# Patient Record
Sex: FEMALE | Race: BLACK OR AFRICAN AMERICAN | Employment: OTHER | URBAN - METROPOLITAN AREA
[De-identification: names, ages, dates, MRNs, and addresses within clinical notes are randomized per-mention and may not be internally consistent; named-entity substitution may affect disease eponyms.]

---

## 2017-01-04 PROBLEM — C64.1 RENAL CELL CARCINOMA OF RIGHT KIDNEY (HCC): Status: ACTIVE | Noted: 2017-01-04

## 2017-01-09 ENCOUNTER — APPOINTMENT (OUTPATIENT)
Dept: GENERAL RADIOLOGY | Age: 72
DRG: 226 | End: 2017-01-09
Attending: EMERGENCY MEDICINE
Payer: MEDICARE

## 2017-01-09 ENCOUNTER — HOSPITAL ENCOUNTER (INPATIENT)
Age: 72
LOS: 14 days | Discharge: NURSING, REHAB OR LONG-TERM CARE | DRG: 226 | End: 2017-01-23
Attending: EMERGENCY MEDICINE | Admitting: INTERNAL MEDICINE
Payer: MEDICARE

## 2017-01-09 DIAGNOSIS — I47.1 SVT (SUPRAVENTRICULAR TACHYCARDIA) (HCC): Primary | ICD-10-CM

## 2017-01-09 DIAGNOSIS — R77.8 ELEVATED TROPONIN: ICD-10-CM

## 2017-01-09 DIAGNOSIS — J18.9 PNEUMONIA OF LEFT LOWER LOBE DUE TO INFECTIOUS ORGANISM: ICD-10-CM

## 2017-01-09 DIAGNOSIS — N28.9 RENAL INSUFFICIENCY: ICD-10-CM

## 2017-01-09 LAB
ALBUMIN SERPL BCP-MCNC: 2.4 G/DL (ref 3.5–5)
ALBUMIN/GLOB SERPL: 0.5 {RATIO} (ref 1.1–2.2)
ALP SERPL-CCNC: 84 U/L (ref 45–117)
ALT SERPL-CCNC: 9 U/L (ref 12–78)
ANION GAP BLD CALC-SCNC: 12 MMOL/L (ref 5–15)
APPEARANCE UR: ABNORMAL
AST SERPL W P-5'-P-CCNC: 16 U/L (ref 15–37)
ATRIAL RATE: 88 BPM
ATRIAL RATE: 93 BPM
BACTERIA URNS QL MICRO: NEGATIVE /HPF
BASOPHILS # BLD AUTO: 0 K/UL (ref 0–0.1)
BASOPHILS # BLD: 0 % (ref 0–1)
BILIRUB SERPL-MCNC: 0.6 MG/DL (ref 0.2–1)
BILIRUB UR QL CFM: NEGATIVE
BUN SERPL-MCNC: 34 MG/DL (ref 6–20)
BUN/CREAT SERPL: 20 (ref 12–20)
CALCIUM SERPL-MCNC: 8.6 MG/DL (ref 8.5–10.1)
CALCULATED P AXIS, ECG09: 28 DEGREES
CALCULATED R AXIS, ECG10: -5 DEGREES
CALCULATED R AXIS, ECG10: -5 DEGREES
CALCULATED T AXIS, ECG11: 15 DEGREES
CALCULATED T AXIS, ECG11: 176 DEGREES
CHLORIDE SERPL-SCNC: 96 MMOL/L (ref 97–108)
CK MB CFR SERPL CALC: 6 % (ref 0–2.5)
CK MB SERPL-MCNC: 1.5 NG/ML (ref 5–25)
CK SERPL-CCNC: 25 U/L (ref 26–192)
CK SERPL-CCNC: 30 U/L (ref 26–192)
CO2 SERPL-SCNC: 26 MMOL/L (ref 21–32)
COLOR UR: ABNORMAL
CREAT SERPL-MCNC: 1.72 MG/DL (ref 0.55–1.02)
DIAGNOSIS, 93000: NORMAL
DIAGNOSIS, 93000: NORMAL
EOSINOPHIL # BLD: 0.1 K/UL (ref 0–0.4)
EOSINOPHIL NFR BLD: 1 % (ref 0–7)
EPITH CASTS URNS QL MICRO: ABNORMAL /LPF
ERYTHROCYTE [DISTWIDTH] IN BLOOD BY AUTOMATED COUNT: 19.3 % (ref 11.5–14.5)
GLOBULIN SER CALC-MCNC: 4.9 G/DL (ref 2–4)
GLUCOSE BLD STRIP.AUTO-MCNC: 122 MG/DL (ref 65–100)
GLUCOSE SERPL-MCNC: 120 MG/DL (ref 65–100)
GLUCOSE UR STRIP.AUTO-MCNC: NEGATIVE MG/DL
HCT VFR BLD AUTO: 34.9 % (ref 35–47)
HGB BLD-MCNC: 11.3 G/DL (ref 11.5–16)
HGB UR QL STRIP: NEGATIVE
KETONES UR QL STRIP.AUTO: ABNORMAL MG/DL
LACTATE SERPL-SCNC: 1.6 MMOL/L (ref 0.4–2)
LEUKOCYTE ESTERASE UR QL STRIP.AUTO: ABNORMAL
LYMPHOCYTES # BLD AUTO: 15 % (ref 12–49)
LYMPHOCYTES # BLD: 2.4 K/UL (ref 0.8–3.5)
MAGNESIUM SERPL-MCNC: 2.1 MG/DL (ref 1.6–2.4)
MCH RBC QN AUTO: 23.7 PG (ref 26–34)
MCHC RBC AUTO-ENTMCNC: 32.4 G/DL (ref 30–36.5)
MCV RBC AUTO: 73.3 FL (ref 80–99)
MONOCYTES # BLD: 1 K/UL (ref 0–1)
MONOCYTES NFR BLD AUTO: 6 % (ref 5–13)
NEUTS SEG # BLD: 13 K/UL (ref 1.8–8)
NEUTS SEG NFR BLD AUTO: 78 % (ref 32–75)
NITRITE UR QL STRIP.AUTO: NEGATIVE
P-R INTERVAL, ECG05: 128 MS
PH UR STRIP: 5.5 [PH] (ref 5–8)
PLATELET # BLD AUTO: 442 K/UL (ref 150–400)
POTASSIUM SERPL-SCNC: 4.5 MMOL/L (ref 3.5–5.1)
PROT SERPL-MCNC: 7.3 G/DL (ref 6.4–8.2)
PROT UR STRIP-MCNC: NEGATIVE MG/DL
Q-T INTERVAL, ECG07: 284 MS
Q-T INTERVAL, ECG07: 366 MS
QRS DURATION, ECG06: 106 MS
QRS DURATION, ECG06: 108 MS
QTC CALCULATION (BEZET), ECG08: 442 MS
QTC CALCULATION (BEZET), ECG08: 487 MS
RBC # BLD AUTO: 4.76 M/UL (ref 3.8–5.2)
RBC #/AREA URNS HPF: ABNORMAL /HPF (ref 0–5)
SERVICE CMNT-IMP: ABNORMAL
SODIUM SERPL-SCNC: 134 MMOL/L (ref 136–145)
SP GR UR REFRACTOMETRY: 1.02 (ref 1–1.03)
TROPONIN I SERPL-MCNC: 0.37 NG/ML
TROPONIN I SERPL-MCNC: 0.46 NG/ML
UROBILINOGEN UR QL STRIP.AUTO: 1 EU/DL (ref 0.2–1)
VENTRICULAR RATE, ECG03: 177 BPM
VENTRICULAR RATE, ECG03: 88 BPM
WBC # BLD AUTO: 16.5 K/UL (ref 3.6–11)
WBC URNS QL MICRO: ABNORMAL /HPF (ref 0–4)

## 2017-01-09 PROCEDURE — 74011250636 HC RX REV CODE- 250/636: Performed by: INTERNAL MEDICINE

## 2017-01-09 PROCEDURE — 74011250636 HC RX REV CODE- 250/636

## 2017-01-09 PROCEDURE — 82553 CREATINE MB FRACTION: CPT | Performed by: EMERGENCY MEDICINE

## 2017-01-09 PROCEDURE — 74011000258 HC RX REV CODE- 258: Performed by: INTERNAL MEDICINE

## 2017-01-09 PROCEDURE — 83605 ASSAY OF LACTIC ACID: CPT | Performed by: EMERGENCY MEDICINE

## 2017-01-09 PROCEDURE — 96365 THER/PROPH/DIAG IV INF INIT: CPT

## 2017-01-09 PROCEDURE — 85025 COMPLETE CBC W/AUTO DIFF WBC: CPT | Performed by: EMERGENCY MEDICINE

## 2017-01-09 PROCEDURE — 83735 ASSAY OF MAGNESIUM: CPT | Performed by: EMERGENCY MEDICINE

## 2017-01-09 PROCEDURE — 65610000006 HC RM INTENSIVE CARE

## 2017-01-09 PROCEDURE — 74011250637 HC RX REV CODE- 250/637: Performed by: EMERGENCY MEDICINE

## 2017-01-09 PROCEDURE — 74011000258 HC RX REV CODE- 258: Performed by: EMERGENCY MEDICINE

## 2017-01-09 PROCEDURE — 99285 EMERGENCY DEPT VISIT HI MDM: CPT

## 2017-01-09 PROCEDURE — 80053 COMPREHEN METABOLIC PANEL: CPT | Performed by: EMERGENCY MEDICINE

## 2017-01-09 PROCEDURE — 74011000250 HC RX REV CODE- 250: Performed by: INTERNAL MEDICINE

## 2017-01-09 PROCEDURE — 71010 XR CHEST PORT: CPT

## 2017-01-09 PROCEDURE — 93005 ELECTROCARDIOGRAM TRACING: CPT

## 2017-01-09 PROCEDURE — 82550 ASSAY OF CK (CPK): CPT | Performed by: EMERGENCY MEDICINE

## 2017-01-09 PROCEDURE — 96374 THER/PROPH/DIAG INJ IV PUSH: CPT

## 2017-01-09 PROCEDURE — 74011250636 HC RX REV CODE- 250/636: Performed by: EMERGENCY MEDICINE

## 2017-01-09 PROCEDURE — 87040 BLOOD CULTURE FOR BACTERIA: CPT | Performed by: EMERGENCY MEDICINE

## 2017-01-09 PROCEDURE — 82962 GLUCOSE BLOOD TEST: CPT

## 2017-01-09 PROCEDURE — 81001 URINALYSIS AUTO W/SCOPE: CPT | Performed by: EMERGENCY MEDICINE

## 2017-01-09 PROCEDURE — 84484 ASSAY OF TROPONIN QUANT: CPT | Performed by: EMERGENCY MEDICINE

## 2017-01-09 PROCEDURE — 36415 COLL VENOUS BLD VENIPUNCTURE: CPT | Performed by: EMERGENCY MEDICINE

## 2017-01-09 PROCEDURE — 74011250637 HC RX REV CODE- 250/637: Performed by: INTERNAL MEDICINE

## 2017-01-09 RX ORDER — ACETAMINOPHEN 325 MG/1
650 TABLET ORAL
Status: DISCONTINUED | OUTPATIENT
Start: 2017-01-09 | End: 2017-01-23 | Stop reason: HOSPADM

## 2017-01-09 RX ORDER — GABAPENTIN 300 MG/1
300 CAPSULE ORAL 3 TIMES DAILY
Status: DISCONTINUED | OUTPATIENT
Start: 2017-01-09 | End: 2017-01-22

## 2017-01-09 RX ORDER — METOPROLOL SUCCINATE 25 MG/1
25 TABLET, EXTENDED RELEASE ORAL DAILY
Status: DISCONTINUED | OUTPATIENT
Start: 2017-01-10 | End: 2017-01-20

## 2017-01-09 RX ORDER — BISACODYL 5 MG
5 TABLET, DELAYED RELEASE (ENTERIC COATED) ORAL DAILY PRN
Status: DISCONTINUED | OUTPATIENT
Start: 2017-01-09 | End: 2017-01-23 | Stop reason: HOSPADM

## 2017-01-09 RX ORDER — ADENOSINE 3 MG/ML
INJECTION, SOLUTION INTRAVENOUS
Status: COMPLETED
Start: 2017-01-09 | End: 2017-01-09

## 2017-01-09 RX ORDER — DULOXETIN HYDROCHLORIDE 30 MG/1
60 CAPSULE, DELAYED RELEASE ORAL DAILY
Status: DISCONTINUED | OUTPATIENT
Start: 2017-01-10 | End: 2017-01-19

## 2017-01-09 RX ORDER — ONDANSETRON 2 MG/ML
4 INJECTION INTRAMUSCULAR; INTRAVENOUS
Status: DISCONTINUED | OUTPATIENT
Start: 2017-01-09 | End: 2017-01-23 | Stop reason: HOSPADM

## 2017-01-09 RX ORDER — DULOXETIN HYDROCHLORIDE 30 MG/1
60 CAPSULE, DELAYED RELEASE ORAL 2 TIMES DAILY
Status: DISCONTINUED | OUTPATIENT
Start: 2017-01-09 | End: 2017-01-09

## 2017-01-09 RX ORDER — DILTIAZEM HYDROCHLORIDE 5 MG/ML
INJECTION INTRAVENOUS
Status: DISPENSED
Start: 2017-01-09 | End: 2017-01-10

## 2017-01-09 RX ORDER — DOCUSATE SODIUM 100 MG/1
100 CAPSULE, LIQUID FILLED ORAL 2 TIMES DAILY
Status: DISCONTINUED | OUTPATIENT
Start: 2017-01-09 | End: 2017-01-23 | Stop reason: HOSPADM

## 2017-01-09 RX ORDER — DILTIAZEM HYDROCHLORIDE 5 MG/ML
10 INJECTION INTRAVENOUS
Status: COMPLETED | OUTPATIENT
Start: 2017-01-09 | End: 2017-01-09

## 2017-01-09 RX ORDER — LEVOFLOXACIN 5 MG/ML
750 INJECTION, SOLUTION INTRAVENOUS
Status: DISCONTINUED | OUTPATIENT
Start: 2017-01-09 | End: 2017-01-11

## 2017-01-09 RX ORDER — SODIUM CHLORIDE 0.9 % (FLUSH) 0.9 %
5-10 SYRINGE (ML) INJECTION EVERY 8 HOURS
Status: DISCONTINUED | OUTPATIENT
Start: 2017-01-09 | End: 2017-01-23 | Stop reason: HOSPADM

## 2017-01-09 RX ORDER — AZITHROMYCIN 250 MG/1
500 TABLET, FILM COATED ORAL
Status: COMPLETED | OUTPATIENT
Start: 2017-01-09 | End: 2017-01-09

## 2017-01-09 RX ORDER — SODIUM CHLORIDE 0.9 % (FLUSH) 0.9 %
5-10 SYRINGE (ML) INJECTION AS NEEDED
Status: DISCONTINUED | OUTPATIENT
Start: 2017-01-09 | End: 2017-01-23 | Stop reason: HOSPADM

## 2017-01-09 RX ADMIN — DOCUSATE SODIUM 100 MG: 100 CAPSULE, LIQUID FILLED ORAL at 19:13

## 2017-01-09 RX ADMIN — ADENOSINE 6 MG: 3 INJECTION, SOLUTION INTRAVENOUS at 11:05

## 2017-01-09 RX ADMIN — GUAIFENESIN 600 MG: 600 TABLET, EXTENDED RELEASE ORAL at 19:13

## 2017-01-09 RX ADMIN — GABAPENTIN 300 MG: 300 CAPSULE ORAL at 21:14

## 2017-01-09 RX ADMIN — LEVOFLOXACIN 750 MG: 5 INJECTION, SOLUTION INTRAVENOUS at 21:14

## 2017-01-09 RX ADMIN — AZITHROMYCIN 500 MG: 250 TABLET, FILM COATED ORAL at 14:14

## 2017-01-09 RX ADMIN — ENOXAPARIN SODIUM 70 MG: 40 INJECTION SUBCUTANEOUS at 21:14

## 2017-01-09 RX ADMIN — DEXTROSE MONOHYDRATE 10 MG/HR: 50 INJECTION, SOLUTION INTRAVENOUS at 16:29

## 2017-01-09 RX ADMIN — DILTIAZEM HYDROCHLORIDE 10 MG: 5 INJECTION INTRAVENOUS at 15:57

## 2017-01-09 RX ADMIN — PIPERACILLIN AND TAZOBACTAM 3.38 G: 3; .375 INJECTION, POWDER, FOR SOLUTION INTRAVENOUS at 14:14

## 2017-01-09 RX ADMIN — AMIODARONE HYDROCHLORIDE 1 MG/MIN: 50 INJECTION, SOLUTION INTRAVENOUS at 20:26

## 2017-01-09 RX ADMIN — SODIUM CHLORIDE 1000 ML: 900 INJECTION, SOLUTION INTRAVENOUS at 14:14

## 2017-01-09 RX ADMIN — AMIODARONE HYDROCHLORIDE 150 MG: 50 INJECTION, SOLUTION INTRAVENOUS at 19:52

## 2017-01-09 NOTE — ED PROVIDER NOTES
HPI Comments: Nataly Lazo is a 80 y.o. female, pmhx significant for CAD, HLD, HTN, DM, who presents via EMS to the ED for evaluation of sudden onset lethargy with associated mild SOB x today. Per EMS, the pt was at [facility] for rehabilitation after having her R kidney removed due to renal cancer. They report that the nurses in the facilities noted that she became lethargic and that she a heart rate in the 170s, so they called EMS. Pt specifically denies CP. PCP: JOSE Rubi      Social Hx: -tobacco , -EtOH, -Illicit Drugs   FHx: no pertinent family hx   Medication Allergies: none      There are no other complaints, changes, or physical findings at this time. The history is provided by the patient and the EMS personnel. Past Medical History:   Diagnosis Date    Autoimmune disease ([address]      rheumatoid     CAD (coronary artery disease)     Diabetes (HCC)     GERD (gastroesophageal reflux disease)     Hypertension     Ill-defined condition      anemia    Ill-defined condition      renal mass    Other ill-defined conditions(799.89)      high cholesterol       Past Surgical History:   Procedure Laterality Date    Pr cardiac surg procedure unlist       three stents placed 2005    Hx tonsillectomy      Hx urological  12/22/2016     RIGHT LAPOROSCOPIC HAND ASSISTED RADICAL NEPHRECTOMY         Family History:   Problem Relation Age of Onset    Cancer Mother      stomach    Other Father      aneurysym    Cancer Brother      lung    Other Brother      stomach problems    Stroke Brother        Social History     Social History    Marital status:      Spouse name: N/A    Number of children: N/A    Years of education: N/A     Occupational History    Not on file.      Social History Main Topics    Smoking status: Never Smoker    Smokeless tobacco: Never Used    Alcohol use No    Drug use: No    Sexual activity: Yes     Birth control/ protection: None     Other Topics Concern    Not on file     Social History Narrative         ALLERGIES: Review of patient's allergies indicates no known allergies. Review of Systems   Constitutional: Negative for fever. HENT: Negative for congestion. Eyes: Negative. Respiratory: Positive for shortness of breath. Cardiovascular: Positive for palpitations. Negative for chest pain. Gastrointestinal: Negative for abdominal pain. Endocrine: Negative for heat intolerance. Genitourinary: Negative. Musculoskeletal: Negative for back pain. Skin: Negative for rash. Allergic/Immunologic: Positive for immunocompromised state. Neurological:        + lethargy   Hematological: Does not bruise/bleed easily. Psychiatric/Behavioral: Negative. All other systems reviewed and are negative. Patient Vitals for the past 12 hrs:   Pulse Resp BP SpO2   01/09/17 1557 (!) 168 - 110/82 -   01/09/17 1400 75 21 107/75 100 %   01/09/17 1345 79 17 112/71 98 %   01/09/17 1330 78 19 104/80 99 %   01/09/17 1315 81 14 131/71 99 %   01/09/17 1130 87 19 101/70 98 %   01/09/17 1116 88 24 90/55 96 %   01/09/17 1110 88 - 101/69 -   01/09/17 1109 88 - - -   01/09/17 1105 (!) 178 - 108/66 -       Physical Exam   Constitutional: She is oriented to person, place, and time. She appears well-developed and well-nourished. No distress. HENT:   Head: Normocephalic and atraumatic. Eyes: EOM are normal.   Neck: Normal range of motion. Neck supple. Cardiovascular: Regular rhythm, normal heart sounds and intact distal pulses. Tachycardic   Pulmonary/Chest: Effort normal and breath sounds normal. No respiratory distress. Abdominal: Soft. Bowel sounds are normal. She exhibits no mass. There is no tenderness. Musculoskeletal: Normal range of motion. She exhibits no edema. Neurological: She is alert and oriented to person, place, and time. Coordination normal.   Skin: Skin is warm and dry. Psychiatric: She has a normal mood and affect.  Her behavior is normal.   Nursing note and vitals reviewed. MDM  Number of Diagnoses or Management Options  Pneumonia of left lower lobe due to infectious organism:   Renal insufficiency:   SVT (supraventricular tachycardia):   Diagnosis management comments: DDx: SVT, dehydration, electrolyte abnormality, CHF, CAD, UTI       Amount and/or Complexity of Data Reviewed  Clinical lab tests: reviewed and ordered  Tests in the radiology section of CPT®: ordered and reviewed  Tests in the medicine section of CPT®: reviewed and ordered  Obtain history from someone other than the patient: yes (EMS)  Review and summarize past medical records: yes  Discuss the patient with other providers: yes (Cardiologist   Hospitalist )  Independent visualization of images, tracings, or specimens: yes    Critical Care  Total time providing critical care:  minutes    Patient Progress  Patient progress: stable        Procedures   EKG interpretation: (Preliminary) 1053  Rhythm: Supraventricular tachycardia; and regular . Rate (approx.): 177 bpm; Axis: voltage criteria for LVH; QRS interval: normal ; ST/T wave: ST and T wave abnormality  Written by Fernanda Quiros as dictated by Silas Santacruz MD    EKG interpretation: (Preliminary) 1110  Rhythm: normal sinus rhythm; and regular . Rate (approx.): 88 bpm; Axis: voltage criteria for LVH; GA interval: normal; QRS interval: normal ; ST/T wave: ST elevation, consider early repolarization, pericarditis or injury, non-specific T wave abnormality  Written by Fernanda Quiros as dictated by Silas Santacruz MD      Procedure Note - Chemical Cardioversion:   11:44 AM  Performed by Silas Santacruz MD .  Cardioversion was indicated for a rhythm of SVT and performed 1 times. 6mg of adenosine were given to pt. Patient's rhythm was normal sinus rhythm at the end of the procedure. The procedure took 1-15 minutes, and pt tolerated well.   Written by Fernanda Quiros as dictated by Roger Morton MD    CRITICAL CARE NOTE :    11:46 AM      IMPENDING DETERIORATION -Respiratory, Cardiovascular, Metabolic and Renal    ASSOCIATED RISK FACTORS - Hypotension, Dysrhythmia, Metabolic changes and Dehydration    MANAGEMENT- Bedside Assessment and Supervision of Care    INTERPRETATION -  Xrays, ECG and Blood Pressure    INTERVENTIONS - hemodynamic mngmt and Metobolic interventions    CASE REVIEW - Hospitalist, Medical Sub-Specialist, Nursing and Family    TREATMENT RESPONSE -Stable    PERFORMED BY - Self        NOTES   :      I have spent 81 minutes of critical care time involved in lab review, consultations with specialist, family decision- making, bedside attention and documentation. During this entire length of time I was immediately available to the patient . Roger Morton MD    CONSULT NOTE:   1:22 PM  Roger Morton MD spoke with Dr. Nikolai Lewis,   Specialty: Cardiology  Discussed pt's hx, disposition, and available diagnostic and imaging results. Reviewed care plans. Consultant recommended to repeat her troponin and if it is not going up to d/c with cardizem. Written by SONY Veliz, as dictated by Roger Morton MD.    CONSULT NOTE:   3:26 Parul Burnham MD spoke with Dr. Nikolai Lewis,   Specialty: Cardiology  Discussed pt's hx, disposition, and available diagnostic and imaging results. Reviewed care plans. Consultant will evaluate the pt. Written by SONY Veliz, as dictated by Roger Morton MD.    CONSULT NOTE:   3:53 PM  Roger Morton MD spoke with Dr. Joss Fung,   Specialty: Hospitalist  Discussed pt's hx, disposition, and available diagnostic and imaging results. Reviewed care plans.  Consultant will admit  Written by SONY Veliz, as dictated by Roger Morton MD.                                                          LABORATORY TESTS:  Recent Results (from the past 12 hour(s))   GLUCOSE, POC    Collection Time: 01/09/17 10:49 AM   Result Value Ref Range    Glucose (POC) 122 (H) 65 - 100 mg/dL    Performed by Christy Brewer    EKG, 12 LEAD, INITIAL    Collection Time: 01/09/17 10:53 AM   Result Value Ref Range    Ventricular Rate 177 BPM    Atrial Rate 93 BPM    QRS Duration 108 ms    Q-T Interval 284 ms    QTC Calculation (Bezet) 487 ms    Calculated R Axis -5 degrees    Calculated T Axis 176 degrees    Diagnosis       ** Poor data quality, interpretation may be adversely affected  Supraventricular tachycardia  Voltage criteria for left ventricular hypertrophy  ST & T wave abnormality, consider inferolateral ischemia  When compared with ECG of 08-JUN-2013 22:40,  T wave inversion now evident in Lateral leads  Confirmed by Varinder Tsang P.V. ([id number]) on 1/9/2017 2:46:13 PM     EKG, 12 LEAD, SUBSEQUENT    Collection Time: 01/09/17 11:10 AM   Result Value Ref Range    Ventricular Rate 88 BPM    Atrial Rate 88 BPM    P-R Interval 128 ms    QRS Duration 106 ms    Q-T Interval 366 ms    QTC Calculation (Bezet) 442 ms    Calculated P Axis 28 degrees    Calculated R Axis -5 degrees    Calculated T Axis 15 degrees    Diagnosis       Normal sinus rhythm  Voltage criteria for left ventricular hypertrophy  ST elevation, consider early repolarization, pericarditis, or injury  Nonspecific T wave abnormality    Confirmed by Varinder Tsang, P.V. ([id number]) on 1/9/2017 2:46:25 PM     CBC WITH AUTOMATED DIFF    Collection Time: 01/09/17 11:24 AM   Result Value Ref Range    WBC 16.5 (H) 3.6 - 11.0 K/uL    RBC 4.76 3.80 - 5.20 M/uL    HGB 11.3 (L) 11.5 - 16.0 g/dL    HCT 34.9 (L) 35.0 - 47.0 %    MCV 73.3 (L) 80.0 - 99.0 FL    MCH 23.7 (L) 26.0 - 34.0 PG    MCHC 32.4 30.0 - 36.5 g/dL    RDW 19.3 (H) 11.5 - 14.5 %    PLATELET 287 (H) 036 - 400 K/uL    NEUTROPHILS 78 (H) 32 - 75 %    LYMPHOCYTES 15 12 - 49 %    MONOCYTES 6 5 - 13 %    EOSINOPHILS 1 0 - 7 %    BASOPHILS 0 0 - 1 %    ABS. NEUTROPHILS 13.0 (H) 1.8 - 8.0 K/UL    ABS.  LYMPHOCYTES 2.4 0.8 - 3.5 K/UL    ABS. MONOCYTES 1.0 0.0 - 1.0 K/UL    ABS. EOSINOPHILS 0.1 0.0 - 0.4 K/UL    ABS. BASOPHILS 0.0 0.0 - 0.1 K/UL   METABOLIC PANEL, COMPREHENSIVE    Collection Time: 01/09/17 11:24 AM   Result Value Ref Range    Sodium 134 (L) 136 - 145 mmol/L    Potassium 4.5 3.5 - 5.1 mmol/L    Chloride 96 (L) 97 - 108 mmol/L    CO2 26 21 - 32 mmol/L    Anion gap 12 5 - 15 mmol/L    Glucose 120 (H) 65 - 100 mg/dL    BUN 34 (H) 6 - 20 MG/DL    Creatinine 1.72 (H) 0.55 - 1.02 MG/DL    BUN/Creatinine ratio 20 12 - 20      GFR est AA 36 (L) >60 ml/min/1.73m2    GFR est non-AA 29 (L) >60 ml/min/1.73m2    Calcium 8.6 8.5 - 10.1 MG/DL    Bilirubin, total 0.6 0.2 - 1.0 MG/DL    ALT 9 (L) 12 - 78 U/L    AST 16 15 - 37 U/L    Alk.  phosphatase 84 45 - 117 U/L    Protein, total 7.3 6.4 - 8.2 g/dL    Albumin 2.4 (L) 3.5 - 5.0 g/dL    Globulin 4.9 (H) 2.0 - 4.0 g/dL    A-G Ratio 0.5 (L) 1.1 - 2.2     TROPONIN I    Collection Time: 01/09/17 11:24 AM   Result Value Ref Range    Troponin-I, Qt. 0.37 (H) <0.05 ng/mL   CK W/ REFLX CKMB    Collection Time: 01/09/17 11:24 AM   Result Value Ref Range    CK 30 26 - 192 U/L   MAGNESIUM    Collection Time: 01/09/17 11:24 AM   Result Value Ref Range    Magnesium 2.1 1.6 - 2.4 mg/dL   URINALYSIS W/ RFLX MICROSCOPIC    Collection Time: 01/09/17  2:08 PM   Result Value Ref Range    Color DARK YELLOW      Appearance CLOUDY (A) CLEAR      Specific gravity 1.025 1.003 - 1.030      pH (UA) 5.5 5.0 - 8.0      Protein NEGATIVE  NEG mg/dL    Glucose NEGATIVE  NEG mg/dL    Ketone TRACE (A) NEG mg/dL    Blood NEGATIVE  NEG      Urobilinogen 1.0 0.2 - 1.0 EU/dL    Nitrites NEGATIVE  NEG      Leukocyte Esterase SMALL (A) NEG      WBC 5-10 0 - 4 /hpf    RBC 0-5 0 - 5 /hpf    Epithelial cells MODERATE (A) FEW /lpf    Bacteria NEGATIVE  NEG /hpf   LACTIC ACID, PLASMA    Collection Time: 01/09/17  2:08 PM   Result Value Ref Range    Lactic acid 1.6 0.4 - 2.0 MMOL/L   CK W/ CKMB & INDEX    Collection Time: 01/09/17 2:08 PM   Result Value Ref Range    CK 25 (L) 26 - 192 U/L    CK - MB 1.5 <3.6 NG/ML    CK-MB Index 6.0 (H) 0 - 2.5     TROPONIN I    Collection Time: 01/09/17  2:08 PM   Result Value Ref Range    Troponin-I, Qt. 0.46 (H) <0.05 ng/mL   BILIRUBIN, CONFIRM    Collection Time: 01/09/17  2:08 PM   Result Value Ref Range    Bilirubin UA, confirm NEGATIVE  NEG         IMAGING RESULTS:  CXR Results  (Last 48 hours)               01/09/17 1321  XR CHEST PORT Final result    Impression:  IMPRESSION: Cardiomegaly is stable. Aorta is ectatic. There is increased density   left retrocardiac region most consistent with left basilar atelectasis/airspace   disease               Narrative:  EXAM:  XR CHEST PORT       INDICATION:  SVT       COMPARISON:  10/3/2016       FINDINGS: A portable AP radiograph of the chest was obtained at 1307 hours. The   patient is on a cardiac monitor. There is increased density left retrocardiac   region may represent atelectasis/airspace disease. .  Mild cardiac megaly is   stable. The aorta is ectatic. Sudie Mar Bony structures are unchanged. MEDICATIONS GIVEN:  Medications   dilTIAZem (CARDIZEM) 5 mg/mL injection (not administered)   dilTIAZem (CARDIZEM) injection 10 mg (not administered)   dilTIAZem (CARDIZEM) 100 mg in dextrose 5% (MBP/ADV) 100 mL infusion (not administered)   adenosine (ADENOCARD) 3 mg/mL injection (6 mg  Given 1/9/17 1105)   sodium chloride 0.9 % bolus infusion 1,000 mL (1,000 mL IntraVENous New Bag 1/9/17 1414)   piperacillin-tazobactam (ZOSYN) 3.375 g in 0.9% sodium chloride (MBP/ADV) 100 mL (3.375 g IntraVENous New Bag 1/9/17 1414)   azithromycin (ZITHROMAX) tablet 500 mg (500 mg Oral Given 1/9/17 1414)       IMPRESSION:  1. SVT (supraventricular tachycardia)    2. Pneumonia of left lower lobe due to infectious organism    3. Renal insufficiency    4. Elevated troponin        PLAN:  1.  Admit to Hospitalist  3:56 PM  Patient is being admitted to the hospital by  Rico.  The results of their tests and reasons for their admission have been discussed with them and/or available family. They convey agreement and understanding for the need to be admitted and for their admission diagnosis. Written by Ortiz Guevara, ED Scribe, as dictated by Sulma Martinez MD.      This note is prepared by Saira Tse acting as scribe for MD Sulma Snyder MD: The scribe's documentation has been prepared under my direction and personally reviewed by me in its entirety. I confirm that the note above accurately reflects all work, treatment, procedures, and medical decision making performed by me.

## 2017-01-09 NOTE — CONSULTS
[id number], [address]   [address] [facility]       Name:  Tony Thomas   MR#:  [id number]   :  1945   Account #:  [de-identified]    Date of Consultation:     Date of Adm:  2017       REQUESTING PHYSICIAN: Dov Sung MD, [id number] [place] [address] ER.    REASON FOR CONSULTATION: Evaluate SVT and abnormal   troponin. CHIEF COMPLAINT: Weakness. HISTORY OF PRESENT ILLNESS: The patient is a 81-year-old   female with a history of known coronary artery disease and remote   anterior wall MI in . At that time, the patient underwent stenting of   the LAD. She had diffuse distal LAD disease and for many years was   followed by Dr. Edith Yoder. She apparently did not have any additional   cardiac events. Her risk factors include hypertension, type 2 diabetes   mellitus, and dyslipidemia. She has been intolerant of statins. The patient recently was found to have a right renal mass and   underwent preoperative evaluation in our office in November. She had   a nuclear study that showed an EF of 33% with a fixed apical defect   and fixed inferior defect. There was no reversibility. She subsequently   underwent right nephrectomy in December and was discharged to   [facility]. Over the last few days, she has apparently been   noted to be weak at [facility]. According to the patient, she has   also had some intermittent, vague chest discomfort for the last several   days. She denies syncope, but has had some dizziness. Some   shortness of breath. She was brought to the [id number] [place] [address] ER today and was   found to be in SVT with a rate of 170. This broke with a dose of   adenosine. Initial troponin was 0.37 and a repeat troponin was 0.46. She has also been found to have a pneumonia on chest x-ray and has   an elevated white count. I was asked to see the patient for evaluation. PAST MEDICAL HISTORY: As noted above.  Type 2 diabetes,   hypertension, dyslipidemia, renal cell cancer, status post right   nephrectomy, also history of rheumatoid arthritis. ALLERGIES: INTOLERANCE TO STATINS WITH MYALGIAS. MEDICATIONS ON ADMISSION:   1. Lisinopril. 2. Percocet. 3. Colace. 4. Metoprolol. 5. Cymbalta. 6. Neurontin. 7. Lortab. 8. HCTZ. SOCIAL HISTORY: The patient is a nonsmoker, nondrinker. FAMILY HISTORY: The patient's brother had hypertension. REVIEW OF SYSTEMS   As noted above, otherwise noncontributory. PHYSICAL EXAMINATION   GENERAL: Revealed an elderly  female lying in bed   in no obvious distress. VITAL SIGNS: Blood pressure 107/75, pulse 75, respirations 21. HEENT: Pupils are equal and reactive to light. Oropharynx with moist   oral mucosa. NECK: Supple. No masses or thyromegaly. No cervical or   supraclavicular adenopathy. No definite carotid bruits or JVD. CHEST: Clear anteriorly. No wheezes or crackles. SKIN: Warm and dry. CARDIAC: Tachycardic rhythm, 2/6 systolic murmur, no gallop or   diastolic murmur heard. ABDOMEN: Obese, no obvious masses or organomegaly. Bowel   sounds positive. EXTREMITIES: No cyanosis or clubbing. Distal pulses not well   palpated. No edema noted. NEUROLOGIC: No obvious gross motor deficits. LABORATORY DATA: Hemoglobin 11.3, white count 16.5, CPK 30,   troponin 0.37, repeat 0.46. BUN 34, creatinine 0.7. EKG: Sinus rhythm, LVH, nonspecific ST-T changes. Chest x-ray   showed left basilar airspace disease. EKG on presentation showed   SVT which was regular rate of 175 beats per minute, nonspecific ST-T   changes. IMPRESSION:   1. SVT, probably AV node reentry. 2. Ischemic cardiomyopathy with prior anterior wall myocardial   infarction and PTC and stenting of the left anterior descending. Previous EF of 43%. 3. Hypertension. 4. Type 2 diabetes. 5. Dyslipidemia. 6. Recent right nephrectomy for renal cell cancer. 7. Acute on chronic renal failure.    8. Left-sided pneumonia. RECOMMENDATIONS: The patient will be admitted to the hospitalist   service. In regard to the SVT, I will start her on IV diltiazem to try to   control this. She should be continued on oral beta blocker. If IV   diltiazem does not work, we can try IV amiodarone. The patient will   need to be treated for her underlying noncardiac issues and an   echocardiogram will be done tomorrow. Further recommendations will   follow.         Noralyn Mates, MD Errol Olszewski / Clinton Hadely   D:  01/09/2017   16:02   T:  01/09/2017   16:29   Job #:  [id number]

## 2017-01-09 NOTE — CONSULTS
Pt seen and examined. Consult dictated. IV diltiazem for SVT. Echo. Hospitalists to admit for pneumonia etc  Will follow.

## 2017-01-09 NOTE — IP AVS SNAPSHOT
[address] [phone] Patient: Holger Fulton MRN: [id number] You are allergic to the following Allergen Reactions Percocet (Oxycodone-Acetaminophen) Other (comments) Confused Recent Documentation Weight BMI OB Status Smoking Status 66.9 kg 23.1 kg/m2 Postmenopausal Never Smoker Unresulted Labs Order Current Status SAMPLE TO BLOOD BANK In process Emergency Contacts Name Discharge Info Relation Home Work Mobile Jr Anjel,Juan Miguel DISCHARGE CAREGIVER [3] Child [2] [phone] About your hospitalization You were admitted on:  January 9, 2017 You last received care in the:  [facility] 2 CARDIOPULMONARY CARE You were discharged on:  January 23, 2017 Unit phone number:  [phone] Why you were hospitalized Your primary diagnosis was:  Not on File Your diagnoses also included:  Svt (Supraventricular Tachycardia), Paroxysmal Atrial Fibrillation (Hcc), Orthostatic Hypotension, Left Lower Lobe Pneumonia Providers Seen During Your Hospitalizations Provider Role Specialty Primary office phone Roger Morton MD Attending Provider Emergency Medicine [phone] Pam Brownlee MD Attending Provider Hospitalist [phone] Per Saenz MD Attending Provider Internal Medicine [phone] Prieto Craig MD Attending Provider Internal Medicine [phone] Your Primary Care Physician (PCP) Primary Care Physician Office Phone Office Fax Hammad Russell [phone] Follow-up Information Follow up With Details Comments Contact Info [facility] Cite Narayan Wolf)   [address] [place] [address] [address]   [address] [address] [phone] Bruce Osborne MD In 2 weeks  [id number] [facility] [address] [phone] JOSE Crowder   [address] [facility] [address] 
[phone] Your Appointments Tuesday February 14, 2017 11:00 AM EST New Patient with Robby Hernandez MD  
Arthritis and 25 [facility] [address] [facility]  
[phone] Current Discharge Medication List  
  
START taking these medications Dose & Instructions Dispensing Information Comments Morning Noon Evening Bedtime  
 amiodarone 200 mg tablet Commonly known as:  CORDARONE Your next dose is: Today, Tomorrow Other:  _________ Dose:  200 mg Take 1 Tab by mouth daily for 60 days. Quantity:  30 Tab Refills:  1  
     
   
   
   
  
 furosemide 20 mg tablet Commonly known as:  LASIX Your next dose is: Today, Tomorrow Other:  _________ Dose:  20 mg Take 1 Tab by mouth daily for 60 days. Quantity:  30 Tab Refills:  1  
     
   
   
   
  
 midodrine 10 mg tablet Commonly known as:  Elyn Pata Your next dose is: Today, Tomorrow Other:  _________ Dose:  10 mg Take 1 Tab by mouth three (3) times daily (with meals) for 60 days. Quantity:  90 Tab Refills:  1 CONTINUE these medications which have CHANGED Dose & Instructions Dispensing Information Comments Morning Noon Evening Bedtime  
 gabapentin 400 mg capsule Commonly known as:  NEURONTIN What changed:   
- medication strength 
- how much to take Your next dose is: Today, Tomorrow Other:  _________ Dose:  400 mg Take 1 Cap by mouth three (3) times daily for 60 days. Quantity:  90 Cap Refills:  1  
     
   
   
   
  
 metoprolol succinate 25 mg XL tablet Commonly known as:  TOPROL-XL What changed:  how much to take Your next dose is: Today, Tomorrow Other:  _________  Dose:  12.5 mg  
 Take 0.5 Tabs by mouth daily. Elsa Smith, NP at [facility] Refills:  0 CONTINUE these medications which have NOT CHANGED Dose & Instructions Dispensing Information Comments Morning Noon Evening Bedtime  
 aspirin delayed-release 81 mg tablet Your next dose is: Today, Tomorrow Other:  _________ Dose:  81 mg Take 81 mg by mouth daily. Refills:  0  
     
   
   
   
  
 docusate sodium 100 mg capsule Commonly known as:  Cristin Lowers Your next dose is: Today, Tomorrow Other:  _________ Dose:  100 mg Take 1 Cap by mouth two (2) times a day for 90 days. Quantity:  60 Cap Refills:  2 STOP taking these medications DULoxetine 30 mg capsule Commonly known as:  CYMBALTA  
   
  
 hydroCHLOROthiazide 25 mg tablet Commonly known as:  HYDRODIURIL HYDROcodone-acetaminophen 5-500 mg per tablet Commonly known as:  LORTAB  
   
  
 lisinopril 40 mg tablet Commonly known as:  Shelly April Where to Get Your Medications Information on where to get these meds will be given to you by the nurse or doctor. ! Ask your nurse or doctor about these medications  
  amiodarone 200 mg tablet  
 furosemide 20 mg tablet  
 gabapentin 400 mg capsule  
 midodrine 10 mg tablet Discharge Instructions DISCHARGE INSTRUCTIONS FOR PATIENTS WITH ICD'S You had a [employer] dual chamber ICD implanted on 1/16 by Dr. Alcides Ortiz during your hospital stay. This device was implanted to act as a pacemaker to bring your low heart rate up AND to protect you from dying from a dangerous fast heart signal that sometimes occurs in weak heart muscles. Your heart muscle is weak, confirmed by an echo imaging study here (Ejection fraction 25%, normal is 55-60%).  
 
1. Remember to call for an appointment in 2-4 weeks [phone] to check healing and implant programming with Dr. Shaun Whatley nurse, Tracy Nicole. This appointment can be cancelled if you are going to see your cardiologist, Dr. Nery Lu, in the next month. The device can be checked at that time. 2. Medic Alert Bracelets are available from your pharmacist to wear at all times if you choose to wear one. 3. Carry your ID card for your ICD with you at all times. This card will be given to you in the hospital or mailed to you. 4. The ICD will bulge slightly under your skin. The bulge will decrease in size over the next few weeks. Please notify the doctor's office if you notice any of the following around your ICD site: A.  A bruise that does not go away. B.  Soreness or yellow, green, or brown drainage from the site. C. Any swelling from the site. D. If you have a fever of 100 degrees or higher that lasts for a few days. INCISION CARE 1.  Leave the skin glue over your site until it dissolves on its own, usually in a few weeks. 2.  You may shower after 3 days as long as your incision isnt submerged or directly sprayed upon until well healed. 3.  For comfort, wear loose fitting clothing. 4.  Report any signs of infection, fever, pain, swelling, redness, oozing, or heat at site especially if these symptoms increase after the first 3 to 4 days. ACTIVITY PRECAUTIONS 1. Avoid rough contact with the implant site. 2. No driving for 14 days. 3. Avoid lifting your arm over your head, carrying anything on the affected side, or lifting over 10 pounds for 30 days. For the first 2 days only bend your arm at the elbow. 4. Any extreme activity such as golf, weight lifting or exercise biking should be restricted for 60 days. 5. Do not carry objects by holding them against your implant site. 6.  No shooting rifles or any type of gun with the affected shoulder permanently. 7.  Welding and chainsaws are prohibited. SPECIAL PRECAUTIONS 1.  You should avoid all strong magnetic fields, such as arc welding, large transformers, large motors. Some ICD devices will beep if it detects a strong magnet. If this occurs, move out of the area. 2.  You may not have an MRI which uses a strong magnet to take pictures. 3.  Treatments or surgery that requires diathermy or electrocautery should be discussed with your doctor before scheduled. 4.  Avoid radio frequency transmitters, including radar. 5.  Advise dentist or other medical personnel you see that you have an ICD. 6.  Cell phones and microwave oven use is okay. 7.  If you plan to move or take a trip to a new area, the doctor's office will give you a name of a doctor to contact for any problems. SPECIAL INSTRUCTIONS ON SHOCKS 1. Notify your doctor for any of the following: A. Anytime a shock is received in a 24 hour period. An office visit is not usually required for a single shock. B.  Two or more shocks in a row. If you do not feel well, call the [facility], otherwise call your doctor. This may require an office visit. C. Two or more shocks spaced apart by several hours. This may require an office visit. 2.  Keep a record of events. Include date, time, symptoms and activity at that time. ANTIBIOTIC THERAPY During the first 8 weeks after your ICD insertion, you may need antibiotics before any dental work or certain tests or operations. Let the dentist or doctor who is caring for you know that you have had an implanted device. HOSPITALIST DISCHARGE INSTRUCTIONS 
 
NAME: Pinkie Bunnie Cabot :  1945 MRN:  [id number] Date/Time:  2017 12:56 PM 
 
ADMIT DATE: 2017 DISCHARGE DATE: 2017 Medications: Per above medication reconciliation. Pain Management: per above medications Recommended diet: Cardiac Diet Recommended activity: PT/OT Eval and Treat She needs to wear compression stockings during the day as tolerated Wound care: AICD site care per routine Indwelling devices:  None Supplemental Oxygen: None Required Lab work: Per SNF routine Glucose management:  None Code status: Full Outside physician follow up: Follow-up Information Follow up With Details Comments Contact Info [facility] Cite Narayan Wolf)   [address] [place] [address] [address]   [address] [address] [phone] Jaquan Kilgore MD In 2 weeks  [address] [facility] 
[phone] JOSE Drew   [address] [address] 
[phone] Skilled nursing facility/ SNF MD responsible for above on discharge. Information obtained by : 
I understand that if any problems occur once I am at home I am to contact my physician. I understand and acknowledge receipt of the instructions indicated above. Physician's or R.N.'s Signature                                                                  Date/Time Patient or Repres Discharge Instructions Attachments/References HEART FAILURE: AVOIDING TRIGGERS (ENGLISH) HEART FAILURE: SELF-CARE: GENERAL INFO (ENGLISH) Discharge Orders None [facility] Announcement We are excited to announce that we are making your provider's discharge notes available to you in [facility]. You will see these notes when they are completed and signed by the physician that discharged you from your recent hospital stay.   If you have any questions or concerns about any information you see in [employer], please call the [employer] Information Department where you were seen or reach out to your Primary Care Provider for more information about your plan of care. Introducing [facility]! [place] introduces [employer] patient portal. Now you can access parts of your medical record, email your doctor's office, and request medication refills online. 1. In your internet browser, go to [email or web address] 2. Click on the First Time User? Click Here link in the Sign In box. You will see the New Member Sign Up page. 3. Enter your [employer] Access Code exactly as it appears below. You will not need to use this code after youve completed the sign-up process. If you do not sign up before the expiration date, you must request a new code. · [employer] Access Code: [email or web address] Expires: 4/9/2017 11:18 AM 
 
4. Enter the last four digits of your Social Security Number (xxxx) and Date of Birth (mm/dd/yyyy) as indicated and click Submit. You will be taken to the next sign-up page. 5. Create a [employer] ID. This will be your [employer] login ID and cannot be changed, so think of one that is secure and easy to remember. 6. Create a [employer] password. You can change your password at any time. 7. Enter your Password Reset Question and Answer. This can be used at a later time if you forget your password. 8. Enter your e-mail address. You will receive e-mail notification when new information is available in [address] 9. Click Sign Up. You can now view and download portions of your medical record. 10. Click the Download Summary menu link to download a portable copy of your medical information. If you have questions, please visit the Frequently Asked Questions section of the [employer] website. Remember, [employer] is NOT to be used for urgent needs. For medical emergencies, dial 911. Now available from your iPhone and Android! General Information Please provide this summary of care documentation to your next provider. Patient Signature:  ____________________________________________________________ Date:  ____________________________________________________________  
  
Michelle Pang Provider Signature:  ____________________________________________________________ Date:  ____________________________________________________________ More Information Avoiding Triggers With Heart Failure: Care Instructions Your Care Instructions Triggers are anything that make your heart failure flare up. A flare-up is also called \"sudden heart failure\" or \"acute heart failure. \" When you have a flare-up, fluid builds up in your lungs, and you have problems breathing. You might need to go to the hospital. By watching for changes in your condition and avoiding triggers, you can prevent heart failure flare-ups. Follow-up care is a key part of your treatment and safety. Be sure to make and go to all appointments, and call your doctor if you are having problems. It's also a good idea to know your test results and keep a list of the medicines you take. How can you care for yourself at home? Watch for changes in your weight and condition · Weigh yourself without clothing at the same time each day. Record your weight. Call your doctor if you gain 3 pounds or more in 2 to 3 days. A sudden weight gain may mean that your heart failure is getting worse. · Keep a daily record of your symptoms. Write down any changes in how you feel, such as new shortness of breath, cough, or problems eating. Also record if your ankles are more swollen than usual and if you have to urinate in the night more often. Note anything that you ate or did that could have triggered these changes. Limit sodium Sodium causes your body to hold on to water, making it harder for your heart to pump. People get most of their sodium from processed foods.  Fast food and restaurant meals also tend to be very high in sodium. · Your doctor may suggest that you limit sodium to 2,000 milligrams (mg) a day or less. That is less than 1 teaspoon of salt a day, including all the salt you eat in cooking or in packaged foods. · Read food labels on cans and food packages. They tell you how much sodium you get in one serving. Check the serving size. If you eat more than one serving, you are getting more sodium. · Be aware that sodium can come in forms other than salt, including monosodium glutamate (MSG), sodium citrate, and sodium bicarbonate (baking soda). MSG is often added to Asian food. You can sometimes ask for food without MSG or salt. · Slowly reducing salt will help you adjust to the taste. Take the salt shaker off the table. · Flavor your food with garlic, lemon juice, onion, vinegar, herbs, and spices instead of salt. Do not use soy sauce, steak sauce, onion salt, garlic salt, mustard, or ketchup on your food, unless it is labeled \"low-sodium\" or \"low-salt. \" 
· Make your own salad dressings, sauces, and ketchup without adding salt. · Use fresh or frozen ingredients, instead of canned ones, whenever you can. Choose low-sodium canned goods. · Eat less processed food and food from restaurants, including fast food. Exercise as directed Moderate, regular exercise is very good for your heart. It improves your blood flow and helps control your weight. But too much exercise can stress your heart and cause a heart failure flare-up. · Check with your doctor before you start an exercise program. 
· Walking is an easy way to get exercise. Start out slowly. Gradually increase the length and pace of your walk. Swimming, riding a bike, and using a treadmill are also good forms of exercise. · When you exercise, watch for signs that your heart is working too hard.  You are pushing yourself too hard if you cannot talk while you are exercising. If you become short of breath or dizzy or have chest pain, stop, sit down, and rest. 
· Do not exercise when you do not feel well. Take medicines correctly · Take your medicines exactly as prescribed. Call your doctor if you think you are having a problem with your medicine. · Make a list of all the medicines you take. Include those prescribed to you by other doctors and any over-the-counter medicines, vitamins, or supplements you take. Take this list with you when you go to any doctor. · Take your medicines at the same time every day. It may help you to post a list of all the medicines you take every day and what time of day you take them. · Make taking your medicine as simple as you can. Plan times to take your medicines when you are doing other things, such as eating a meal or getting ready for bed. This will make it easier to remember to take your medicines. · Get organized. Use helpful tools, such as daily or weekly pill containers. When should you call for help? Call 911 if you have symptoms of sudden heart failure such as: 
· You have severe trouble breathing. · You cough up pink, foamy mucus. · You have a new irregular or rapid heartbeat. Call your doctor now or seek immediate medical care if: 
· You have new or increased shortness of breath. · You are dizzy or lightheaded, or you feel like you may faint. · You have sudden weight gain, such as 3 pounds or more in 2 to 3 days. · You have increased swelling in your legs, ankles, or feet. · You are suddenly so tired or weak that you cannot do your usual activities. Watch closely for changes in your health, and be sure to contact your doctor if you develop new symptoms. Where can you learn more? Go to http://nohemy-josé miguel.info/. Enter K426 in the search box to learn more about \"Avoiding Triggers With Heart Failure: Care Instructions. \" Current as of: April 27, 2016 Content Version: 11.1 © 6031-4657 Healthwise, [employer]. Care instructions adapted under license by [employer] (which disclaims liability or warranty for this information). If you have questions about a medical condition or this instruction, always ask your healthcare professional. [address] any warranty or liability for your use of this information. Learning About Self-Care for Heart Failure What is self-care for heart failure? Heart failure usually gets worse over time. But there are many things you can do to feel better, stay healthy longer, and avoid the hospital. 
Self-care means managing your health by doing certain things every day, like weighing yourself. It's about knowing which symptoms to watch for so you can avoid getting worse. When you practice good self-care, you know when it's time to call your doctor and when your heart failure has turned into an emergency. The lists below can help. Top 5 self-care tips for every day 1. Take your medicines as prescribed. This gives them the best chance of helping you. 2. Watch for signs that you're getting worse. Weighing yourself every day is one of the best ways to do this. Weight gain may be a sign that your body is holding on to too much fluid. Weigh yourself at the same time each day, using the same scale, on a hard, flat surface. The best time is in the morning after you go to the bathroom and before you eat or drink anything. 3. Find out what your triggers are, and learn to avoid them. Triggers are things that make your heart failure worse, often suddenly. A trigger may be eating too much salt, missing a dose of your medicine, or exercising too hard. 4. Limit sodium. This helps keep fluid from building up and makes it easier for your heart to pump. Your doctor may suggest that you limit sodium to 2,000 milligrams (mg) a day or less.  That's less than 1 teaspoon. You can stay under this amount by limiting the salt you eat at home and by watching for \"hidden\" sodium when you eat out or shop for food. 5. Try to exercise throughout the week. Exercise makes your heart stronger and can help you avoid symptoms. Walking is a great way to get exercise. If your doctor says it's safe, start out with some short walks, and then slowly build up to longer ones. When should you act? Try to become familiar with signs that mean your heart failure is getting worse. If you need help, talk with your doctor about making a personal plan. Here are some things to watch for as you practice your daily self-care. Call your doctor if: 
· You gain 3 pounds or more over 2 to 3 days. (Or your doctor may tell you how much weight to watch for.) · You have new or worse swelling in your feet, ankles, or legs. · Your breathing gets worse. Activities that did not make you short of breath before are hard for you now. · Your breathing when you lie down is worse than usual, or you wake up at night needing to catch your breath. Be sure to make and go to all of your follow-up appointments. And it's always a good idea to call your doctor anytime you have a sudden change in symptoms. When is it an emergency? Sometimes the symptoms get worse very quickly. This is called sudden heart failure. It causes fluid to build up in your lungs. Sudden heart failure is an emergency. If you have any of these symptoms, you need care right away. Call 911 if: 
· You have severe shortness of breath. · You have an irregular or fast heartbeat. · You cough up foamy, pink mucus. What else can you do to stay healthy? There are other things you can do to take care of your body and your heart. These things will help you feel better. And they will also reduce your risk of heart attack and stroke. · Try to stay at a healthy weight. Eat a healthy diet with lots of fresh fruit, vegetables, and whole grains. · If you smoke, quit. · Limit the amount of alcohol you drink. · Keep high blood pressure and diabetes under control. If you need help, talk with your doctor. If your doctor has not set you up with a cardiac rehabilitation (rehab) program, talk to him or her about whether that is right for you. Cardiac rehab includes exercise, help with diet and lifestyle changes, and emotional support. Also let your doctor know if: 
· You're having trouble sleeping. Sleep is important to your well-being. It also helps your heart work the way it's supposed to. Your doctor can help you decide if you need treatment for sleep problems. · You're feeling sad and hopeless much of the time, or you are worried and anxious. Heart failure can be hard on your emotions. Treatment with counseling and medicine can help. And when you feel better, you're more likely to take care of yourself. Follow-up care is a key part of your treatment and safety. Be sure to make and go to all appointments, and call your doctor if you are having problems. It's also a good idea to know your test results and keep a list of the medicines you take. Where can you learn more? Go to http://nohemy-josé miguel.info/. Enter E289 in the search box to learn more about \"Learning About Self-Care for Heart Failure. \" Current as of: January 27, 2016 Content Version: 11.1 © 3712-5200 [employer], [employer]. Care instructions adapted under license by [employer] (which disclaims liability or warranty for this information). If you have questions about a medical condition or this instruction, always ask your healthcare professional. [address] any warranty or liability for your use of this information.

## 2017-01-09 NOTE — ED NOTES
Pt arrives via EMS from rehab center c/o weakness and heart racing, pts , pt appears to be in SVT. MD at bedside. Pt placed on continuous EKG, defib pads in place. 6 mg of Adenosine at bedside.

## 2017-01-09 NOTE — H&P
Hospitalist Admission Note    NAME: Maddison Solo   :  1945   MRN:  [id number]     Date/Time:  2017 4:30 PM    Patient PCP: JOSE Maurer  ________________________________________________________________________    My assessment of this patient's clinical condition and my plan of care is as follows. Assessment / Plan:  SVT/afib with aberrancy and mildly elevated troponin:  Variable HR 50s-170s in ER  - CXR with cardiomegaly is stable. Aorta is ectatic. There is increased density left retrocardiac region most consistent with left basilar atelectasis/airspace disease. - echo ordered  - appreciate cardiology consult  - con't metoprolol  - diltiazem gtt recommended - if continues to have severe variability or hypotension in HR, Dr. Duran Leach recommending change to amiodarone gtt  - therapeutic anticoagulation  - serial troponins, suspect mild elevation due to high HR  Acute renal failure in setting of recent R nephrectomy for RCC :  Suspect this is her new baseline renal function  - UA reviewed and is benign  - holding nephrotoxins (HCTZ, lisinopril)  - IV fluids  Leukocytosis: concern for PNA based on CXR but Pt with minimal respiratory sx (has dyspnea but no significant cough)  - given zosyn x 1 in ER, will change to levaquin  - mucinex BID  GERD    Code Status: full  Surrogate Decision Maker: son Timmy Ahumada  DVT Prophylaxis: lovenox        Subjective:   CHIEF COMPLAINT:   Chest pain    HISTORY OF PRESENT ILLNESS:     Jose Eduardo Abad is a 80 y.o.  female who presents with above. Pt recently underwent R nephrectomy 2/2 RCC and was hospitalized - for this. She discharged to [facility]. She says that although she has essentially no pain she has continued to have weakness. Over the last 2 days, she has developed chest pain under her L breast.  She denies lightheadedness or palpitations. She has been having ROCK. She has a dry cough.   No fevers or rhinorrhea. Appetite is poor because she doesn't like the food. No bowel changes. She thinks her feet are a little bit swollen. We were asked to admit for work up and evaluation of the above problems. Past Medical History   Diagnosis Date    Autoimmune disease ([address]      rheumatoid     CAD (coronary artery disease)     GERD (gastroesophageal reflux disease)     Hypertension     Ill-defined condition      anemia    Other ill-defined conditions(799.89)      high cholesterol    Renal cell carcinoma of right kidney (HCC)      s/p resection 12/16        Past Surgical History   Procedure Laterality Date    Pr cardiac surg procedure unlist       three stents placed 2005    Hx tonsillectomy      Hx urological  12/22/2016     RIGHT LAPOROSCOPIC HAND ASSISTED RADICAL NEPHRECTOMY       Social History   Substance Use Topics    Smoking status: Never Smoker    Smokeless tobacco: Never Used    Alcohol use No        Family History   Problem Relation Age of Onset    Cancer Mother      stomach    Other Father      aneurysym   Aundria Dadds Cancer Brother      lung    Other Brother      stomach problems    Stroke Brother      Allergies   Allergen Reactions    Percocet [Oxycodone-Acetaminophen] Other (comments)     Confused        Prior to Admission medications    Medication Sig Start Date End Date Taking? Authorizing Provider   oxyCODONE-acetaminophen (PERCOCET) 5-325 mg per tablet Take 1-2 Tabs by mouth every six (6) hours as needed for Pain. Max Daily Amount: 8 Tabs. 12/22/16   Dena Berrios MD   docusate sodium (COLACE) 100 mg capsule Take 1 Cap by mouth two (2) times a day for 90 days. 12/22/16 3/22/17  Dena Berrios MD   metoprolol succinate (TOPROL-XL) 25 mg XL tablet Take 25 mg by mouth daily. Femi Olmstead NP at [address] Provider   DULoxetine (CYMBALTA) 30 mg capsule Take 60 mg by mouth two (2) times a day.     Historical Provider   gabapentin (NEURONTIN) 300 mg capsule Take 300 mg by mouth three (3) times daily. Mau Raphael MD   HYDROcodone-acetaminophen (LORTAB) 5-500 mg per tablet Take 1 Tab by mouth every four (4) hours as needed for Pain. 6/9/13   Reginaldo Rodriguez MD   hydrochlorothiazide (HYDRODIURIL) 25 mg tablet Take 25 mg by mouth daily. Mau Raphael MD   lisinopril (PRINIVIL, ZESTRIL) 40 mg tablet Take 40 mg by mouth daily. Mau Raphael MD       REVIEW OF SYSTEMS:     I am not able to complete the review of systems because:    The patient is intubated and sedated    The patient has altered mental status due to his acute medical problems    The patient has baseline aphasia from prior stroke(s)    The patient has baseline dementia and is not reliable historian    The patient is in acute medical distress and unable to provide information           Total of 12 systems reviewed as follows:       POSITIVE= underlined text  Negative = text not underlined  General:  fever, chills, sweats, generalized weakness, weight loss/gain,      loss of appetite   Eyes:    blurred vision, eye pain, loss of vision, double vision  ENT:    rhinorrhea, pharyngitis   Respiratory:   cough, sputum production, SOB, ROCK, wheezing, pleuritic pain   Cardiology:   chest pain, palpitations, orthopnea, PND, edema, syncope   Gastrointestinal:  abdominal pain , N/V, diarrhea, dysphagia, constipation, bleeding   Genitourinary:  frequency, urgency, dysuria, hematuria, incontinence   Muskuloskeletal :  arthralgia, myalgia, back pain  Hematology:  easy bruising, nose or gum bleeding, lymphadenopathy   Dermatological: rash, ulceration, pruritis, color change / jaundice  Endocrine:   hot flashes or polydipsia   Neurological:  headache, dizziness, confusion, focal weakness, paresthesia,     Speech difficulties, memory loss, gait difficulty  Psychological: Feelings of anxiety, depression, agitation    Objective:   VITALS:    Visit Vitals    /82    Pulse (!) 168    Resp 21    SpO2 100%       PHYSICAL EXAM:    General: Alert, cooperative, no distress, appears stated age. HEENT: Atraumatic, anicteric sclerae, pink conjunctivae     No oral ulcers, mucosa moist, throat clear, dentition fair  Neck:  Supple, symmetrical,  thyroid: non tender  Lungs:   Clear to auscultation bilaterally. No Wheezing or Rhonchi. No rales. Chest wall:  No tenderness  No accessory muscle use. Heart:   Tachycardic, irregular rhythm,  No  murmur   No edema  Abdomen:   Soft, non-tender. Not distended. Bowel sounds normal  Extremities: No cyanosis. No clubbing    Skin:     Not pale. Not Jaundiced  No rashes   Psych:  Good insight. Not depressed. Not anxious or agitated. Neurologic: EOMs intact. No facial asymmetry. No aphasia or slurred speech. Symmetrical strength, Sensation grossly intact. Alert and oriented X 4.     _______________________________________________________________________  Care Plan discussed with:    Comments   Patient x    Family      RN     Care Manager                    Consultant:      _______________________________________________________________________  Expected  Disposition:   Home with Family    HH/PT/OT/RN    SNF/LTC x   GUILHERME    ________________________________________________________________________  TOTAL TIME:   Minutes    Critical Care Provided   54  Minutes non procedure based ([id number])      Comments    x Reviewed previous records   >50% of visit spent in counseling and coordination of care x Discussion with patient and/or family and questions answered       ________________________________________________________________________  Signed: Tj Link MD    Procedures: see electronic medical records for all procedures/Xrays and details which were not copied into this note but were reviewed prior to creation of Plan.     LAB DATA REVIEWED:    Recent Results (from the past 24 hour(s))   GLUCOSE, POC    Collection Time: 01/09/17 10:49 AM   Result Value Ref Range    Glucose (POC) 122 (H) 65 - 100 mg/dL    Performed by [employer]    EKG, 12 LEAD, INITIAL    Collection Time: 01/09/17 10:53 AM   Result Value Ref Range    Ventricular Rate 177 BPM    Atrial Rate 93 BPM    QRS Duration 108 ms    Q-T Interval 284 ms    QTC Calculation (Bezet) 487 ms    Calculated R Axis -5 degrees    Calculated T Axis 176 degrees    Diagnosis       ** Poor data quality, interpretation may be adversely affected  Supraventricular tachycardia  Voltage criteria for left ventricular hypertrophy  ST & T wave abnormality, consider inferolateral ischemia  When compared with ECG of 08-JUN-2013 22:40,  T wave inversion now evident in Lateral leads  Confirmed by [employer] ([id number]) on 1/9/2017 2:46:13 PM     EKG, 12 LEAD, SUBSEQUENT    Collection Time: 01/09/17 11:10 AM   Result Value Ref Range    Ventricular Rate 88 BPM    Atrial Rate 88 BPM    P-R Interval 128 ms    QRS Duration 106 ms    Q-T Interval 366 ms    QTC Calculation (Bezet) 442 ms    Calculated P Axis 28 degrees    Calculated R Axis -5 degrees    Calculated T Axis 15 degrees    Diagnosis       Normal sinus rhythm  Voltage criteria for left ventricular hypertrophy  ST elevation, consider early repolarization, pericarditis, or injury  Nonspecific T wave abnormality    Confirmed by [employer] ([id number]) on 1/9/2017 2:46:25 PM     CBC WITH AUTOMATED DIFF    Collection Time: 01/09/17 11:24 AM   Result Value Ref Range    WBC 16.5 (H) 3.6 - 11.0 K/uL    RBC 4.76 3.80 - 5.20 M/uL    HGB 11.3 (L) 11.5 - 16.0 g/dL    HCT 34.9 (L) 35.0 - 47.0 %    MCV 73.3 (L) 80.0 - 99.0 FL    MCH 23.7 (L) 26.0 - 34.0 PG    MCHC 32.4 30.0 - 36.5 g/dL    RDW 19.3 (H) 11.5 - 14.5 %    PLATELET 453 (H) 452 - 400 K/uL    NEUTROPHILS 78 (H) 32 - 75 %    LYMPHOCYTES 15 12 - 49 %    MONOCYTES 6 5 - 13 %    EOSINOPHILS 1 0 - 7 %    BASOPHILS 0 0 - 1 %    ABS. NEUTROPHILS 13.0 (H) 1.8 - 8.0 K/UL    ABS. LYMPHOCYTES 2.4 0.8 - 3.5 K/UL    ABS. MONOCYTES 1.0 0.0 - 1.0 K/UL    ABS. EOSINOPHILS 0.1 0.0 - 0.4 K/UL    ABS.  BASOPHILS 0.0 0.0 - 0.1 K/UL   METABOLIC PANEL, COMPREHENSIVE    Collection Time: 01/09/17 11:24 AM   Result Value Ref Range    Sodium 134 (L) 136 - 145 mmol/L    Potassium 4.5 3.5 - 5.1 mmol/L    Chloride 96 (L) 97 - 108 mmol/L    CO2 26 21 - 32 mmol/L    Anion gap 12 5 - 15 mmol/L    Glucose 120 (H) 65 - 100 mg/dL    BUN 34 (H) 6 - 20 MG/DL    Creatinine 1.72 (H) 0.55 - 1.02 MG/DL    BUN/Creatinine ratio 20 12 - 20      GFR est AA 36 (L) >60 ml/min/1.73m2    GFR est non-AA 29 (L) >60 ml/min/1.73m2    Calcium 8.6 8.5 - 10.1 MG/DL    Bilirubin, total 0.6 0.2 - 1.0 MG/DL    ALT 9 (L) 12 - 78 U/L    AST 16 15 - 37 U/L    Alk.  phosphatase 84 45 - 117 U/L    Protein, total 7.3 6.4 - 8.2 g/dL    Albumin 2.4 (L) 3.5 - 5.0 g/dL    Globulin 4.9 (H) 2.0 - 4.0 g/dL    A-G Ratio 0.5 (L) 1.1 - 2.2     TROPONIN I    Collection Time: 01/09/17 11:24 AM   Result Value Ref Range    Troponin-I, Qt. 0.37 (H) <0.05 ng/mL   CK W/ REFLX CKMB    Collection Time: 01/09/17 11:24 AM   Result Value Ref Range    CK 30 26 - 192 U/L   MAGNESIUM    Collection Time: 01/09/17 11:24 AM   Result Value Ref Range    Magnesium 2.1 1.6 - 2.4 mg/dL   URINALYSIS W/ RFLX MICROSCOPIC    Collection Time: 01/09/17  2:08 PM   Result Value Ref Range    Color DARK YELLOW      Appearance CLOUDY (A) CLEAR      Specific gravity 1.025 1.003 - 1.030      pH (UA) 5.5 5.0 - 8.0      Protein NEGATIVE  NEG mg/dL    Glucose NEGATIVE  NEG mg/dL    Ketone TRACE (A) NEG mg/dL    Blood NEGATIVE  NEG      Urobilinogen 1.0 0.2 - 1.0 EU/dL    Nitrites NEGATIVE  NEG      Leukocyte Esterase SMALL (A) NEG      WBC 5-10 0 - 4 /hpf    RBC 0-5 0 - 5 /hpf    Epithelial cells MODERATE (A) FEW /lpf    Bacteria NEGATIVE  NEG /hpf   LACTIC ACID, PLASMA    Collection Time: 01/09/17  2:08 PM   Result Value Ref Range    Lactic acid 1.6 0.4 - 2.0 MMOL/L   CK W/ CKMB & INDEX    Collection Time: 01/09/17  2:08 PM   Result Value Ref Range    CK 25 (L) 26 - 192 U/L    CK - MB 1.5 <3.6 NG/ML    CK-MB Index 6.0 (H) 0 - 2.5     TROPONIN I    Collection Time: 01/09/17  2:08 PM   Result Value Ref Range    Troponin-I, Qt. 0.46 (H) <0.05 ng/mL   BILIRUBIN, CONFIRM    Collection Time: 01/09/17  2:08 PM   Result Value Ref Range    Bilirubin UA, confirm NEGATIVE  NEG

## 2017-01-09 NOTE — ED NOTES
Cardiologist at bedside. Patient again in SVT. Verbal order given for 10 mg bolus of diltiazem IVP. Patients heart rate 162. Verbal orders received to start diltiazem gtt.

## 2017-01-10 ENCOUNTER — APPOINTMENT (OUTPATIENT)
Dept: ULTRASOUND IMAGING | Age: 72
DRG: 226 | End: 2017-01-10
Attending: INTERNAL MEDICINE
Payer: MEDICARE

## 2017-01-10 LAB
ANION GAP BLD CALC-SCNC: 10 MMOL/L (ref 5–15)
BASOPHILS # BLD AUTO: 0 K/UL (ref 0–0.1)
BASOPHILS # BLD: 0 % (ref 0–1)
BUN SERPL-MCNC: 36 MG/DL (ref 6–20)
BUN/CREAT SERPL: 23 (ref 12–20)
CALCIUM SERPL-MCNC: 7.7 MG/DL (ref 8.5–10.1)
CHLORIDE SERPL-SCNC: 97 MMOL/L (ref 97–108)
CO2 SERPL-SCNC: 25 MMOL/L (ref 21–32)
CREAT SERPL-MCNC: 1.56 MG/DL (ref 0.55–1.02)
EOSINOPHIL # BLD: 0.1 K/UL (ref 0–0.4)
EOSINOPHIL NFR BLD: 1 % (ref 0–7)
ERYTHROCYTE [DISTWIDTH] IN BLOOD BY AUTOMATED COUNT: 18.6 % (ref 11.5–14.5)
GLUCOSE SERPL-MCNC: 159 MG/DL (ref 65–100)
HCT VFR BLD AUTO: 25.7 % (ref 35–47)
HGB BLD-MCNC: 8.3 G/DL (ref 11.5–16)
LYMPHOCYTES # BLD AUTO: 18 % (ref 12–49)
LYMPHOCYTES # BLD: 1.6 K/UL (ref 0.8–3.5)
MAGNESIUM SERPL-MCNC: 1.9 MG/DL (ref 1.6–2.4)
MCH RBC QN AUTO: 23.3 PG (ref 26–34)
MCHC RBC AUTO-ENTMCNC: 32.3 G/DL (ref 30–36.5)
MCV RBC AUTO: 72.2 FL (ref 80–99)
MONOCYTES # BLD: 0.7 K/UL (ref 0–1)
MONOCYTES NFR BLD AUTO: 8 % (ref 5–13)
NEUTS SEG # BLD: 6.5 K/UL (ref 1.8–8)
NEUTS SEG NFR BLD AUTO: 73 % (ref 32–75)
PLATELET # BLD AUTO: 310 K/UL (ref 150–400)
POTASSIUM SERPL-SCNC: 4.1 MMOL/L (ref 3.5–5.1)
RBC # BLD AUTO: 3.56 M/UL (ref 3.8–5.2)
SODIUM SERPL-SCNC: 132 MMOL/L (ref 136–145)
TROPONIN I SERPL-MCNC: 0.56 NG/ML
WBC # BLD AUTO: 8.9 K/UL (ref 3.6–11)

## 2017-01-10 PROCEDURE — 94761 N-INVAS EAR/PLS OXIMETRY MLT: CPT

## 2017-01-10 PROCEDURE — 74011000258 HC RX REV CODE- 258: Performed by: INTERNAL MEDICINE

## 2017-01-10 PROCEDURE — 74011250637 HC RX REV CODE- 250/637: Performed by: INTERNAL MEDICINE

## 2017-01-10 PROCEDURE — 84484 ASSAY OF TROPONIN QUANT: CPT | Performed by: INTERNAL MEDICINE

## 2017-01-10 PROCEDURE — 74011250636 HC RX REV CODE- 250/636: Performed by: INTERNAL MEDICINE

## 2017-01-10 PROCEDURE — 85025 COMPLETE CBC W/AUTO DIFF WBC: CPT | Performed by: INTERNAL MEDICINE

## 2017-01-10 PROCEDURE — 74011000250 HC RX REV CODE- 250: Performed by: INTERNAL MEDICINE

## 2017-01-10 PROCEDURE — 93970 EXTREMITY STUDY: CPT

## 2017-01-10 PROCEDURE — 77030029684 HC NEB SM VOL KT MONA -A

## 2017-01-10 PROCEDURE — 36415 COLL VENOUS BLD VENIPUNCTURE: CPT | Performed by: INTERNAL MEDICINE

## 2017-01-10 PROCEDURE — 83735 ASSAY OF MAGNESIUM: CPT | Performed by: INTERNAL MEDICINE

## 2017-01-10 PROCEDURE — 80048 BASIC METABOLIC PNL TOTAL CA: CPT | Performed by: INTERNAL MEDICINE

## 2017-01-10 PROCEDURE — 94640 AIRWAY INHALATION TREATMENT: CPT

## 2017-01-10 PROCEDURE — 65660000000 HC RM CCU STEPDOWN

## 2017-01-10 PROCEDURE — 93306 TTE W/DOPPLER COMPLETE: CPT

## 2017-01-10 PROCEDURE — 93005 ELECTROCARDIOGRAM TRACING: CPT

## 2017-01-10 RX ORDER — LEVALBUTEROL INHALATION SOLUTION 0.63 MG/3ML
0.63 SOLUTION RESPIRATORY (INHALATION)
Status: DISCONTINUED | OUTPATIENT
Start: 2017-01-10 | End: 2017-01-11

## 2017-01-10 RX ORDER — GUAIFENESIN 100 MG/5ML
81 LIQUID (ML) ORAL DAILY
Status: DISCONTINUED | OUTPATIENT
Start: 2017-01-10 | End: 2017-01-23 | Stop reason: HOSPADM

## 2017-01-10 RX ORDER — ASPIRIN 81 MG/1
81 TABLET ORAL DAILY
Status: ON HOLD | COMMUNITY
End: 2017-02-28

## 2017-01-10 RX ORDER — HYDRALAZINE HYDROCHLORIDE 20 MG/ML
10 INJECTION INTRAMUSCULAR; INTRAVENOUS
Status: DISCONTINUED | OUTPATIENT
Start: 2017-01-10 | End: 2017-01-23 | Stop reason: HOSPADM

## 2017-01-10 RX ORDER — SODIUM CHLORIDE 9 MG/ML
50 INJECTION, SOLUTION INTRAVENOUS CONTINUOUS
Status: DISCONTINUED | OUTPATIENT
Start: 2017-01-10 | End: 2017-01-11

## 2017-01-10 RX ADMIN — GABAPENTIN 300 MG: 300 CAPSULE ORAL at 09:03

## 2017-01-10 RX ADMIN — Medication 10 ML: at 14:00

## 2017-01-10 RX ADMIN — AMIODARONE HYDROCHLORIDE 0.5 MG/MIN: 50 INJECTION, SOLUTION INTRAVENOUS at 02:21

## 2017-01-10 RX ADMIN — LEVALBUTEROL HYDROCHLORIDE 0.63 MG: 0.63 SOLUTION RESPIRATORY (INHALATION) at 19:48

## 2017-01-10 RX ADMIN — GABAPENTIN 300 MG: 300 CAPSULE ORAL at 21:24

## 2017-01-10 RX ADMIN — AMIODARONE HYDROCHLORIDE 0.5 MG/MIN: 50 INJECTION, SOLUTION INTRAVENOUS at 15:11

## 2017-01-10 RX ADMIN — Medication 10 ML: at 02:20

## 2017-01-10 RX ADMIN — LEVALBUTEROL HYDROCHLORIDE 0.63 MG: 0.63 SOLUTION RESPIRATORY (INHALATION) at 16:10

## 2017-01-10 RX ADMIN — DULOXETINE HYDROCHLORIDE 60 MG: 30 CAPSULE, DELAYED RELEASE ORAL at 11:06

## 2017-01-10 RX ADMIN — SODIUM CHLORIDE 50 ML/HR: 900 INJECTION, SOLUTION INTRAVENOUS at 14:17

## 2017-01-10 RX ADMIN — GUAIFENESIN 600 MG: 600 TABLET, EXTENDED RELEASE ORAL at 19:24

## 2017-01-10 RX ADMIN — Medication 10 ML: at 21:25

## 2017-01-10 RX ADMIN — GABAPENTIN 300 MG: 300 CAPSULE ORAL at 19:23

## 2017-01-10 RX ADMIN — ASPIRIN 81 MG CHEWABLE TABLET 81 MG: 81 TABLET CHEWABLE at 12:38

## 2017-01-10 RX ADMIN — GUAIFENESIN 600 MG: 600 TABLET, EXTENDED RELEASE ORAL at 09:03

## 2017-01-10 RX ADMIN — ENOXAPARIN SODIUM 70 MG: 40 INJECTION SUBCUTANEOUS at 21:25

## 2017-01-10 RX ADMIN — DOCUSATE SODIUM 100 MG: 100 CAPSULE, LIQUID FILLED ORAL at 09:03

## 2017-01-10 RX ADMIN — DOCUSATE SODIUM 100 MG: 100 CAPSULE, LIQUID FILLED ORAL at 19:24

## 2017-01-10 NOTE — PROGRESS NOTES
Hospitalist Progress Note    NAME: Pilar Campbell   :  1945   MRN:  [id number]       Interim Hospital Summary: 80 y.o. female whom presented on 2017 with  A. Fib, Pneumonia, ARF     Assessment / Plan:  SVT vs A. Fib: c/w Amiodarone drip, check ECHO, Cardiology help appreciated. LLL Pneumonia: c/w LVQ, bronchodilators, mucolytics, check sputum. ARF: so far creatinine trending down, monitor. C/w gentle hydration. Recent Right nephrectomy for RCC   Chest Pain/increased troponin: c/w ASA, check ECHO, Cardiology help appreciated. Leukocytosis: so far trending down, monitor. GERD  Surrogate Decision Maker: elias Marinelli Lay  Code status: Full  Prophylaxis: lovenox  Recommended Disposition: SNF/LTC     Subjective:     Chief Complaint / Reason for Physician Visit  \"I feel a little better\". Discussed with RN events overnight. Review of Systems:  Symptom Y/N Comments  Symptom Y/N Comments   Fever/Chills    Chest Pain y    Poor Appetite    Edema     Cough    Abdominal Pain     Sputum    Joint Pain     SOB/ROCK y   Pruritis/Rash     Nausea/vomit    Tolerating PT/OT     Diarrhea    Tolerating Diet y    Constipation    Other       Could NOT obtain due to:      Objective:     VITALS:   Last 24hrs VS reviewed since prior progress note.  Most recent are:  Patient Vitals for the past 24 hrs:   Temp Pulse Resp BP SpO2   01/10/17 0830 - 67 14 141/85 97 %   01/10/17 0730 - 60 14 136/79 99 %   01/10/17 0700 - 64 10 136/78 99 %   01/10/17 0630 - (!) 59 12 129/69 98 %   01/10/17 0600 - (!) 59 12 125/75 96 %   01/10/17 0530 - (!) 58 12 114/75 96 %   01/10/17 0430 - (!) 55 11 113/70 100 %   01/10/17 0400 - 61 11 135/77 100 %   01/10/17 0300 - 61 12 122/75 100 %   01/10/17 0200 - (!) 59 13 113/73 100 %   01/10/17 0130 - (!) 59 12 115/69 100 %   01/10/17 0100 - 63 15 122/74 100 %   01/10/17 0030 97.7 °F (36.5 °C) 62 16 110/74 100 %   17 2300 - 78 12 111/73 100 %   17 2230 - 88 14 113/78 100 % 01/09/17 2132 - 76 16 113/85 97 %   01/09/17 2050 - 63 15 108/73 100 %   01/09/17 2015 - (!) 120 17 113/70 99 %   01/09/17 2000 - (!) 135 16 99/66 98 %   01/09/17 1958 - 62 16 106/75 99 %   01/09/17 1945 97.5 °F (36.4 °C) 62 16 104/68 99 %   01/09/17 1915 - 81 17 100/74 99 %   01/09/17 1901 - (!) 125 16 - 99 %   01/09/17 1900 - 72 15 109/61 99 %   01/09/17 1859 - (!) 160 17 - 99 %   01/09/17 1856 - (!) 158 18 - 100 %   01/09/17 1715 - (!) 130 (!) 0 95/71 99 %   01/09/17 1557 - (!) 168 - 110/82 -   01/09/17 1400 - 75 21 107/75 100 %   01/09/17 1345 - 79 17 112/71 98 %   01/09/17 1330 - 78 19 104/80 99 %   01/09/17 1315 - 81 14 131/71 99 %     No intake or output data in the 24 hours ending 01/10/17 1232     PHYSICAL EXAM:  General: WD, WN. Alert, cooperative, no acute distress    EENT:  EOMI. Anicteric sclerae. MMM  Resp:  Coarse BS  CV:  Regular  rhythm,  No edema  GI:  Soft, Non distended, Non tender.  +Bowel sounds  Neurologic:  Alert and oriented X 3, normal speech,   Psych:   Fair  insight. Not anxious nor agitated  Skin:  No rashes. No jaundice    Reviewed most current lab test results and cultures  YES  Reviewed most current radiology test results   YES  Review and summation of old records today    NO  Reviewed patient's current orders and MAR    YES  PMH/SH reviewed - no change compared to H&P  ________________________________________________________________________  Care Plan discussed with:    Comments   Patient y    Family      RN y    Care Manager     Consultant                        Multidiciplinary team rounds were held today with , nursing, pharmacist and clinical coordinator. Patient's plan of care was discussed; medications were reviewed and discharge planning was addressed.      ________________________________________________________________________  Total NON critical care TIME:     Minutes    Total CRITICAL CARE TIME Spent: 35   Minutes non procedure based      Comments   >50% of visit spent in counseling and coordination of care     ________________________________________________________________________  Delmy Mack MD     Procedures: see electronic medical records for all procedures/Xrays and details which were not copied into this note but were reviewed prior to creation of Plan. LABS:  I reviewed today's most current labs and imaging studies.   Pertinent labs include:  Recent Labs      01/10/17   0421  01/09/17   1124   WBC  8.9  16.5*   HGB  8.3*  11.3*   HCT  25.7*  34.9*   PLT  310  442*     Recent Labs      01/10/17   0421  01/09/17   1124   NA  132*  134*   K  4.1  4.5   CL  97  96*   CO2  25  26   GLU  159*  120*   BUN  36*  34*   CREA  1.56*  1.72*   CA  7.7*  8.6   MG  1.9  2.1   ALB   --   2.4*   TBILI   --   0.6   SGOT   --   16   ALT   --   9*       Signed: Delmy Mack MD

## 2017-01-10 NOTE — ED NOTES
Bedside and Verbal shift change report given to ISAIAS Mccurdy (oncoming nurse) by Navi Ingram (offgoing nurse). Report included the following information SBAR, ED Summary, Intake/Output, MAR and Recent Results. 2L in place. Diltiazem gtt infusing as ordered. No complaints of pain.

## 2017-01-10 NOTE — PROGRESS NOTES
Cardiology Progress Note      1/10/2017 8:47 AM    Admit Date: 1/9/2017          Subjective:  Further SVT last night. Now on IV amio. Also ? afib ( no strips available)  Some chest pain with deep breaths          Visit Vitals    /79    Pulse 60    Temp 97.7 °F (36.5 °C)    Resp 14    Wt 72.6 kg (160 lb)    SpO2 99%    BMI 25.06 kg/m2              Objective:      Physical Exam:  VS as above  Chest clear ant  Card RRR no gallop  Neuro alert     Data Review:   Labs:    Hgb 8.3  Trop 0.56   Na 132  BUN 36  Creat 1.6     Telemetry: SB R 50-60       Assessment:     1. SVT, probably AV node reentry. ? afib as well  2. Ischemic cardiomyopathy with prior anterior wall myocardial   infarction and PTC and stenting of the left anterior descending. Previous EF of 43%. 3. Hypertension. 4. Type 2 diabetes. 5. Dyslipidemia. 6. Recent right nephrectomy for renal cell cancer. 7. Acute on chronic renal failure. 8. ? Left-sided pneumonia. 9. Anemia     Plan: Cont amio gtt. Echo today.  Repeat trop in AM

## 2017-01-10 NOTE — PROGRESS NOTES
Pharmacy  [facility] Monitoring     Enoxaparin Indication: AF  Current Dose: 70 mg Q12H  Creatinine Clearance: 29.6 ml/min  Recent Labs      01/09/17   1124   CREA  1.72*   BUN  34*   HGB  11.3*   PLT  442*     Wt Readings from Last 1 Encounters:   01/09/17 72.6 kg (160 lb)   Body mass index is 25.06 kg/(m^2). Impression/Plan: Dose adjusted to 70 mg once daily for low creatinine clearance. A call has been placed to Dr. Toni Ross to clarify indication as notes say DVT prophylaxis but patient is currently being treated with amiodarone infusion.       Thanks,  Smiley Pino, [facility]

## 2017-01-10 NOTE — ED NOTES
S/w Hospitalist Mulu regarding patient hgb dropped from 11.3 to 8.3 on this am labs. No new orders given.

## 2017-01-10 NOTE — ED NOTES
TRANSFER - OUT REPORT:    Verbal report given to Breanna Hawk RN on [employer]  being transferred to [address] [place] [address] for routine progression of care       Report consisted of patients Situation, Background, Assessment and   Recommendations(SBAR). Information from the following report(s) SBAR, Kardex, Intake/Output and MAR was reviewed with the receiving nurse. Lines:   Peripheral IV 01/09/17 Left Hand (Active)   Site Assessment Clean, dry, & intact 1/9/2017  4:22 PM   Phlebitis Assessment 0 1/9/2017  4:22 PM   Infiltration Assessment 0 1/9/2017  4:22 PM   Dressing Status Clean, dry, & intact 1/9/2017  4:22 PM   Dressing Type Transparent 1/9/2017  4:22 PM   Hub Color/Line Status Flushed 1/9/2017  4:22 PM       Peripheral IV 01/09/17 Left Hand (Active)   Site Assessment Clean, dry, & intact 1/9/2017 11:46 PM   Phlebitis Assessment 0 1/9/2017 11:46 PM   Infiltration Assessment 0 1/9/2017 11:46 PM   Dressing Status Clean, dry, & intact 1/9/2017 11:46 PM   Dressing Type Transparent 1/9/2017 11:46 PM   Hub Color/Line Status Flushed 1/9/2017 11:46 PM        Opportunity for questions and clarification was provided.       Patient transported with:   Monitor  Registered Nurse

## 2017-01-10 NOTE — PROGRESS NOTES
TRANSFER - IN REPORT:    Verbal report received from ISAIAS Oropeza(name) on [id number] [facility]  being received from ED(unit) for routine progression of care      Report consisted of patients Situation, Background, Assessment and   Recommendations(SBAR). Information from the following report(s) SBAR was reviewed with the receiving nurse. Opportunity for questions and clarification was provided. Assessment completed upon patients arrival to unit and care assumed. 1315: Pt arrived on floor and was oriented to room. Pt has no complaints of pain at this time.      Primary Nurse Joseph Hsu and Martin Vallejo RN performed a dual skin assessment on this patient No impairment noted  Evan score is 17

## 2017-01-10 NOTE — ED NOTES
Bedside and Verbal shift change report given to [facility] (oncoming nurse) by Ludy Knapp (offgoing nurse). Report included the following information SBAR, ED Summary, Intake/Output, MAR and Recent Results. Patient appears to be sleeping at this time. Amio gtt infusing as ordered at this time.

## 2017-01-10 NOTE — ED NOTES
Assumed care of patient. Patient postitioned for comfort, resting quietly in stretcher with call bell in reach.

## 2017-01-11 LAB
ALBUMIN SERPL BCP-MCNC: 2 G/DL (ref 3.5–5)
ALBUMIN/GLOB SERPL: 0.5 {RATIO} (ref 1.1–2.2)
ALP SERPL-CCNC: 67 U/L (ref 45–117)
ALT SERPL-CCNC: 7 U/L (ref 12–78)
ANION GAP BLD CALC-SCNC: 10 MMOL/L (ref 5–15)
AST SERPL W P-5'-P-CCNC: 15 U/L (ref 15–37)
ATRIAL RATE: 62 BPM
ATRIAL RATE: 69 BPM
BASOPHILS # BLD AUTO: 0 K/UL (ref 0–0.1)
BASOPHILS # BLD: 0 % (ref 0–1)
BILIRUB SERPL-MCNC: 0.4 MG/DL (ref 0.2–1)
BUN SERPL-MCNC: 29 MG/DL (ref 6–20)
BUN/CREAT SERPL: 20 (ref 12–20)
CALCIUM SERPL-MCNC: 7.9 MG/DL (ref 8.5–10.1)
CALCULATED P AXIS, ECG09: -175 DEGREES
CALCULATED P AXIS, ECG09: 167 DEGREES
CALCULATED R AXIS, ECG10: -15 DEGREES
CALCULATED R AXIS, ECG10: -15 DEGREES
CALCULATED T AXIS, ECG11: -23 DEGREES
CALCULATED T AXIS, ECG11: 0 DEGREES
CHLORIDE SERPL-SCNC: 99 MMOL/L (ref 97–108)
CO2 SERPL-SCNC: 25 MMOL/L (ref 21–32)
CREAT SERPL-MCNC: 1.42 MG/DL (ref 0.55–1.02)
DIAGNOSIS, 93000: NORMAL
DIAGNOSIS, 93000: NORMAL
EOSINOPHIL # BLD: 0.1 K/UL (ref 0–0.4)
EOSINOPHIL NFR BLD: 2 % (ref 0–7)
ERYTHROCYTE [DISTWIDTH] IN BLOOD BY AUTOMATED COUNT: 18.7 % (ref 11.5–14.5)
EST. AVERAGE GLUCOSE BLD GHB EST-MCNC: 100 MG/DL
GLOBULIN SER CALC-MCNC: 3.9 G/DL (ref 2–4)
GLUCOSE SERPL-MCNC: 110 MG/DL (ref 65–100)
HBA1C MFR BLD: 5.1 % (ref 4.2–6.3)
HCT VFR BLD AUTO: 25.1 % (ref 35–47)
HGB BLD-MCNC: 8.1 G/DL (ref 11.5–16)
LYMPHOCYTES # BLD AUTO: 22 % (ref 12–49)
LYMPHOCYTES # BLD: 1.5 K/UL (ref 0.8–3.5)
MAGNESIUM SERPL-MCNC: 1.8 MG/DL (ref 1.6–2.4)
MCH RBC QN AUTO: 23.3 PG (ref 26–34)
MCHC RBC AUTO-ENTMCNC: 32.3 G/DL (ref 30–36.5)
MCV RBC AUTO: 72.1 FL (ref 80–99)
MONOCYTES # BLD: 0.5 K/UL (ref 0–1)
MONOCYTES NFR BLD AUTO: 7 % (ref 5–13)
NEUTS SEG # BLD: 4.9 K/UL (ref 1.8–8)
NEUTS SEG NFR BLD AUTO: 69 % (ref 32–75)
P-R INTERVAL, ECG05: 142 MS
P-R INTERVAL, ECG05: 200 MS
PLATELET # BLD AUTO: 296 K/UL (ref 150–400)
POTASSIUM SERPL-SCNC: 3.7 MMOL/L (ref 3.5–5.1)
PROT SERPL-MCNC: 5.9 G/DL (ref 6.4–8.2)
Q-T INTERVAL, ECG07: 442 MS
Q-T INTERVAL, ECG07: 496 MS
QRS DURATION, ECG06: 104 MS
QRS DURATION, ECG06: 154 MS
QTC CALCULATION (BEZET), ECG08: 473 MS
QTC CALCULATION (BEZET), ECG08: 503 MS
RBC # BLD AUTO: 3.48 M/UL (ref 3.8–5.2)
SODIUM SERPL-SCNC: 134 MMOL/L (ref 136–145)
TROPONIN I SERPL-MCNC: 0.25 NG/ML
TROPONIN I SERPL-MCNC: 0.36 NG/ML
VENTRICULAR RATE, ECG03: 62 BPM
VENTRICULAR RATE, ECG03: 69 BPM
WBC # BLD AUTO: 7 K/UL (ref 3.6–11)

## 2017-01-11 PROCEDURE — 74011250637 HC RX REV CODE- 250/637: Performed by: INTERNAL MEDICINE

## 2017-01-11 PROCEDURE — 80053 COMPREHEN METABOLIC PANEL: CPT | Performed by: INTERNAL MEDICINE

## 2017-01-11 PROCEDURE — 74011000250 HC RX REV CODE- 250: Performed by: INTERNAL MEDICINE

## 2017-01-11 PROCEDURE — 97116 GAIT TRAINING THERAPY: CPT | Performed by: PHYSICAL THERAPIST

## 2017-01-11 PROCEDURE — 85025 COMPLETE CBC W/AUTO DIFF WBC: CPT | Performed by: INTERNAL MEDICINE

## 2017-01-11 PROCEDURE — 74011000258 HC RX REV CODE- 258: Performed by: INTERNAL MEDICINE

## 2017-01-11 PROCEDURE — 36415 COLL VENOUS BLD VENIPUNCTURE: CPT | Performed by: INTERNAL MEDICINE

## 2017-01-11 PROCEDURE — 84484 ASSAY OF TROPONIN QUANT: CPT | Performed by: INTERNAL MEDICINE

## 2017-01-11 PROCEDURE — 93041 RHYTHM ECG TRACING: CPT

## 2017-01-11 PROCEDURE — 65660000000 HC RM CCU STEPDOWN

## 2017-01-11 PROCEDURE — 74011250636 HC RX REV CODE- 250/636: Performed by: INTERNAL MEDICINE

## 2017-01-11 PROCEDURE — G8987 SELF CARE CURRENT STATUS: HCPCS | Performed by: OCCUPATIONAL THERAPIST

## 2017-01-11 PROCEDURE — G8979 MOBILITY GOAL STATUS: HCPCS | Performed by: PHYSICAL THERAPIST

## 2017-01-11 PROCEDURE — 83735 ASSAY OF MAGNESIUM: CPT | Performed by: INTERNAL MEDICINE

## 2017-01-11 PROCEDURE — 94640 AIRWAY INHALATION TREATMENT: CPT

## 2017-01-11 PROCEDURE — 97163 PT EVAL HIGH COMPLEX 45 MIN: CPT | Performed by: PHYSICAL THERAPIST

## 2017-01-11 PROCEDURE — 97535 SELF CARE MNGMENT TRAINING: CPT | Performed by: OCCUPATIONAL THERAPIST

## 2017-01-11 PROCEDURE — 97167 OT EVAL HIGH COMPLEX 60 MIN: CPT | Performed by: OCCUPATIONAL THERAPIST

## 2017-01-11 PROCEDURE — 83036 HEMOGLOBIN GLYCOSYLATED A1C: CPT | Performed by: INTERNAL MEDICINE

## 2017-01-11 PROCEDURE — G8978 MOBILITY CURRENT STATUS: HCPCS | Performed by: PHYSICAL THERAPIST

## 2017-01-11 PROCEDURE — G8988 SELF CARE GOAL STATUS: HCPCS | Performed by: OCCUPATIONAL THERAPIST

## 2017-01-11 RX ORDER — LEVALBUTEROL INHALATION SOLUTION 0.63 MG/3ML
0.63 SOLUTION RESPIRATORY (INHALATION)
Status: DISCONTINUED | OUTPATIENT
Start: 2017-01-11 | End: 2017-01-23 | Stop reason: HOSPADM

## 2017-01-11 RX ORDER — LEVOFLOXACIN 750 MG/1
750 TABLET ORAL
Status: DISCONTINUED | OUTPATIENT
Start: 2017-01-11 | End: 2017-01-12

## 2017-01-11 RX ORDER — HYDRALAZINE HYDROCHLORIDE 25 MG/1
25 TABLET, FILM COATED ORAL 3 TIMES DAILY
Status: DISCONTINUED | OUTPATIENT
Start: 2017-01-11 | End: 2017-01-13

## 2017-01-11 RX ADMIN — AMIODARONE HYDROCHLORIDE 0.5 MG/MIN: 50 INJECTION, SOLUTION INTRAVENOUS at 16:46

## 2017-01-11 RX ADMIN — Medication 10 ML: at 13:24

## 2017-01-11 RX ADMIN — Medication 10 ML: at 21:55

## 2017-01-11 RX ADMIN — AMIODARONE HYDROCHLORIDE 0.5 MG/MIN: 50 INJECTION, SOLUTION INTRAVENOUS at 03:18

## 2017-01-11 RX ADMIN — GUAIFENESIN 600 MG: 600 TABLET, EXTENDED RELEASE ORAL at 09:16

## 2017-01-11 RX ADMIN — HYDRALAZINE HYDROCHLORIDE 25 MG: 25 TABLET, FILM COATED ORAL at 10:59

## 2017-01-11 RX ADMIN — DULOXETINE HYDROCHLORIDE 60 MG: 30 CAPSULE, DELAYED RELEASE ORAL at 09:15

## 2017-01-11 RX ADMIN — GABAPENTIN 300 MG: 300 CAPSULE ORAL at 16:53

## 2017-01-11 RX ADMIN — GUAIFENESIN 600 MG: 600 TABLET, EXTENDED RELEASE ORAL at 17:54

## 2017-01-11 RX ADMIN — ENOXAPARIN SODIUM 70 MG: 40 INJECTION SUBCUTANEOUS at 21:54

## 2017-01-11 RX ADMIN — METOPROLOL SUCCINATE 25 MG: 25 TABLET, EXTENDED RELEASE ORAL at 09:16

## 2017-01-11 RX ADMIN — HYDRALAZINE HYDROCHLORIDE 25 MG: 25 TABLET, FILM COATED ORAL at 16:53

## 2017-01-11 RX ADMIN — LEVALBUTEROL HYDROCHLORIDE 0.63 MG: 0.63 SOLUTION RESPIRATORY (INHALATION) at 01:20

## 2017-01-11 RX ADMIN — GABAPENTIN 300 MG: 300 CAPSULE ORAL at 21:55

## 2017-01-11 RX ADMIN — DOCUSATE SODIUM 100 MG: 100 CAPSULE, LIQUID FILLED ORAL at 09:16

## 2017-01-11 RX ADMIN — ASPIRIN 81 MG CHEWABLE TABLET 81 MG: 81 TABLET CHEWABLE at 09:16

## 2017-01-11 RX ADMIN — ENOXAPARIN SODIUM 70 MG: 40 INJECTION SUBCUTANEOUS at 10:58

## 2017-01-11 RX ADMIN — LEVOFLOXACIN 750 MG: 750 TABLET, FILM COATED ORAL at 21:55

## 2017-01-11 RX ADMIN — Medication 10 ML: at 09:15

## 2017-01-11 RX ADMIN — GABAPENTIN 300 MG: 300 CAPSULE ORAL at 09:15

## 2017-01-11 RX ADMIN — HYDRALAZINE HYDROCHLORIDE 25 MG: 25 TABLET, FILM COATED ORAL at 21:55

## 2017-01-11 NOTE — PROGRESS NOTES
Hospitalist Progress Note    NAME: Zoe Rosenthal   :  1945   MRN:  [id number]       Interim Hospital Summary: 80 y.o. female whom presented on 2017 with  A. Fib, Pneumonia, ARF     Assessment / Plan:  Sepsis POA: tachycardia, Leukocytosis, PNA, so far improving. SVT vs A. Fib: c/w Amiodarone drip,  ECHO EF 25%, Cardiology help appreciated. LLL Pneumonia: c/w LVQ, bronchodilators, mucolytics, check sputum. WBC trending down  ARF: so far creatinine trending down, monitor. Recent Right nephrectomy for RCC   Chest Pain/increased troponin: c/w ASA,  ECHO EF 25%, Cardiology help appreciated. Leukocytosis: so far trending down to WNL, monitor. GERD  Surrogate Decision Maker: son Deuce Fish, dtr [facility] [place]  [phone] (mailbox is full)  Code status: Full  Prophylaxis: lovenox  Recommended Disposition: SNF/LTC     Progressing well, may be able to D/c back to SNF tomorrow if Amio drip is off     Subjective:     Chief Complaint / Reason for Physician Visit  \"I feel  better\". Discussed with RN events overnight. Review of Systems:  Symptom Y/N Comments  Symptom Y/N Comments   Fever/Chills    Chest Pain y    Poor Appetite    Edema     Cough    Abdominal Pain     Sputum    Joint Pain     SOB/ROCK y   Pruritis/Rash     Nausea/vomit    Tolerating PT/OT     Diarrhea    Tolerating Diet y    Constipation    Other       Could NOT obtain due to:      Objective:     VITALS:   Last 24hrs VS reviewed since prior progress note.  Most recent are:  Patient Vitals for the past 24 hrs:   Temp Pulse Resp BP SpO2   17 0748 97.7 °F (36.5 °C) 71 16 148/85 96 %   17 0205 97.8 °F (36.6 °C) 72 16 120/77 96 %   01/10/17 2256 97.6 °F (36.4 °C) 73 16 139/81 97 %   01/10/17 2002 97.5 °F (36.4 °C) 67 16 137/71 100 %   01/10/17 1610 - - - - 93 %   01/10/17 1536 97.6 °F (36.4 °C) 68 16 134/73 98 %   01/10/17 1319 97.6 °F (36.4 °C) 74 16 138/83 96 %   01/10/17 1230 - 73 15 138/69 97 %       Intake/Output Summary (Last 24 hours) at 01/11/17 1009  Last data filed at 01/11/17 0426   Gross per 24 hour   Intake          1755.41 ml   Output                0 ml   Net          1755.41 ml        PHYSICAL EXAM:  General: WD, WN. Alert, cooperative, no acute distress    EENT:  EOMI. Anicteric sclerae. MMM  Resp:  Coarse BS  CV:  Regular  rhythm,  No edema  GI:  Soft, Non distended, Non tender.  +Bowel sounds  Neurologic:  Alert and oriented X 3, normal speech,   Psych:   Fair  insight. Not anxious nor agitated  Skin:  No rashes. No jaundice    Reviewed most current lab test results and cultures  YES  Reviewed most current radiology test results   YES  Review and summation of old records today    NO  Reviewed patient's current orders and MAR    YES  PMH/SH reviewed - no change compared to H&P  ________________________________________________________________________  Care Plan discussed with:    Comments   Patient y    Family      RN y    Care Manager     Consultant                        Multidiciplinary team rounds were held today with , nursing, pharmacist and clinical coordinator. Patient's plan of care was discussed; medications were reviewed and discharge planning was addressed. ________________________________________________________________________  Total NON critical care TIME:  30   Minutes    Total CRITICAL CARE TIME Spent:    Minutes non procedure based      Comments   >50% of visit spent in counseling and coordination of care     ________________________________________________________________________  Trav Solo MD     Procedures: see electronic medical records for all procedures/Xrays and details which were not copied into this note but were reviewed prior to creation of Plan. LABS:  I reviewed today's most current labs and imaging studies.   Pertinent labs include:  Recent Labs      01/11/17   0223  01/10/17   0421  01/09/17   1124   WBC  7.0  8.9  16.5*   HGB  8.1*  8.3*  11.3*   HCT  25.1* 25.7*  34.9*   PLT  296  310  442*     Recent Labs      01/11/17   0223  01/10/17   0421  01/09/17   1124   NA  134*  132*  134*   K  3.7  4.1  4.5   CL  99  97  96*   CO2  25  25  26   GLU  110*  159*  120*   BUN  29*  36*  34*   CREA  1.42*  1.56*  1.72*   CA  7.9*  7.7*  8.6   MG  1.8  1.9  2.1   ALB  2.0*   --   2.4*   TBILI  0.4   --   0.6   SGOT  15   --   16   ALT  7*   --   9*       Signed: Yaya Simmons MD

## 2017-01-11 NOTE — CARDIO/PULMONARY
CP REHAB NOTE    Chart Review: Patient admitted with SVT/Afib. Medical History: CAD, HTN, prior PCI  EF 25%. Echo 1/10/2017  Non Smoker    Met with patient for CHF teaching. Patient given the CHF folder and handouts on Low Sodium, TLC diet, Spices, and Salty Six. Patient has ischemic cardiomyopathy per cardiology note but no HF diagnosis as of yet. Indicated to patient that with her heart muscle weakness following the parameters we discussed would be a good idea. Patient indicated that she has been told in the past to weigh daily and eat low sodium. Reinforced daily weights and reviewed the guidelines for calling the doctor. Discussed eating low sodium with patient and why we eat low sodium in order to control fluid and avoid fluid retention. Encouraged medication compliance and daily walks. Patient understood teaching and had no further questions.

## 2017-01-11 NOTE — PROGRESS NOTES
[address] SHIFT NURSING NOTE    Bedside and Verbal shift change report given to  (oncoming nurse) by Popeye Reeves (offgoing nurse). Report included the following information SBAR, Procedure Summary, Intake/Output, MAR and Recent Results. SHIFT SUMMARY:         Admission Date 1/9/2017   Admission Diagnosis SVT (supraventricular tachycardia)   Consults IP CONSULT TO CARDIOLOGY  IP CONSULT TO CARDIOLOGY        Consults   [x]PT   [x]OT   []Speech   []Case Management      [] Palliative       Cardiac Monitoring Order   [x]Yes   []No     GI Prophylaxis   []Yes   []No           DVT Prophylaxis   SCDs:             Santiago stockings:         [x] Medication   []Contraindicated   []None        Activity Level Activity Level: Head of bed elevated (degrees)     Activity Assistance: Partial (one person)   Purposeful Rounding every 1-2 hour? [x]Yes    Zuri Score  Total Score: 3   Bed Alarm (If score 3 or >)   [x]Yes   [] Refused (See signed refusal form in chart)   Evan Score  Evan Score: 17   Evan Score (if score 14 or less)   []PMT consult   []Wound Care consult      []Specialty bed   [] Nutrition consult          Needs prior to discharge:   Home O2 required:    []Yes   [x]No    If yes, how much O2 required?     Other:    Last Bowel Movement: Last Bowel Movement Date: 01/06/17        POST-OP SURGICAL VATS   []Yes   [x]N/A   Incentive Spirometer:   []Yes   []Refused   Coughing and deep breathing:     []Yes   []Refused   Oral care:     []Yes   []Refused   Understanding (patient education):     []Yes   []Refused   Getting out of bed Number times ambulated in hallway past shift:    Number of times OOB to chair past shift:     Head of bed elevation:     []Yes   []Refused      Influenza Vaccine Received Flu Vaccine for Current Season (usually Sept-March): Yes        Pneumonia Vaccine           Diet Active Orders   Diet    DIET CARDIAC Regular      LDAs               Peripheral IV 01/09/17 Left Hand (Active)   Site Assessment Clean, dry, & intact 1/11/2017  4:26 AM   Phlebitis Assessment 0 1/11/2017  4:26 AM   Infiltration Assessment 0 1/11/2017  4:26 AM   Dressing Status Clean, dry, & intact 1/11/2017  4:26 AM   Dressing Type Transparent;Tape 1/11/2017  4:26 AM   Hub Color/Line Status Blue; Infusing 1/11/2017  4:26 AM       Peripheral IV 01/09/17 Left Hand (Active)   Site Assessment Clean, dry, & intact 1/11/2017  4:26 AM   Phlebitis Assessment 0 1/11/2017  4:26 AM   Infiltration Assessment 0 1/11/2017  4:26 AM   Dressing Status Clean, dry, & intact 1/11/2017  4:26 AM   Dressing Type Transparent;Tape 1/11/2017  4:26 AM   Hub Color/Line Status Blue; Infusing 1/11/2017  4:26 AM                      Urinary Catheter      Intake & Output   Date 01/10/17 0700 - 01/11/17 0659 01/11/17 0700 - 01/12/17 0659   Shift 8641-1816 0352-3595 24 Hour Total 1986-6030 6580-7423 24 Hour Total   I  N  T  A  K  E   I.V.  (mL/kg/hr)  1755. 4 1755.4         Amiodarone Volume  1047.9 1047. 9         Volume (0.9% sodium chloride infusion)  707.5 707.5       Shift Total  (mL/kg)  1755.4  (24.2) 1755.4  (24.2)      O  U  T  P  U  T   Urine  (mL/kg/hr)            Urine Occurrence(s) 1 x 2 x 3 x       Shift Total  (mL/kg)         NET  1755. 4 1755.4      Weight (kg) 72.6 72.6 72.6 72.6 72.6 72.6         Readmission Risk Assessment Tool Score Low Risk            9       Total Score        4 More than 1 Admission in calendar year    5 Patient Insurance is Medicare, Medicaid or Self Pay        Criteria that do not apply:    Relationship with PCP    Patient Living Status    Patient Length of Stay > 5    Charlson Comorbidity Score       Expected Length of Stay - - -   Actual Length of Stay 2

## 2017-01-11 NOTE — PROGRESS NOTES
[address] SHIFT NURSING NOTE    Bedside and Verbal shift change report given to Jarrett Jeffries (oncoming nurse) by Demarco Riley (offgoing nurse). Report included the following information SBAR, Kardex, ED Summary, Procedure Summary, Intake/Output, MAR, Recent Results and Cardiac Rhythm NSR-SVT. SHIFT SUMMARY:   7010: PT/OT reported that pt had near-syncopal episode in bathroom while standing and washing face. Pt's BP orthostatic relative to her norm. 1106: spoke to dr. Kathrin Fox. notifiedof pt's near-syncopal episode, vital signs, amio drip, and chest \"discomfort. \"  -orders for ekg, troponin. 1112: paged dr. Annelise Perea. 1219: paged dr. Madi Ingram second time    12: spoke to dr. Annelise Perea. Dr. Annelise Perea to come see pt. Admission Date 1/9/2017   Admission Diagnosis SVT (supraventricular tachycardia)   Consults IP CONSULT TO CARDIOLOGY  IP CONSULT TO CARDIOLOGY        Consults   [x] PT   [x] OT   [] Speech   [] Palliative      [] Hospice    [] Case Management   [] None   Cardiac Monitoring   [x] Yes   [] No     Antibiotics   [x] Yes   [] No   GI Prophylaxis  (Ex: Protonix, Pepcid, etc,.)   [] Yes   [x] No          DVT Prophylaxis   SCDs:             Santiago stockings:         [x] Medication (Ex: Lovenox, Eliquis,  Heparin, etc..)   [] Contraindicated   [] None       Urinary Catheter             LDAs               Peripheral IV 01/09/17 Left Hand (Active)   Site Assessment Clean, dry, & intact 1/11/2017  7:45 AM   Phlebitis Assessment 0 1/11/2017  7:45 AM   Infiltration Assessment 0 1/11/2017  7:45 AM   Dressing Status Clean, dry, & intact 1/11/2017  7:45 AM   Dressing Type Transparent;Tape 1/11/2017  7:45 AM   Hub Color/Line Status Blue; Infusing 1/11/2017  7:45 AM       Peripheral IV 01/09/17 Left Hand (Active)   Site Assessment Clean, dry, & intact 1/11/2017  7:45 AM   Phlebitis Assessment 0 1/11/2017  7:45 AM   Infiltration Assessment 0 1/11/2017  7:45 AM   Dressing Status Clean, dry, & intact 1/11/2017  7:45 AM Dressing Type Transparent;Tape 1/11/2017  7:45 AM   Hub Color/Line Status Blue; Infusing 1/11/2017  7:45 AM                      I/Os   Intake/Output Summary (Last 24 hours) at 01/11/17 1425  Last data filed at 01/11/17 0426   Gross per 24 hour   Intake          1755.41 ml   Output                0 ml   Net          1755.41 ml         Activity Level Activity Level: Up with Assistance, Head of bed elevated (degrees)     Activity Assistance: Partial (one person)   Diet Active Orders   Diet    DIET CARDIAC Regular      Purposeful Rounding every 1-2 hour? [x] Yes    Zuri Score  Total Score: 3   Bed Alarm (If score 3 or >)   [x] Yes    [] Refused (See signed refusal form in chart)   Evan Score  Evan Score: 17       Evan Score (if score 14 or less)   [] PMT consult   [] Nutrition consult   [] Wound Care consult      []  Specialty bed         Influenza Vaccine Received Flu Vaccine for Current Season (usually Sept-March): Yes               Needs prior to discharge:   Home O2 required:    [] Yes   [x] No     If yes, how much O2 required?     Other:    Last Bowel Movement Date: 01/06/17        POST-OP SURGICAL VATS   [] Yes   [x] No     Incentive Spirometer:   [] Yes   [] Refused   Coughing and deep breathing:     [] Yes   [] Refused   Oral care:     [] Yes   [] Refused   Understanding (patient education):     [] Yes   [] Refused   Getting out of bed Number times ambulated in hallway past shift:    Number of times OOB to chair past shift:     Head of bed elevation:     [] Yes   [] Refused      Readmission Risk Assessment Tool Score Low Risk            9       Total Score        4 More than 1 Admission in calendar year    5 Patient Insurance is Medicare, Medicaid or Self Pay        Criteria that do not apply:    Relationship with PCP    Patient Living Status    Patient Length of Stay > 5    Charlson Comorbidity Score       Expected Length of Stay 3d 19h   Actual Length of Stay 2

## 2017-01-11 NOTE — PROGRESS NOTES
DC plan: Return to [facility] at time of dc. They are willing to accept the pt back they will have to obtain another insurance auth.     Janiya Velasco, MSW

## 2017-01-11 NOTE — PROGRESS NOTES
Problem: Mobility Impaired (Adult and Pediatric)  Goal: *Acute Goals and Plan of Care (Insert Text)  Physical Therapy Goals  Initiated 1/11/2017  1. Patient will move from supine to sit and sit to supine , scoot up and down and roll side to side in bed with supervision/set-up within 7 day(s). 2. Patient will transfer from bed to chair and chair to bed with minimal assistance/contact guard assist using the least restrictive device within 7 day(s). 3. Patient will perform sit to stand with minimal assistance/contact guard assist within 7 day(s). 4. Patient will ambulate with minimal assistance/contact guard assist for 75 feet with the least restrictive device within 7 day(s). 5. Patient will perform 2 sets of 10 repetitions of active strengthening exercises for bilateral lower extremity(s) with supervision/set-up within 7 day(s). PHYSICAL THERAPY EVALUATION  Patient: Elvie Youngblood (26 y.o. female)  Date: 1/11/2017  Primary Diagnosis: SVT (supraventricular tachycardia)        Precautions: falls         ASSESSMENT :  Based on the objective data described below, the patient presents with generalized weakness and impaired functional mobility, balance, endurance and activity tolerance. Patient requiring CGA and additional time to transition to EOB from supine and min/mod A of 2 for transfer to standing. Gait is slow and unsteady demonstrating short shuffled steps and poor ability to clear L LE. Patient demonstrating hypertension initially during therapy. While standing at sink patient with c/o feeling weak and required emergent transfer to sitting. BP dropped to 121/72 and patient with c/o chest discomfort. Patient returned to bed with mod A of 2 and nursing notified. Recommend patient return to SNF following discharge to improve functional independence prior to returning to home. Patient will benefit from skilled intervention to address the above impairments.   Patients rehabilitation potential is considered to be Fair  Factors which may influence rehabilitation potential include:   [ ]         None noted  [ ]         Mental ability/status  [X]         Medical condition  [ ]         Home/family situation and support systems  [ ]         Safety awareness  [ ]         Pain tolerance/management  [ ]         Other:        PLAN :  Recommendations and Planned Interventions:  [X]           Bed Mobility Training             [ ]    Neuromuscular Re-Education  [X]           Transfer Training                   [ ]    Orthotic/Prosthetic Training  [X]           Gait Training                         [ ]    Modalities  [X]           Therapeutic Exercises           [ ]    Edema Management/Control  [X]           Therapeutic Activities            [X]    Patient and Family Training/Education  [ ]           Other (comment):     Frequency/Duration: Patient will be followed by physical therapy  4 times a week to address goals. Discharge Recommendations: [facility]  Further Equipment Recommendations for Discharge: TBD by SNF       SUBJECTIVE:   Patient stated My chest feels funny.       OBJECTIVE DATA SUMMARY:   HISTORY:    Past Medical History   Diagnosis Date    Autoimmune disease ([address]         rheumatoid     CAD (coronary artery disease)      GERD (gastroesophageal reflux disease)      Hypertension      Ill-defined condition         anemia    Other ill-defined conditions(799.89)         high cholesterol    Renal cell carcinoma of right kidney [facility])         s/p resection 12/16     Past Surgical History   Procedure Laterality Date    Pr cardiac surg procedure unlist           three stents placed 2005    Hx tonsillectomy        Hx urological   12/22/2016       RIGHT LAPOROSCOPIC HAND ASSISTED RADICAL NEPHRECTOMY     Prior Level of Function/Home Situation: patient has been at [facility] rehab prior to this admission using RW for ambulation.  Prior to Oct. Patient reporting independence with mobility; using RW when ambulating in community and assistance from son when ambulating within home. Home Situation  Home Environment: Other (comment) (Pt arrived from [facility]. )  One/Two Story Residence: Other (Comment)  Living Alone: No (lives w son)  Support Systems: Child(macy), Family member(s)  Patient Expects to be Discharged to[de-identified] Skilled nursing facility  Current DME Used/Available at Home: Commode, bedside, Shower chair, Walker, rolling  Tub or Shower Type: Tub/Shower combination     EXAMINATION/PRESENTATION/DECISION MAKING:   Critical Behavior:   Patient is alert and oriented x 4; flat affect; decreased safety awareness        Strength:    Strength: Generally decreased, functional (L LE 4-/5; R LE 4/5)                    Tone & Sensation:   Tone:  (mild intention tremors noted in B hands)              Sensation: Impaired (pt reports numbness and tingling in B feet)               Range Of Motion:  AROM: Generally decreased, functional                       Coordination:  Coordination: Within functional limits     Functional Mobility:  Bed Mobility:  Rolling: Supervision; Additional time (using beed rails)  Supine to Sit: Contact guard assistance; Additional time     Scooting: Contact guard assistance; Additional time  Transfers:  Sit to Stand: Minimum assistance; Moderate assistance;Assist x2 (increased assistance from toilet demonstrating decreased ant)  Stand to Sit: Minimum assistance        Bed to Chair: Minimum assistance;Assist x2              Balance:   Sitting: Intact  Standing: Impaired  Standing - Static: Fair;Constant support  Standing - Dynamic : Fair (using RW for support)  Ambulation/Gait Training:  Distance (ft): 15 Feet (ft)  Assistive Device: Walker, rolling  Ambulation - Level of Assistance: Minimal assistance;Assist x1        Gait Abnormalities: Decreased step clearance (impaired ability to clear L foot; decreased heel strike B)        Base of Support: Narrowed     Speed/Maria Luisa: Pace decreased (<100 feet/min); Shuffled; Slow  Step Length: Left shortened;Right shortened (L>R)   Gait is slow and unsteady demonstrating short shuffled steps and poor clearance of L foot           Functional Measure:     Elder Mobility Scale      3/20            EMS and G-code impairment scale:  Percentage of impairment CH  0% CI  1-19% CJ  20-39% CK  40-59% CL  60-79% CM  80-99% CN  100%   EMS Score 0-20 20 17-19 13-16 9-12 5-8 1-4 0      Scores under 10  generally these patients are dependent in mobility maneuvers; require help with  basic ADL, such as transfers, toileting and dressing. Scores between 10  13  generally these patients are borderline in terms of safe mobility and  independence in ADL i.e. they require some help with some mobility maneuvers. Scores over 14  Generally these patients are able to perform mobility maneuvers alone and safely  and are independent in basic ADL. G codes: In compliance with CMSs Claims Based Outcome Reporting, the following G-code set was chosen for this patient based on their primary functional limitation being treated: The outcome measure chosen to determine the severity of the functional limitation was the Elderly Mobility scale with a score of 3/20 which was correlated with the impairment scale.       · Mobility - Walking and Moving Around:               - CURRENT STATUS:    CM - 80%-99% impaired, limited or restricted               - GOAL STATUS:           CK - 40%-59% impaired, limited or restricted               - D/C STATUS:                       ---------------To be determined---------------         Pain:  Pain Scale 1: Numeric (0 - 10) (pt denies any pain, but states she's in \"discomfort\")  Pain Intensity 1: 0  Pain Location 1: Chest (midclavicular)  Pain Orientation 1: Anterior  Pain Description 1:  (pt states she feels like \"a piece of food is in her throat\")  Pain Intervention(s) 1: Therapeutic presence;Rest;Repositioned;Relaxation technique  Activity Tolerance:   Hypotensive in standing at sink requiring emergent return to sitting- patient remained alert and talking throughout episode. Nursing notified  Please refer to the flowsheet for vital signs taken during this treatment. After treatment:   [ ]         Patient left in no apparent distress sitting up in chair  [X]         Patient left in no apparent distress in bed  [X]         Call bell left within reach  [X]         Nursing notified  [ ]         Caregiver present  [X]         Bed alarm activated      COMMUNICATION/EDUCATION:   The patients plan of care was discussed with: Occupational Therapist and Registered Nurse.  [X]         Fall prevention education was provided and the patient/caregiver indicated understanding. [X]         Patient/family have participated as able in goal setting and plan of care. [X]         Patient/family agree to work toward stated goals and plan of care. [ ]         Patient understands intent and goals of therapy, but is neutral about his/her participation. [ ]         Patient is unable to participate in goal setting and plan of care.      Thank you for this referral.  Paulette Rodriguez, PT   Time Calculation: 48 mins

## 2017-01-11 NOTE — PROGRESS NOTES
Cardiology Progress Note      1/11/2017 8:41 AM    Admit Date: 1/9/2017          Subjective:  Echo showed EF 25%, mildly dilated LV. No major valve issues. Still having frequent bursts of SVT. No other new c/o         Visit Vitals    /85 (BP 1 Location: Right arm, BP Patient Position: At rest)    Pulse 71    Temp 97.7 °F (36.5 °C)    Resp 16    Wt 72.6 kg (160 lb)    SpO2 96%    BMI 25.06 kg/m2     01/09 1901 - 01/11 0700  In: 1755.4 [I.V.:1755.4]  Out: -         Objective:      Physical Exam:  VS as above  Chest crackles at bases  Card RRR no gallop  Extrem 1+ edema     Data Review:   Labs:    BUN 29  Creat 1.4   Trop 0.36   Hgb 8.1     Telemetry: SR, freq nonsustained SVT rate about 160-170 bpm , no afib seen       Assessment:     1. SVT, probably AV node reentry. ? afib as well  2. Ischemic cardiomyopathy with prior anterior wall myocardial   infarction and remote PTCA and stenting of the left anterior descending. EF 25% by echo   3. Hypertension. 4. Type 2 diabetes. 5. Dyslipidemia. 6. Recent right nephrectomy for renal cell cancer. 7. Acute on chronic renal failure. - better  8. ? Left-sided pneumonia. 9. Anemia     Plan:  Cont IV amio. Add hydralazine. ACE contraindicated with renal insuff, single kidney.  Borderline bradycardia at baseline so wont increase Toprol

## 2017-01-12 LAB
ALBUMIN SERPL BCP-MCNC: 2 G/DL (ref 3.5–5)
ALBUMIN/GLOB SERPL: 0.5 {RATIO} (ref 1.1–2.2)
ALP SERPL-CCNC: 65 U/L (ref 45–117)
ALT SERPL-CCNC: <6 U/L (ref 12–78)
ANION GAP BLD CALC-SCNC: 12 MMOL/L (ref 5–15)
AST SERPL W P-5'-P-CCNC: 15 U/L (ref 15–37)
BASOPHILS # BLD AUTO: 0 K/UL (ref 0–0.1)
BASOPHILS # BLD: 0 % (ref 0–1)
BILIRUB SERPL-MCNC: 0.4 MG/DL (ref 0.2–1)
BUN SERPL-MCNC: 21 MG/DL (ref 6–20)
BUN/CREAT SERPL: 17 (ref 12–20)
CALCIUM SERPL-MCNC: 8.1 MG/DL (ref 8.5–10.1)
CHLORIDE SERPL-SCNC: 101 MMOL/L (ref 97–108)
CO2 SERPL-SCNC: 24 MMOL/L (ref 21–32)
CREAT SERPL-MCNC: 1.21 MG/DL (ref 0.55–1.02)
EOSINOPHIL # BLD: 0.1 K/UL (ref 0–0.4)
EOSINOPHIL NFR BLD: 1 % (ref 0–7)
ERYTHROCYTE [DISTWIDTH] IN BLOOD BY AUTOMATED COUNT: 19 % (ref 11.5–14.5)
GLOBULIN SER CALC-MCNC: 3.8 G/DL (ref 2–4)
GLUCOSE SERPL-MCNC: 102 MG/DL (ref 65–100)
HCT VFR BLD AUTO: 24.1 % (ref 35–47)
HCT VFR BLD AUTO: 26.4 % (ref 35–47)
HGB BLD-MCNC: 7.7 G/DL (ref 11.5–16)
HGB BLD-MCNC: 8.4 G/DL (ref 11.5–16)
LYMPHOCYTES # BLD AUTO: 12 % (ref 12–49)
LYMPHOCYTES # BLD: 1 K/UL (ref 0.8–3.5)
MAGNESIUM SERPL-MCNC: 1.9 MG/DL (ref 1.6–2.4)
MCH RBC QN AUTO: 23.2 PG (ref 26–34)
MCHC RBC AUTO-ENTMCNC: 32 G/DL (ref 30–36.5)
MCV RBC AUTO: 72.6 FL (ref 80–99)
MONOCYTES # BLD: 0.5 K/UL (ref 0–1)
MONOCYTES NFR BLD AUTO: 6 % (ref 5–13)
NEUTS SEG # BLD: 6.8 K/UL (ref 1.8–8)
NEUTS SEG NFR BLD AUTO: 81 % (ref 32–75)
PLATELET # BLD AUTO: 305 K/UL (ref 150–400)
POTASSIUM SERPL-SCNC: 4.1 MMOL/L (ref 3.5–5.1)
PROT SERPL-MCNC: 5.8 G/DL (ref 6.4–8.2)
RBC # BLD AUTO: 3.32 M/UL (ref 3.8–5.2)
SODIUM SERPL-SCNC: 137 MMOL/L (ref 136–145)
TROPONIN I SERPL-MCNC: 0.25 NG/ML
WBC # BLD AUTO: 8.4 K/UL (ref 3.6–11)

## 2017-01-12 PROCEDURE — 74011250637 HC RX REV CODE- 250/637: Performed by: INTERNAL MEDICINE

## 2017-01-12 PROCEDURE — 36415 COLL VENOUS BLD VENIPUNCTURE: CPT | Performed by: INTERNAL MEDICINE

## 2017-01-12 PROCEDURE — 97535 SELF CARE MNGMENT TRAINING: CPT | Performed by: OCCUPATIONAL THERAPIST

## 2017-01-12 PROCEDURE — 84484 ASSAY OF TROPONIN QUANT: CPT | Performed by: INTERNAL MEDICINE

## 2017-01-12 PROCEDURE — 97116 GAIT TRAINING THERAPY: CPT | Performed by: PHYSICAL THERAPIST

## 2017-01-12 PROCEDURE — 85018 HEMOGLOBIN: CPT | Performed by: INTERNAL MEDICINE

## 2017-01-12 PROCEDURE — 65660000000 HC RM CCU STEPDOWN

## 2017-01-12 PROCEDURE — 74011250636 HC RX REV CODE- 250/636: Performed by: INTERNAL MEDICINE

## 2017-01-12 PROCEDURE — 85025 COMPLETE CBC W/AUTO DIFF WBC: CPT | Performed by: INTERNAL MEDICINE

## 2017-01-12 PROCEDURE — 80053 COMPREHEN METABOLIC PANEL: CPT | Performed by: INTERNAL MEDICINE

## 2017-01-12 PROCEDURE — 74011000258 HC RX REV CODE- 258: Performed by: INTERNAL MEDICINE

## 2017-01-12 PROCEDURE — 83735 ASSAY OF MAGNESIUM: CPT | Performed by: INTERNAL MEDICINE

## 2017-01-12 RX ORDER — AMIODARONE HYDROCHLORIDE 200 MG/1
200 TABLET ORAL 3 TIMES DAILY
Status: DISCONTINUED | OUTPATIENT
Start: 2017-01-12 | End: 2017-01-17

## 2017-01-12 RX ORDER — CEFUROXIME AXETIL 250 MG/1
500 TABLET ORAL EVERY 12 HOURS
Status: DISCONTINUED | OUTPATIENT
Start: 2017-01-12 | End: 2017-01-16

## 2017-01-12 RX ORDER — FUROSEMIDE 20 MG/1
20 TABLET ORAL DAILY
Status: DISCONTINUED | OUTPATIENT
Start: 2017-01-12 | End: 2017-01-23 | Stop reason: HOSPADM

## 2017-01-12 RX ADMIN — Medication 10 ML: at 21:06

## 2017-01-12 RX ADMIN — AMIODARONE HYDROCHLORIDE 200 MG: 200 TABLET ORAL at 18:11

## 2017-01-12 RX ADMIN — ASPIRIN 81 MG CHEWABLE TABLET 81 MG: 81 TABLET CHEWABLE at 10:12

## 2017-01-12 RX ADMIN — HYDRALAZINE HYDROCHLORIDE 25 MG: 25 TABLET, FILM COATED ORAL at 18:11

## 2017-01-12 RX ADMIN — AMIODARONE HYDROCHLORIDE 200 MG: 200 TABLET ORAL at 10:22

## 2017-01-12 RX ADMIN — GABAPENTIN 300 MG: 300 CAPSULE ORAL at 21:06

## 2017-01-12 RX ADMIN — ENOXAPARIN SODIUM 70 MG: 40 INJECTION SUBCUTANEOUS at 21:06

## 2017-01-12 RX ADMIN — Medication 10 ML: at 03:12

## 2017-01-12 RX ADMIN — AMIODARONE HYDROCHLORIDE 0.5 MG/MIN: 50 INJECTION, SOLUTION INTRAVENOUS at 06:57

## 2017-01-12 RX ADMIN — Medication 10 ML: at 00:12

## 2017-01-12 RX ADMIN — CEFUROXIME AXETIL 500 MG: 250 TABLET ORAL at 21:06

## 2017-01-12 RX ADMIN — GABAPENTIN 300 MG: 300 CAPSULE ORAL at 18:11

## 2017-01-12 RX ADMIN — GABAPENTIN 300 MG: 300 CAPSULE ORAL at 10:12

## 2017-01-12 RX ADMIN — HYDRALAZINE HYDROCHLORIDE 25 MG: 25 TABLET, FILM COATED ORAL at 10:12

## 2017-01-12 RX ADMIN — ONDANSETRON 4 MG: 2 INJECTION INTRAMUSCULAR; INTRAVENOUS at 00:12

## 2017-01-12 RX ADMIN — DOCUSATE SODIUM 100 MG: 100 CAPSULE, LIQUID FILLED ORAL at 18:11

## 2017-01-12 RX ADMIN — GUAIFENESIN 600 MG: 600 TABLET, EXTENDED RELEASE ORAL at 18:11

## 2017-01-12 RX ADMIN — METOPROLOL SUCCINATE 25 MG: 25 TABLET, EXTENDED RELEASE ORAL at 10:12

## 2017-01-12 RX ADMIN — ENOXAPARIN SODIUM 70 MG: 40 INJECTION SUBCUTANEOUS at 10:12

## 2017-01-12 RX ADMIN — Medication 10 ML: at 18:11

## 2017-01-12 RX ADMIN — CEFUROXIME AXETIL 500 MG: 250 TABLET ORAL at 13:50

## 2017-01-12 RX ADMIN — AMIODARONE HYDROCHLORIDE 200 MG: 200 TABLET ORAL at 21:05

## 2017-01-12 RX ADMIN — FUROSEMIDE 20 MG: 20 TABLET ORAL at 13:40

## 2017-01-12 RX ADMIN — HYDRALAZINE HYDROCHLORIDE 25 MG: 25 TABLET, FILM COATED ORAL at 21:05

## 2017-01-12 RX ADMIN — DULOXETINE HYDROCHLORIDE 60 MG: 30 CAPSULE, DELAYED RELEASE ORAL at 10:12

## 2017-01-12 RX ADMIN — DOCUSATE SODIUM 100 MG: 100 CAPSULE, LIQUID FILLED ORAL at 10:12

## 2017-01-12 RX ADMIN — GUAIFENESIN 600 MG: 600 TABLET, EXTENDED RELEASE ORAL at 10:12

## 2017-01-12 NOTE — PROGRESS NOTES
0730: Report received from [facility].    0800: Pt. Has incisions to her ABD. Healing well and intact. Pt. Is incontinent. Will turn 2 Q hours and complete good incontinent care. Will apply foam pad to sacrum for protection and float heels when in bed to prevent breakdown.

## 2017-01-12 NOTE — PROGRESS NOTES
Cardiology Progress Note      1/12/2017 8:46 AM    Admit Date: 1/9/2017          Subjective: No chest pain, SOB overnight. Some atypical CP yesterday but enzymes neg. No SVT overnight. Orthostatic with PT         Visit Vitals    /63 (BP 1 Location: Left arm, BP Patient Position: At rest)    Pulse 62    Temp 98.1 °F (36.7 °C)    Resp 18    Wt 72.6 kg (160 lb)    SpO2 98%    BMI 25.06 kg/m2     01/10 1901 - 01/12 0700  In: 1845.4 [P.O.:90; I.V.:1755.4]  Out: -         Objective:      Physical Exam:  VS as above  Chest CTA   Card RRR no gallop   Extrem s edema     Data Review:   Labs:    trop 0.25  Hgb 7.7  BUN 21  Creat 1.2     Telemetry: SR No SVT     Assessment:     1. SVT, probably AV node reentry. ? afib as well  2. Ischemic cardiomyopathy with prior anterior wall myocardial   infarction and remote PTCA and stenting of the left anterior descending. EF 25% by echo   3. Hypertension. 4. Type 2 diabetes. 5. Dyslipidemia. 6. Recent right nephrectomy for renal cell cancer. 7. Acute on chronic renal failure. - better  8. ? Left-sided pneumonia. 9. Anemia   10 orthostasis    Plan: Change to PO amio. Can try a fluid bolus if orthostatic today.

## 2017-01-12 NOTE — PROGRESS NOTES
Cardiopulmonary Care Interdisciplinary rounds were held today to discuss patient plan of care and outcomes. The following members were present: PT, NP/Physician, Pharmacy, Nursing, Nutritionist and Case Management.       Plan of Care: Continue current treatment plan  Changed to po Amio; d/c 1/12 or 1/13 to [facility]

## 2017-01-12 NOTE — PROGRESS NOTES
Report received from [address]. SBAR, Kardex, Procedure Summary, Intake/Output, MAR and Recent Results were discussed.     Leydi Hicks

## 2017-01-12 NOTE — PROGRESS NOTES
Problem: Self Care Deficits Care Plan (Adult)  Goal: *Acute Goals and Plan of Care (Insert Text)  Occupational Therapy Goals  Initiated 1/11/2017  1. Patient will perform grooming in standing with supervision/set-up within 7 day(s). 2. Patient will perform upper body adls with supervision/set-up within 7 day(s). 3. Patient will perform standing adls with supervision/set-up within 7 day(s). 4. Patient will walk into the bathroom to perform toilet transfers with supervision/set-up within 7 day(s). 5. Patient will perform all aspects of toileting with supervision/set-up within 7 day(s). OCCUPATIONAL THERAPY TREATMENT  Patient: Christin Gambino (71 y.o. female)  Date: 1/12/2017  Diagnosis: SVT (supraventricular tachycardia) <principal problem not specified>       Precautions:        ASSESSMENT:  Pt is slowly progressing. Again today, pt complained of dizziness upon standing and did have a decrease in systolic BP (~66). After a seated rest and LE exercises,  pt was able to ambulate around the bed to sit in the chair verbalizing less complaints-BP had  decreased systolically ~84. Pt performed lower body dressing requiring additional time and CGA/Bipin as well as set up. Pt will benefit from returning to SNF rehab to complete her program.    Progression toward goals:  [ ]       Improving appropriately and progressing toward goals  [X]       Improving slowly and progressing toward goals  [ ]       Not making progress toward goals and plan of care will be adjusted       PLAN:  Patient continues to benefit from skilled intervention to address the above impairments. Continue treatment per established plan of care. Discharge Recommendations:  Skilled Nursing Facility  Further Equipment Recommendations for Discharge:  tbd       SUBJECTIVE:   Patient stated I feel dizzy.       OBJECTIVE DATA SUMMARY:   Cognitive/Behavioral Status:      alert, oriented and cooperative  Generally flat affect                 Functional Mobility and Transfers for ADLs:  Bed Mobility:  Rolling: Supervision (using bed rails)  Supine to Sit: Supervision  Scooting: Supervision; Additional time     Transfers:  Sit to Stand: Minimum assistance;Contact guard assistance;Assist x2  Stand to Sit: Minimum assistance;Contact guard assistance;Assist x2  Bed to Chair: Minimum assistance     Balance:  Sitting: Intact  Standing: Impaired  Standing - Static: Good;Constant support  Standing - Dynamic : Fair (overall using RW however pt with 1 major LOB when turning)     ADL Intervention:   Pt performed bed mobility, ambulation around the bed to the chair using the RW and assistance X2. Pt was able to don underpants once set up with additional time and CGA/Bipin for standing balance using one hand on the RW for support in standing. Therapeutic Exercises:   Encouraged BLE exercises in sitting when pt c/o dizziness in standing. Pain:  Pain Scale 1: Numeric (0 - 10)  Pain Intensity 1: 0              Activity Tolerance:   Fair. Dizzy in standing. Decreased systolic BP upon initially standing with symptoms. Please refer to the flowsheet for vital signs taken during this treatment.   After treatment:   [X] Patient left in no apparent distress sitting up in chair  [ ] Patient left in no apparent distress in bed  [X] Call bell left within reach  [X] Nursing notified  [ ] Caregiver present  [X] Bed alarm activated      COMMUNICATION/COLLABORATION:   The patients plan of care was discussed with: Physical Therapist and Registered Nurse and rounding team     PADMINI Resendez/L  Time Calculation: 32 mins

## 2017-01-12 NOTE — PROGRESS NOTES
Hospitalist Progress Note    NAME: Lisa Pollock   :  1945   MRN:  [id number]       Interim Hospital Summary: 80 y.o. female whom presented on 2017 with  A. Fib, Pneumonia, ARF     Assessment / Plan:  Sepsis POA: tachycardia, Leukocytosis, PNA, so far improving. SVT vs A. Fib: c/w Amiodarone po now,  ECHO EF 25%, Cardiology help appreciated. LLL Pneumonia: change LVQ to Ceftin, c/w bronchodilators, mucolytics, check sputum. WBC trending down  ARF: so far creatinine trending down to WNL, monitor. Recent Right nephrectomy for RCC   Syncope: had a near syncope yesterday, still dizzy and lightheaded, vomited LVQ, BP is better today. Chest Pain/increased troponin: c/w ASA,  ECHO EF 25%, Cardiology help appreciated. Leukocytosis: so far trending down to WNL, monitor. GERD  Surrogate Decision Maker: son Ivory Yu, dtr [facility]  [phone] (mailbox is full)  Code status: Full  Prophylaxis: lovenox  Recommended Disposition: SNF/LTC     Plan to D/c tomorrow back to SNF     Subjective:     Chief Complaint / Reason for Physician Visit  \"I feel  dizzy\". Discussed with RN events overnight. Review of Systems:  Symptom Y/N Comments  Symptom Y/N Comments   Fever/Chills    Chest Pain y    Poor Appetite    Edema     Cough    Abdominal Pain     Sputum    Joint Pain     SOB/ROCK y   Pruritis/Rash     Nausea/vomit    Tolerating PT/OT     Diarrhea    Tolerating Diet y    Constipation    Other       Could NOT obtain due to:      Objective:     VITALS:   Last 24hrs VS reviewed since prior progress note.  Most recent are:  Patient Vitals for the past 24 hrs:   Temp Pulse Resp BP SpO2   17 1104 - 74 - 125/66 98 %   17 1100 - 87 - 119/72 -   17 1055 - 65 - 135/76 99 %   17 1050 98 °F (36.7 °C) 63 18 134/63 96 %   17 0733 98.1 °F (36.7 °C) 62 18 135/63 98 %   17 0305 98 °F (36.7 °C) 73 18 128/69 97 %   17 2254 98 °F (36.7 °C) 68 18 127/68 98 %   17 1918 97.5 °F (36.4 °C) 72 18 117/80 97 %   01/11/17 1539 98 °F (36.7 °C) 68 18 137/82 98 %       Intake/Output Summary (Last 24 hours) at 01/12/17 1155  Last data filed at 01/11/17 1756   Gross per 24 hour   Intake               90 ml   Output                0 ml   Net               90 ml        PHYSICAL EXAM:  General: WD, WN. Alert, cooperative, no acute distress    EENT:  EOMI. Anicteric sclerae. MMM  Resp:  Coarse BS  CV:  Regular  rhythm,  No edema  GI:  Soft, Non distended, Non tender.  +Bowel sounds  Neurologic:  Alert and oriented X 3, normal speech,   Psych:   Fair  insight. Not anxious nor agitated  Skin:  No rashes. No jaundice    Reviewed most current lab test results and cultures  YES  Reviewed most current radiology test results   YES  Review and summation of old records today    NO  Reviewed patient's current orders and MAR    YES  PMH/SH reviewed - no change compared to H&P  ________________________________________________________________________  Care Plan discussed with:    Comments   Patient y    Family      RN y    Care Manager     Consultant                        Multidiciplinary team rounds were held today with , nursing, pharmacist and clinical coordinator. Patient's plan of care was discussed; medications were reviewed and discharge planning was addressed. ________________________________________________________________________  Total NON critical care TIME:  30   Minutes    Total CRITICAL CARE TIME Spent:    Minutes non procedure based      Comments   >50% of visit spent in counseling and coordination of care     ________________________________________________________________________  Nunu Jason, MD     Procedures: see electronic medical records for all procedures/Xrays and details which were not copied into this note but were reviewed prior to creation of Plan. LABS:  I reviewed today's most current labs and imaging studies.   Pertinent labs include:  Recent Labs 01/12/17   0940  01/12/17   0358  01/11/17   0223  01/10/17   0421   WBC   --   8.4  7.0  8.9   HGB  8.4*  7.7*  8.1*  8.3*   HCT  26.4*  24.1*  25.1*  25.7*   PLT   --   305  296  310     Recent Labs      01/12/17   0358  01/11/17   0223  01/10/17   0421   NA  137  134*  132*   K  4.1  3.7  4.1   CL  101  99  97   CO2  24  25  25   GLU  102*  110*  159*   BUN  21*  29*  36*   CREA  1.21*  1.42*  1.56*   CA  8.1*  7.9*  7.7*   MG  1.9  1.8  1.9   ALB  2.0*  2.0*   --    TBILI  0.4  0.4   --    SGOT  15  15   --    ALT  <6*  7*   --        Signed: Cindy Hernandez MD

## 2017-01-12 NOTE — PROGRESS NOTES
Problem: Mobility Impaired (Adult and Pediatric)  Goal: *Acute Goals and Plan of Care (Insert Text)  Physical Therapy Goals  Initiated 1/11/2017  1. Patient will move from supine to sit and sit to supine , scoot up and down and roll side to side in bed with supervision/set-up within 7 day(s). 2. Patient will transfer from bed to chair and chair to bed with minimal assistance/contact guard assist using the least restrictive device within 7 day(s). 3. Patient will perform sit to stand with minimal assistance/contact guard assist within 7 day(s). 4. Patient will ambulate with minimal assistance/contact guard assist for 75 feet with the least restrictive device within 7 day(s). 5. Patient will perform 2 sets of 10 repetitions of active strengthening exercises for bilateral lower extremity(s) with supervision/set-up within 7 day(s). PHYSICAL THERAPY TREATMENT  Patient: [address] (14 y.o. female)  Date: 1/12/2017  Diagnosis: SVT (supraventricular tachycardia) <principal problem not specified>       Precautions:  falls      ASSESSMENT: Patient continues to demonstrate mild symptomatic hypotension which limits OOB mobility. Patient able to perform bed mobility with supervision and requires CGA/min A of 2 for transfers. Improved gait stability noted overall however patient demonstrating one major LOB when walking around corned of bed requiring mod A to regain balance. Patient only able to ambulate 15 feet again today but was able to remain sitting in chair at end of session. Recommend return to SNF following discharge. Progression toward goals:  [ ]    Improving appropriately and progressing toward goals  [X]    Improving slowly and progressing toward goals  [ ]    Not making progress toward goals and plan of care will be adjusted       PLAN:  Patient continues to benefit from skilled intervention to address the above impairments. Continue treatment per established plan of care.   Discharge Recommendations:  Skilled Nursing Facility  Further Equipment Recommendations for Discharge:  TBD by SNF       SUBJECTIVE:   Patient stated I'm feeling a little better today.       OBJECTIVE DATA SUMMARY:   Critical Behavior:  Neurologic State: Alert  Orientation Level: Oriented X4  Cognition: Follows commands  Safety/Judgement: Awareness of environment, Fall prevention  Functional Mobility Training:  Bed Mobility:  Rolling: Supervision (using bed rails)  Supine to Sit: Supervision     Scooting: Supervision; Additional time        Transfers:  Sit to Stand: Minimum assistance;Contact guard assistance;Assist x2  Stand to Sit: Minimum assistance;Contact guard assistance;Assist x2        Bed to Chair: Minimum assistance                    Balance:  Sitting: Intact  Standing: Impaired  Standing - Static: Good;Constant support  Standing - Dynamic : Fair (overall using RW however pt with 1 major LOB when turning)  Ambulation/Gait Training:  Distance (ft): 15 Feet (ft)  Assistive Device: Walker, rolling  Ambulation - Level of Assistance: Minimal assistance;Contact guard assistance (one major LOB when turning at EOB requring mod A)        Gait Abnormalities: Decreased step clearance (decreased dirosiflex on L during swing through/heel strike)        Base of Support: Narrowed     Speed/Maria Luisa: Pace decreased (<100 feet/min)  Step Length: Left shortened;Right shortened      Gait slow with decreased step length bilaterally; CGA initially but demonstrating one major LOB when walking around corner of bed requiring mod A to regain balance. Pain:  Pain Scale 1: Numeric (0 - 10)  Pain Intensity 1: 0              Activity Tolerance:   Mild orthostatic hypotension in standing - patient is symptomatic with c/o dizziness  Please refer to the flowsheet for vital signs taken during this treatment.   After treatment:   [X]    Patient left in no apparent distress sitting up in chair  [ ]    Patient left in no apparent distress in bed  [X]    Call bell left within reach  [X]    Nursing notified  [ ]    Caregiver present  [X]    Bed alarm activated      COMMUNICATION/COLLABORATION:   The patients plan of care was discussed with: Occupational Therapist, Registered Nurse and      Alysia Hernandez PT   Time Calculation: 22 mins

## 2017-01-12 NOTE — PROGRESS NOTES
Problem: Self Care Deficits Care Plan (Adult)  Goal: *Acute Goals and Plan of Care (Insert Text)  Occupational Therapy Goals  Initiated 1/11/2017  1. Patient will perform grooming in standing with supervision/set-up within 7 day(s). 2. Patient will perform upper body adls with supervision/set-up within 7 day(s). 3. Patient will perform standing adls with supervision/set-up within 7 day(s). 4. Patient will walk into the bathroom to perform toilet transfers with supervision/set-up within 7 day(s). 5. Patient will perform all aspects of toileting with supervision/set-up within 7 day(s). OCCUPATIONAL THERAPY EVALUATION  Patient: Jasson Austin (80 y.o. female)  Date: 1/11/2017  Primary Diagnosis: SVT (supraventricular tachycardia)        Precautions: fall,         ASSESSMENT :  Based on the objective data described below, the patient presents with decreased balance, generalized weakness, and decreased endurance as well as BP instability limiting pt's tolerance for adls and mobility. Pt is functioning at set up to moderate assistance for self care and is CGA to maximal assistance for functional mobility. Pt used a RW with assistance X2 to walk into the bathroom with minimal assistance X2. BP was recorded throughout tx session-pt had significant weakness and chest discomfort post toileting and standing at the sink--pt was urgently returned to bed and nursing notified. Pt will benefit from ret urning to rehab to complete her program .     Patient will benefit from skilled intervention to address the above impairments.   Patients rehabilitation potential is considered to be Good  Factors which may influence rehabilitation potential include:   [ ]             None noted  [ ]             Mental ability/status  [X]             Medical condition  [ ]             Home/family situation and support systems  [ ]             Safety awareness  [ ]             Pain tolerance/management  [ ]             Other:        PLAN :  Recommendations and Planned Interventions:  [X]               Self Care Training                  [X]        Therapeutic Activities  [X]               Functional Mobility Training    [ ]        Cognitive Retraining  [X]               Therapeutic Exercises           [X]        Endurance Activities  [X]               Balance Training                   [ ]        Neuromuscular Re-Education  [ ]               Visual/Perceptual Training     [X]   Home Safety Training  [X]               Patient Education                 [X]        Family Training/Education  [ ]               Other (comment):     Frequency/Duration: Patient will be followed by occupational therapy 4 times a week to address goals. Discharge Recommendations: Rehab  Further Equipment Recommendations for Discharge: tbd       SUBJECTIVE:   Patient stated I feel so weak.   (pt collapsing while s tanding at sink and urgently ret urned to bed)      OBJECTIVE DATA SUMMARY:   HISTORY:   Past Medical History   Diagnosis Date    Autoimmune disease ([facility] [address]         rheumatoid     CAD (coronary artery disease)      GERD (gastroesophageal reflux disease)      Hypertension      Ill-defined condition         anemia    Other ill-defined conditions(799.89)         high cholesterol    Renal cell carcinoma of right kidney ([facility] [address]         s/p resection 12/16     Past Surgical History   Procedure Laterality Date    Pr cardiac surg procedure unlist           three stents placed 2005    Hx tonsillectomy        Hx urological   12/22/2016       RIGHT LAPOROSCOPIC HAND ASSISTED RADICAL NEPHRECTOMY        Prior Level of Function/Home Situation: Pt reported that she  has been becoming weaker since October. She is independent in adls at that time. Pt was admitted from rehab.   Expanded or extensive additional review of patient history:      Home Situation  Home Environment: Other (comment) (Pt arrived from [facility]. )  One/Two Story Residence: Other (Comment)  Living Alone: No (lives w son)  Support Systems: Child(macy), Family member(s)  Patient Expects to be Discharged to[de-identified] Skilled nursing facility  Current DME Used/Available at Home: Commode, bedside, Shower chair, Walker, rolling  Tub or Shower Type: Tub/Shower combination  [X]  Right hand dominant             [ ]  Left hand dominant     EXAMINATION OF PERFORMANCE DEFICITS:  Cognitive/Behavioral Status:  Neurologic State: Alert; Appropriate for age  Orientation Level: Appropriate for age  Cognition: Follows commands  Perception: Appears intact  Perseveration: No perseveration noted  Safety/Judgement: Awareness of environment; Fall prevention  Skin: generally intact  Edema: none observed  Vision/Perceptual:                           Acuity: Impaired near vision    Corrective Lenses: Reading glasses  Range of Motion:  BUEs:  AROM: Generally decreased, functional                       Strength:  BUEs  Strength: Generally decreased, functional ( strength equal but decreased bilaterally)   Overall strength is generally weak           Coordination:  Coordination: Within functional limits  Fine Motor Skills-Upper: Left Intact; Right Intact (intention tremor present)    Gross Motor Skills-Upper: Left Intact; Right Intact  Tone & Sensation:     Tone:  (intention tremors B hands)  Sensation: Impaired (pt reports numbness and tingling in B feet)                       Balance:  Sitting: Intact  Standing: Impaired  Standing - Static: Fair;Constant support  Standing - Dynamic : Fair (using RW for support)     Functional Mobility and Transfers for ADLs:  Bed Mobility:  Rolling: Supervision; Additional time (using bed rails)  Supine to Sit: Contact guard assistance; Additional time  Sit to Supine: Maximum assistance;Assist x2  Scooting: Contact guard assistance; Additional time     Transfers:  Sit to Stand: Minimum assistance; Moderate assistance;Assist x2 (increased assistance from toilet demonstrating decreased ant)  Stand to Sit: Minimum assistance  Bed to Chair: Minimum assistance;Assist x2  Toilet Transfer : Minimum assistance; Additional time;Assist x2     ADL Assessment:  Feeding: Setup     Oral Facial Hygiene/Grooming: Setup     Bathing: Moderate assistance     Upper Body Dressing: Minimum assistance     Lower Body Dressing: Moderate assistance     Toileting: Setup;Minimum assistance (assist for throroughness)                 ADL Intervention and task modifications:     Pt performed bed mobility, ambulated to bathroom using RW and assistance X2, toilet transfer , assist for toilet hygiene attempted standing at sink, became collapsing weak and was urgently returned to bed                                      Cognitive Retraining  Safety/Judgement: Awareness of environment; Fall prevention        Functional Measure:  Barthel Index:      Bathin  Bladder: 10  Bowels: 10  Groomin  Dressin  Feeding: 10  Mobility: 0  Stairs: 0  Toilet Use: 5  Transfer (Bed to Chair and Back): 10  Total: 55         Barthel and G-code impairment scale:  Percentage of impairment CH  0% CI  1-19% CJ  20-39% CK  40-59% CL  60-79% CM  80-99% CN  100%   Barthel Score 0-100 100 99-80 79-60 59-40 20-39 1-19    0   Barthel Score 0-20 20 17-19 13-16 9-12 5-8 1-4 0      The Barthel ADL Index: Guidelines  1. The index should be used as a record of what a patient does, not as a record of what a patient could do. 2. The main aim is to establish degree of independence from any help, physical or verbal, however minor and for whatever reason. 3. The need for supervision renders the patient not independent. 4. A patient's performance should be established using the best available evidence. Asking the patient, friends/relatives and nurses are the usual sources, but direct observation and common sense are also important. However direct testing is not needed.   5. Usually the patient's performance over the preceding 24-48 hours is important, but occasionally longer periods will be relevant. 6. Middle categories imply that the patient supplies over 50 per cent of the effort. 7. Use of aids to be independent is allowed. Cee Torres., Barthel, DDavidW. (4608). Functional evaluation: the Barthel Index. [facility] (14)2. [facility], Thompson Mccoy, Julio Nagy, Colonel Olivo, [facility] (1999). Measuring the change indisability after inpatient rehabilitation; comparison of the responsiveness of the Barthel Index and Functional Lyman Measure. Journal of Neurology, Neurosurgery, and Psychiatry, 66(4), 668-194. Lorraine Craven, N.J.A, LIUDMILA Hubbard, & Rosy Elizondo M.A. (2004.) Assessment of post-stroke quality of life in cost-effectiveness studies: The usefulness of the Barthel Index and the EuroQoL-5D. Quality of Life Research, 13, 339-94         G codes: In compliance with CMSs Claims Based Outcome Reporting, the following G-code set was chosen for this patient based on their primary functional limitation being treated: The outcome measure chosen to determine the severity of the functional limitation was the Barthel Index with a score of 55/100 which was correlated with the impairment scale. · Self Care:               - CURRENT STATUS:    CK - 40%-59% impaired, limited or restricted               - GOAL STATUS:           CJ - 20%-39% impaired, limited or restricted               - D/C STATUS:                       ---------------To be determined---------------      Occupational Therapy Evaluation Charge Determination   History Examination Decision-Making   MEDIUM Complexity : Expanded review of history including physical, cognitive and psychosocial  history  MEDIUM Complexity : 3-5 performance deficits relating to physical, cognitive , or psychosocial skils that result in activity limitations and / or participation restrictions MEDIUM Complexity : Patient may present with comorbidities that affect occupational performnce.  Miniml to moderate modification of tasks or assistance (eg, physical or verbal ) with assesment(s) is necessary to enable patient to complete evaluation       Based on the above components, the patient evaluation is determined to be of the following complexity level: MEDIUM  Pain:  Pain Scale 1: Numeric (0 - 10)  Pain Intensity 1: did not rate  Pain Location 1: Chest (midclavicular)  Pain Orientation 1: Anterior  Pain Description 1:  (pt states she feels like \"a piece of food is in her throat\")  Pain Intervention(s) 1: Therapeutic presence;Rest;Repositioned;Relaxation technique  Activity Tolerance:   Poor due to orthostasis  Please refer to the flowsheet for vital signs taken during this treatment. After treatment:   [ ] Patient left in no apparent distress sitting up in chair  [X] Patient left in no apparent distress in bed  [X] Call bell left within reach  [X] Nursing notified  [ ] Caregiver present  [X] Bed alarm activated      COMMUNICATION/EDUCATION:   The patients plan of care was discussed with: Physical Therapist and Registered Nurse. [ ] Home safety education was provided and the patient/caregiver indicated understanding. [ ] Patient/family have participated as able in goal setting and plan of care. [X] Patient/family agree to work toward stated goals and plan of care. [ ] Patient understands intent and goals of therapy, but is neutral about his/her participation. [ ] Patient is unable to participate in goal setting and plan of care. This patients plan of care is appropriate for delegation to [facility].      Thank you for this referral.  Christa Waterman OTR/KELSEA  Time Calculation: 49 mins

## 2017-01-12 NOTE — PROGRESS NOTES
Pharmacy Automatic Renal Dosing Protocol - Antimicrobials     Indication for Antimicrobials: CAP     Current Regimen of Each Antimicrobial:   mg IV q48h - started  17 day 4     Significant Cultures:    (blood): no growth x 3days-PRELIM   (UA):   · Cloudy  · Trace ketone  · Small leuk esterase  · Mod epithelial cells  · Bacteria negative  CAPD, Hemodialysis or Renal Replacement Therapy: None   Paralysis, amputations, malnutrition: None  Estimated Creatinine Clearance: 42.1 mL/min (based on Cr of 1.21). Estimated Creatinine Clearance (using IBW): 42.1 mL/min  Recent Labs      17   0358  17   0223  01/10/17   0421  17   1124   CREA  1.21*  1.42*  1.56*  1.72*   BUN  21*  29*  36*  34*   WBC  8.4  7.0  8.9  16.5*   NA  137  134*  132*  134*   K  4.1  3.7  4.1  4.5   MG  1.9  1.8  1.9  2.1   CA  8.1*  7.9*  7.7*  8.6   ALB  2.0*  2.0*   --   2.4*   HGB  7.7*  8.1*  8.3*  11.3*   HCT  24.1*  25.1*  25.7*  34.9*   PLT  305  296  310  442*   ALT  <6*  7*   --   9*     Temp (24hrs), Av.9 °F (36.6 °C), Min:97.5 °F (36.4 °C), Max:98.1 °F (36.7 °C)    Impression/Plan:   WBC < 10, afebrile, 750mg q48h interval is correct for current renal function and pt taking PO meds, last dose     Please consider changing regimen to PO with addition of stop date, next dose for q48h interval would be . Pharmacy will follow daily and adjust as appropriate.     Thank you,    Kobe Olson, [facility]

## 2017-01-12 NOTE — PROGRESS NOTES
Pharmacy Automatic Renal Dosing Protocol - Antimicrobials     Indication for Antimicrobials: CAP     Current Regimen of Each Antimicrobial:  Cefuroxime 500mg PO q12h - abx 17 day 4     Significant Cultures:    (blood): no growth x 3days-PRELIM   (UA):   · Cloudy  · Trace ketone  · Small leuk esterase  · Mod epithelial cells  · Bacteria negative  CAPD, Hemodialysis or Renal Replacement Therapy: None   Paralysis, amputations, malnutrition: None  Estimated Creatinine Clearance: 42.1 mL/min (based on Cr of 1.21). Estimated Creatinine Clearance (using IBW): 42.1 mL/min         Recent Labs      17  0358 17  0223 01/10/17  0421 17  1124   CREA 1.21* 1.42* 1.56* 1.72*   BUN 21* 29* 36* 34*   WBC 8.4 7.0 8.9 16.5*    134* 132* 134*   K 4.1 3.7 4.1 4.5   MG 1.9 1.8 1.9 2.1   CA 8.1* 7.9* 7.7* 8.6   ALB 2.0* 2.0* --  2.4*   HGB 7.7* 8.1* 8.3* 11.3*   HCT 24.1* 25.1* 25.7* 34.9*    296 310 442*   ALT <6* 7* --  9*      Temp (24hrs), Av.9 °F (36.6 °C), Min:97.5 °F (36.4 °C), Max:98.1 °F (36.7 °C)     Impression/Plan: WBC < 10, afebrile, LQ 750mg q48h changed to Cefuroxime 500mg PO Q12h ; Dosing regimen correct for renal function.        Thank you,  Yessy Garcia, [facility]

## 2017-01-13 PROBLEM — J18.9 LEFT LOWER LOBE PNEUMONIA: Status: ACTIVE | Noted: 2017-01-13

## 2017-01-13 PROBLEM — I95.1 ORTHOSTATIC HYPOTENSION: Status: ACTIVE | Noted: 2017-01-13

## 2017-01-13 PROBLEM — I48.0 PAROXYSMAL ATRIAL FIBRILLATION (HCC): Status: ACTIVE | Noted: 2017-01-13

## 2017-01-13 LAB
ALBUMIN SERPL BCP-MCNC: 2 G/DL (ref 3.5–5)
ALBUMIN/GLOB SERPL: 0.6 {RATIO} (ref 1.1–2.2)
ALP SERPL-CCNC: 61 U/L (ref 45–117)
ALT SERPL-CCNC: <6 U/L (ref 12–78)
ANION GAP BLD CALC-SCNC: 8 MMOL/L (ref 5–15)
AST SERPL W P-5'-P-CCNC: 12 U/L (ref 15–37)
BASOPHILS # BLD AUTO: 0 K/UL (ref 0–0.1)
BASOPHILS # BLD: 0 % (ref 0–1)
BILIRUB SERPL-MCNC: 0.3 MG/DL (ref 0.2–1)
BUN SERPL-MCNC: 18 MG/DL (ref 6–20)
BUN/CREAT SERPL: 14 (ref 12–20)
CALCIUM SERPL-MCNC: 8.2 MG/DL (ref 8.5–10.1)
CHLORIDE SERPL-SCNC: 101 MMOL/L (ref 97–108)
CO2 SERPL-SCNC: 26 MMOL/L (ref 21–32)
CREAT SERPL-MCNC: 1.29 MG/DL (ref 0.55–1.02)
EOSINOPHIL # BLD: 0.1 K/UL (ref 0–0.4)
EOSINOPHIL NFR BLD: 2 % (ref 0–7)
ERYTHROCYTE [DISTWIDTH] IN BLOOD BY AUTOMATED COUNT: 19.3 % (ref 11.5–14.5)
GLOBULIN SER CALC-MCNC: 3.6 G/DL (ref 2–4)
GLUCOSE SERPL-MCNC: 91 MG/DL (ref 65–100)
HCT VFR BLD AUTO: 23.3 % (ref 35–47)
HGB BLD-MCNC: 7.5 G/DL (ref 11.5–16)
LYMPHOCYTES # BLD AUTO: 21 % (ref 12–49)
LYMPHOCYTES # BLD: 1.6 K/UL (ref 0.8–3.5)
MAGNESIUM SERPL-MCNC: 1.9 MG/DL (ref 1.6–2.4)
MCH RBC QN AUTO: 23.4 PG (ref 26–34)
MCHC RBC AUTO-ENTMCNC: 32.2 G/DL (ref 30–36.5)
MCV RBC AUTO: 72.8 FL (ref 80–99)
MONOCYTES # BLD: 0.8 K/UL (ref 0–1)
MONOCYTES NFR BLD AUTO: 10 % (ref 5–13)
NEUTS SEG # BLD: 5.2 K/UL (ref 1.8–8)
NEUTS SEG NFR BLD AUTO: 67 % (ref 32–75)
PLATELET # BLD AUTO: 299 K/UL (ref 150–400)
POTASSIUM SERPL-SCNC: 3.7 MMOL/L (ref 3.5–5.1)
PROT SERPL-MCNC: 5.6 G/DL (ref 6.4–8.2)
RBC # BLD AUTO: 3.2 M/UL (ref 3.8–5.2)
SODIUM SERPL-SCNC: 135 MMOL/L (ref 136–145)
WBC # BLD AUTO: 7.7 K/UL (ref 3.6–11)

## 2017-01-13 PROCEDURE — 74011250636 HC RX REV CODE- 250/636: Performed by: INTERNAL MEDICINE

## 2017-01-13 PROCEDURE — 74011250637 HC RX REV CODE- 250/637: Performed by: INTERNAL MEDICINE

## 2017-01-13 PROCEDURE — 80053 COMPREHEN METABOLIC PANEL: CPT | Performed by: INTERNAL MEDICINE

## 2017-01-13 PROCEDURE — 83735 ASSAY OF MAGNESIUM: CPT | Performed by: INTERNAL MEDICINE

## 2017-01-13 PROCEDURE — 36415 COLL VENOUS BLD VENIPUNCTURE: CPT | Performed by: INTERNAL MEDICINE

## 2017-01-13 PROCEDURE — 65660000000 HC RM CCU STEPDOWN

## 2017-01-13 PROCEDURE — 85025 COMPLETE CBC W/AUTO DIFF WBC: CPT | Performed by: INTERNAL MEDICINE

## 2017-01-13 RX ORDER — LISINOPRIL 20 MG/1
20 TABLET ORAL DAILY
Status: DISCONTINUED | OUTPATIENT
Start: 2017-01-14 | End: 2017-01-16

## 2017-01-13 RX ORDER — ENOXAPARIN SODIUM 100 MG/ML
40 INJECTION SUBCUTANEOUS EVERY 24 HOURS
Status: DISCONTINUED | OUTPATIENT
Start: 2017-01-14 | End: 2017-01-14

## 2017-01-13 RX ADMIN — ENOXAPARIN SODIUM 70 MG: 40 INJECTION SUBCUTANEOUS at 09:13

## 2017-01-13 RX ADMIN — SODIUM CHLORIDE 500 ML: 900 INJECTION, SOLUTION INTRAVENOUS at 08:08

## 2017-01-13 RX ADMIN — HYDRALAZINE HYDROCHLORIDE 25 MG: 25 TABLET, FILM COATED ORAL at 09:16

## 2017-01-13 RX ADMIN — GABAPENTIN 300 MG: 300 CAPSULE ORAL at 09:13

## 2017-01-13 RX ADMIN — CEFUROXIME AXETIL 500 MG: 250 TABLET ORAL at 22:37

## 2017-01-13 RX ADMIN — GUAIFENESIN 600 MG: 600 TABLET, EXTENDED RELEASE ORAL at 17:50

## 2017-01-13 RX ADMIN — Medication 10 ML: at 08:11

## 2017-01-13 RX ADMIN — AMIODARONE HYDROCHLORIDE 200 MG: 200 TABLET ORAL at 22:35

## 2017-01-13 RX ADMIN — DOCUSATE SODIUM 100 MG: 100 CAPSULE, LIQUID FILLED ORAL at 09:11

## 2017-01-13 RX ADMIN — ONDANSETRON 4 MG: 2 INJECTION INTRAMUSCULAR; INTRAVENOUS at 15:46

## 2017-01-13 RX ADMIN — GABAPENTIN 300 MG: 300 CAPSULE ORAL at 22:35

## 2017-01-13 RX ADMIN — AMIODARONE HYDROCHLORIDE 200 MG: 200 TABLET ORAL at 17:58

## 2017-01-13 RX ADMIN — GUAIFENESIN 600 MG: 600 TABLET, EXTENDED RELEASE ORAL at 09:12

## 2017-01-13 RX ADMIN — ASPIRIN 81 MG CHEWABLE TABLET 81 MG: 81 TABLET CHEWABLE at 09:13

## 2017-01-13 RX ADMIN — METOPROLOL SUCCINATE 25 MG: 25 TABLET, EXTENDED RELEASE ORAL at 09:16

## 2017-01-13 RX ADMIN — DULOXETINE HYDROCHLORIDE 60 MG: 30 CAPSULE, DELAYED RELEASE ORAL at 09:12

## 2017-01-13 RX ADMIN — Medication 10 ML: at 15:46

## 2017-01-13 RX ADMIN — AMIODARONE HYDROCHLORIDE 200 MG: 200 TABLET ORAL at 09:12

## 2017-01-13 RX ADMIN — GABAPENTIN 300 MG: 300 CAPSULE ORAL at 17:55

## 2017-01-13 RX ADMIN — DOCUSATE SODIUM 100 MG: 100 CAPSULE, LIQUID FILLED ORAL at 17:50

## 2017-01-13 RX ADMIN — FUROSEMIDE 20 MG: 20 TABLET ORAL at 09:16

## 2017-01-13 RX ADMIN — Medication 10 ML: at 22:39

## 2017-01-13 RX ADMIN — CEFUROXIME AXETIL 500 MG: 250 TABLET ORAL at 10:13

## 2017-01-13 NOTE — PROGRESS NOTES
Cardiology Progress Note      1/13/2017 11:19 AM    Admit Date: 1/9/2017      Subjective:  Some dizziness, SOB when she stands. No chest pain. Only one episode of SVT yest- 10-15 beats          Visit Vitals    /60 (BP 1 Location: Left arm, BP Patient Position: At rest)    Pulse 66    Temp 97.3 °F (36.3 °C)    Resp 18    Wt 72.6 kg (160 lb)    SpO2 96%    BMI 25.06 kg/m2              Objective:      Physical Exam:  VS as above  Chest CTA  Card RRR no gallop 2/6 DORIAN     Data Review:   Labs:      BUN 18  Creat 1.3   Hgb 7.5     Telemetry: SR R 70      Assessment:     1. SVT, probably AV node reentry. ? afib as well- better   2. Ischemic cardiomyopathy with prior anterior wall myocardial   infarction and remote PTCA and stenting of the left anterior descending. EF 25% by echo   3. Hypertension. 4. Type 2 diabetes. 5. Dyslipidemia. 6. Recent right nephrectomy for renal cell cancer. 7. Acute on chronic renal failure. - better  8. ? Left-sided pneumonia. 9. Anemia   10 orthostasis    Plan:  Cont current Rx. Should go to SNF on amiodarone 200 mg tid and see me in office 1-2 weeks. 200 mg bid after 2 weeks. Cont other meds except can d/c lovenox.

## 2017-01-13 NOTE — PROGRESS NOTES
Cardiopulmonary Care Interdisciplinary rounds were held today to discuss patient plan of care and outcomes. The following members were present: PT, NP/Physician, Pharmacy, Nursing, Nutritionist and Case Management.       Plan of Care: Continue current treatment plan  d/c 1/13 to [facility]

## 2017-01-13 NOTE — CARDIO/PULMONARY
C/P Rehab Note:    Chart Reviewed. Pt admitted with SVT. Per Cardiology note: SVT, probably AV node reentry. ? afib as well. Ischemic cardiomyopathy with prior anterior wall myocardial infarction and remote PTCA and stenting of the left anterior descending. EF 25% by echo. PMH significant for:  1. HTN  2. DM  3. Hyperlipiedmia  4. Acute on chronic renal failure     Teaching provided other day. Met with pt who was sitting up talking on the phone. Waited by door way, pt continue talking and advised I would return. Met with pt who was sitting in bed. Inquired if she had an opportunity to read CHF materials. She replied that she has not. Reviewed rational for weighing daily. Asked if she recalled weight parameters? Pt could not recall and provided information. Encouraged pt to follow a low NA diet. Inquired if she remembered how many mg of salt to limit in her diet? Pt replied she could not recall and provided information. She went on to say that she does not salt her food. Advised her that most of the salt we consume comes from the food we eat. Demonstrated how to read NA content on label from beverage on her table. Encouraged her to track NA content each meal.     Pt without questions and reinforcement advisable.

## 2017-01-13 NOTE — PROGRESS NOTES
CM note: Rosaline Briscoe has received authorization from the pt's insurance for her to return. It is possible that the pt will be ready for dc to [place] on tomorrow 1/14/17 ( [phone].     Judee Pallas, MSW

## 2017-01-13 NOTE — PROGRESS NOTES
[facility] SHIFT NURSING NOTE    Bedside and Verbal shift change report given to Giovanna Carroll (oncoming nurse) by Philippe Wilson (offgoing nurse). Report included the following information SBAR, Kardex, ED Summary, Procedure Summary, Intake/Output, MAR, Recent Results and Cardiac Rhythm NSR.    SHIFT SUMMARY:     1759: Per Dr. Sana Harris, amiodarone to be administered considering current vitals. Admission Date 1/9/2017   Admission Diagnosis SVT (supraventricular tachycardia)   Consults IP CONSULT TO CARDIOLOGY  IP CONSULT TO CARDIOLOGY        Consults   [x] PT   [x] OT   [] Speech   [] Palliative      [] Hospice    [] Case Management   [] None   Cardiac Monitoring   [x] Yes   [] No     Antibiotics   [x] Yes   [] No   GI Prophylaxis  (Ex: Protonix, Pepcid, etc,.)   [] Yes   [x] No          DVT Prophylaxis   SCDs:             Santiago stockings:         [x] Medication (Ex: Lovenox, Eliquis,  Heparin, etc..)   [] Contraindicated   [] None       Urinary Catheter             LDAs               Peripheral IV 01/12/17 Left Forearm (Active)   Site Assessment Clean, dry, & intact 1/13/2017  7:31 AM   Phlebitis Assessment 0 1/13/2017  7:31 AM   Infiltration Assessment 0 1/13/2017  7:31 AM   Dressing Status Clean, dry, & intact 1/13/2017  7:31 AM   Dressing Type Transparent;Tape 1/13/2017  7:31 AM   Hub Color/Line Status Blue 1/13/2017  7:31 AM                      I/Os   Intake/Output Summary (Last 24 hours) at 01/13/17 1135  Last data filed at 01/13/17 0919   Gross per 24 hour   Intake              300 ml   Output                0 ml   Net              300 ml         Activity Level Activity Level: Up with Assistance, Head of bed elevated (degrees)     Activity Assistance: Partial (two people)   Diet Active Orders   Diet    DIET CARDIAC Regular      Purposeful Rounding every 1-2 hour?    [x] Yes    Zuri Score  Total Score: 3   Bed Alarm (If score 3 or >)   [x] Yes    [] Refused (See signed refusal form in chart)   Evan Score  Evan Score: 16 Evan Score (if score 14 or less)   [] PMT consult   [] Nutrition consult   [] Wound Care consult      []  Specialty bed         Influenza Vaccine Received Flu Vaccine for Current Season (usually Sept-March): Yes               Needs prior to discharge:   Home O2 required:    [] Yes   [x] No     If yes, how much O2 required?     Other:    Last Bowel Movement Date: 01/11/17        POST-OP SURGICAL VATS   [] Yes   [x] No     Incentive Spirometer:   [] Yes   [] Refused   Coughing and deep breathing:     [] Yes   [] Refused   Oral care:     [] Yes   [] Refused   Understanding (patient education):     [] Yes   [] Refused   Getting out of bed Number times ambulated in hallway past shift:    Number of times OOB to chair past shift:     Head of bed elevation:     [] Yes   [] Refused      Readmission Risk Assessment Tool Score Low Risk            9       Total Score        4 More than 1 Admission in calendar year    5 Patient Insurance is Medicare, Medicaid or Self Pay        Criteria that do not apply:    Relationship with PCP    Patient Living Status    Patient Length of Stay > 5    Charlson Comorbidity Score       Expected Length of Stay 3d 19h   Actual Length of Stay 4

## 2017-01-13 NOTE — PROGRESS NOTES
Report received from [facility]. SBAR, Kardex, ED Summary, Procedure Summary, Intake/Output, MAR and Recent Results were discussed.     Catherine Lee

## 2017-01-13 NOTE — PROGRESS NOTES
Hospitalist Progress Note    NAME: Danica Griffiths   :  1945   MRN:  [id number]       Interim Hospital Summary: 80 y.o. female whom presented on 2017 with chest pain     Assessment / Plan:  Sepsis POA Due to PNA- resolved    Paroxsymal A. Fib POA- now in sinus  CHF systolic POA- stable at this time  H/o CAD s/p MI, s/p remote PTCA, stenting of LAD  Syncope/Orthostatic Hypotension- persistent today  ECHO EF 25%    Cont Amiodarone PO as per Cardiology  OP follow up in 1-2 weeks  Can DC Lovenox as per Cardiology  Cont ASA  Restart Lisinopril today, DC Hydralazine due to orthostatic Dizziness/Hypotension   ml bolus x 1 today AM    LLL Pneumonia POA  Changed LVQ to Ceftin  Cont bronchodilators, mucolytics, check sputum. WBC trending down    ARF: so far creatinine trending down to WNL, monitor. Recent Right nephrectomy for RCC     Chest Pain/increased troponin: c/w ASA, ECHO EF 25%, Cardiology help appreciated. Leukocytosis: so far trending down to WNL, monitor. GERD    Surrogate Decision Maker: elias Smith, dtr [facility] [place] [facility] [place] [phone] (mailbox is full)  Code status: Full  Prophylaxis: lovenox  Recommended Disposition: SNF/LTC once medically cleared - likely in 24 hrs       Subjective:     Chief Complaint / Reason for Physician Visit: orthostatic Hypotension, CHF, Afib RVR  \"I was a little dizzy today AM when the check BP\". Discussed with RN events overnight. Review of Systems:  Symptom Y/N Comments  Symptom Y/N Comments   Fever/Chills n   Chest Pain n    Poor Appetite n   Edema n    Cough n   Abdominal Pain     Sputum n   Joint Pain     SOB/ROCK y improved  Pruritis/Rash     Nausea/vomit n   Tolerating PT/OT y    Diarrhea    Tolerating Diet y    Constipation    Other       Could NOT obtain due to:      Objective:     VITALS:   Last 24hrs VS reviewed since prior progress note.  Most recent are:  Patient Vitals for the past 24 hrs:   Temp Pulse Resp BP SpO2   17 1101 97.3 °F (36.3 °C) 66 18 121/60 96 %   01/13/17 0910 - 67 - - -   01/13/17 0745 - - - (!) 127/106 -   01/13/17 0743 - - - 100/65 -   01/13/17 0741 98.1 °F (36.7 °C) (!) 53 18 123/63 91 %   01/13/17 0425 97.8 °F (36.6 °C) 69 18 119/55 95 %   01/12/17 2333 98.1 °F (36.7 °C) 66 18 108/54 95 %   01/12/17 2130 - - - - 97 %   01/12/17 1932 97.5 °F (36.4 °C) (!) 59 18 107/81 96 %   01/12/17 1520 97.9 °F (36.6 °C) 64 18 132/66 100 %       Intake/Output Summary (Last 24 hours) at 01/13/17 1349  Last data filed at 01/13/17 0919   Gross per 24 hour   Intake              300 ml   Output                0 ml   Net              300 ml        PHYSICAL EXAM:  General: WD, WN. Alert, cooperative, no acute distress    EENT:  EOMI. Anicteric sclerae. MMM  Resp:  Bilateral basal crackles noted +  No accessory muscle use  CV:  Regular  rhythm,  No edema  GI:  Soft, Non distended, Non tender.  +Bowel sounds  Neurologic:  Alert and oriented X 3, normal speech,   Psych:   Good insight. Not anxious nor agitated  Skin:  No rashes. No jaundice    Reviewed most current lab test results and cultures  YES  Reviewed most current radiology test results   YES  Review and summation of old records today    NO  Reviewed patient's current orders and MAR    YES  PMH/SH reviewed - no change compared to H&P  ________________________________________________________________________  Care Plan discussed with:    Comments   Patient x    Family      RN x    Care Manager     Consultant                        Multidiciplinary team rounds were held today with , nursing, pharmacist and clinical coordinator. Patient's plan of care was discussed; medications were reviewed and discharge planning was addressed.      ________________________________________________________________________  Total NON critical care TIME:  25   Minutes    Total CRITICAL CARE TIME Spent:   Minutes non procedure based      Comments   >50% of visit spent in counseling and coordination of care     ________________________________________________________________________  Daryle Crosser, MD     Procedures: see electronic medical records for all procedures/Xrays and details which were not copied into this note but were reviewed prior to creation of Plan. LABS:  I reviewed today's most current labs and imaging studies.   Pertinent labs include:  Recent Labs      01/13/17 0423 01/12/17   0940  01/12/17 0358 01/11/17 0223   WBC  7.7   --   8.4  7.0   HGB  7.5*  8.4*  7.7*  8.1*   HCT  23.3*  26.4*  24.1*  25.1*   PLT  299   --   305  296     Recent Labs      01/13/17 0423 01/12/17 0358 01/11/17 0223   NA  135*  137  134*   K  3.7  4.1  3.7   CL  101  101  99   CO2  26  24  25   GLU  91  102*  110*   BUN  18  21*  29*   CREA  1.29*  1.21*  1.42*   CA  8.2*  8.1*  7.9*   MG  1.9  1.9  1.8   ALB  2.0*  2.0*  2.0*   TBILI  0.3  0.4  0.4   SGOT  12*  15  15   ALT  <6*  <6*  7*       Signed: Daryle Crosser, MD

## 2017-01-14 ENCOUNTER — APPOINTMENT (OUTPATIENT)
Dept: GENERAL RADIOLOGY | Age: 72
DRG: 226 | End: 2017-01-14
Attending: INTERNAL MEDICINE
Payer: MEDICARE

## 2017-01-14 ENCOUNTER — APPOINTMENT (OUTPATIENT)
Dept: CT IMAGING | Age: 72
DRG: 226 | End: 2017-01-14
Attending: INTERNAL MEDICINE
Payer: MEDICARE

## 2017-01-14 LAB
ANION GAP BLD CALC-SCNC: 12 MMOL/L (ref 5–15)
BACTERIA SPEC CULT: NORMAL
BUN SERPL-MCNC: 15 MG/DL (ref 6–20)
BUN/CREAT SERPL: 11 (ref 12–20)
CALCIUM SERPL-MCNC: 8.1 MG/DL (ref 8.5–10.1)
CHLORIDE SERPL-SCNC: 102 MMOL/L (ref 97–108)
CK MB CFR SERPL CALC: ABNORMAL % (ref 0–2.5)
CK MB SERPL-MCNC: <1 NG/ML (ref 5–25)
CK SERPL-CCNC: 12 U/L (ref 26–192)
CO2 SERPL-SCNC: 24 MMOL/L (ref 21–32)
CREAT SERPL-MCNC: 1.33 MG/DL (ref 0.55–1.02)
ERYTHROCYTE [DISTWIDTH] IN BLOOD BY AUTOMATED COUNT: 19.2 % (ref 11.5–14.5)
GLUCOSE SERPL-MCNC: 90 MG/DL (ref 65–100)
HCT VFR BLD AUTO: 23.9 % (ref 35–47)
HGB BLD-MCNC: 7.7 G/DL (ref 11.5–16)
MCH RBC QN AUTO: 23.3 PG (ref 26–34)
MCHC RBC AUTO-ENTMCNC: 32.2 G/DL (ref 30–36.5)
MCV RBC AUTO: 72.2 FL (ref 80–99)
PLATELET # BLD AUTO: 290 K/UL (ref 150–400)
POTASSIUM SERPL-SCNC: 3.8 MMOL/L (ref 3.5–5.1)
RBC # BLD AUTO: 3.31 M/UL (ref 3.8–5.2)
SERVICE CMNT-IMP: NORMAL
SODIUM SERPL-SCNC: 138 MMOL/L (ref 136–145)
TROPONIN I SERPL-MCNC: 0.04 NG/ML
WBC # BLD AUTO: 7.5 K/UL (ref 3.6–11)

## 2017-01-14 PROCEDURE — 82550 ASSAY OF CK (CPK): CPT | Performed by: INTERNAL MEDICINE

## 2017-01-14 PROCEDURE — 80048 BASIC METABOLIC PNL TOTAL CA: CPT | Performed by: INTERNAL MEDICINE

## 2017-01-14 PROCEDURE — 93041 RHYTHM ECG TRACING: CPT

## 2017-01-14 PROCEDURE — 74011250637 HC RX REV CODE- 250/637: Performed by: INTERNAL MEDICINE

## 2017-01-14 PROCEDURE — 84484 ASSAY OF TROPONIN QUANT: CPT | Performed by: INTERNAL MEDICINE

## 2017-01-14 PROCEDURE — 85027 COMPLETE CBC AUTOMATED: CPT | Performed by: INTERNAL MEDICINE

## 2017-01-14 PROCEDURE — 74011250636 HC RX REV CODE- 250/636: Performed by: RADIOLOGY

## 2017-01-14 PROCEDURE — 71275 CT ANGIOGRAPHY CHEST: CPT

## 2017-01-14 PROCEDURE — 71010 XR CHEST PORT: CPT

## 2017-01-14 PROCEDURE — 74011250636 HC RX REV CODE- 250/636: Performed by: INTERNAL MEDICINE

## 2017-01-14 PROCEDURE — 74011000250 HC RX REV CODE- 250: Performed by: INTERNAL MEDICINE

## 2017-01-14 PROCEDURE — 36415 COLL VENOUS BLD VENIPUNCTURE: CPT | Performed by: INTERNAL MEDICINE

## 2017-01-14 PROCEDURE — 65660000000 HC RM CCU STEPDOWN

## 2017-01-14 PROCEDURE — 74011636320 HC RX REV CODE- 636/320: Performed by: RADIOLOGY

## 2017-01-14 RX ORDER — ENOXAPARIN SODIUM 100 MG/ML
30 INJECTION SUBCUTANEOUS ONCE
Status: DISCONTINUED | OUTPATIENT
Start: 2017-01-14 | End: 2017-01-14

## 2017-01-14 RX ORDER — SODIUM CHLORIDE 0.9 % (FLUSH) 0.9 %
10 SYRINGE (ML) INJECTION
Status: COMPLETED | OUTPATIENT
Start: 2017-01-14 | End: 2017-01-14

## 2017-01-14 RX ORDER — SODIUM CHLORIDE 9 MG/ML
50 INJECTION, SOLUTION INTRAVENOUS
Status: COMPLETED | OUTPATIENT
Start: 2017-01-14 | End: 2017-01-14

## 2017-01-14 RX ORDER — ENOXAPARIN SODIUM 100 MG/ML
40 INJECTION SUBCUTANEOUS ONCE
Status: COMPLETED | OUTPATIENT
Start: 2017-01-14 | End: 2017-01-14

## 2017-01-14 RX ADMIN — AMIODARONE HYDROCHLORIDE 200 MG: 200 TABLET ORAL at 23:10

## 2017-01-14 RX ADMIN — DULOXETINE HYDROCHLORIDE 60 MG: 30 CAPSULE, DELAYED RELEASE ORAL at 09:11

## 2017-01-14 RX ADMIN — IOPAMIDOL 80 ML: 755 INJECTION, SOLUTION INTRAVENOUS at 21:58

## 2017-01-14 RX ADMIN — CEFUROXIME AXETIL 500 MG: 250 TABLET ORAL at 23:16

## 2017-01-14 RX ADMIN — ENOXAPARIN SODIUM 40 MG: 40 INJECTION SUBCUTANEOUS at 12:30

## 2017-01-14 RX ADMIN — ENOXAPARIN SODIUM 70 MG: 40 INJECTION SUBCUTANEOUS at 23:11

## 2017-01-14 RX ADMIN — GABAPENTIN 300 MG: 300 CAPSULE ORAL at 18:01

## 2017-01-14 RX ADMIN — SODIUM CHLORIDE 50 ML/HR: 900 INJECTION, SOLUTION INTRAVENOUS at 21:58

## 2017-01-14 RX ADMIN — CEFUROXIME AXETIL 500 MG: 250 TABLET ORAL at 09:10

## 2017-01-14 RX ADMIN — Medication 10 ML: at 23:10

## 2017-01-14 RX ADMIN — GUAIFENESIN 600 MG: 600 TABLET, EXTENDED RELEASE ORAL at 09:11

## 2017-01-14 RX ADMIN — LIDOCAINE HYDROCHLORIDE 40 ML: 20 SOLUTION ORAL; TOPICAL at 14:07

## 2017-01-14 RX ADMIN — LISINOPRIL 20 MG: 20 TABLET ORAL at 09:11

## 2017-01-14 RX ADMIN — AMIODARONE HYDROCHLORIDE 200 MG: 200 TABLET ORAL at 18:01

## 2017-01-14 RX ADMIN — METOPROLOL SUCCINATE 25 MG: 25 TABLET, EXTENDED RELEASE ORAL at 09:11

## 2017-01-14 RX ADMIN — DOCUSATE SODIUM 100 MG: 100 CAPSULE, LIQUID FILLED ORAL at 09:11

## 2017-01-14 RX ADMIN — GABAPENTIN 300 MG: 300 CAPSULE ORAL at 09:11

## 2017-01-14 RX ADMIN — Medication 10 ML: at 18:02

## 2017-01-14 RX ADMIN — DOCUSATE SODIUM 100 MG: 100 CAPSULE, LIQUID FILLED ORAL at 18:01

## 2017-01-14 RX ADMIN — FUROSEMIDE 20 MG: 20 TABLET ORAL at 09:11

## 2017-01-14 RX ADMIN — AMIODARONE HYDROCHLORIDE 200 MG: 200 TABLET ORAL at 09:11

## 2017-01-14 RX ADMIN — GUAIFENESIN 600 MG: 600 TABLET, EXTENDED RELEASE ORAL at 18:01

## 2017-01-14 RX ADMIN — Medication 10 ML: at 21:58

## 2017-01-14 RX ADMIN — GABAPENTIN 300 MG: 300 CAPSULE ORAL at 23:10

## 2017-01-14 RX ADMIN — ENOXAPARIN SODIUM 40 MG: 40 INJECTION SUBCUTANEOUS at 09:18

## 2017-01-14 RX ADMIN — ASPIRIN 81 MG CHEWABLE TABLET 81 MG: 81 TABLET CHEWABLE at 09:11

## 2017-01-14 NOTE — PROGRESS NOTES
CM was notified that pt's medical status has changed and therefore will not be discharged today. RICHARD has notified Sana Malagon at [facility] via [address] [place], [place] [address]d.     [address]  X [phone]

## 2017-01-14 NOTE — PROGRESS NOTES
[address] SHIFT NURSING NOTE    Bedside and Verbal shift change report given to Jenna Rodriguez (oncoming nurse) by Virgil Souza (offgoing nurse). Report included the following information SBAR, Kardex, ED Summary, Procedure Summary, Intake/Output, MAR, Recent Results and Cardiac Rhythm NSR-SBrady with intermittent junctional rhythm. SHIFT SUMMARY:   Micki Mckinney (pt's daughter-in-law): [phone]     033: paged dr. Suzy Donahue. 0920: spoke to dr. Suzy Donahue. Fabiola's phone number given to him with request to call her. Dr. Suzy Donahue to speak to pt and Fabiola. Dr. Suzy Donahue possibly to keep pt overnight d/t orthostatic BPs. 1036: pt c/o CP 8/10 with stable vitals  1100: paged dr. Sadia Oshea  1108: spoke to dr. Sadia Oshea. Notified of pt's CP, current vital signs, recent conversion to intermittent junctional rhythm, admitting diagnoses, pertinent PMHx. MD to see pt. EKG and labwork performed and Dr. Sadia Oshea notified of results. Dr. Sadia Oshea reviewed EKG in person. 1338: paged Dr. Sadia Oshea. 1350: spoke to kory. Chest pressure now descending downward mid-abdominally. RN instructed to notify attending MD. Orders received. 1358: spoke to dr. Suzy Donahue. Notified of pt's current situation including pt's \"chest pressure\" and \"pressure\" descending down mid-abdomen. Orders received. 1413: pt needs to be NPO/clear liq for 4 hrs for CT. 1830: RN unable to obtain IV access in the [facility] for CT. Charge RN also unable to obtain IV access in either [facility] for CT. RN Supervisor Jennifer Merino contacted and is to come attempt to obtain IV access in either [facility].

## 2017-01-14 NOTE — PROGRESS NOTES
Cardiac markers OK, CXR without new findings. Will get CT chest with/without contrast PE protocol. Recheck cardiac markers later today.

## 2017-01-14 NOTE — PROGRESS NOTES
[address] SHIFT NURSING NOTE    Bedside and Verbal shift change report given to *** (oncoming nurse) by Mikey Armijo RN (offgoing nurse). Report included the following information [id number] SHIFT SUMMARY: ***        Admission Date 1/9/2017   Admission Diagnosis SVT (supraventricular tachycardia)   Consults IP CONSULT TO CARDIOLOGY  IP CONSULT TO CARDIOLOGY        Consults   [x] PT   [x] OT   [] Speech   [] Palliative      [] Hospice    [x] Case Management   [] None   Cardiac Monitoring   [x] Yes   [] No     Antibiotics   [x] Yes   [] No   GI Prophylaxis  (Ex: Protonix, Pepcid, etc,.)   [] Yes   [x] No          DVT Prophylaxis   SCDs:             Santiago stockings:         [x] Medication (Ex: Lovenox, Eliquis,  Heparin, etc..)   [] Contraindicated   [] None       Urinary Catheter             LDAs               Peripheral IV 01/12/17 Left Forearm (Active)   Site Assessment Clean, dry, & intact 1/13/2017  8:26 PM   Phlebitis Assessment 0 1/13/2017  8:26 PM   Infiltration Assessment 0 1/13/2017  8:26 PM   Dressing Status Clean, dry, & intact 1/13/2017  8:26 PM   Dressing Type Tape;Transparent 1/13/2017  8:26 PM   Hub Color/Line Status Blue;Capped 1/13/2017  8:26 PM                      I/Os   Intake/Output Summary (Last 24 hours) at 01/14/17 0146  Last data filed at 01/13/17 1801   Gross per 24 hour   Intake              540 ml   Output                0 ml   Net              540 ml         Activity Level Activity Level: Up with Assistance, Head of bed elevated (degrees)     Activity Assistance: Partial (two people)   Diet Active Orders   Diet    DIET CARDIAC Regular      Purposeful Rounding every 1-2 hour?    [x] Yes    Zuri Score  Total Score: 3   Bed Alarm (If score 3 or >)   [x] Yes    [] Refused (See signed refusal form in chart)   Evan Score  Evan Score: 16       Evan Score (if score 14 or less)   [] PMT consult   [] Nutrition consult   [] Wound Care consult      []  Specialty bed Influenza Vaccine Received Flu Vaccine for Current Season (usually Sept-March): Yes               Needs prior to discharge:   Home O2 required:    [] Yes   [x] No     If yes, how much O2 required?     Other:    Last Bowel Movement Date: 01/11/17        POST-OP SURGICAL VATS   [] Yes   [x] No     Incentive Spirometer:   [] Yes   [] Refused   Coughing and deep breathing:     [] Yes   [] Refused   Oral care:     [] Yes   [] Refused   Understanding (patient education):     [] Yes   [] Refused   Getting out of bed Number times ambulated in hallway past shift:    Number of times OOB to chair past shift:     Head of bed elevation:     [] Yes   [] Refused      Readmission Risk Assessment Tool Score Low Risk            9       Total Score        4 More than 1 Admission in calendar year    5 Patient Insurance is Medicare, Medicaid or Self Pay        Criteria that do not apply:    Relationship with PCP    Patient Living Status    Patient Length of Stay > 5    Charlson Comorbidity Score       Expected Length of Stay 3d 19h   Actual Length of Stay 5

## 2017-01-14 NOTE — PROGRESS NOTES
Cardiology Progress Note      1/14/2017     Admit Date: 1/9/2017      Subjective:  No dizziness today, hasn't been up except for (+) orthostatics. Has chest pain that has been present since this AM (3+ hrs). +sharp and pleuritic. R upper chest, no radiation, associated sx. Stable intensity. She says she's felt this discomfort in the past.          Visit Vitals    /69 (BP 1 Location: Left arm, BP Patient Position: At rest)    Pulse 62    Temp 98 °F (36.7 °C)    Resp 20    Wt 72.6 kg (160 lb)    SpO2 96%    BMI 25.06 kg/m2     01/12 1901 - 01/14 0700  In: 540 [P.O.:540]  Out: -         Objective:      Physical Exam:  VS as above  Chest CTA  Card RRR no gallop 2/6 DORIAN     Data Review:   Recent Results (from the past 24 hour(s))   CBC W/O DIFF    Collection Time: 01/14/17  3:50 AM   Result Value Ref Range    WBC 7.5 3.6 - 11.0 K/uL    RBC 3.31 (L) 3.80 - 5.20 M/uL    HGB 7.7 (L) 11.5 - 16.0 g/dL    HCT 23.9 (L) 35.0 - 47.0 %    MCV 72.2 (L) 80.0 - 99.0 FL    MCH 23.3 (L) 26.0 - 34.0 PG    MCHC 32.2 30.0 - 36.5 g/dL    RDW 19.2 (H) 11.5 - 14.5 %    PLATELET 627 119 - 287 K/uL   METABOLIC PANEL, BASIC    Collection Time: 01/14/17  3:50 AM   Result Value Ref Range    Sodium 138 136 - 145 mmol/L    Potassium 3.8 3.5 - 5.1 mmol/L    Chloride 102 97 - 108 mmol/L    CO2 24 21 - 32 mmol/L    Anion gap 12 5 - 15 mmol/L    Glucose 90 65 - 100 mg/dL    BUN 15 6 - 20 MG/DL    Creatinine 1.33 (H) 0.55 - 1.02 MG/DL    BUN/Creatinine ratio 11 (L) 12 - 20      GFR est AA 48 (L) >60 ml/min/1.73m2    GFR est non-AA 39 (L) >60 ml/min/1.73m2    Calcium 8.1 (L) 8.5 - 10.1 MG/DL       Telemetry: SR R 70      Assessment:     1. Chest pain, atypical for myocardial ischemia. Cannot exclude PE, though not hypoxic. 2. Dizziness, orthostatic hypotension. 3. Intermittent junctional/ectopic atrial rhythm. With the orthostasis and rate-limiting medications on board, probably should have pacing support.   4. Paroxysmal SVT, probably AV node reentry per strips. 5. Ischemic cardiomyopathy with prior anterior wall myocardial infarction and remote PTCA and stenting of the left anterior descending. EF 25% by echo here. 6. Hypertensive heart disease with chronic systolic CHF class 3 and CKD stage 3 at baseline. 7. Type 2 diabetes mellitus. 8. Dyslipidemia. 9. Recent right nephrectomy for renal cell cancer in 12/2016. 10. Acute on chronic renal failure, improved. 11. ? Left-sided pneumonia (retrocardiac abnormality on CXR). 12. Anemia, unspecified. Plan:  1. ECG done, no ischemic changes noted in comparison to last ECG. Check cardiac markers, pCXR STAT. 2.  Increase Lovenox to 80 mg total for today. 3.  Continue beta-blocker, ACEI, low dose loop diuretic. 4.  Continue amiodarone for now, though would look to stop at some point in future if no definite Afib given the side effect profile. 5.  Discussed option of EP study and SVT ablation with her, though now is not the time to pursue it. She is difficult to medicate prophylactically due to orthostatics. 6.  Discussed the option of prophylactic (dual chamber) ICD with her, though now is not the time to pursue it. 7.  Not on statin, will defer to general cardiologist (Dr. Yasmin Musa) regarding this. She is not ready for discharge due to her evolving issue. Probably best for ICD implant and/or EP study/SVT ablation if she agrees before discharge, but this will be worked out.

## 2017-01-14 NOTE — PROGRESS NOTES
Hospitalist Progress Note    NAME: Baron Ya   :  1945   MRN:  [id number]       Interim Hospital Summary: 80 y.o. female whom presented on 2017 with chest pain     Assessment / Plan:  Sepsis POA Due to PNA- resolved    Paroxsymal A. Fib POA- now in sinus  CHF systolic POA- stable at this time  H/o CAD s/p MI, s/p remote PTCA, stenting of LAD  Syncope/Orthostatic Hypotension- persistent today  ECHO EF 25%    Cont Amiodarone PO as per Cardiology  Initially recommendations was for OP follow up in 1-2 weeks, plan for IP EPS/Abaltion/? PPM/ICD if patient agrees as per cardiology today  Increased Lovenox to 1mg /kg  as per Cardiology  Cont ASA  Restarted Lisinopril yesterday  DC Hydralazine due to orthostatic Dizziness/Hypotension  S/p  ml bolus x 1yesterday    LLL Pneumonia POA  Changed LVQ to Ceftin  Cont bronchodilators, mucolytics, check sputum. WBC trending down    ARF: so far creatinine trending down to WNL, monitor. Recent Right nephrectomy for RCC     Chest Pain/increased troponin: c/w ASA, ECHO EF 25%, Cardiology help appreciated. Leukocytosis: so far trending down to WNL, monitor. GERD    Surrogate Decision Maker: elias Solis, dtr [facility] [phone] (mailbox is full)  Code status: Full  Prophylaxis: lovenox  Recommended Disposition: SNF/LTC once  cleared by cardiology       Subjective:     Chief Complaint / Reason for Physician Visit: orthostatic Hypotension, CHF, Afib RVR  \"I was a little dizzy again today AM when the check BP\". Discussed with RN events overnight.      Review of Systems:  Symptom Y/N Comments  Symptom Y/N Comments   Fever/Chills n   Chest Pain n    Poor Appetite n   Edema n    Cough n   Abdominal Pain     Sputum n   Joint Pain     SOB/ROCK y improved  Pruritis/Rash     Nausea/vomit n   Tolerating PT/OT y    Diarrhea    Tolerating Diet y    Constipation    Other       Could NOT obtain due to:      Objective:     VITALS:   Last 24hrs VS reviewed since prior progress note. Most recent are:  Patient Vitals for the past 24 hrs:   Temp Pulse Resp BP SpO2   01/14/17 1040 - - - 107/69 -   01/14/17 1036 98 °F (36.7 °C) 62 20 99/63 -   01/14/17 0747 98.2 °F (36.8 °C) (!) 54 20 135/75 96 %   01/14/17 0336 97.7 °F (36.5 °C) (!) 56 18 142/71 94 %   01/13/17 2304 97.8 °F (36.6 °C) (!) 55 18 121/55 95 %   01/13/17 2018 97.8 °F (36.6 °C) 60 18 120/58 95 %   01/13/17 1755 - (!) 54 - 115/56 -   01/13/17 1456 98 °F (36.7 °C) 60 18 119/60 93 %       Intake/Output Summary (Last 24 hours) at 01/14/17 1348  Last data filed at 01/14/17 0930   Gross per 24 hour   Intake              360 ml   Output                0 ml   Net              360 ml        PHYSICAL EXAM:  General: WD, WN. Alert, cooperative, no acute distress    EENT:  EOMI. Anicteric sclerae. MMM  Resp:  Bilateral basal crackles noted +  No accessory muscle use  CV:  Regular  rhythm,  No edema  GI:  Soft, Non distended, Non tender.  +Bowel sounds  Neurologic:  Alert and oriented X 3, normal speech,   Psych:   Good insight. Not anxious nor agitated  Skin:  No rashes. No jaundice    Reviewed most current lab test results and cultures  YES  Reviewed most current radiology test results   YES  Review and summation of old records today    NO  Reviewed patient's current orders and MAR    YES  PMH/SH reviewed - no change compared to H&P  ________________________________________________________________________  Care Plan discussed with:    Comments   Patient x    Family  x Daughter on phone [phone]   RN x    Care Manager x    Consultant                        Multidiciplinary team rounds were held today with , nursing, pharmacist and clinical coordinator. Patient's plan of care was discussed; medications were reviewed and discharge planning was addressed.      ________________________________________________________________________  Total NON critical care TIME:  25   Minutes    Total CRITICAL CARE TIME Spent: Minutes non procedure based      Comments   >50% of visit spent in counseling and coordination of care     ________________________________________________________________________  Danitza Neves MD     Procedures: see electronic medical records for all procedures/Xrays and details which were not copied into this note but were reviewed prior to creation of Plan. LABS:  I reviewed today's most current labs and imaging studies.   Pertinent labs include:  Recent Labs      01/14/17 0350 01/13/17 0423 01/12/17   0940  01/12/17 0358   WBC  7.5  7.7   --   8.4   HGB  7.7*  7.5*  8.4*  7.7*   HCT  23.9*  23.3*  26.4*  24.1*   PLT  290  299   --   305     Recent Labs      01/14/17 0350 01/13/17 0423 01/12/17 0358   NA  138  135*  137   K  3.8  3.7  4.1   CL  102  101  101   CO2  24  26  24   GLU  90  91  102*   BUN  15  18  21*   CREA  1.33*  1.29*  1.21*   CA  8.1*  8.2*  8.1*   MG   --   1.9  1.9   ALB   --   2.0*  2.0*   TBILI   --   0.3  0.4   SGOT   --   12*  15   ALT   --   <6*  <6*       Signed: Danitza Neves MD

## 2017-01-15 LAB
ANION GAP BLD CALC-SCNC: 10 MMOL/L (ref 5–15)
BUN SERPL-MCNC: 15 MG/DL (ref 6–20)
BUN/CREAT SERPL: 12 (ref 12–20)
CALCIUM SERPL-MCNC: 8.3 MG/DL (ref 8.5–10.1)
CHLORIDE SERPL-SCNC: 99 MMOL/L (ref 97–108)
CK MB CFR SERPL CALC: NORMAL % (ref 0–2.5)
CK MB SERPL-MCNC: <1 NG/ML (ref 5–25)
CK SERPL-CCNC: 13 U/L (ref 26–192)
CO2 SERPL-SCNC: 25 MMOL/L (ref 21–32)
CREAT SERPL-MCNC: 1.23 MG/DL (ref 0.55–1.02)
ERYTHROCYTE [DISTWIDTH] IN BLOOD BY AUTOMATED COUNT: 19 % (ref 11.5–14.5)
GLUCOSE SERPL-MCNC: 109 MG/DL (ref 65–100)
HCT VFR BLD AUTO: 26.3 % (ref 35–47)
HGB BLD-MCNC: 8.4 G/DL (ref 11.5–16)
MCH RBC QN AUTO: 23.2 PG (ref 26–34)
MCHC RBC AUTO-ENTMCNC: 31.9 G/DL (ref 30–36.5)
MCV RBC AUTO: 72.7 FL (ref 80–99)
PLATELET # BLD AUTO: 329 K/UL (ref 150–400)
POTASSIUM SERPL-SCNC: 4.1 MMOL/L (ref 3.5–5.1)
RBC # BLD AUTO: 3.62 M/UL (ref 3.8–5.2)
SODIUM SERPL-SCNC: 134 MMOL/L (ref 136–145)
TROPONIN I SERPL-MCNC: <0.04 NG/ML
WBC # BLD AUTO: 10 K/UL (ref 3.6–11)

## 2017-01-15 PROCEDURE — 82550 ASSAY OF CK (CPK): CPT | Performed by: INTERNAL MEDICINE

## 2017-01-15 PROCEDURE — 84484 ASSAY OF TROPONIN QUANT: CPT | Performed by: INTERNAL MEDICINE

## 2017-01-15 PROCEDURE — 65660000000 HC RM CCU STEPDOWN

## 2017-01-15 PROCEDURE — 85027 COMPLETE CBC AUTOMATED: CPT | Performed by: INTERNAL MEDICINE

## 2017-01-15 PROCEDURE — 74011250637 HC RX REV CODE- 250/637: Performed by: INTERNAL MEDICINE

## 2017-01-15 PROCEDURE — 36415 COLL VENOUS BLD VENIPUNCTURE: CPT | Performed by: INTERNAL MEDICINE

## 2017-01-15 PROCEDURE — 74011250636 HC RX REV CODE- 250/636: Performed by: INTERNAL MEDICINE

## 2017-01-15 PROCEDURE — 80048 BASIC METABOLIC PNL TOTAL CA: CPT | Performed by: INTERNAL MEDICINE

## 2017-01-15 PROCEDURE — 82553 CREATINE MB FRACTION: CPT | Performed by: INTERNAL MEDICINE

## 2017-01-15 RX ADMIN — Medication 5 ML: at 14:00

## 2017-01-15 RX ADMIN — CEFUROXIME AXETIL 500 MG: 250 TABLET ORAL at 21:42

## 2017-01-15 RX ADMIN — CEFUROXIME AXETIL 500 MG: 250 TABLET ORAL at 09:53

## 2017-01-15 RX ADMIN — AMIODARONE HYDROCHLORIDE 200 MG: 200 TABLET ORAL at 21:38

## 2017-01-15 RX ADMIN — GABAPENTIN 300 MG: 300 CAPSULE ORAL at 17:49

## 2017-01-15 RX ADMIN — ASPIRIN 81 MG CHEWABLE TABLET 81 MG: 81 TABLET CHEWABLE at 09:52

## 2017-01-15 RX ADMIN — Medication 10 ML: at 21:39

## 2017-01-15 RX ADMIN — GABAPENTIN 300 MG: 300 CAPSULE ORAL at 21:38

## 2017-01-15 RX ADMIN — FUROSEMIDE 20 MG: 20 TABLET ORAL at 09:55

## 2017-01-15 RX ADMIN — GUAIFENESIN 600 MG: 600 TABLET, EXTENDED RELEASE ORAL at 09:52

## 2017-01-15 RX ADMIN — DULOXETINE HYDROCHLORIDE 60 MG: 30 CAPSULE, DELAYED RELEASE ORAL at 09:52

## 2017-01-15 RX ADMIN — AMIODARONE HYDROCHLORIDE 200 MG: 200 TABLET ORAL at 17:49

## 2017-01-15 RX ADMIN — METOPROLOL SUCCINATE 25 MG: 25 TABLET, EXTENDED RELEASE ORAL at 09:51

## 2017-01-15 RX ADMIN — GABAPENTIN 300 MG: 300 CAPSULE ORAL at 09:52

## 2017-01-15 RX ADMIN — DOCUSATE SODIUM 100 MG: 100 CAPSULE, LIQUID FILLED ORAL at 17:49

## 2017-01-15 RX ADMIN — ENOXAPARIN SODIUM 70 MG: 40 INJECTION SUBCUTANEOUS at 09:53

## 2017-01-15 RX ADMIN — DOCUSATE SODIUM 100 MG: 100 CAPSULE, LIQUID FILLED ORAL at 09:53

## 2017-01-15 RX ADMIN — LISINOPRIL 20 MG: 20 TABLET ORAL at 09:52

## 2017-01-15 RX ADMIN — AMIODARONE HYDROCHLORIDE 200 MG: 200 TABLET ORAL at 09:52

## 2017-01-15 RX ADMIN — GUAIFENESIN 600 MG: 600 TABLET, EXTENDED RELEASE ORAL at 17:49

## 2017-01-15 NOTE — PROGRESS NOTES
Hospitalist Progress Note    NAME: Christin Gambino   :  1945   MRN:  [id number]       Interim Hospital Summary: 80 y.o. female whom presented on 2017 with chest pain     Assessment / Plan:  Sepsis POA Due to PNA- resolved    Paroxsymal A. Fib POA- now in sinus  CHF systolic POA- stable at this time  H/o CAD s/p MI, s/p remote PTCA, stenting of LAD  Syncope/Orthostatic Hypotension- resolved today AM  ECHO EF 25%    Cont Amiodarone PO as per Cardiology  Initially recommendations was for OP follow up in 1-2 weeks, plan for IP EPS/Abaltion/? PPM/ICD if patient agrees as per cardiology- pt willing to consider it as per the discussion today  Increased Lovenox to 1mg /kg  as per Cardiology  Cont ASA  Restarted Lisinopril  DC Hydralazine due to orthostatic Dizziness/Hypotension  S/p  ml bolus x 1yesterday    LLL Pneumonia POA  Changed LVQ to Ceftin  Cont bronchodilators, mucolytics, check sputum. WBC trending down    ARF: so far creatinine trending down to WNL, monitor. Recent Right nephrectomy for RCC     Chest Pain/increased troponin: c/w ASA, ECHO EF 25%, Cardiology help appreciated. Leukocytosis: so far trending down to WNL, monitor. GERD    Surrogate Decision Maker: elias Dean, dtr [facility] [phone] (mailbox is full)  Code status: Full  Prophylaxis: lovenox  Recommended Disposition: SNF/LTC once  cleared by cardiology       Subjective:     Chief Complaint / Reason for Physician Visit: orthostatic Hypotension, CHF, Afib RVR  \"i am fine today\". Discussed with RN events overnight.      Review of Systems:  Symptom Y/N Comments  Symptom Y/N Comments   Fever/Chills n   Chest Pain n    Poor Appetite n   Edema n    Cough n   Abdominal Pain     Sputum n   Joint Pain     SOB/ROCK y improved  Pruritis/Rash     Nausea/vomit n   Tolerating PT/OT y    Diarrhea    Tolerating Diet y    Constipation    Other       Could NOT obtain due to:      Objective:     VITALS:   Last 24hrs VS reviewed since prior progress note. Most recent are:  Patient Vitals for the past 24 hrs:   Temp Pulse Resp BP SpO2   01/15/17 1148 97.5 °F (36.4 °C) 70 18 129/62 97 %   01/15/17 0951 - 64 - - -   01/15/17 0812 97.7 °F (36.5 °C) (!) 51 16 107/59 98 %   01/15/17 0411 97.9 °F (36.6 °C) 63 16 109/64 97 %   01/14/17 2309 98 °F (36.7 °C) 65 18 132/77 96 %   01/14/17 2049 98.3 °F (36.8 °C) (!) 52 18 139/79 97 %   01/14/17 1621 97.9 °F (36.6 °C) 64 18 125/65 98 %       Intake/Output Summary (Last 24 hours) at 01/15/17 1200  Last data filed at 01/14/17 1410   Gross per 24 hour   Intake               40 ml   Output                0 ml   Net               40 ml        PHYSICAL EXAM:  General: WD, WN. Alert, cooperative, no acute distress    EENT:  EOMI. Anicteric sclerae. MMM  Resp:  Bilateral basal crackles noted +  No accessory muscle use  CV:  Regular  rhythm,  No edema  GI:  Soft, Non distended, Non tender.  +Bowel sounds  Neurologic:  Alert and oriented X 3, normal speech,   Psych:   Good insight. Not anxious nor agitated  Skin:  No rashes. No jaundice    Reviewed most current lab test results and cultures  YES  Reviewed most current radiology test results   YES  Review and summation of old records today    NO  Reviewed patient's current orders and MAR    YES  PMH/ reviewed - no change compared to H&P  ________________________________________________________________________  Care Plan discussed with:    Comments   Patient x    Family      RN x    Care Manager     Consultant                        Multidiciplinary team rounds were held today with , nursing, pharmacist and clinical coordinator. Patient's plan of care was discussed; medications were reviewed and discharge planning was addressed.      ________________________________________________________________________  Total NON critical care TIME:  25   Minutes    Total CRITICAL CARE TIME Spent:   Minutes non procedure based      Comments   >50% of visit spent in counseling and coordination of care     ________________________________________________________________________  Zeynep Jacobs MD     Procedures: see electronic medical records for all procedures/Xrays and details which were not copied into this note but were reviewed prior to creation of Plan. LABS:  I reviewed today's most current labs and imaging studies.   Pertinent labs include:  Recent Labs      01/15/17   0420  01/14/17   0350  01/13/17   0423   WBC  10.0  7.5  7.7   HGB  8.4*  7.7*  7.5*   HCT  26.3*  23.9*  23.3*   PLT  329  290  299     Recent Labs      01/15/17   0420  01/14/17   0350  01/13/17   0423   NA  134*  138  135*   K  4.1  3.8  3.7   CL  99  102  101   CO2  25  24  26   GLU  109*  90  91   BUN  15  15  18   CREA  1.23*  1.33*  1.29*   CA  8.3*  8.1*  8.2*   MG   --    --   1.9   ALB   --    --   2.0*   TBILI   --    --   0.3   SGOT   --    --   12*   ALT   --    --   <6*       Signed: Zeynep Jacobs MD

## 2017-01-15 NOTE — PROGRESS NOTES
Cardiology Progress Note      1/15/2017     Admit Date: 1/9/2017      Subjective:  Some dizziness today, negative orthostatics today. Has chest pain that has intermittent.  +sharp and pleuritic. R upper chest, no radiation, associated sx. Stable intensity. Visit Vitals    /62 (BP Patient Position: Sitting)    Pulse 70    Temp 97.5 °F (36.4 °C)    Resp 18    Wt 72.6 kg (160 lb)    SpO2 97%    BMI 25.06 kg/m2     01/13 1901 - 01/15 0700  In: 160 [P.O.:160]  Out: -         Objective:      Physical Exam:  VS as above  Chest CTA  Card RRR no gallop 2/6 DORIAN  Pulsatile JVP  Tired, awake and appropriate    Data Review:   Recent Results (from the past 24 hour(s))   METABOLIC PANEL, BASIC    Collection Time: 01/15/17  4:20 AM   Result Value Ref Range    Sodium 134 (L) 136 - 145 mmol/L    Potassium 4.1 3.5 - 5.1 mmol/L    Chloride 99 97 - 108 mmol/L    CO2 25 21 - 32 mmol/L    Anion gap 10 5 - 15 mmol/L    Glucose 109 (H) 65 - 100 mg/dL    BUN 15 6 - 20 MG/DL    Creatinine 1.23 (H) 0.55 - 1.02 MG/DL    BUN/Creatinine ratio 12 12 - 20      GFR est AA 52 (L) >60 ml/min/1.73m2    GFR est non-AA 43 (L) >60 ml/min/1.73m2    Calcium 8.3 (L) 8.5 - 10.1 MG/DL   CBC W/O DIFF    Collection Time: 01/15/17  4:20 AM   Result Value Ref Range    WBC 10.0 3.6 - 11.0 K/uL    RBC 3.62 (L) 3.80 - 5.20 M/uL    HGB 8.4 (L) 11.5 - 16.0 g/dL    HCT 26.3 (L) 35.0 - 47.0 %    MCV 72.7 (L) 80.0 - 99.0 FL    MCH 23.2 (L) 26.0 - 34.0 PG    MCHC 31.9 30.0 - 36.5 g/dL    RDW 19.0 (H) 11.5 - 14.5 %    PLATELET 176 093 - 087 K/uL   CK-MB,QUANT.     Collection Time: 01/15/17  4:20 AM   Result Value Ref Range    CK - MB <1.0 <3.6 NG/ML    CK-MB Index CANNOT BE CALCULATED 0 - 2.5     CK    Collection Time: 01/15/17  4:20 AM   Result Value Ref Range    CK 13 (L) 26 - 192 U/L   TROPONIN I    Collection Time: 01/15/17  4:20 AM   Result Value Ref Range    Troponin-I, Qt. <0.04 <0.05 ng/mL       Telemetry: Junctional rhythm 70, was in ectopic atrial rhythm until early this AM      Assessment:     1. Chest pain, atypical for myocardial ischemia. No evidence of ischemic insult. 2. Dizziness, orthostatic hypotension. 3. Intermittent junctional/ectopic atrial rhythm. With the orthostasis and rate-limiting medications on board, probably should have pacing support. 4. Paroxysmal SVT, probably AV node reentry per strips. No recent recurrence. 5. Ischemic cardiomyopathy with prior anterior wall myocardial infarction and remote PTCA and stenting of the left anterior descending. EF 25% by echo here. 6. Hypertensive heart disease with chronic systolic CHF class 3 and CKD stage 3 at baseline. 7. Type 2 diabetes mellitus. 8. Dyslipidemia. 9. Recent right nephrectomy for renal cell cancer in 12/2016. 10. Acute on chronic renal failure, improved. 11. ? Left-sided pneumonia (retrocardiac abnormality on CXR). 12. Anemia, unspecified. Plan:  1. Discontinue Lovenox, last dose 1/16 AM.  Anticipate invasive procedure. 2.  Lowered dose of beta-blocker, continue ACEI, low dose loop diuretic. 3.  Continue amiodarone for now, though would look to stop at some point in future if no definite Afib given the side effect profile. 4.  Discussed option of EP study and SVT ablation with her, though now is not the time to pursue it. This can be done later as an OP as appropriate. 5.  Discussed the option of prophylactic (dual chamber) ICD with her, and given the potential benefits, risks, and alternatives, she agrees to proceed. 6.  Not on statin, will defer to general cardiologist (Dr. Wilfrido Tellez) regarding this. Would push for ICD implant for primary prevention of sudden cardiac death prior to discharge as this also can provide pacing support which is what she needs. NPO after MN in case we can do this tomorrow pending schedule, otherwise will look to Tuesday.

## 2017-01-15 NOTE — PROGRESS NOTES
Report received from Jana Celaya, [address] [facility], [facility] 23, ED SUMMARY, [facility], RECENT RESULTS were discussed. Maria Cintron, RN    0673: Paged Dr. Yelena Santo to make him aware pt refusing to sign concent for ICD.  Pt states she does not want a ICD bc she doesn't want anything to shock her but will do a Pacemaker

## 2017-01-16 ENCOUNTER — ANESTHESIA (OUTPATIENT)
Dept: NON INVASIVE DIAGNOSTICS | Age: 72
DRG: 226 | End: 2017-01-16
Payer: MEDICARE

## 2017-01-16 ENCOUNTER — APPOINTMENT (OUTPATIENT)
Dept: GENERAL RADIOLOGY | Age: 72
DRG: 226 | End: 2017-01-16
Attending: INTERNAL MEDICINE
Payer: MEDICARE

## 2017-01-16 ENCOUNTER — ANESTHESIA EVENT (OUTPATIENT)
Dept: NON INVASIVE DIAGNOSTICS | Age: 72
DRG: 226 | End: 2017-01-16
Payer: MEDICARE

## 2017-01-16 LAB
ANION GAP BLD CALC-SCNC: 12 MMOL/L (ref 5–15)
ATRIAL RATE: 57 BPM
BUN SERPL-MCNC: 19 MG/DL (ref 6–20)
BUN/CREAT SERPL: 14 (ref 12–20)
CALCIUM SERPL-MCNC: 8.3 MG/DL (ref 8.5–10.1)
CALCULATED P AXIS, ECG09: 8 DEGREES
CALCULATED R AXIS, ECG10: -17 DEGREES
CALCULATED T AXIS, ECG11: 6 DEGREES
CHLORIDE SERPL-SCNC: 100 MMOL/L (ref 97–108)
CO2 SERPL-SCNC: 24 MMOL/L (ref 21–32)
CREAT SERPL-MCNC: 1.4 MG/DL (ref 0.55–1.02)
DIAGNOSIS, 93000: NORMAL
GLUCOSE SERPL-MCNC: 96 MG/DL (ref 65–100)
P-R INTERVAL, ECG05: 154 MS
POTASSIUM SERPL-SCNC: 4.1 MMOL/L (ref 3.5–5.1)
Q-T INTERVAL, ECG07: 450 MS
QRS DURATION, ECG06: 102 MS
QTC CALCULATION (BEZET), ECG08: 438 MS
SODIUM SERPL-SCNC: 136 MMOL/L (ref 136–145)
VENTRICULAR RATE, ECG03: 57 BPM

## 2017-01-16 PROCEDURE — 02HK3KZ INSERTION OF DEFIBRILLATOR LEAD INTO RIGHT VENTRICLE, PERCUTANEOUS APPROACH: ICD-10-PCS | Performed by: INTERNAL MEDICINE

## 2017-01-16 PROCEDURE — 80048 BASIC METABOLIC PNL TOTAL CA: CPT | Performed by: INTERNAL MEDICINE

## 2017-01-16 PROCEDURE — 74011250637 HC RX REV CODE- 250/637: Performed by: INTERNAL MEDICINE

## 2017-01-16 PROCEDURE — 0JH608Z INSERTION OF DEFIBRILLATOR GENERATOR INTO CHEST SUBCUTANEOUS TISSUE AND FASCIA, OPEN APPROACH: ICD-10-PCS | Performed by: INTERNAL MEDICINE

## 2017-01-16 PROCEDURE — C1892 INTRO/SHEATH,FIXED,PEEL-AWAY: HCPCS

## 2017-01-16 PROCEDURE — 71010 XR CHEST PORT: CPT

## 2017-01-16 PROCEDURE — 77030018729 HC ELECTRD DEFIB PAD CARD -B

## 2017-01-16 PROCEDURE — 77030011640 HC PAD GRND REM COVD -A

## 2017-01-16 PROCEDURE — 77030018836 HC SOL IRR NACL ICUM -A

## 2017-01-16 PROCEDURE — 74011250636 HC RX REV CODE- 250/636: Performed by: INTERNAL MEDICINE

## 2017-01-16 PROCEDURE — C1898 LEAD, PMKR, OTHER THAN TRANS: HCPCS

## 2017-01-16 PROCEDURE — C1777 LEAD, AICD, ENDO SINGLE COIL: HCPCS

## 2017-01-16 PROCEDURE — 74011000250 HC RX REV CODE- 250: Performed by: INTERNAL MEDICINE

## 2017-01-16 PROCEDURE — L3670 SO ACRO/CLAV CAN WEB PRE OTS: HCPCS

## 2017-01-16 PROCEDURE — 93641 EP EVL 1/2CHMB PAC CVDFB TST: CPT

## 2017-01-16 PROCEDURE — 74011250636 HC RX REV CODE- 250/636

## 2017-01-16 PROCEDURE — 65660000000 HC RM CCU STEPDOWN

## 2017-01-16 PROCEDURE — 77030002996 HC SUT SLK J&J -A

## 2017-01-16 PROCEDURE — 77030031139 HC SUT VCRL2 J&J -A

## 2017-01-16 PROCEDURE — 77030010507 HC ADH SKN DERMBND J&J -B

## 2017-01-16 PROCEDURE — C1894 INTRO/SHEATH, NON-LASER: HCPCS

## 2017-01-16 PROCEDURE — C1721 AICD, DUAL CHAMBER: HCPCS

## 2017-01-16 PROCEDURE — 02H63KZ INSERTION OF DEFIBRILLATOR LEAD INTO RIGHT ATRIUM, PERCUTANEOUS APPROACH: ICD-10-PCS | Performed by: INTERNAL MEDICINE

## 2017-01-16 PROCEDURE — 36415 COLL VENOUS BLD VENIPUNCTURE: CPT | Performed by: INTERNAL MEDICINE

## 2017-01-16 RX ORDER — MIDAZOLAM HYDROCHLORIDE 1 MG/ML
1-5 INJECTION, SOLUTION INTRAMUSCULAR; INTRAVENOUS
Status: DISCONTINUED | OUTPATIENT
Start: 2017-01-16 | End: 2017-01-16 | Stop reason: ALTCHOICE

## 2017-01-16 RX ORDER — LISINOPRIL 5 MG/1
5 TABLET ORAL DAILY
Status: DISCONTINUED | OUTPATIENT
Start: 2017-01-17 | End: 2017-01-17

## 2017-01-16 RX ORDER — BACITRACIN 50000 [IU]/1
INJECTION, POWDER, FOR SOLUTION INTRAMUSCULAR
Status: DISCONTINUED
Start: 2017-01-16 | End: 2017-01-16 | Stop reason: ALTCHOICE

## 2017-01-16 RX ORDER — FENTANYL CITRATE 50 UG/ML
12.5-5 INJECTION, SOLUTION INTRAMUSCULAR; INTRAVENOUS
Status: DISCONTINUED | OUTPATIENT
Start: 2017-01-16 | End: 2017-01-16 | Stop reason: ALTCHOICE

## 2017-01-16 RX ORDER — LIDOCAINE HYDROCHLORIDE AND EPINEPHRINE 10; 10 MG/ML; UG/ML
1-20 INJECTION, SOLUTION INFILTRATION; PERINEURAL
Status: DISCONTINUED | OUTPATIENT
Start: 2017-01-16 | End: 2017-01-16 | Stop reason: ALTCHOICE

## 2017-01-16 RX ORDER — SODIUM CHLORIDE 0.9 % (FLUSH) 0.9 %
5-10 SYRINGE (ML) INJECTION AS NEEDED
Status: DISCONTINUED | OUTPATIENT
Start: 2017-01-16 | End: 2017-01-22 | Stop reason: SDUPTHER

## 2017-01-16 RX ORDER — PROPOFOL 10 MG/ML
INJECTION, EMULSION INTRAVENOUS AS NEEDED
Status: DISCONTINUED | OUTPATIENT
Start: 2017-01-16 | End: 2017-01-16 | Stop reason: HOSPADM

## 2017-01-16 RX ORDER — SODIUM CHLORIDE 900 MG/100ML
INJECTION INTRAVENOUS
Status: DISCONTINUED
Start: 2017-01-16 | End: 2017-01-16 | Stop reason: ALTCHOICE

## 2017-01-16 RX ORDER — HEPARIN SODIUM 200 [USP'U]/100ML
500 INJECTION, SOLUTION INTRAVENOUS ONCE
Status: COMPLETED | OUTPATIENT
Start: 2017-01-16 | End: 2017-01-16

## 2017-01-16 RX ORDER — SODIUM CHLORIDE 0.9 % (FLUSH) 0.9 %
5-10 SYRINGE (ML) INJECTION EVERY 8 HOURS
Status: DISCONTINUED | OUTPATIENT
Start: 2017-01-16 | End: 2017-01-22 | Stop reason: SDUPTHER

## 2017-01-16 RX ORDER — BACITRACIN 50000 [IU]/1
50000 INJECTION, POWDER, FOR SOLUTION INTRAMUSCULAR ONCE
Status: COMPLETED | OUTPATIENT
Start: 2017-01-16 | End: 2017-01-16

## 2017-01-16 RX ORDER — VANCOMYCIN HYDROCHLORIDE 1 G/20ML
INJECTION, POWDER, LYOPHILIZED, FOR SOLUTION INTRAVENOUS
Status: DISCONTINUED
Start: 2017-01-16 | End: 2017-01-16 | Stop reason: ALTCHOICE

## 2017-01-16 RX ORDER — SODIUM CHLORIDE 9 MG/ML
100 INJECTION, SOLUTION INTRAVENOUS CONTINUOUS
Status: DISCONTINUED | OUTPATIENT
Start: 2017-01-16 | End: 2017-01-16

## 2017-01-16 RX ADMIN — Medication 10 ML: at 21:12

## 2017-01-16 RX ADMIN — GABAPENTIN 300 MG: 300 CAPSULE ORAL at 21:11

## 2017-01-16 RX ADMIN — LIDOCAINE HYDROCHLORIDE,EPINEPHRINE BITARTRATE 20 ML: 10; .01 INJECTION, SOLUTION INFILTRATION; PERINEURAL at 15:14

## 2017-01-16 RX ADMIN — MIDAZOLAM HYDROCHLORIDE 2 MG: 1 INJECTION INTRAMUSCULAR; INTRAVENOUS at 14:35

## 2017-01-16 RX ADMIN — BACITRACIN 50000 UNITS: 5000 INJECTION, POWDER, FOR SOLUTION INTRAMUSCULAR at 15:15

## 2017-01-16 RX ADMIN — MIDAZOLAM HYDROCHLORIDE 1 MG: 1 INJECTION INTRAMUSCULAR; INTRAVENOUS at 15:06

## 2017-01-16 RX ADMIN — GABAPENTIN 300 MG: 300 CAPSULE ORAL at 08:53

## 2017-01-16 RX ADMIN — FUROSEMIDE 20 MG: 20 TABLET ORAL at 08:53

## 2017-01-16 RX ADMIN — SODIUM CHLORIDE 250 ML: 900 INJECTION, SOLUTION INTRAVENOUS at 15:13

## 2017-01-16 RX ADMIN — AMIODARONE HYDROCHLORIDE 200 MG: 200 TABLET ORAL at 21:11

## 2017-01-16 RX ADMIN — PROPOFOL 30 MG: 10 INJECTION, EMULSION INTRAVENOUS at 15:24

## 2017-01-16 RX ADMIN — VANCOMYCIN HYDROCHLORIDE 1000 MG: 1 INJECTION, POWDER, LYOPHILIZED, FOR SOLUTION INTRAVENOUS at 14:31

## 2017-01-16 RX ADMIN — FENTANYL CITRATE 50 MCG: 50 INJECTION, SOLUTION INTRAMUSCULAR; INTRAVENOUS at 14:53

## 2017-01-16 RX ADMIN — AMIODARONE HYDROCHLORIDE 200 MG: 200 TABLET ORAL at 08:54

## 2017-01-16 RX ADMIN — SODIUM CHLORIDE 100 ML/HR: 900 INJECTION, SOLUTION INTRAVENOUS at 14:30

## 2017-01-16 RX ADMIN — MIDAZOLAM HYDROCHLORIDE 2 MG: 1 INJECTION INTRAMUSCULAR; INTRAVENOUS at 14:53

## 2017-01-16 RX ADMIN — Medication 5 ML: at 14:00

## 2017-01-16 RX ADMIN — GUAIFENESIN 600 MG: 600 TABLET, EXTENDED RELEASE ORAL at 08:53

## 2017-01-16 RX ADMIN — AMIODARONE HYDROCHLORIDE 200 MG: 200 TABLET ORAL at 17:51

## 2017-01-16 RX ADMIN — METOPROLOL SUCCINATE 25 MG: 25 TABLET, EXTENDED RELEASE ORAL at 08:53

## 2017-01-16 RX ADMIN — DULOXETINE HYDROCHLORIDE 60 MG: 30 CAPSULE, DELAYED RELEASE ORAL at 08:54

## 2017-01-16 RX ADMIN — GABAPENTIN 300 MG: 300 CAPSULE ORAL at 17:51

## 2017-01-16 RX ADMIN — LISINOPRIL 20 MG: 20 TABLET ORAL at 08:53

## 2017-01-16 RX ADMIN — CEFUROXIME AXETIL 500 MG: 250 TABLET ORAL at 08:59

## 2017-01-16 RX ADMIN — DOCUSATE SODIUM 100 MG: 100 CAPSULE, LIQUID FILLED ORAL at 08:54

## 2017-01-16 RX ADMIN — FENTANYL CITRATE 50 MCG: 50 INJECTION, SOLUTION INTRAMUSCULAR; INTRAVENOUS at 14:35

## 2017-01-16 RX ADMIN — DOCUSATE SODIUM 100 MG: 100 CAPSULE, LIQUID FILLED ORAL at 17:51

## 2017-01-16 RX ADMIN — ASPIRIN 81 MG CHEWABLE TABLET 81 MG: 81 TABLET CHEWABLE at 08:54

## 2017-01-16 RX ADMIN — HEPARIN SODIUM 1000 UNITS: 200 INJECTION, SOLUTION INTRAVENOUS at 14:31

## 2017-01-16 NOTE — PROGRESS NOTES
Physical Therapy  Spoke with RN who states patient is to go for pacer placement soon and was rather orthostatic this morning. Recommends we wait for PT until after procedure. Will check back tmrw for mobility progression.   Giselle Saab, PT, DPT

## 2017-01-16 NOTE — PROGRESS NOTES
0700: Report received from Guillaume Garza, [address] [facility] 23, ED SUMMARY, [facility], RECENT RESULTS were discussed. Sadie Kessler, RN    [id number] Velasco with EP lab, pt on schedule for ICD between 0982-9812. Ok to give am meds    1100:   Cardiopulmonary Care Interdisciplinary rounds were held today to discuss patient plan of care and outcomes. The following members were present: PT, NP/Physician, Pharmacy, Nursing, Nutritionist and Case Management. Plan of Care: Continue current treatment plan         [id number]: Pt back to room post ICD placement, Pt arousalable but sleepy. VSS.  Site clean dry and intact

## 2017-01-16 NOTE — PROCEDURES
[address]  [phone]    Patient ID:  Patient: Brenda Saha  MRN: [id number]  Age: 80 y.o.  : 1945  Gender: female  Study Date: 2017    History: This is a female with a chronic dilated cardiomyopathy EF 25%, on a beta-blocker and ACEI for at least 3 months, unable to take beta-blocker now due to nonreversible sinus node dysfunction. She is here for a dual chamber ICD for primary prevention of sudden cardiac death. Procedure: DUAL CHAMBER ICD (28706) and ICD testing, initial (45548)  The patient was brought to the EP lab in a postabsorptive state after informed consent had been previously obtained. Continuous electrocardiographic and hemodynamic monitoring was performed. Sedation was performed by the nurse at first who was in constant attendance and my supervision throughout the procedure (using Versed 5 mg and fentanyl 100 mcg) and then finally by the anesthesiologist during ICD testing using propofol 30 mcg. Using 1% lidocaine with epinephrine, the left chest site was anesthetized. The pocket was formed in the usual fashion and ultimately axillary venous access was obtained using a micropuncture needle x2. Two safe sheaths were placed. The single coil right ventricular lead ([id number]/58 cm) was advanced to the RV apex. The right atrial lead (2088TC/52 cm) was advanced to the RA appendage. Each lead was fixed and tested, performance verified. It was noted during the case that she was having intermittent junctional rhythm as well as paroxysmal atrial flutter. The leads were anchored to the pocket floor using two 0-silk sutures at each anchor sleeve. The pulse generator was connected to the leads and placed in the pocket after hemostasis was confirmed. Vigorous irrigation with antibiotic solution was performed. A single 0-silk suture was used to anchor the pulse generator to the pocket floor.     The pocket was closed using a running 2-0 Vicryl layer x1, followed by a more superficial layer of running 4-0 Vicryl in a subcuticular fashion to close the skin. Final fluoroscopic check revealed adequate redundancy of the leads and the absence of pneumothorax. Dermabond was applied. ICD testing was performed before pocket closure. Using an induction protocol, ventricular fibrillation was successfully sensed and a single 20J biphasic shock restored sinus rhythm, 52 ohm shock impedance. No complications. Preoperative Diagnosis: As above. Postoperative Diagnosis: As above. Procedure:  As above. Surgeon(s) and Role:  Wendy Kline MD - Primary   Anesthesia:   MAC by the anesthesiologist  Estimated Blood Loss:  <5 cc. Specimens: * No specimens in log *   Findings:  As below. Complications:  None. Case time:  50 minutes  X-ray time:  2.6 minutes    SETTINGS:  SJM 2357-40Q, [id number]  RA 0.5, 1.7, 378  RV 19.1, 1.0, 585  DDDR     2 tachycardia zones:   bpm,  bpm.  ATP before shocks. Recommendations:  After successful dual chamber ICD implant to the left chest using transvenous leads, routine follow-up in 2-4 weeks for wound and device check. Intermittent atrial flutter noted.

## 2017-01-16 NOTE — PROGRESS NOTES
Hospitalist Progress Note    NAME: Maddison Solo   :  1945   MRN:  [id number]       Interim Hospital Summary: 80 y.o. female whom presented on 2017 with chest pain     Assessment / Plan:  Sepsis POA Due to PNA- resolved    Paroxsymal A. Fib POA- now in sinus  CHF systolic POA- stable at this time  H/o CAD s/p MI, s/p remote PTCA, stenting of LAD  Syncope/Orthostatic Hypotension- back again today AM  ECHO EF 25%    Cont Amiodarone PO as per Cardiology  Initially recommendations was for OP follow up in 1-2 weeks, plan for IP EPS/Abaltion/? PPM/ICD if patient agrees as per cardiology- Pt has agreed to get AICD/PPM today- scheduled for today PM  Increased Lovenox to 1mg /kg  as per Cardiology- held for ICD/PPM placement  Cont ASA  Restarted Lisinopril yesterday- will decrease dose to 5mg today  DC Hydralazine due to orthostatic Dizziness/Hypotension  S/p  ml bolus x 1 over the weekend    LLL Pneumonia POA  Changed LVQ to Ceftin - completed therapy here- DC today  Cont bronchodilators, mucolytics, check sputum. WBC trending down    ARF: so far creatinine trending down to WNL, monitor. Recent Right nephrectomy for RCC     Chest Pain/increased troponin: c/w ASA, ECHO EF 25%, Cardiology help appreciated. Leukocytosis: so far trending down to WNL, monitor. GERD    Surrogate Decision Maker: son Timmy Ahumada, dtr [facility] [phone] (mailbox is full)  Code status: Full  Prophylaxis: lovenox  Recommended Disposition: SNF/LTC once  cleared by cardiology in 24/48 hrs       Subjective:     Chief Complaint / Reason for Physician Visit: orthostatic Hypotension, CHF, Afib RVR  \"i am fine but was dizzy today\". Discussed with RN events overnight.      Review of Systems:  Symptom Y/N Comments  Symptom Y/N Comments   Fever/Chills n   Chest Pain n    Poor Appetite n   Edema n    Cough n   Abdominal Pain     Sputum n   Joint Pain     SOB/ROCK y improved  Pruritis/Rash     Nausea/vomit n   Tolerating PT/OT y    Diarrhea    Tolerating Diet y    Constipation    Other       Could NOT obtain due to:      Objective:     VITALS:   Last 24hrs VS reviewed since prior progress note. Most recent are:  Patient Vitals for the past 24 hrs:   Temp Pulse Resp BP SpO2   01/16/17 0808 97.5 °F (36.4 °C) 65 18 111/66 99 %   01/16/17 0246 98.3 °F (36.8 °C) 65 16 109/61 99 %   01/15/17 2256 98.1 °F (36.7 °C) 70 18 93/67 99 %   01/15/17 2023 97.8 °F (36.6 °C) 68 18 97/63 96 %   01/15/17 1534 97.1 °F (36.2 °C) 66 18 110/62 98 %       Intake/Output Summary (Last 24 hours) at 01/16/17 1208  Last data filed at 01/16/17 1022   Gross per 24 hour   Intake              240 ml   Output              350 ml   Net             -110 ml        PHYSICAL EXAM:  General: WD, WN. Alert, cooperative, no acute distress    EENT:  EOMI. Anicteric sclerae. MMM  Resp:  Bilateral basal crackles noted +  No accessory muscle use  CV:  Regular  rhythm,  No edema  GI:  Soft, Non distended, Non tender.  +Bowel sounds  Neurologic:  Alert and oriented X 3, normal speech,   Psych:   Good insight. Not anxious nor agitated  Skin:  No rashes. No jaundice    Reviewed most current lab test results and cultures  YES  Reviewed most current radiology test results   YES  Review and summation of old records today    NO  Reviewed patient's current orders and MAR    YES  PMH/ reviewed - no change compared to H&P  ________________________________________________________________________  Care Plan discussed with:    Comments   Patient x    Family      RN x    Care Manager     Consultant                        Multidiciplinary team rounds were held today with , nursing, pharmacist and clinical coordinator. Patient's plan of care was discussed; medications were reviewed and discharge planning was addressed.      ________________________________________________________________________  Total NON critical care TIME:  15   Minutes    Total CRITICAL CARE TIME Spent:   Minutes non procedure based      Comments   >50% of visit spent in counseling and coordination of care     ________________________________________________________________________  Guillermo Victor MD     Procedures: see electronic medical records for all procedures/Xrays and details which were not copied into this note but were reviewed prior to creation of Plan. LABS:  I reviewed today's most current labs and imaging studies.   Pertinent labs include:  Recent Labs      01/15/17   0420  01/14/17   0350   WBC  10.0  7.5   HGB  8.4*  7.7*   HCT  26.3*  23.9*   PLT  329  290     Recent Labs      01/16/17   0318  01/15/17   0420  01/14/17   0350   NA  136  134*  138   K  4.1  4.1  3.8   CL  100  99  102   CO2  24  25  24   GLU  96  109*  90   BUN  19  15  15   CREA  1.40*  1.23*  1.33*   CA  8.3*  8.3*  8.1*       Signed: Guillermo Victor MD

## 2017-01-16 NOTE — PROGRESS NOTES
EP/ End of Procedure/ TRANSFER - OUT REPORT:    Verbal report given to MARLO,RN on Stefania Hobbs for routine progression of care       Report consisted of patients Situation, Background, Assessment and   Recommendations(SBAR). Information from the following report(s) SBAR, Kardex, Procedure Summary and MAR was reviewed with the receiving nurse. Opportunity for questions and clarification was provided.

## 2017-01-16 NOTE — ANESTHESIA PREPROCEDURE EVALUATION
Anesthetic History   No history of anesthetic complications            Review of Systems / Medical History  Patient summary reviewed, nursing notes reviewed and pertinent labs reviewed    Pulmonary  Within defined limits                 Neuro/Psych   Within defined limits           Cardiovascular    Hypertension        Dysrhythmias : atrial fibrillation  CAD and hyperlipidemia    Exercise tolerance: <4 METS  Comments: Hx SVT   Hx Orthostatic hypotension  Coronary stent x 3     GI/Hepatic/Renal     GERD    Renal disease: CRI      Comments: Hx Renal cell carcinoma  Endo/Other        Anemia     Other Findings            Physical Exam    Airway  Mallampati: III    Neck ROM: normal range of motion   Mouth opening: Normal     Cardiovascular  Regular rate and rhythm,  S1 and S2 normal,  no murmur, click, rub, or gallop             Dental      Comments: Multiple missing teeth   Pulmonary  Breath sounds clear to auscultation               Abdominal  GI exam deferred       Other Findings            Anesthetic Plan    ASA: 3  Anesthesia type: general          Induction: Intravenous  Anesthetic plan and risks discussed with: Patient

## 2017-01-16 NOTE — PROGRESS NOTES
Cardiology Progress Note      1/16/2017 8:42 AM    Admit Date: 1/9/2017          Subjective:  Course over weeknd noted. Remains in junctional rhythm. No other c/o. Visit Vitals    /66 (BP 1 Location: Left arm)    Pulse 65    Temp 97.5 °F (36.4 °C)    Resp 18    Wt 72.6 kg (160 lb)    SpO2 99%    BMI 25.06 kg/m2     01/14 1901 - 01/16 0700  In: 360 [P.O.:360]  Out: -         Objective:      Physical Exam:  VS as above  Chest CTA  Card RRR no gallop      Data Review:   Labs:    K 4.1  BUN 19  Creat 1.4     Telemetry: junctional rhythm R 54       Assessment:     1. Chest pain, atypical for myocardial ischemia. No evidence of ischemic insult. 2. Dizziness, orthostatic hypotension. 3. Intermittent junctional/ectopic atrial rhythm. With the orthostasis and rate-limiting medications on board, probably should have pacing support. 4. Paroxysmal SVT, probably AV node reentry per strips. No recent recurrence. 5. Ischemic cardiomyopathy with prior anterior wall myocardial infarction and remote PTCA and stenting of the left anterior descending. EF 25% by echo here. 6. Hypertensive heart disease with chronic systolic CHF class 3 and CKD stage 3 at baseline. 7. Type 2 diabetes mellitus. 8. Dyslipidemia. 9. Recent right nephrectomy for renal cell cancer in 12/2016. 10. Acute on chronic renal failure, improved. 11. ? Left-sided pneumonia (retrocardiac abnormality on CXR). 12. Anemia, unspecified. Plan: Cont current Rx. For pacer/ICD prior to d/c.

## 2017-01-16 NOTE — PROGRESS NOTES
Pressure Ulcer Prevention In basket Alert Received for Evan < 14 (moderate risk).      Suggested Care Plan/Interventions for Nursing  1. Complete Evan Pressure Ulcer Risk Scale and use sub scores to identify appropriate interventions. 2. Perform Assessment: skin, changes in LOC, visual cues for pain, monitor skin under medical devices  3. Respond to Reduced Sensory Perception: changes in LOC, check visual cues for pain, float heels, suspension boots, pressure redistribution bed/mattress/chair cushion, turning and reposition approximately every 2 hours (pillows & wedges), pad between skin to skin, turn & reposition  4. Manage Moisture: absorbent under pads, internal / external urinary device, internal /  external fecal device, minimize layers, contain wound drainage, access need for specialty bed, limit adult briefs, maintain skin hydration (lotion/cream), moisture barrier, offer toileting every hour  5. Promote Activity: increase time out of bed, chair cushion, PT/OT evaluation, trapeze to reposition, pressure redistribution bed/mattress/chair  6. Address Reduced Mobility: float heels / suspension boot, HOB 30 degrees or less, pressure redistribution bed/mattress/cushion, PT / OT evaluation, turn and reposition approximately every 2 hours (pillows & wedges)  7. Promote Nutrition: document food / fluid / supplement intake, encourage/assist with meals as needed  8. Reduce Friction and Shear: transferring/repositioning devices (lift/draw sheet), lift team/ patient mobility team, feet elevated on foot rest, minimize layers, foam dressing / transparent film / skin sealants, protective barrier creams and emollients, transfer aides (board, Jannet lift, ceiling lift, stand assist), HOB 30 degrees or less, trapeze to reposition.   Wound Care Team

## 2017-01-16 NOTE — PROGRESS NOTES
CM note: Pt to have a pacer/ICD placed on today. SW has sent updates through [facility] to [facility]. At time of dc a re authorization for SNF is needed.     Bridgette Wilson, OZZIE

## 2017-01-16 NOTE — ANESTHESIA POSTPROCEDURE EVALUATION
Post-Anesthesia Evaluation and Assessment    Patient: Christina Whatley MRN: [id number]  SSN: [id number]    YOB: 1945  Age: 80 y.o. Sex: female       Cardiovascular Function/Vital Signs  Visit Vitals    BP 97/62    Pulse 69    Temp 36.4 °C (97.6 °F)    Resp 16    Wt 72.6 kg (160 lb)    SpO2 100%    BMI 25.06 kg/m2       Patient is status post general anesthesia for * No procedures listed *. Nausea/Vomiting: None    Postoperative hydration reviewed and adequate. Pain:  Pain Scale 1: Numeric (0 - 10) (01/16/17 0731)  Pain Intensity 1: 0 (01/16/17 0731)   Managed    Neurological Status:   Neuro  Neurologic State: Alert (01/16/17 0734)  Orientation Level: Oriented X4 (01/16/17 0734)  Cognition: Follows commands (01/15/17 2003)   At baseline    Mental Status and Level of Consciousness: Arousable    Pulmonary Status:   O2 Device: CO2 nasal cannula (01/16/17 1538)   Adequate oxygenation and airway patent    Complications related to anesthesia: None    Post-anesthesia assessment completed.  No concerns    Signed By: Adrian Britt MD     January 16, 2017

## 2017-01-16 NOTE — CARDIO/PULMONARY
C/P Rehab Note:     Chart Reviewed. Pt admitted with SVT. Per Cardiology note: SVT, probably AV node reentry. ? afib as well. Ischemic cardiomyopathy with prior anterior wall myocardial infarction and remote PTCA and stenting of the left anterior descending. EF 25% by echo.     PMH significant for:  1. HTN  2. DM  3. Hyperlipiedmia  4. Acute on chronic renal failure. NPO currently for ICD today. Attempt to meet with pt preop for teaching- currently being helped to bathe by her nurse Leslie Pearl. Handed pre op instructions on ICD and post op pacemaker ICD instructions with Jayde for pt to read over. Returned to room per nurse who said pt was available for teaching. Reviewed pre and  post ICD insertion instructions, with emphasis on limitations, temporary restrictions, signs/symptoms of infection, f/u with MD, and importance of taking all meds as ordered and what to do if a shock is felt. Verbalized understanding and questions were answered.

## 2017-01-17 LAB
ANION GAP BLD CALC-SCNC: 13 MMOL/L (ref 5–15)
BUN SERPL-MCNC: 22 MG/DL (ref 6–20)
BUN/CREAT SERPL: 15 (ref 12–20)
CALCIUM SERPL-MCNC: 8.2 MG/DL (ref 8.5–10.1)
CHLORIDE SERPL-SCNC: 101 MMOL/L (ref 97–108)
CO2 SERPL-SCNC: 22 MMOL/L (ref 21–32)
CREAT SERPL-MCNC: 1.42 MG/DL (ref 0.55–1.02)
GLUCOSE SERPL-MCNC: 89 MG/DL (ref 65–100)
POTASSIUM SERPL-SCNC: 4 MMOL/L (ref 3.5–5.1)
SODIUM SERPL-SCNC: 136 MMOL/L (ref 136–145)

## 2017-01-17 PROCEDURE — 74011250637 HC RX REV CODE- 250/637: Performed by: INTERNAL MEDICINE

## 2017-01-17 PROCEDURE — G8979 MOBILITY GOAL STATUS: HCPCS | Performed by: PHYSICAL THERAPIST

## 2017-01-17 PROCEDURE — 97530 THERAPEUTIC ACTIVITIES: CPT | Performed by: OCCUPATIONAL THERAPIST

## 2017-01-17 PROCEDURE — 97530 THERAPEUTIC ACTIVITIES: CPT | Performed by: PHYSICAL THERAPIST

## 2017-01-17 PROCEDURE — 36415 COLL VENOUS BLD VENIPUNCTURE: CPT | Performed by: INTERNAL MEDICINE

## 2017-01-17 PROCEDURE — 80048 BASIC METABOLIC PNL TOTAL CA: CPT | Performed by: INTERNAL MEDICINE

## 2017-01-17 PROCEDURE — 65660000000 HC RM CCU STEPDOWN

## 2017-01-17 PROCEDURE — G8978 MOBILITY CURRENT STATUS: HCPCS | Performed by: PHYSICAL THERAPIST

## 2017-01-17 RX ORDER — AMIODARONE HYDROCHLORIDE 200 MG/1
200 TABLET ORAL 2 TIMES DAILY
Status: DISCONTINUED | OUTPATIENT
Start: 2017-01-17 | End: 2017-01-21

## 2017-01-17 RX ADMIN — Medication 10 ML: at 03:10

## 2017-01-17 RX ADMIN — DULOXETINE HYDROCHLORIDE 60 MG: 30 CAPSULE, DELAYED RELEASE ORAL at 08:58

## 2017-01-17 RX ADMIN — METOPROLOL SUCCINATE 25 MG: 25 TABLET, EXTENDED RELEASE ORAL at 08:58

## 2017-01-17 RX ADMIN — GABAPENTIN 300 MG: 300 CAPSULE ORAL at 08:58

## 2017-01-17 RX ADMIN — FUROSEMIDE 20 MG: 20 TABLET ORAL at 08:58

## 2017-01-17 RX ADMIN — AMIODARONE HYDROCHLORIDE 200 MG: 200 TABLET ORAL at 17:01

## 2017-01-17 RX ADMIN — APIXABAN 2.5 MG: 2.5 TABLET, FILM COATED ORAL at 17:01

## 2017-01-17 RX ADMIN — GABAPENTIN 300 MG: 300 CAPSULE ORAL at 22:02

## 2017-01-17 RX ADMIN — Medication 10 ML: at 22:02

## 2017-01-17 RX ADMIN — DOCUSATE SODIUM 100 MG: 100 CAPSULE, LIQUID FILLED ORAL at 08:58

## 2017-01-17 RX ADMIN — Medication 10 ML: at 14:00

## 2017-01-17 RX ADMIN — AMIODARONE HYDROCHLORIDE 200 MG: 200 TABLET ORAL at 08:58

## 2017-01-17 RX ADMIN — LISINOPRIL 5 MG: 5 TABLET ORAL at 08:58

## 2017-01-17 RX ADMIN — Medication 10 ML: at 03:09

## 2017-01-17 RX ADMIN — GABAPENTIN 300 MG: 300 CAPSULE ORAL at 16:58

## 2017-01-17 RX ADMIN — DOCUSATE SODIUM 100 MG: 100 CAPSULE, LIQUID FILLED ORAL at 17:01

## 2017-01-17 RX ADMIN — ASPIRIN 81 MG CHEWABLE TABLET 81 MG: 81 TABLET CHEWABLE at 08:58

## 2017-01-17 NOTE — PROGRESS NOTES
Hospitalist Progress Note    NAME: Jasson Austin   :  1945   MRN:  [id number]       Interim Hospital Summary: 80 y.o. female whom presented on 2017 with chest pain     Assessment / Plan:  Sepsis POA Due to PNA- resolved    Paroxsymal A. Fib POA- now in sinus  CHF systolic POA- stable at this time  H/o CAD s/p MI, s/p remote PTCA, stenting of LAD  Syncope/Orthostatic Hypotension- improved post PPM/ICD yesterday  ECHO EF 25%    Cont Amiodarone PO as per Cardiology  Initially recommendations was for OP follow up in 1-2 weeks, plan for IP EPS/Abaltion/PPM/ICD if patient agrees as per cardiology- Pt has agreed to get AICD/PPM s/p placement yesterday  Increased Lovenox to 1mg /kg  as per Cardiology- held for ICD/PPM placement -will add eliquis today  Cont ASA  Restarted Lisinopril yesterday- cont decreased dose  5mg today - Cr stable  DC Hydralazine due to orthostatic Dizziness/Hypotension  S/p  ml bolus x 1 over the weekend    LLL Pneumonia POA  Changed LVQ to Ceftin - completed therapy here- off it now  Cont bronchodilators, mucolytics, check sputum. WBC trending down    ARF: so far creatinine trending down to WNL, monitor. Recent Right nephrectomy for RCC     Chest Pain/increased troponin: c/w ASA, ECHO EF 25%, Cardiology help appreciated. Leukocytosis: so far trending down to WNL, monitor. GERD    Surrogate Decision Maker: son Romel Cope, dtr [facility] [phone] (mailbox is full)  Code status: Full  Prophylaxis: lovenox  Recommended Disposition: SNF/LTC  In 24 hrs pending authorization       Subjective:     Chief Complaint / Reason for Physician Visit: orthostatic Hypotension, CHF, Afib RVR  \"i am fine , just sore in my chest\". Discussed with RN events overnight.      Review of Systems:  Symptom Y/N Comments  Symptom Y/N Comments   Fever/Chills n   Chest Pain n    Poor Appetite n   Edema n    Cough n   Abdominal Pain     Sputum n   Joint Pain     SOB/ROCK y improved  Pruritis/Rash Nausea/vomit n   Tolerating PT/OT y Orthostatic improved   Diarrhea    Tolerating Diet y    Constipation    Other       Could NOT obtain due to:      Objective:     VITALS:   Last 24hrs VS reviewed since prior progress note. Most recent are:  Patient Vitals for the past 24 hrs:   Temp Pulse Resp BP SpO2   01/17/17 1043 98.1 °F (36.7 °C) 71 16 116/72 97 %   01/17/17 0717 98 °F (36.7 °C) 70 16 148/76 96 %   01/17/17 0310 98.2 °F (36.8 °C) 75 16 131/71 96 %   01/17/17 0014 98 °F (36.7 °C) 70 16 133/70 99 %   01/16/17 2114 97.4 °F (36.3 °C) 70 16 123/59 95 %   01/16/17 2028 97.6 °F (36.4 °C) 70 18 113/67 97 %   01/16/17 1918 98 °F (36.7 °C) 70 18 108/64 100 %   01/16/17 1739 - 70 - 115/65 -   01/16/17 1730 - 70 - 114/64 -   01/16/17 1700 - 97 - (!) 89/69 -   01/16/17 1645 - (!) 104 - 112/62 -   01/16/17 1630 - 95 - 96/62 -   01/16/17 1614 97.6 °F (36.4 °C) (!) 101 16 98/72 97 %   01/16/17 1557 98.1 °F (36.7 °C) (!) 104 16 93/56 98 %   01/16/17 1538 - 69 16 97/62 100 %       Intake/Output Summary (Last 24 hours) at 01/17/17 1239  Last data filed at 01/17/17 0418   Gross per 24 hour   Intake              320 ml   Output                0 ml   Net              320 ml        PHYSICAL EXAM:  General: WD, WN. Alert, cooperative, no acute distress    EENT:  EOMI. Anicteric sclerae. MMM  Resp:  Bilateral basal crackles improved +  No accessory muscle use  CV:  Regular  rhythm,  No edema  GI:  Soft, Non distended, Non tender.  +Bowel sounds  Neurologic:  Alert and oriented X 3, normal speech,   Psych:   Good insight. Not anxious nor agitated  Skin:  No rashes.   No jaundice    Reviewed most current lab test results and cultures  YES  Reviewed most current radiology test results   YES  Review and summation of old records today    NO  Reviewed patient's current orders and MAR    YES  PMH/SH reviewed - no change compared to H&P  ________________________________________________________________________  Care Plan discussed with: Comments   Patient x    Family      RN x    Care Manager x    Consultant                        Multidiciplinary team rounds were held today with , nursing, pharmacist and clinical coordinator. Patient's plan of care was discussed; medications were reviewed and discharge planning was addressed. ________________________________________________________________________  Total NON critical care TIME:  15   Minutes    Total CRITICAL CARE TIME Spent:   Minutes non procedure based      Comments   >50% of visit spent in counseling and coordination of care     ________________________________________________________________________  Guillermo Victor MD     Procedures: see electronic medical records for all procedures/Xrays and details which were not copied into this note but were reviewed prior to creation of Plan. LABS:  I reviewed today's most current labs and imaging studies.   Pertinent labs include:  Recent Labs      01/15/17   0420   WBC  10.0   HGB  8.4*   HCT  26.3*   PLT  329     Recent Labs      01/17/17   0341  01/16/17   0318  01/15/17   0420   NA  136  136  134*   K  4.0  4.1  4.1   CL  101  100  99   CO2  22  24  25   GLU  89  96  109*   BUN  22*  19  15   CREA  1.42*  1.40*  1.23*   CA  8.2*  8.3*  8.3*       Signed: Guillermo Victor MD

## 2017-01-17 NOTE — PROGRESS NOTES
POD#1 site and device programming check OK. S/p dual chamber SJM ICD yesterday. Expected mild site discomfort. Dermabond intact. No hematoma. Ambulatory with a walker and taking oral.      Visit Vitals    /76 (BP 1 Location: Right arm, BP Patient Position: At rest)    Pulse 70    Temp 98 °F (36.7 °C)    Resp 16    Wt 72.6 kg (160 lb)    SpO2 96%    BMI 25.06 kg/m2       ND, NAD. L chest site OK. RRR, no rub. No unilateral arm edema. Awake, appropriate, neuro grossly nonfocal.      PLAN:  F/U in the office for wound and device programming check in 2-4 weeks. This can be arranged at the same time as one month F/U with Dr. Annelise Perea, her primary cardiologist.  All questions answered regarding her ICD. Patient is aware of signs and sx warranting urgent med F/U or calling 911. Will sign off. Addendum:  I discontinued lisinopril. She is still significantly orthostatic.     Patient Vitals for the past 12 hrs:   Temp Pulse Resp BP SpO2   01/17/17 1624 - 77 - 128/62 -   01/17/17 1623 - 99 - (!) 76/58 (standing) -   01/17/17 1611 - 77 - 140/83 -   01/17/17 1609 - 77 - 105/77 -   01/17/17 1604 - 68 - 140/81 -   01/17/17 1043 98.1 °F (36.7 °C) 71 16 116/72 97 %   01/17/17 0717 98 °F (36.7 °C) 70 16 148/76 96 %

## 2017-01-17 NOTE — PROGRESS NOTES
7:28 AM  received bedside report from [place], [facility]. [address] SHIFT NURSING NOTE    Bedside and Verbal shift change report given to [place] (oncoming nurse) by Jossy Lewis (offgoing nurse). Report included the following information SBAR, Kardex, Intake/Output and Recent Results. SHIFT SUMMARY: patient had an unevetntful day today.         Admission Date 1/9/2017   Admission Diagnosis SVT (supraventricular tachycardia)   Consults IP CONSULT TO CARDIOLOGY  IP CONSULT TO CARDIOLOGY        Consults   [x] PT   [x] OT   [] Speech   [] Palliative      [] Hospice    [] Case Management   [] None   Cardiac Monitoring   [x] Yes   [] No     Antibiotics   [] Yes   [] No   GI Prophylaxis  (Ex: Protonix, Pepcid, etc,.)   [] Yes   [] No          DVT Prophylaxis   SCDs:             Santiago stockings:         [] Medication (Ex: Lovenox, Eliquis,  Heparin, etc..)   [] Contraindicated   [] None       Urinary Catheter             LDAs               Peripheral IV 01/12/17 Left Forearm (Active)   Site Assessment Clean, dry, & intact 1/17/2017  3:08 PM   Phlebitis Assessment 0 1/17/2017  3:08 PM   Infiltration Assessment 0 1/17/2017  3:08 PM   Dressing Status Clean, dry, & intact 1/17/2017  3:08 PM   Dressing Type Transparent;Tape 1/17/2017  3:08 PM   Hub Color/Line Status Blue;Flushed 1/17/2017  3:08 PM       Peripheral IV 01/14/17 Right Arm (Active)   Site Assessment Clean, dry, & intact 1/17/2017  3:08 PM   Phlebitis Assessment 0 1/17/2017  3:08 PM   Infiltration Assessment 0 1/17/2017  3:08 PM   Dressing Status Clean, dry, & intact 1/17/2017  3:08 PM   Dressing Type Transparent;Tape 1/17/2017  3:08 PM   Hub Color/Line Status Pink;Flushed 1/17/2017  3:08 PM                      I/Os   Intake/Output Summary (Last 24 hours) at 01/17/17 1917  Last data filed at 01/17/17 0418   Gross per 24 hour   Intake              120 ml   Output                0 ml   Net              120 ml         Activity Level Activity Level: Bed Rest     Activity Assistance: Partial (two people)   Diet Active Orders   Diet    DIET CARDIAC Regular; 2 GM NA (House Low NA)      Purposeful Rounding every 1-2 hour? [x] Yes    Zuri Score  Total Score: 3   Bed Alarm (If score 3 or >)   [x] Yes    [] Refused (See signed refusal form in chart)   Evan Score  Evan Score: 16       Evan Score (if score 14 or less)   [] PMT consult   [] Nutrition consult   [] Wound Care consult      []  Specialty bed         Influenza Vaccine Received Flu Vaccine for Current Season (usually Sept-March): Yes               Needs prior to discharge:   Home O2 required:    [] Yes   [x] No     If yes, how much O2 required?     Other:    Last Bowel Movement Date: 01/17/17        POST-OP SURGICAL VATS   [] Yes   [x] No     Incentive Spirometer:   [] Yes   [] Refused   Coughing and deep breathing:     [] Yes   [] Refused   Oral care:     [] Yes   [] Refused   Understanding (patient education):     [] Yes   [] Refused   Getting out of bed Number times ambulated in hallway past shift:    Number of times OOB to chair past shift:     Head of bed elevation:     [] Yes   [] Refused      Readmission Risk Assessment Tool Score Low Risk            12       Total Score        3 Patient Length of Stay > 5    4 More than 1 Admission in calendar year    5 Patient Insurance is Medicare, Medicaid or Self Pay        Criteria that do not apply:    Relationship with PCP    Patient Living Status    Charlson Comorbidity Score       Expected Length of Stay 5d 16h   Actual Length of Stay 8

## 2017-01-17 NOTE — DISCHARGE INSTRUCTIONS
DISCHARGE INSTRUCTIONS FOR PATIENTS WITH ICD'S    You had a [employer] dual chamber ICD implanted on 1/16 by Dr. Vy De La Cruz during your hospital stay. This device was implanted to act as a pacemaker to bring your low heart rate up AND to protect you from dying from a dangerous fast heart signal that sometimes occurs in weak heart muscles. Your heart muscle is weak, confirmed by an echo imaging study here (Ejection fraction 25%, normal is 55-60%). 1. Remember to call for an appointment in 2-4 weeks [phone] to check healing and implant programming with Dr. Radha Pineda nurse, Edilma Orellana. This appointment can be cancelled if you are going to see your cardiologist, Dr. Yobany Longoria, in the next month. The device can be checked at that time. 2. Medic Alert Bracelets are available from your pharmacist to wear at all times if you choose to wear one. 3. Carry your ID card for your ICD with you at all times. This card will be given to you in the hospital or mailed to you. 4. The ICD will bulge slightly under your skin. The bulge will decrease in size over the next few weeks. Please notify the doctor's office if you notice any of the following around your ICD site:   A.  A bruise that does not go away. B.  Soreness or yellow, green, or brown drainage from the site. C. Any swelling from the site. D. If you have a fever of 100 degrees or higher that lasts for a few days. INCISION CARE       1.  Leave the skin glue over your site until it dissolves on its own, usually in a few weeks. 2.  You may shower after 3 days as long as your incision isnt submerged or directly sprayed upon until well healed. 3.  For comfort, wear loose fitting clothing. 4.  Report any signs of infection, fever, pain, swelling, redness, oozing, or heat at site especially if these symptoms increase after the first 3 to 4 days. ACTIVITY PRECAUTIONS     1. Avoid rough contact with the implant site.   2. No driving for 14 days.  3. Avoid lifting your arm over your head, carrying anything on the affected side, or lifting over 10 pounds for 30 days. For the first 2 days only bend your arm at the elbow. 4. Any extreme activity such as golf, weight lifting or exercise biking should be restricted for 60 days. 5. Do not carry objects by holding them against your implant site. 6.  No shooting rifles or any type of gun with the affected shoulder permanently. 7.  Welding and chainsaws are prohibited. SPECIAL PRECAUTIONS     1. You should avoid all strong magnetic fields, such as arc welding, large transformers, large motors. Some ICD devices will beep if it detects a strong magnet. If this occurs, move out of the area. 2.  You may not have an MRI which uses a strong magnet to take pictures. 3.  Treatments or surgery that requires diathermy or electrocautery should be discussed with your doctor before scheduled. 4.  Avoid radio frequency transmitters, including radar. 5.  Advise dentist or other medical personnel you see that you have an ICD. 6.  Cell phones and microwave oven use is okay. 7.  If you plan to move or take a trip to a new area, the doctor's office will give you a name of a doctor to contact for any problems. SPECIAL INSTRUCTIONS ON SHOCKS     1. Notify your doctor for any of the following:       A. Anytime a shock is received in a 24 hour period. An office visit is not usually required for a single shock. B.  Two or more shocks in a row. If you do not feel well, call the [facility], otherwise call your doctor. This may require an office visit. C. Two or more shocks spaced apart by several hours. This may require an office visit. 2.  Keep a record of events. Include date, time, symptoms and activity at that time. ANTIBIOTIC THERAPY    During the first 8 weeks after your ICD insertion, you may need antibiotics before any dental work or certain tests or operations.   Let the dentist or doctor who is caring for you know that you have had an implanted device. HOSPITALIST DISCHARGE INSTRUCTIONS    NAME: Iban Ott   :  1945   MRN:  [id number]     Date/Time:  2017 12:56 PM    ADMIT DATE: 2017   DISCHARGE DATE: 2017     Medications: Per above medication reconciliation. Pain Management: per above medications    Recommended diet: Cardiac Diet    Recommended activity: PT/OT Eval and Treat  She needs to wear compression stockings during the day as tolerated    Wound care: AICD site care per routine    Indwelling devices:  None    Supplemental Oxygen: None    Required Lab work: Per SNF routine    Glucose management:  None    Code status: Full        Outside physician follow up: Follow-up Information     Follow up With Details Comments [address] ([address])   10 [facility]  [address] [place], [place] In 2 weeks  [address]  [phone]  [facility] [address], [address] [address]                 Skilled nursing facility/ SNF MD responsible for above on discharge. Information obtained by :  I understand that if any problems occur once I am at home I am to contact my physician. I understand and acknowledge receipt of the instructions indicated above.                                                                                                                                            Physician's or R.N.'s Signature                                                                  Date/Time                                                                                                                                              Patient or Repres

## 2017-01-17 NOTE — PROGRESS NOTES
Cardiology Progress Note      1/17/2017 8:55 AM    Admit Date: 1/9/2017          Subjective: S/p pacer ICD yest. Some soreness today. No other c/o          Visit Vitals    /76 (BP 1 Location: Right arm, BP Patient Position: At rest)    Pulse 70    Temp 98 °F (36.7 °C)    Resp 16    Wt 72.6 kg (160 lb)    SpO2 96%    BMI 25.06 kg/m2     01/15 1901 - 01/17 0700  In: 320 [P.O.:120; I.V.:200]  Out: 350 [Urine:350]        Objective:      Physical Exam:  VS as above  Chest CTA ant  Card RRR no gallop  Extrem 2+ pedal edema     Data Review:   Labs:    BUN 22  Creat 1.4   K 4.0     Telemetry: SR with V pacing No SVT       Assessment:     1. Chest pain, atypical for myocardial ischemia. No evidence of ischemic insult. 2. Dizziness, orthostatic hypotension. 3. Intermittent junctional/ectopic atrial rhythm. With the orthostasis and rate-limiting medications on board, probably should have pacing support. 4. Paroxysmal SVT, probably AV node reentry per strips. No recent recurrence. 5. Ischemic cardiomyopathy with prior anterior wall myocardial infarction and remote PTCA and stenting of the left anterior descending. EF 25% by echo here. 6. Hypertensive heart disease with chronic systolic CHF class 3 and CKD stage 3 at baseline. 7. Type 2 diabetes mellitus. 8. Dyslipidemia. 9. Recent right nephrectomy for renal cell cancer in 12/2016. 10. Acute on chronic renal failure, improved. 11. ? Left-sided pneumonia (retrocardiac abnormality on CXR). 12. Anemia, unspecified. 13. S/p pacer/ ICD yest     Plan: Would like to see her back in a month. Will decrease amiodarone to 200 mg bid. Creat up slightly- need to watch closely and may may to stop ACE if it continues to rise.

## 2017-01-17 NOTE — PROGRESS NOTES
Problem: Self Care Deficits Care Plan (Adult)  Goal: *Acute Goals and Plan of Care (Insert Text)  Occupational Therapy Goals  Initiated 1/11/2017  1. Patient will perform grooming in standing with supervision/set-up within 7 day(s). 2. Patient will perform upper body adls with supervision/set-up within 7 day(s). 3. Patient will perform standing adls with supervision/set-up within 7 day(s). 4. Patient will walk into the bathroom to perform toilet transfers with supervision/set-up within 7 day(s). 5. Patient will perform all aspects of toileting with supervision/set-up within 7 day(s). OCCUPATIONAL THERAPY TREATMENT  Patient: Pilar Campbell (75 y.o. female)  Date: 1/17/2017  Diagnosis: SVT (supraventricular tachycardia) <principal problem not specified>       Precautions:  fall, pacemaker      ASSESSMENT:  Pt had pacemaker surgery yesterday, now with precautions. All established goals remain appropriate to continue. Educated pt on the pacemaker precautions and restrictions during adls and mobility-pt needed cues to adhere to the precautions. Pt reports dizziness in standing, requiring immediate return to bed with maximal assistance. Orthostatics taken and pt positive and symptomatic which impairs pt's mobility and participation in adls. Pt also reports that she is feeling weak due to her cold. Sling applied at bed level. Pt verbalized understanding of precautions but will need reinforcement. Recommend returning to snf rehab. Progression toward goals:  [ ]       Improving appropriately and progressing toward goals  [ ]       Improving slowly and progressing toward goals  [X]       Not making progress toward goals this date       PLAN:  Patient continues to benefit from skilled intervention to address the above impairments. Continue treatment per established plan of care.   Discharge Recommendations:  [facility] Jacob for completing rehab program  Further Equipment Recommendations for Discharge:  tbd       SUBJECTIVE:   Patient stated yes, I feel dizzy.       OBJECTIVE DATA SUMMARY:   Cognitive/Behavioral Status:  Neurologic State: Alert  Orientation Level: Oriented X4  Cognition: Follows commands; Appropriate decision making              Functional Mobility and Transfers for ADLs:  Bed Mobility:  Rolling: Supervision (VC to adhere to pacemaker precautions)  Supine to Sit: Contact guard assistance (VC to adhere to pacemaker precautions)  Sit to Supine: Maximal assistance due to BP decrease/diziness  Scooting: Supervision (VC to adhere to pacemaker precautions)     Transfers:  Sit to Stand: Minimum assistance;Contact guard assistance (decreased anterior weight shift)  Stand to Sit: Minimum assistance     Balance:  Sitting: Intact  Standing: Impaired  Standing - Static: Good;Constant support     ADL Intervention:   Pt educated in pacemaker precautions-needs reinforcement. Total assistance for donning /doffing sling. Educated on the benefits of OOB activities and to prepare for them by being upright in bed as much as possible. Pt was orthostatic today limiting particpation in adls. Therapeutic Exercises:   Performed BLE seated exercises at EOB to decrease orthostatic response. Pain:  Pain Scale 1: Numeric (0 - 10)  Pain Intensity 1: 0              Activity Tolerance:   Poor due to symptomatic orthostatic BP  Please refer to the flowsheet for vital signs taken during this treatment.   After treatment:   [ ] Patient left in no apparent distress sitting up in chair  [X] Patient left in no apparent distress in bed-HOB raised  [X] Call bell left within reach  [X] Nursing notified  [ ] Caregiver present  [X] Bed alarm activated      COMMUNICATION/COLLABORATION:   The patients plan of care was discussed with: Physical Therapist and Registered Nurse     Chelsea Rasheed OTR/L  Time Calculation: 32 mins

## 2017-01-17 NOTE — PROGRESS NOTES
[address] SHIFT NURSING NOTE    Bedside and Verbal shift change report given to  (oncoming nurse) by Ethan Gilbert (offgoing nurse). Report included the following information SBAR, Procedure Summary, Intake/Output, MAR and Recent Results. SHIFT SUMMARY:   1945 Sling applied to patients arm. Admission Date 1/9/2017   Admission Diagnosis SVT (supraventricular tachycardia)   Consults IP CONSULT TO CARDIOLOGY  IP CONSULT TO CARDIOLOGY        Consults   [x]PT   [x]OT   []Speech   []Case Management      [] Palliative       Cardiac Monitoring Order   [x]Yes   []No     GI Prophylaxis   []Yes   []No           DVT Prophylaxis   SCDs:             Santiago stockings:         [] Medication   []Contraindicated   []None        Activity Level Activity Level: Bed Rest (S/P ICD placement)     Activity Assistance: Partial (two people)   Purposeful Rounding every 1-2 hour? [x]Yes    Zuri Score  Total Score: 3   Bed Alarm (If score 3 or >)   [x]Yes   [] Refused (See signed refusal form in chart)   Evan Score  Evan Score: 15   Evan Score (if score 14 or less)   []PMT consult   []Wound Care consult      []Specialty bed   [] Nutrition consult          Needs prior to discharge:   Home O2 required:    []Yes   [x]No    If yes, how much O2 required?     Other:    Last Bowel Movement: Last Bowel Movement Date: 01/15/17        POST-OP SURGICAL VATS   []Yes   [x]N/A   Incentive Spirometer:   []Yes   []Refused   Coughing and deep breathing:     []Yes   []Refused   Oral care:     []Yes   []Refused   Understanding (patient education):     []Yes   []Refused   Getting out of bed Number times ambulated in hallway past shift:    Number of times OOB to chair past shift:     Head of bed elevation:     []Yes   []Refused      Influenza Vaccine Received Flu Vaccine for Current Season (usually Sept-March): Yes        Pneumonia Vaccine           Diet Active Orders   Diet    DIET CARDIAC Regular; 2 GM NA (House Low NA)      LDAs               Peripheral IV 01/12/17 Left Forearm (Active)   Site Assessment Clean, dry, & intact 1/17/2017  4:18 AM   Phlebitis Assessment 0 1/17/2017  4:18 AM   Infiltration Assessment 0 1/17/2017  4:18 AM   Dressing Status Clean, dry, & intact 1/17/2017  4:18 AM   Dressing Type Transparent;Tape 1/17/2017  4:18 AM   Hub Color/Line Status Blue;Capped 1/17/2017  4:18 AM       Peripheral IV 01/14/17 Right Arm (Active)   Site Assessment Clean, dry, & intact 1/17/2017  4:18 AM   Phlebitis Assessment 0 1/17/2017  4:18 AM   Infiltration Assessment 0 1/17/2017  4:18 AM   Dressing Status Clean, dry, & intact 1/17/2017  4:18 AM   Dressing Type Transparent;Tape 1/17/2017  4:18 AM   Hub Color/Line Status Pink;Capped 1/17/2017  4:18 AM                      Urinary Catheter      Intake & Output   Date 01/16/17 0700 - 01/17/17 0659 01/17/17 0700 - 01/18/17 0659   Shift 9272-1696 6325-4133 24 Hour Total 7389-2908 1108-5731 24 Hour Total   I  N  T  A  K  E   P.O.  120 120         P. O.  120 120       I.V.  (mL/kg/hr) 200  (0.2)  200         Volume (0.9% sodium chloride infusion) 200  200       Shift Total  (mL/kg) 200  (2.8) 120  (1.7) 320  (4.4)      O  U  T  P  U  T   Urine  (mL/kg/hr) 350  (0.4)  350         Urine Voided 350  350         Urine Occurrence(s) 1 x 2 x 3 x       Shift Total  (mL/kg) 350  (4.8)  350  (4.8)      NET -150 120 -30      Weight (kg) 72.6 72.6 72.6 72.6 72.6 72.6         Readmission Risk Assessment Tool Score Low Risk            12       Total Score        3 Patient Length of Stay > 5    4 More than 1 Admission in calendar year    5 Patient Insurance is Medicare, Medicaid or Self Pay        Criteria that do not apply:    Relationship with PCP    Patient Living Status    Charlson Comorbidity Score       Expected Length of Stay 5d 16h   Actual Length of Stay 8

## 2017-01-17 NOTE — PROGRESS NOTES
Problem: Mobility Impaired (Adult and Pediatric)  Goal: *Acute Goals and Plan of Care (Insert Text)  Physical Therapy Goals  Revised 1/17/2017  1. Patient will move from supine to sit and sit to supine , scoot up and down and roll side to side in bed with supervision/set-up within 7 day(s). 2. Patient will transfer from bed to chair and chair to bed with supervision/set-up using the least restrictive device within 7 day(s). 3. Patient will perform sit to stand with supervision/set-up within 7 day(s). 4. Patient will ambulate with minimal assistance/contact guard assist for 50 feet with the least restrictive device within 7 day(s). 5. Patient will demonstrate good recall and compliance with pacemaker precautions during activity within 7 days. Physical Therapy Goals  Initiated 1/11/2017  1. Patient will move from supine to sit and sit to supine , scoot up and down and roll side to side in bed with supervision/set-up within 7 day(s). 2. Patient will transfer from bed to chair and chair to bed with minimal assistance/contact guard assist using the least restrictive device within 7 day(s). 3. Patient will perform sit to stand with minimal assistance/contact guard assist within 7 day(s). 4. Patient will ambulate with minimal assistance/contact guard assist for 75 feet with the least restrictive device within 7 day(s). 5. Patient will perform 2 sets of 10 repetitions of active strengthening exercises for bilateral lower extremity(s) with supervision/set-up within 7 day(s). PHYSICAL THERAPY REEVALUATION  Patient: Maddison Solo (80 y.o. female)  Date: 1/17/2017  Primary Diagnosis: SVT (supraventricular tachycardia)        Precautions: falls         ASSESSMENT :  Based on the objective data described below, the patient presents with generalized weakness and impaired functional mobility, balance, endurance and activity tolerance. Patient s/p pacemaker placement yesterday.  Pacemaker precautions reviewed with patient and patient requiring frequent VC during mobility to adhere to precautions. Patient requiring CGA to min A and additional time for bed mobility and transfers. Patient demonstrating orthostatic hypotension with positional changes and demonstrates significant drop in BP to 76/58 in standing- pt symptomatic and required return to supine. Patient demonstrating slow overall progress with therapy and OOB mobility continues to be limited by orthostatic hypotension. Recommend SNF following discharge. .     Patient will benefit from skilled intervention to address the above impairments. Patients rehabilitation potential is considered to be Fair  Factors which may influence rehabilitation potential include:   [ ]           None noted  [X]           Mental ability/status  [X]           Medical condition  [ ]           Home/family situation and support systems  [ ]           Safety awareness  [ ]           Pain tolerance/management  [ ]           Other:        PLAN :  Recommendations and Planned Interventions:  [X]             Bed Mobility Training             [ ]      Neuromuscular Re-Education  [X]             Transfer Training                   [ ]      Orthotic/Prosthetic Training  [X]             Gait Training                         [ ]      Modalities  [ ]             Therapeutic Exercises           [ ]      Edema Management/Control  [X]             Therapeutic Activities            [X]      Patient and Family Training/Education  [ ]             Other (comment):  Frequency/Duration: Patient will be followed by physical therapy 4 times a week to address goals. Discharge Recommendations: Skilled Nursing Facility  Further Equipment Recommendations for Discharge: TBD by SNF       SUBJECTIVE:   Patient stated I don't feel like doing anything today. I have caught a cold.       OBJECTIVE DATA SUMMARY:       Past Medical History   Diagnosis Date    Autoimmune disease ([facility] [address]         rheumatoid     CAD (coronary artery disease)      GERD (gastroesophageal reflux disease)      Hypertension      Ill-defined condition         anemia    Other ill-defined conditions(799.89)         high cholesterol    Renal cell carcinoma of right kidney [facility])         s/p resection 12/16     Past Surgical History   Procedure Laterality Date    Pr cardiac surg procedure unlist           three stents placed 2005    Hx tonsillectomy        Hx urological   12/22/2016       RIGHT LAPOROSCOPIC HAND [address] course since last seen and reason for reevaluation: pacemaker placement 1/16/17  Critical Behavior:  Neurologic State: Alert  Orientation Level: Oriented X4  Cognition: Follows commands, Appropriate decision making  Safety/Judgement: Awareness of environment, Fall prevention     Strength:    Strength: Generally decreased, functional                       Tone & Sensation:   Tone: Normal (no tremors noted today)                              Range Of Motion:  AROM: Generally decreased, functional (L shoulder flex to 90 2/2 new pacemaker)                       Coordination:  Coordination: Within functional limits     Functional Mobility:  Bed Mobility:  Rolling: Supervision (VC to adhere to pacemaker precautions)  Supine to Sit: Contact guard assistance (VC to adhere to pacemaker precautions)  Sit to Supine: Moderate assistance  Scooting: Supervision (VC to adhere to pacemaker precautions)  Transfers:  Sit to Stand: Minimum assistance;Contact guard assistance (decreased anterior weight shift)  Stand to Sit: Minimum assistance                    Balance:   Sitting: Intact  Standing: Impaired  Standing - Static: Good;Constant support  Ambulation/Gait Training:      Patient able to side step 2 feet to head of bed using RW for support. Min A for support but no LOB noted. Further ambulation deferred secondary to orthostatic hypotension.            Functional Measure:     Elder Mobility Scale      4/20            EMS and G-code impairment scale:  Percentage of impairment CH  0% CI  1-19% CJ  20-39% CK  40-59% CL  60-79% CM  80-99% CN  100%   EMS Score 0-20 20 17-19 13-16 9-12 5-8 1-4 0      Scores under 10  generally these patients are dependent in mobility maneuvers; require help with  basic ADL, such as transfers, toileting and dressing. Scores between 10  13  generally these patients are borderline in terms of safe mobility and  independence in ADL i.e. they require some help with some mobility maneuvers. Scores over 14  Generally these patients are able to perform mobility maneuvers alone and safely  and are independent in basic ADL. G codes: In compliance with CMSs Claims Based Outcome Reporting, the following G-code set was chosen for this patient based on their primary functional limitation being treated: The outcome measure chosen to determine the severity of the functional limitation was the Elderly Mobility scale with a score of 4/20 which was correlated with the impairment scale. · Mobility - Walking and Moving Around:               - CURRENT STATUS:    CM - 80%-99% impaired, limited or restricted               - GOAL STATUS:           CK - 40%-59% impaired, limited or restricted               - D/C STATUS:                       ---------------To be determined---------------      Pain:   Patient without c/o pain; reports soreness at pacemaker incision site                 Activity Tolerance:   Symptomatic orthostatic hypotension in standing. BP in qtmsxay=942/83 and standing= 76/58  Please refer to the flowsheet for vital signs taken during this treatment.   After treatment:   [ ]  Patient left in no apparent distress sitting up in chair  [X]  Patient left in no apparent distress in bed  [X]  Call bell left within reach  [X]  Nursing notified  [ ]  Caregiver present  [ ]  Bed alarm activated      COMMUNICATION/EDUCATION:   The patients plan of care was discussed with: Occupational Therapist and Registered Nurse.  [X]  Fall prevention education was provided and the patient/caregiver indicated understanding. [X]  Patient/family have participated as able in goal setting and plan of care. [X]  Patient/family agree to work toward stated goals and plan of care. [ ]  Patient understands intent and goals of therapy, but is neutral about his/her participation. [ ]  Patient is unable to participate in goal setting and plan of care.      Thank you for this referral.  Kriss Amin, PT   Time Calculation: 31 mins

## 2017-01-17 NOTE — PROGRESS NOTES
Cardiopulmonary Care Interdisciplinary rounds were held today to discuss patient plan of care and outcomes. The following members were present: PT, NP/Physician, Pharmacy, Nursing, Nutritionist and Case Management.       Plan of Care: Continue current treatment plan  D/c to SNF on 1/18 - awaiting auth

## 2017-01-18 LAB
ANION GAP BLD CALC-SCNC: 12 MMOL/L (ref 5–15)
BUN SERPL-MCNC: 19 MG/DL (ref 6–20)
BUN/CREAT SERPL: 15 (ref 12–20)
CALCIUM SERPL-MCNC: 8.1 MG/DL (ref 8.5–10.1)
CHLORIDE SERPL-SCNC: 102 MMOL/L (ref 97–108)
CO2 SERPL-SCNC: 24 MMOL/L (ref 21–32)
CREAT SERPL-MCNC: 1.24 MG/DL (ref 0.55–1.02)
GLUCOSE BLD STRIP.AUTO-MCNC: 152 MG/DL (ref 65–100)
GLUCOSE SERPL-MCNC: 97 MG/DL (ref 65–100)
POTASSIUM SERPL-SCNC: 3.9 MMOL/L (ref 3.5–5.1)
SERVICE CMNT-IMP: ABNORMAL
SODIUM SERPL-SCNC: 138 MMOL/L (ref 136–145)

## 2017-01-18 PROCEDURE — 65660000000 HC RM CCU STEPDOWN

## 2017-01-18 PROCEDURE — 36415 COLL VENOUS BLD VENIPUNCTURE: CPT | Performed by: INTERNAL MEDICINE

## 2017-01-18 PROCEDURE — 80048 BASIC METABOLIC PNL TOTAL CA: CPT | Performed by: INTERNAL MEDICINE

## 2017-01-18 PROCEDURE — 74011250637 HC RX REV CODE- 250/637: Performed by: INTERNAL MEDICINE

## 2017-01-18 PROCEDURE — 82962 GLUCOSE BLOOD TEST: CPT

## 2017-01-18 PROCEDURE — 74011250637 HC RX REV CODE- 250/637: Performed by: NURSE PRACTITIONER

## 2017-01-18 PROCEDURE — 74011250636 HC RX REV CODE- 250/636: Performed by: INTERNAL MEDICINE

## 2017-01-18 RX ORDER — MIDODRINE HYDROCHLORIDE 5 MG/1
2.5 TABLET ORAL 2 TIMES DAILY WITH MEALS
Status: DISCONTINUED | OUTPATIENT
Start: 2017-01-18 | End: 2017-01-19

## 2017-01-18 RX ADMIN — METOPROLOL SUCCINATE 25 MG: 25 TABLET, EXTENDED RELEASE ORAL at 10:35

## 2017-01-18 RX ADMIN — AMIODARONE HYDROCHLORIDE 200 MG: 200 TABLET ORAL at 10:35

## 2017-01-18 RX ADMIN — Medication 10 ML: at 18:30

## 2017-01-18 RX ADMIN — GABAPENTIN 300 MG: 300 CAPSULE ORAL at 21:52

## 2017-01-18 RX ADMIN — GABAPENTIN 300 MG: 300 CAPSULE ORAL at 18:20

## 2017-01-18 RX ADMIN — DULOXETINE HYDROCHLORIDE 60 MG: 30 CAPSULE, DELAYED RELEASE ORAL at 10:35

## 2017-01-18 RX ADMIN — GABAPENTIN 300 MG: 300 CAPSULE ORAL at 10:35

## 2017-01-18 RX ADMIN — APIXABAN 2.5 MG: 2.5 TABLET, FILM COATED ORAL at 10:35

## 2017-01-18 RX ADMIN — DOCUSATE SODIUM 100 MG: 100 CAPSULE, LIQUID FILLED ORAL at 18:20

## 2017-01-18 RX ADMIN — Medication 10 ML: at 21:52

## 2017-01-18 RX ADMIN — DOCUSATE SODIUM 100 MG: 100 CAPSULE, LIQUID FILLED ORAL at 10:35

## 2017-01-18 RX ADMIN — AMIODARONE HYDROCHLORIDE 200 MG: 200 TABLET ORAL at 18:20

## 2017-01-18 RX ADMIN — MIDODRINE HYDROCHLORIDE 2.5 MG: 5 TABLET ORAL at 18:21

## 2017-01-18 RX ADMIN — SODIUM CHLORIDE 1000 ML: 900 INJECTION, SOLUTION INTRAVENOUS at 10:00

## 2017-01-18 RX ADMIN — ASPIRIN 81 MG CHEWABLE TABLET 81 MG: 81 TABLET CHEWABLE at 10:35

## 2017-01-18 NOTE — PROGRESS NOTES
Cardiology Progress Note      1/18/2017 8:43 AM    Admit Date: 1/9/2017          Subjective:  Stable , no new c/o. ACE inhibitor stopped. Visit Vitals    /78 (BP 1 Location: Right arm, BP Patient Position: At rest)    Pulse 69    Temp 99 °F (37.2 °C)    Resp 16    Wt 66.9 kg (147 lb 8 oz)    SpO2 96%    BMI 23.1 kg/m2     01/16 1901 - 01/18 0700  In: 240 [P.O.:240]  Out: -         Objective:      Physical Exam:  VS as above  Chest CTA ant  Card RRR no gallop      Data Review:   Labs:    BUN 19  Creat 1.2     Telemetry: SR     Assessment:     1. Chest pain, atypical for myocardial ischemia. No evidence of ischemic insult. 2. Dizziness, orthostatic hypotension. 3. Intermittent junctional/ectopic atrial rhythm. With the orthostasis and rate-limiting medications on board, probably should have pacing support. 4. Paroxysmal SVT, probably AV node reentry per strips. No recent recurrence. 5. Ischemic cardiomyopathy with prior anterior wall myocardial infarction and remote PTCA and stenting of the left anterior descending. EF 25% by echo here. 6. Hypertensive heart disease with chronic systolic CHF class 3 and CKD stage 3 at baseline. 7. Type 2 diabetes mellitus. 8. Dyslipidemia. 9. Recent right nephrectomy for renal cell cancer in 12/2016. 10. Acute on chronic renal failure, improved. 11. ? Left-sided pneumonia (retrocardiac abnormality on CXR). 12. Anemia, unspecified. 13. S/p pacer/ ICD yest        Plan:  Cont current Rx. I am not sure she really need Eliquis- no prolonged episodes of afib.

## 2017-01-18 NOTE — PROGRESS NOTES
Spiritual Care Assessment/Progress Notes    Holger Fulton [id number]  [id number]    1945  80 y.o.  female    Patient Telephone Number: [phone] (home)   Yarsani Affiliation: Abner Epley   Language: English   Extended Emergency Contact Information  Primary Emergency Contact: Lori Marrero  Address: [place] [address] [address] [place] [address] Drive Phone: [phone]  Relation: Child   Patient Active Problem List    Diagnosis Date Noted    Paroxysmal atrial fibrillation ([facility] [address] 01/13/2017    Orthostatic hypotension 01/13/2017    Left lower lobe pneumonia 01/13/2017    SVT (supraventricular tachycardia) 01/09/2017    Renal cell carcinoma of right kidney ([facility] [address] 01/04/2017    Renal mass 12/22/2016        Date: 1/18/2017       Level of Yarsani/Spiritual Activity:  []         Involved in danette tradition/spiritual practice    []         Not involved in danette tradition/spiritual practice  [x]         Spiritually oriented    []         Claims no spiritual orientation    []         seeking spiritual identity  []         Feels alienated from Yazdanism practice/tradition  []         Feels angry about Yazdanism practice/tradition  []         Spirituality/Yazdanism tradition is a resource for coping at this time.   []         Not able to assess due to medical condition    Services Provided Today:  []         crisis intervention    []         reading Scriptures  [x]         spiritual assessment    [x]         prayer  []         empathic listening/emotional support  []         rites and rituals (cite in comments)  []         life review     [x]         Yazdanism support  []         theological development   []         advocacy  []         ethical dialog     []         blessing  []         bereavement support    [x]         support to family  []         anticipatory grief support   []         help with AMD  []         spiritual guidance    []         meditation      Spiritual Care Needs  [x]         Emotional Support  [x]         Spiritual/Taoism Care  [x]         Loss/Adjustment  []         Advocacy/Referral                /Ethics  []         No needs expressed at               this time  []         Other: (note in               comments)  Spiritual Care Plan  [x]         Follow up visits with               pt/family  []         Provide materials  []         Schedule sacraments  []         Contact Community               Clergy  []         Follow up as needed  []         Other: (note in               comments)     Comments:   Initial visit with patient and her family at request of staff. I met with pt's daughter-in-law and her only son Guille Mayo in the hallway, then we went to visit with Ms. Harley Dean. Family shared of patient's struggles. I affirmed pt's emotions and Ms. Harley Dean shared how she is tired of just not feeling well. She was not expecting this lengthy hospital stay and desires to return home. I offered presence and support. Ms. Harley Dean also shared that she lost two friends this week and that has been hard for her to handle. We spoke of growing older and the difficulties we face as our bodies break down - we prayed together. Spiritual care will continue to follow and provide support to Ms. Harley Dean as able. Ms. Harley Dean is hoping to be discharged and go rehab. Please page as needed/desired, as spiritual support appears to be a source of comfort to patient. [phone]. Visit by: Zeynep Montemayor. Renato Koroma.  Gavin Goetz MA, [address]

## 2017-01-18 NOTE — PROGRESS NOTES
Initial Nutrition Assessment:    INTERVENTIONS/RECOMMENDATIONS:   · Continue current diet, modify texture to dental soft  · Glucerna TID, Jello once per day. · acounts for 34% of 2g Na, however not eating much of meals, will adjust if meal consumption increases  · Encourage PO intake    ASSESSMENT:   Ms. Vickie Silva, 81 yo female present with orthostatic hypotension, PNA, CHF, renal cell carcinoma and PMH shown below. Pt reports of a poor appetite PTA and currently with a 22% weight loss in the past 3 months. We discussed the importance of nutrition for energy and she agreed to receive glucerna and gelatein. Notes trouble chewing, will modify texture to dental soft. Past Medical History   Diagnosis Date    Autoimmune disease ([facility] [address]      rheumatoid     CAD (coronary artery disease)     GERD (gastroesophageal reflux disease)     Hypertension     Ill-defined condition      anemia    Other ill-defined conditions(799.89)      high cholesterol    Renal cell carcinoma of right kidney ([facility])      s/p resection 12/16     Diet Order: Cardiac, Other (comment) (2g Na)  % Eaten:  No data found. Pertinent Medications: [x]Reviewed []Other (colace, lasix, zofran)  Pertinent Labs: [x]Reviewed []Other  Food Allergies: [x]None []Other   Last BM: 1/18   [x]Active     []Hyperactive  []Hypoactive       [] Absent BS  Skin:    [x] Intact   [] Incision  [] Breakdown  [] Other:    Anthropometrics:   Height:   Weight: 66.9 kg (147 lb 8 oz)   IBW (%IBW):   ( ) UBW (%UBW):   (  %)   Last Weight Metrics:  Weight Loss Metrics 1/17/2017 12/22/2016 12/14/2016 10/21/2016 10/15/2016 10/3/2016 4/11/2015   Today's Wt 147 lb 8 oz 150 lb 5.7 oz 154 lb 8.7 oz 185 lb 186 lb 184 lb 11.9 oz 197 lb   BMI 23.1 kg/m2 23.55 kg/m2 24.2 kg/m2 28.98 kg/m2 29.13 kg/m2 28.94 kg/m2 30.85 kg/m2       BMI: Body mass index is 23.1 kg/(m^2).     This BMI is indicative of:   []Underweight    [x]Normal    []Overweight    [] Obesity   [] Extreme Obesity (BMI>40) Estimated Nutrition Needs (Based on):   1600 Kcals/day (MSJ: 1225 x 1.3) , 70 g (1 g/kg) Protein  Carbohydrate: At Least 130 g/day  Fluids: 1600 mL/day (1ml/kcal)    Pt expected to meet estimated nutrient needs: []Yes [x]No    NUTRITION DIAGNOSES:   Problem:  Inadequate protein-energy intake      Etiology: related to decreased appetite     Signs/Symptoms: as evidenced by 22% wt loss in 3 months      NUTRITION INTERVENTIONS:  Meals/Snacks: General/healthful diet   Supplements: Commercial supplement              GOAL:   consume >50% of meals and ONS in 2-4 days    LEARNING NEEDS (Diet, Food/Nutrient-Drug Interaction):    [x] None Identified   [] Identified and Education Provided/Documented   [] Identified and Pt declined/was not appropriate     Cultureal, Jehovah's witness, OR Ethnic Dietary Needs:    [x] None Identified   [] Identified and Addressed     [x] Interdisciplinary Care Plan Reviewed/Documented    [x] Discharge Planning:  TBD     MONITORING /EVALUATION:      Food/Nutrient Intake Outcomes:  Total energy intake, Liquid meal replacement  Physical Signs/Symptoms Outcomes: Weight/weight change, Electrolyte and renal profile, Glucose profile, GI    NUTRITION RISK:    [x] High              [] Moderate           []  Low  []  Minimal/Uncompromised    PT SEEN FOR:    [x]  MD Consult: []Calorie Count      []Diabetic Diet Education        []Diet Education     []Electrolyte Management     [x]General Nutrition Management and Supplements     []Management of Tube Feeding     []TPN Recommendations    []  RN Referral:  []MST score >=2     []Enteral/Parenteral Nutrition PTA     []Pregnant: Gestational DM or Multigestation     []Pressure Ulcer/Wound Care needs        []  Low BMI  []  DTR Referral       Jeferson Tejeda RDN  Pager [phone]  Weekend Pager [phone]

## 2017-01-18 NOTE — PROGRESS NOTES
Patient up to bathroom for bowel movement, became diaphoretic and complained of lightheadedness. BP 87/58. Patient returned to bed. Blood pressure rechecked, systolic BP returned to 703 over several minutes. RN in room during episode. Interventions started by RN.

## 2017-01-18 NOTE — PROGRESS NOTES
[address] SHIFT NURSING NOTE    Bedside and Verbal shift change report given to  (oncoming nurse) by Brandie Putnam (offgoing nurse). Report included the following information SBAR, Procedure Summary, Intake/Output, MAR and Recent Results. SHIFT SUMMARY:         Admission Date 1/9/2017   Admission Diagnosis SVT (supraventricular tachycardia)   Consults IP CONSULT TO CARDIOLOGY  IP CONSULT TO CARDIOLOGY        Consults   []PT   []OT   []Speech   []Case Management      [] Palliative       Cardiac Monitoring Order   []Yes   []No     GI Prophylaxis   []Yes   []No           DVT Prophylaxis   SCDs:             Santiago stockings:         [] Medication   []Contraindicated   []None        Activity Level Activity Level: Up with Assistance     Activity Assistance: Partial (two people)   Purposeful Rounding every 1-2 hour? []Yes    Zuri Score  Total Score: 3   Bed Alarm (If score 3 or >)   []Yes   [] Refused (See signed refusal form in chart)   Evan Score  Evan Score: 17   Evan Score (if score 14 or less)   []PMT consult   []Wound Care consult      []Specialty bed   [] Nutrition consult          Needs prior to discharge:   Home O2 required:    []Yes   []No    If yes, how much O2 required?     Other:    Last Bowel Movement: Last Bowel Movement Date: 01/17/17        POST-OP SURGICAL VATS   []Yes   []N/A   Incentive Spirometer:   []Yes   []Refused   Coughing and deep breathing:     []Yes   []Refused   Oral care:     []Yes   []Refused   Understanding (patient education):     []Yes   []Refused   Getting out of bed Number times ambulated in hallway past shift:    Number of times OOB to chair past shift:     Head of bed elevation:     []Yes   []Refused      Influenza Vaccine Received Flu Vaccine for Current Season (usually Sept-March): Yes        Pneumonia Vaccine           Diet Active Orders   Diet    DIET CARDIAC Regular; 2 GM NA (House Low NA)      LDAs               Peripheral IV 01/12/17 Left Forearm (Active)   Site Assessment Clean, dry, & intact 1/18/2017  4:32 AM   Phlebitis Assessment 0 1/18/2017  4:32 AM   Infiltration Assessment 0 1/18/2017  4:32 AM   Dressing Status Clean, dry, & intact 1/18/2017  4:32 AM   Dressing Type Transparent;Tape 1/18/2017  4:32 AM   Hub Color/Line Status Blue;Capped 1/18/2017  4:32 AM       Peripheral IV 01/14/17 Right Arm (Active)   Site Assessment Clean, dry, & intact 1/18/2017  4:32 AM   Phlebitis Assessment 0 1/18/2017  4:32 AM   Infiltration Assessment 0 1/18/2017  4:32 AM   Dressing Status Clean, dry, & intact 1/18/2017  4:32 AM   Dressing Type Transparent;Tape 1/18/2017  4:32 AM   Hub Color/Line Status Pink; Infusing 1/18/2017  4:32 AM                      Urinary Catheter      Intake & Output   Date 01/17/17 0700 - 01/18/17 0659 01/18/17 0700 - 01/19/17 0659   Shift [id number] 24 Hour Total [id number] 7773-1435 24 Hour Total   I  N  T  A  K  E   P.O.  120 120         P. O.  120 120       Shift Total  (mL/kg)  120  (1.8) 120  (1.8)      O  U  T  P  U  T   Shift Total  (mL/kg)         NET  120 120      Weight (kg) 66.9 66.9 66.9 66.9 66.9 66.9         Readmission Risk Assessment Tool Score Low Risk            12       Total Score        3 Patient Length of Stay > 5    4 More than 1 Admission in calendar year    5 Patient Insurance is Medicare, Medicaid or Self Pay        Criteria that do not apply:    Relationship with PCP    Patient Living Status    Charlson Comorbidity Score       Expected Length of Stay 5d 16h   Actual Length of Stay 9

## 2017-01-18 NOTE — PROGRESS NOTES
Physical Therapy  Chart reviewed. Pt continues with orthostatic hypotension as demonstrated this am by short walk to bathroom with ns staff (orthostatics taken initially were WNL). Pt receiving a bolus now. Will follow up later today as able.

## 2017-01-18 NOTE — PROGRESS NOTES
Cardiopulmonary Care Interdisciplinary rounds were held today to discuss patient plan of care and outcomes. The following members were present: PT, NP/Physician, Pharmacy, Nursing, Nutritionist and Case Management.       Plan of Care: Continue current treatment plan  reconsult cardio re: ortho bp; ? D/c 1/18 or 1/19

## 2017-01-18 NOTE — PROGRESS NOTES
Occupational Therapy  Medical record reviewed. Pt has been orthostatic this a.m. And is to receive a bolus of fluid. Will defer and continue to follow. Suggest that pt sit upright in bed and up to the chair as BP tolerates.

## 2017-01-19 LAB
ANION GAP BLD CALC-SCNC: 11 MMOL/L (ref 5–15)
BUN SERPL-MCNC: 18 MG/DL (ref 6–20)
BUN/CREAT SERPL: 16 (ref 12–20)
CALCIUM SERPL-MCNC: 8.1 MG/DL (ref 8.5–10.1)
CHLORIDE SERPL-SCNC: 104 MMOL/L (ref 97–108)
CO2 SERPL-SCNC: 24 MMOL/L (ref 21–32)
CREAT SERPL-MCNC: 1.1 MG/DL (ref 0.55–1.02)
ERYTHROCYTE [DISTWIDTH] IN BLOOD BY AUTOMATED COUNT: 19.2 % (ref 11.5–14.5)
GLUCOSE BLD STRIP.AUTO-MCNC: 117 MG/DL (ref 65–100)
GLUCOSE BLD STRIP.AUTO-MCNC: 118 MG/DL (ref 65–100)
GLUCOSE SERPL-MCNC: 89 MG/DL (ref 65–100)
HCT VFR BLD AUTO: 23.8 % (ref 35–47)
HGB BLD-MCNC: 7.8 G/DL (ref 11.5–16)
MCH RBC QN AUTO: 23.4 PG (ref 26–34)
MCHC RBC AUTO-ENTMCNC: 32.8 G/DL (ref 30–36.5)
MCV RBC AUTO: 71.5 FL (ref 80–99)
PLATELET # BLD AUTO: 327 K/UL (ref 150–400)
POTASSIUM SERPL-SCNC: 3.9 MMOL/L (ref 3.5–5.1)
RBC # BLD AUTO: 3.33 M/UL (ref 3.8–5.2)
SERVICE CMNT-IMP: ABNORMAL
SERVICE CMNT-IMP: ABNORMAL
SODIUM SERPL-SCNC: 139 MMOL/L (ref 136–145)
WBC # BLD AUTO: 7.8 K/UL (ref 3.6–11)

## 2017-01-19 PROCEDURE — 74011250637 HC RX REV CODE- 250/637: Performed by: INTERNAL MEDICINE

## 2017-01-19 PROCEDURE — 97535 SELF CARE MNGMENT TRAINING: CPT | Performed by: OCCUPATIONAL THERAPIST

## 2017-01-19 PROCEDURE — 97530 THERAPEUTIC ACTIVITIES: CPT | Performed by: OCCUPATIONAL THERAPIST

## 2017-01-19 PROCEDURE — 65660000000 HC RM CCU STEPDOWN

## 2017-01-19 PROCEDURE — 36415 COLL VENOUS BLD VENIPUNCTURE: CPT | Performed by: INTERNAL MEDICINE

## 2017-01-19 PROCEDURE — 97530 THERAPEUTIC ACTIVITIES: CPT

## 2017-01-19 PROCEDURE — 80048 BASIC METABOLIC PNL TOTAL CA: CPT | Performed by: INTERNAL MEDICINE

## 2017-01-19 PROCEDURE — 82962 GLUCOSE BLOOD TEST: CPT

## 2017-01-19 PROCEDURE — 74011250637 HC RX REV CODE- 250/637: Performed by: NURSE PRACTITIONER

## 2017-01-19 PROCEDURE — 85027 COMPLETE CBC AUTOMATED: CPT | Performed by: INTERNAL MEDICINE

## 2017-01-19 RX ORDER — MIDODRINE HYDROCHLORIDE 5 MG/1
5 TABLET ORAL
Status: DISCONTINUED | OUTPATIENT
Start: 2017-01-19 | End: 2017-01-20

## 2017-01-19 RX ADMIN — GABAPENTIN 300 MG: 300 CAPSULE ORAL at 09:19

## 2017-01-19 RX ADMIN — AMIODARONE HYDROCHLORIDE 200 MG: 200 TABLET ORAL at 09:19

## 2017-01-19 RX ADMIN — Medication 10 ML: at 21:08

## 2017-01-19 RX ADMIN — Medication 10 ML: at 03:06

## 2017-01-19 RX ADMIN — MIDODRINE HYDROCHLORIDE 5 MG: 5 TABLET ORAL at 17:57

## 2017-01-19 RX ADMIN — ASPIRIN 81 MG CHEWABLE TABLET 81 MG: 81 TABLET CHEWABLE at 09:20

## 2017-01-19 RX ADMIN — DULOXETINE HYDROCHLORIDE 60 MG: 30 CAPSULE, DELAYED RELEASE ORAL at 09:19

## 2017-01-19 RX ADMIN — GABAPENTIN 300 MG: 300 CAPSULE ORAL at 17:57

## 2017-01-19 RX ADMIN — DOCUSATE SODIUM 100 MG: 100 CAPSULE, LIQUID FILLED ORAL at 17:57

## 2017-01-19 RX ADMIN — AMIODARONE HYDROCHLORIDE 200 MG: 200 TABLET ORAL at 17:57

## 2017-01-19 RX ADMIN — METOPROLOL SUCCINATE 25 MG: 25 TABLET, EXTENDED RELEASE ORAL at 09:00

## 2017-01-19 RX ADMIN — FUROSEMIDE 20 MG: 20 TABLET ORAL at 09:19

## 2017-01-19 RX ADMIN — Medication 10 ML: at 13:50

## 2017-01-19 RX ADMIN — MIDODRINE HYDROCHLORIDE 2.5 MG: 5 TABLET ORAL at 09:19

## 2017-01-19 RX ADMIN — MIDODRINE HYDROCHLORIDE 5 MG: 5 TABLET ORAL at 13:50

## 2017-01-19 RX ADMIN — GABAPENTIN 300 MG: 300 CAPSULE ORAL at 21:08

## 2017-01-19 NOTE — PROGRESS NOTES
Cardiopulmonary Care Interdisciplinary rounds were held today to discuss patient plan of care and outcomes. The following members were present: PT, NP/Physician, Pharmacy, Nursing, Nutritionist and Case Management.       Plan of Care: Continue current treatment plan  Continue f/u with cardio

## 2017-01-19 NOTE — PROGRESS NOTES
Hospitalist Progress Note    NAME: Raciel Amado   :  1945   MRN:  [id number]       Interim Hospital Summary: 80 y.o. female whom presented on 2017 with chest pain     Assessment / Plan:      Orthostatic Hypotension  Vasovagal reaction / pre syncope  -remains orthostatic this AM (down to 80s). After BM, went down further (40s)  -will stop Cymbalta (associated with orthostatic hypotension)  -increase midodrine 5 TID  -continue to follow orthostatic BP and PT OT as tolerated. Hold discharge to SNF    Paroxsymal SVT / SSS POA  H/o CAD s/p MI, s/p remote PTCA, stenting of LAD  Ischemic cardiomyopathy with chronic systolic HF  -ECHO EF 41% no AS no MR  -s/p dual chamber ICD for primary prevention of SCD 17  -continue asa, amiodarone and toprol  -appreciate cardiology input / follow up. Anti coagulation not felt necessary as patient has not demonstrated prolonged episodes of afib. Follow up with Cardiology as OP    Anemia, microcytic   -check Iron studies in AM with retic. Transfusion may help BP    Sepsis due to PNA POA   -completed abx treatment with LVQ / Ceftin    LOYDA  S/P Recent Right nephrectomy for RCC   -Cr trending down to normal      Surrogate Decision Maker: [facility] Kai [phone] (mailbox is full)  Code status: Full  Prophylaxis: lovenox  Recommended Disposition: SNF/LTC  In 24 hrs pending authorization       Subjective:     Chief Complaint / Reason for Physician Visit: orthostatic Hypotension, CHF, Afib RVR  Patient felt dizzy this morning after a BM with BP dropping to the 80s.       Review of Systems:  Symptom Y/N Comments  Symptom Y/N Comments   Fever/Chills n   Chest Pain n    Poor Appetite n   Edema n    Cough n   Abdominal Pain     Sputum n   Joint Pain     SOB/ROCK y improved  Pruritis/Rash     Nausea/vomit n   Tolerating PT/OT y    Diarrhea    Tolerating Diet y    Constipation    Other       Could NOT obtain due to:      Objective:     VITALS: Last 24hrs VS reviewed since prior progress note. Most recent are:  Patient Vitals for the past 24 hrs:   Temp Pulse Resp BP SpO2   01/19/17 1042 - - - 100/66 -   01/19/17 1007 - - - 117/75 -   01/19/17 0925 - - - 141/84 -   01/19/17 0808 97.5 °F (36.4 °C) 77 16 121/77 100 %   01/19/17 0258 98 °F (36.7 °C) 70 16 124/64 98 %   01/18/17 2312 97.4 °F (36.3 °C) 70 18 113/80 98 %   01/18/17 2008 97.9 °F (36.6 °C) 70 18 105/73 99 %   01/18/17 1356 98.3 °F (36.8 °C) 70 - 118/87 100 %   01/18/17 1300 - - - 127/82 -       Intake/Output Summary (Last 24 hours) at 01/19/17 1134  Last data filed at 01/19/17 0934   Gross per 24 hour   Intake              360 ml   Output                0 ml   Net              360 ml        PHYSICAL EXAM:  General: WD, WN. Alert, cooperative, no acute distress    EENT:  EOMI. Anicteric sclerae. MMM  Resp:  Bilateral basal crackles improved +  No accessory muscle use  CV:  Regular  rhythm,  No edema  GI:  Soft, Non distended, Non tender.  +Bowel sounds  Neurologic:  Alert and oriented X 3, normal speech,   Psych:   Good insight. Not anxious nor agitated  Skin:  No rashes. No jaundice    Reviewed most current lab test results and cultures  YES  Reviewed most current radiology test results   YES  Review and summation of old records today    NO  Reviewed patient's current orders and MAR    YES  PMH/SH reviewed - no change compared to H&P  ________________________________________________________________________  Care Plan discussed with:    Comments   Patient x    Family  x Spoke with son and DIL   RN x    Care Manager x    Consultant                        Multidiciplinary team rounds were held today with , nursing, pharmacist and clinical coordinator. Patient's plan of care was discussed; medications were reviewed and discharge planning was addressed.      ________________________________________________________________________  Total NON critical care TIME:  35   Minutes    Total CRITICAL CARE TIME Spent:   Minutes non procedure based      Comments   >50% of visit spent in counseling and coordination of care     ________________________________________________________________________  Cristiana Guerrero MD     Procedures: see electronic medical records for all procedures/Xrays and details which were not copied into this note but were reviewed prior to creation of Plan. LABS:  I reviewed today's most current labs and imaging studies.   Pertinent labs include:  Recent Labs      01/19/17   0308   WBC  7.8   HGB  7.8*   HCT  23.8*   PLT  327     Recent Labs      01/19/17   0308  01/18/17   0407  01/17/17   0341   NA  139  138  136   K  3.9  3.9  4.0   CL  104  102  101   CO2  24  24  22   GLU  89  97  89   BUN  18  19  22*   CREA  1.10*  1.24*  1.42*   CA  8.1*  8.1*  8.2*       Signed: Cristiana Guerrero MD

## 2017-01-19 NOTE — PROGRESS NOTES
10:40 AM Notified by PT of patients blood pressure dropping while on bedside commode. BP 43/21. Patient feeling dizzy. Assisted patient back to bed to lay down. Patients blood pressure now 100/66. Patient states she is feeling better. MD notified. Will  Continue to monitor. 3:00 PM Report given to Subha Huang RN.

## 2017-01-19 NOTE — PROGRESS NOTES
Cardiology Progress Note      1/19/2017 1:28 PM    Admit Date: 1/9/2017          Subjective: Still orthostatic. Midodrine increased. Otherwise feels ok. Visit Vitals    /76 (BP 1 Location: Left arm, BP Patient Position: At rest)    Pulse 71    Temp 97.6 °F (36.4 °C)    Resp 18    Wt 66.9 kg (147 lb 8 oz)    SpO2 100%    BMI 23.1 kg/m2     01/17 1901 - 01/19 0700  In: 240 [P.O.:240]  Out: 150 [Urine:150]        Objective:      Physical Exam:  VS as above  Chest CTA ant  Card RRR 2/6 DORIAN no gallop     Data Review:   Labs:    BUN 18  Creat 1.1  Hgb 7.8     Telemetry: Dual chamber pacing      Assessment:     1. Chest pain, atypical for myocardial ischemia. No evidence of ischemic insult. Resolved   2. Dizziness, orthostatic hypotension. 3. Intermittent junctional/ectopic atrial rhythm. With the orthostasis and rate-limiting medications on board, probably should have pacing support. 4. Paroxysmal SVT, probably AV node reentry per strips. No recent recurrence. 5. Ischemic cardiomyopathy with prior anterior wall myocardial infarction and remote PTCA and stenting of the left anterior descending. EF 25% by echo here. 6. Hypertensive heart disease with chronic systolic CHF class 3 and CKD stage 3 at baseline. 7. Type 2 diabetes mellitus. 8. Dyslipidemia. 9. Recent right nephrectomy for renal cell cancer in 12/2016. 10. Acute on chronic renal failure, improved. 11. ? Left-sided pneumonia (retrocardiac abnormality on CXR). 12. Anemia, unspecified. 13. S/p pacer/ ICD Monday     Plan:  Cont current Rx. No additional recc at this time.

## 2017-01-19 NOTE — PROGRESS NOTES
Problem: Mobility Impaired (Adult and Pediatric)  Goal: *Acute Goals and Plan of Care (Insert Text)  Physical Therapy Goals  Revised 1/17/2017  1. Patient will move from supine to sit and sit to supine , scoot up and down and roll side to side in bed with supervision/set-up within 7 day(s). 2. Patient will transfer from bed to chair and chair to bed with supervision/set-up using the least restrictive device within 7 day(s). 3. Patient will perform sit to stand with supervision/set-up within 7 day(s). 4. Patient will ambulate with minimal assistance/contact guard assist for 50 feet with the least restrictive device within 7 day(s). 5. Patient will demonstrate good recall and compliance with pacemaker precautions during activity within 7 days. Physical Therapy Goals  Initiated 1/11/2017  1. Patient will move from supine to sit and sit to supine , scoot up and down and roll side to side in bed with supervision/set-up within 7 day(s). 2. Patient will transfer from bed to chair and chair to bed with minimal assistance/contact guard assist using the least restrictive device within 7 day(s). 3. Patient will perform sit to stand with minimal assistance/contact guard assist within 7 day(s). 4. Patient will ambulate with minimal assistance/contact guard assist for 75 feet with the least restrictive device within 7 day(s). 5. Patient will perform 2 sets of 10 repetitions of active strengthening exercises for bilateral lower extremity(s) with supervision/set-up within 7 day(s). PHYSICAL THERAPY TREATMENT  PT SEEN [id number] AM     Patient: Holger Fulton (11 y.o. female)  Date: 1/19/2017  Diagnosis: SVT (supraventricular tachycardia) <principal problem not specified>       Precautions:    Chart, physical therapy assessment, plan of care and goals were reviewed. ASSESSMENT:  Pt very limited in all mobility due to significant orthostatic hypotension.  Pt performs mobility with min assist and stood with RW but unable to complete standing BP due to dizziness. Pt insisting on walking into the bathroom then agreed to [facility] as she needed to have a bowel movement. Pt became increasingly dizzy and decreased responsiveness as BP was 43/27 while on BSC. Pt required total assist x 2-3 to transfer back to bed. RN attending to pt and MD paged. Will continue to follow. Progression toward goals:  [ ]      Improving appropriately and progressing toward goals  [X]      Improving slowly and progressing toward goals  [ ]      Not making progress toward goals and plan of care will be adjusted       PLAN:  Patient continues to benefit from skilled intervention to address the above impairments. Continue treatment per established plan of care. Discharge Recommendations:  Skilled Nursing Facility  Further Equipment Recommendations for Discharge:  rolling walker       SUBJECTIVE:   Patient stated I want to go to the bathroom.       OBJECTIVE DATA SUMMARY:   Critical Behavior:  Neurologic State: Alert, Appropriate for age (oriented)  Orientation Level: Oriented X4  Cognition: Follows commands  Safety/Judgement: Awareness of environment, Fall prevention  Functional Mobility Training:  Bed Mobility:     Supine to Sit: Contact guard assistance  Sit to Supine: Total assistance;Assist x2 (due to drop in BP)  Scooting: Supervision (at EOB)                    Transfers:  Sit to Stand: Minimum assistance;Assist x2  Stand to Sit: Minimum assistance        Bed to Chair: Minimum assistance; Total assistance;Assist x2 (min assist bed>BSC; BSC>bed total A due to drop in BP)                    Balance:  Sitting: Intact  Standing: Impaired  Standing - Static: Good  Standing - Dynamic : Fair  Ambulation/Gait Training:  Distance (ft): 3 Feet (ft)  Assistive Device: Gait belt;Walker, rolling  Ambulation - Level of Assistance: Minimal assistance;Assist x2  Gait Abnormalities: Decreased step clearance  Base of Support: Narrowed     Speed/Maria Luisa: Pace decreased (<100 feet/min)  Step Length: Left shortened;Right shortened                 Stairs: Therapeutic Exercises:      Pain:  Pain Scale 1: Numeric (0 - 10)  Pain Intensity 1: 0              Activity Tolerance:   Pt with symptomatic orthostatic hypotension. See flow sheet for numerous BP's taken     Please refer to the flowsheet for vital signs taken during this treatment.   After treatment:   [ ] Patient left in no apparent distress sitting up in chair  [X] Patient left in no apparent distress in bed  [X] Call bell left within reach  [X] Nursing notified  [ ] Caregiver present  [ ] Bed alarm activated      COMMUNICATION/COLLABORATION:   The patients plan of care was discussed with: Occupational Therapist and Registered Nurse     Pam Beal PTA   Time Calculation: 41 mins

## 2017-01-19 NOTE — PROGRESS NOTES
Problem: Self Care Deficits Care Plan (Adult)  Goal: *Acute Goals and Plan of Care (Insert Text)  Occupational Therapy Goals  All goals reviewed on 2019--all goals remain appropriate to continue for another week  Initiated 2017  1. Patient will perform grooming in standing with supervision/set-up within 7 day(s). 2017 Not Achieved. Continue goal  2. Patient will perform upper body adls with supervision/set-up within 7 day(s). 2017 Not Achieved. Continue goal  3. Patient will perform standing adls with supervision/set-up within 7 day(s). 2017 Not Achieved. Continue goal  4. Patient will walk into the bathroom to perform toilet transfers with supervision/set-up within 7 day(s). 2017 Not Achieved. Continue goal  5. Patient will perform all aspects of toileting with supervision/set-up within 7 day(s). 2017 Not Achieved. Continue goal   OCCUPATIONAL THERAPY TREATMENT: WEEKLY REASSESSMENT  Patient: Pepito Stein (80 y.o. female)  Date: 2017  Diagnosis: SVT (supraventricular tachycardia) <principal problem not specified>       Precautions:  fall, orthostatic hypotension      ASSESSMENT:  All goals were reviewed and appropriate to continue for another week. Therapy tx sessions have been limited by significant symptomatic orthostatic hypotension which impairs pt's ability to participate in self care and mobiltiy activities. Today pt was orthostatic/dizzy and nauseated in standing, but unable to tolerate standing long enough for the dynamap to read due to her severe symtoms and BP was completed in sittin/37. Pt sat EOB for several minutes, pumping ankles and fists and pt eventually julius to 104/64 and BP was felt to be fairly stable in sitting. Nursing was consulted re: decision to allow pt to  sit up in the chair with BP monitoring and that was okayed.   Pt then requested to ambulate to the bathroom and was convinced to use the BSC (due to BP instability, BSC was a safer option). Pt was seated on the BSC, had her BM and became even more symptomatic and BP had decreased to 43/27. Pt was emergently returned to bed and began feeling better in supine. Nursing assisting and present during episode, monitoring pt at end of tx session. Pt's progress in therapy is significantly impaired due to her instability in positional BPs. Pt is hoping to  return to complete her rehab program at discharge. Pt has demonstrated orthostatic/BP instability with symptoms every tx session. Progression toward goals:  [ ]            Improving appropriately and progressing toward goals  [ ]            Improving slowly and progressing toward goals  [X]            Not making progress toward goals due to BP instability       PLAN:  Goals have been updated based on progression since last assessment. Patient continues to benefit from skilled intervention to address the above impairments. Continue to follow patient 4 times a week to address goals. Planned Interventions:  [X]                    Self Care Training                  [X]             Therapeutic Activities  [X]                    Functional Mobility Training    [X]             Cognitive Retraining  [X]                    Therapeutic Exercises           [X]             Endurance Activities  [X]                    Balance Training                   [ ]             Neuromuscular Re-Education  [ ]                    Visual/Perceptual Training     [ ]        Home Safety Training  [X]                    Patient Education                 [X]             Family Training/Education  [ ]                    Other (comment):  Discharge Recommendations: Rehab  Further Equipment Recommendations for Discharge: tbd       SUBJECTIVE:   Patient stated I don't feel that well.    (pt reported taking medications with her nutritional supplement and reported in supine.)      OBJECTIVE DATA SUMMARY:   Cognitive/Behavioral Status:  Neurologic State: Alert; Appropriate for age (oriented)  Orientation Level: Oriented X4  Cognition: Follows commands              Functional Mobility and Transfers for ADLs:  Bed Mobility:  Supine to Sit: Contact guard assistance  Sit to Supine: Total assistance;Assist x2 (due to drop in BP)  Scooting: Supervision (at EOB)     Transfers:  Sit to Stand: Minimum assistance;Assist x2  Stand to Sit: Minimum assistance  Bed to Chair: Minimum assistance; Total assistance;Assist x2 (min assist bed>BSC; BSC>bed total A due to drop in BP)  Toilet Transfer : Moderate assistance (to BSC -  Where BP dropped signficantly)     Balance:  Sitting: Intact  Standing: Impaired  Standing - Static: Good  Standing - Dynamic : Fair     ADL Intervention:  Feeding  Feeding Assistance: Supervision/set-up (limited intake)                                   Toileting  Toileting Assistance: Total assistance(dependent)  Bowel Hygiene: Total assistance (dependent) (due to symtomatic orthostatic BP on BSC)  Clothing Management: Total assistance (dependent)  Cues: Tactile cues provided;Verbal cues provided  Adaptive Equipment: Bethanie Jackson; Other (comment) (bedside commode)                    Therapeutic Exercises:   Encouraged Lower body exercises seated and pumping fists during low BPs  Pain:  Pain Scale 1: Numeric (0 - 10)  Pain Intensity 1: 0              Activity Tolerance:   Poor unable to participate in functional activities due to orthostatic BPs  Please refer to the flowsheet for vital signs taken during this treatment.   After treatment:   [ ] Patient left in no apparent distress sitting up in chair  [X] Patient left in no apparent distress in bed  [X] Call bell left within reach  [X] Nursing notified and present to assist  [ ] Caregiver present  [ ] Bed alarm activated      COMMUNICATION/COLLABORATION:   The patients plan of care was discussed with: Physical Therapy Assistant, Registered Nurse and Physician     PADMINI Haas/L  Time Calculation: 42 mins

## 2017-01-19 NOTE — PROGRESS NOTES
[facility] SHIFT NURSING NOTE    Bedside and Verbal shift change report given to  (oncoming nurse) by Amisha Jones (offgoing nurse). Report included the following information SBAR, Procedure Summary, Intake/Output, MAR and Recent Results. SHIFT SUMMARY:           Admission Date 1/9/2017   Admission Diagnosis SVT (supraventricular tachycardia)   Consults IP CONSULT TO CARDIOLOGY  IP CONSULT TO CARDIOLOGY        Consults   [x]PT   [x]OT   []Speech   []Case Management      [] Palliative       Cardiac Monitoring Order   [x]Yes   []No     GI Prophylaxis   []Yes   []No           DVT Prophylaxis   SCDs:             Santiago stockings:         [x] Medication   []Contraindicated   []None        Activity Level Activity Level: Up with Assistance     Activity Assistance: Partial (two people)   Purposeful Rounding every 1-2 hour? [x]Yes    Zuri Score  Total Score: 3   Bed Alarm (If score 3 or >)   [x]Yes   [] Refused (See signed refusal form in chart)   Evan Score  Evan Score: 17   Evan Score (if score 14 or less)   []PMT consult   []Wound Care consult      []Specialty bed   [] Nutrition consult          Needs prior to discharge:   Home O2 required:    []Yes   [x]No    If yes, how much O2 required?     Other:    Last Bowel Movement: Last Bowel Movement Date: 01/18/17        POST-OP SURGICAL VATS   []Yes   [x]N/A   Incentive Spirometer:   []Yes   []Refused   Coughing and deep breathing:     []Yes   []Refused   Oral care:     []Yes   []Refused   Understanding (patient education):     []Yes   []Refused   Getting out of bed Number times ambulated in hallway past shift:    Number of times OOB to chair past shift:     Head of bed elevation:     []Yes   []Refused      Influenza Vaccine Received Flu Vaccine for Current Season (usually Sept-March): Yes        Pneumonia Vaccine           Diet Active Orders   Diet    DIET CARDIAC Soft Solids; 2 GM NA (House Low NA)      LDAs               Peripheral IV 01/12/17 Left Forearm (Active) Site Assessment Clean, dry, & intact 1/19/2017  3:47 AM   Phlebitis Assessment 0 1/19/2017  3:47 AM   Infiltration Assessment 0 1/19/2017  3:47 AM   Dressing Status Clean, dry, & intact 1/19/2017  3:47 AM   Dressing Type Transparent;Tape 1/19/2017  3:47 AM   Hub Color/Line Status Blue;Capped 1/19/2017  3:47 AM       Peripheral IV 01/14/17 Right Arm (Active)   Site Assessment Clean, dry, & intact 1/19/2017  3:47 AM   Phlebitis Assessment 0 1/19/2017  3:47 AM   Infiltration Assessment 0 1/19/2017  3:47 AM   Dressing Status Clean, dry, & intact 1/19/2017  3:47 AM   Dressing Type Transparent;Tape 1/19/2017  3:47 AM   Hub Color/Line Status Pink;Capped 1/19/2017  3:47 AM                      Urinary Catheter      Intake & Output   Date 01/18/17 0700 - 01/19/17 0659 01/19/17 0700 - 01/20/17 0659   Shift 8869-2409 8768-5301 24 Hour Total 2816-2772 6879-6322 24 Hour Total   I  N  T  A  K  E   P.O.  120 120         P. O.  120 120       Shift Total  (mL/kg)  120  (1.8) 120  (1.8)      O  U  T  P  U  T   Urine  (mL/kg/hr) 150  (0.2)  150         Urine Voided 150  150       Shift Total  (mL/kg) 150  (2.2)  150  (2.2)      NET -150 120 -30      Weight (kg) 66.9 66.9 66.9 66.9 66.9 66.9         Readmission Risk Assessment Tool Score Low Risk            12       Total Score        3 Patient Length of Stay > 5    4 More than 1 Admission in calendar year    5 Patient Insurance is Medicare, Medicaid or Self Pay        Criteria that do not apply:    Relationship with PCP    Patient Living Status    Charlson Comorbidity Score       Expected Length of Stay 5d 16h   Actual Length of Stay 10

## 2017-01-19 NOTE — CARDIO/PULMONARY
C/P Rehab Note:      Chart Reviewed. Pt admitted with SVT. Per Cardiology note: SVT, probably AV node reentry. ? afib as well. Ischemic cardiomyopathy with prior anterior wall myocardial infarction and remote PTCA and stenting of the left anterior descending. EF 25% by echo.      PMH significant for:  1. HTN  2. DM  3. Hyperlipiedmia  4. Acute on chronic renal failure    Met with pt for CHF follow up teaching. This was a follow-up visit to answer questions and reinforce prior teaching re: CHF, S&Ss, medication management, Low NA diet, daily weights, when to call the doctor and balancing rest/activity. Pt awake and pleasant but when asked about CHF/fluid overload teaching-she had a blank stare and does not recall any teaching-was not able to tell me what weight gain to call doctor for,was not able to tell me what fluid pill she was on. States she normally lives with her son but will be going [facility] from here. Reminded to let staff be aware if any of the food served there is too salty and ask for something with lower sodium. Pt engaged very little in teaching and may only have a basic understanding of information. Will need reinforcement. Verbally reviewed post ICD insertion instructions, with emphasis on limitations, temporary restrictions, signs/symptoms of infection, f/u with MD, and importance of taking all meds as ordered and what to do if a shock is felt. Noted on hospital discharge instructions ICD teaching handout added.

## 2017-01-19 NOTE — PROGRESS NOTES
Hospitalist Progress Note    NAME: Pepito Stein   :  1945   MRN:  [id number]       Interim Hospital Summary: 80 y.o. female whom presented on 2017 with chest pain     Assessment / Plan:      Orthostatic Hypotension  Vasovagal reaction / pre syncope  -lisinopril stopped yesterday due to low BP. The event this morning after bowel movement involved her BP dropping to the 80s. Sounds like vasovagal reaction.  -started midodrine today. Continue to monitor. -PT OT to re eval in AM.  DC planning to SNF once BP stable. Paroxsymal SVT / SSS POA  H/o CAD s/p MI, s/p remote PTCA, stenting of LAD  Ischemic cardiomyopathy with chronic systolic HF  -ECHO EF 88% no AS no MR  -s/p dual chamber ICD for primary prevention of SCD 17  -continue asa, amiodarone and toprol  -appreciate cardiology input / follow up. Anti coagulation not felt necessary as patient has not demonstrated prolonged episodes of afib. Follow up with Cardiology as OP      Sepsis due to PNA POA   -completed abx treatment with LVQ / Ceftin    LOYDA  S/P Recent Right nephrectomy for RCC   -Cr trending down to normal      Surrogate Decision Maker: elias Keene Heads, dtr [facility] [phone] (mailbox is full)  Code status: Full  Prophylaxis: lovenox  Recommended Disposition: SNF/LTC  In 24 hrs pending authorization       Subjective:     Chief Complaint / Reason for Physician Visit: orthostatic Hypotension, CHF, Afib RVR  Patient felt dizzy this morning after a BM with BP dropping to the 80s. Review of Systems:  Symptom Y/N Comments  Symptom Y/N Comments   Fever/Chills n   Chest Pain n    Poor Appetite n   Edema n    Cough n   Abdominal Pain     Sputum n   Joint Pain     SOB/ROCK y improved  Pruritis/Rash     Nausea/vomit n   Tolerating PT/OT y    Diarrhea    Tolerating Diet y    Constipation    Other       Could NOT obtain due to:      Objective:     VITALS:   Last 24hrs VS reviewed since prior progress note.  Most recent are:  Patient Vitals for the past 24 hrs:   Temp Pulse Resp BP SpO2   01/18/17 2008 97.9 °F (36.6 °C) 70 18 105/73 99 %   01/18/17 1356 98.3 °F (36.8 °C) 70 - 118/87 100 %   01/18/17 1300 - - - 127/82 -   01/18/17 1118 - 70 - - -   01/18/17 1108 97.8 °F (36.6 °C) 70 18 121/79 97 %   01/18/17 1018 - - - 130/71 -   01/18/17 0955 - - - 126/71 -   01/18/17 0952 - - - 105/69 -   01/18/17 0950 - 69 16 (!) 88/58 95 %   01/18/17 0903 - - - (!) 86/53 -   01/18/17 0737 99 °F (37.2 °C) 69 16 122/78 96 %   01/18/17 0400 98 °F (36.7 °C) 69 18 126/60 100 %   01/18/17 0000 97.9 °F (36.6 °C) 70 18 118/78 100 %       Intake/Output Summary (Last 24 hours) at 01/18/17 2103  Last data filed at 01/18/17 0915   Gross per 24 hour   Intake              120 ml   Output              150 ml   Net              -30 ml        PHYSICAL EXAM:  General: WD, WN. Alert, cooperative, no acute distress    EENT:  EOMI. Anicteric sclerae. MMM  Resp:  Bilateral basal crackles improved +  No accessory muscle use  CV:  Regular  rhythm,  No edema  GI:  Soft, Non distended, Non tender.  +Bowel sounds  Neurologic:  Alert and oriented X 3, normal speech,   Psych:   Good insight. Not anxious nor agitated  Skin:  No rashes. No jaundice    Reviewed most current lab test results and cultures  YES  Reviewed most current radiology test results   YES  Review and summation of old records today    NO  Reviewed patient's current orders and MAR    YES  PMH/SH reviewed - no change compared to H&P  ________________________________________________________________________  Care Plan discussed with:    Comments   Patient x    Family  x Spoke with son and DIL   RN x    Care Manager x    Consultant                        Multidiciplinary team rounds were held today with , nursing, pharmacist and clinical coordinator. Patient's plan of care was discussed; medications were reviewed and discharge planning was addressed. ________________________________________________________________________  Total NON critical care TIME:  35   Minutes    Total CRITICAL CARE TIME Spent:   Minutes non procedure based      Comments   >50% of visit spent in counseling and coordination of care     ________________________________________________________________________  Kobe Kang MD     Procedures: see electronic medical records for all procedures/Xrays and details which were not copied into this note but were reviewed prior to creation of Plan. LABS:  I reviewed today's most current labs and imaging studies. Pertinent labs include:  No results for input(s): WBC, HGB, HCT, PLT, HGBEXT, HCTEXT, PLTEXT, HGBEXT, HCTEXT, PLTEXT in the last 72 hours.   Recent Labs      01/18/17   0407  01/17/17   0341  01/16/17   0318   NA  138  136  136   K  3.9  4.0  4.1   CL  102  101  100   CO2  24  22  24   GLU  97  89  96   BUN  19  22*  19   CREA  1.24*  1.42*  1.40*   CA  8.1*  8.2*  8.3*       Signed: Kobe Kang MD

## 2017-01-20 LAB
ANION GAP BLD CALC-SCNC: 9 MMOL/L (ref 5–15)
BUN SERPL-MCNC: 19 MG/DL (ref 6–20)
BUN/CREAT SERPL: 16 (ref 12–20)
CALCIUM SERPL-MCNC: 8 MG/DL (ref 8.5–10.1)
CHLORIDE SERPL-SCNC: 104 MMOL/L (ref 97–108)
CO2 SERPL-SCNC: 25 MMOL/L (ref 21–32)
CREAT SERPL-MCNC: 1.19 MG/DL (ref 0.55–1.02)
GLUCOSE BLD STRIP.AUTO-MCNC: 114 MG/DL (ref 65–100)
GLUCOSE SERPL-MCNC: 92 MG/DL (ref 65–100)
HCT VFR BLD AUTO: 24.5 % (ref 35–47)
HGB BLD-MCNC: 7.9 G/DL (ref 11.5–16)
IRON SATN MFR SERPL: 38 % (ref 20–50)
IRON SERPL-MCNC: 52 UG/DL (ref 35–150)
POTASSIUM SERPL-SCNC: 3.9 MMOL/L (ref 3.5–5.1)
RETICS/RBC NFR AUTO: 1.6 % (ref 0.7–2.1)
SERVICE CMNT-IMP: ABNORMAL
SODIUM SERPL-SCNC: 138 MMOL/L (ref 136–145)
TIBC SERPL-MCNC: 137 UG/DL (ref 250–450)

## 2017-01-20 PROCEDURE — 65660000000 HC RM CCU STEPDOWN

## 2017-01-20 PROCEDURE — 77030032490 HC SLV COMPR SCD KNE COVD -B

## 2017-01-20 PROCEDURE — 74011250637 HC RX REV CODE- 250/637: Performed by: INTERNAL MEDICINE

## 2017-01-20 PROCEDURE — 97530 THERAPEUTIC ACTIVITIES: CPT | Performed by: OCCUPATIONAL THERAPIST

## 2017-01-20 PROCEDURE — 85045 AUTOMATED RETICULOCYTE COUNT: CPT | Performed by: INTERNAL MEDICINE

## 2017-01-20 PROCEDURE — 36415 COLL VENOUS BLD VENIPUNCTURE: CPT | Performed by: INTERNAL MEDICINE

## 2017-01-20 PROCEDURE — 82962 GLUCOSE BLOOD TEST: CPT

## 2017-01-20 PROCEDURE — 80048 BASIC METABOLIC PNL TOTAL CA: CPT | Performed by: INTERNAL MEDICINE

## 2017-01-20 PROCEDURE — 97530 THERAPEUTIC ACTIVITIES: CPT

## 2017-01-20 PROCEDURE — 83540 ASSAY OF IRON: CPT | Performed by: INTERNAL MEDICINE

## 2017-01-20 PROCEDURE — 85018 HEMOGLOBIN: CPT | Performed by: INTERNAL MEDICINE

## 2017-01-20 RX ORDER — METOPROLOL SUCCINATE 25 MG/1
12.5 TABLET, EXTENDED RELEASE ORAL DAILY
Status: DISCONTINUED | OUTPATIENT
Start: 2017-01-21 | End: 2017-01-23 | Stop reason: HOSPADM

## 2017-01-20 RX ORDER — MIDODRINE HYDROCHLORIDE 5 MG/1
7.5 TABLET ORAL
Status: DISCONTINUED | OUTPATIENT
Start: 2017-01-20 | End: 2017-01-21

## 2017-01-20 RX ADMIN — Medication 10 ML: at 22:36

## 2017-01-20 RX ADMIN — MIDODRINE HYDROCHLORIDE 7.5 MG: 5 TABLET ORAL at 11:34

## 2017-01-20 RX ADMIN — GABAPENTIN 300 MG: 300 CAPSULE ORAL at 17:22

## 2017-01-20 RX ADMIN — GABAPENTIN 300 MG: 300 CAPSULE ORAL at 09:04

## 2017-01-20 RX ADMIN — AMIODARONE HYDROCHLORIDE 200 MG: 200 TABLET ORAL at 17:22

## 2017-01-20 RX ADMIN — ASPIRIN 81 MG CHEWABLE TABLET 81 MG: 81 TABLET CHEWABLE at 09:03

## 2017-01-20 RX ADMIN — Medication 10 ML: at 06:55

## 2017-01-20 RX ADMIN — FUROSEMIDE 20 MG: 20 TABLET ORAL at 09:03

## 2017-01-20 RX ADMIN — Medication 10 ML: at 13:43

## 2017-01-20 RX ADMIN — GABAPENTIN 300 MG: 300 CAPSULE ORAL at 22:36

## 2017-01-20 RX ADMIN — MIDODRINE HYDROCHLORIDE 7.5 MG: 5 TABLET ORAL at 17:21

## 2017-01-20 RX ADMIN — DOCUSATE SODIUM 100 MG: 100 CAPSULE, LIQUID FILLED ORAL at 17:21

## 2017-01-20 RX ADMIN — DOCUSATE SODIUM 100 MG: 100 CAPSULE, LIQUID FILLED ORAL at 09:03

## 2017-01-20 RX ADMIN — Medication 10 ML: at 13:44

## 2017-01-20 RX ADMIN — AMIODARONE HYDROCHLORIDE 200 MG: 200 TABLET ORAL at 09:03

## 2017-01-20 RX ADMIN — MIDODRINE HYDROCHLORIDE 5 MG: 5 TABLET ORAL at 09:03

## 2017-01-20 NOTE — PROGRESS NOTES
Problem: Mobility Impaired (Adult and Pediatric)  Goal: *Acute Goals and Plan of Care (Insert Text)  Physical Therapy Goals  Revised 1/17/2017  1. Patient will move from supine to sit and sit to supine , scoot up and down and roll side to side in bed with supervision/set-up within 7 day(s). 2. Patient will transfer from bed to chair and chair to bed with supervision/set-up using the least restrictive device within 7 day(s). 3. Patient will perform sit to stand with supervision/set-up within 7 day(s). 4. Patient will ambulate with minimal assistance/contact guard assist for 50 feet with the least restrictive device within 7 day(s). 5. Patient will demonstrate good recall and compliance with pacemaker precautions during activity within 7 days. Physical Therapy Goals  Initiated 1/11/2017  1. Patient will move from supine to sit and sit to supine , scoot up and down and roll side to side in bed with supervision/set-up within 7 day(s). 2. Patient will transfer from bed to chair and chair to bed with minimal assistance/contact guard assist using the least restrictive device within 7 day(s). 3. Patient will perform sit to stand with minimal assistance/contact guard assist within 7 day(s). 4. Patient will ambulate with minimal assistance/contact guard assist for 75 feet with the least restrictive device within 7 day(s). 5. Patient will perform 2 sets of 10 repetitions of active strengthening exercises for bilateral lower extremity(s) with supervision/set-up within 7 day(s). PHYSICAL THERAPY TREATMENT  Patient: Maddison Solo (35 y.o. female)  Date: 1/20/2017  Diagnosis: SVT (supraventricular tachycardia) <principal problem not specified>       Precautions:    Chart, physical therapy assessment, plan of care and goals were reviewed. ASSESSMENT:  Pt continues to be limited in progression of mobility today due to orthostatic hypotension.  A lot of additional time spent taking BP's and monitoring pt's response and symptoms. Pt able to take a few steps out to the chair and remained there for 15 minutes but required a transfer back to bed due to continued drop in BP (95/67)  and c/o dizziness/nausea. Pt alert and able to take steps back to bed today with RW. NSG aware. Recommend for pt's head to be elevated in bed and for nsg to continue to progress transfers as able out of bed this evening and over the weekend. Progression toward goals:  [ ]      Improving appropriately and progressing toward goals  [X]      Improving slowly and progressing toward goals  [ ]      Not making progress toward goals and plan of care will be adjusted       PLAN:  Patient continues to benefit from skilled intervention to address the above impairments. Continue treatment per established plan of care. Discharge Recommendations:  Skilled Nursing Facility  Further Equipment Recommendations for Discharge:  rolling walker       SUBJECTIVE:   Patient stated I feel weak.       OBJECTIVE DATA SUMMARY:   Critical Behavior:  Neurologic State: Alert  Orientation Level: Oriented X4  Cognition: Appropriate for age attention/concentration, Follows commands  Safety/Judgement: Awareness of environment, Fall prevention  Functional Mobility Training:  Bed Mobility:     Supine to Sit: Contact guard assistance; Additional time  Sit to Supine: Minimum assistance;Assist x1                       Transfers:  Sit to Stand: Minimum assistance;Assist x2  Stand to Sit: Minimum assistance;Assist x1        Bed to Chair: Minimum assistance;Assist x2                    Balance:  Sitting: Intact  Standing: Impaired  Standing - Static: Good  Standing - Dynamic : Fair  Ambulation/Gait Training:  Distance (ft): 3 Feet (ft)  Assistive Device: Gait belt;Walker, rolling  Ambulation - Level of Assistance: Minimal assistance;Assist x2  Gait Abnormalities: Decreased step clearance  Base of Support: Narrowed     Speed/Maria Luisa: Pace decreased (<100 feet/min)  Step Length: Left shortened;Right shortened              Therapeutic Exercises:      Pain:  Pain Scale 1: Numeric (0 - 10)  Pain Intensity 1: 0              Activity Tolerance:   orthostatic BP's taken throughout lengthy session. Pt had a drop in BP after sitting in chair approximately 15 minutes that required a transfer back to bed     Please refer to the flowsheet for vital signs taken during this treatment.   After treatment:   [ ] Patient left in no apparent distress sitting up in chair  [X] Patient left in no apparent distress in bed  [X] Call bell left within reach  [X] Nursing notified  [ ] Caregiver present  [X] Bed alarm activated      COMMUNICATION/COLLABORATION:   The patients plan of care was discussed with: Occupational Therapist and Registered Nurse     Alisa Yang PTA   Time Calculation: 50 mins

## 2017-01-20 NOTE — PROGRESS NOTES
Problem: Self Care Deficits Care Plan (Adult)  Goal: *Acute Goals and Plan of Care (Insert Text)  Occupational Therapy Goals  All goals reviewed on 1/19/2019--all goals remain appropriate to continue for another week  Initiated 1/11/2017  1. Patient will perform grooming in standing with supervision/set-up within 7 day(s). 1/19/2017 Not Achieved. Continue goal  2. Patient will perform upper body adls with supervision/set-up within 7 day(s). 1/19/2017 Not Achieved. Continue goal  3. Patient will perform standing adls with supervision/set-up within 7 day(s). 1/19/2017 Not Achieved. Continue goal  4. Patient will walk into the bathroom to perform toilet transfers with supervision/set-up within 7 day(s). 1/19/2017 Not Achieved. Continue goal  5. Patient will perform all aspects of toileting with supervision/set-up within 7 day(s). 1/19/2017 Not Achieved. Continue goal   OCCUPATIONAL THERAPY TREATMENT  Patient: Pilar Campbell (7 y.o. female)  Date: 1/20/2017  Diagnosis: SVT (supraventricular tachycardia) <principal problem not specified>       Precautions:   fall, ortthostatic hypotension      ASSESSMENT:  Pt was agreeable to treatment. She was again orthostatic and many BP were taken to ensure the safest mobility progression. Pt was able to take steps to the chair and sit for approx. 15 minutes--exercises were initiated several times throughout tx session to aid in maintaining BP. Pt then reported dizziness and nausea and pt was returned to bed. Nursing aware. Encouraged upright sitting in bed as much as possible, and several times a day pt should be getting up to the chair to allow her BP to adjust appropriately to mobility demands. Spoke with nursing and cardiologist this session. Would ACE wrapping BLEs, or abdominal binder, or recliner chair with scd's?  help with BP control?    Progression toward goals:  [ ]       Improving appropriately and progressing toward goals  [X]       Improving slowly and progressing toward goals  [ ]       Not making progress toward goals and plan of care will be adjusted       PLAN:  Patient continues to benefit from skilled intervention to address the above impairments. Continue treatment per established plan of care. Discharge Recommendations:  Rehab-return to [facility]. HC to complete her rehab program  Further Equipment Recommendations for Discharge:  tbd       SUBJECTIVE:   Patient stated I feel nauseated.   (after sitting 15 minutes in chair.)      OBJECTIVE DATA SUMMARY:   Cognitive/Behavioral Status:  Neurologic State: Alert  Orientation Level: Oriented X4  Cognition: Appropriate for age attention/concentration; Follows commands              Functional Mobility and Transfers for ADLs:  Bed Mobility:  Supine to Sit: Contact guard assistance; Additional time  Sit to Supine: Minimum assistance;Assist x1     Transfers:  Sit to Stand: Minimum assistance;Assist x2  Stand to Sit: Minimum assistance;Assist x1  Bed to Chair: Minimum assistance;Assist x2     Balance:  Sitting: Intact  Standing: Impaired  Standing - Static: Good  Standing - Dynamic : Fair     ADL Intervention:     Pt performed bed mobility and transfer to chair and returned to bed due to symptomatic Orthostatic BP. Many readings were taken for pt safety. Pt tolerated ~15 OOB sitting this date then became nauseated. Discussed with nursing and physician   Would ace wrapping LEs help with orthostasis? Therapeutic Exercises:   Encouraged  BUE and BLE exercises to increase BP control. Pain:  Pain Scale 1: Numeric (0 - 10)  Pain Intensity 1: 0              Activity Tolerance:   Poor but slightly improved. Many BPs taken   Please refer to the flowsheet for vital signs taken during this treatment.   After treatment:   [ ] Patient left in no apparent distress sitting up in chair  [X] Patient left in no apparent distress in bed  [X] Call bell left within reach  [X] Nursing notified  [ ] Caregiver present  [X] Bed alarm activated      COMMUNICATION/COLLABORATION:   The patients plan of care was discussed with: Physical Therapist, Physical Therapy Assistant, Registered Nurse and cardiologist     Joan Keyes OTR/L  Time Calculation: 49 mins

## 2017-01-20 NOTE — PROGRESS NOTES
Cardiology Progress Note      1/20/2017 9:36 AM    Admit Date: 1/9/2017      Subjective:  Still orthostatic. No other c/o. Visit Vitals    /68 (BP 1 Location: Left arm, BP Patient Position: Standing)    Pulse (!) 102    Temp 97.9 °F (36.6 °C)    Resp 20    Wt 66.9 kg (147 lb 8 oz)    SpO2 100%    BMI 23.1 kg/m2     01/18 1901 - 01/20 0700  In: 760 [P.O.:760]  Out: 500 [Urine:500]        Objective:      Physical Exam:  VS as above  Chest CTA  Card RRR 2/6 DORIAN no gallop     Data Review:   Labs:    BUN 19  Creat 1.2   Hgb 7.9     Telemetry: dual chamber pacing       Assessment:     1. Chest pain, atypical for myocardial ischemia. No evidence of ischemic insult. Resolved   2. Dizziness, orthostatic hypotension. 3. Intermittent junctional/ectopic atrial rhythm. With the orthostasis and rate-limiting medications on board, probably should have pacing support. 4. Paroxysmal SVT, probably AV node reentry per strips. No recent recurrence. 5. Ischemic cardiomyopathy with prior anterior wall myocardial infarction and remote PTCA and stenting of the left anterior descending. EF 25% by echo here. 6. Hypertensive heart disease with chronic systolic CHF class 3 and CKD stage 3 at baseline. 7. Type 2 diabetes mellitus. 8. Dyslipidemia. 9. Recent right nephrectomy for renal cell cancer in 12/2016. 10. Acute on chronic renal failure, improved. 11. ? Left-sided pneumonia (retrocardiac abnormality on CXR). 12. Anemia, unspecified. 13. S/p pacer/ ICD Monday     Plan: Will increase midodrine to 7.5 mg tid.

## 2017-01-20 NOTE — PROGRESS NOTES
Bedside shift change report given to Aleenasaima Emery. (oncoming nurse) by Cheryle Net. (offgoing nurse). Report included the following information SBAR, Kardex, Intake/Output, MAR, Recent Results and Cardiac Rhythm paced. 4:30 PM Pt up to bedside commode. Pt remained sitting on side of bed to eat dinner. No c/o dizziness or feeling lightheaded. Will continue to monitor. [facility] SHIFT NURSING NOTE    Bedside shift change report given to Cheryle Net. (oncoming nurse) by Aleenasaima Mcgregor (offgoing nurse). Report included the following information SBAR, Kardex, Intake/Output, MAR, Med Rec Status and Cardiac Rhythm paced. SHIFT SUMMARY: Pts BP has remained stable today; however she still has felt dizzy and lightheaded when she gets up.         Admission Date 1/9/2017   Admission Diagnosis SVT (supraventricular tachycardia)   Consults IP CONSULT TO CARDIOLOGY  IP CONSULT TO CARDIOLOGY        Consults   [x] PT   [x] OT   [] Speech   [] Palliative      [] Hospice    [x] Case Management   [] None   Cardiac Monitoring   [x] Yes   [] No     Antibiotics   [] Yes   [] No   GI Prophylaxis  (Ex: Protonix, Pepcid, etc,.)   [x] Yes   [] No          DVT Prophylaxis   SCDs:  Sequential Compression Device: Bilateral          Santiago stockings:         [] Medication (Ex: Lovenox, Eliquis,  Heparin, etc..)   [] Contraindicated   [] None       Urinary Catheter             LDAs               Peripheral IV 01/14/17 Right Arm (Active)   Site Assessment Clean, dry, & intact 1/20/2017  1:53 PM   Phlebitis Assessment 0 1/20/2017  1:53 PM   Infiltration Assessment 0 1/20/2017  1:53 PM   Dressing Status Clean, dry, & intact 1/20/2017  1:53 PM   Dressing Type Transparent;Tape 1/20/2017  1:53 PM   Hub Color/Line Status Pink;Capped;Flushed;Patent 1/20/2017  1:53 PM   Alcohol Cap Used No 1/20/2017  1:53 PM                      I/Os   Intake/Output Summary (Last 24 hours) at 01/20/17 1630  Last data filed at 01/20/17 0351   Gross per 24 hour   Intake 400 ml   Output              500 ml   Net             -100 ml         Activity Level Activity Level: Up with Assistance     Activity Assistance: Partial (two people)   Diet Active Orders   Diet    DIET CARDIAC Soft Solids; 2 GM NA (House Low NA)      Purposeful Rounding every 1-2 hour? [x] Yes    Zuri Score  Total Score: 3   Bed Alarm (If score 3 or >)   [x] Yes    [] Refused (See signed refusal form in chart)   Evan Score  Evan Score: 17       Evan Score (if score 14 or less)   [] PMT consult   [] Nutrition consult   [] Wound Care consult      []  Specialty bed         Influenza Vaccine Received Flu Vaccine for Current Season (usually Sept-March): Yes               Needs prior to discharge:   Home O2 required:    [] Yes   [x] No     If yes, how much O2 required?     Other:    Last Bowel Movement Date: 01/19/17        POST-OP SURGICAL VATS   [] Yes   [] No     Incentive Spirometer:   [] Yes   [] Refused   Coughing and deep breathing:     [] Yes   [] Refused   Oral care:     [] Yes   [] Refused   Understanding (patient education):     [] Yes   [] Refused   Getting out of bed Number times ambulated in hallway past shift:    Number of times OOB to chair past shift:     Head of bed elevation:     [] Yes   [] Refused      Readmission Risk Assessment Tool Score Low Risk            12       Total Score        3 Patient Length of Stay > 5    4 More than 1 Admission in calendar year    5 Patient Insurance is Medicare, Medicaid or Self Pay        Criteria that do not apply:    Relationship with PCP    Patient Living Status    Charlson Comorbidity Score       Expected Length of Stay 5d 16h   Actual Length of Stay 11

## 2017-01-20 NOTE — PROGRESS NOTES
CM note: Pt remains orthostatic. [facility] [place]. is stil willing to accept pt. Insurance auth needed prior to discharge. As previous authorizations have ran out.   OZZIE Oneill

## 2017-01-20 NOTE — PROGRESS NOTES
[address] SHIFT NURSING NOTE    Bedside and Verbal shift change report given to  (oncoming nurse) by Jimmie Rock  (offgoing nurse). Report included the following information Kardex. SHIFT SUMMARY: Pt refused orthostatic BPs and to stand for weight after yesterdays episode of orthostatic hypotension . Reassured and emotional support given but she still refused. SCDS placed this evening          Admission Date 1/9/2017   Admission Diagnosis SVT (supraventricular tachycardia)   Consults IP CONSULT TO CARDIOLOGY  IP CONSULT TO CARDIOLOGY        Consults   [x] PT   [x] OT   [] Speech   [] Palliative      [] Hospice    [] Case Management   [] None   Cardiac Monitoring   [x] Yes   [] No     Antibiotics   [] Yes   [x] No   GI Prophylaxis  (Ex: Protonix, Pepcid, etc,.)   [] Yes   [x] No          DVT Prophylaxis   SCDs:  Sequential Compression Device: Bilateral          Santiago stockings:         [] Medication (Ex: Lovenox, Eliquis,  Heparin, etc..)   [] Contraindicated   [x] None       Urinary Catheter             LDAs               Peripheral IV 01/14/17 Right Arm (Active)   Site Assessment Clean, dry, & intact 1/20/2017  3:51 AM   Phlebitis Assessment 0 1/20/2017  3:51 AM   Infiltration Assessment 0 1/20/2017  3:51 AM   Dressing Status Clean, dry, & intact 1/20/2017  3:51 AM   Dressing Type Tape;Transparent 1/20/2017  3:51 AM   Hub Color/Line Status Pink;Capped;Flushed;Patent 1/20/2017  3:51 AM                      I/Os   Intake/Output Summary (Last 24 hours) at 01/20/17 1826  Last data filed at 01/20/17 0351   Gross per 24 hour   Intake              640 ml   Output              500 ml   Net              140 ml         Activity Level Activity Level: Up with Assistance     Activity Assistance: Partial (two people)   Diet Active Orders   Diet    DIET CARDIAC Soft Solids; 2 GM NA (House Low NA)      Purposeful Rounding every 1-2 hour?    [x] Yes    Zuri Score  Total Score: 3   Bed Alarm (If score 3 or >)   [] Yes    [] Refused (See signed refusal form in chart)   Evan Score  Evan Score: 17       Evan Score (if score 14 or less)   [] PMT consult   [] Nutrition consult   [] Wound Care consult      []  Specialty bed         Influenza Vaccine Received Flu Vaccine for Current Season (usually Sept-March): Yes               Needs prior to discharge:   Home O2 required:    [] Yes   [x] No     If yes, how much O2 required?     Other:    Last Bowel Movement Date: 01/19/17        POST-OP SURGICAL VATS   [] Yes   [] No     Incentive Spirometer:   [] Yes   [] Refused   Coughing and deep breathing:     [] Yes   [] Refused   Oral care:     [] Yes   [] Refused   Understanding (patient education):     [] Yes   [] Refused   Getting out of bed Number times ambulated in hallway past shift:    Number of times OOB to chair past shift:     Head of bed elevation:     [] Yes   [] Refused      Readmission Risk Assessment Tool Score Low Risk            12       Total Score        3 Patient Length of Stay > 5    4 More than 1 Admission in calendar year    5 Patient Insurance is Medicare, Medicaid or Self Pay        Criteria that do not apply:    Relationship with PCP    Patient Living Status    Charlson Comorbidity Score       Expected Length of Stay 5d 16h   Actual Length of Stay 11

## 2017-01-20 NOTE — PROGRESS NOTES
Hospitalist Progress Note    NAME: Virgil Bell   :  1945   MRN:  [id number]       Interim Hospital Summary: 80 y.o. female whom presented on 2017 with chest pain     Assessment / Plan:      Orthostatic Hypotension  Vasovagal reaction / pre syncope  -remains orthostatic but drop is less (down to 95/67 instead of <80s like yesterday)  -stopped Cymbalta (associated with orthostatic hypotension)  -increasing midodrine 7.5 TID  -continue to follow orthostatic BP and PT OT as tolerated. Continue to hold discharge to SNF    Paroxsymal SVT / SSS POA  H/o CAD s/p MI, s/p remote PTCA, stenting of LAD  Ischemic cardiomyopathy with chronic systolic HF  -ECHO EF 90% no AS no MR  -s/p dual chamber ICD for primary prevention of SCD 17  -continue asa, amiodarone and toprol  -appreciate cardiology input / follow up. Anti coagulation not felt necessary as patient has not demonstrated prolonged episodes of afib. Follow up with Cardiology as OP    Anemia of chronic disease  -check Iron studies consistent with chronic disease and not ANTOINETTE (low TIBC, normal Iron and normal percent sat of iron)      Sepsis due to PNA POA   -completed abx treatment with LVQ / Ceftin    LOYDA  S/P Recent Right nephrectomy for RCC   -Cr trending down to normal      Surrogate Decision Maker: elias Stark, [phone] (mailbox is full)  Code status: Full  Prophylaxis: lovenox  Recommended Disposition: SNF/LTC  In 24 hrs pending authorization       Subjective:     Chief Complaint / Reason for Physician Visit: orthostatic Hypotension, CHF, Afib RVR  Still orthostatic but to a lesser degree.       Review of Systems:  Symptom Y/N Comments  Symptom Y/N Comments   Fever/Chills n   Chest Pain n    Poor Appetite n   Edema n    Cough n   Abdominal Pain     Sputum n   Joint Pain     SOB/ROCK y improved  Pruritis/Rash     Nausea/vomit n   Tolerating PT/OT y    Diarrhea    Tolerating Diet y    Constipation    Other Could NOT obtain due to:      Objective:     VITALS:   Last 24hrs VS reviewed since prior progress note. Most recent are:  Patient Vitals for the past 24 hrs:   Temp Pulse Resp BP SpO2   01/20/17 1139 97.8 °F (36.6 °C) 70 16 113/77 100 %   01/20/17 1100 - - - 103/75 -   01/20/17 1059 - - - 95/67 -   01/20/17 1041 - - - 110/77 -   01/20/17 1038 - 70 - 114/73 100 %   01/20/17 1034 - 69 - 102/61 100 %   01/20/17 1031 - 84 - 108/80 -   01/20/17 1023 - 70 - 125/69 99 %   01/20/17 0804 - (!) 102 20 115/68 100 %   01/20/17 0802 - 70 18 131/79 100 %   01/20/17 0800 97.9 °F (36.6 °C) 70 18 131/70 99 %   01/20/17 0426 97.5 °F (36.4 °C) 72 18 133/79 98 %   01/19/17 2226 97.5 °F (36.4 °C) 71 16 127/72 98 %   01/19/17 1948 97.6 °F (36.4 °C) 70 15 112/69 100 %   01/19/17 1615 97.3 °F (36.3 °C) 70 18 125/84 99 %   01/19/17 1225 97.6 °F (36.4 °C) 71 18 139/76 100 %       Intake/Output Summary (Last 24 hours) at 01/20/17 1142  Last data filed at 01/20/17 0351   Gross per 24 hour   Intake              400 ml   Output              500 ml   Net             -100 ml        PHYSICAL EXAM:  General: WD, WN. Alert, cooperative, no acute distress    EENT:  EOMI. Anicteric sclerae. MMM  Resp:  Bilateral basal crackles improved +  No accessory muscle use  CV:  Regular  rhythm,  No edema  GI:  Soft, Non distended, Non tender.  +Bowel sounds  Neurologic:  Alert and oriented X 3, normal speech,   Psych:   Good insight. Not anxious nor agitated  Skin:  No rashes.   No jaundice    Reviewed most current lab test results and cultures  YES  Reviewed most current radiology test results   YES  Review and summation of old records today    NO  Reviewed patient's current orders and MAR    YES  PMH/ reviewed - no change compared to H&P  ________________________________________________________________________  Care Plan discussed with:    Comments   Patient x    Family  x Spoke with son and DIL   RN x    Care Manager     Consultant Multidiciplinary team rounds were held today with , nursing, pharmacist and clinical coordinator. Patient's plan of care was discussed; medications were reviewed and discharge planning was addressed. ________________________________________________________________________  Total NON critical care TIME:  25   Minutes    Total CRITICAL CARE TIME Spent:   Minutes non procedure based      Comments   >50% of visit spent in counseling and coordination of care     ________________________________________________________________________  Cristiana Guerrero MD     Procedures: see electronic medical records for all procedures/Xrays and details which were not copied into this note but were reviewed prior to creation of Plan. LABS:  I reviewed today's most current labs and imaging studies.   Pertinent labs include:  Recent Labs      01/20/17 0253 01/19/17   0308   WBC   --   7.8   HGB  7.9*  7.8*   HCT  24.5*  23.8*   PLT   --   327     Recent Labs      01/20/17 0253 01/19/17 0308  01/18/17   0407   NA  138  139  138   K  3.9  3.9  3.9   CL  104  104  102   CO2  25  24  24   GLU  92  89  97   BUN  19  18  19   CREA  1.19*  1.10*  1.24*   CA  8.0*  8.1*  8.1*       Signed: Cristiana Guerrero MD

## 2017-01-20 NOTE — CARDIO/PULMONARY
C/P Rehab Note:      Chart Reviewed. Pt admitted with SVT. Per Cardiology note: SVT, probably AV node reentry. ? afib as well. Ischemic cardiomyopathy with prior anterior wall myocardial infarction and remote PTCA and stenting of the left anterior descending. EF 25% by echo.      PMH significant for:  1. HTN  2. DM  3. Hyperlipiedmia  4. Acute on chronic renal failure     Patient working with PT/OT. Patient seen for CHF and ICD education on January 11, 13, 16 and 19.    CP Rehab will continue to follow.

## 2017-01-20 NOTE — PROGRESS NOTES
Nutrition Assessment:    INTERVENTIONS/RECOMMENDATIONS:   Continue current diet regimen    ASSESSMENT:   Chart reviewed, medically noted for orthostatic hypotension, PNA, CHF, renal cell carcinoma. Pt's intake seems to be improving, still not eating the majority of her meals but reports consuming 3 glucerna per day (provides 660 kcals, 30 g pro). Pt notes today that she would like to regain some of the weight she lost. She seemed to have more energy today than previous visits. Will continue to follow PO intake. Diet Order: Cardiac (soft solids, 2g Na)  % Eaten:  Patient Vitals for the past 72 hrs:   % Diet Eaten   01/19/17 0934 10 %     Pertinent Medications: [x] Reviewed []Other: colace, lasix, zofran PRN  Pertinent Labs: [x]Reviewed  []Other:  Food Allergies: [x]None []Other:     Last BM: 1/19   [x]Active     []Hyperactive  []Hypoactive       [] Absent  BS  Skin:    [] Intact   [x] Incision  [] Breakdown   []Edema   []Other:    Anthropometrics: Height:   Weight: 66.9 kg (147 lb 8 oz)    IBW (%IBW):   ( ) UBW (%UBW):   (  %)    BMI: Body mass index is 23.1 kg/(m^2). This BMI is indicative of:  []Underweight   [x]Normal   []Overweight   [] Obesity   [] Extreme Obesity (BMI>40)  Last Weight Metrics:  Weight Loss Metrics 1/17/2017 12/22/2016 12/14/2016 10/21/2016 10/15/2016 10/3/2016 4/11/2015   Today's Wt 147 lb 8 oz 150 lb 5.7 oz 154 lb 8.7 oz 185 lb 186 lb 184 lb 11.9 oz 197 lb   BMI 23.1 kg/m2 23.55 kg/m2 24.2 kg/m2 28.98 kg/m2 29.13 kg/m2 28.94 kg/m2 30.85 kg/m2       Estimated Nutrition Needs (Based on): 1600 Kcals/day (MSJ: 1225 x 1.3) , 70 g (1 g/kg) Protein  Carbohydrate:  At Least 130 g/day  Fluids: 1600 mL/day    NUTRITION DIAGNOSES:   Problem:  Inadequate protein-energy intake      Etiology: related to decreased appetite     Signs/Symptoms: as evidenced by 22% wt loss in 3 months      NUTRITION INTERVENTIONS:  Meals/Snacks: General/healthful diet   Supplements: Commercial supplement GOAL:   consume >50% of meals and ONS in 2-4 days    NUTRITION MONITORING AND EVALUATION      Food/Nutrient Intake Outcomes:  Total energy intake, Liquid meal replacement  Physical Signs/Symptoms Outcomes: Weight/weight change, Electrolyte and renal profile, Glucose profile, GI    Previous Goal Met:   [] Met              [x] Progressing Towards Goal              [] Not Progressing Towards Goal   Previous Recommendations:   [x] Implemented          [] Not Implemented          [] Not Applicable    LEARNING NEEDS (Diet, Food/Nutrient-Drug Interaction):    [x] None Identified   [] Identified and Education Provided/Documented   [] Identified and Pt declined/was not appropriate     Cultural, Advent, OR Ethnic Dietary Needs:    [x] None Identified   [] Identified and Addressed     [x] Interdisciplinary Care Plan Reviewed/Documented    [x] Discharge Planning: TBD   [] Participated in Interdisciplinary Rounds    NUTRITION RISK:    [x] High              [] Moderate           []  Low  []  Minimal/Uncompromised      Sherryle Poncho, RDN  Pager [phone]  Weekend Pager [phone]

## 2017-01-21 LAB
ANION GAP BLD CALC-SCNC: 11 MMOL/L (ref 5–15)
BNP SERPL-MCNC: 952 PG/ML (ref 0–100)
BUN SERPL-MCNC: 17 MG/DL (ref 6–20)
BUN/CREAT SERPL: 15 (ref 12–20)
CALCIUM SERPL-MCNC: 8.1 MG/DL (ref 8.5–10.1)
CHLORIDE SERPL-SCNC: 105 MMOL/L (ref 97–108)
CO2 SERPL-SCNC: 24 MMOL/L (ref 21–32)
CREAT SERPL-MCNC: 1.17 MG/DL (ref 0.55–1.02)
ERYTHROCYTE [DISTWIDTH] IN BLOOD BY AUTOMATED COUNT: 19.3 % (ref 11.5–14.5)
GLUCOSE SERPL-MCNC: 88 MG/DL (ref 65–100)
HCT VFR BLD AUTO: 25.2 % (ref 35–47)
HGB BLD-MCNC: 8.1 G/DL (ref 11.5–16)
MCH RBC QN AUTO: 23.1 PG (ref 26–34)
MCHC RBC AUTO-ENTMCNC: 32.1 G/DL (ref 30–36.5)
MCV RBC AUTO: 72 FL (ref 80–99)
PLATELET # BLD AUTO: 352 K/UL (ref 150–400)
POTASSIUM SERPL-SCNC: 4 MMOL/L (ref 3.5–5.1)
RBC # BLD AUTO: 3.5 M/UL (ref 3.8–5.2)
SODIUM SERPL-SCNC: 140 MMOL/L (ref 136–145)
WBC # BLD AUTO: 10.1 K/UL (ref 3.6–11)

## 2017-01-21 PROCEDURE — 80048 BASIC METABOLIC PNL TOTAL CA: CPT | Performed by: INTERNAL MEDICINE

## 2017-01-21 PROCEDURE — 65660000000 HC RM CCU STEPDOWN

## 2017-01-21 PROCEDURE — 74011250637 HC RX REV CODE- 250/637: Performed by: INTERNAL MEDICINE

## 2017-01-21 PROCEDURE — 85027 COMPLETE CBC AUTOMATED: CPT | Performed by: INTERNAL MEDICINE

## 2017-01-21 PROCEDURE — 83880 ASSAY OF NATRIURETIC PEPTIDE: CPT | Performed by: INTERNAL MEDICINE

## 2017-01-21 PROCEDURE — 36415 COLL VENOUS BLD VENIPUNCTURE: CPT | Performed by: INTERNAL MEDICINE

## 2017-01-21 RX ORDER — AMIODARONE HYDROCHLORIDE 200 MG/1
200 TABLET ORAL DAILY
Status: DISCONTINUED | OUTPATIENT
Start: 2017-01-22 | End: 2017-01-23 | Stop reason: HOSPADM

## 2017-01-21 RX ORDER — MIDODRINE HYDROCHLORIDE 5 MG/1
10 TABLET ORAL
Status: DISCONTINUED | OUTPATIENT
Start: 2017-01-21 | End: 2017-01-23 | Stop reason: HOSPADM

## 2017-01-21 RX ADMIN — METOPROLOL SUCCINATE 12.5 MG: 25 TABLET, EXTENDED RELEASE ORAL at 08:31

## 2017-01-21 RX ADMIN — ASPIRIN 81 MG CHEWABLE TABLET 81 MG: 81 TABLET CHEWABLE at 08:32

## 2017-01-21 RX ADMIN — Medication 10 ML: at 05:08

## 2017-01-21 RX ADMIN — GABAPENTIN 300 MG: 300 CAPSULE ORAL at 17:07

## 2017-01-21 RX ADMIN — GABAPENTIN 300 MG: 300 CAPSULE ORAL at 21:18

## 2017-01-21 RX ADMIN — AMIODARONE HYDROCHLORIDE 200 MG: 200 TABLET ORAL at 08:31

## 2017-01-21 RX ADMIN — GABAPENTIN 300 MG: 300 CAPSULE ORAL at 08:31

## 2017-01-21 RX ADMIN — Medication 10 ML: at 21:18

## 2017-01-21 RX ADMIN — MIDODRINE HYDROCHLORIDE 10 MG: 5 TABLET ORAL at 11:38

## 2017-01-21 RX ADMIN — Medication 10 ML: at 14:24

## 2017-01-21 RX ADMIN — FUROSEMIDE 20 MG: 20 TABLET ORAL at 08:31

## 2017-01-21 RX ADMIN — MIDODRINE HYDROCHLORIDE 10 MG: 5 TABLET ORAL at 17:07

## 2017-01-21 RX ADMIN — DOCUSATE SODIUM 100 MG: 100 CAPSULE, LIQUID FILLED ORAL at 08:32

## 2017-01-21 RX ADMIN — MIDODRINE HYDROCHLORIDE 7.5 MG: 5 TABLET ORAL at 08:30

## 2017-01-21 NOTE — PROGRESS NOTES
Hospitalist Progress Note    NAME: Chapin Robles   :  1945   MRN:  [id number]       Interim Hospital Summary: 80 y.o. female whom presented on 2017 with chest pain     Assessment / Plan:      Orthostatic Hypotension  Vasovagal reaction / pre syncope  -today's orthostatic BP pending.    -stopped Cymbalta (associated with orthostatic hypotension)   -increasing midodrine 10 TID  -continue to follow orthostatic BP and PT OT as tolerated. DC planning Monday if no longer orthostatic. Paroxsymal SVT / SSS POA  H/o CAD s/p MI, s/p remote PTCA, stenting of LAD  Ischemic cardiomyopathy with chronic systolic HF  -ECHO EF 21% no AS no MR  -s/p dual chamber ICD for primary prevention of SCD 17  -continue asa, amiodarone and toprol  -appreciate cardiology input / follow up. Anti coagulation not felt necessary as patient has not demonstrated prolonged episodes of afib. Follow up with Cardiology as OP    Anemia of chronic disease  -check Iron studies consistent with chronic disease and not ANTOINETTE (low TIBC, normal Iron and normal percent sat of iron)      Sepsis due to PNA POA   -completed abx treatment with LVQ / Ceftin    LOYDA  S/P Recent Right nephrectomy for RCC   -Cr trending down to normal      Surrogate Decision Maker: elias Rai, dtr [facility] [phone] (mailbox is full)  Code status: Full  Prophylaxis: lovenox  Recommended Disposition: SNF/LTC  In 24 hrs pending authorization       Subjective:     Chief Complaint / Reason for Physician Visit: orthostatic Hypotension, CHF, Afib RVR  BP improved.       Review of Systems:  Symptom Y/N Comments  Symptom Y/N Comments   Fever/Chills n   Chest Pain n    Poor Appetite n   Edema n    Cough n   Abdominal Pain     Sputum n   Joint Pain     SOB/ROCK y improved  Pruritis/Rash     Nausea/vomit n   Tolerating PT/OT y    Diarrhea    Tolerating Diet y    Constipation    Other       Could NOT obtain due to:      Objective:     VITALS:   Last 24hrs VS reviewed since prior progress note. Most recent are:  Patient Vitals for the past 24 hrs:   Temp Pulse Resp BP SpO2   01/21/17 1648 - - - 108/75 -   01/21/17 1635 97.6 °F (36.4 °C) 69 16 (!) 137/92 100 %   01/21/17 1231 - (!) 104 - (!) 148/91 -   01/21/17 1230 - 71 - 136/77 -   01/21/17 1229 97.4 °F (36.3 °C) 70 18 135/76 99 %   01/21/17 0811 - 70 - 127/85 -   01/21/17 0810 98.1 °F (36.7 °C) 70 18 120/78 98 %   01/21/17 0434 97.8 °F (36.6 °C) 69 18 142/85 100 %   01/21/17 0045 98.1 °F (36.7 °C) 68 18 130/78 99 %   01/20/17 2004 97.5 °F (36.4 °C) 70 18 120/74 99 %       Intake/Output Summary (Last 24 hours) at 01/21/17 1755  Last data filed at 01/21/17 1400   Gross per 24 hour   Intake              720 ml   Output              501 ml   Net              219 ml        PHYSICAL EXAM:  General: WD, WN. Alert, cooperative, no acute distress    EENT:  EOMI. Anicteric sclerae. MMM  Resp:  Bilateral basal crackles improved +  No accessory muscle use  CV:  Regular  rhythm,  No edema  GI:  Soft, Non distended, Non tender.  +Bowel sounds  Neurologic:  Alert and oriented X 3, normal speech,   Psych:   Good insight. Not anxious nor agitated  Skin:  No rashes. No jaundice    Reviewed most current lab test results and cultures  YES  Reviewed most current radiology test results   YES  Review and summation of old records today    NO  Reviewed patient's current orders and MAR    YES  PMH/SH reviewed - no change compared to H&P  ________________________________________________________________________  Care Plan discussed with:    Comments   Patient x    Family   Spoke with son and DIL   RN x    Care Manager     Consultant                        Multidiciplinary team rounds were held today with , nursing, pharmacist and clinical coordinator. Patient's plan of care was discussed; medications were reviewed and discharge planning was addressed. ________________________________________________________________________  Total NON critical care TIME:  25   Minutes    Total CRITICAL CARE TIME Spent:   Minutes non procedure based      Comments   >50% of visit spent in counseling and coordination of care     ________________________________________________________________________  Nica Uribe MD     Procedures: see electronic medical records for all procedures/Xrays and details which were not copied into this note but were reviewed prior to creation of Plan. LABS:  I reviewed today's most current labs and imaging studies.   Pertinent labs include:  Recent Labs      01/21/17 0427 01/20/17 0253 01/19/17 0308   WBC  10.1   --   7.8   HGB  8.1*  7.9*  7.8*   HCT  25.2*  24.5*  23.8*   PLT  352   --   327     Recent Labs      01/21/17 0427 01/20/17 0253 01/19/17 0308   NA  140  138  139   K  4.0  3.9  3.9   CL  105  104  104   CO2  24  25  24   GLU  88  92  89   BUN  17  19  18   CREA  1.17*  1.19*  1.10*   CA  8.1*  8.0*  8.1*       Signed: Nica Uribe MD

## 2017-01-21 NOTE — PROGRESS NOTES
Cardiology Progress Note      1/21/2017 9:36 AM    Admit Date: 1/9/2017      Subjective:  BP's improved No other c/o. Visit Vitals    /85 (BP Patient Position: Sitting)    Pulse 70    Temp 98.1 °F (36.7 °C)    Resp 18    Wt 66.9 kg (147 lb 8 oz)    SpO2 98%    BMI 23.1 kg/m2     01/19 1901 - 01/21 0700  In: 1080 [P.O.:1080]  Out: 7648 [Urine:1601]        Objective:      Physical Exam:  VS as above  Chest CTA  Card RRR 2/6 DORIAN no gallop     Data Review:   Labs:    BUN 19  Creat 1.2   Hgb 7.9     Telemetry: dual chamber pacing       Assessment:     1. Chest pain, atypical for myocardial ischemia. No evidence of ischemic insult. Resolved   2. Dizziness, orthostatic hypotension. 3. Intermittent junctional/ectopic atrial rhythm. With the orthostasis and rate-limiting medications on board, probably should have pacing support. 4. Paroxysmal SVT, probably AV node reentry per strips. No recent recurrence. 5. Ischemic cardiomyopathy with prior anterior wall myocardial infarction and remote PTCA and stenting of the left anterior descending. EF 25% by echo here. 6. Hypertensive heart disease with chronic systolic CHF class 3 and CKD stage 3 at baseline. 7. Type 2 diabetes mellitus. 8. Dyslipidemia. 9. Recent right nephrectomy for renal cell cancer in 12/2016. 10. Acute on chronic renal failure, improved. 11. ? Left-sided pneumonia (retrocardiac abnormality on CXR). 12. Anemia, unspecified. 13. S/p pacer/ ICD Monday     Plan:    - Cont asa  - Decrease amiodarone to 200mg daily  - Cont toprol XL 12.5  - no ACE/ARB due to hypotension  - lasix 20mg daily  - Increase midodrine to 10mg TID, PT/OT hopefully rehab soon.

## 2017-01-21 NOTE — PROGRESS NOTES
[address] SHIFT NURSING NOTE    Bedside and Verbal shift change report given to eMlva Rico  (oncoming nurse) by [employer] nurse). Report included the following information Kardex. SHIFT SUMMARY: Mrs Machelle Pino did refuse to stand for orthostatic b/ps saying 'I will  the morning \"      Admission Date 1/9/2017   Admission Diagnosis SVT (supraventricular tachycardia)   Consults IP CONSULT TO CARDIOLOGY  IP CONSULT TO CARDIOLOGY        Consults   [x] PT   [] OT   [] Speech   [] Palliative      [] Hospice    [] Case Management   [] None   Cardiac Monitoring   [x] Yes   [] No     Antibiotics   [] Yes   [] No   GI Prophylaxis  (Ex: Protonix, Pepcid, etc,.)   [] Yes   [] No          DVT Prophylaxis   SCDs:  Sequential Compression Device: Bilateral          Santiago stockings:         [] Medication (Ex: Lovenox, Eliquis,  Heparin, etc..)   [] Contraindicated   [] None       Urinary Catheter             LDAs               Peripheral IV 01/14/17 Right Arm (Active)   Site Assessment Clean, dry, & intact 1/21/2017  4:34 AM   Phlebitis Assessment 0 1/21/2017  4:34 AM   Infiltration Assessment 0 1/21/2017  4:34 AM   Dressing Status Clean, dry, & intact 1/21/2017  4:34 AM   Dressing Type Tape;Transparent 1/21/2017  4:34 AM   Hub Color/Line Status Pink;Capped;Flushed;Patent 1/21/2017  4:34 AM   Alcohol Cap Used No 1/20/2017  1:53 PM                      I/Os   Intake/Output Summary (Last 24 hours) at 01/21/17 0568  Last data filed at 01/21/17 0434   Gross per 24 hour   Intake              680 ml   Output             1100 ml   Net             -420 ml         Activity Level Activity Level: Up with Assistance     Activity Assistance: Partial (two people)   Diet Active Orders   Diet    DIET CARDIAC Soft Solids; 2 GM NA (House Low NA)      Purposeful Rounding every 1-2 hour?    [x] Yes    Zuri Score  Total Score: 3   Bed Alarm (If score 3 or >)   [x] Yes    [] Refused (See signed refusal form in chart)   Evan Score  Evan Score: 17       Evan Score (if score 14 or less)   [] PMT consult   [] Nutrition consult   [] Wound Care consult      []  Specialty bed         Influenza Vaccine Received Flu Vaccine for Current Season (usually Sept-March): Yes               Needs prior to discharge:   Home O2 required:    [] Yes   [x] No     If yes, how much O2 required?     Other:    Last Bowel Movement Date: 01/19/17        POST-OP SURGICAL VATS   [] Yes   [] No     Incentive Spirometer:   [] Yes   [] Refused   Coughing and deep breathing:     [] Yes   [] Refused   Oral care:     [] Yes   [] Refused   Understanding (patient education):     [] Yes   [] Refused   Getting out of bed Number times ambulated in hallway past shift:    Number of times OOB to chair past shift:     Head of bed elevation:     [] Yes   [] Refused      Readmission Risk Assessment Tool Score Low Risk            12       Total Score        3 Patient Length of Stay > 5    4 More than 1 Admission in calendar year    5 Patient Insurance is Medicare, Medicaid or Self Pay        Criteria that do not apply:    Relationship with PCP    Patient Living Status    Charlson Comorbidity Score       Expected Length of Stay 5d 16h   Actual Length of Stay 12

## 2017-01-21 NOTE — PROGRESS NOTES
Bedside shift change report given to Zilliant. (oncoming nurse) by Torie Sky. (offgoing nurse). Report included the following information SBAR, Kardex, Intake/Output, MAR, Recent Results and Cardiac Rhythm paced. 4:40 PM Pt stood up to go to the bathroom, as well as to get her standing BP. Pt got lightheaded and had to sit down before the blood pressure was completed. While sitting the blood pressure was 80/60 after standing for a few seconds. Pt had been sitting in chair since 1400. Will inform MD and monitor pt closely. [address] [facility] SHIFT NURSING NOTE    Bedside shift change report given to Danilo (oncoming nurse) by Zilliant. (offgoing nurse). Report included the following information SBAR, Kardex, Intake/Output, MAR, Recent Results and Cardiac Rhythm paced. Ethelene Gauss     SHIFT SUMMARY:        Admission Date 1/9/2017   Admission Diagnosis SVT (supraventricular tachycardia)   Consults IP CONSULT TO CARDIOLOGY  IP CONSULT TO CARDIOLOGY        Consults   [x] PT   [x] OT   [] Speech   [] Palliative      [] Hospice    [] Case Management   [] None   Cardiac Monitoring   [x] Yes   [] No     Antibiotics   [] Yes   [] No   GI Prophylaxis  (Ex: Protonix, Pepcid, etc,.)   [x] Yes   [] No          DVT Prophylaxis   SCDs:  Sequential Compression Device: Bilateral          Santiago stockings:         [] Medication (Ex: Lovenox, Eliquis,  Heparin, etc..)   [] Contraindicated   [] None       Urinary Catheter             LDAs               Peripheral IV 01/14/17 Right Arm (Active)   Site Assessment Clean, dry, & intact 1/21/2017  2:29 PM   Phlebitis Assessment 0 1/21/2017  2:29 PM   Infiltration Assessment 0 1/21/2017  2:29 PM   Dressing Status Clean, dry, & intact 1/21/2017  2:29 PM   Dressing Type Transparent;Tape 1/21/2017  2:29 PM   Hub Color/Line Status Pink;Capped;Flushed;Patent 1/21/2017  2:29 PM   Alcohol Cap Used No 1/21/2017  2:29 PM                      I/Os   Intake/Output Summary (Last 24 hours) at 01/21/17 1432  Last data filed at 01/21/17 0453   Gross per 24 hour   Intake              240 ml   Output              501 ml   Net             -261 ml         Activity Level Activity Level: Up with Assistance     Activity Assistance: Partial (one person)   Diet Active Orders   Diet    DIET CARDIAC Soft Solids; 2 GM NA (House Low NA)      Purposeful Rounding every 1-2 hour? [x] Yes    Zuri Score  Total Score: 3   Bed Alarm (If score 3 or >)   [x] Yes    [] Refused (See signed refusal form in chart)   Evan Score  Evan Score: 17       Evan Score (if score 14 or less)   [] PMT consult   [] Nutrition consult   [] Wound Care consult      []  Specialty bed         Influenza Vaccine Received Flu Vaccine for Current Season (usually Sept-March): Yes               Needs prior to discharge:   Home O2 required:    [] Yes   [x] No     If yes, how much O2 required?     Other:    Last Bowel Movement Date: 01/19/17        POST-OP SURGICAL VATS   [] Yes   [] No     Incentive Spirometer:   [] Yes   [] Refused   Coughing and deep breathing:     [] Yes   [] Refused   Oral care:     [] Yes   [] Refused   Understanding (patient education):     [] Yes   [] Refused   Getting out of bed Number times ambulated in hallway past shift:    Number of times OOB to chair past shift:     Head of bed elevation:     [] Yes   [] Refused      Readmission Risk Assessment Tool Score Low Risk            12       Total Score        3 Patient Length of Stay > 5    4 More than 1 Admission in calendar year    5 Patient Insurance is Medicare, Medicaid or Self Pay        Criteria that do not apply:    Relationship with PCP    Patient Living Status    Charlson Comorbidity Score       Expected Length of Stay 5d 16h   Actual Length of Stay 12

## 2017-01-22 PROCEDURE — 74011250637 HC RX REV CODE- 250/637: Performed by: INTERNAL MEDICINE

## 2017-01-22 PROCEDURE — 65660000000 HC RM CCU STEPDOWN

## 2017-01-22 RX ADMIN — GABAPENTIN 400 MG: 100 CAPSULE ORAL at 21:04

## 2017-01-22 RX ADMIN — MIDODRINE HYDROCHLORIDE 10 MG: 5 TABLET ORAL at 08:55

## 2017-01-22 RX ADMIN — METOPROLOL SUCCINATE 12.5 MG: 25 TABLET, EXTENDED RELEASE ORAL at 08:55

## 2017-01-22 RX ADMIN — GABAPENTIN 300 MG: 300 CAPSULE ORAL at 08:54

## 2017-01-22 RX ADMIN — GABAPENTIN 400 MG: 100 CAPSULE ORAL at 17:10

## 2017-01-22 RX ADMIN — Medication 10 ML: at 21:04

## 2017-01-22 RX ADMIN — AMIODARONE HYDROCHLORIDE 200 MG: 200 TABLET ORAL at 08:54

## 2017-01-22 RX ADMIN — FUROSEMIDE 20 MG: 20 TABLET ORAL at 08:54

## 2017-01-22 RX ADMIN — ASPIRIN 81 MG CHEWABLE TABLET 81 MG: 81 TABLET CHEWABLE at 08:54

## 2017-01-22 RX ADMIN — MIDODRINE HYDROCHLORIDE 10 MG: 5 TABLET ORAL at 17:10

## 2017-01-22 RX ADMIN — MIDODRINE HYDROCHLORIDE 10 MG: 5 TABLET ORAL at 12:32

## 2017-01-22 RX ADMIN — Medication 10 ML: at 14:48

## 2017-01-22 RX ADMIN — Medication 10 ML: at 03:08

## 2017-01-22 NOTE — PROGRESS NOTES
Cardiology Progress Note      1/22/2017 9:36 AM    Admit Date: 1/9/2017      Subjective:  BP's improved No other c/o. Visit Vitals    /77 (BP 1 Location: Left arm)    Pulse 68    Temp 97.8 °F (36.6 °C)    Resp 18    Wt 66.9 kg (147 lb 8 oz)    SpO2 96%    BMI 23.1 kg/m2     01/20 1901 - 01/22 0700  In: 1120 [P.O.:1120]  Out: 1251 [Urine:1251]        Objective:      Physical Exam:  VS as above  Chest CTA  Card RRR 2/6 DORIAN no gallop     Data Review:   Labs:    BUN 19  Creat 1.2   Hgb 7.9     Telemetry: dual chamber pacing       Assessment:     1. Chest pain, atypical for myocardial ischemia. No evidence of ischemic insult. Resolved   2. Dizziness, orthostatic hypotension. 3. Intermittent junctional/ectopic atrial rhythm. With the orthostasis and rate-limiting medications on board, probably should have pacing support. 4. Paroxysmal SVT, probably AV node reentry per strips. No recent recurrence. 5. Ischemic cardiomyopathy with prior anterior wall myocardial infarction and remote PTCA and stenting of the left anterior descending. EF 25% by echo here. 6. Hypertensive heart disease with chronic systolic CHF class 3 and CKD stage 3 at baseline. 7. Type 2 diabetes mellitus. 8. Dyslipidemia. 9. Recent right nephrectomy for renal cell cancer in 12/2016. 10. Acute on chronic renal failure, improved. 11. ? Left-sided pneumonia (retrocardiac abnormality on CXR). 12. Anemia, unspecified. 13. S/p pacer/ ICD Monday     Plan:    - Cont asa  - Cont amiodarone to 200mg daily  - Cont toprol XL 12.5  - no ACE/ARB due to hypotension  - lasix 20mg daily, BNP is mildly elevated, on room air  - Cont midodrine to 10mg TID, PT/OT hopefully rehab soon.

## 2017-01-22 NOTE — PROGRESS NOTES
Bedside shift change report given to [email or web address]. (oncoming nurse) by Bryan Pace (offgoing nurse). Report included the following information SBAR, Kardex, Intake/Output, MAR, Recent Results and Cardiac Rhythm paced. 5:50 PM Pure wick changed. [address] SHIFT NURSING NOTE    Bedside shift change report given to Bryan Pace (oncoming nurse) by [email or web address]. (offgoing nurse). Report included the following information SBAR, Kardex, Intake/Output, MAR, Recent Results and Cardiac Rhythm paced. SHIFT SUMMARY: Pts BP remained stable during othostatic BPs today. Admission Date 1/9/2017   Admission Diagnosis SVT (supraventricular tachycardia)   Consults IP CONSULT TO CARDIOLOGY  IP CONSULT TO CARDIOLOGY        Consults   [x] PT   [x] OT   [] Speech   [] Palliative      [] Hospice    [x] Case Management   [] None   Cardiac Monitoring   [x] Yes   [] No     Antibiotics   [] Yes   [] No   GI Prophylaxis  (Ex: Protonix, Pepcid, etc,.)   [x] Yes   [] No          DVT Prophylaxis   SCDs:  Sequential Compression Device: Bilateral          Santiago stockings:         [] Medication (Ex: Lovenox, Eliquis,  Heparin, etc..)   [] Contraindicated   [] None       Urinary Catheter             LDAs               Peripheral IV 01/14/17 Right Arm (Active)   Site Assessment Clean, dry, & intact 1/22/2017  3:08 PM   Phlebitis Assessment 0 1/22/2017  3:08 PM   Infiltration Assessment 0 1/22/2017  3:08 PM   Dressing Status Clean, dry, & intact 1/22/2017  3:08 PM   Dressing Type Transparent;Tape 1/22/2017  3:08 PM   Hub Color/Line Status Pink;Capped;Flushed;Patent 1/22/2017  3:08 PM   Alcohol Cap Used No 1/22/2017  3:08 PM                      I/Os   Intake/Output Summary (Last 24 hours) at 01/22/17 1509  Last data filed at 01/22/17 0359   Gross per 24 hour   Intake              640 ml   Output              750 ml   Net             -110 ml         Activity Level Activity Level:  Up with Assistance     Activity Assistance: Partial (two people)   Diet Active Orders   Diet    DIET CARDIAC Soft Solids; 2 GM NA (House Low NA)      Purposeful Rounding every 1-2 hour? [x] Yes    Zuri Score  Total Score: 3   Bed Alarm (If score 3 or >)   [] Yes    [] Refused (See signed refusal form in chart)   Evan Score  Evan Score: 18       Evan Score (if score 14 or less)   [] PMT consult   [] Nutrition consult   [] Wound Care consult      []  Specialty bed         Influenza Vaccine Received Flu Vaccine for Current Season (usually Sept-March): Yes               Needs prior to discharge:   Home O2 required:    [] Yes   [x] No     If yes, how much O2 required?     Other:    Last Bowel Movement Date: 01/21/17        POST-OP SURGICAL VATS   [] Yes   [] No     Incentive Spirometer:   [] Yes   [] Refused   Coughing and deep breathing:     [] Yes   [] Refused   Oral care:     [] Yes   [] Refused   Understanding (patient education):     [] Yes   [] Refused   Getting out of bed Number times ambulated in hallway past shift:    Number of times OOB to chair past shift:     Head of bed elevation:     [] Yes   [] Refused      Readmission Risk Assessment Tool Score Low Risk            12       Total Score        3 Patient Length of Stay > 5    4 More than 1 Admission in calendar year    5 Patient Insurance is Medicare, Medicaid or Self Pay        Criteria that do not apply:    Relationship with PCP    Patient Living Status    Charlson Comorbidity Score       Expected Length of Stay 5d 16h   Actual Length of Stay 13

## 2017-01-22 NOTE — PROGRESS NOTES
[facility] SHIFT NURSING NOTE    Bedside shift change report given to Eliseo (oncoming nurse) by Liban Negron (offgoing nurse). Report included the following information SBAR, Kardex, Intake/Output, MAR and Recent Results. SHIFT SUMMARY:         Admission Date 1/9/2017   Admission Diagnosis SVT (supraventricular tachycardia)   Consults IP CONSULT TO CARDIOLOGY  IP CONSULT TO CARDIOLOGY        Consults   [x] PT   [x] OT   [] Speech   [] Palliative      [] Hospice    [x] Case Management   [] None   Cardiac Monitoring   [] Yes   [] No     Antibiotics   [] Yes   [] No   GI Prophylaxis  (Ex: Protonix, Pepcid, etc,.)   [] Yes   [] No          DVT Prophylaxis   SCDs:  Sequential Compression Device: Bilateral          Santiago stockings:         [] Medication (Ex: Lovenox, Eliquis,  Heparin, etc..)   [] Contraindicated   [x] None       Urinary Catheter             LDAs               Peripheral IV 01/14/17 Right Arm (Active)   Site Assessment Clean, dry, & intact 1/21/2017  8:04 PM   Phlebitis Assessment 0 1/21/2017  8:04 PM   Infiltration Assessment 0 1/21/2017  8:04 PM   Dressing Status Clean, dry, & intact 1/21/2017  8:04 PM   Dressing Type Transparent 1/21/2017  8:04 PM   Hub Color/Line Status Pink;Capped 1/21/2017  8:04 PM   Alcohol Cap Used No 1/21/2017  2:29 PM                      I/Os   Intake/Output Summary (Last 24 hours) at 01/21/17 2006  Last data filed at 01/21/17 2004   Gross per 24 hour   Intake             1020 ml   Output              801 ml   Net              219 ml         Activity Level Activity Level: Up with Assistance     Activity Assistance: Partial (one person)   Diet Active Orders   Diet    DIET CARDIAC Soft Solids; 2 GM NA (House Low NA)      Purposeful Rounding every 1-2 hour?    [x] Yes    Zuri Score  Total Score: 3   Bed Alarm (If score 3 or >)   [x] Yes    [] Refused (See signed refusal form in chart)   Evan Score  Evan Score: 17       Evan Score (if score 14 or less)   [] PMT consult   [] Nutrition consult   [] Wound Care consult      []  Specialty bed         Influenza Vaccine Received Flu Vaccine for Current Season (usually Sept-March): Yes               Needs prior to discharge:   Home O2 required:    [] Yes   [x] No     If yes, how much O2 required?     Other:    Last Bowel Movement Date: 01/19/17        POST-OP SURGICAL VATS   [] Yes   [x] No     Incentive Spirometer:   [] Yes   [] Refused   Coughing and deep breathing:     [] Yes   [] Refused   Oral care:     [] Yes   [] Refused   Understanding (patient education):     [] Yes   [] Refused   Getting out of bed Number times ambulated in hallway past shift:    Number of times OOB to chair past shift:     Head of bed elevation:     [] Yes   [] Refused      Readmission Risk Assessment Tool Score Low Risk            12       Total Score        3 Patient Length of Stay > 5    4 More than 1 Admission in calendar year    5 Patient Insurance is Medicare, Medicaid or Self Pay        Criteria that do not apply:    Relationship with PCP    Patient Living Status    Charlson Comorbidity Score       Expected Length of Stay 5d 16h   Actual Length of Stay 12

## 2017-01-22 NOTE — PROGRESS NOTES
Hospitalist Progress Note    NAME: Jennifer Power   :  1945   MRN:  [id number]       Interim Hospital Summary: 80 y.o. female whom presented on 2017 with chest pain     Assessment / Plan:    Orthostatic Hypotension  Vasovagal reaction / pre syncope  -was not orthostatic yesterday AM but was again orthostatic last night dropping down to SBP 80 (per patient and nurse but not recorded in flow sheets)  -stopped Cymbalta (associated with orthostatic hypotension)   -continue midodrine 10 TID  -apply compression stockings  -continue to follow orthostatic BP and PT OT as tolerated. DC planning to SNF once no longer orthostatic    Paroxsymal SVT / SSS POA  H/o CAD s/p MI, s/p remote PTCA, stenting of LAD  Ischemic cardiomyopathy with chronic systolic HF  -ECHO EF 01% no AS no MR  -s/p dual chamber ICD for primary prevention of SCD 17  -continue asa, amiodarone and toprol  -appreciate cardiology input / follow up. Anti coagulation not felt necessary as patient has not demonstrated prolonged episodes of afib. Follow up with Cardiology as OP    Anemia of chronic disease  -check Iron studies consistent with chronic disease and not ANTOINETTE (low TIBC, normal Iron and normal percent sat of iron)      Sepsis due to PNA POA   -completed abx treatment with LVQ / Ceftin    LOYDA  S/P Recent Right nephrectomy for RCC   -Cr trending down to normal    Peripheral neuropathy  -increase neurontin 400mg TID since we stopped her cymbalta. Discussed with pt. Surrogate Decision Maker: elias Jacksona Kayla, dtr [facility] [phone] (mailbox is full)  Code status: Full  Prophylaxis: lovenox  Recommended Disposition: SNF/LTC  In 24 hrs pending authorization       Subjective:     Chief Complaint / Reason for Physician Visit: orthostatic Hypotension, CHF, Afib RVR  Still orthostatic.       Review of Systems:  Symptom Y/N Comments  Symptom Y/N Comments   Fever/Chills n   Chest Pain n    Poor Appetite n   Edema n    Cough n Abdominal Pain     Sputum n   Joint Pain     SOB/ROCK y improved  Pruritis/Rash     Nausea/vomit n   Tolerating PT/OT y    Diarrhea    Tolerating Diet y    Constipation    Other       Could NOT obtain due to:      Objective:     VITALS:   Last 24hrs VS reviewed since prior progress note. Most recent are:  Patient Vitals for the past 24 hrs:   Temp Pulse Resp BP SpO2   01/22/17 0735 - 71 - 138/76 -   01/22/17 0733 98 °F (36.7 °C) 66 18 127/77 98 %   01/22/17 0358 98.1 °F (36.7 °C) 70 18 131/70 97 %   01/21/17 2242 97.9 °F (36.6 °C) 75 18 119/73 100 %   01/21/17 2005 97.8 °F (36.6 °C) 72 16 135/73 98 %   01/21/17 1648 - - - 108/75 -   01/21/17 1635 97.6 °F (36.4 °C) 69 16 (!) 137/92 100 %   01/21/17 1231 - (!) 104 - (!) 148/91 -   01/21/17 1230 - 71 - 136/77 -   01/21/17 1229 97.4 °F (36.3 °C) 70 18 135/76 99 %       Intake/Output Summary (Last 24 hours) at 01/22/17 0854  Last data filed at 01/22/17 0359   Gross per 24 hour   Intake              880 ml   Output              750 ml   Net              130 ml        PHYSICAL EXAM:  General: WD, WN. Alert, cooperative, no acute distress    EENT:  EOMI. Anicteric sclerae. MMM  Resp:  Bilateral basal crackles improved +  No accessory muscle use  CV:  Regular  rhythm,  No edema  GI:  Soft, Non distended, Non tender.  +Bowel sounds  Neurologic:  Alert and oriented X 3, normal speech,   Psych:   Good insight. Not anxious nor agitated  Skin:  No rashes.   No jaundice    Reviewed most current lab test results and cultures  YES  Reviewed most current radiology test results   YES  Review and summation of old records today    NO  Reviewed patient's current orders and MAR    YES  PMH/SH reviewed - no change compared to H&P  ________________________________________________________________________  Care Plan discussed with:    Comments   Patient x    Family   Spoke with son and DIL   RN x    Care Manager     Consultant                        Multidiciplinary team rounds were held today with , nursing, pharmacist and clinical coordinator. Patient's plan of care was discussed; medications were reviewed and discharge planning was addressed. ________________________________________________________________________  Total NON critical care TIME:  25   Minutes    Total CRITICAL CARE TIME Spent:   Minutes non procedure based      Comments   >50% of visit spent in counseling and coordination of care     ________________________________________________________________________  Anitra Leonard MD     Procedures: see electronic medical records for all procedures/Xrays and details which were not copied into this note but were reviewed prior to creation of Plan. LABS:  I reviewed today's most current labs and imaging studies.   Pertinent labs include:  Recent Labs      01/21/17 0427 01/20/17 0253   WBC  10.1   --    HGB  8.1*  7.9*   HCT  25.2*  24.5*   PLT  352   --      Recent Labs      01/21/17 0427 01/20/17 0253   NA  140  138   K  4.0  3.9   CL  105  104   CO2  24  25   GLU  88  92   BUN  17  19   CREA  1.17*  1.19*   CA  8.1*  8.0*       Signed: Anitra Leonard MD

## 2017-01-23 VITALS
HEART RATE: 69 BPM | OXYGEN SATURATION: 100 % | RESPIRATION RATE: 18 BRPM | DIASTOLIC BLOOD PRESSURE: 80 MMHG | TEMPERATURE: 98 F | WEIGHT: 147.5 LBS | BODY MASS INDEX: 23.1 KG/M2 | SYSTOLIC BLOOD PRESSURE: 153 MMHG

## 2017-01-23 LAB
ANION GAP BLD CALC-SCNC: 9 MMOL/L (ref 5–15)
BUN SERPL-MCNC: 19 MG/DL (ref 6–20)
BUN/CREAT SERPL: 16 (ref 12–20)
CALCIUM SERPL-MCNC: 8 MG/DL (ref 8.5–10.1)
CHLORIDE SERPL-SCNC: 105 MMOL/L (ref 97–108)
CO2 SERPL-SCNC: 26 MMOL/L (ref 21–32)
CREAT SERPL-MCNC: 1.2 MG/DL (ref 0.55–1.02)
ERYTHROCYTE [DISTWIDTH] IN BLOOD BY AUTOMATED COUNT: 19.8 % (ref 11.5–14.5)
GLUCOSE BLD STRIP.AUTO-MCNC: 125 MG/DL (ref 65–100)
GLUCOSE SERPL-MCNC: 94 MG/DL (ref 65–100)
HCT VFR BLD AUTO: 24.4 % (ref 35–47)
HGB BLD-MCNC: 7.8 G/DL (ref 11.5–16)
MCH RBC QN AUTO: 23.4 PG (ref 26–34)
MCHC RBC AUTO-ENTMCNC: 32 G/DL (ref 30–36.5)
MCV RBC AUTO: 73.3 FL (ref 80–99)
PLATELET # BLD AUTO: 338 K/UL (ref 150–400)
POTASSIUM SERPL-SCNC: 4.3 MMOL/L (ref 3.5–5.1)
RBC # BLD AUTO: 3.33 M/UL (ref 3.8–5.2)
SERVICE CMNT-IMP: ABNORMAL
SODIUM SERPL-SCNC: 140 MMOL/L (ref 136–145)
WBC # BLD AUTO: 11.3 K/UL (ref 3.6–11)

## 2017-01-23 PROCEDURE — 74011250637 HC RX REV CODE- 250/637: Performed by: INTERNAL MEDICINE

## 2017-01-23 PROCEDURE — 85027 COMPLETE CBC AUTOMATED: CPT | Performed by: INTERNAL MEDICINE

## 2017-01-23 PROCEDURE — 80048 BASIC METABOLIC PNL TOTAL CA: CPT | Performed by: INTERNAL MEDICINE

## 2017-01-23 PROCEDURE — 97116 GAIT TRAINING THERAPY: CPT | Performed by: PHYSICAL THERAPIST

## 2017-01-23 PROCEDURE — 36415 COLL VENOUS BLD VENIPUNCTURE: CPT | Performed by: INTERNAL MEDICINE

## 2017-01-23 PROCEDURE — 82962 GLUCOSE BLOOD TEST: CPT

## 2017-01-23 PROCEDURE — 97530 THERAPEUTIC ACTIVITIES: CPT | Performed by: PHYSICAL THERAPIST

## 2017-01-23 RX ORDER — FUROSEMIDE 20 MG/1
20 TABLET ORAL DAILY
Qty: 30 TAB | Refills: 1 | Status: ON HOLD | OUTPATIENT
Start: 2017-01-23 | End: 2017-02-01

## 2017-01-23 RX ORDER — METOPROLOL SUCCINATE 25 MG/1
12.5 TABLET, EXTENDED RELEASE ORAL DAILY
Status: ON HOLD | COMMUNITY
Start: 2017-01-23 | End: 2017-02-28

## 2017-01-23 RX ORDER — AMIODARONE HYDROCHLORIDE 200 MG/1
200 TABLET ORAL DAILY
Qty: 30 TAB | Refills: 1 | Status: SHIPPED | OUTPATIENT
Start: 2017-01-23 | End: 2017-03-24

## 2017-01-23 RX ORDER — MIDODRINE HYDROCHLORIDE 10 MG/1
10 TABLET ORAL
Qty: 90 TAB | Refills: 1 | Status: SHIPPED | OUTPATIENT
Start: 2017-01-23 | End: 2017-03-24

## 2017-01-23 RX ORDER — GABAPENTIN 400 MG/1
400 CAPSULE ORAL 3 TIMES DAILY
Qty: 90 CAP | Refills: 1 | Status: ON HOLD | OUTPATIENT
Start: 2017-01-23 | End: 2017-02-28 | Stop reason: DRUGHIGH

## 2017-01-23 RX ADMIN — GABAPENTIN 400 MG: 100 CAPSULE ORAL at 09:15

## 2017-01-23 RX ADMIN — ASPIRIN 81 MG CHEWABLE TABLET 81 MG: 81 TABLET CHEWABLE at 09:15

## 2017-01-23 RX ADMIN — FUROSEMIDE 20 MG: 20 TABLET ORAL at 09:16

## 2017-01-23 RX ADMIN — AMIODARONE HYDROCHLORIDE 200 MG: 200 TABLET ORAL at 09:15

## 2017-01-23 RX ADMIN — Medication 10 ML: at 02:05

## 2017-01-23 RX ADMIN — MIDODRINE HYDROCHLORIDE 10 MG: 5 TABLET ORAL at 09:16

## 2017-01-23 RX ADMIN — METOPROLOL SUCCINATE 12.5 MG: 25 TABLET, EXTENDED RELEASE ORAL at 09:16

## 2017-01-23 RX ADMIN — MIDODRINE HYDROCHLORIDE 10 MG: 5 TABLET ORAL at 13:39

## 2017-01-23 NOTE — PROGRESS NOTES
Care Management:    Anticipate discharge today or tomorrow. Patient returning to [facility] and they have been sent updates and will need to get auth. Once auth obtained for return to [facility] we will set up BLS ambulance transport back to [facility]. Patient states she will let her family know she is discharging back to [facility]. Second IM letter given and copy placed on chart. Care Management Interventions  PCP Verified by CM: Yes  Mode of Transport at Discharge: BLS  Transition of Care Consult (CM Consult): SNF  Plan discussed with Pt/Family/Caregiver: Yes  Discharge Location  Discharge Placement: Skilled nursing facility     Nurse has number to call report once discharge set.      Elzbieta Escalera crm acm [phone]

## 2017-01-23 NOTE — PROGRESS NOTES
Cardiopulmonary Care Interdisciplinary rounds were held today to discuss patient plan of care and outcomes. The following members were present: PT, NP/Physician, Pharmacy, Nursing, Nutritionist and Case Management.       Plan of Care: Continue current treatment plan  D/c rehab awaiting auth

## 2017-01-23 NOTE — DISCHARGE SUMMARY
Hospitalist Discharge Summary     Patient ID:  Baron Ya  [id number]  80 y.o.  1945    PCP on record: JOSE Virk    Admit date: 1/9/2017  Discharge date and time: 1/23/2017      DISCHARGE DIAGNOSIS:    Orthostatic Hypotension  Vasovagal reaction / pre syncope  Paroxsymal SVT / SSS POA  H/o CAD s/p MI, s/p remote PTCA, stenting of LAD  Ischemic cardiomyopathy with chronic systolic HF  Anemia of chronic disease  Sepsis due to PNA POA   LOYDA  S/P Recent Right nephrectomy for RCC 12/16  Peripheral neuropathy      CONSULTATIONS:  IP CONSULT TO CARDIOLOGY  IP CONSULT TO CARDIOLOGY    Excerpted HPI from H&P of Carlos Alberto Lazo MD:  CHIEF COMPLAINT: Chest pain     HISTORY OF PRESENT ILLNESS:   Orlando Medrano is a 80 y.o.  female who presents with above. Pt recently underwent R nephrectomy 2/2 RCC and was hospitalized 12/22-27 for this. She discharged to [facility]. She says that although she has essentially no pain she has continued to have weakness. Over the last 2 days, she has developed chest pain under her L breast. She denies lightheadedness or palpitations. She has been having ROCK. She has a dry cough. No fevers or rhinorrhea. Appetite is poor because she doesn't like the food. No bowel changes. She thinks her feet are a little bit swollen.     ______________________________________________________________________  DISCHARGE SUMMARY/HOSPITAL COURSE:  for full details see H&P, daily progress notes, labs, consult notes. Orthostatic Hypotension  Vasovagal reaction / pre syncope  -hospital stay complicated by postural hypotension and pre syncope after BM. She gradually improved and is now no longer orthostatic and she does not experience lightheadedness when she stands up. Interventions include: stopped Cymbalta (associated with orthostatic hypotension) 1/19. Starting midodrine and gradually increased to present dose of 10mg TID.   Also added compression stockings. -stable for discharge to SNF      Paroxsymal SVT / SSS POA  H/o CAD s/p MI, s/p remote PTCA, stenting of LAD  Ischemic cardiomyopathy with chronic systolic HF  -ECHO EF 50% no AS no MR  -she was evaluated by cardiology and felt appropriate for ICD by Dr Jordyn Delong. She is now s/p dual chamber ICD for primary prevention of SCD 1/16/17  -continue asa, amiodarone and toprol  -appreciate cardiology input / follow up. Anti coagulation not felt necessary as patient has not demonstrated prolonged episodes of afib. Follow up with Dr Nery Lu as OP     Anemia of chronic disease  -Iron studies consistent with chronic disease and not ANTOINETTE (low TIBC, normal Iron and normal percent sat of iron)      Sepsis due to PNA POA   -completed abx treatment with LVQ / Ceftin     LOYDA, resolved  S/P Recent Right nephrectomy for RCC 12/16. Cr has been stable    Peripheral neuropathy  -increased neurontin 400mg TID since we stopped her cymbalta.      _______________________________________________________________________  Patient seen and examined by me on discharge day. Pertinent Findings:  General: WD, WN. Alert, cooperative, no acute distress    EENT:  EOMI. Anicteric sclerae. MMM  Resp:  Bilateral basal crackles improved + No accessory muscle use  CV:  Regular rhythm,  No edema  GI:  Soft, Non distended, Non tender.  +Bowel sounds  Neurologic:  Alert and oriented X 3, normal speech,   Psych:   Good insight. Not anxious nor agitated  Skin:  No rashes. No jaundice     _______________________________________________________________________  DISCHARGE MEDICATIONS:   Current Discharge Medication List      START taking these medications    Details   amiodarone (CORDARONE) 200 mg tablet Take 1 Tab by mouth daily for 60 days. Qty: 30 Tab, Refills: 1      furosemide (LASIX) 20 mg tablet Take 1 Tab by mouth daily for 60 days.   Qty: 30 Tab, Refills: 1      midodrine (PROAMITINE) 10 mg tablet Take 1 Tab by mouth three (3) times daily (with meals) for 60 days. Qty: 90 Tab, Refills: 1         CONTINUE these medications which have CHANGED    Details   gabapentin (NEURONTIN) 400 mg capsule Take 1 Cap by mouth three (3) times daily for 60 days. Qty: 90 Cap, Refills: 1      metoprolol succinate (TOPROL-XL) 25 mg XL tablet Take 0.5 Tabs by mouth daily. Sonya Adamson NP at [facility] these medications which have NOT CHANGED    Details   aspirin delayed-release 81 mg tablet Take 81 mg by mouth daily. docusate sodium (COLACE) 100 mg capsule Take 1 Cap by mouth two (2) times a day for 90 days. Qty: 60 Cap, Refills: 2         STOP taking these medications       DULoxetine (CYMBALTA) 30 mg capsule Comments:   Reason for Stopping:         hydrochlorothiazide (HYDRODIURIL) 25 mg tablet Comments:   Reason for Stopping:         lisinopril (PRINIVIL, ZESTRIL) 40 mg tablet Comments:   Reason for Stopping:         HYDROcodone-acetaminophen (LORTAB) 5-500 mg per tablet Comments:   Reason for Stopping:               My Recommended Diet, Activity, Wound Care, and follow-up labs are listed in the patient's Discharge Insturctions which I have personally completed and reviewed.     _______________________________________________________________________  DISPOSITION:    Home with Family:    Home with HH/PT/OT/RN:    SNF/LTC: x   GUILHERME:    OTHER:        Condition at Discharge:  Stable  _______________________________________________________________________  Follow up with:   PCP : JOSE Lopez  Follow-up Information     Follow up With Details [address] [place] [address] ([address] [place] [address])   [address]  [address] [place] [address] [place]    Karl Hills MD In 2 weeks  [phone]                Total time in minutes spent coordinating this discharge (includes going over instructions, follow-up, prescriptions, and preparing report for sign off to her PCP) :  35 minutes    Signed:  Judith Bowling MD

## 2017-01-23 NOTE — CARDIO/PULMONARY
C/P Rehab Note:      Chart Reviewed. Pt admitted with SVT. Per Cardiology note: SVT, probably AV node reentry. ? afib as well. Ischemic cardiomyopathy with prior anterior wall myocardial infarction and remote PTCA and stenting of the left anterior descending. EF 25% by echo.      PMH significant for:  1. HTN  2. DM  3. Hyperlipiedmia  4. Acute on chronic renal failure. Attempt to meet with pt for follow up CHF teaching -Care Manager currently in room with patient. Handout on amiodarone left at bedside and handout on post ICD left at bedside as Physical Therapy now in room with patient. Will continue to follow-has had previous CHF teaching this admission.

## 2017-01-23 NOTE — PROGRESS NOTES
[address] SHIFT NURSING NOTE    Bedside shift change report given to Shagufta (oncoming nurse) by Demi Jung (offgoing nurse). Report included the following information SBAR, Kardex, Intake/Output, MAR and Recent Results. SHIFT SUMMARY:         Admission Date 1/9/2017   Admission Diagnosis SVT (supraventricular tachycardia)   Consults IP CONSULT TO CARDIOLOGY  IP CONSULT TO CARDIOLOGY        Consults   [x] PT   [x] OT   [] Speech   [] Palliative      [] Hospice    [x] Case Management   [] None   Cardiac Monitoring   [x] Yes   [] No     Antibiotics   [] Yes   [x] No   GI Prophylaxis  (Ex: Protonix, Pepcid, etc,.)   [] Yes   [x] No          DVT Prophylaxis   SCDs:  Sequential Compression Device: Bilateral          Santiago stockings:         [] Medication (Ex: Lovenox, Eliquis,  Heparin, etc.. )   [x] Contraindicated   [] None       Urinary Catheter             LDAs               Peripheral IV 01/14/17 Right Arm (Active)   Site Assessment Clean, dry, & intact 1/22/2017  7:27 PM   Phlebitis Assessment 0 1/22/2017  7:27 PM   Infiltration Assessment 0 1/22/2017  7:27 PM   Dressing Status Clean, dry, & intact 1/22/2017  7:27 PM   Dressing Type Transparent 1/22/2017  7:27 PM   Hub Color/Line Status Pink;Capped 1/22/2017  7:27 PM   Alcohol Cap Used No 1/22/2017  3:08 PM                      I/Os   Intake/Output Summary (Last 24 hours) at 01/22/17 1930  Last data filed at 01/22/17 1927   Gross per 24 hour   Intake             1000 ml   Output              750 ml   Net              250 ml         Activity Level Activity Level: Up with Assistance     Activity Assistance: Partial (two people)   Diet Active Orders   Diet    DIET CARDIAC Soft Solids; 2 GM NA (House Low NA)      Purposeful Rounding every 1-2 hour?    [x] Yes    Zuri Score  Total Score: 3   Bed Alarm (If score 3 or >)   [x] Yes    [] Refused (See signed refusal form in chart)   Evan Score  Evan Score: 18       Evan Score (if score 14 or less)   [] PMT consult   [] Nutrition consult   [] Wound Care consult      []  Specialty bed         Influenza Vaccine Received Flu Vaccine for Current Season (usually Sept-March): Yes               Needs prior to discharge:   Home O2 required:    [] Yes   [x] No     If yes, how much O2 required?     Other:    Last Bowel Movement Date: 01/21/17        POST-OP SURGICAL VATS   [] Yes   [x] No     Incentive Spirometer:   [] Yes   [] Refused   Coughing and deep breathing:     [] Yes   [] Refused   Oral care:     [] Yes   [] Refused   Understanding (patient education):     [] Yes   [] Refused   Getting out of bed Number times ambulated in hallway past shift:    Number of times OOB to chair past shift:     Head of bed elevation:     [] Yes   [] Refused      Readmission Risk Assessment Tool Score Low Risk            12       Total Score        3 Patient Length of Stay > 5    4 More than 1 Admission in calendar year    5 Patient Insurance is Medicare, Medicaid or Self Pay        Criteria that do not apply:    Relationship with PCP    Patient Living Status    Charlson Comorbidity Score       Expected Length of Stay 5d 16h   Actual Length of Stay 13

## 2017-01-23 NOTE — PROGRESS NOTES
0730  Report received from [address] [place] [address], [address] [place] [address]. SBAR, Kardex, Procedure Summary, Intake/Output, MAR, Accordion, Recent Results and Med Rec Status were discussed. 1440  Discharge instructions reviewed with patient, questions answered.      1515  PIV and tele removed    1525  Report called to Marla Orellana RN at [facility]    Lilly Harrington

## 2017-01-23 NOTE — PROGRESS NOTES
Problem: Mobility Impaired (Adult and Pediatric)  Goal: *Acute Goals and Plan of Care (Insert Text)  Physical Therapy Goals  Revised 1/17/2017  1. Patient will move from supine to sit and sit to supine , scoot up and down and roll side to side in bed with supervision/set-up within 7 day(s). 2. Patient will transfer from bed to chair and chair to bed with supervision/set-up using the least restrictive device within 7 day(s). 3. Patient will perform sit to stand with supervision/set-up within 7 day(s). 4. Patient will ambulate with minimal assistance/contact guard assist for 50 feet with the least restrictive device within 7 day(s). 5. Patient will demonstrate good recall and compliance with pacemaker precautions during activity within 7 days. Physical Therapy Goals  Initiated 1/11/2017  1. Patient will move from supine to sit and sit to supine , scoot up and down and roll side to side in bed with supervision/set-up within 7 day(s). 2. Patient will transfer from bed to chair and chair to bed with minimal assistance/contact guard assist using the least restrictive device within 7 day(s). 3. Patient will perform sit to stand with minimal assistance/contact guard assist within 7 day(s). 4. Patient will ambulate with minimal assistance/contact guard assist for 75 feet with the least restrictive device within 7 day(s). 5. Patient will perform 2 sets of 10 repetitions of active strengthening exercises for bilateral lower extremity(s) with supervision/set-up within 7 day(s). PHYSICAL THERAPY TREATMENT  Patient: Phyllis Rodriguez (89 y.o. female)  Date: 1/23/2017  Diagnosis: SVT (supraventricular tachycardia) <principal problem not specified>       Precautions:   falls      ASSESSMENT: Patient noted to be hypertensive with initial standing today. Patient demonstrating improved overall mobility. She is able to perform bed mobility with supervision and requires CGA for tranfers.  Gait is slow and unsteady with increased tremulousness noted in R LE. Patient demonstrating mild B knee buckling and required an emergent transition to sitting on EOB- BP stable. Transferred to chair and BP remained stable. Patient requiring min A for ambulation today using RW. Patient appears somewhat anxious regarding pending discharge which appears to impair her ability to fully participate in therapy and noted to have c/o \" not feeling right \" but VSS. Recommend return to SNF following discharge to improve functional independence prior to returning to home. Progression toward goals:  [ ]    Improving appropriately and progressing toward goals  [X]    Improving slowly and progressing toward goals  [ ]    Not making progress toward goals and plan of care will be adjusted       PLAN:  Patient continues to benefit from skilled intervention to address the above impairments. Continue treatment per established plan of care. Discharge Recommendations:  Skilled Nursing Facility  Further Equipment Recommendations for Discharge:  TBD by SNF       SUBJECTIVE:   Patient stated I can't leave the hospital if my pressures are up. They need to figure this out before they send me to rehab.       OBJECTIVE DATA SUMMARY:   Critical Behavior:  Neurologic State: Alert, Appropriate for age  Orientation Level: Oriented X4  Cognition: Appropriate decision making  Safety/Judgement: Awareness of environment, Fall prevention  Functional Mobility Training:  Bed Mobility:  Rolling: Supervision  Supine to Sit: Supervision     Scooting: Supervision        Transfers:  Sit to Stand: Contact guard assistance  Stand to Sit: Contact guard assistance        Bed to Chair: Minimum assistance                    Balance:  Sitting: Intact  Standing: Impaired  Standing - Static: Good;Constant support  Standing - Dynamic : Fair (using RW for support)  Ambulation/Gait Training:  Distance (ft): 15 Feet (ft)  Assistive Device: Walker, rolling;Gait belt  Ambulation - Level of Assistance: Minimal assistance        Gait Abnormalities: Decreased step clearance;Shuffling gait        Base of Support: Widened     Speed/Maria Luisa: Pace decreased (<100 feet/min); Shuffled; Slow  Step Length: Left shortened;Right shortened      Gait is slow and unsteady; tremulousness noted in R LE; short shuffled steps noted. Patient ambulated 15 feet and then experienced mild knee buckling with c/o feeling \"odd\". Assisted to EOB- BP stable. Then then transferred to chair and BP remained stable        Pain:  Pain Scale 1: Numeric (0 - 10)  Pain Intensity 1: 0              Activity Tolerance:   Hypertensive in standing  Please refer to the flowsheet for vital signs taken during this treatment.   After treatment:   [X]    Patient left in no apparent distress sitting up in chair  [ ]    Patient left in no apparent distress in bed  [X]    Call bell left within reach  [X]    Nursing notified  [ ]    Caregiver present  [X]    Bed alarm activated      COMMUNICATION/COLLABORATION:   The patients plan of care was discussed with: Registered Nurse,  and Rehabilitation Attendant     Bj Agustin PT   Time Calculation: 28 mins

## 2017-01-23 NOTE — PROGRESS NOTES
Hospitalist Progress Note    NAME: Amy Weber   :  1945   MRN:  [id number]       Interim Hospital Summary: 80 y.o. female whom presented on 2017 with chest pain     Assessment / Plan:    Orthostatic Hypotension  Vasovagal reaction / pre syncope  -no longer orthostatic and she does not experience lightheadedness when she stands up  -stopped Cymbalta (associated with orthostatic hypotension)   -continue midodrine 10 TID  -apply compression stockings  -stable for discharge to SNF once bed available    Paroxsymal SVT / SSS POA  H/o CAD s/p MI, s/p remote PTCA, stenting of LAD  Ischemic cardiomyopathy with chronic systolic HF  -ECHO EF 58% no AS no MR  -s/p dual chamber ICD for primary prevention of SCD 17  -continue asa, amiodarone and toprol  -appreciate cardiology input / follow up. Anti coagulation not felt necessary as patient has not demonstrated prolonged episodes of afib. Follow up with Dr Nery Lu as OP    Anemia of chronic disease  -check Iron studies consistent with chronic disease and not ANTOINETTE (low TIBC, normal Iron and normal percent sat of iron)      Sepsis due to PNA POA   -completed abx treatment with LVQ / Ceftin    LOYDA  S/P Recent Right nephrectomy for RCC   -Cr trending down to normal    Peripheral neuropathy  -increased neurontin 400mg TID since we stopped her cymbalta. Surrogate Decision Maker[facility]  [phone]  Code status: Full  Prophylaxis: lovenox  Recommended Disposition: SNF/LTC  In 24 hrs pending authorization       Subjective:     Chief Complaint / Reason for Physician Visit: orthostatic Hypotension, CHF, Afib RVR  No longer orthostatic.    No dizziness     Review of Systems:  Symptom Y/N Comments  Symptom Y/N Comments   Fever/Chills n   Chest Pain n    Poor Appetite n   Edema n    Cough n   Abdominal Pain     Sputum n   Joint Pain     SOB/ROCK n   Pruritis/Rash     Nausea/vomit n   Tolerating PT/OT y    Diarrhea    Tolerating Diet y    Constipation    Other       Could NOT obtain due to:      Objective:     VITALS:   Last 24hrs VS reviewed since prior progress note. Most recent are:  Patient Vitals for the past 24 hrs:   Temp Pulse Resp BP SpO2   01/23/17 0750 98.3 °F (36.8 °C) 72 18 132/73 98 %   01/23/17 0310 98.3 °F (36.8 °C) 70 18 123/73 100 %   01/23/17 0008 98.3 °F (36.8 °C) 70 18 132/69 98 %   01/22/17 2045 98.6 °F (37 °C) 71 18 137/66 98 %   01/22/17 1742 - 72 - 168/85 -   01/22/17 1523 97.9 °F (36.6 °C) 70 18 144/67 100 %   01/22/17 1150 97.8 °F (36.6 °C) 68 18 140/77 96 %   01/22/17 1120 - 73 - 163/87 -   01/22/17 1119 - 70 - 151/89 -   01/22/17 1118 97.6 °F (36.4 °C) 69 16 (!) 149/91 97 %       Intake/Output Summary (Last 24 hours) at 01/23/17 0856  Last data filed at 01/23/17 0311   Gross per 24 hour   Intake             1040 ml   Output              700 ml   Net              340 ml        PHYSICAL EXAM:  General: WD, WN. Alert, cooperative, no acute distress    EENT:  EOMI. Anicteric sclerae. MMM  Resp:  Bilateral basal crackles improved +  No accessory muscle use  CV:  Regular  rhythm,  No edema  GI:  Soft, Non distended, Non tender.  +Bowel sounds  Neurologic:  Alert and oriented X 3, normal speech,   Psych:   Good insight. Not anxious nor agitated  Skin:  No rashes. No jaundice    Reviewed most current lab test results and cultures  YES  Reviewed most current radiology test results   YES  Review and summation of old records today    NO  Reviewed patient's current orders and MAR    YES  PMH/SH reviewed - no change compared to H&P  ________________________________________________________________________  Care Plan discussed with:    Comments   Patient x    Family  x Left message with DIL    RN x    Care Manager     Consultant                        Multidiciplinary team rounds were held today with , nursing, pharmacist and clinical coordinator.   Patient's plan of care was discussed; medications were reviewed and discharge planning was addressed. ________________________________________________________________________  Total NON critical care TIME:  15   Minutes    Total CRITICAL CARE TIME Spent:   Minutes non procedure based      Comments   >50% of visit spent in counseling and coordination of care     ________________________________________________________________________  Giovanni Savage MD     Procedures: see electronic medical records for all procedures/Xrays and details which were not copied into this note but were reviewed prior to creation of Plan. LABS:  I reviewed today's most current labs and imaging studies.   Pertinent labs include:  Recent Labs      01/23/17 0343 01/21/17 0427   WBC  11.3*  10.1   HGB  7.8*  8.1*   HCT  24.4*  25.2*   PLT  338  352     Recent Labs      01/23/17 0343 01/21/17 0427   NA  140  140   K  4.3  4.0   CL  105  105   CO2  26  24   GLU  94  88   BUN  19  17   CREA  1.20*  1.17*   CA  8.0*  8.1*       Signed: Giovanni Savage MD

## 2017-01-26 ENCOUNTER — APPOINTMENT (OUTPATIENT)
Dept: ULTRASOUND IMAGING | Age: 72
DRG: 445 | End: 2017-01-26
Attending: INTERNAL MEDICINE
Payer: MEDICARE

## 2017-01-26 ENCOUNTER — HOSPITAL ENCOUNTER (INPATIENT)
Age: 72
LOS: 6 days | Discharge: NURSING, REHAB OR LONG-TERM CARE | DRG: 445 | End: 2017-02-01
Attending: EMERGENCY MEDICINE | Admitting: INTERNAL MEDICINE
Payer: MEDICARE

## 2017-01-26 ENCOUNTER — APPOINTMENT (OUTPATIENT)
Dept: CT IMAGING | Age: 72
DRG: 445 | End: 2017-01-26
Attending: EMERGENCY MEDICINE
Payer: MEDICARE

## 2017-01-26 ENCOUNTER — APPOINTMENT (OUTPATIENT)
Dept: GENERAL RADIOLOGY | Age: 72
DRG: 445 | End: 2017-01-26
Attending: EMERGENCY MEDICINE
Payer: MEDICARE

## 2017-01-26 DIAGNOSIS — K57.32 DIVERTICULITIS OF LARGE INTESTINE WITHOUT PERFORATION OR ABSCESS WITHOUT BLEEDING: Primary | ICD-10-CM

## 2017-01-26 PROBLEM — Z95.0 HISTORY OF PERMANENT CARDIAC PACEMAKER PLACEMENT: Status: ACTIVE | Noted: 2017-01-26

## 2017-01-26 PROBLEM — I50.22 CHRONIC SYSTOLIC HEART FAILURE (HCC): Status: ACTIVE | Noted: 2017-01-26

## 2017-01-26 PROBLEM — K57.92 DIVERTICULITIS: Status: ACTIVE | Noted: 2017-01-26

## 2017-01-26 PROBLEM — D72.829 LEUKOCYTOSIS: Status: ACTIVE | Noted: 2017-01-26

## 2017-01-26 LAB
ALBUMIN SERPL BCP-MCNC: 2.5 G/DL (ref 3.5–5)
ALBUMIN/GLOB SERPL: 0.7 {RATIO} (ref 1.1–2.2)
ALP SERPL-CCNC: 390 U/L (ref 45–117)
ALT SERPL-CCNC: 12 U/L (ref 12–78)
ANION GAP BLD CALC-SCNC: 14 MMOL/L (ref 5–15)
APPEARANCE UR: ABNORMAL
AST SERPL W P-5'-P-CCNC: 30 U/L (ref 15–37)
BACTERIA URNS QL MICRO: NEGATIVE /HPF
BASOPHILS # BLD AUTO: 0 K/UL (ref 0–0.1)
BASOPHILS # BLD: 0 % (ref 0–1)
BILIRUB SERPL-MCNC: 0.7 MG/DL (ref 0.2–1)
BILIRUB UR QL CFM: NEGATIVE
BUN SERPL-MCNC: 17 MG/DL (ref 6–20)
BUN/CREAT SERPL: 16 (ref 12–20)
CALCIUM SERPL-MCNC: 8 MG/DL (ref 8.5–10.1)
CHLORIDE SERPL-SCNC: 101 MMOL/L (ref 97–108)
CK SERPL-CCNC: 22 U/L (ref 26–192)
CO2 SERPL-SCNC: 23 MMOL/L (ref 21–32)
COLOR UR: ABNORMAL
CREAT SERPL-MCNC: 1.06 MG/DL (ref 0.55–1.02)
DIFFERENTIAL METHOD BLD: ABNORMAL
EOSINOPHIL # BLD: 0 K/UL (ref 0–0.4)
EOSINOPHIL NFR BLD: 0 % (ref 0–7)
EPITH CASTS URNS QL MICRO: ABNORMAL /LPF
ERYTHROCYTE [DISTWIDTH] IN BLOOD BY AUTOMATED COUNT: 19.5 % (ref 11.5–14.5)
GLOBULIN SER CALC-MCNC: 3.7 G/DL (ref 2–4)
GLUCOSE SERPL-MCNC: 120 MG/DL (ref 65–100)
GLUCOSE UR STRIP.AUTO-MCNC: NEGATIVE MG/DL
HCT VFR BLD AUTO: 24.2 % (ref 35–47)
HGB BLD-MCNC: 7.9 G/DL (ref 11.5–16)
HGB UR QL STRIP: NEGATIVE
KETONES UR QL STRIP.AUTO: 15 MG/DL
LACTATE SERPL-SCNC: 0.6 MMOL/L (ref 0.4–2)
LEUKOCYTE ESTERASE UR QL STRIP.AUTO: ABNORMAL
LIPASE SERPL-CCNC: 51 U/L (ref 73–393)
LYMPHOCYTES # BLD AUTO: 3 % (ref 12–49)
LYMPHOCYTES # BLD: 0.7 K/UL (ref 0.8–3.5)
MCH RBC QN AUTO: 23.7 PG (ref 26–34)
MCHC RBC AUTO-ENTMCNC: 32.6 G/DL (ref 30–36.5)
MCV RBC AUTO: 72.5 FL (ref 80–99)
MONOCYTES # BLD: 1.7 K/UL (ref 0–1)
MONOCYTES NFR BLD AUTO: 7 % (ref 5–13)
NEUTS SEG # BLD: 21.2 K/UL (ref 1.8–8)
NEUTS SEG NFR BLD AUTO: 90 % (ref 32–75)
NITRITE UR QL STRIP.AUTO: NEGATIVE
PH UR STRIP: 6 [PH] (ref 5–8)
PLATELET # BLD AUTO: 303 K/UL (ref 150–400)
PLATELET COMMENTS,PCOM: ABNORMAL
POTASSIUM SERPL-SCNC: 4 MMOL/L (ref 3.5–5.1)
PROT SERPL-MCNC: 6.2 G/DL (ref 6.4–8.2)
PROT UR STRIP-MCNC: 30 MG/DL
RBC # BLD AUTO: 3.34 M/UL (ref 3.8–5.2)
RBC #/AREA URNS HPF: ABNORMAL /HPF (ref 0–5)
RBC MORPH BLD: ABNORMAL
RBC MORPH BLD: ABNORMAL
SODIUM SERPL-SCNC: 138 MMOL/L (ref 136–145)
SP GR UR REFRACTOMETRY: 1.02 (ref 1–1.03)
TROPONIN I SERPL-MCNC: <0.04 NG/ML
UA: UC IF INDICATED,UAUC: ABNORMAL
UROBILINOGEN UR QL STRIP.AUTO: 1 EU/DL (ref 0.2–1)
WBC # BLD AUTO: 23.6 K/UL (ref 3.6–11)
WBC URNS QL MICRO: ABNORMAL /HPF (ref 0–4)

## 2017-01-26 PROCEDURE — 74011250636 HC RX REV CODE- 250/636: Performed by: EMERGENCY MEDICINE

## 2017-01-26 PROCEDURE — 74011250637 HC RX REV CODE- 250/637: Performed by: INTERNAL MEDICINE

## 2017-01-26 PROCEDURE — 65660000000 HC RM CCU STEPDOWN

## 2017-01-26 PROCEDURE — 74011250636 HC RX REV CODE- 250/636

## 2017-01-26 PROCEDURE — 80053 COMPREHEN METABOLIC PANEL: CPT | Performed by: EMERGENCY MEDICINE

## 2017-01-26 PROCEDURE — 74011000250 HC RX REV CODE- 250: Performed by: EMERGENCY MEDICINE

## 2017-01-26 PROCEDURE — 84484 ASSAY OF TROPONIN QUANT: CPT | Performed by: EMERGENCY MEDICINE

## 2017-01-26 PROCEDURE — 96365 THER/PROPH/DIAG IV INF INIT: CPT

## 2017-01-26 PROCEDURE — 36415 COLL VENOUS BLD VENIPUNCTURE: CPT | Performed by: EMERGENCY MEDICINE

## 2017-01-26 PROCEDURE — 96375 TX/PRO/DX INJ NEW DRUG ADDON: CPT

## 2017-01-26 PROCEDURE — 71010 XR CHEST PORT: CPT

## 2017-01-26 PROCEDURE — 82550 ASSAY OF CK (CPK): CPT | Performed by: EMERGENCY MEDICINE

## 2017-01-26 PROCEDURE — 83605 ASSAY OF LACTIC ACID: CPT | Performed by: EMERGENCY MEDICINE

## 2017-01-26 PROCEDURE — 85025 COMPLETE CBC W/AUTO DIFF WBC: CPT | Performed by: EMERGENCY MEDICINE

## 2017-01-26 PROCEDURE — 83690 ASSAY OF LIPASE: CPT | Performed by: EMERGENCY MEDICINE

## 2017-01-26 PROCEDURE — 76705 ECHO EXAM OF ABDOMEN: CPT

## 2017-01-26 PROCEDURE — 81001 URINALYSIS AUTO W/SCOPE: CPT | Performed by: EMERGENCY MEDICINE

## 2017-01-26 PROCEDURE — 74176 CT ABD & PELVIS W/O CONTRAST: CPT

## 2017-01-26 PROCEDURE — 74011000250 HC RX REV CODE- 250: Performed by: INTERNAL MEDICINE

## 2017-01-26 PROCEDURE — 36600 WITHDRAWAL OF ARTERIAL BLOOD: CPT

## 2017-01-26 PROCEDURE — 74011250636 HC RX REV CODE- 250/636: Performed by: INTERNAL MEDICINE

## 2017-01-26 PROCEDURE — 99285 EMERGENCY DEPT VISIT HI MDM: CPT

## 2017-01-26 RX ORDER — AMIODARONE HYDROCHLORIDE 200 MG/1
200 TABLET ORAL DAILY
Status: DISCONTINUED | OUTPATIENT
Start: 2017-01-27 | End: 2017-02-01 | Stop reason: HOSPADM

## 2017-01-26 RX ORDER — METOPROLOL SUCCINATE 25 MG/1
12.5 TABLET, EXTENDED RELEASE ORAL DAILY
Status: DISCONTINUED | OUTPATIENT
Start: 2017-01-27 | End: 2017-02-01 | Stop reason: HOSPADM

## 2017-01-26 RX ORDER — METRONIDAZOLE 500 MG/100ML
500 INJECTION, SOLUTION INTRAVENOUS EVERY 8 HOURS
Status: DISCONTINUED | OUTPATIENT
Start: 2017-01-26 | End: 2017-02-01

## 2017-01-26 RX ORDER — ACETAMINOPHEN 325 MG/1
650 TABLET ORAL
Status: DISCONTINUED | OUTPATIENT
Start: 2017-01-26 | End: 2017-02-01 | Stop reason: HOSPADM

## 2017-01-26 RX ORDER — ASPIRIN 81 MG/1
81 TABLET ORAL DAILY
Status: DISCONTINUED | OUTPATIENT
Start: 2017-01-26 | End: 2017-01-26

## 2017-01-26 RX ORDER — SODIUM CHLORIDE 9 MG/ML
50 INJECTION, SOLUTION INTRAVENOUS CONTINUOUS
Status: DISCONTINUED | OUTPATIENT
Start: 2017-01-26 | End: 2017-01-26

## 2017-01-26 RX ORDER — AMIODARONE HYDROCHLORIDE 200 MG/1
200 TABLET ORAL DAILY
Status: DISCONTINUED | OUTPATIENT
Start: 2017-01-26 | End: 2017-01-26

## 2017-01-26 RX ORDER — METOPROLOL TARTRATE 5 MG/5ML
1.25 INJECTION INTRAVENOUS EVERY 6 HOURS
Status: DISCONTINUED | OUTPATIENT
Start: 2017-01-26 | End: 2017-01-26

## 2017-01-26 RX ORDER — MORPHINE SULFATE 2 MG/ML
2 INJECTION, SOLUTION INTRAMUSCULAR; INTRAVENOUS
Status: DISCONTINUED | OUTPATIENT
Start: 2017-01-26 | End: 2017-01-26

## 2017-01-26 RX ORDER — METOPROLOL SUCCINATE 25 MG/1
12.5 TABLET, EXTENDED RELEASE ORAL DAILY
Status: DISCONTINUED | OUTPATIENT
Start: 2017-01-26 | End: 2017-01-26

## 2017-01-26 RX ORDER — ASPIRIN 81 MG/1
81 TABLET ORAL DAILY
Status: DISCONTINUED | OUTPATIENT
Start: 2017-01-27 | End: 2017-02-01 | Stop reason: HOSPADM

## 2017-01-26 RX ORDER — MIDODRINE HYDROCHLORIDE 5 MG/1
10 TABLET ORAL
Status: DISCONTINUED | OUTPATIENT
Start: 2017-01-26 | End: 2017-01-26

## 2017-01-26 RX ORDER — SENNOSIDES 8.6 MG/1
2 TABLET ORAL DAILY
COMMUNITY
End: 2017-02-24

## 2017-01-26 RX ORDER — LEVOFLOXACIN 5 MG/ML
750 INJECTION, SOLUTION INTRAVENOUS
Status: DISCONTINUED | OUTPATIENT
Start: 2017-01-26 | End: 2017-02-01

## 2017-01-26 RX ORDER — PANTOPRAZOLE SODIUM 40 MG/1
40 TABLET, DELAYED RELEASE ORAL DAILY
Status: DISCONTINUED | OUTPATIENT
Start: 2017-01-27 | End: 2017-02-01 | Stop reason: HOSPADM

## 2017-01-26 RX ORDER — SENNOSIDES 8.6 MG/1
2 TABLET ORAL DAILY
Status: DISCONTINUED | OUTPATIENT
Start: 2017-01-27 | End: 2017-01-31

## 2017-01-26 RX ORDER — METRONIDAZOLE 500 MG/100ML
500 INJECTION, SOLUTION INTRAVENOUS
Status: COMPLETED | OUTPATIENT
Start: 2017-01-26 | End: 2017-01-26

## 2017-01-26 RX ORDER — CIPROFLOXACIN 2 MG/ML
400 INJECTION, SOLUTION INTRAVENOUS
Status: COMPLETED | OUTPATIENT
Start: 2017-01-26 | End: 2017-01-27

## 2017-01-26 RX ORDER — SODIUM CHLORIDE 0.9 % (FLUSH) 0.9 %
5-10 SYRINGE (ML) INJECTION EVERY 8 HOURS
Status: DISCONTINUED | OUTPATIENT
Start: 2017-01-26 | End: 2017-02-01 | Stop reason: HOSPADM

## 2017-01-26 RX ORDER — SODIUM CHLORIDE 0.9 % (FLUSH) 0.9 %
5-10 SYRINGE (ML) INJECTION AS NEEDED
Status: DISCONTINUED | OUTPATIENT
Start: 2017-01-26 | End: 2017-02-01 | Stop reason: HOSPADM

## 2017-01-26 RX ORDER — HYDROMORPHONE HYDROCHLORIDE 1 MG/ML
0.5 INJECTION, SOLUTION INTRAMUSCULAR; INTRAVENOUS; SUBCUTANEOUS
Status: DISCONTINUED | OUTPATIENT
Start: 2017-01-26 | End: 2017-02-01 | Stop reason: HOSPADM

## 2017-01-26 RX ORDER — MORPHINE SULFATE 2 MG/ML
INJECTION, SOLUTION INTRAMUSCULAR; INTRAVENOUS
Status: COMPLETED
Start: 2017-01-26 | End: 2017-01-26

## 2017-01-26 RX ORDER — DOCUSATE SODIUM 100 MG/1
100 CAPSULE, LIQUID FILLED ORAL 2 TIMES DAILY
Status: DISCONTINUED | OUTPATIENT
Start: 2017-01-27 | End: 2017-01-31

## 2017-01-26 RX ORDER — MORPHINE SULFATE 2 MG/ML
2 INJECTION, SOLUTION INTRAMUSCULAR; INTRAVENOUS
Status: COMPLETED | OUTPATIENT
Start: 2017-01-26 | End: 2017-01-26

## 2017-01-26 RX ORDER — MIDODRINE HYDROCHLORIDE 5 MG/1
10 TABLET ORAL
Status: DISCONTINUED | OUTPATIENT
Start: 2017-01-27 | End: 2017-02-01 | Stop reason: HOSPADM

## 2017-01-26 RX ORDER — OMEPRAZOLE 20 MG/1
20 CAPSULE, DELAYED RELEASE ORAL DAILY
COMMUNITY
End: 2018-09-27

## 2017-01-26 RX ORDER — ONDANSETRON 2 MG/ML
4 INJECTION INTRAMUSCULAR; INTRAVENOUS
Status: DISCONTINUED | OUTPATIENT
Start: 2017-01-26 | End: 2017-02-01 | Stop reason: HOSPADM

## 2017-01-26 RX ORDER — ENOXAPARIN SODIUM 100 MG/ML
40 INJECTION SUBCUTANEOUS EVERY 24 HOURS
Status: DISCONTINUED | OUTPATIENT
Start: 2017-01-26 | End: 2017-01-26

## 2017-01-26 RX ADMIN — Medication 2 MG: at 03:15

## 2017-01-26 RX ADMIN — Medication 10 ML: at 22:20

## 2017-01-26 RX ADMIN — METOPROLOL SUCCINATE 12.5 MG: 25 TABLET, EXTENDED RELEASE ORAL at 10:49

## 2017-01-26 RX ADMIN — LEVOFLOXACIN 750 MG: 5 INJECTION, SOLUTION INTRAVENOUS at 10:36

## 2017-01-26 RX ADMIN — METRONIDAZOLE 500 MG: 500 INJECTION, SOLUTION INTRAVENOUS at 04:57

## 2017-01-26 RX ADMIN — ONDANSETRON 4 MG: 2 INJECTION INTRAMUSCULAR; INTRAVENOUS at 08:18

## 2017-01-26 RX ADMIN — Medication 10 ML: at 08:18

## 2017-01-26 RX ADMIN — AMIODARONE HYDROCHLORIDE 200 MG: 200 TABLET ORAL at 10:48

## 2017-01-26 RX ADMIN — GABAPENTIN 400 MG: 100 CAPSULE ORAL at 10:48

## 2017-01-26 RX ADMIN — SODIUM CHLORIDE 500 ML: 900 INJECTION, SOLUTION INTRAVENOUS at 04:57

## 2017-01-26 RX ADMIN — METOPROLOL TARTRATE 1.25 MG: 5 INJECTION INTRAVENOUS at 18:35

## 2017-01-26 RX ADMIN — ASPIRIN 81 MG: 81 TABLET, COATED ORAL at 10:48

## 2017-01-26 RX ADMIN — MORPHINE SULFATE 2 MG: 2 INJECTION, SOLUTION INTRAMUSCULAR; INTRAVENOUS at 03:15

## 2017-01-26 RX ADMIN — ENOXAPARIN SODIUM 40 MG: 40 INJECTION SUBCUTANEOUS at 10:48

## 2017-01-26 RX ADMIN — ACETAMINOPHEN 650 MG: 325 TABLET, FILM COATED ORAL at 10:52

## 2017-01-26 RX ADMIN — CIPROFLOXACIN 400 MG: 2 INJECTION, SOLUTION INTRAVENOUS at 06:06

## 2017-01-26 RX ADMIN — METRONIDAZOLE 500 MG: 500 INJECTION, SOLUTION INTRAVENOUS at 14:50

## 2017-01-26 RX ADMIN — MIDODRINE HYDROCHLORIDE 10 MG: 5 TABLET ORAL at 10:48

## 2017-01-26 RX ADMIN — GABAPENTIN 400 MG: 100 CAPSULE ORAL at 22:19

## 2017-01-26 NOTE — ED NOTES
MD requesting RN continue to attempt IV access at this time. ISAIAS Goel at bedside attempting IV at this time.

## 2017-01-26 NOTE — ROUTINE PROCESS
Call out to Dr. Annabel Ronquillo to confirm whether pt is to remain NPO.    1350 Have not heard from Dr. Annabel Ronquillo, but Dr. Toni Ross was on floor and said clear liquid's ok.

## 2017-01-26 NOTE — ED NOTES
TRANSFER - OUT REPORT:    Verbal report given to Ines ESPARZA(name) on [address]  being transferred to [facility]) for routine progression of care       Report consisted of patients Situation, Background, Assessment and   Recommendations(SBAR). Information from the following report(s) SBAR, Kardex, ED Summary and MAR was reviewed with the receiving nurse. Lines:   Peripheral IV 01/26/17 Left Antecubital (Active)   Site Assessment Clean, dry, & intact 1/26/2017  4:23 AM   Phlebitis Assessment 0 1/26/2017  4:23 AM   Infiltration Assessment 0 1/26/2017  4:23 AM   Dressing Status Clean, dry, & intact 1/26/2017  4:23 AM   Dressing Type Tape;Transparent 1/26/2017  4:23 AM   Hub Color/Line Status Pink 1/26/2017  4:23 AM        Opportunity for questions and clarification was provided.       Patient transported with:   [place]

## 2017-01-26 NOTE — ED PROVIDER NOTES
HPI Comments: Christina Whatley is a 80 y.o. female with PMHx significant for CAD, HTN, and GERD who presents via EMS to [facility] ED with cc of abdominal pain. Pt states that her pain started yesterday as a mild pain but today it was exacerbated and became severe pain. Pt states her pain is currently a 10/10. Pt also c/o nausea and vomiting, she notes one episode of emesis yesterday. Pt also reports being constipated. She states that her last BM was 3 days ago and she has not eaten anything for the past 2 days. She notes drinking milk of magnesia with no relief. Pt specifically denies any fever, dysuria, frequency, or any other acute symptoms. PCP: JOSE Cedeno    Social Hx: - tobacco (-), -EtOH (-), - illicit drugs    There are no other complaints, changes or physical findings at this time. The history is provided by the patient. No  was used. Past Medical History:   Diagnosis Date    Autoimmune disease ([facility] [address]      rheumatoid     CAD (coronary artery disease)     GERD (gastroesophageal reflux disease)     Hypertension     Ill-defined condition      anemia    Other ill-defined conditions(799.89)      high cholesterol    Renal cell carcinoma of right kidney (HCC)      s/p resection 12/16       Past Surgical History:   Procedure Laterality Date    Pr cardiac surg procedure unlist       three stents placed 2005    Hx tonsillectomy      Hx urological  12/22/2016     RIGHT LAPOROSCOPIC HAND ASSISTED RADICAL NEPHRECTOMY         Family History:   Problem Relation Age of Onset    Cancer Mother      stomach    Other Father      aneurysym    Cancer Brother      lung    Other Brother      stomach problems    Stroke Brother        Social History     Social History    Marital status:      Spouse name: N/A    Number of children: N/A    Years of education: N/A     Occupational History    Not on file.      Social History Main Topics    Smoking status: Never Smoker    Smokeless tobacco: Never Used    Alcohol use No    Drug use: No    Sexual activity: Yes     Birth control/ protection: None     Other Topics Concern    Not on file     Social History Narrative         ALLERGIES: Percocet [oxycodone-acetaminophen]    Review of Systems   Constitutional: Negative for activity change, appetite change, fatigue and fever. HENT: Negative. Negative for congestion, rhinorrhea and sore throat. Respiratory: Negative. Negative for cough, shortness of breath and wheezing. Cardiovascular: Negative. Negative for chest pain and leg swelling. Gastrointestinal: Positive for abdominal pain (generalized), constipation, nausea and vomiting. Negative for abdominal distention and diarrhea. Endocrine: Negative. Genitourinary: Negative for difficulty urinating, dysuria, frequency, menstrual problem, vaginal bleeding and vaginal discharge. Musculoskeletal: Negative. Negative for arthralgias, joint swelling and myalgias. Skin: Negative. Negative for rash. Neurological: Negative. Negative for dizziness, weakness, light-headedness and headaches. Psychiatric/Behavioral: Negative. Patient Vitals for the past 12 hrs:   Temp Pulse Resp BP SpO2   01/26/17 0412 - 75 19 - 98 %   01/26/17 0406 - 78 24 - 93 %   01/26/17 0400 - 78 23 148/66 95 %   01/26/17 0330 - 74 21 149/70 98 %   01/26/17 0245 - 73 21 138/55 97 %   01/26/17 0230 - 73 19 140/56 99 %   01/26/17 0215 - 71 20 142/61 99 %   01/26/17 0200 - 70 24 137/57 97 %   01/26/17 0145 - 70 17 148/59 97 %   01/26/17 0130 - 73 26 151/63 95 %   01/26/17 0116 98.4 °F (36.9 °C) 70 18 150/65 100 %            Physical Exam   Constitutional: She is oriented to person, place, and time. She appears well-developed and well-nourished. Non-toxic appearance. Tired appearing   HENT:   Head: Atraumatic. Eyes: EOM are normal.   Cardiovascular: Normal rate, regular rhythm, normal heart sounds and intact distal pulses.   Exam reveals no gallop and no friction rub. No murmur heard. Pulmonary/Chest: Effort normal and breath sounds normal. No respiratory distress. She has no wheezes. She has no rales. She exhibits no tenderness. Abdominal: Soft. Bowel sounds are normal. She exhibits no distension and no mass. There is tenderness (mild) in the epigastric area. There is no rebound and no guarding. Musculoskeletal: Normal range of motion. She exhibits no edema or tenderness. Neurological: She is oriented to person, place, and time. Skin: Skin is warm and dry. Psychiatric: She has a normal mood and affect. Nursing note and vitals reviewed. MDM  Number of Diagnoses or Management Options  Diverticulitis of large intestine without perforation or abscess without bleeding:   Diagnosis management comments: Diverticulitis, colitis, C Dif, gastroenterology, dehydration, electrolyte abnormality, cholecystitis, Gall stones, UTI       Amount and/or Complexity of Data Reviewed  Clinical lab tests: ordered and reviewed  Tests in the radiology section of CPT®: ordered and reviewed  Review and summarize past medical records: yes    Patient Progress  Patient progress: stable    ED Course       Procedures      PROGRESS NOTE:  4:28 AM  Updated family on plans for admission and her diagnosis. Written by Simon Kim ED Scribe, as dictated by Martha Morales MD.    CONSULT NOTE:   4:50 AM  Martha Morales MD spoke with Jacobo Guerrreo,   Specialty: Hospitalist  Discussed pt's hx, disposition, and available diagnostic and imaging results. Reviewed care plans. Consultant will evaluate pt for admission.   Written by Simon Kim ED Scribe, as dictated by Martha Morales MD.    LABORATORY TESTS:  Recent Results (from the past 12 hour(s))   CBC WITH AUTOMATED DIFF    Collection Time: 01/26/17  3:36 AM   Result Value Ref Range    WBC 23.6 (H) 3.6 - 11.0 K/uL    RBC 3.34 (L) 3.80 - 5.20 M/uL    HGB 7.9 (L) 11.5 - 16.0 g/dL    HCT 24.2 (L) 35.0 - 47.0 %    MCV 72.5 (L) 80.0 - 99.0 FL    MCH 23.7 (L) 26.0 - 34.0 PG    MCHC 32.6 30.0 - 36.5 g/dL    RDW 19.5 (H) 11.5 - 14.5 %    PLATELET 637 137 - 268 K/uL    NEUTROPHILS 90 (H) 32 - 75 %    LYMPHOCYTES 3 (L) 12 - 49 %    MONOCYTES 7 5 - 13 %    EOSINOPHILS 0 0 - 7 %    BASOPHILS 0 0 - 1 %    ABS. NEUTROPHILS 21.2 (H) 1.8 - 8.0 K/UL    ABS. LYMPHOCYTES 0.7 (L) 0.8 - 3.5 K/UL    ABS. MONOCYTES 1.7 (H) 0.0 - 1.0 K/UL    ABS. EOSINOPHILS 0.0 0.0 - 0.4 K/UL    ABS. BASOPHILS 0.0 0.0 - 0.1 K/UL    DF SMEAR SCANNED      PLATELET COMMENTS LARGE PLATELETS      RBC COMMENTS MICROCYTOSIS  1+        RBC COMMENTS POLYCHROMASIA  1+       LACTIC ACID, PLASMA    Collection Time: 01/26/17  3:36 AM   Result Value Ref Range    Lactic acid 0.6 0.4 - 2.0 MMOL/L   METABOLIC PANEL, COMPREHENSIVE    Collection Time: 01/26/17  3:40 AM   Result Value Ref Range    Sodium 138 136 - 145 mmol/L    Potassium 4.0 3.5 - 5.1 mmol/L    Chloride 101 97 - 108 mmol/L    CO2 23 21 - 32 mmol/L    Anion gap 14 5 - 15 mmol/L    Glucose 120 (H) 65 - 100 mg/dL    BUN 17 6 - 20 MG/DL    Creatinine 1.06 (H) 0.55 - 1.02 MG/DL    BUN/Creatinine ratio 16 12 - 20      GFR est AA >60 >60 ml/min/1.73m2    GFR est non-AA 51 (L) >60 ml/min/1.73m2    Calcium 8.0 (L) 8.5 - 10.1 MG/DL    Bilirubin, total 0.7 0.2 - 1.0 MG/DL    ALT 12 12 - 78 U/L    AST 30 15 - 37 U/L    Alk.  phosphatase 390 (H) 45 - 117 U/L    Protein, total 6.2 (L) 6.4 - 8.2 g/dL    Albumin 2.5 (L) 3.5 - 5.0 g/dL    Globulin 3.7 2.0 - 4.0 g/dL    A-G Ratio 0.7 (L) 1.1 - 2.2     LIPASE    Collection Time: 01/26/17  3:40 AM   Result Value Ref Range    Lipase 51 (L) 73 - 393 U/L   TROPONIN I    Collection Time: 01/26/17  3:40 AM   Result Value Ref Range    Troponin-I, Qt. <0.04 <0.05 ng/mL   CK W/ REFLX CKMB    Collection Time: 01/26/17  3:40 AM   Result Value Ref Range    CK 22 (L) 26 - 192 U/L       IMAGING RESULTS:  XR CHEST PORT   Final Result   Study Result      INDICATION: abdominal pain/chest pain      COMPARISON: 1/16/2017     FINDINGS: Single AP portable view of the chest obtained at 307 demonstrates a  stable cardiomediastinal silhouette. The lungs are hypoinspiratory but appear  clear bilaterally. Left-sided pacer device is noted. No osseous abnormalities  are seen.     IMPRESSION  IMPRESSION: No evidence of acute cardiopulmonary process. CT ABD PELV WO CONT   Final Result   Study Result      INDICATION: ABDOMINAL pAIN status post recent nephrectomy     COMPARISON: CT 10/21/2016     TECHNIQUE:   Thin axial images were obtained through the abdomen and pelvis. Coronal and  sagittal reconstructions were generated. Oral contrast was not administered. CT  dose reduction was achieved through use of a standardized protocol tailored for  this examination and automatic exposure control for dose modulation.      The absence of intravenous contrast material reduces the sensitivity for  evaluation of the solid parenchymal organs of the abdomen.      FINDINGS:   LUNG BASES: Mild bibasilar atelectasis and trace pleural effusions. INCIDENTALLY IMAGED HEART AND MEDIASTINUM: Cardiac pacer leads are noted  LIVER: No mass or biliary dilatation. GALLBLADDER: Mild gallbladder distention with hyperdense material within  SPLEEN: No mass. PANCREAS: No mass or ductal dilatation. ADRENALS: Normal left adrenal gland. The right adrenal gland is not well  visualized  KIDNEYS/URETERS: Status post right nephrectomy, with postsurgical changes. Normal unenhanced appearance of the left kidney. STOMACH: Unremarkable. SMALL BOWEL: No dilatation or wall thickening. COLON: Pan diverticulosis of the colon. Acute inflammation of the hepatic  flexure, over approximately 4.5 cm, is compatible with acute diverticulitis. No  evidence of abscess. APPENDIX: Unremarkable. PERITONEUM: No pneumoperitoneum. Trace ascites in the right upper quadrant  RETROPERITONEUM: No lymphadenopathy or aortic aneurysm. REPRODUCTIVE ORGANS: Hysterectomy is noted. Moderate free fluid in the pelvis. URINARY BLADDER: No mass or calculus. BONES: L1 compression fracture has worsened mildly since the prior study. Severe  degenerative disc disease at L5-S1. ADDITIONAL COMMENTS: N/A     IMPRESSION  IMPRESSION:     1. Acute diverticulitis of the hepatic flexure. No evidence of peridiverticular  abscess. 2. Distended gallbladder with hyperdense material may represent sludge. No  evidence of biliary duct dilatation. 3. Evidence of prior right nephrectomy. No complicating features visualized in  the surgical bed. Normal unenhanced appearance of the left kidney. 4. Chronic L1 compression fracture, with mild worsening compression since prior  study. MEDICATIONS GIVEN:  Medications   ciprofloxacin (CIPRO) 400 mg IVPB (premix) (not administered)   metroNIDAZOLE (FLAGYL) IVPB premix 500 mg (not administered)   sodium chloride 0.9 % bolus infusion 500 mL (not administered)   morphine injection 2 mg (2 mg IntraMUSCular Given 1/26/17 0315)       IMPRESSION:  1. Diverticulitis of large intestine without perforation or abscess without bleeding        PLAN:  1. Admit    ADMIT NOTE:  4:50 AM  Patient is being admitted to the hospital by Dr. José Altman. The results of their tests and reasons for their admission have been discussed with them and/or available family. They convey agreement and understanding for the need to be admitted and for their admission diagnosis. Consultation has been made with the inpatient physician specialist for hospitalization. This note is prepared by aDnie Lora acting as Scribe for Corky Pennington MD.    Corky Pennington MD: The scribe's documentation has been prepared under my direction and personally reviewed by me in its entirety. I confirm that the note above accurately reflects all work, treatment, procedures, and medical decision making performed by me.

## 2017-01-26 NOTE — CONSULTS
Surgery Consult:  cholecystitis    Subjective:   Patient 80 y.o.  female presented to ER with 3 days history of severe constant epigastric/RUQ pain with nausea and vomiting. No F/C/S. Patient reports having similar pain but mild on and off for few years especially with fatty foods. Work up in the ER included CT scan which showed findings consistent with acute diverticulitis of the hepatic flexure, distended gallbladder, and chronic L1 compression fracture. Patient subsequently underwent US and it showed gallbladder wall thickening and distention with stones and sludge. WBC on admission was 23.6. LFTs were notable for elevated . TB normal at 0.7. Patient is currently on Levaquin and Flagyl. Patient reports that her abdominal pain has much improved. Patient denies any prior history of diverticulitis. Of noted, patient recently underwent hand assisted laparoscopic right radical nephrectomy for renal cell carcinoma on 12/22/16. No history of jaundice or pancreatitis. Past Medical & Surgical History:  Past Medical History   Diagnosis Date    Autoimmune disease ([address]      rheumatoid     CAD (coronary artery disease)     GERD (gastroesophageal reflux disease)     Hypertension     Ill-defined condition      anemia    Other ill-defined conditions(799.89)      high cholesterol    Renal cell carcinoma of right kidney ([address]      s/p resection 12/16      Past Surgical History   Procedure Laterality Date    Pr cardiac surg procedure unlist       three stents placed 2005    Hx tonsillectomy      Hx urological  12/22/2016     RIGHT LAPOROSCOPIC HAND ASSISTED RADICAL NEPHRECTOMY       Social History:  Social History     Social History    Marital status:      Spouse name: N/A    Number of children: N/A    Years of education: N/A     Occupational History    Not on file.      Social History Main Topics    Smoking status: Never Smoker    Smokeless tobacco: Never Used   [employer] Alcohol use No    Drug use: No    Sexual activity: Yes     Birth control/ protection: None     Other Topics Concern    Not on file     Social History Narrative        Family History:  Family History   Problem Relation Age of Onset    Cancer Mother      stomach    Other Father      aneurysym    Cancer Brother      lung    Other Brother      stomach problems    Stroke Brother         Medications:  Current Facility-Administered Medications   Medication Dose Route Frequency    levoFLOXacin (LEVAQUIN) 750 mg in D5W IVPB  750 mg IntraVENous Q48H    metroNIDAZOLE (FLAGYL) IVPB premix 500 mg  500 mg IntraVENous Q8H    sodium chloride (NS) flush 5-10 mL  5-10 mL IntraVENous Q8H    sodium chloride (NS) flush 5-10 mL  5-10 mL IntraVENous PRN    acetaminophen (TYLENOL) tablet 650 mg  650 mg Oral Q6H PRN    ondansetron (ZOFRAN) injection 4 mg  4 mg IntraVENous Q4H PRN    morphine injection 2 mg  2 mg IntraVENous Q4H PRN    metoprolol (LOPRESSOR) injection 1.25 mg  1.25 mg IntraVENous Q6H       Allergies: Allergies   Allergen Reactions    Percocet [Oxycodone-Acetaminophen] Other (comments)     Confused       Review of Systems  A comprehensive review of systems was negative except for that written in the HPI. Objective:     Exam:    Visit Vitals    /60 (BP 1 Location: Right arm, BP Patient Position: At rest)    Pulse 70    Temp 97.6 °F (36.4 °C)    Resp 18    Ht 5' 7\" (1.702 m)    Wt 73.1 kg (161 lb 2.5 oz)    SpO2 100%    BMI 25.24 kg/m2     General appearance: alert, cooperative, no distress, appears stated age  Eyes: no sclera icterus  Lungs: clear to auscultation bilaterally  Heart: regular rate and rhythm  Abdomen: soft, non-distended. Mild RUQ tenderness, no rebound or guarding. RLQ and trocar incisions C/D/I. Extremities: extremities normal, atraumatic, no cyanosis or edema. GUSTAVO. Skin: Skin color, texture, turgor normal. No rashes or lesions. No jaundice.   Neurologic: Grossly normal      Data Review  Recent Results (from the past 24 hour(s))   CBC WITH AUTOMATED DIFF    Collection Time: 01/26/17  3:36 AM   Result Value Ref Range    WBC 23.6 (H) 3.6 - 11.0 K/uL    RBC 3.34 (L) 3.80 - 5.20 M/uL    HGB 7.9 (L) 11.5 - 16.0 g/dL    HCT 24.2 (L) 35.0 - 47.0 %    MCV 72.5 (L) 80.0 - 99.0 FL    MCH 23.7 (L) 26.0 - 34.0 PG    MCHC 32.6 30.0 - 36.5 g/dL    RDW 19.5 (H) 11.5 - 14.5 %    PLATELET 725 975 - 950 K/uL    NEUTROPHILS 90 (H) 32 - 75 %    LYMPHOCYTES 3 (L) 12 - 49 %    MONOCYTES 7 5 - 13 %    EOSINOPHILS 0 0 - 7 %    BASOPHILS 0 0 - 1 %    ABS. NEUTROPHILS 21.2 (H) 1.8 - 8.0 K/UL    ABS. LYMPHOCYTES 0.7 (L) 0.8 - 3.5 K/UL    ABS. MONOCYTES 1.7 (H) 0.0 - 1.0 K/UL    ABS. EOSINOPHILS 0.0 0.0 - 0.4 K/UL    ABS. BASOPHILS 0.0 0.0 - 0.1 K/UL    DF SMEAR SCANNED      PLATELET COMMENTS LARGE PLATELETS      RBC COMMENTS MICROCYTOSIS  1+        RBC COMMENTS POLYCHROMASIA  1+       LACTIC ACID, PLASMA    Collection Time: 01/26/17  3:36 AM   Result Value Ref Range    Lactic acid 0.6 0.4 - 2.0 MMOL/L   METABOLIC PANEL, COMPREHENSIVE    Collection Time: 01/26/17  3:40 AM   Result Value Ref Range    Sodium 138 136 - 145 mmol/L    Potassium 4.0 3.5 - 5.1 mmol/L    Chloride 101 97 - 108 mmol/L    CO2 23 21 - 32 mmol/L    Anion gap 14 5 - 15 mmol/L    Glucose 120 (H) 65 - 100 mg/dL    BUN 17 6 - 20 MG/DL    Creatinine 1.06 (H) 0.55 - 1.02 MG/DL    BUN/Creatinine ratio 16 12 - 20      GFR est AA >60 >60 ml/min/1.73m2    GFR est non-AA 51 (L) >60 ml/min/1.73m2    Calcium 8.0 (L) 8.5 - 10.1 MG/DL    Bilirubin, total 0.7 0.2 - 1.0 MG/DL    ALT 12 12 - 78 U/L    AST 30 15 - 37 U/L    Alk.  phosphatase 390 (H) 45 - 117 U/L    Protein, total 6.2 (L) 6.4 - 8.2 g/dL    Albumin 2.5 (L) 3.5 - 5.0 g/dL    Globulin 3.7 2.0 - 4.0 g/dL    A-G Ratio 0.7 (L) 1.1 - 2.2     LIPASE    Collection Time: 01/26/17  3:40 AM   Result Value Ref Range    Lipase 51 (L) 73 - 393 U/L   TROPONIN I    Collection Time: 01/26/17  3:40 AM Result Value Ref Range    Troponin-I, Qt. <0.04 <0.05 ng/mL   CK W/ REFLX CKMB    Collection Time: 01/26/17  3:40 AM   Result Value Ref Range    CK 22 (L) 26 - 192 U/L   URINALYSIS W/ REFLEX CULTURE    Collection Time: 01/26/17  4:35 AM   Result Value Ref Range    Color DARK YELLOW      Appearance CLOUDY (A) CLEAR      Specific gravity 1.019 1.003 - 1.030      pH (UA) 6.0 5.0 - 8.0      Protein 30 (A) NEG mg/dL    Glucose NEGATIVE  NEG mg/dL    Ketone 15 (A) NEG mg/dL    Blood NEGATIVE  NEG      Urobilinogen 1.0 0.2 - 1.0 EU/dL    Nitrites NEGATIVE  NEG      Leukocyte Esterase SMALL (A) NEG      WBC 0-4 0 - 4 /hpf    RBC 0-5 0 - 5 /hpf    Epithelial cells FEW FEW /lpf    Bacteria NEGATIVE  NEG /hpf    UA:UC IF INDICATED CULTURE NOT INDICATED BY UA RESULT CNI     BILIRUBIN, CONFIRM    Collection Time: 01/26/17  4:35 AM   Result Value Ref Range    Bilirubin UA, confirm NEGATIVE  NEG         Assessment:     Active Problems:    Renal cell carcinoma of right kidney (HCC) (1/4/2017)      SVT (supraventricular tachycardia) (1/9/2017)      Paroxysmal atrial fibrillation (HCC) (1/13/2017)      Orthostatic hypotension (1/13/2017)      Diverticulitis (1/26/2017)      Leukocytosis (1/26/2017)      History of permanent cardiac pacemaker placement (1/26/2017)      Chronic systolic heart failure ([address] (1/26/2017)    Cholecystitis with gallstones and sludge    Plan:     Continue antibiotics  Plan for Laparoscopic cholecystectomy/IOC as an outpatient. Plan for cholecystostomy tube placement if symptoms worsens or pain/leukocytosis persists. Follow labs.

## 2017-01-26 NOTE — PROGRESS NOTES
Pt is a 80 y.o  [place] female admitted with Diverticulitis. Pt was alert, oriented and in no distress. Demographic information verified and pt would like her daughter added to the emergency contact list. CM will add pt's daughter, Grover Scott with a phone number of [phone]. Pt stated she came from [email or web address] after being discharged from [address] [facility] on 1/23/17. Prior to [email or web address] pt lives with her son in a 1 story home with a few steps to the entrance. Pt uses a cane and walker at home. Preferred pharmacy is [facility]. Pt's son can transport pt when discharged. CM contacted [email or web address] and they verified pt was there for rehab. CM will continue to follow pt for discharge planning needs. Care Management Interventions  PCP Verified by CM: Yes  Mode of Transport at Discharge: Other (see comment) (family can transport)  Transition of Care Consult (CM Consult): Discharge Planning  Discharge Durable Medical Equipment: No (pt has a cane and walker)  Physical Therapy Consult: No  Occupational Therapy Consult: No  Speech Therapy Consult: No  Current Support Network:  Other, Own Home (lives with her son in a 1 story home with a few steps)  Confirm Follow Up Transport: Family  Discharge Location  Discharge Placement: [facility] [address], [address]

## 2017-01-26 NOTE — IP AVS SNAPSHOT
[address] [facility] 
[phone] Patient: Sujatha Goldberg MRN: [id number] GXF:[id number]/[id number] You are allergic to the following Allergen Reactions Percocet (Oxycodone-Acetaminophen) Other (comments) Confused Recent Documentation Height Weight BMI OB Status Smoking Status 1.702 m 73.1 kg 25.24 kg/m2 Postmenopausal Never Smoker Unresulted Labs Order Current Status CULTURE, BLOOD Preliminary result Emergency Contacts Name Discharge Info Relation Home Work Mobile 23 Mirtha Weaver Said CAREGIVER [3] Son [22] [phone] Alysia Stoddard  Daughter [21] [phone] About your hospitalization You were admitted on:  January 26, 2017 You last received care in the:  \[facility]\"" 2 GENERAL SURGERY You were discharged on:  February 1, 2017 Unit phone number:  [phone] Why you were hospitalized Your primary diagnosis was:  Not on File Your diagnoses also included:  Diverticulitis, Leukocytosis, Paroxysmal Atrial Fibrillation (Hcc), Svt (Supraventricular Tachycardia), History Of Permanent Cardiac Pacemaker Placement, Chronic Systolic Heart Failure (Hcc), Renal Cell Carcinoma Of Right Kidney (Hcc), Orthostatic Hypotension Providers Seen During Your Hospitalizations Provider Role Specialty Primary office phone Sergio Cordero MD Attending Provider Emergency Medicine [phone] Basil Miramontes MD Attending Provider Internal Medicine [phone] Samara Echevarria MD Attending Provider Internal Medicine [phone] Your Primary Care Physician (PCP) Primary Care Physician Office Phone Office Fax Shanika Townsend [phone] Follow-up Information Follow up With Details Comments Contact Info Rosendo Goodrich MD On 2/13/2017 your appointment is at 1:40pm [address]ain [address] [address] [phone] [facility]  every week   
 gastroenterology  4 weeks for recent diverticulitis JOSE Fierro   [address] [facility] [address] 
[phone] [facility] Cite Narayan Wolf)   [address] [facility] [address]    [address] [address] 
[phone] Your Appointments Monday February 13, 2017  1:40 PM EST  
ESTABLISHED PATIENT with Shahana Singletary MD  
[facility] [address] ([facility]) [address], [address] [facility]  
[phone] Tuesday February 14, 2017 11:00 AM EST New Patient with Tati Oquendo MD  
Arthritis and [address] [facility]) [address]  
[phone] Current Discharge Medication List  
  
START taking these medications Dose & Instructions Dispensing Information Comments Morning Noon Evening Bedtime  
 acetaminophen 325 mg tablet Commonly known as:  TYLENOL Your next dose is: Today, Tomorrow Other:  _________ Dose:  650 mg Take 2 Tabs by mouth every six (6) hours as needed. Quantity:  30 Tab Refills:  0  
     
   
   
   
  
 L. acidoph & paracasei- S therm- Bifido 8 billion cell Cap cap Commonly known as:  PARDEEP-Q/RISAQUAD Your next dose is: Today, Tomorrow Other:  _________ Dose:  1 Cap Take 1 Cap by mouth daily. Quantity:  30 Cap Refills:  0  
     
   
   
   
  
 levoFLOXacin 750 mg tablet Commonly known as:  Salty Feliz Your next dose is: Today, Tomorrow Other:  _________ Dose:  750 mg Take 1 Tab by mouth every fourty-eight (48) hours for 10 days. Quantity:  5 Tab Refills:  0  
     
   
   
   
  
 metroNIDAZOLE 500 mg tablet Commonly known as:  FLAGYL Your next dose is: Today, Tomorrow Other:  _________  Dose:  500 mg  
 Take 1 Tab by mouth every eight (8) hours for 10 days. Quantity:  30 Tab Refills:  0  
     
   
   
   
  
 ondansetron hcl 4 mg tablet Commonly known as:  ZOFRAN (AS HYDROCHLORIDE) Your next dose is: Today, Tomorrow Other:  _________ Dose:  4 mg Take 1 Tab by mouth every eight (8) hours as needed for Nausea. Quantity:  30 Tab Refills:  0 CONTINUE these medications which have CHANGED Dose & Instructions Dispensing Information Comments Morning Noon Evening Bedtime  
 furosemide 20 mg tablet Commonly known as:  LASIX What changed:   
- how much to take 
- how to take this - when to take this 
- additional instructions Your next dose is: Today, Tomorrow Other:  _________ Take 60mg PO daily but may hold on days if she is not tolerating PO intake properly. Quantity:  30 Tab Refills:  1 CONTINUE these medications which have NOT CHANGED Dose & Instructions Dispensing Information Comments Morning Noon Evening Bedtime  
 amiodarone 200 mg tablet Commonly known as:  CORDARONE Your next dose is: Today, Tomorrow Other:  _________ Dose:  200 mg Take 1 Tab by mouth daily for 60 days. Quantity:  30 Tab Refills:  1  
     
   
   
   
  
 aspirin delayed-release 81 mg tablet Your next dose is: Today, Tomorrow Other:  _________ Dose:  81 mg Take 81 mg by mouth daily. Refills:  0  
     
   
   
   
  
 docusate sodium 100 mg capsule Commonly known as:  Aurora Brandie Your next dose is: Today, Tomorrow Other:  _________ Dose:  100 mg Take 1 Cap by mouth two (2) times a day for 90 days. Quantity:  60 Cap Refills:  2  
     
   
   
   
  
 gabapentin 400 mg capsule Commonly known as:  NEURONTIN Your next dose is: Today, Tomorrow Other:  _________  Dose:  400 mg  
 Take 1 Cap by mouth three (3) times daily for 60 days. Quantity:  90 Cap Refills:  1  
     
   
   
   
  
 metoprolol succinate 25 mg XL tablet Commonly known as:  TOPROL-XL Your next dose is: Today, Tomorrow Other:  _________ Dose:  12.5 mg Take 12.5 mg by mouth daily. Refills:  0  
     
   
   
   
  
 midodrine 10 mg tablet Commonly known as:  Deyanira Quale Your next dose is: Today, Tomorrow Other:  _________ Dose:  10 mg Take 1 Tab by mouth three (3) times daily (with meals) for 60 days. Quantity:  90 Tab Refills:  1  
     
   
   
   
  
 omeprazole 20 mg capsule Commonly known as:  PRILOSEC Your next dose is: Today, Tomorrow Other:  _________ Dose:  20 mg Take 20 mg by mouth daily. Indications: GASTROESOPHAGEAL REFLUX Refills:  0  
     
   
   
   
  
 senna 8.6 mg tablet Commonly known as:  Edwin Tellez Your next dose is: Today, Tomorrow Other:  _________ Dose:  2 Tab Take 2 Tabs by mouth daily. Refills:  0 Where to Get Your Medications Information on where to get these meds will be given to you by the nurse or doctor. ! Ask your nurse or doctor about these medications  
  acetaminophen 325 mg tablet  
 furosemide 20 mg tablet L. acidoph & paracasei- S therm- Bifido 8 billion cell Cap cap  
 levoFLOXacin 750 mg tablet  
 metroNIDAZOLE 500 mg tablet  
 ondansetron hcl 4 mg tablet Discharge Instructions Follow up with Dr. Reva Barron in 2 weeks. Please call [phone] for an appointment. HOSPITALIST DISCHARGE INSTRUCTIONS 
 
NAME: Gypsybrandyn Zakia Mercedes :  1945 MRN:  [id number] Date/Time:  2017 11:46 AM 
 
ADMIT DATE: 2017 DISCHARGE DATE: 2017 Attending Physician: Levi Das MD 
 
DISCHARGE DIAGNOSIS: 
Acute renal failure Acute cholecystitis Chronic systolic heart failure (ischemic cardiomyopathy) due to CAD with LAD stents and SVT/paroxysmal atrial fibrillation s/p ICD/PM placement 1/18/17 Acute on chronic anemia Orthostatic hypotension Peripheral neuropathy GERD Renal cell carcinoma of right kidney s/p resection 12/16 with Dr. Misha Porter Medications: Per above medication reconciliation. Pain Management: per above medications Recommended diet: GI lite with Ensure Clear with all meal. Advance to cardiac as tolerated Recommended activity: PT/OT Eval and Treat Wound care: None Indwelling devices:  Colostomy care Supplemental Oxygen: None Required Lab work: Weekly BMP Glucose management:  None Code status: DNR Outside physician follow up: Follow-up Information Follow up With Details Comments Contact Info Sudhakar Wick MD Schedule an appointment as soon as possible for a visit in 15 days  [address] [place] [address] 
[phone] BMP  every week Skilled nursing facility/ SNF MD responsible for above on discharge. Information obtained by : 
I understand that if any problems occur once I am at home I am to contact my physician. I understand and acknowledge receipt of the instructions indicated above. Physician's or R.N.'s Signature                                                                  Date/Time Patient or Repres Discharge Orders None [facility] Announcement We are excited to announce that we are making your provider's discharge notes available to you in [employer]   You will see these notes when they are completed and signed by the physician that discharged you from your recent hospital stay. If you have any questions or concerns about any information you see in [facility], please call the Health Information Department where you were seen or reach out to your Primary Care Provider for more information about your plan of care. Introducing [facility] [place] [facility]! Bradford Mendez introduces [facility] patient portal. Now you can access parts of your medical record, email your doctor's office, and request medication refills online. 1. In your internet browser, go to [email or web address]€™[email or web address] More Alive Than You. [email or web address]/Whatâ€™s More Alive Than You 2. Click on the First Time User? Click Here link in the Sign In box. You will see the New Member Sign Up page. 3. Enter your PlaceFull Access Code exactly as it appears below. You will not need to use this code after youve completed the sign-up process. If you do not sign up before the expiration date, you must request a new code. · PlaceFull Access Code: [email or web address] Expires: 4/9/2017 11:18 AM 
 
4. Enter the last four digits of your Social Security Number (xxxx) and Date of Birth (mm/dd/yyyy) as indicated and click Submit. You will be taken to the next sign-up page. 5. Create a PlaceFull ID. This will be your PlaceFull login ID and cannot be changed, so think of one that is secure and easy to remember. 6. Create a Place[facility] password. You can change your password at any time. 7. Enter your Password Reset Question and Answer. This can be used at a later time if you forget your password. 8. Enter your e-mail address. You will receive e-mail notification when new information is available in [address] 9. Click Sign Up. You can now view and download portions of your medical record. 10. Click the Download Summary menu link to download a portable copy of your medical information.  
 
If you have questions, please visit the Frequently Asked Questions section of the H5. Remember, MyChart is NOT to be used for urgent needs. For medical emergencies, dial 911. Now available from your iPhone and Android! General Information Please provide this summary of care documentation to your next provider. Patient Signature:  ____________________________________________________________ Date:  ____________________________________________________________  
  
Mya Portage Provider Signature:  ____________________________________________________________ Date:  ____________________________________________________________

## 2017-01-26 NOTE — PROGRESS NOTES
Hospitalist Progress Note    NAME: Erica Reddy   :  1945   MRN:  [id number]         Assessment / Plan:  Acute cholecystitis:  Nausea/pain improving this morning with supportive care  - CT with acute diverticulitis of the hepatic flexure. No evidence of peridiverticular abscess. Distended gallbladder with hyperdense material may represent sludge. No  evidence of biliary duct dilatation. Evidence of prior right nephrectomy. No complicating features visualized in the surgical bed. Normal unenhanced appearance of the left kidney. Chronic L1 compression fracture with mild worsening compression.  - Abd US with concern for acute cholecystitis: Gallbladder wall is thickened and contains fluid. Gallbladder contains sludge and stones. No biliary dilation.  - general surgery consulted  - con't levaquin, flagyl  - d/v IV fluids  Chronic systolic heart failure (ischemic cardiomyopathy) due to CAD with LAD stents and SVT/paroxysmal atrial fibrillation s/p ICD/PM placement 17 : echo 1/10 with EF 25%. There were no regional wall motion abnormalities. - restart amiodarone  - restart toprol and ASA  - holding lasix today, will restart tomorrow if stable and tolerating po   Orthostatic hypotension:  con't outpt midodrine  Peripheral neuropathy: con't restart neurontin  GERD: restart PPI  Renal cell carcinoma of right kidney s/p resection  with Dr. Evelia Lui    Code Status: DNR  Surrogate Decision Maker: Chauncey Cosby  DVT Prophylaxis: Lovenox     Subjective:     Chief Complaint / Reason for Physician Visit  Fatigued this morning. No acute complaints abdominally, feeling a little better. Discussed with RN events overnight.      Review of Systems:  Symptom Y/N Comments  Symptom Y/N Comments   Fever/Chills n   Chest Pain n    Poor Appetite y   Edema n    Cough n   Abdominal Pain y    Sputum n   Joint Pain     SOB/ROCK n   Pruritis/Rash     Nausea/vomit    Tolerating PT/OT     Diarrhea    Tolerating Diet Constipation    Other       Could NOT obtain due to:      Objective:     VITALS:   Last 24hrs VS reviewed since prior progress note. Most recent are:  Patient Vitals for the past 24 hrs:   Temp Pulse Resp BP SpO2   01/26/17 0742 98.7 °F (37.1 °C) 66 20 116/67 100 %   01/26/17 0627 - 72 23 - 95 %   01/26/17 0615 - 73 19 130/64 95 %   01/26/17 0600 - 72 20 138/63 96 %   01/26/17 0545 - 74 23 134/62 94 %   01/26/17 0530 - 76 21 138/66 96 %   01/26/17 0515 - 73 22 143/69 95 %   01/26/17 0500 - 72 23 144/64 95 %   01/26/17 0445 - 72 23 141/68 92 %   01/26/17 0430 - 73 19 140/72 96 %   01/26/17 0412 - 75 19 - 98 %   01/26/17 0406 - 78 24 - 93 %   01/26/17 0400 - 78 23 148/66 95 %   01/26/17 0330 - 74 21 149/70 98 %   01/26/17 0245 - 73 21 138/55 97 %   01/26/17 0230 - 73 19 140/56 99 %   01/26/17 0215 - 71 20 142/61 99 %   01/26/17 0200 - 70 24 137/57 97 %   01/26/17 0145 - 70 17 148/59 97 %   01/26/17 0130 - 73 26 151/63 95 %   01/26/17 0116 98.4 °F (36.9 °C) 70 18 150/65 100 %     No intake or output data in the 24 hours ending 01/26/17 0902     PHYSICAL EXAM:  General: WD, WN. Alert, cooperative, no acute distress    EENT:  EOMI. Anicteric sclerae. MMM  Resp:  CTA bilaterally, no wheezing or rales. No accessory muscle use  CV:  Regular  rhythm,  No edema  GI:  Soft, moderately distended, mildly tender midabdominally.  +Bowel sounds  Neurologic:  Alert and oriented X 3, normal speech,   Psych:   Poor insight. Not anxious nor agitated  Skin:  No rashes.   No jaundice    Reviewed most current lab test results and cultures  YES  Reviewed most current radiology test results   YES  Review and summation of old records today    NO  Reviewed patient's current orders and MAR    YES  PMH/ reviewed - no change compared to H&P  ________________________________________________________________________  Care Plan discussed with:    Comments   Patient x    Family  x Son over phone   RN x    Care Manager     Consultant  x surgery Multidiciplinary team rounds were held today with , nursing, pharmacist and clinical coordinator. Patient's plan of care was discussed; medications were reviewed and discharge planning was addressed. ________________________________________________________________________  Total NON critical care TIME:  35 Minutes    Total CRITICAL CARE TIME Spent:   Minutes non procedure based      Comments   >50% of visit spent in counseling and coordination of care x    ________________________________________________________________________  Suzanne Canavan, MD     Procedures: see electronic medical records for all procedures/Xrays and details which were not copied into this note but were reviewed prior to creation of Plan. LABS:  I reviewed today's most current labs and imaging studies.   Pertinent labs include:  Recent Labs      01/26/17   0336   WBC  23.6*   HGB  7.9*   HCT  24.2*   PLT  303     Recent Labs      01/26/17   0340   NA  138   K  4.0   CL  101   CO2  23   GLU  120*   BUN  17   CREA  1.06*   CA  8.0*   ALB  2.5*   TBILI  0.7   SGOT  30   ALT  12       Signed: Suzanne Canavan, MD

## 2017-01-26 NOTE — PROGRESS NOTES
Primary Nurse Amanda Patel and Tessie Boone, ISAIAS performed a dual skin assessment on this patient No impairment noted (healed incision to left upper chest).    Evan score is 19

## 2017-01-26 NOTE — IP AVS SNAPSHOT
Current Discharge Medication List  
  
Take these medications at their scheduled times Dose & Instructions Dispensing Information Comments Morning Noon Evening Bedtime  
 amiodarone 200 mg tablet Commonly known as:  CORDARONE Your next dose is: Today, Tomorrow Other:  ____________ Dose:  200 mg Take 1 Tab by mouth daily for 60 days. Quantity:  30 Tab Refills:  1  
     
   
   
   
  
 aspirin delayed-release 81 mg tablet Your next dose is: Today, Tomorrow Other:  ____________ Dose:  81 mg Take 81 mg by mouth daily. Refills:  0  
     
   
   
   
  
 docusate sodium 100 mg capsule Commonly known as:  Fredonia Cummins Your next dose is: Today, Tomorrow Other:  ____________ Dose:  100 mg Take 1 Cap by mouth two (2) times a day for 90 days. Quantity:  60 Cap Refills:  2  
     
   
   
   
  
 gabapentin 400 mg capsule Commonly known as:  NEURONTIN Your next dose is: Today, Tomorrow Other:  ____________ Dose:  400 mg Take 1 Cap by mouth three (3) times daily for 60 days. Quantity:  90 Cap Refills:  1 L. acidoph & paracasei- S therm- Bifido 8 billion cell Cap cap Commonly known as:  PARDEEP-Q/RISAQUAD Your next dose is: Today, Tomorrow Other:  ____________ Dose:  1 Cap Take 1 Cap by mouth daily. Quantity:  30 Cap Refills:  0  
     
   
   
   
  
 levoFLOXacin 750 mg tablet Commonly known as:  Lamount Chalet Your next dose is: Today, Tomorrow Other:  ____________ Dose:  750 mg Take 1 Tab by mouth every fourty-eight (48) hours for 10 days. Quantity:  5 Tab Refills:  0  
     
   
   
   
  
 metoprolol succinate 25 mg XL tablet Commonly known as:  TOPROL-XL Your next dose is: Today, Tomorrow Other:  ____________ Dose:  12.5 mg Take 12.5 mg by mouth daily. Refills:  0 metroNIDAZOLE 500 mg tablet Commonly known as:  FLAGYL Your next dose is: Today, Tomorrow Other:  ____________ Dose:  500 mg Take 1 Tab by mouth every eight (8) hours for 10 days. Quantity:  30 Tab Refills:  0  
     
   
   
   
  
 midodrine 10 mg tablet Commonly known as:  Ermias Laughliner Your next dose is: Today, Tomorrow Other:  ____________ Dose:  10 mg Take 1 Tab by mouth three (3) times daily (with meals) for 60 days. Quantity:  90 Tab Refills:  1  
     
   
   
   
  
 omeprazole 20 mg capsule Commonly known as:  PRILOSEC Your next dose is: Today, Tomorrow Other:  ____________ Dose:  20 mg Take 20 mg by mouth daily. Indications: GASTROESOPHAGEAL REFLUX Refills:  0  
     
   
   
   
  
 senna 8.6 mg tablet Commonly known as:  Edwin Tellez Your next dose is: Today, Tomorrow Other:  ____________ Dose:  2 Tab Take 2 Tabs by mouth daily. Refills:  0 Take these medications as needed Dose & Instructions Dispensing Information Comments Morning Noon Evening Bedtime  
 acetaminophen 325 mg tablet Commonly known as:  TYLENOL Your next dose is: Today, Tomorrow Other:  ____________ Dose:  650 mg Take 2 Tabs by mouth every six (6) hours as needed. Quantity:  30 Tab Refills:  0  
     
   
   
   
  
 ondansetron hcl 4 mg tablet Commonly known as:  ZOFRAN (AS HYDROCHLORIDE) Your next dose is: Today, Tomorrow Other:  ____________ Dose:  4 mg Take 1 Tab by mouth every eight (8) hours as needed for Nausea. Quantity:  30 Tab Refills:  0 Take these medications as directed Dose & Instructions Dispensing Information Comments Morning Noon Evening Bedtime  
 furosemide 20 mg tablet Commonly known as:  LASIX Your next dose is: Today, Tomorrow Other:  ____________ Take 60mg PO daily but may hold on days if she is not tolerating PO intake properly. Quantity:  30 Tab Refills:  1 Where to Get Your Medications Information about where to get these medications is not yet available ! Ask your nurse or doctor about these medications  
  acetaminophen 325 mg tablet  
 furosemide 20 mg tablet L. acidoph & paracasei- S therm- Bifido 8 billion cell Cap cap  
 levoFLOXacin 750 mg tablet  
 metroNIDAZOLE 500 mg tablet  
 ondansetron hcl 4 mg tablet

## 2017-01-26 NOTE — ED TRIAGE NOTES
Pt reports n/v x1 today and abdominal pain 10/10 that is generalized and stabbing that started two days prior. Pt reports 3 normal BMs today. Pt denies chest pain, SOB and all other symptoms at this time. Pt reports she had a pacemaker placed 2 weeks and and a 1 kidney removed in December of 2016.

## 2017-01-26 NOTE — PROGRESS NOTES
Patient has repeatedly declined attempts to toilet throughout the day. Informed pt that she was to get up and ambulate to bathroom and attempt to urinate. She was unsteady on her feet and required use of walker and close physical assistance to remain upright. She voided 100 cc's of tea colored urine. She states it was Tea Breeze" a couple of days ago too. Denies any pain/burning with urination. 1835 Discussed above with Dr. Jj Goel. Continue to monitor. End of Shift Nursing Note    Bedside shift change report given to Vee Cordova (oncoming nurse) by Gustavo Gentile (offgoing nurse). Report included the following information SBAR and Intake/Output. Significant changes during shift:    As above. .. Otherwise, no vomiting. Premedicated with zofran x1, Tylenol given for pain x1. C/o dull, sore pains in right ribs and LLQ of abdomen. Non-emergent issues for physician to address:   Switch metoprolol back to PO while not NPO? SCD's??       Vital Signs:    Temp: 98.9 °F (37.2 °C)     Pulse (Heart Rate): 70     BP: 125/70     Resp Rate: 17     O2 Sat (%): 95 %    Skin Integrity:      Wounds: no   Dressings Present: no    Wound Concerns: no      GI:    Current diet:  DIET CLEAR LIQUID    Nausea: YES  Vomiting: NO  Bowel Sounds: YES  Flatus: NO  Last Bowel Movement: PTA       Patient Safety:    Falls Score: 3  Mobility Score: 1  Bed Alarm On? NO  Sitter?  [address] [facility]

## 2017-01-26 NOTE — ED NOTES
Daughter in law Fiona Hussein) [phone]  Son Ruthy Rico) [phone]    Family going home at this time.  Family requesting to be contacted for update on admission

## 2017-01-26 NOTE — PROGRESS NOTES
Pharmacy Medication Reconciliation     The patient was interviewed regarding current PTA medication list, use and drug allergies. Allergy Update: No update. Recommendations/Findings: The following amendments were made to the patient's active medication list on file at [facility]:    1) Additions:   Omeprazole 20 mg cap by mouth once daily for reflux  Sennosides 8.6 mg tab 2 tabs by mouth once daily     2) Deletions: none     3) Changes: none        -Clarified PTA med list with medication list provided from rehab facility. Patient was not able to recall medications she is prescribed. PTA medication list was corrected to the following:     Prior to Admission Medications   Prescriptions Last Dose Informant Patient Reported? Taking?   amiodarone (CORDARONE) 200 mg tablet 1/25/2017 at Unknown time  No Yes   Sig: Take 1 Tab by mouth daily for 60 days. aspirin delayed-release 81 mg tablet 1/25/2017 at Unknown time  Yes Yes   Sig: Take 81 mg by mouth daily. docusate sodium (COLACE) 100 mg capsule 1/25/2017 at Unknown time  No Yes   Sig: Take 1 Cap by mouth two (2) times a day for 90 days. furosemide (LASIX) 20 mg tablet 1/25/2017 at Unknown time  No Yes   Sig: Take 1 Tab by mouth daily for 60 days. gabapentin (NEURONTIN) 400 mg capsule 1/25/2017 at Unknown time  No Yes   Sig: Take 1 Cap by mouth three (3) times daily for 60 days. metoprolol succinate (TOPROL-XL) 25 mg XL tablet 1/25/2017 at Unknown time  Yes Yes   Sig: Take 12.5 mg by mouth daily. midodrine (PROAMITINE) 10 mg tablet 1/25/2017 at Unknown time  No Yes   Sig: Take 1 Tab by mouth three (3) times daily (with meals) for 60 days. omeprazole (PRILOSEC) 20 mg capsule 1/25/2017 at Unknown time  Yes Yes   Sig: Take 20 mg by mouth daily. Indications: GASTROESOPHAGEAL REFLUX   senna (SENOKOT) 8.6 mg tablet 1/25/2017 at Unknown time  Yes Yes   Sig: Take 2 Tabs by mouth daily.       Facility-Administered Medications: None        Thank you,  Santoscourtney Johnsonome PharmD Candidate 2017  [facility]

## 2017-01-27 ENCOUNTER — APPOINTMENT (OUTPATIENT)
Dept: INTERVENTIONAL RADIOLOGY/VASCULAR | Age: 72
DRG: 445 | End: 2017-01-27
Attending: SURGERY
Payer: MEDICARE

## 2017-01-27 LAB
ALBUMIN SERPL BCP-MCNC: 2.1 G/DL (ref 3.5–5)
ALBUMIN/GLOB SERPL: 0.6 {RATIO} (ref 1.1–2.2)
ALP SERPL-CCNC: 225 U/L (ref 45–117)
ALT SERPL-CCNC: 10 U/L (ref 12–78)
ANION GAP BLD CALC-SCNC: 12 MMOL/L (ref 5–15)
AST SERPL W P-5'-P-CCNC: 20 U/L (ref 15–37)
BASOPHILS # BLD AUTO: 0 K/UL
BASOPHILS # BLD: 0 %
BILIRUB SERPL-MCNC: 0.7 MG/DL (ref 0.2–1)
BUN SERPL-MCNC: 27 MG/DL (ref 6–20)
BUN/CREAT SERPL: 17 (ref 12–20)
CALCIUM SERPL-MCNC: 8 MG/DL (ref 8.5–10.1)
CHLORIDE SERPL-SCNC: 98 MMOL/L (ref 97–108)
CO2 SERPL-SCNC: 24 MMOL/L (ref 21–32)
CREAT SERPL-MCNC: 1.62 MG/DL (ref 0.55–1.02)
DIFFERENTIAL METHOD BLD: ABNORMAL
EOSINOPHIL # BLD: 0 K/UL
EOSINOPHIL NFR BLD: 0 %
ERYTHROCYTE [DISTWIDTH] IN BLOOD BY AUTOMATED COUNT: 19 % (ref 11.5–14.5)
GLOBULIN SER CALC-MCNC: 3.8 G/DL (ref 2–4)
GLUCOSE SERPL-MCNC: 92 MG/DL (ref 65–100)
HCT VFR BLD AUTO: 22.6 % (ref 35–47)
HGB BLD-MCNC: 7.3 G/DL (ref 11.5–16)
LYMPHOCYTES # BLD AUTO: 6 %
LYMPHOCYTES # BLD: 1.9 K/UL
MCH RBC QN AUTO: 23.3 PG (ref 26–34)
MCHC RBC AUTO-ENTMCNC: 32.3 G/DL (ref 30–36.5)
MCV RBC AUTO: 72.2 FL (ref 80–99)
MONOCYTES # BLD: 0.3 K/UL
MONOCYTES NFR BLD AUTO: 1 %
NEUTS BAND NFR BLD MANUAL: 1 %
NEUTS SEG # BLD: 29.3 K/UL
NEUTS SEG NFR BLD AUTO: 92 %
PLATELET # BLD AUTO: 296 K/UL (ref 150–400)
POTASSIUM SERPL-SCNC: 4.4 MMOL/L (ref 3.5–5.1)
PROT SERPL-MCNC: 5.9 G/DL (ref 6.4–8.2)
RBC # BLD AUTO: 3.13 M/UL (ref 3.8–5.2)
RBC MORPH BLD: ABNORMAL
RBC MORPH BLD: ABNORMAL
SODIUM SERPL-SCNC: 134 MMOL/L (ref 136–145)
WBC # BLD AUTO: 31.5 K/UL (ref 3.6–11)

## 2017-01-27 PROCEDURE — 74011250637 HC RX REV CODE- 250/637: Performed by: INTERNAL MEDICINE

## 2017-01-27 PROCEDURE — C1892 INTRO/SHEATH,FIXED,PEEL-AWAY: HCPCS

## 2017-01-27 PROCEDURE — 74011636320 HC RX REV CODE- 636/320

## 2017-01-27 PROCEDURE — C1729 CATH, DRAINAGE: HCPCS

## 2017-01-27 PROCEDURE — 47490 INCISION OF GALLBLADDER: CPT

## 2017-01-27 PROCEDURE — 87205 SMEAR GRAM STAIN: CPT | Performed by: INTERNAL MEDICINE

## 2017-01-27 PROCEDURE — 77030010548 HC BG WND DRN ARMD -B

## 2017-01-27 PROCEDURE — 80053 COMPREHEN METABOLIC PANEL: CPT | Performed by: INTERNAL MEDICINE

## 2017-01-27 PROCEDURE — C1769 GUIDE WIRE: HCPCS

## 2017-01-27 PROCEDURE — 74011000250 HC RX REV CODE- 250: Performed by: RADIOLOGY

## 2017-01-27 PROCEDURE — 74011250636 HC RX REV CODE- 250/636: Performed by: INTERNAL MEDICINE

## 2017-01-27 PROCEDURE — 77030002996 HC SUT SLK J&J -A

## 2017-01-27 PROCEDURE — 74011000258 HC RX REV CODE- 258: Performed by: INTERNAL MEDICINE

## 2017-01-27 PROCEDURE — 65660000000 HC RM CCU STEPDOWN

## 2017-01-27 PROCEDURE — 77030011229 HC DIL VESL COON COOK -A

## 2017-01-27 PROCEDURE — 87040 BLOOD CULTURE FOR BACTERIA: CPT | Performed by: INTERNAL MEDICINE

## 2017-01-27 PROCEDURE — 74011000250 HC RX REV CODE- 250: Performed by: INTERNAL MEDICINE

## 2017-01-27 PROCEDURE — 36415 COLL VENOUS BLD VENIPUNCTURE: CPT | Performed by: INTERNAL MEDICINE

## 2017-01-27 PROCEDURE — 85025 COMPLETE CBC W/AUTO DIFF WBC: CPT | Performed by: INTERNAL MEDICINE

## 2017-01-27 PROCEDURE — 0F943ZZ DRAINAGE OF GALLBLADDER, PERCUTANEOUS APPROACH: ICD-10-PCS | Performed by: RADIOLOGY

## 2017-01-27 PROCEDURE — 74011250636 HC RX REV CODE- 250/636: Performed by: RADIOLOGY

## 2017-01-27 PROCEDURE — 87075 CULTR BACTERIA EXCEPT BLOOD: CPT | Performed by: INTERNAL MEDICINE

## 2017-01-27 RX ORDER — LIDOCAINE HYDROCHLORIDE 20 MG/ML
20 INJECTION, SOLUTION INFILTRATION; PERINEURAL ONCE
Status: COMPLETED | OUTPATIENT
Start: 2017-01-27 | End: 2017-01-27

## 2017-01-27 RX ORDER — SODIUM CHLORIDE 450 MG/100ML
100 INJECTION, SOLUTION INTRAVENOUS CONTINUOUS
Status: DISPENSED | OUTPATIENT
Start: 2017-01-27 | End: 2017-01-28

## 2017-01-27 RX ORDER — DIPHENHYDRAMINE HYDROCHLORIDE 50 MG/ML
25-50 INJECTION, SOLUTION INTRAMUSCULAR; INTRAVENOUS ONCE
Status: COMPLETED | OUTPATIENT
Start: 2017-01-27 | End: 2017-01-27

## 2017-01-27 RX ORDER — FENTANYL CITRATE 50 UG/ML
12.5-5 INJECTION, SOLUTION INTRAMUSCULAR; INTRAVENOUS
Status: DISCONTINUED | OUTPATIENT
Start: 2017-01-27 | End: 2017-01-27

## 2017-01-27 RX ORDER — HEPARIN SODIUM 1000 [USP'U]/ML
10000 INJECTION, SOLUTION INTRAVENOUS; SUBCUTANEOUS ONCE
Status: ACTIVE | OUTPATIENT
Start: 2017-01-27 | End: 2017-01-28

## 2017-01-27 RX ADMIN — FENTANYL CITRATE 25 MCG: 50 INJECTION, SOLUTION INTRAMUSCULAR; INTRAVENOUS at 13:29

## 2017-01-27 RX ADMIN — MIDODRINE HYDROCHLORIDE 10 MG: 5 TABLET ORAL at 18:27

## 2017-01-27 RX ADMIN — FENTANYL CITRATE 50 MCG: 50 INJECTION, SOLUTION INTRAMUSCULAR; INTRAVENOUS at 13:20

## 2017-01-27 RX ADMIN — DOCUSATE SODIUM 100 MG: 100 CAPSULE, LIQUID FILLED ORAL at 08:57

## 2017-01-27 RX ADMIN — GABAPENTIN 400 MG: 100 CAPSULE ORAL at 22:00

## 2017-01-27 RX ADMIN — METOPROLOL SUCCINATE 12.5 MG: 25 TABLET, EXTENDED RELEASE ORAL at 08:57

## 2017-01-27 RX ADMIN — PANTOPRAZOLE SODIUM 40 MG: 40 TABLET, DELAYED RELEASE ORAL at 09:00

## 2017-01-27 RX ADMIN — SENNOSIDES 17.2 MG: 8.6 TABLET, FILM COATED ORAL at 08:59

## 2017-01-27 RX ADMIN — GABAPENTIN 400 MG: 100 CAPSULE ORAL at 18:27

## 2017-01-27 RX ADMIN — HYDROMORPHONE HYDROCHLORIDE 0.5 MG: 1 INJECTION, SOLUTION INTRAMUSCULAR; INTRAVENOUS; SUBCUTANEOUS at 09:08

## 2017-01-27 RX ADMIN — METRONIDAZOLE 500 MG: 500 INJECTION, SOLUTION INTRAVENOUS at 01:35

## 2017-01-27 RX ADMIN — MIDODRINE HYDROCHLORIDE 10 MG: 5 TABLET ORAL at 08:57

## 2017-01-27 RX ADMIN — FENTANYL CITRATE 25 MCG: 50 INJECTION, SOLUTION INTRAMUSCULAR; INTRAVENOUS at 13:25

## 2017-01-27 RX ADMIN — DOCUSATE SODIUM 100 MG: 100 CAPSULE, LIQUID FILLED ORAL at 18:36

## 2017-01-27 RX ADMIN — LIDOCAINE HYDROCHLORIDE 400 MG: 20 INJECTION, SOLUTION INFILTRATION; PERINEURAL at 13:31

## 2017-01-27 RX ADMIN — AMIODARONE HYDROCHLORIDE 200 MG: 200 TABLET ORAL at 08:59

## 2017-01-27 RX ADMIN — MIDODRINE HYDROCHLORIDE 10 MG: 5 TABLET ORAL at 14:26

## 2017-01-27 RX ADMIN — DIPHENHYDRAMINE HYDROCHLORIDE 50 MG: 50 INJECTION, SOLUTION INTRAMUSCULAR; INTRAVENOUS at 13:10

## 2017-01-27 RX ADMIN — IOPAMIDOL 10 ML: 755 INJECTION, SOLUTION INTRAVENOUS at 13:33

## 2017-01-27 RX ADMIN — SODIUM CHLORIDE 100 ML/HR: 450 INJECTION, SOLUTION INTRAVENOUS at 18:37

## 2017-01-27 RX ADMIN — ASPIRIN 81 MG: 81 TABLET, COATED ORAL at 08:58

## 2017-01-27 RX ADMIN — Medication 10 ML: at 09:03

## 2017-01-27 RX ADMIN — METRONIDAZOLE 500 MG: 500 INJECTION, SOLUTION INTRAVENOUS at 09:01

## 2017-01-27 RX ADMIN — GABAPENTIN 400 MG: 100 CAPSULE ORAL at 08:58

## 2017-01-27 RX ADMIN — Medication 10 ML: at 14:28

## 2017-01-27 RX ADMIN — METRONIDAZOLE 500 MG: 500 INJECTION, SOLUTION INTRAVENOUS at 18:28

## 2017-01-27 RX ADMIN — SODIUM BICARBONATE 2 ML: 0.2 INJECTION, SOLUTION INTRAVENOUS at 13:31

## 2017-01-27 NOTE — PROGRESS NOTES
Hospitalist Progress Note    NAME: Virgil Bell   :  1945   MRN:  [id number]         Assessment / Plan:  Acute cholecystitis: worsening leukocytosis with new ARF this morning  - CT with acute diverticulitis of the hepatic flexure. No evidence of peridiverticular abscess. Distended gallbladder with hyperdense material may represent sludge. No  evidence of biliary duct dilatation. Evidence of prior right nephrectomy. No complicating features visualized in the surgical bed. Normal unenhanced appearance of the left kidney. Chronic L1 compression fracture with mild worsening compression.  - Abd US with concern for acute cholecystitis: Gallbladder wall is thickened and contains fluid. Gallbladder contains sludge and stones. No biliary dilation. - percutaneous cholecystectomy tube today via IR due to worsening labs which is worrisome. Fluid sent for cultures. Blood cultures also sent today. - general surgery consult appreciated, plan for elective cholecystectomy in 4-6 weeks if possible  - con't levaquin, flagyl  - restart IV fluids  Acute renal failure:    - admission UA reviewed and is benign  - restart IV fluids - need to use caution due to severe systolic CHF  Chronic systolic heart failure (ischemic cardiomyopathy) due to CAD with LAD stents and SVT/paroxysmal atrial fibrillation s/p ICD/PM placement 17 : echo 1/10 with EF 25%. There were no regional wall motion abnormalities. - con't amiodarone, toprol and ASA  - holding lasix due to renal failure   Orthostatic hypotension: con't outpt midodrine  Peripheral neuropathy: con't restart neurontin  GERD: restart PPI  Renal cell carcinoma of right kidney s/p resection  with Dr. Silas Berg     Code Status: DNR  Surrogate Decision Maker: Petrona Vincent  DVT Prophylaxis: Lovenox     Subjective:     Chief Complaint / Reason for Physician Visit  \"Doing okay\". Denies acute issue, tolerated cholecystectomy tube placement.   Discussed with RN events overnight. Review of Systems:  Symptom Y/N Comments  Symptom Y/N Comments   Fever/Chills n   Chest Pain n    Poor Appetite n   Edema n    Cough n   Abdominal Pain n    Sputum n   Joint Pain     SOB/ROCK n   Pruritis/Rash     Nausea/vomit    Tolerating PT/OT     Diarrhea    Tolerating Diet     Constipation    Other       Could NOT obtain due to:      Objective:     VITALS:   Last 24hrs VS reviewed since prior progress note. Most recent are:  Patient Vitals for the past 24 hrs:   Temp Pulse Resp BP SpO2   01/27/17 1330 - 70 20 127/69 100 %   01/27/17 1325 - 75 20 128/77 100 %   01/27/17 1320 - 70 20 141/49 100 %   01/27/17 1234 98.8 °F (37.1 °C) 78 18 115/50 97 %   01/27/17 1117 98.7 °F (37.1 °C) 76 18 110/47 96 %   01/27/17 0726 98.4 °F (36.9 °C) 71 18 108/62 95 %   01/27/17 0006 98.2 °F (36.8 °C) 71 18 115/70 96 %   01/26/17 1959 98.8 °F (37.1 °C) 72 16 125/66 93 %   01/26/17 1645 - - 17 - -   01/26/17 1606 98.9 °F (37.2 °C) 70 17 125/70 95 %       Intake/Output Summary (Last 24 hours) at 01/27/17 1432  Last data filed at 01/27/17 1042   Gross per 24 hour   Intake              300 ml   Output              100 ml   Net              200 ml        PHYSICAL EXAM:  General: WD, WN. Alert, cooperative, no acute distress    EENT:  EOMI. Anicteric sclerae. MMM  Resp:  CTA bilaterally, no wheezing or rales. No accessory muscle use  CV:  Regular  rhythm,  No edema  GI:  Soft, moderately distended, Non tender.  +Bowel sounds  Neurologic:  Alert and oriented X 3, normal speech,   Psych:   Some insight. Not anxious nor agitated  Skin:  No rashes.   No jaundice    Reviewed most current lab test results and cultures  YES  Reviewed most current radiology test results   YES  Review and summation of old records today    NO  Reviewed patient's current orders and MAR    YES  PMH/SH reviewed - no change compared to H&P  ________________________________________________________________________  Care Plan discussed with:    Comments Patient x    Family      RN x    Care Manager     Consultant  x General surgery                     Multidiciplinary team rounds were held today with , nursing, pharmacist and clinical coordinator. Patient's plan of care was discussed; medications were reviewed and discharge planning was addressed. ________________________________________________________________________  Total NON critical care TIME:  30 Minutes    Total CRITICAL CARE TIME Spent:   Minutes non procedure based      Comments   >50% of visit spent in counseling and coordination of care x    ________________________________________________________________________  Neisha Gaona MD     Procedures: see electronic medical records for all procedures/Xrays and details which were not copied into this note but were reviewed prior to creation of Plan. LABS:  I reviewed today's most current labs and imaging studies.   Pertinent labs include:  Recent Labs      01/27/17   0432  01/26/17   0336   WBC  31.5*  23.6*   HGB  7.3*  7.9*   HCT  22.6*  24.2*   PLT  296  303     Recent Labs      01/27/17   0432  01/26/17   0340   NA  134*  138   K  4.4  4.0   CL  98  101   CO2  24  23   GLU  92  120*   BUN  27*  17   CREA  1.62*  1.06*   CA  8.0*  8.0*   ALB  2.1*  2.5*   TBILI  0.7  0.7   SGOT  20  30   ALT  10*  12       Signed: Neisha Gaona MD

## 2017-01-27 NOTE — PROGRESS NOTES
SURGERY PROGRESS NOTE      Admit Date: 2017      Subjective:     Complaining of worse abdominal pain. Objective:     Visit Vitals    /62 (BP 1 Location: Right arm, BP Patient Position: At rest)    Pulse 71    Temp 98.4 °F (36.9 °C)    Resp 18    Ht 5' 7\" (1.702 m)    Wt 73.1 kg (161 lb 2.5 oz)    SpO2 95%    BMI 25.24 kg/m2        Temp (24hrs), Av.4 °F (36.9 °C), Min:97.6 °F (36.4 °C), Max:98.9 °F (37.2 °C)      Physical Exam:     Abdomen:  Soft. Non-distended. RUQ tenderness. No rebound or guarding. Lab Results  Component Value Date/Time   WBC 31.5 2017 04:32 AM   Hemoglobin (POC) 9.2 2016 07:02 AM   HGB 7.3 2017 04:32 AM   Hematocrit (POC) 27 2016 07:02 AM   HCT 22.6 2017 04:32 AM   PLATELET 791  04:32 AM   MCV 72.2 2017 04:32 AM       Lab Results  Component Value Date/Time   GFR est AA 38 2017 04:32 AM   GFR est non-AA 31 2017 04:32 AM   Creatinine (POC) 1.0 2016 07:02 AM   Creatinine 1.62 2017 04:32 AM   BUN 27 2017 04:32 AM   BUN (POC) 12 2016 07:02 AM   Sodium (POC) 137 2016 07:02 AM   Sodium 134 2017 04:32 AM   Potassium 4.4 2017 04:32 AM   Potassium (POC) 3.9 2016 07:02 AM   Chloride (POC) 98 2016 07:02 AM   Chloride 98 2017 04:32 AM   CO2 24 2017 04:32 AM        Assessment:     Active Problems:    Renal cell carcinoma of right kidney ([address] (2017)      SVT (supraventricular tachycardia) (2017)      Paroxysmal atrial fibrillation (HCC) (2017)      Orthostatic hypotension (2017)      Diverticulitis (2017)      Leukocytosis (2017)      History of permanent cardiac pacemaker placement (2017)      Chronic systolic heart failure ([facility] [address] (2017)    Worsening leukocytosis despite antibiotics    Plan/Recommendations/Medical Decision Making:     NPO  Plan for cholecystostomy tube placement today by IR.   Continue antibiotics

## 2017-01-27 NOTE — PROGRESS NOTES
Pt arrived in xray recovery for procedure. Pt ate a full breakfast at 9 am. Per MD procedure will be done with Fentanyl and Benedryl.

## 2017-01-27 NOTE — PROGRESS NOTES
Name of procedure: Cholecystostomy Tube    Complications, if any, r/t procedure: none    Medications given: 100 mcg Fentanyl, 50 mg Benedryl    VS : Stable     [facility]    Pt tolerated procedure well. SS. No C/O pain. Dressing to site D&I. No bleeding or hematoma noted to site. HOB elevated. Pt taken back to room by transport. NAD noted at time of transport. Floor, RN to assume care of pt. Attempted to call report X3 . Floor RN may call ext. [phone] for updates. See connect care orders and chart for updates.

## 2017-01-27 NOTE — PROGRESS NOTES
End of Shift Nursing Note    Bedside shift change report given to Ger Miranda (oncoming nurse) by Bhumika Junior (offgoing nurse). Report included the following information SBAR, Kardex, Procedure Summary and Recent Results. Zone Phone:   none    Significant changes during shift:    none   Non-emergent issues for physician to address:   none     Number times ambulated in hallway past shift: none      Number of times OOB to chair past shift: none    POD #: n/a     Vital Signs:    Temp: 98.2 °F (36.8 °C)     Pulse (Heart Rate): 71     BP: 115/70     Resp Rate: 18     O2 Sat (%): 96 %    Lines & Drains:     Urinary Catheter? NO   Placement Date:    Medical Necessity:   Central Line? NO   Placement Date:    Medical Necessity:   PICC Line? NO   Placement Date:    Medical Necessity:     NG tube [] in [] removed [x] not applicable   Drains [] in [] removed [x] not applicable     Skin Integrity:      Wounds: no   Dressings Present: no    Wound Concerns: no      GI:    Current diet:  DIET CLEAR LIQUID    Nausea: NO  Vomiting: NO  Bowel Sounds: YES  Flatus: YES  Last Bowel Movement: yesterday   Appearance:     Respiratory:  Supplemental O2: NO      Device:    via  Liters/min     Incentive Spirometer: NO  Volume:   Coughing and Deep Breathing: YES  Oral Care: NO  Understanding (patient/family education): YES   Getting out of bed: NO  Head of bed elevation: YES    Patient Safety:    Falls Score: 3  Mobility Score: 1  Bed Alarm On? NO  Sitter? NO      Opportunity for questions and clarification was given to oncoming nurse. Patient bed is in low position, side rails are up x 2, door & observation blinds open as needed, call bell within reach and patient not in distress.     Sara Martinez RN

## 2017-01-28 LAB
ALBUMIN SERPL BCP-MCNC: 2 G/DL (ref 3.5–5)
ALBUMIN/GLOB SERPL: 0.6 {RATIO} (ref 1.1–2.2)
ALP SERPL-CCNC: 170 U/L (ref 45–117)
ALT SERPL-CCNC: 9 U/L (ref 12–78)
ANION GAP BLD CALC-SCNC: 10 MMOL/L (ref 5–15)
AST SERPL W P-5'-P-CCNC: 21 U/L (ref 15–37)
BILIRUB SERPL-MCNC: 0.8 MG/DL (ref 0.2–1)
BUN SERPL-MCNC: 38 MG/DL (ref 6–20)
BUN/CREAT SERPL: 18 (ref 12–20)
CALCIUM SERPL-MCNC: 8 MG/DL (ref 8.5–10.1)
CHLORIDE SERPL-SCNC: 97 MMOL/L (ref 97–108)
CO2 SERPL-SCNC: 25 MMOL/L (ref 21–32)
CREAT SERPL-MCNC: 2.09 MG/DL (ref 0.55–1.02)
ERYTHROCYTE [DISTWIDTH] IN BLOOD BY AUTOMATED COUNT: 18.8 % (ref 11.5–14.5)
GLOBULIN SER CALC-MCNC: 3.6 G/DL (ref 2–4)
GLUCOSE SERPL-MCNC: 86 MG/DL (ref 65–100)
HCT VFR BLD AUTO: 20.6 % (ref 35–47)
HGB BLD-MCNC: 6.7 G/DL (ref 11.5–16)
MCH RBC QN AUTO: 23.2 PG (ref 26–34)
MCHC RBC AUTO-ENTMCNC: 32.5 G/DL (ref 30–36.5)
MCV RBC AUTO: 71.3 FL (ref 80–99)
PLATELET # BLD AUTO: 293 K/UL (ref 150–400)
POTASSIUM SERPL-SCNC: 4.2 MMOL/L (ref 3.5–5.1)
PROT SERPL-MCNC: 5.6 G/DL (ref 6.4–8.2)
RBC # BLD AUTO: 2.89 M/UL (ref 3.8–5.2)
SODIUM SERPL-SCNC: 132 MMOL/L (ref 136–145)
WBC # BLD AUTO: 20.7 K/UL (ref 3.6–11)

## 2017-01-28 PROCEDURE — 74011000250 HC RX REV CODE- 250: Performed by: INTERNAL MEDICINE

## 2017-01-28 PROCEDURE — 74011250637 HC RX REV CODE- 250/637: Performed by: INTERNAL MEDICINE

## 2017-01-28 PROCEDURE — 77030027138 HC INCENT SPIROMETER -A

## 2017-01-28 PROCEDURE — 65660000000 HC RM CCU STEPDOWN

## 2017-01-28 PROCEDURE — 80053 COMPREHEN METABOLIC PANEL: CPT | Performed by: INTERNAL MEDICINE

## 2017-01-28 PROCEDURE — 36415 COLL VENOUS BLD VENIPUNCTURE: CPT | Performed by: INTERNAL MEDICINE

## 2017-01-28 PROCEDURE — 74011250636 HC RX REV CODE- 250/636: Performed by: INTERNAL MEDICINE

## 2017-01-28 PROCEDURE — P9016 RBC LEUKOCYTES REDUCED: HCPCS | Performed by: INTERNAL MEDICINE

## 2017-01-28 PROCEDURE — 86900 BLOOD TYPING SEROLOGIC ABO: CPT | Performed by: INTERNAL MEDICINE

## 2017-01-28 PROCEDURE — 30233N1 TRANSFUSION OF NONAUTOLOGOUS RED BLOOD CELLS INTO PERIPHERAL VEIN, PERCUTANEOUS APPROACH: ICD-10-PCS | Performed by: INTERNAL MEDICINE

## 2017-01-28 PROCEDURE — 77030029131 HC ADMN ST IV BLD N DEHP ICUM -B

## 2017-01-28 PROCEDURE — 36430 TRANSFUSION BLD/BLD COMPNT: CPT

## 2017-01-28 PROCEDURE — 85027 COMPLETE CBC AUTOMATED: CPT | Performed by: INTERNAL MEDICINE

## 2017-01-28 PROCEDURE — 74011000258 HC RX REV CODE- 258: Performed by: INTERNAL MEDICINE

## 2017-01-28 PROCEDURE — 86920 COMPATIBILITY TEST SPIN: CPT | Performed by: INTERNAL MEDICINE

## 2017-01-28 RX ORDER — SODIUM CHLORIDE 450 MG/100ML
50 INJECTION, SOLUTION INTRAVENOUS CONTINUOUS
Status: DISCONTINUED | OUTPATIENT
Start: 2017-01-28 | End: 2017-01-30

## 2017-01-28 RX ORDER — SODIUM CHLORIDE 9 MG/ML
250 INJECTION, SOLUTION INTRAVENOUS AS NEEDED
Status: DISCONTINUED | OUTPATIENT
Start: 2017-01-28 | End: 2017-02-01 | Stop reason: HOSPADM

## 2017-01-28 RX ADMIN — METRONIDAZOLE 500 MG: 500 INJECTION, SOLUTION INTRAVENOUS at 23:44

## 2017-01-28 RX ADMIN — GABAPENTIN 400 MG: 100 CAPSULE ORAL at 20:59

## 2017-01-28 RX ADMIN — METRONIDAZOLE 500 MG: 500 INJECTION, SOLUTION INTRAVENOUS at 11:17

## 2017-01-28 RX ADMIN — GABAPENTIN 400 MG: 100 CAPSULE ORAL at 18:40

## 2017-01-28 RX ADMIN — LIDOCAINE HYDROCHLORIDE 40 ML: 20 SOLUTION ORAL; TOPICAL at 12:34

## 2017-01-28 RX ADMIN — AMIODARONE HYDROCHLORIDE 200 MG: 200 TABLET ORAL at 08:51

## 2017-01-28 RX ADMIN — LEVOFLOXACIN 750 MG: 5 INJECTION, SOLUTION INTRAVENOUS at 08:46

## 2017-01-28 RX ADMIN — Medication 10 ML: at 04:53

## 2017-01-28 RX ADMIN — MIDODRINE HYDROCHLORIDE 10 MG: 5 TABLET ORAL at 11:17

## 2017-01-28 RX ADMIN — SENNOSIDES 17.2 MG: 8.6 TABLET, FILM COATED ORAL at 08:49

## 2017-01-28 RX ADMIN — MIDODRINE HYDROCHLORIDE 10 MG: 5 TABLET ORAL at 08:49

## 2017-01-28 RX ADMIN — MIDODRINE HYDROCHLORIDE 10 MG: 5 TABLET ORAL at 18:40

## 2017-01-28 RX ADMIN — DOCUSATE SODIUM 100 MG: 100 CAPSULE, LIQUID FILLED ORAL at 08:50

## 2017-01-28 RX ADMIN — Medication 10 ML: at 20:58

## 2017-01-28 RX ADMIN — SODIUM CHLORIDE 50 ML/HR: 450 INJECTION, SOLUTION INTRAVENOUS at 08:57

## 2017-01-28 RX ADMIN — METOPROLOL SUCCINATE 12.5 MG: 25 TABLET, EXTENDED RELEASE ORAL at 08:50

## 2017-01-28 RX ADMIN — GABAPENTIN 400 MG: 100 CAPSULE ORAL at 08:48

## 2017-01-28 RX ADMIN — METRONIDAZOLE 500 MG: 500 INJECTION, SOLUTION INTRAVENOUS at 04:52

## 2017-01-28 RX ADMIN — PANTOPRAZOLE SODIUM 40 MG: 40 TABLET, DELAYED RELEASE ORAL at 08:49

## 2017-01-28 RX ADMIN — ASPIRIN 81 MG: 81 TABLET, COATED ORAL at 08:49

## 2017-01-28 NOTE — PROGRESS NOTES
End of Shift Nursing Note    Bedside shift change report given to Thomas (oncoming nurse) by Gail Izquierdo (offgoing nurse). Report included the following information SBAR and Kardex. Zone Phone:   [phone]    Significant changes during shift:    Pt had a cholecystectomy tube placed   Non-emergent issues for physician to address:   none     Number times ambulated in hallway past shift: 0      Number of times OOB to chair past shift: 0    POD #:      Vital Signs:    Temp: 100.3 °F (37.9 °C)     Pulse (Heart Rate): 68     BP: 112/67     Resp Rate: 16     O2 Sat (%): 93 %    Lines & Drains:     Urinary Catheter? NO   Placement Date:    Medical Necessity:   Central Line? NO   Placement Date:    Medical Necessity:   PICC Line? NO   Placement Date:    Medical Necessity:     NG tube [] in [] removed [] not applicable   Drains [] in [] removed [] not applicable     Skin Integrity:      Wounds: yes   Dressings Present: yes    Wound Concerns: no      GI:    Current diet:  DIET CLEAR LIQUID    Nausea: NO  Vomiting: NO  Bowel Sounds: YES  Flatus: YES  Last Bowel Movement: yesterday   Appearance:     Respiratory:  Supplemental O2: NO      Device:    via  Liters/min     Incentive Spirometer: YES  Volume:   Coughing and Deep Breathing: YES  Oral Care: YES  Understanding (patient/family education): YES   Getting out of bed: YES  Head of bed elevation: YES    Patient Safety:    Falls Score: 3  Mobility Score: 1  Bed Alarm On? NO  Sitter? NO      Opportunity for questions and clarification was given to oncoming nurse. Patient bed is in lowest position, side rails are up x 3, door & observation blinds open as needed, call bell within reach and patient not in distress.     [facility]

## 2017-01-28 NOTE — PROGRESS NOTES
Hospitalist Progress Note    NAME: Antoinette Jones   :  1945   MRN:  [id number]         Assessment / Plan:  Acute anemia:    - discussed with Pt - no apparent source for blood loss  - transfusing 1u pRBCs  Acute renal failure: says she is urinating more now, but still worsening Cr  - admission UA reviewed and is benign  - con't IV fluids (caution with comorbid CHF)  - con't holding lasix  Acute cholecystitis: WBC improving, still febrile  - CT with acute diverticulitis of the hepatic flexure. No evidence of peridiverticular abscess. Distended gallbladder with hyperdense material may represent sludge. No  evidence of biliary duct dilatation. Evidence of prior right nephrectomy. No complicating features visualized in the surgical bed. Normal unenhanced appearance of the left kidney. Chronic L1 compression fracture with mild worsening compression.  - Abd US with concern for acute cholecystitis: Gallbladder wall is thickened and contains fluid. Gallbladder contains sludge and stones. No biliary dilation. - percutaneous cholecystectomy tube  via IR due to worsening labs which is worrisome. Fluid cultures no growth thus far. - general surgery consult appreciated, plan for elective cholecystectomy in 4-6 weeks  - con't levaquin, flagyl  Chronic systolic heart failure (ischemic cardiomyopathy) due to CAD with LAD stents and SVT/paroxysmal atrial fibrillation s/p ICD/PM placement 17 : echo 1/10 with EF 25%. There were no regional wall motion abnormalities.   - con't amiodarone, toprol and ASA  - holding lasix due to renal failure   Orthostatic hypotension: con't outpt midodrine  Peripheral neuropathy: con't restart neurontin  GERD: restart PPI  Renal cell carcinoma of right kidney s/p resection  with Dr. Anette Knight  Code Status: DNR  Surrogate Decision Maker: Will Vaca  DVT Prophylaxis: Lovenox      Subjective:     Chief Complaint / Reason for Physician Visit  \"I'm having some pain (epigastric)\". Discussed with RN events overnight. Review of Systems:  Symptom Y/N Comments  Symptom Y/N Comments   Fever/Chills n   Chest Pain n    Poor Appetite n   Edema n    Cough n   Abdominal Pain y    Sputum n   Joint Pain     SOB/ROCK n   Pruritis/Rash     Nausea/vomit    Tolerating PT/OT     Diarrhea    Tolerating Diet     Constipation    Other       Could NOT obtain due to:      Objective:     VITALS:   Last 24hrs VS reviewed since prior progress note. Most recent are:  Patient Vitals for the past 24 hrs:   Temp Pulse Resp BP SpO2   01/28/17 1119 98.6 °F (37 °C) 70 18 123/63 96 %   01/28/17 0842 98.8 °F (37.1 °C) 73 18 105/64 94 %   01/28/17 0340 98.6 °F (37 °C) 73 16 115/60 92 %   01/27/17 2317 99.5 °F (37.5 °C) 67 16 102/61 93 %   01/27/17 2013 100.3 °F (37.9 °C) 68 16 112/67 93 %   01/27/17 1447 98.5 °F (36.9 °C) 66 17 125/58 96 %   01/27/17 1430 - 75 20 129/63 100 %   01/27/17 1400 - 78 18 135/74 100 %   01/27/17 1345 - 74 20 129/72 100 %   01/27/17 1330 - 70 20 127/69 100 %   01/27/17 1325 - 75 20 128/77 100 %   01/27/17 1320 - 70 20 141/49 100 %   01/27/17 1234 98.8 °F (37.1 °C) 78 18 115/50 97 %       Intake/Output Summary (Last 24 hours) at 01/28/17 1158  Last data filed at 01/28/17 0800   Gross per 24 hour   Intake              690 ml   Output               20 ml   Net              670 ml        PHYSICAL EXAM:  General: WD, WN. Alert, cooperative, no acute distress    EENT:  EOMI. Anicteric sclerae. MMM  Resp:  CTA bilaterally, no wheezing or rales. No accessory muscle use  CV:  Regular  rhythm,  No edema  GI:  Soft, moderately distended, RUQ tenderness.  +Bowel sounds  Neurologic:  Alert and oriented X 3, normal speech,   Psych:   Good insight. Not anxious nor agitated  Skin:  No rashes.   No jaundice    Reviewed most current lab test results and cultures  YES  Reviewed most current radiology test results   YES  Review and summation of old records today    NO  Reviewed patient's current orders and MAR    YES  PMH/SH reviewed - no change compared to H&P  ________________________________________________________________________  Care Plan discussed with:    Comments   Patient x    Family  x Left message with son J   RN x    Care Manager     Consultant                        Multidiciplinary team rounds were held today with , nursing, pharmacist and clinical coordinator. Patient's plan of care was discussed; medications were reviewed and discharge planning was addressed. ________________________________________________________________________  Total NON critical care TIME:  25 Minutes    Total CRITICAL CARE TIME Spent:   Minutes non procedure based      Comments   >50% of visit spent in counseling and coordination of care x    ________________________________________________________________________  Eladio Ma MD     Procedures: see electronic medical records for all procedures/Xrays and details which were not copied into this note but were reviewed prior to creation of Plan. LABS:  I reviewed today's most current labs and imaging studies.   Pertinent labs include:  Recent Labs      01/28/17 0452  01/27/17   0432  01/26/17   0336   WBC  20.7*  31.5*  23.6*   HGB  6.7*  7.3*  7.9*   HCT  20.6*  22.6*  24.2*   PLT  293  296  303     Recent Labs      01/28/17   0452  01/27/17   0432  01/26/17   0340   NA  132*  134*  138   K  4.2  4.4  4.0   CL  97  98  101   CO2  25  24  23   GLU  86  92  120*   BUN  38*  27*  17   CREA  2.09*  1.62*  1.06*   CA  8.0*  8.0*  8.0*   ALB  2.0*  2.1*  2.5*   TBILI  0.8  0.7  0.7   SGOT  21  20  30   ALT  9*  10*  12       Signed: Eladio Ma MD

## 2017-01-28 NOTE — PROGRESS NOTES
1900-bedside report received from dottie smith. Pt. Resting in  Bed oriented x 4, denies nausea, pain. Drain in place, patent. Drain flushed by dottie Pinedo. Assessment as noted    0000--pt denies need for pain medication at this time, no nausea. 0430--pt oriented x 4, denies pain nausea, am labs drawn and sent to lab.    0700--bedside report given to dottie salcido who is assuming care of pt.

## 2017-01-28 NOTE — PROGRESS NOTES
Admit Date: 2017    POD * No surgery found *    Procedure:  * No surgery found *    Subjective:     Patient has complaints of right sided pain, says worse than yesterday. Zaira clears well    Objective:     Blood pressure 105/64, pulse 73, temperature 98.8 °F (37.1 °C), resp. rate 18, height 5' 7\" (1.702 m), weight 161 lb 2.5 oz (73.1 kg), SpO2 94 %. Temp (24hrs), Av °F (37.2 °C), Min:98.5 °F (36.9 °C), Max:100.3 °F (37.9 °C)      Physical Exam:  GENERAL: alert, cooperative, no distress, appears stated age, LUNG: clear to auscultation bilaterally, HEART: regular rate and rhythm, S1, S2 normal, no murmur, click, rub or gallop, ABDOMEN: soft, non-tender.  Bowel sounds normal. No masses,  no organomegaly, drain output dark bile, EXTREMITIES:  extremities normal, atraumatic, no cyanosis or edema    Labs:   Recent Results (from the past 24 hour(s))   CULTURE, BLOOD    Collection Time: 17 11:16 AM   Result Value Ref Range    Special Requests: NO SPECIAL REQUESTS      Culture result: NO GROWTH AFTER 20 HOURS     CULTURE, BODY FLUID W GRAM STAIN    Collection Time: 17  1:30 PM   Result Value Ref Range    Special Requests: NO SPECIAL REQUESTS      GRAM STAIN RARE  WBCS SEEN        GRAM STAIN NO ORGANISMS SEEN      Culture result: PENDING    CBC W/O DIFF    Collection Time: 17  4:52 AM   Result Value Ref Range    WBC 20.7 (H) 3.6 - 11.0 K/uL    RBC 2.89 (L) 3.80 - 5.20 M/uL    HGB 6.7 (L) 11.5 - 16.0 g/dL    HCT 20.6 (L) 35.0 - 47.0 %    MCV 71.3 (L) 80.0 - 99.0 FL    MCH 23.2 (L) 26.0 - 34.0 PG    MCHC 32.5 30.0 - 36.5 g/dL    RDW 18.8 (H) 11.5 - 14.5 %    PLATELET 643 846 - 784 K/uL   METABOLIC PANEL, COMPREHENSIVE    Collection Time: 17  4:52 AM   Result Value Ref Range    Sodium 132 (L) 136 - 145 mmol/L    Potassium 4.2 3.5 - 5.1 mmol/L    Chloride 97 97 - 108 mmol/L    CO2 25 21 - 32 mmol/L    Anion gap 10 5 - 15 mmol/L    Glucose 86 65 - 100 mg/dL    BUN 38 (H) 6 - 20 MG/DL    Creatinine 2.09 (H) 0.55 - 1.02 MG/DL    BUN/Creatinine ratio 18 12 - 20      GFR est AA 28 (L) >60 ml/min/1.73m2    GFR est non-AA 23 (L) >60 ml/min/1.73m2    Calcium 8.0 (L) 8.5 - 10.1 MG/DL    Bilirubin, total 0.8 0.2 - 1.0 MG/DL    ALT 9 (L) 12 - 78 U/L    AST 21 15 - 37 U/L    Alk. phosphatase 170 (H) 45 - 117 U/L    Protein, total 5.6 (L) 6.4 - 8.2 g/dL    Albumin 2.0 (L) 3.5 - 5.0 g/dL    Globulin 3.6 2.0 - 4.0 g/dL    A-G Ratio 0.6 (L) 1.1 - 2.2         Data Review images and reports reviewed    Assessment:     Active Problems:    Renal cell carcinoma of right kidney (HCC) (1/4/2017)      SVT (supraventricular tachycardia) (1/9/2017)      Paroxysmal atrial fibrillation (HCC) (1/13/2017)      Orthostatic hypotension (1/13/2017)      Diverticulitis (1/26/2017)      Leukocytosis (1/26/2017)      History of permanent cardiac pacemaker placement (1/26/2017)      Chronic systolic heart failure ([facility] [address] (1/26/2017)        Plan/Recommendations/Medical Decision Making:     Continue present treatment   S/p cholecystostomy placement yesterday. WBC down to 20 today. Low grade temp last evening, currently afeb. No organisms on gram stain    Yoly Montanez MD, [facility]

## 2017-01-28 NOTE — PROGRESS NOTES
End of Shift Nursing Note    Bedside shift change report given to [address] [place] [address] (oncoming nurse) by Olvin Moise (offgoing nurse). Report included the following information SBAR, Kardex, ED Summary, Procedure Summary, Intake/Output, MAR, Accordion and Recent Results. Significant changes during shift:    Pt with Hgb=6.7, one unit RBC given. Pts bili drain with very dark brown (nearly black) output of 6mL today. Non-emergent issues for physician to address:   none     Number times ambulated in hallway past shift: 0      Number of times OOB to chair past shift: 0       Vital Signs:    Temp: 99.1 °F (37.3 °C)     Pulse (Heart Rate): 76     BP: 113/69     Resp Rate: 16     O2 Sat (%): 96 %    Lines & Drains:     Urinary Catheter? NO       NG tube [] in [] removed [x] not applicable   Drains [] in [] removed [x] not applicable     Skin Integrity:      Wounds: no   Dressings Present: yes    Wound Concerns: no      GI:    Current diet:  DIET GI LITE (POST SURGICAL)    Nausea: NO  Vomiting: NO  Bowel Sounds: YES  Flatus: YES  Last Bowel Movement: several days ago    Respiratory:  Supplemental O2: NO      Incentive Spirometer: YES  Volume: 400  Coughing and Deep Breathing: YES  Oral Care: YES  Understanding (patient/family education): YES   Getting out of bed: NO  Head of bed elevation: YES    Patient Safety:    Falls Score: 2  Mobility Score: 1  Bed Alarm On? NO  Sitter? NO      Opportunity for questions and clarification was given to oncoming nurse. Patient bed is in low position, side rails are up x 2, door & observation blinds open as needed, call bell within reach and patient not in distress.     Chantal Bender RN

## 2017-01-29 ENCOUNTER — APPOINTMENT (OUTPATIENT)
Dept: GENERAL RADIOLOGY | Age: 72
DRG: 445 | End: 2017-01-29
Attending: INTERNAL MEDICINE
Payer: MEDICARE

## 2017-01-29 LAB
ABO + RH BLD: NORMAL
ALBUMIN SERPL BCP-MCNC: 1.8 G/DL (ref 3.5–5)
ALBUMIN/GLOB SERPL: 0.5 {RATIO} (ref 1.1–2.2)
ALP SERPL-CCNC: 149 U/L (ref 45–117)
ALT SERPL-CCNC: 6 U/L (ref 12–78)
ANION GAP BLD CALC-SCNC: 13 MMOL/L (ref 5–15)
AST SERPL W P-5'-P-CCNC: 16 U/L (ref 15–37)
BILIRUB SERPL-MCNC: 0.7 MG/DL (ref 0.2–1)
BLD PROD TYP BPU: NORMAL
BLOOD GROUP ANTIBODIES SERPL: NORMAL
BPU ID: NORMAL
BUN SERPL-MCNC: 35 MG/DL (ref 6–20)
BUN/CREAT SERPL: 19 (ref 12–20)
CALCIUM SERPL-MCNC: 7.8 MG/DL (ref 8.5–10.1)
CHLORIDE SERPL-SCNC: 98 MMOL/L (ref 97–108)
CO2 SERPL-SCNC: 22 MMOL/L (ref 21–32)
CREAT SERPL-MCNC: 1.84 MG/DL (ref 0.55–1.02)
CROSSMATCH RESULT,%XM: NORMAL
ERYTHROCYTE [DISTWIDTH] IN BLOOD BY AUTOMATED COUNT: 18 % (ref 11.5–14.5)
GLOBULIN SER CALC-MCNC: 3.6 G/DL (ref 2–4)
GLUCOSE SERPL-MCNC: 83 MG/DL (ref 65–100)
HCT VFR BLD AUTO: 21.3 % (ref 35–47)
HEMOCCULT STL QL: NEGATIVE
HGB BLD-MCNC: 7.2 G/DL (ref 11.5–16)
MCH RBC QN AUTO: 24.1 PG (ref 26–34)
MCHC RBC AUTO-ENTMCNC: 33.8 G/DL (ref 30–36.5)
MCV RBC AUTO: 71.2 FL (ref 80–99)
PLATELET # BLD AUTO: 278 K/UL (ref 150–400)
POTASSIUM SERPL-SCNC: 4 MMOL/L (ref 3.5–5.1)
PROT SERPL-MCNC: 5.4 G/DL (ref 6.4–8.2)
RBC # BLD AUTO: 2.99 M/UL (ref 3.8–5.2)
SODIUM SERPL-SCNC: 133 MMOL/L (ref 136–145)
SPECIMEN EXP DATE BLD: NORMAL
STATUS OF UNIT,%ST: NORMAL
UNIT DIVISION, %UDIV: 0
WBC # BLD AUTO: 15.2 K/UL (ref 3.6–11)

## 2017-01-29 PROCEDURE — 71020 XR CHEST PA LAT: CPT

## 2017-01-29 PROCEDURE — 74011000258 HC RX REV CODE- 258: Performed by: INTERNAL MEDICINE

## 2017-01-29 PROCEDURE — 36415 COLL VENOUS BLD VENIPUNCTURE: CPT | Performed by: INTERNAL MEDICINE

## 2017-01-29 PROCEDURE — 74011000250 HC RX REV CODE- 250: Performed by: INTERNAL MEDICINE

## 2017-01-29 PROCEDURE — 74011250637 HC RX REV CODE- 250/637: Performed by: INTERNAL MEDICINE

## 2017-01-29 PROCEDURE — 65660000000 HC RM CCU STEPDOWN

## 2017-01-29 PROCEDURE — 80053 COMPREHEN METABOLIC PANEL: CPT | Performed by: INTERNAL MEDICINE

## 2017-01-29 PROCEDURE — 85027 COMPLETE CBC AUTOMATED: CPT | Performed by: INTERNAL MEDICINE

## 2017-01-29 PROCEDURE — 82272 OCCULT BLD FECES 1-3 TESTS: CPT | Performed by: INTERNAL MEDICINE

## 2017-01-29 RX ADMIN — ASPIRIN 81 MG: 81 TABLET, COATED ORAL at 09:02

## 2017-01-29 RX ADMIN — METRONIDAZOLE 500 MG: 500 INJECTION, SOLUTION INTRAVENOUS at 16:19

## 2017-01-29 RX ADMIN — GABAPENTIN 400 MG: 100 CAPSULE ORAL at 09:03

## 2017-01-29 RX ADMIN — Medication 10 ML: at 05:35

## 2017-01-29 RX ADMIN — METRONIDAZOLE 500 MG: 500 INJECTION, SOLUTION INTRAVENOUS at 09:00

## 2017-01-29 RX ADMIN — MIDODRINE HYDROCHLORIDE 10 MG: 5 TABLET ORAL at 13:08

## 2017-01-29 RX ADMIN — SENNOSIDES 17.2 MG: 8.6 TABLET, FILM COATED ORAL at 09:01

## 2017-01-29 RX ADMIN — METOPROLOL SUCCINATE 12.5 MG: 25 TABLET, EXTENDED RELEASE ORAL at 09:04

## 2017-01-29 RX ADMIN — AMIODARONE HYDROCHLORIDE 200 MG: 200 TABLET ORAL at 09:02

## 2017-01-29 RX ADMIN — MIDODRINE HYDROCHLORIDE 10 MG: 5 TABLET ORAL at 16:19

## 2017-01-29 RX ADMIN — PANTOPRAZOLE SODIUM 40 MG: 40 TABLET, DELAYED RELEASE ORAL at 09:05

## 2017-01-29 RX ADMIN — GABAPENTIN 400 MG: 100 CAPSULE ORAL at 16:19

## 2017-01-29 RX ADMIN — SODIUM CHLORIDE 50 ML/HR: 450 INJECTION, SOLUTION INTRAVENOUS at 04:56

## 2017-01-29 RX ADMIN — Medication 10 ML: at 20:34

## 2017-01-29 RX ADMIN — MIDODRINE HYDROCHLORIDE 10 MG: 5 TABLET ORAL at 09:04

## 2017-01-29 RX ADMIN — GABAPENTIN 400 MG: 100 CAPSULE ORAL at 20:33

## 2017-01-29 RX ADMIN — Medication 10 ML: at 14:19

## 2017-01-29 NOTE — PROGRESS NOTES
End of Shift Nursing Note     Bedside shift change report given to [address] [place] [address] (oncoming nurse) by Earlene Joseph (offgoing nurse). Report included the following information SBAR, Kardex, ED Summary, Intake/Output, MAR, Accordion and Recent Results. Significant changes during shift:    Inc B&B, Hgb improved to 7.2 today. Bili drain putting out dark brown to black drainage, flushed per order. Stool sent for occult blood, negative. Two incontinent BMs today. Non-emergent issues for physician to address:   none     Number times ambulated in hallway past shift: 0       Number of times OOB to chair past shift: 0       Vital Signs:    Temp: 98.2 °F (36.8 °C)     Pulse (Heart Rate): 70     BP: 119/75     Resp Rate: 16     O2 Sat (%): 99 %    Lines & Drains:     Urinary Catheter? NO     NG tube [] in [] removed [x] not applicable   Drains [] in [] removed [x] not applicable     Skin Integrity:      Wounds: no   Dressings Present: no    Wound Concerns: no      GI:    Current diet:  DIET FULL LIQUID    Nausea: NO  Vomiting: NO  Bowel Sounds: YES  Flatus: YES  Last Bowel Movement: today       Respiratory:  Supplemental O2: YES      Device: nc   via 2 Liters/min     Incentive Spirometer: NO   Coughing and Deep Breathing: YES  Oral Care: YES  Understanding (patient/family education): YES   Getting out of bed: YES  Head of bed elevation: YES    Patient Safety:    Falls Score: 3  Mobility Score: 1  Bed Alarm On? NO  Sitter? NO      Opportunity for questions and clarification was given to oncoming nurse. Patient bed is in low position, side rails are up x 2, door & observation blinds open as needed, call bell within reach and patient not in distress.     Theo De Jesus RN

## 2017-01-29 NOTE — PROGRESS NOTES
End of Shift Nursing Note    Bedside shift change report given to Sadie Robbins RN (oncoming nurse) by Koki Kumari RN (offgoing nurse). Report included the following information SBAR, Kardex, Procedure Summary, Intake/Output, MAR and Recent Results. Zone Phone:   [phone]    Significant changes during shift:    New IV 22 G Rt hand, Hgb slightly improved after the blood transfusion 7.2. Drains flushed with 10cc. Total output 18 cc   Non-emergent issues for physician to address:   none     Number times ambulated in hallway past shift: 0      Number of times OOB to chair past shift: 1    POD #: 0     Vital Signs:    Temp: 98.7 °F (37.1 °C)     Pulse (Heart Rate): 73     BP: 148/82     Resp Rate: 16     O2 Sat (%): 95 %    Lines & Drains:     Urinary Catheter? NO   Central Line? NO   PICC Line? NO       NG tube [] in [] removed [x] not applicable   Drains [] in [] removed [x] not applicable     Skin Integrity:      Wounds: yes   Dressings Present: yes    Wound Concerns: no      GI:    Current diet:  DIET GI LITE (POST SURGICAL)    Nausea: NO  Vomiting: NO  Bowel Sounds: YES  Flatus: YES  Last Bowel Movement: several days ago     Respiratory:  Supplemental O2: NO      Coughing and Deep Breathing: YES  Oral Care: YES  Understanding (patient/family education): YES   Getting out of bed: YES  Head of bed elevation: YES    Patient Safety:    Falls Score: 1  Mobility Score: 1  Bed Alarm On? NO  Sitter? NO      Opportunity for questions and clarification was given to oncoming nurse. Patient bed is in low position, side rails are up x 2, door & observation blinds open as needed, call bell within reach and patient not in distress. Peyton Haro RN

## 2017-01-29 NOTE — PROGRESS NOTES
Admit Date: 2017    POD * No surgery found *    Procedure:  * No surgery found *    Subjective:     Patient feeling well, had nausea earlier. Objective:     Blood pressure 119/75, pulse 70, temperature 98.2 °F (36.8 °C), resp. rate 16, height 5' 7\" (1.702 m), weight 161 lb 2.5 oz (73.1 kg), SpO2 99 %. Temp (24hrs), Av.4 °F (36.9 °C), Min:97.6 °F (36.4 °C), Max:99.1 °F (37.3 °C)      Physical Exam:  GENERAL: alert, cooperative, no distress, appears stated age, LUNG: clear to auscultation bilaterally, HEART: regular rate and rhythm, S1, S2 normal, no murmur, click, rub or gallop, ABDOMEN: soft, non-tender. Bowel sounds normal. No masses,  no organomegaly, drain output dark bile, EXTREMITIES:  extremities normal, atraumatic, no cyanosis or edema    Labs:   Recent Results (from the past 24 hour(s))   METABOLIC PANEL, COMPREHENSIVE    Collection Time: 17  4:54 AM   Result Value Ref Range    Sodium 133 (L) 136 - 145 mmol/L    Potassium 4.0 3.5 - 5.1 mmol/L    Chloride 98 97 - 108 mmol/L    CO2 22 21 - 32 mmol/L    Anion gap 13 5 - 15 mmol/L    Glucose 83 65 - 100 mg/dL    BUN 35 (H) 6 - 20 MG/DL    Creatinine 1.84 (H) 0.55 - 1.02 MG/DL    BUN/Creatinine ratio 19 12 - 20      GFR est AA 33 (L) >60 ml/min/1.73m2    GFR est non-AA 27 (L) >60 ml/min/1.73m2    Calcium 7.8 (L) 8.5 - 10.1 MG/DL    Bilirubin, total 0.7 0.2 - 1.0 MG/DL    ALT 6 (L) 12 - 78 U/L    AST 16 15 - 37 U/L    Alk.  phosphatase 149 (H) 45 - 117 U/L    Protein, total 5.4 (L) 6.4 - 8.2 g/dL    Albumin 1.8 (L) 3.5 - 5.0 g/dL    Globulin 3.6 2.0 - 4.0 g/dL    A-G Ratio 0.5 (L) 1.1 - 2.2     CBC W/O DIFF    Collection Time: 17  4:54 AM   Result Value Ref Range    WBC 15.2 (H) 3.6 - 11.0 K/uL    RBC 2.99 (L) 3.80 - 5.20 M/uL    HGB 7.2 (L) 11.5 - 16.0 g/dL    HCT 21.3 (L) 35.0 - 47.0 %    MCV 71.2 (L) 80.0 - 99.0 FL    MCH 24.1 (L) 26.0 - 34.0 PG    MCHC 33.8 30.0 - 36.5 g/dL    RDW 18.0 (H) 11.5 - 14.5 %    PLATELET 479 112 - 432 K/uL Data Review images and reports reviewed    Assessment:     Active Problems:    Renal cell carcinoma of right kidney (HCC) (1/4/2017)      SVT (supraventricular tachycardia) (1/9/2017)      Paroxysmal atrial fibrillation (HCC) (1/13/2017)      Orthostatic hypotension (1/13/2017)      Diverticulitis (1/26/2017)      Leukocytosis (1/26/2017)      History of permanent cardiac pacemaker placement (1/26/2017)      Chronic systolic heart failure ([address] (1/26/2017)        Plan/Recommendations/Medical Decision Making:     Continue present treatment   S/p cholecystostomy placement Friday. WBC continues to trend down. Scant drain output past 2 days. No new recommendations. Dr. Lauryn Howard back tomorrow. Ernesto Funes.  Gloria Denson MD, [facility]

## 2017-01-29 NOTE — PROGRESS NOTES
Hospitalist Progress Note    NAME: Jasson Austin   :  1945   MRN:  [id number]         Assessment / Plan:  Acute renal failure: Cr slightly better today  - admission UA reviewed and is benign  - con't IV fluids (caution with comorbid CHF). Daily weights added. - con't holding lasix for now  Acute cholecystitis: WBC improving. Still nauseated with abd pain - asked to be changed back to liquid diet today as not tolerating GI lite  - CT A/P  with acute diverticulitis of the hepatic flexure. No evidence of peridiverticular abscess. Distended gallbladder with hyperdense material may represent sludge. No evidence of biliary duct dilatation. Evidence of prior right nephrectomy. Chronic L1 compression fracture with mild worsening compression.  - Abd US  with concern for acute cholecystitis: Gallbladder wall is thickened and contains fluid. Gallbladder contains sludge and stones. No biliary dilation. - percutaneous cholecystectomy tube  via IR due to worsening labs which is worrisome. Fluid cultures no growth thus far. - general surgery consult appreciated, plan for elective cholecystectomy in 4-6 weeks if able. Still putting out dark brown/black fluid from GB.  - con't levaquin, flagyl  - change back to full liquid diet  Chronic systolic heart failure (ischemic cardiomyopathy) due to CAD with LAD stents and SVT/paroxysmal atrial fibrillation s/p ICD/PM placement 17 : echo 1/10 with EF 25%. There were no regional wall motion abnormalities.   - con't amiodarone, toprol and ASA  - holding lasix due to renal failure   Acute on chronic anemia: Hg still low, but slightly better  - transfused 1u pRBCs   - check iron studies in AM  - check stool for blood  Orthostatic hypotension: con't outpt midodrine  Peripheral neuropathy: con't neurontin  GERD: PPI  Renal cell carcinoma of right kidney s/p resection  with Dr. Tineo Bachelor  Code Status: DNR  Surrogate Decision Maker: Navi dos santos [phone] or  [phone], Daughter in law Sadie Ernst [phone]  DVT Prophylaxis: Lovenox      Subjective:     Chief Complaint / Reason for Physician Visit  \"I still don't feel good\". Poor appetite, pain, nausea. Discussed with RN events overnight. Review of Systems:  Symptom Y/N Comments  Symptom Y/N Comments   Fever/Chills n   Chest Pain y    Poor Appetite n   Edema n    Cough n   Abdominal Pain y    Sputum n   Joint Pain     SOB/ROCK y   Pruritis/Rash     Nausea/vomit    Tolerating PT/OT     Diarrhea    Tolerating Diet     Constipation    Other       Could NOT obtain due to:      Objective:     VITALS:   Last 24hrs VS reviewed since prior progress note. Most recent are:  Patient Vitals for the past 24 hrs:   Temp Pulse Resp BP SpO2   01/29/17 0819 97.6 °F (36.4 °C) 72 18 125/77 96 %   01/29/17 0342 98.2 °F (36.8 °C) 71 16 146/83 98 %   01/28/17 2345 98.7 °F (37.1 °C) 73 16 148/82 95 %   01/28/17 2036 98.9 °F (37.2 °C) 76 16 138/76 93 %   01/28/17 1846 98.1 °F (36.7 °C) 82 16 (!) 145/93 92 %   01/28/17 1750 98.1 °F (36.7 °C) 79 16 122/83 95 %   01/28/17 1624 99.1 °F (37.3 °C) 76 16 113/69 96 %   01/28/17 1546 98.3 °F (36.8 °C) 76 16 116/75 91 %   01/28/17 1119 98.6 °F (37 °C) 70 18 123/63 96 %   01/28/17 0842 98.8 °F (37.1 °C) 73 18 105/64 94 %       Intake/Output Summary (Last 24 hours) at 01/29/17 0832  Last data filed at 01/29/17 0700   Gross per 24 hour   Intake            243.8 ml   Output               24 ml   Net            219.8 ml        PHYSICAL EXAM:  General: WD, WN. Alert, cooperative, no acute distress    EENT:  EOMI. Anicteric sclerae. MMM  Resp:  CTA bilaterally, no wheezing or rales. No accessory muscle use  CV:  Regular rhythm,  No edema  GI:  Soft, moderately distended, Non tender.  +Bowel sounds  Neurologic:  Alert and oriented X 3, normal speech,   Psych:   Fair insight. Not anxious nor agitated  Skin:  No rashes.   No jaundice    Reviewed most current lab test results and cultures YES  Reviewed most current radiology test results   YES  Review and summation of old records today    NO  Reviewed patient's current orders and MAR    YES  PMH/SH reviewed - no change compared to H&P  ________________________________________________________________________  Care Plan discussed with:    Comments   Patient x    Family      RN     Care Manager     Consultant                        Multidiciplinary team rounds were held today with , nursing, pharmacist and clinical coordinator. Patient's plan of care was discussed; medications were reviewed and discharge planning was addressed. ________________________________________________________________________  Total NON critical care TIME:  25 Minutes    Total CRITICAL CARE TIME Spent:   Minutes non procedure based      Comments   >50% of visit spent in counseling and coordination of care x    ________________________________________________________________________  Tj Link MD     Procedures: see electronic medical records for all procedures/Xrays and details which were not copied into this note but were reviewed prior to creation of Plan. LABS:  I reviewed today's most current labs and imaging studies.   Pertinent labs include:  Recent Labs      01/29/17 0454 01/28/17 0452  01/27/17   0432   WBC  15.2*  20.7*  31.5*   HGB  7.2*  6.7*  7.3*   HCT  21.3*  20.6*  22.6*   PLT  278  293  296     Recent Labs      01/29/17   0454  01/28/17 0452  01/27/17   0432   NA  133*  132*  134*   K  4.0  4.2  4.4   CL  98  97  98   CO2  22  25  24   GLU  83  86  92   BUN  35*  38*  27*   CREA  1.84*  2.09*  1.62*   CA  7.8*  8.0*  8.0*   ALB  1.8*  2.0*  2.1*   TBILI  0.7  0.8  0.7   SGOT  16  21  20   ALT  6*  9*  10*       Signed: Tj Link MD

## 2017-01-30 LAB
ANION GAP BLD CALC-SCNC: 11 MMOL/L (ref 5–15)
BUN SERPL-MCNC: 26 MG/DL (ref 6–20)
BUN/CREAT SERPL: 20 (ref 12–20)
CALCIUM SERPL-MCNC: 7.2 MG/DL (ref 8.5–10.1)
CHLORIDE SERPL-SCNC: 100 MMOL/L (ref 97–108)
CO2 SERPL-SCNC: 22 MMOL/L (ref 21–32)
CREAT SERPL-MCNC: 1.3 MG/DL (ref 0.55–1.02)
ERYTHROCYTE [DISTWIDTH] IN BLOOD BY AUTOMATED COUNT: 18.2 % (ref 11.5–14.5)
GLUCOSE SERPL-MCNC: 78 MG/DL (ref 65–100)
HCT VFR BLD AUTO: 24.8 % (ref 35–47)
HGB BLD-MCNC: 8.3 G/DL (ref 11.5–16)
IRON SATN MFR SERPL: 61 % (ref 20–50)
IRON SERPL-MCNC: 59 UG/DL (ref 35–150)
MCH RBC QN AUTO: 24.1 PG (ref 26–34)
MCHC RBC AUTO-ENTMCNC: 33.5 G/DL (ref 30–36.5)
MCV RBC AUTO: 72.1 FL (ref 80–99)
PLATELET # BLD AUTO: 281 K/UL (ref 150–400)
POTASSIUM SERPL-SCNC: 3.5 MMOL/L (ref 3.5–5.1)
RBC # BLD AUTO: 3.44 M/UL (ref 3.8–5.2)
SODIUM SERPL-SCNC: 133 MMOL/L (ref 136–145)
TIBC SERPL-MCNC: 96 UG/DL (ref 250–450)
TROPONIN I SERPL-MCNC: <0.04 NG/ML
WBC # BLD AUTO: 12.8 K/UL (ref 3.6–11)

## 2017-01-30 PROCEDURE — 97165 OT EVAL LOW COMPLEX 30 MIN: CPT

## 2017-01-30 PROCEDURE — 74011000250 HC RX REV CODE- 250: Performed by: INTERNAL MEDICINE

## 2017-01-30 PROCEDURE — 80048 BASIC METABOLIC PNL TOTAL CA: CPT | Performed by: INTERNAL MEDICINE

## 2017-01-30 PROCEDURE — 74011250637 HC RX REV CODE- 250/637: Performed by: INTERNAL MEDICINE

## 2017-01-30 PROCEDURE — G8979 MOBILITY GOAL STATUS: HCPCS

## 2017-01-30 PROCEDURE — 85027 COMPLETE CBC AUTOMATED: CPT | Performed by: INTERNAL MEDICINE

## 2017-01-30 PROCEDURE — 97161 PT EVAL LOW COMPLEX 20 MIN: CPT

## 2017-01-30 PROCEDURE — G8987 SELF CARE CURRENT STATUS: HCPCS

## 2017-01-30 PROCEDURE — 74011000258 HC RX REV CODE- 258: Performed by: INTERNAL MEDICINE

## 2017-01-30 PROCEDURE — 36415 COLL VENOUS BLD VENIPUNCTURE: CPT | Performed by: INTERNAL MEDICINE

## 2017-01-30 PROCEDURE — 65660000000 HC RM CCU STEPDOWN

## 2017-01-30 PROCEDURE — 84484 ASSAY OF TROPONIN QUANT: CPT | Performed by: INTERNAL MEDICINE

## 2017-01-30 PROCEDURE — 97162 PT EVAL MOD COMPLEX 30 MIN: CPT

## 2017-01-30 PROCEDURE — 74011250636 HC RX REV CODE- 250/636: Performed by: INTERNAL MEDICINE

## 2017-01-30 PROCEDURE — 83540 ASSAY OF IRON: CPT | Performed by: INTERNAL MEDICINE

## 2017-01-30 PROCEDURE — G8978 MOBILITY CURRENT STATUS: HCPCS

## 2017-01-30 RX ADMIN — GABAPENTIN 400 MG: 100 CAPSULE ORAL at 20:32

## 2017-01-30 RX ADMIN — ASPIRIN 81 MG: 81 TABLET, COATED ORAL at 09:00

## 2017-01-30 RX ADMIN — METOPROLOL SUCCINATE 12.5 MG: 25 TABLET, EXTENDED RELEASE ORAL at 08:17

## 2017-01-30 RX ADMIN — GABAPENTIN 400 MG: 100 CAPSULE ORAL at 17:18

## 2017-01-30 RX ADMIN — Medication 10 ML: at 06:03

## 2017-01-30 RX ADMIN — METRONIDAZOLE 500 MG: 500 INJECTION, SOLUTION INTRAVENOUS at 08:19

## 2017-01-30 RX ADMIN — AMIODARONE HYDROCHLORIDE 200 MG: 200 TABLET ORAL at 08:17

## 2017-01-30 RX ADMIN — PANTOPRAZOLE SODIUM 40 MG: 40 TABLET, DELAYED RELEASE ORAL at 08:18

## 2017-01-30 RX ADMIN — GABAPENTIN 400 MG: 100 CAPSULE ORAL at 08:17

## 2017-01-30 RX ADMIN — Medication 10 ML: at 14:17

## 2017-01-30 RX ADMIN — Medication 10 ML: at 20:33

## 2017-01-30 RX ADMIN — MIDODRINE HYDROCHLORIDE 10 MG: 5 TABLET ORAL at 08:17

## 2017-01-30 RX ADMIN — SODIUM CHLORIDE 50 ML/HR: 450 INJECTION, SOLUTION INTRAVENOUS at 06:03

## 2017-01-30 RX ADMIN — METRONIDAZOLE 500 MG: 500 INJECTION, SOLUTION INTRAVENOUS at 00:09

## 2017-01-30 RX ADMIN — MIDODRINE HYDROCHLORIDE 10 MG: 5 TABLET ORAL at 14:17

## 2017-01-30 RX ADMIN — LEVOFLOXACIN 750 MG: 5 INJECTION, SOLUTION INTRAVENOUS at 08:19

## 2017-01-30 RX ADMIN — MIDODRINE HYDROCHLORIDE 10 MG: 5 TABLET ORAL at 17:18

## 2017-01-30 RX ADMIN — METRONIDAZOLE 500 MG: 500 INJECTION, SOLUTION INTRAVENOUS at 17:18

## 2017-01-30 NOTE — PROGRESS NOTES
Problem: Mobility Impaired (Adult and Pediatric)  Goal: *Acute Goals and Plan of Care (Insert Text)  Physical Therapy Goals  Initiated 1/30/2017  1. Patient will move from supine to sit and sit to supine in bed with independence within 7 day(s). 2. Patient will transfer from bed to chair and chair to bed with modified independence using the least restrictive device within 7 day(s). 3. Patient will perform sit to stand with modified independence within 7 day(s). 4. Patient will ambulate with modified independence for 100 feet with the least restrictive device within 7 day(s). PHYSICAL THERAPY EVALUATION  Patient: Jorge Hernandez (42 y.o. female)  Date: 1/30/2017  Primary Diagnosis: Diverticulitis        Precautions:   Fall      ASSESSMENT :  Based on the objective data described below, the patient presents with generalized weakness, decreased activity tolerance, impaired balance and altered gait. PTA pt was discharged from [facility] 1/23/17 and went to [facility] for rehab, she then was readmitted to the hospital. She was using walker at rehab and a chair follow, she gets dizzy. Cleared by nursing to mobilize, she was received in supine and agreed to work with therapy. Bed mobility was performed with supervision to come to EOB. Sit<>stand was performed with min A and multiple attempts. She stood quickly and became very dizzy, she sat quickly. BP taken in sitting and then in standing, VSS despite dizziness (in flow sheet). Ambulated around the bed for 15ft with RW and CGA/min ,noted increased trunk sway and a few minor LOB with walker. She was returned to supine at the end of the session, refused OOB at this time but encouraged to get to chair with nursing later. Recommend returning to SNF to finish her rehab. Patient will benefit from skilled intervention to address the above impairments.   Patients rehabilitation potential is considered to be Good  Factors which may influence rehabilitation potential include:   [X]         None noted  [ ]         Mental ability/status  [ ]         Medical condition  [ ]         Home/family situation and support systems  [ ]         Safety awareness  [ ]         Pain tolerance/management  [ ]         Other:        PLAN :  Recommendations and Planned Interventions:  [X]           Bed Mobility Training             [ ]    Neuromuscular Re-Education  [X]           Transfer Training                   [ ]    Orthotic/Prosthetic Training  [X]           Gait Training                         [ ]    Modalities  [X]           Therapeutic Exercises           [ ]    Edema Management/Control  [X]           Therapeutic Activities            [X]    Patient and Family Training/Education  [ ]           Other (comment):     Frequency/Duration: Patient will be followed by physical therapy  4 times a week to address goals. Discharge Recommendations: Skilled Nursing Facility  Further Equipment Recommendations for Discharge: TBD       SUBJECTIVE:   Patient stated If I get up I will poop.       OBJECTIVE DATA SUMMARY:   HISTORY:    Past Medical History   Diagnosis Date    Autoimmune disease ([facility]         rheumatoid     CAD (coronary artery disease)      GERD (gastroesophageal reflux disease)      Hypertension      Ill-defined condition         anemia    Other ill-defined conditions(799.89)         high cholesterol    Renal cell carcinoma of right kidney [facility])         s/p resection 12/16     Past Surgical History   Procedure Laterality Date    Pr cardiac surg procedure unlist           three stents placed 2005    Hx tonsillectomy        Hx urological   12/22/2016       RIGHT LAPOROSCOPIC HAND ASSISTED RADICAL NEPHRECTOMY     Prior Level of Function/Home Situation: was at [facility].  Prior the last admission she was living at home with her son  Personal factors and/or comorbidities impacting plan of care:      Home Situation  Home Environment: Skilled nursing facility  One/Two Story Residence: Other (Comment) ([facility])  Living Alone: No  Support Systems: Family member(s)  Patient Expects to be Discharged to[de-identified] Patient room  Current DME Used/Available at Home: Walker     EXAMINATION/PRESENTATION/DECISION MAKING:   Critical Behavior:   alert and oriented x 4           Skin:  WDL  Strength:    Strength: Generally decreased, functional                    Tone & Sensation:   Tone: Normal              Sensation: Intact               Range Of Motion:  AROM: Within functional limits           PROM: Within functional limits           Coordination:  Coordination: Within functional limits     Functional Mobility:  Bed Mobility:  Rolling: Supervision  Supine to Sit: Supervision        Transfers:  Sit to Stand: Minimum assistance (multiple attempts)  Stand to Sit: Contact guard assistance                       Balance:   Sitting: Intact  Standing: Impaired  Standing - Static: Good;Constant support  Standing - Dynamic : Fair  Ambulation/Gait Training:  Distance (ft): 15 Feet (ft)  Assistive Device: Gait belt;Walker, rolling  Ambulation - Level of Assistance: Contact guard assistance  Gait Abnormalities: Decreased step clearance;Trunk sway increased; Path deviations  Base of Support: Widened  Speed/Maria Luisa: Pace decreased (<100 feet/min)  Step Length: Left shortened;Right shortened           Functional Measure:  Barthel Index:      Bathin  Bladder: 5  Bowels: 5  Groomin  Dressin  Feeding: 10  Mobility: 0  Stairs: 0  Toilet Use: 5  Transfer (Bed to Chair and Back): 10  Total: 45         Barthel and G-code impairment scale:  Percentage of impairment CH  0% CI  1-19% CJ  20-39% CK  40-59% CL  60-79% CM  80-99% CN  100%   Barthel Score 0-100 100 99-80 79-60 59-40 20-39 1-19    0   Barthel Score 0-20 20 17-19 13-16 9-12 5-8 1-4 0      The Barthel ADL Index: Guidelines  1. The index should be used as a record of what a patient does, not as a record of what a patient could do.   2. The main aim is to establish degree of independence from any help, physical or verbal, however minor and for whatever reason. 3. The need for supervision renders the patient not independent. 4. A patient's performance should be established using the best available evidence. Asking the patient, friends/relatives and nurses are the usual sources, but direct observation and common sense are also important. However direct testing is not needed. 5. Usually the patient's performance over the preceding 24-48 hours is important, but occasionally longer periods will be relevant. 6. Middle categories imply that the patient supplies over 50 per cent of the effort. 7. Use of aids to be independent is allowed. Ky Bassett., Barthel, D.W. (2049). Functional evaluation: the Barthel Index. [address] [facility] (14)2. [facility], Leticia Fabian., [facility]., Shana, [address] [place] [address] Ave (1999). Measuring the change indisability after inpatient rehabilitation; comparison of the responsiveness of the Barthel Index and Functional [place] Measure. Journal of Neurology, Neurosurgery, and Psychiatry, 66(4), 821-236. Chi Rico, N.J.A, LIUDMILA Hubbard, & Caitlyn Marsh, MJOHN. (2004.) Assessment of post-stroke quality of life in cost-effectiveness studies: The usefulness of the Barthel Index and the EuroQoL-5D. Quality of Life Research, 13, 009-67            G codes: In compliance with CMSs Claims Based Outcome Reporting, the following G-code set was chosen for this patient based on their primary functional limitation being treated: The outcome measure chosen to determine the severity of the functional limitation was the barthel with a score of 45/100 which was correlated with the impairment scale.       · Mobility - Walking and Moving Around:               - CURRENT STATUS:    CK - 40%-59% impaired, limited or restricted               - GOAL STATUS:           CJ - 20%-39% impaired, limited or restricted               - D/C STATUS:                       ---------------To be determined---------------      Physical Therapy Evaluation Charge Determination   History Examination Presentation Decision-Making   MEDIUM  Complexity : 1-2 comorbidities / personal factors will impact the outcome/ POC  MEDIUM Complexity : 3 Standardized tests and measures addressing body structure, function, activity limitation and / or participation in recreation  MEDIUM Complexity : Evolving with changing characteristics  MEDIUM Complexity : FOTO score of 26-74      Based on the above components, the patient evaluation is determined to be of the following complexity level: MEDIUM     Pain:  Pain Scale 1: Numeric (0 - 10)  Pain Intensity 1: 0              Activity Tolerance:   Dizzy, VSS  Please refer to the flowsheet for vital signs taken during this treatment. After treatment:   [ ]         Patient left in no apparent distress sitting up in chair  [X]         Patient left in no apparent distress in bed  [X]         Call bell left within reach  [X]         Nursing notified  [ ]         Caregiver present  [ ]         Bed alarm activated      COMMUNICATION/EDUCATION:   The patients plan of care was discussed with: Occupational Therapist and Registered Nurse.  [X]         Fall prevention education was provided and the patient/caregiver indicated understanding. [ ]         Patient/family have participated as able in goal setting and plan of care. [X]         Patient/family agree to work toward stated goals and plan of care. [ ]         Patient understands intent and goals of therapy, but is neutral about his/her participation. [ ]         Patient is unable to participate in goal setting and plan of care.      Thank you for this referral.  Arlene Weber, PT, DPT   Time Calculation: 20 mins

## 2017-01-30 NOTE — PROGRESS NOTES
End of Shift Nursing Note    Bedside shift change report given to RN (oncoming nurse) by Starlett Oppenheim, RN (offgoing nurse). Report included the following information SBAR, Kardex, Procedure Summary, Intake/Output, MAR and Recent Results. Zone Phone:   [phone]    Significant changes during shift:    1 Loose small BM   Non-emergent issues for physician to address:   none     Number times ambulated in hallway past shift: 0      Number of times OOB to chair past shift: 0    POD #: 0     Vital Signs:    Temp: 98.4 °F (36.9 °C)     Pulse (Heart Rate): 69     BP: 124/72     Resp Rate: 18     O2 Sat (%): 99 %    Lines & Drains:     Urinary Catheter? NO     Central Line? NO   PICC Line? NO     NG tube [] in [] removed [x] not applicable   Drains [] in [] removed [x] not applicable     Skin Integrity:      Wounds: yes   Dressings Present: yes    Wound Concerns: no      GI:    Current diet:  DIET FULL LIQUID    Nausea: NO  Vomiting: NO  Bowel Sounds: YES  Flatus: YES  Last Bowel Movement: yesterday     Respiratory:  Supplemental O2: NO        Incentive Spirometer: YES  Volume: 700  Coughing and Deep Breathing: YES  Oral Care: YES  Understanding (patient/family education): YES   Getting out of bed: Yes  Head of bed elevation: YES    Patient Safety:    Falls Score: 1  Mobility Score: 1  Bed Alarm On? NO  Sitter? NO      Opportunity for questions and clarification was given to oncoming nurse. Patient bed is in low position, side rails are up x 2, door & observation blinds open as needed, call bell within reach and patient not in distress. Peyton Masters RN

## 2017-01-30 NOTE — PROGRESS NOTES
Problem: Self Care Deficits Care Plan (Adult)  Goal: *Acute Goals and Plan of Care (Insert Text)  Occupational Therapy Goals  Initiated 1/30/2017  1. Patient will perform grooming at the sink with supervision/set-up within 7 day(s). 2. Patient will perform bathing at the sink with supervision/set-up within 7 day(s). 3. Patient will perform lower body dressing with minimal assistance/contact guard assist within 7 day(s). 4. Patient will perform toilet transfers with supervision/set-up within 7 day(s). 5. Patient will perform all aspects of toileting with independence within 7 day(s). OCCUPATIONAL THERAPY EVALUATION  Patient: Jennifer Power (20 y.o. female)  Date: 1/30/2017  Primary Diagnosis: Diverticulitis        Precautions:   Fall      ASSESSMENT :  Based on the objective data described below, the patient presents with generalized weakness, decreased endurance, strength, mobility, and ADLs. Pt reports that she was independent in all areas prior to her first admission using AD. Pt was sent to SNF for 2 to 3 days after her last admission to acute care and stated that she was working on transfers and has not done much with ADLs. Pt at [facility] was supervision for bed mobility, CGA for standing and pt stood and sat down right away stating that she was dizzy. All VSS and pt was able to stand with CGA to SBA and walk around the bed with RW and did not want to sit up in chair or walk further. Pt was CGA to SBA for getting back in bed and was left in bed with call bell and nursing notified. Recommendations for pt at discharge will be to return to [facility] for OT and PT      Patient will benefit from skilled intervention to address the above impairments.   Patients rehabilitation potential is considered to be Good  Factors which may influence rehabilitation potential include:   [X]             None noted  [ ]             Mental ability/status  [ ]             Medical condition  [ ]             Home/family situation and support systems  [ ]             Safety awareness  [ ]             Pain tolerance/management  [ ]             Other:        PLAN :  Recommendations and Planned Interventions:  [X]               Self Care Training                  [ ]        Therapeutic Activities  [X]               Functional Mobility Training    [ ]        Cognitive Retraining  [X]               Therapeutic Exercises           [X]        Endurance Activities  [ ]               Balance Training                   [ ]        Neuromuscular Re-Education  [ ]               Visual/Perceptual Training     [X]   Home Safety Training  [X]               Patient Education                 [X]        Family Training/Education  [ ]               Other (comment):     Frequency/Duration: Patient will be followed by occupational therapy 4 times a week to address goals. Discharge Recommendations: Skilled Nursing Facility  Further Equipment Recommendations for Discharge: tbd       SUBJECTIVE:   Patient stated I dont have the strength to be up and I keep going to the bathroom. Ramon Larson      OBJECTIVE DATA SUMMARY:   HISTORY:   Past Medical History   Diagnosis Date    Autoimmune disease ([facility] [address]         rheumatoid     CAD (coronary artery disease)      GERD (gastroesophageal reflux disease)      Hypertension      Ill-defined condition         anemia    Other ill-defined conditions(799.89)         high cholesterol    Renal cell carcinoma of right kidney (HCC)         s/p resection 12/16     Past Surgical History   Procedure Laterality Date    Pr cardiac surg procedure unlist           three stents placed 2005    Hx tonsillectomy        Hx urological   12/22/2016       RIGHT LAPOROSCOPIC HAND ASSISTED RADICAL NEPHRECTOMY        Prior Level of Function/Home Situation: pt resided in a [address] [place] [address] prior and was only there for approx 2 to 3 days and was working with therapy but stated that she needed assist with her ADLs.   Expanded or extensive additional review of patient history:      Home Situation  Home Environment: Skilled nursing facility  One/Two Story Residence: Other (Comment) ([facility])  Living Alone: No  Support Systems: Family member(s)  Patient Expects to be Discharged to[de-identified] Patient room  Current DME Used/Available at Home: [address]  [X]  Right hand dominant             [ ]  Left hand dominant     EXAMINATION OF PERFORMANCE DEFICITS:  Cognitive/Behavioral Status:  Neurologic State: Alert  Orientation Level: Appropriate for age;Oriented X4  Cognition: Appropriate decision making; Follows commands  Perception: Appears intact  Perseveration: No perseveration noted  Safety/Judgement: Awareness of environment; Fall prevention  Skin: in good health   Edema: none noted  Vision/Perceptual:                 intact                  Range of Motion:     AROM: Within functional limits  PROM: Within functional limits                    Strength:     Strength: Generally decreased, functional              Coordination:  Coordination: Within functional limits          Tone & Sensation:     Tone: Normal  Sensation: Intact                       Balance:  Sitting: Intact  Standing: Impaired  Standing - Static: Good;Constant support  Standing - Dynamic : Fair     Functional Mobility and Transfers for ADLs:  Bed Mobility:  Rolling: Supervision  Supine to Sit: Supervision     Transfers:  Sit to Stand: Minimum assistance (multiple attempts)  Stand to Sit: Contact guard assistance     ADL Assessment:  Feeding: Independent     Oral Facial Hygiene/Grooming: Setup     Bathing: Moderate assistance;Contact guard assistance     Upper Body Dressing: Setup     Lower Body Dressing:  Moderate assistance;Contact guard assistance     Toileting: Minimum assistance;Contact guard assistance                 Barthel Index:      Bathin  Bladder: 5  Bowels: 5  Groomin  Dressin  Feeding: 10  Mobility: 0  Stairs: 0  Toilet Use: 5  Transfer (Bed to Chair and Back): 10  Total: 45         Barthel and G-code impairment scale:  Percentage of impairment CH  0% CI  1-19% CJ  20-39% CK  40-59% CL  60-79% CM  80-99% CN  100%   Barthel Score 0-100 100 99-80 79-60 59-40 20-39 1-19    0   Barthel Score 0-20 20 17-19 13-16 9-12 5-8 1-4 0      The Barthel ADL Index: Guidelines  1. The index should be used as a record of what a patient does, not as a record of what a patient could do. 2. The main aim is to establish degree of independence from any help, physical or verbal, however minor and for whatever reason. 3. The need for supervision renders the patient not independent. 4. A patient's performance should be established using the best available evidence. Asking the patient, friends/relatives and nurses are the usual sources, but direct observation and common sense are also important. However direct testing is not needed. 5. Usually the patient's performance over the preceding 24-48 hours is important, but occasionally longer periods will be relevant. 6. Middle categories imply that the patient supplies over 50 per cent of the effort. 7. Use of aids to be independent is allowed. Zac Rosado., Barthel, DDavidW. (5417). Functional evaluation: the Barthel Index. [facility] (14)2. Loree Asencio, SUSSYJMALIK, Isabela Phillips., [facility], [address] [place] [address] (1999). Measuring the change indisability after inpatient rehabilitation; comparison of the responsiveness of the Barthel Index and Functional [place] Measure. Journal of Neurology, Neurosurgery, and Psychiatry, 66(4), 933-896. Mj Chino, N.[place].A, JESSICA Hubbard.MITRA, & Lizzie Sevilla, MDavidA. (2004.) Assessment of post-stroke quality of life in cost-effectiveness studies: The usefulness of the Barthel Index and the EuroQoL-5D. Quality of Life Research, 13, 463-62                        Cognitive Retraining  Safety/Judgement: Awareness of environment; Fall prevention           Functional Measure:        G codes:   In compliance with CMSs Claims Based Outcome Reporting, the following G-code set was chosen for this patient based on their primary functional limitation being treated: The outcome measure chosen to determine the severity of the functional limitation was the Barthel with a score of 45/100 which was correlated with the impairment scale. · Self Care:               - CURRENT STATUS:    CK - 40%-59% impaired, limited or restricted               - GOAL STATUS:           CJ - 20%-39% impaired, limited or restricted               - D/C STATUS:                       ---------------To be determined---------------      Occupational Therapy Evaluation Charge Determination   History Examination Decision-Making   LOW Complexity : Brief history review  MEDIUM Complexity : 3-5 performance deficits relating to physical, cognitive , or psychosocial skils that result in activity limitations and / or participation restrictions MEDIUM Complexity : Patient may present with comorbidities that affect occupational performnce. Miniml to moderate modification of tasks or assistance (eg, physical or verbal ) with assesment(s) is necessary to enable patient to complete evaluation       Based on the above components, the patient evaluation is determined to be of the following complexity level: LOW   Pain:  Pain Scale 1: Numeric (0 - 10)  Pain Intensity 1: 0              Activity Tolerance:   vss  Please refer to the flowsheet for vital signs taken during this treatment. After treatment:   [ ] Patient left in no apparent distress sitting up in chair  [X] Patient left in no apparent distress in bed  [X] Call bell left within reach  [X] Nursing notified  [ ] Caregiver present  [X] Bed alarm activated      COMMUNICATION/EDUCATION:   The patients plan of care was discussed with: Physical Therapist and Registered Nurse.  [X] Home safety education was provided and the patient/caregiver indicated understanding.   [X] Patient/family have participated as able in goal setting and plan of care. [X] Patient/family agree to work toward stated goals and plan of care. [ ] Patient understands intent and goals of therapy, but is neutral about his/her participation. [ ] Patient is unable to participate in goal setting and plan of care. This patients plan of care is appropriate for delegation to GRACY.      Thank you for this referral.  Huong Rodriguez OT  Time Calculation: 20 mins

## 2017-01-30 NOTE — PROGRESS NOTES
End of Shift Nursing Note    Bedside shift change report given to *** (oncoming nurse) by Leti Lamb RN (offgoing nurse). Report included the following information SBAR, Kardex, Intake/Output, MAR and Recent Results. Zone Phone:       Significant changes during shift:    ***   Non-emergent issues for physician to address:   ***     Number times ambulated in hallway past shift: 0. Up to bedside commode. Number of times OOB to chair past shift: 0    POD #:      Vital Signs:    Temp: 97.6 °F (36.4 °C)     Pulse (Heart Rate): 70     BP: 133/81     Resp Rate: 18     O2 Sat (%): 97 %    Lines & Drains:     Urinary Catheter? NO   Placement Date:    Medical Necessity:   Central Line? NO   Placement Date:    Medical Necessity:   PICC Line? NO   Placement Date:    Medical Necessity:     NG tube [] in [] removed [] not applicable   Drains [] in [] removed [] not applicable     Skin Integrity:      Wounds: no   Dressings Present: no    Wound Concerns: no      GI:    Current diet:  DIET FULL LIQUID    Nausea: YEs  Vomiting: NO  Bowel Sounds: YES  Flatus: YES  Last Bowel Movement: today   Appearance:     Respiratory:  Supplemental O2: NO      Device:    via  Liters/min     Incentive Spirometer: YES  Volume:   Coughing and Deep Breathing: YES  Oral Care: YES  Understanding (patient/family education): YES   Getting out of bed: YES  Head of bed elevation: YES    Patient Safety:    Falls Score: 3  Mobility Score: 1.0  Bed Alarm On? NO  Sitter? NO      Opportunity for questions and clarification was given to oncoming nurse. Patient bed is in upright position, side rails are up x 3, door & observation blinds open as needed, call bell within reach and patient not in distress.     Sherita Lob

## 2017-01-30 NOTE — PROGRESS NOTES
Met with pt and discussed therapy's recommendation for rehab at SNF at discharge. Pt came from [facility] and would like to return at discharge. FOC completed. Referral sent via [employer] to [facility].      Ry Olmstead, [address]

## 2017-01-30 NOTE — PROGRESS NOTES
Hospitalist Progress Note    NAME: Jorge Hernandez   :  1945   MRN:  [id number]         Assessment / Plan:  Acute renal failure: Cr improving  - admission UA reviewed and is benign  - con't IV fluids (caution with comorbid CHF). Daily weights added. - con't holding lasix until Pt taking better po  Acute cholecystitis: WBC improving. Still nauseated with abd pain when eating and has not been able to advance beyond a small amount of liquids. - CT A/P  with acute diverticulitis of the hepatic flexure. No evidence of peridiverticular abscess. Distended gallbladder with hyperdense material may represent sludge. No evidence of biliary duct dilatation. Evidence of prior right nephrectomy. Chronic L1 compression fracture with mild worsening compression.  - Abd US  with concern for acute cholecystitis: Gallbladder wall is thickened and contains fluid. Gallbladder contains sludge and stones. No biliary dilation. - percutaneous cholecystectomy tube  via IR. Fluid cultures no growth thus far. - general surgery consult appreciated, plan for elective cholecystectomy in 4-6 weeks if able to stabilize Pt.  - con't levaquin, flagyl  - full liquid diet until improves  Chronic systolic heart failure (ischemic cardiomyopathy) due to CAD with LAD stents and SVT/paroxysmal atrial fibrillation s/p ICD/PM placement 17 : echo 1/10 with EF 25%. There were no regional wall motion abnormalities.   - con't amiodarone, toprol and ASA  - holding lasix due to poor po intake  Acute on chronic anemia: Hg stable  - transfused 1u pRBCs   - iron level WNL  - check stool for blood  Orthostatic hypotension: con't outpt midodrine  Peripheral neuropathy: con't neurontin  GERD: PPI  Renal cell carcinoma of right kidney s/p resection  with Dr. Lucy Mayo  Code Status: DNR  Surrogate Decision Maker: [facility]) c [phone] or h [phone], Daughter in law [place] [phone]  DVT Prophylaxis: Lovenox Subjective:     Chief Complaint / Reason for Physician Visit  \"I still can't eat\". Discussed with RN events overnight. Review of Systems:  Symptom Y/N Comments  Symptom Y/N Comments   Fever/Chills n   Chest Pain n    Poor Appetite y   Edema n    Cough n   Abdominal Pain y    Sputum n   Joint Pain     SOB/ROCK n   Pruritis/Rash     Nausea/vomit    Tolerating PT/OT     Diarrhea    Tolerating Diet     Constipation    Other       Could NOT obtain due to:      Objective:     VITALS:   Last 24hrs VS reviewed since prior progress note. Most recent are:  Patient Vitals for the past 24 hrs:   Temp Pulse Resp BP SpO2   01/30/17 0831 97.6 °F (36.4 °C) 70 18 133/81 97 %   01/30/17 0257 97.2 °F (36.2 °C) 70 18 132/74 96 %   01/30/17 0010 98.4 °F (36.9 °C) 69 18 124/72 99 %   01/1945 98.6 °F (37 °C) 68 16 139/77 96 %   01/29/17 1609 98.4 °F (36.9 °C) 69 16 151/76 97 %       Intake/Output Summary (Last 24 hours) at 01/30/17 1055  Last data filed at 01/30/17 0611   Gross per 24 hour   Intake               10 ml   Output              136 ml   Net             -126 ml        PHYSICAL EXAM:  General: WD, WN. Alert, cooperative, no acute distress    EENT:  EOMI. Anicteric sclerae. MMM  Resp:  CTA bilaterally, no wheezing or rales. No accessory muscle use  CV:  Regular rhythm,  No edema  GI:  Soft, moderately distended, tender epigastric and RUQ.  +Bowel sounds  Neurologic:  Alert and oriented X 3, normal speech,   Psych:   Fair insight. Not anxious nor agitated  Skin:  No rashes.   No jaundice    Reviewed most current lab test results and cultures  YES  Reviewed most current radiology test results   YES  Review and summation of old records today    NO  Reviewed patient's current orders and MAR    YES  PMH/SH reviewed - no change compared to H&P  ________________________________________________________________________  Care Plan discussed with:    Comments   Patient x    Family      RN     Care Manager     Consultant  x surgery                     Multidiciplinary team rounds were held today with , nursing, pharmacist and clinical coordinator. Patient's plan of care was discussed; medications were reviewed and discharge planning was addressed. ________________________________________________________________________  Total NON critical care TIME:  25 Minutes    Total CRITICAL CARE TIME Spent:   Minutes non procedure based      Comments   >50% of visit spent in counseling and coordination of care x    ________________________________________________________________________  Lore Warren MD     Procedures: see electronic medical records for all procedures/Xrays and details which were not copied into this note but were reviewed prior to creation of Plan. LABS:  I reviewed today's most current labs and imaging studies.   Pertinent labs include:  Recent Labs      01/30/17   0331 01/29/17   0454  01/28/17 0452   WBC  12.8*  15.2*  20.7*   HGB  8.3*  7.2*  6.7*   HCT  24.8*  21.3*  20.6*   PLT  281  278  293     Recent Labs      01/30/17   0331 01/29/17   0454  01/28/17 0452   NA  133*  133*  132*   K  3.5  4.0  4.2   CL  100  98  97   CO2  22  22  25   GLU  78  83  86   BUN  26*  35*  38*   CREA  1.30*  1.84*  2.09*   CA  7.2*  7.8*  8.0*   ALB   --   1.8*  2.0*   TBILI   --   0.7  0.8   SGOT   --   16  21   ALT   --   6*  9*       Signed: Lore Warren MD

## 2017-01-30 NOTE — PROGRESS NOTES
Admit Date: 2017    POD * No surgery found *    Procedure:  * No surgery found *      Assessment:   Active Problems:    Renal cell carcinoma of right kidney ([facility] [address] (2017)      SVT (supraventricular tachycardia) (2017)      Paroxysmal atrial fibrillation (HCC) (2017)      Orthostatic hypotension (2017)      Diverticulitis (2017)      Leukocytosis (2017)      History of permanent cardiac pacemaker placement (2017)      Chronic systolic heart failure ([facility] [address] (2017)      1. Acute cholecystitis. S/p cholecystostomy tube placement  2. WBC improving  3. Tolerating clear liquids but not enthusiastic about advancing diet  4. Acute renal failure improving      Plan/Recommendations/Medical Decision Makin. Mobilize  2. Advance to GI lite diet    Subjective:     Patient has complaints of pain. Objective:     Blood pressure 133/81, pulse 70, temperature 97.6 °F (36.4 °C), resp. rate 18, height 5' 7\" (1.702 m), weight 73.1 kg (161 lb 2.5 oz), SpO2 97 %. Temp (24hrs), Av.1 °F (36.7 °C), Min:97.2 °F (36.2 °C), Max:98.6 °F (37 °C)      Physical Exam:  LUNG: clear to auscultation bilaterally, HEART: regular rate and rhythm, S1, S2 normal, no murmur, click, rub or gallop, ABDOMEN: soft, non-tender except at drain site.  Bowel sounds normal. No masses,  no organomegaly, drain output bilious; EXTREMITIES:  extremities normal, atraumatic, no cyanosis or edema    Labs:   Recent Results (from the past 48 hour(s))   TYPE & CROSSMATCH    Collection Time: 17 10:33 AM   Result Value Ref Range    Crossmatch Expiration 2017     ABO/Rh(D) Stu Olivas POSITIVE     Antibody screen NEG     Unit number [id number]     Blood component type RC LR AS3,2     Unit division 00     Status of unit TRANSFUSED     Crossmatch result Compatible    METABOLIC PANEL, COMPREHENSIVE    Collection Time: 17  4:54 AM   Result Value Ref Range    Sodium 133 (L) 136 - 145 mmol/L    Potassium 4.0 3.5 - 5.1 mmol/L    Chloride 98 97 - 108 mmol/L    CO2 22 21 - 32 mmol/L    Anion gap 13 5 - 15 mmol/L    Glucose 83 65 - 100 mg/dL    BUN 35 (H) 6 - 20 MG/DL    Creatinine 1.84 (H) 0.55 - 1.02 MG/DL    BUN/Creatinine ratio 19 12 - 20      GFR est AA 33 (L) >60 ml/min/1.73m2    GFR est non-AA 27 (L) >60 ml/min/1.73m2    Calcium 7.8 (L) 8.5 - 10.1 MG/DL    Bilirubin, total 0.7 0.2 - 1.0 MG/DL    ALT 6 (L) 12 - 78 U/L    AST 16 15 - 37 U/L    Alk.  phosphatase 149 (H) 45 - 117 U/L    Protein, total 5.4 (L) 6.4 - 8.2 g/dL    Albumin 1.8 (L) 3.5 - 5.0 g/dL    Globulin 3.6 2.0 - 4.0 g/dL    A-G Ratio 0.5 (L) 1.1 - 2.2     CBC W/O DIFF    Collection Time: 01/29/17  4:54 AM   Result Value Ref Range    WBC 15.2 (H) 3.6 - 11.0 K/uL    RBC 2.99 (L) 3.80 - 5.20 M/uL    HGB 7.2 (L) 11.5 - 16.0 g/dL    HCT 21.3 (L) 35.0 - 47.0 %    MCV 71.2 (L) 80.0 - 99.0 FL    MCH 24.1 (L) 26.0 - 34.0 PG    MCHC 33.8 30.0 - 36.5 g/dL    RDW 18.0 (H) 11.5 - 14.5 %    PLATELET 039 498 - 725 K/uL   OCCULT BLOOD, STOOL    Collection Time: 01/29/17  5:42 PM   Result Value Ref Range    Occult blood, stool NEGATIVE  NEG     CBC W/O DIFF    Collection Time: 01/30/17  3:31 AM   Result Value Ref Range    WBC 12.8 (H) 3.6 - 11.0 K/uL    RBC 3.44 (L) 3.80 - 5.20 M/uL    HGB 8.3 (L) 11.5 - 16.0 g/dL    HCT 24.8 (L) 35.0 - 47.0 %    MCV 72.1 (L) 80.0 - 99.0 FL    MCH 24.1 (L) 26.0 - 34.0 PG    MCHC 33.5 30.0 - 36.5 g/dL    RDW 18.2 (H) 11.5 - 14.5 %    PLATELET 230 669 - 661 K/uL   METABOLIC PANEL, BASIC    Collection Time: 01/30/17  3:31 AM   Result Value Ref Range    Sodium 133 (L) 136 - 145 mmol/L    Potassium 3.5 3.5 - 5.1 mmol/L    Chloride 100 97 - 108 mmol/L    CO2 22 21 - 32 mmol/L    Anion gap 11 5 - 15 mmol/L    Glucose 78 65 - 100 mg/dL    BUN 26 (H) 6 - 20 MG/DL    Creatinine 1.30 (H) 0.55 - 1.02 MG/DL    BUN/Creatinine ratio 20 12 - 20      GFR est AA 49 (L) >60 ml/min/1.73m2    GFR est non-AA 40 (L) >60 ml/min/1.73m2    Calcium 7.2 (L) 8.5 - 10.1 MG/DL   TROPONIN I    Collection Time: 01/30/17  3:31 AM   Result Value Ref Range    Troponin-I, Qt. <0.04 <0.05 ng/mL   IRON PROFILE    Collection Time: 01/30/17  3:31 AM   Result Value Ref Range    Iron 59 35 - 150 ug/dL    TIBC 96 (L) 250 - 450 ug/dL    Iron % saturation 61 (H) 20 - 50 %       Data Review images and reports reviewed

## 2017-01-30 NOTE — PROGRESS NOTES
Problem: Falls - Risk of  Goal: *Absence of falls  Outcome: Progressing Towards Goal  Patient will remain free of falls throughout this hospitalization.

## 2017-01-31 LAB
ANION GAP BLD CALC-SCNC: 14 MMOL/L (ref 5–15)
BACTERIA SPEC CULT: NORMAL
BACTERIA SPEC CULT: NORMAL
BUN SERPL-MCNC: 20 MG/DL (ref 6–20)
BUN/CREAT SERPL: 18 (ref 12–20)
CALCIUM SERPL-MCNC: 7.7 MG/DL (ref 8.5–10.1)
CHLORIDE SERPL-SCNC: 104 MMOL/L (ref 97–108)
CO2 SERPL-SCNC: 21 MMOL/L (ref 21–32)
CREAT SERPL-MCNC: 1.12 MG/DL (ref 0.55–1.02)
ERYTHROCYTE [DISTWIDTH] IN BLOOD BY AUTOMATED COUNT: 18.6 % (ref 11.5–14.5)
GLUCOSE SERPL-MCNC: 75 MG/DL (ref 65–100)
GRAM STN SPEC: NORMAL
GRAM STN SPEC: NORMAL
HCT VFR BLD AUTO: 23.3 % (ref 35–47)
HGB BLD-MCNC: 7.9 G/DL (ref 11.5–16)
MCH RBC QN AUTO: 24.4 PG (ref 26–34)
MCHC RBC AUTO-ENTMCNC: 33.9 G/DL (ref 30–36.5)
MCV RBC AUTO: 71.9 FL (ref 80–99)
PLATELET # BLD AUTO: 307 K/UL (ref 150–400)
POTASSIUM SERPL-SCNC: 3.6 MMOL/L (ref 3.5–5.1)
RBC # BLD AUTO: 3.24 M/UL (ref 3.8–5.2)
SERVICE CMNT-IMP: NORMAL
SERVICE CMNT-IMP: NORMAL
SODIUM SERPL-SCNC: 139 MMOL/L (ref 136–145)
WBC # BLD AUTO: 10.9 K/UL (ref 3.6–11)

## 2017-01-31 PROCEDURE — 65660000000 HC RM CCU STEPDOWN

## 2017-01-31 PROCEDURE — 74011250636 HC RX REV CODE- 250/636: Performed by: INTERNAL MEDICINE

## 2017-01-31 PROCEDURE — 85027 COMPLETE CBC AUTOMATED: CPT | Performed by: INTERNAL MEDICINE

## 2017-01-31 PROCEDURE — 36415 COLL VENOUS BLD VENIPUNCTURE: CPT | Performed by: INTERNAL MEDICINE

## 2017-01-31 PROCEDURE — 74011250637 HC RX REV CODE- 250/637: Performed by: INTERNAL MEDICINE

## 2017-01-31 PROCEDURE — 97116 GAIT TRAINING THERAPY: CPT

## 2017-01-31 PROCEDURE — 74011000250 HC RX REV CODE- 250: Performed by: INTERNAL MEDICINE

## 2017-01-31 PROCEDURE — 97535 SELF CARE MNGMENT TRAINING: CPT

## 2017-01-31 PROCEDURE — 80048 BASIC METABOLIC PNL TOTAL CA: CPT | Performed by: INTERNAL MEDICINE

## 2017-01-31 PROCEDURE — 74011250637 HC RX REV CODE- 250/637: Performed by: SURGERY

## 2017-01-31 RX ADMIN — METRONIDAZOLE 500 MG: 500 INJECTION, SOLUTION INTRAVENOUS at 16:03

## 2017-01-31 RX ADMIN — Medication 10 ML: at 16:04

## 2017-01-31 RX ADMIN — GABAPENTIN 400 MG: 100 CAPSULE ORAL at 21:47

## 2017-01-31 RX ADMIN — GABAPENTIN 400 MG: 100 CAPSULE ORAL at 16:03

## 2017-01-31 RX ADMIN — Medication 1 CAPSULE: at 10:13

## 2017-01-31 RX ADMIN — METRONIDAZOLE 500 MG: 500 INJECTION, SOLUTION INTRAVENOUS at 01:08

## 2017-01-31 RX ADMIN — MIDODRINE HYDROCHLORIDE 10 MG: 5 TABLET ORAL at 08:36

## 2017-01-31 RX ADMIN — MIDODRINE HYDROCHLORIDE 10 MG: 5 TABLET ORAL at 12:32

## 2017-01-31 RX ADMIN — MIDODRINE HYDROCHLORIDE 10 MG: 5 TABLET ORAL at 16:03

## 2017-01-31 RX ADMIN — Medication 10 ML: at 21:40

## 2017-01-31 RX ADMIN — AMIODARONE HYDROCHLORIDE 200 MG: 200 TABLET ORAL at 08:36

## 2017-01-31 RX ADMIN — METOPROLOL SUCCINATE 12.5 MG: 25 TABLET, EXTENDED RELEASE ORAL at 08:37

## 2017-01-31 RX ADMIN — ASPIRIN 81 MG: 81 TABLET, COATED ORAL at 08:36

## 2017-01-31 RX ADMIN — GABAPENTIN 400 MG: 100 CAPSULE ORAL at 08:36

## 2017-01-31 RX ADMIN — SODIUM CHLORIDE 250 ML: 900 INJECTION, SOLUTION INTRAVENOUS at 08:58

## 2017-01-31 RX ADMIN — PANTOPRAZOLE SODIUM 40 MG: 40 TABLET, DELAYED RELEASE ORAL at 08:36

## 2017-01-31 RX ADMIN — METRONIDAZOLE 500 MG: 500 INJECTION, SOLUTION INTRAVENOUS at 08:37

## 2017-01-31 NOTE — PROGRESS NOTES
Hospitalist Progress Note    NAME: Christin Gambino   :  1945   MRN:  [id number]         Assessment / Plan:  Acute renal failure  Cr improving with IVF  con't holding lasix until Pt taking better po    Acute cholecystitis  ? Acute Diverticulitis POA  CT A/P  with acute diverticulitis of the hepatic flexure. No evidence of peridiverticular abscess. Distended gallbladder with hyperdense material may represent sludge. No evidence of biliary duct dilatation. Evidence of prior right nephrectomy. Chronic L1 compression fracture with mild worsening compression. Abd US  with concern for acute cholecystitis: Gallbladder wall is thickened and contains fluid. Gallbladder contains sludge and stones. No biliary dilation. S/p percutaneous cholecystectomy tube  via IR. Fluid cultures no growth thus far. Continue surgery input  Con't levaquin, flagyl  Advanced diet to GI lite today  Plan for keep the cholecystectomy tube on discharge with OP followup with Dr Enoch Szymanski    Chronic systolic heart failure (ischemic cardiomyopathy) due to CAD with LAD stents and SVT/paroxysmal atrial fibrillation s/p ICD/PM placement 17   2D echo 1/10 with EF 25%. There were no regional wall motion abnormalities. Con't amiodarone, toprol and ASA  holding lasix due to poor po intake    Acute on chronic anemia  Hg stable  Transfused 1u pRBCs   Iron level WNL  Check stool for blood    Orthostatic hypotension  con't outpt midodrine  Peripheral neuropathy: con't neurontin  GERD: PPI  Renal cell carcinoma of right kidney s/p resection  with Dr. Fran Tsang  Code Status: DNR  Surrogate Decision Maker: Vicki Abraham [facility]) c [phone] or h [phone], Daughter in law [place] [phone]  DVT Prophylaxis: Lovenox      Subjective: Pt seen and examined at bedside. Feels the same. NAD.  Overnight events d/w RN     Chief Complaint / Reason for Physician Visit: f/u abdominal pain    Review of Systems:  Symptom Y/N Comments Symptom Y/N Comments   Fever/Chills n   Chest Pain n    Poor Appetite y   Edema n    Cough n   Abdominal Pain y    Sputum n   Joint Pain     SOB/ROCK n   Pruritis/Rash     Nausea/vomit    Tolerating PT/OT     Diarrhea    Tolerating Diet y    Constipation    Other       Could NOT obtain due to:      Objective:     VITALS:   Last 24hrs VS reviewed since prior progress note. Most recent are:  Patient Vitals for the past 24 hrs:   Temp Pulse Resp BP SpO2   01/31/17 1124 97.5 °F (36.4 °C) 70 18 140/88 100 %   01/31/17 0900 - 73 - 170/88 -   01/31/17 0741 98.1 °F (36.7 °C) 70 18 155/90 97 %   01/30/17 2309 97.7 °F (36.5 °C) 74 16 163/85 100 %   01/30/17 1834 98.7 °F (37.1 °C) 69 18 133/78 100 %   01/30/17 1537 98.6 °F (37 °C) 72 17 142/80 98 %       Intake/Output Summary (Last 24 hours) at 01/31/17 1516  Last data filed at 01/31/17 1038   Gross per 24 hour   Intake              240 ml   Output                0 ml   Net              240 ml        PHYSICAL EXAM:  General: WD, WN. Alert, cooperative, no acute distress    EENT:  EOMI. Anicteric sclerae. MMM  Resp:  CTA bilaterally, no wheezing or rales. No accessory muscle use  CV:  Regular rhythm,  No edema  GI:  Soft, moderately distended, tender epigastric and RUQ.  +Bowel sounds  Neurologic:  Alert and oriented X 3, normal speech,   Psych:   Fair insight. Not anxious nor agitated  Skin:  No rashes. No jaundice    Reviewed most current lab test results and cultures  YES  Reviewed most current radiology test results   YES  Review and summation of old records today    NO  Reviewed patient's current orders and MAR    YES  PMH/SH reviewed - no change compared to H&P  ________________________________________________________________________  Care Plan discussed with:    Comments   Patient x    Family      RN x    Care Manager x    Consultant                        Multidiciplinary team rounds were held today with , nursing, pharmacist and clinical coordinator. Patient's plan of care was discussed; medications were reviewed and discharge planning was addressed. ________________________________________________________________________  Total NON critical care TIME:  30 Minutes    Total CRITICAL CARE TIME Spent:   Minutes non procedure based      Comments   >50% of visit spent in counseling and coordination of care x Chart review   ________________________________________________________________________  Eugenie Page MD     Procedures: see electronic medical records for all procedures/Xrays and details which were not copied into this note but were reviewed prior to creation of Plan. LABS:  I reviewed today's most current labs and imaging studies.   Pertinent labs include:  Recent Labs      01/31/17   0316  01/30/17   0331  01/29/17   0454   WBC  10.9  12.8*  15.2*   HGB  7.9*  8.3*  7.2*   HCT  23.3*  24.8*  21.3*   PLT  307  281  278     Recent Labs      01/31/17   0316  01/30/17   0331  01/29/17   0454   NA  139  133*  133*   K  3.6  3.5  4.0   CL  104  100  98   CO2  21  22  22   GLU  75  78  83   BUN  20  26*  35*   CREA  1.12*  1.30*  1.84*   CA  7.7*  7.2*  7.8*   ALB   --    --   1.8*   TBILI   --    --   0.7   SGOT   --    --   16   ALT   --    --   6*       Signed: Eugenie Page MD

## 2017-01-31 NOTE — PROGRESS NOTES
End of Shift Nursing Note    Bedside shift change report given to Luis Donnelly RN (oncoming nurse) by Bev Calderon RN (offgoing nurse). Report included the following information SBAR, Kardex, Procedure Summary, Intake/Output, MAR and Recent Results. Zone Phone:   [phone]    Significant changes during shift:   Loose BM x 1 Hgb 7.9 this am Creat. 1.12  Dressing changed as ordered   Non-emergent issues for physician to address:   none     Number times ambulated in hallway past shift: 0      Number of times OOB to chair past shift: 2    POD #: 0     Vital Signs:    Temp: 97.7 °F (36.5 °C)     Pulse (Heart Rate): 74     BP: 163/85     Resp Rate: 16     O2 Sat (%): 100 %    Lines & Drains:     Urinary Catheter? NO   Central Line? NO     PICC Line? NO       NG tube [] in [] removed [x] not applicable   Drains [] in [] removed [x] not applicable     Skin Integrity:      Wounds: yes   Dressings Present: yes    Wound Concerns: no      GI:    Current diet:  DIET FULL LIQUID  DIET NUTRITIONAL SUPPLEMENTS Breakfast, Lunch, Dinner; Ensure Clear    Nausea: NO  Vomiting: NO  Bowel Sounds: YES  Flatus: YES  Last Bowel Movement: today   Appearance: loose    Respiratory:  Supplemental O2: NO      Coughing and Deep Breathing: YES  Oral Care: YES  Understanding (patient/family education): YES   Getting out of bed: YES  Head of bed elevation: YES    Patient Safety:    Falls Score: 1  Mobility Score: 1  Bed Alarm On? NO  Sitter? NO      Opportunity for questions and clarification was given to oncoming nurse. Patient bed is in low position, side rails are up x 2, door & observation blinds open as needed, call bell within reach and patient not in distress. Peyton Waterman RN

## 2017-01-31 NOTE — PROGRESS NOTES
Met with pt and explained the 2nd  Medicare letter, pt understood and signed it. Copy left with pt. We discussed transport to [facility] and medical appointments and pt's daughter Tamera Castillo can transport her. Pt called [facility] [phone] and CM spoke with her and she agreed to  pt tomorrow if discharged. CM will send updates to [facility] via [facility]. [facility] has accepted pt for rehab.      Audrey Kaur, [phone]an

## 2017-01-31 NOTE — PROGRESS NOTES
SURGERY PROGRESS NOTE      Admit Date: 2017    Subjective: Had diarrhea yesterday, but better today. Tolerating full liquid diet. No N/V. Abdominal pain improving. Objective:     Visit Vitals    /90 (BP 1 Location: Right arm, BP Patient Position: At rest)    Pulse 70    Temp 98.1 °F (36.7 °C)    Resp 18    Ht 5' 7\" (1.702 m)    Wt 73.1 kg (161 lb 2.5 oz)    SpO2 97%    BMI 25.24 kg/m2        Temp (24hrs), Av.3 °F (36.8 °C), Min:97.7 °F (36.5 °C), Max:98.7 °F (37.1 °C)      Physical Exam:     Abdomen:  Soft. Non-tender, non-distended. Drain in place. Lab Results  Component Value Date/Time   WBC 10.9 2017 03:16 AM   Hemoglobin (POC) 9.2 2016 07:02 AM   HGB 7.9 2017 03:16 AM   Hematocrit (POC) 27 2016 07:02 AM   HCT 23.3 2017 03:16 AM   PLATELET 030 9727 03:16 AM   MCV 71.9 2017 03:16 AM       Lab Results  Component Value Date/Time   GFR est AA 58 2017 03:16 AM   GFR est non-AA 48 2017 03:16 AM   Creatinine (POC) 1.0 2016 07:02 AM   Creatinine 1.12 2017 03:16 AM   BUN 20 2017 03:16 AM   BUN (POC) 12 2016 07:02 AM   Sodium (POC) 137 2016 07:02 AM   Sodium 139 2017 03:16 AM   Potassium 3.6 2017 03:16 AM   Potassium (POC) 3.9 2016 07:02 AM   Chloride (POC) 98 2016 07:02 AM   Chloride 104 2017 03:16 AM   CO2 21 2017 03:16 AM        Assessment:     Active Problems:    Renal cell carcinoma of right kidney ([address] (2017)      SVT (supraventricular tachycardia) (2017)      Paroxysmal atrial fibrillation [address] [address] (2017)      Orthostatic hypotension (2017)      Diverticulitis (2017)      Leukocytosis (2017)      History of permanent cardiac pacemaker placement (2017)      Chronic systolic heart failure ([facility] (2017)    Leukocytosis and ARF resolving.   Diarrhea    Plan/Recommendations/Medical Decision Making:     Advance to GI lite diet.  Hold colace and senna. Ariana Q  Clear for discharge from surgery standpoint. Will need to go to Rehab with the drain. F/u in the clinic in 2 weeks.

## 2017-01-31 NOTE — ROUTINE PROCESS
End of Shift Nursing Note    Bedside shift change report given to Audrey Hook (oncoming nurse) by Luz Solis RN (offgoing nurse). Report included the following information [facility]. [facility] Phone:   [phone]    Significant changes during shift:    Possible dc to rehab in the am   Non-emergent issues for physician to address:        Number times ambulated in hallway past shift: 1 in the room      Number of times OOB to chair past shift: 1    POD #:      Vital Signs:    Temp: 97.5 °F (36.4 °C)     Pulse (Heart Rate): 70     BP: 140/88     Resp Rate: 18     O2 Sat (%): 100 %    Lines & Drains:     Urinary Catheter? NO   Placement Date:    Medical Necessity:   Central Line? NO   Placement Date:    Medical Necessity:   PICC Line? NO   Placement Date:    Medical Necessity:     NG tube [] in [] removed [] not applicable   Drains [] in [] removed [] not applicable     Skin Integrity:      Wounds: no   Dressings Present: no    Wound Concerns: no      GI:    Current diet:  DIET NUTRITIONAL SUPPLEMENTS Breakfast, Lunch, Dinner; Ensure Clear  DIET GI LITE (POST SURGICAL)    Nausea: NO  Vomiting: NO  Bowel Sounds: YES  Flatus: YES  Last Bowel Movement:    Appearance:     Respiratory:  Supplemental O2: NO      Device:    via  Liters/min     Incentive Spirometer: YES  Volume:   Coughing and Deep Breathing: YES  Oral Care: YES  Understanding (patient/family education): YES   Getting out of bed: YES  Head of bed elevation: YES    Patient Safety:    Falls Score: 1  Mobility Score: 2  Bed Alarm On? NO  Sitter? NO      Opportunity for questions and clarification was given to oncoming nurse. Patient bed is in lowest position, side rails are up x 2, door & observation blinds open as needed, call bell within reach and patient not in distress.     Austin Thacker RN

## 2017-01-31 NOTE — DISCHARGE INSTRUCTIONS
Follow up with Dr. Rubén Sam in 2 weeks. Please call [phone] for an appointment. HOSPITALIST DISCHARGE INSTRUCTIONS    NAME: Brenda Saha   :  1945   MRN:  [id number]     Date/Time:  2017 11:46 AM    ADMIT DATE: 2017   DISCHARGE DATE: 2017     Attending Physician: Sanaz Moore MD    DISCHARGE DIAGNOSIS:  Acute renal failure  Acute cholecystitis   Chronic systolic heart failure (ischemic cardiomyopathy) due to CAD with LAD stents and SVT/paroxysmal atrial fibrillation s/p ICD/PM placement 17   Acute on chronic anemia  Orthostatic hypotension  Peripheral neuropathy  GERD  Renal cell carcinoma of right kidney s/p resection  with Dr. Joy Moeller      Medications: Per above medication reconciliation. Pain Management: per above medications    Recommended diet: GI lite with Ensure Clear with all meal. Advance to cardiac as tolerated    Recommended activity: PT/OT Eval and Treat    Wound care: None    Indwelling devices:  Colostomy care    Supplemental Oxygen: None    Required Lab work: Weekly BMP    Glucose management:  None    Code status: DNR        Outside physician follow up: Follow-up Information     Follow up With Details Comments Contact Collette Farooq MD Schedule an appointment as soon as possible for a visit in 15 days  [facility] [address]  [address] [phone]      BMP  every week                Skilled nursing facility/ SNF MD responsible for above on discharge. Information obtained by :  I understand that if any problems occur once I am at home I am to contact my physician. I understand and acknowledge receipt of the instructions indicated above.                                                                                                                                            Physician's or R.N.'s Signature                                                                  Date/Time Patient or Repres

## 2017-01-31 NOTE — PROGRESS NOTES
Problem: Mobility Impaired (Adult and Pediatric)  Goal: *Acute Goals and Plan of Care (Insert Text)  Physical Therapy Goals  Initiated 1/30/2017  1. Patient will move from supine to sit and sit to supine in bed with independence within 7 day(s). 2. Patient will transfer from bed to chair and chair to bed with modified independence using the least restrictive device within 7 day(s). 3. Patient will perform sit to stand with modified independence within 7 day(s). 4. Patient will ambulate with modified independence for 100 feet with the least restrictive device within 7 day(s). PHYSICAL THERAPY TREATMENT  Patient: Cristin Valente (7 y.o. female)  Date: 1/31/2017  Diagnosis: Diverticulitis <principal problem not specified>       Precautions: Fall      ASSESSMENT:  Pt was received in supine and cleared by nursing to mobilize. Bed mobility was performed with supervision to come to EOB. Sit<>stand performed with CGA and height of the bed elevated. Negative for orthostatics, but she continues to feel dizzy with initial stand and needs to sit down quickly, vitals in flow sheet. She stood a second time and ambulated to the bathroom with RW and CGA, she refused to go into the bathroom. Reported some dizziness at this time and ambulated to the chair where she sat down. She was left working with OT on self care, encourage pt to do as much for herself as possible. Continue to recommend SNF at discharge. Progression toward goals:  [ ]    Improving appropriately and progressing toward goals  [X]    Improving slowly and progressing toward goals  [ ]    Not making progress toward goals and plan of care will be adjusted       PLAN:  Patient continues to benefit from skilled intervention to address the above impairments. Continue treatment per established plan of care.   Discharge Recommendations:  [facility]  Further Equipment Recommendations for Discharge:  TBD       SUBJECTIVE:   Patient stated Darold Lombard said I am going home tomorrow .       OBJECTIVE DATA SUMMARY:   Critical Behavior:  Neurologic State: Alert  Orientation Level: Appropriate for age, Oriented X4  Cognition: Appropriate decision making, Follows commands  Safety/Judgement: Awareness of environment, Fall prevention  Functional Mobility Training:  Bed Mobility:  Rolling: Supervision  Supine to Sit: Supervision              Transfers:  Sit to Stand: Contact guard assistance (height of bed raised)  Stand to Sit: Contact guard assistance                             Balance:  Sitting: Intact  Standing: Impaired  Standing - Static: Good;Constant support  Standing - Dynamic : Fair  Ambulation/Gait Training:  Distance (ft): 17 Feet (ft)  Assistive Device: Gait belt;Walker, rolling  Ambulation - Level of Assistance: Contact guard assistance        Gait Abnormalities: Decreased step clearance        Base of Support: Widened     Speed/Maria Luisa: Pace decreased (<100 feet/min)  Step Length: Left shortened;Right shortened           Pain:  Pain Scale 1: Numeric (0 - 10)  Pain Intensity 1: 0              Activity Tolerance:   Dizzy, VSS  Please refer to the flowsheet for vital signs taken during this treatment.   After treatment:   [X]    Patient left in no apparent distress sitting up in chair  [ ]    Patient left in no apparent distress in bed  [X]    Call bell left within reach  [X]    Nursing notified  [ ]    Caregiver present  [ ]    Bed alarm activated      COMMUNICATION/COLLABORATION:   The patients plan of care was discussed with: Occupational Therapist and Registered Nurse     Cory Edge PT, DPT   Time Calculation: 17 mins

## 2017-01-31 NOTE — PROGRESS NOTES
Problem: Self Care Deficits Care Plan (Adult)  Goal: *Acute Goals and Plan of Care (Insert Text)  Occupational Therapy Goals  Initiated 1/30/2017  1. Patient will perform grooming at the sink with supervision/set-up within 7 day(s). 2. Patient will perform bathing at the sink with supervision/set-up within 7 day(s). 3. Patient will perform lower body dressing with minimal assistance/contact guard assist within 7 day(s). 4. Patient will perform toilet transfers with supervision/set-up within 7 day(s). 5. Patient will perform all aspects of toileting with independence within 7 day(s). OCCUPATIONAL THERAPY TREATMENT  Patient: Nataly Lazo (18 y.o. female)  Date: 1/31/2017  Diagnosis: Diverticulitis <principal problem not specified>       Precautions: Fall      ASSESSMENT:  Nursing cleared patient for therapy. Assessed BP in supine, sit and standing- all stable, see flowsheet. Reports mild dizziness in standing. Attempted to ambulate to toilet, patient preferring to walk around bed to chair. Provided education on role of OT and encouraged highest level of independence. Patient self limiting, reporting her family was helping her at home when her health issues began. With education and encouragement, agreeable to complete bathing in sitting. Completed with setup and supervision. Progression toward goals:  [X]       Improving appropriately and progressing toward goals  [ ]       Improving slowly and progressing toward goals  [ ]       Not making progress toward goals and plan of care will be adjusted       PLAN:  Patient continues to benefit from skilled intervention to address the above impairments. Continue treatment per established plan of care. Discharge Recommendations:  Skilled Nursing Facility  Further Equipment Recommendations for Discharge:  TBD SNF       SUBJECTIVE:   Patient stated I guess I can be doing more now that I am getting better.       OBJECTIVE DATA SUMMARY: Cognitive/Behavioral Status:  Neurologic State: Alert  Orientation Level: Appropriate for age;Oriented X4  Cognition: Appropriate decision making; Follows commands        Functional Mobility and Transfers for ADLs:  Bed Mobility:  Rolling: Supervision  Supine to Sit: Supervision     Transfers:  Sit to Stand: Contact guard assistance (height of bed raised)  Stand to Sit: Contact guard assistance     Balance:  Sitting: Intact  Standing: Impaired  Standing - Static: Good;Constant support  Standing - Dynamic : Fair     ADL Intervention:    Assessed BP in supine, sit and standing- all stable, see flowsheet. Completed x 2 sit <> stand to RW level  Reports mild dizziness in standing. Attempted to ambulate to toilet at [facility] level with CGA, patient preferring to walk around bed to chair. No LOB. Patient self limiting to highest level of independence. Provided education on role of OT and encouraged highest level of independence. Completed bathing with setup and supervision in sitting. Encouraged her to assist nursing when they complete ADL tasks, not to be dependent. Grooming  Washing Face: Supervision/set-up (seated)     Upper Body Bathing  Bathing Assistance: Supervision/set-up (seated)     Pain:  Pain Scale 1: Numeric (0 - 10)  Pain Intensity 1: 0     Activity Tolerance:   Good. BP stable.       After treatment:   [X] Patient left in no apparent distress sitting up in chair  [ ] Patient left in no apparent distress in bed  [X] Call bell left within reach  [X] Nursing notified  [ ] Caregiver present  [X] Bed alarm activated      COMMUNICATION/COLLABORATION:   The patients plan of care was discussed with: Physical Therapist, Registered Nurse and Patient     Mike Tapia OT  Time Calculation: 18 mins

## 2017-02-01 VITALS
HEART RATE: 75 BPM | SYSTOLIC BLOOD PRESSURE: 136 MMHG | OXYGEN SATURATION: 96 % | RESPIRATION RATE: 18 BRPM | HEIGHT: 67 IN | TEMPERATURE: 98 F | BODY MASS INDEX: 25.29 KG/M2 | WEIGHT: 161.16 LBS | DIASTOLIC BLOOD PRESSURE: 79 MMHG

## 2017-02-01 PROCEDURE — 74011250637 HC RX REV CODE- 250/637: Performed by: SURGERY

## 2017-02-01 PROCEDURE — 74011250636 HC RX REV CODE- 250/636: Performed by: INTERNAL MEDICINE

## 2017-02-01 PROCEDURE — 74011250637 HC RX REV CODE- 250/637: Performed by: INTERNAL MEDICINE

## 2017-02-01 PROCEDURE — 74011000250 HC RX REV CODE- 250: Performed by: INTERNAL MEDICINE

## 2017-02-01 PROCEDURE — 77030012890

## 2017-02-01 RX ORDER — FUROSEMIDE 20 MG/1
TABLET ORAL
Qty: 30 TAB | Refills: 1 | Status: ON HOLD
Start: 2017-02-01 | End: 2017-02-28

## 2017-02-01 RX ORDER — FUROSEMIDE 20 MG/1
TABLET ORAL
Qty: 30 TAB | Refills: 1 | Status: SHIPPED
Start: 2017-02-01 | End: 2017-02-01

## 2017-02-01 RX ORDER — ONDANSETRON 4 MG/1
4 TABLET, FILM COATED ORAL
Qty: 30 TAB | Refills: 0 | Status: SHIPPED
Start: 2017-02-01 | End: 2017-02-24

## 2017-02-01 RX ORDER — LEVOFLOXACIN 750 MG/1
750 TABLET ORAL
Qty: 5 TAB | Refills: 0 | Status: SHIPPED
Start: 2017-02-01 | End: 2017-02-11

## 2017-02-01 RX ORDER — LISINOPRIL 20 MG/1
20 TABLET ORAL DAILY
Status: DISCONTINUED | OUTPATIENT
Start: 2017-02-01 | End: 2017-02-01 | Stop reason: CLARIF

## 2017-02-01 RX ORDER — ACETAMINOPHEN 325 MG/1
650 TABLET ORAL
Qty: 30 TAB | Refills: 0 | Status: ON HOLD
Start: 2017-02-01 | End: 2017-02-28

## 2017-02-01 RX ORDER — METRONIDAZOLE 500 MG/1
500 TABLET ORAL EVERY 8 HOURS
Qty: 30 TAB | Refills: 0 | Status: SHIPPED
Start: 2017-02-01 | End: 2017-02-11

## 2017-02-01 RX ORDER — METRONIDAZOLE 250 MG/1
500 TABLET ORAL EVERY 8 HOURS
Status: DISCONTINUED | OUTPATIENT
Start: 2017-02-01 | End: 2017-02-01 | Stop reason: HOSPADM

## 2017-02-01 RX ORDER — LEVOFLOXACIN 750 MG/1
750 TABLET ORAL
Status: DISCONTINUED | OUTPATIENT
Start: 2017-02-01 | End: 2017-02-01 | Stop reason: HOSPADM

## 2017-02-01 RX ADMIN — METRONIDAZOLE 500 MG: 250 TABLET ORAL at 17:13

## 2017-02-01 RX ADMIN — GABAPENTIN 400 MG: 100 CAPSULE ORAL at 08:32

## 2017-02-01 RX ADMIN — METRONIDAZOLE 500 MG: 500 INJECTION, SOLUTION INTRAVENOUS at 08:17

## 2017-02-01 RX ADMIN — Medication 1 CAPSULE: at 08:32

## 2017-02-01 RX ADMIN — Medication 10 ML: at 06:19

## 2017-02-01 RX ADMIN — MIDODRINE HYDROCHLORIDE 10 MG: 5 TABLET ORAL at 08:32

## 2017-02-01 RX ADMIN — LEVOFLOXACIN 750 MG: 5 INJECTION, SOLUTION INTRAVENOUS at 09:44

## 2017-02-01 RX ADMIN — MIDODRINE HYDROCHLORIDE 10 MG: 5 TABLET ORAL at 17:13

## 2017-02-01 RX ADMIN — PANTOPRAZOLE SODIUM 40 MG: 40 TABLET, DELAYED RELEASE ORAL at 08:30

## 2017-02-01 RX ADMIN — METRONIDAZOLE 500 MG: 500 INJECTION, SOLUTION INTRAVENOUS at 00:47

## 2017-02-01 RX ADMIN — ASPIRIN 81 MG: 81 TABLET, COATED ORAL at 08:32

## 2017-02-01 RX ADMIN — AMIODARONE HYDROCHLORIDE 200 MG: 200 TABLET ORAL at 08:32

## 2017-02-01 RX ADMIN — Medication 10 ML: at 13:50

## 2017-02-01 RX ADMIN — MIDODRINE HYDROCHLORIDE 10 MG: 5 TABLET ORAL at 11:25

## 2017-02-01 RX ADMIN — METOPROLOL SUCCINATE 12.5 MG: 25 TABLET, EXTENDED RELEASE ORAL at 08:29

## 2017-02-01 RX ADMIN — GABAPENTIN 400 MG: 100 CAPSULE ORAL at 17:13

## 2017-02-01 NOTE — PROGRESS NOTES
Met with pt and informed her [email or web address] has authorization for her stay. Pt stated her daughter is coming to the hospital and will transport her there. CM sent discharge summary to [email or web address] and provided pt's nurse with the contact information to give report. Pt in good spirits and ready for discharge.      Ivania Rodriguez, [phone]

## 2017-02-01 NOTE — PROGRESS NOTES
SURGERY PROGRESS NOTE      Admit Date: 2017    Subjective: Tolerating diet. No abdominal pain. Diarrhea resolved. Objective:     Visit Vitals    /84 (BP 1 Location: Right arm, BP Patient Position: At rest)    Pulse 70    Temp 98.5 °F (36.9 °C)    Resp 16    Ht 5' 7\" (1.702 m)    Wt 73.1 kg (161 lb 2.5 oz)    SpO2 99%    BMI 25.24 kg/m2        Temp (24hrs), Av.6 °F (37 °C), Min:97.5 °F (36.4 °C), Max:100.2 °F (37.9 °C)      Physical Exam:     Abdomen:  Soft. Non-tender, non-distended. Drain in place. Lab Results   Component Value Date/Time    WBC 10.9 2017 03:16 AM    Hemoglobin (POC) 9.2 2016 07:02 AM    HGB 7.9 2017 03:16 AM    Hematocrit (POC) 27 2016 07:02 AM    HCT 23.3 2017 03:16 AM    PLATELET 678  03:16 AM    MCV 71.9 2017 03:16 AM       Lab Results   Component Value Date/Time    GFR est AA 58 2017 03:16 AM    GFR est non-AA 48 2017 03:16 AM    Creatinine (POC) 1.0 2016 07:02 AM    Creatinine 1.12 2017 03:16 AM    BUN 20 2017 03:16 AM    BUN (POC) 12 2016 07:02 AM    Sodium (POC) 137 2016 07:02 AM    Sodium 139 2017 03:16 AM    Potassium 3.6 2017 03:16 AM    Potassium (POC) 3.9 2016 07:02 AM    Chloride (POC) 98 2016 07:02 AM    Chloride 104 2017 03:16 AM    CO2 21 2017 03:16 AM        Assessment:     Active Problems:    Renal cell carcinoma of right kidney ([address] (2017)      SVT (supraventricular tachycardia) (2017)      Paroxysmal atrial fibrillation ([facility]) (2017)      Orthostatic hypotension (2017)      Diverticulitis (2017)      Leukocytosis (2017)      History of permanent cardiac pacemaker placement (2017)      Chronic systolic heart failure ([facility] [address] (2017)      Plan/Recommendations/Medical Decision Making:     Clear for discharge from surgery standpoint. Will need to go to Rehab with the drain.   F/u in the clinic in 2-3 weeks.

## 2017-02-01 NOTE — PROGRESS NOTES
Pharmacy IV to PO Conversion Program    IV to PO conversion of metronidazole 500 mg IV q 8 hr to 500 mg po q 8 hr and levaquin 750 mg IV q 48h to 750 mg po q 48  for this 80 y.o. female being treated for diverticulitis. Recent Labs      17   0316  17   0331   CREA  1.12*  1.30*   BUN  20  26*   WBC  10.9  12.8*     Temp (24hrs), Av.6 °F (37 °C), Min:97.5 °F (36.4 °C), Max:100.2 °F (37.9 °C)      Criteria for IV to PO switch - Antibiotics  Received IV therapy for at least 48 hours and   YES   1. Functioning GI tract  a. Taking scheduled oral medications  b. Tolerating tube feeds at goal rate or a full liquid, soft, or regular diet  c. Patient has no documented malabsorption syndrome  d. Patient has not experienced emesis or severe diarrhea within the past 24 hours     YES   2. Clinically Stable  a. Afebrile (temperature < 100.4°F or 38°C) for at least 24 hours  b. White blood count is trending down towards normal range     YES            3.   Does not have an exclusion criteria (See list below) YES         Thanks,    Syed Lopez, PHARMD

## 2017-02-01 NOTE — CARDIO/PULMONARY
CP Rehab Note: chart review    Admit: abdominal pain/cholecystostomy tube placement    Mhx: CAD, GERD, HTN, cardiac stents x3, high cholesterol, autoimmune disease, renal cancer/radical nephrectomy, ICD/PM placement January 2017, EF 25-30% on 1/10/17. CP Rehab CHF education January 2017. Never smoked. Patient being discharged.

## 2017-02-01 NOTE — DISCHARGE SUMMARY
Hospitalist Discharge Summary     Patient ID:  Virgil Bell  [id number]  80 y.o.  1945    PCP on record: JOSE Fay    Admit date: 1/26/2017  Discharge date and time: 2/1/2017      DISCHARGE DIAGNOSIS:  Acute renal failure  Acute cholecystitis   Chronic systolic heart failure (ischemic cardiomyopathy) due to CAD with LAD stents and SVT/paroxysmal atrial fibrillation s/p ICD/PM placement 1/18/17   Acute on chronic anemia  Orthostatic hypotension  Peripheral neuropathy  GERD  Renal cell carcinoma of right kidney s/p resection 12/16 with Dr. Curtis Weston:  IP Elwin Heimlich HPI from H&P of Ebenezer Trejo MD:  Kayy Sue is a 80 y.o.  female who presents with nausea, vomiting and abdominal pain. As per patient, she started to have abdominal pain 3 days ago, intermittent, 10/10 in intensity when worse, sharp in nature, diffuse all over the abdomen, non radiating associated with intermittent nausea and vomiting. Pt denies any fever, chills, diarrhea, problems urination, chest pain, shortness of breath. In ED pt note to have leukocytosis, CT A/P showed diverticulitis, Gall bladder distension and chronic L1 compression fracture.     We were asked to admit for work up and evaluation of the above problems. ______________________________________________________________________  DISCHARGE SUMMARY/HOSPITAL COURSE:  for full details see H&P, daily progress notes, labs, consult notes. Acute renal failure  Resolved with IVF and holding diuretics  Diuretics resume on discharge due to risk of fluid overload with poor EF, instructions placed to hold the days not taking PO intake properly    Acute cholecystitis  ? Acute Diverticulitis POA  CT A/P 1/26 with acute diverticulitis of the hepatic flexure. No evidence of peridiverticular abscess. Distended gallbladder with hyperdense material may represent sludge.  No evidence of biliary duct dilatation. Evidence of prior right nephrectomy. Chronic L1 compression fracture with mild worsening compression. Abd US 1/26 with concern for acute cholecystitis: Gallbladder wall is thickened and contains fluid. Gallbladder contains sludge and stones. No biliary dilation. S/p percutaneous cholecystectomy tube 1/27 via IR. Fluid cultures no growth thus far. Continue surgery input  Con't levaquin, flagyl  Advanced diet to GI lite today  Plan for keep the cholecystectomy tube on discharge with OP followup with Dr Vivian Rayo     Chronic systolic heart failure (ischemic cardiomyopathy) due to CAD with LAD stents and SVT/paroxysmal atrial fibrillation s/p ICD/PM placement 1/18/17   2D echo 1/10 with EF 25%. There were no regional wall motion abnormalities. Con't amiodarone, toprol and ASA  Lasix resume as above     Acute on chronic anemia  Hg stable  Transfused 1u pRBCs 1/28  Iron level WNL     Orthostatic hypotension  con't outpt midodrine  Peripheral neuropathy: con't neurontin  GERD: PPI  Renal cell carcinoma of right kidney s/p resection 12/16 with Dr. Lacinda Kocher  Code Status: DNR  _______________________________________________________________________  Patient seen and examined by me on discharge day. Pertinent Findings:  Gen:    Not in distress  Chest: Clear lungs  CVS:   Regular rhythm. No edema  Abd:  Soft, not distended, not tender  Neuro:  Alert, non focal  _______________________________________________________________________  DISCHARGE MEDICATIONS:   Current Discharge Medication List      START taking these medications    Details   L. acidoph & paracasei- S therm- Bifido (PARDEEP-Q/RISAQUAD) 8 billion cell cap cap Take 1 Cap by mouth daily. Qty: 30 Cap, Refills: 0      acetaminophen (TYLENOL) 325 mg tablet Take 2 Tabs by mouth every six (6) hours as needed. Qty: 30 Tab, Refills: 0      levoFLOXacin (LEVAQUIN) 750 mg tablet Take 1 Tab by mouth every fourty-eight (48) hours for 10 days.   Qty: 5 Tab, Refills: 0 metroNIDAZOLE (FLAGYL) 500 mg tablet Take 1 Tab by mouth every eight (8) hours for 10 days. Qty: 30 Tab, Refills: 0      ondansetron hcl (ZOFRAN, AS HYDROCHLORIDE,) 4 mg tablet Take 1 Tab by mouth every eight (8) hours as needed for Nausea. Qty: 30 Tab, Refills: 0         CONTINUE these medications which have CHANGED    Details   furosemide (LASIX) 20 mg tablet Take 60mg PO daily but may hold on days if she is not tolerating PO intake properly. Qty: 30 Tab, Refills: 1         CONTINUE these medications which have NOT CHANGED    Details   omeprazole (PRILOSEC) 20 mg capsule Take 20 mg by mouth daily. Indications: GASTROESOPHAGEAL REFLUX      senna (SENOKOT) 8.6 mg tablet Take 2 Tabs by mouth daily. gabapentin (NEURONTIN) 400 mg capsule Take 1 Cap by mouth three (3) times daily for 60 days. Qty: 90 Cap, Refills: 1      metoprolol succinate (TOPROL-XL) 25 mg XL tablet Take 12.5 mg by mouth daily. amiodarone (CORDARONE) 200 mg tablet Take 1 Tab by mouth daily for 60 days. Qty: 30 Tab, Refills: 1      midodrine (PROAMITINE) 10 mg tablet Take 1 Tab by mouth three (3) times daily (with meals) for 60 days. Qty: 90 Tab, Refills: 1      aspirin delayed-release 81 mg tablet Take 81 mg by mouth daily. docusate sodium (COLACE) 100 mg capsule Take 1 Cap by mouth two (2) times a day for 90 days. Qty: 60 Cap, Refills: 2             My Recommended Diet, Activity, Wound Care, and follow-up labs are listed in the patient's Discharge Insturctions which I have personally completed and reviewed.     _______________________________________________________________________  DISPOSITION:    Home with Family:    Home with HH/PT/OT/RN:    SNF/LTC: Y   GUILHERME:    OTHER:        Condition at Discharge:  Stable  _______________________________________________________________________  Follow up with:   PCP : JOSE Chester  Follow-up Information     Follow up With Details Milderd Austin, MD Schedule an appointment as soon as possible for a visit in 15 days  [address]  [address]  [address] [facility]  every week     gastroenterology  4 weeks for recent diverticulitis     [facility], [address]   [facility] [address] [facility]  [phone]                Total time in minutes spent coordinating this discharge (includes going over instructions, follow-up, prescriptions, and preparing report for sign off to her PCP) : 35 minutes    Signed:  Levi Das MD

## 2017-02-01 NOTE — PROGRESS NOTES
Updates and discharge summary sent to [employer] via [employer]. [employer] still waiting on an authorization for pt's stay. They will let us know when they receive it. F/u appointment made with Dr. Sharath Bennett for pt and documented on discharge paperwork.     Azeem Parra, [address]

## 2017-02-01 NOTE — ROUTINE PROCESS
I have reviewed discharge instructions with the patient. The patient verbalized understanding. IV removed. Patient taken to main entrance via wheelchair to main entrance.

## 2017-02-01 NOTE — PROGRESS NOTES
Spiritual Care Assessment/Progress Notes    Jonathon Leon [id number]  [id number]    1945  80 y.o.  female    Patient Telephone Number: [phone] (home)   Hindu Affiliation: Nataliya Etienne   Language: English   Extended Emergency Contact Information  Primary Emergency Contact: Christian Josue  Address: [facility] [address] Phone: [phone]  Relation: Son  Secondary Emergency Contact: [address] [place] [address] Phone: [phone]  Relation: Daughter   Patient Active Problem List    Diagnosis Date Noted    Diverticulitis 01/26/2017    Leukocytosis 01/26/2017    History of permanent cardiac pacemaker placement 01/26/2017    Chronic systolic heart failure ([facility] 01/26/2017    Paroxysmal atrial fibrillation ([facility] 01/13/2017    Orthostatic hypotension 01/13/2017    Left lower lobe pneumonia 01/13/2017    SVT (supraventricular tachycardia) 01/09/2017    Renal cell carcinoma of right kidney ([facility] 01/04/2017    Renal mass 12/22/2016        Date: 2/1/2017       Level of Hindu/Spiritual Activity:  []         Involved in danette tradition/spiritual practice    []         Not involved in danette tradition/spiritual practice  [x]         Spiritually oriented    []         Claims no spiritual orientation    []         seeking spiritual identity  []         Feels alienated from Amish practice/tradition  []         Feels angry about Amish practice/tradition  [x]         Spirituality/Amish tradition is a resource for coping at this time.   []         Not able to assess due to medical condition    Services Provided Today:  []         crisis intervention    []         reading Scriptures  [x]         spiritual assessment    []         prayer  [x]         empathic listening/emotional support  []         rites and rituals (cite in comments)  []         life review     []         Amish support  []         theological development   []         advocacy  []         ethical dialog     []         blessing  []         bereavement support    []         support to family  []         anticipatory grief support   []         help with AMD  []         spiritual guidance    []         meditation      Spiritual Care Needs  []         Emotional Support  [x]         Spiritual/Episcopalian Care  []         Loss/Adjustment  []         Advocacy/Referral                /Ethics  []         No needs expressed at               this time  []         Other: (note in               comments)  [address]  []         Follow up visits with               pt/family  []         Provide materials  []         Schedule sacraments  []         Contact Community               Clergy  [x]         Follow up as needed  []         Other: (note in               comments)     Comments: On rounds.  initiated visit due to pt's length of stay. Provided empathic listening as pt expressed her frustration and concerns with her physical condition. Pt shared that she has not been home since December 22 and has been moving from one health care facility to another and soon she will be D/Fadi to another. Pt shared that she has a son who supports her and is able to come by to visit. She stated that prayer and her spirituality has been coping resources for her through this time. Advised of  availability. Ashley Yip M.S.   Spiritual Care Department  If need arise please call DISHA ([phone])

## 2017-02-01 NOTE — PROGRESS NOTES
End of Shift Nursing Note    Bedside shift change report given to [address] [place] [address] (oncoming nurse) by Khoi Angel RN (offgoing nurse). Report included the following information SBAR, Kardex, MAR and Recent Results. Significant changes during shift:    none   Non-emergent issues for physician to address:   none     Number times ambulated in hallway past shift: 0      Number of times OOB to chair past shift: 0     Vital Signs:    Temp: 98.4 °F (36.9 °C)     Pulse (Heart Rate): 75     BP: 142/77     Resp Rate: 18     O2 Sat (%): 100 %    Lines & Drains:      NG tube [] in [] removed [x] not applicable   Drains [x] in [] removed [] not applicable     Skin Integrity:      Wounds: yes   Dressings Present: yes    Wound Concerns: no      GI:    Current diet:  DIET NUTRITIONAL SUPPLEMENTS Breakfast, Lunch, Dinner; Ensure Clear  DIET GI LITE (POST SURGICAL)    Nausea: NO  Vomiting: NO  Bowel Sounds: YES  Flatus: NO  Last Bowel Movement: several days ago    Respiratory:    Incentive Spirometer: YES   Coughing and Deep Breathing: YES  Oral Care: YES  Understanding (patient/family education): YES   Getting out of bed: NO  Head of bed elevation: YES    Patient Safety:    Falls Score: 1  Mobility Score: 1  Bed Alarm On? NO  Sitter? NO      Opportunity for questions and clarification was given to oncoming nurse. Patient bed is in low position, side rails are up x 2, door & observation blinds open as needed, call bell within reach and patient not in distress.     Pippa Bravo RN

## 2017-02-01 NOTE — PROGRESS NOTES
[employer] called and they just received authorization. CM will inform pt's nurse.      Mcarthur Carly, [address]

## 2017-02-02 LAB
BACTERIA SPEC CULT: NORMAL
SERVICE CMNT-IMP: NORMAL

## 2017-02-07 ENCOUNTER — TELEPHONE (OUTPATIENT)
Dept: RHEUMATOLOGY | Age: 72
End: 2017-02-07

## 2017-02-09 ENCOUNTER — HOME HEALTH ADMISSION (OUTPATIENT)
Dept: HOME HEALTH SERVICES | Facility: HOME HEALTH | Age: 72
End: 2017-02-09

## 2017-02-13 ENCOUNTER — OFFICE VISIT (OUTPATIENT)
Dept: SURGERY | Age: 72
End: 2017-02-13

## 2017-02-13 ENCOUNTER — TELEPHONE (OUTPATIENT)
Dept: RHEUMATOLOGY | Age: 72
End: 2017-02-13

## 2017-02-13 VITALS
HEART RATE: 57 BPM | RESPIRATION RATE: 20 BRPM | HEIGHT: 67 IN | OXYGEN SATURATION: 98 % | BODY MASS INDEX: 25.27 KG/M2 | SYSTOLIC BLOOD PRESSURE: 136 MMHG | WEIGHT: 161 LBS | DIASTOLIC BLOOD PRESSURE: 81 MMHG

## 2017-02-13 DIAGNOSIS — K80.20 GALLSTONES: Primary | ICD-10-CM

## 2017-02-13 NOTE — MR AVS SNAPSHOT
Visit Information Date & Time Provider Department Dept. Phone Encounter #  
 2/13/2017  2:00 PM Rosi Lopez MD [facility] [place] [address] [id number] Upcoming Health Maintenance Date Due Hepatitis C Screening 1945 DTaP/Tdap/Td series (1 - Tdap) 8/3/1967 ZOSTER VACCINE AGE 60> 8/3/2006 GLAUCOMA SCREENING Q2Y 8/3/2011 OSTEOPOROSIS SCREENING (DEXA) 8/3/2011 Pneumococcal 65+ High/Highest Risk (1 of 2 - PCV13) 8/3/2011 MEDICARE YEARLY EXAM 8/3/2011 BREAST CANCER SCRN MAMMOGRAM 8/29/2015 FOBT Q 1 YEAR AGE 50-75 1/29/2018 Allergies as of 2/13/2017  Review Complete On: 2/13/2017 By: Chang Johnson Severity Noted Reaction Type Reactions Percocet [Oxycodone-acetaminophen]  01/09/2017    Other (comments) Confused Current Immunizations  Never Reviewed Name Date Influenza Vaccine 12/1/2016 Not reviewed this visit Vitals BP Pulse Resp Height(growth percentile) Weight(growth percentile) SpO2  
 136/81 (BP 1 Location: Left arm, BP Patient Position: Sitting) (!) 57 20 5' 7\" (1.702 m) 161 lb (73 kg) 98% BMI OB Status Smoking Status 25.22 kg/m2 Postmenopausal Never Smoker BMI and BSA Data Body Mass Index Body Surface Area  
 25.22 kg/m 2 1.86 m 2 Preferred Pharmacy Pharmacy Name Phone [facility] [address] [address] [address]  [facility] [phone] Your Updated Medication List  
  
   
This list is accurate as of: 2/13/17 11:59 PM.  Always use your most recent med list.  
  
  
  
  
 acetaminophen 325 mg tablet Commonly known as:  TYLENOL Take 2 Tabs by mouth every six (6) hours as needed. amiodarone 200 mg tablet Commonly known as:  CORDARONE Take 1 Tab by mouth daily for 60 days. aspirin delayed-release 81 mg tablet Take 81 mg by mouth daily. docusate sodium 100 mg capsule Commonly known as:  Kelly Cummins Take 1 Cap by mouth two (2) times a day for 90 days. furosemide 20 mg tablet Commonly known as:  LASIX Take 60mg PO daily but may hold on days if she is not tolerating PO intake properly. gabapentin 400 mg capsule Commonly known as:  NEURONTIN Take 1 Cap by mouth three (3) times daily for 60 days. L. acidoph & paracasei- S therm- Bifido 8 billion cell Cap cap Commonly known as:  PARDEEP-Q/RISAQUAD Take 1 Cap by mouth daily. metoprolol succinate 25 mg XL tablet Commonly known as:  TOPROL-XL Take 12.5 mg by mouth daily. midodrine 10 mg tablet Commonly known as:  Tonna Fergusson Take 1 Tab by mouth three (3) times daily (with meals) for 60 days. omeprazole 20 mg capsule Commonly known as:  PRILOSEC Take 20 mg by mouth daily. Indications: GASTROESOPHAGEAL REFLUX  
  
 ondansetron hcl 4 mg tablet Commonly known as:  ZOFRAN (AS HYDROCHLORIDE) Take 1 Tab by mouth every eight (8) hours as needed for Nausea. senna 8.6 mg tablet Commonly known as:  [employer] Take 2 Tabs by mouth daily. To-Do List   
 02/23/2017 1:00 PM  
  Appointment with [facility] PAT ROOM P2 at [facility] OR PREADMIT TESTING ([phone]) Introducing [facility]! Salazar Echevarria introduces [email or web address] patient portal. Now you can access parts of your medical record, email your doctor's office, and request medication refills online. 1. In your internet browser, go to [email or web address] 2. Click on the First Time User? Click Here link in the Sign In box. You will see the New Member Sign Up page. 3. Enter your [email or web address] Access Code exactly as it appears below. You will not need to use this code after youve completed the sign-up process. If you do not sign up before the expiration date, you must request a new code. · [email or web address] Access Code: [email or web address] Expires: 4/9/2017 11:18 AM 
 
 4. Enter the last four digits of your Social Security Number (xxxx) and Date of Birth (mm/dd/yyyy) as indicated and click Submit. You will be taken to the next sign-up page. 5. Create a Base[employer] ID. This will be your Base[employer] login ID and cannot be changed, so think of one that is secure and easy to remember. 6. Create a [employer] password. You can change your password at any time. 7. Enter your Password Reset Question and Answer. This can be used at a later time if you forget your password. 8. Enter your e-mail address. You will receive e-mail notification when new information is available in [address] 9. Click Sign Up. You can now view and download portions of your medical record. 10. Click the Download Summary menu link to download a portable copy of your medical information. If you have questions, please visit the Frequently Asked Questions section of the [employer] website. Remember, Base[employer] is NOT to be used for urgent needs. For medical emergencies, dial 911. Now available from your iPhone and Android! Please provide this summary of care documentation to your next provider. Your primary care clinician is listed as Duglas Castillo. If you have any questions after today's visit, please call [phone].

## 2017-02-15 NOTE — PROGRESS NOTES
Patient is here for follow-up from the hospital.  Patient was admitted on 1/26/17 with diverticulitis and cholecystitis. Patient underwent cholecystostomy tube placed on 1/27/17. Patient was discharged on 2/1/17. Patient was doing well until this morning when the tube fell out. Currently denies any abdominal pain. No F/C/S. Off antibiotics. PE:  Visit Vitals    /81 (BP 1 Location: Left arm, BP Patient Position: Sitting)    Pulse (!) 57    Resp 20    Ht 5' 7\" (1.702 m)    Wt 73 kg (161 lb)    SpO2 98%    BMI 25.22 kg/m2     Abdomen:  Soft, non-tender. Non-distended. Assessment:    Diverticulitis and cholecystitis s/p cholecystostomy tube    Plan:  -Will hold off on replacing the cholecystostomy tube for now.  -plan for lap tana/IOC in couple of weeks  -Risks, benefit, and alternative to surgery was discussed with the patient. The risks of surgery include but not limited to infection, bleeding, intraabdominal organ injury, bile duct injury, retained stone, possible conversion to open, and the risks of general anesthetic. Patient is agreeable to surgery. All questions answered.   -Instructed to RTC if recurrent symptoms

## 2017-02-24 ENCOUNTER — HOSPITAL ENCOUNTER (OUTPATIENT)
Dept: PREADMISSION TESTING | Age: 72
Discharge: HOME | End: 2017-02-24
Attending: SURGERY
Payer: MEDICARE

## 2017-02-24 VITALS
OXYGEN SATURATION: 98 % | TEMPERATURE: 98.3 F | SYSTOLIC BLOOD PRESSURE: 155 MMHG | BODY MASS INDEX: 21.49 KG/M2 | RESPIRATION RATE: 20 BRPM | HEART RATE: 89 BPM | DIASTOLIC BLOOD PRESSURE: 87 MMHG | WEIGHT: 136.91 LBS | HEIGHT: 67 IN

## 2017-02-24 LAB
ANION GAP BLD CALC-SCNC: 10 MMOL/L (ref 5–15)
APPEARANCE UR: ABNORMAL
ATRIAL RATE: 78 BPM
BACTERIA URNS QL MICRO: NEGATIVE /HPF
BILIRUB UR QL CFM: NEGATIVE
BUN SERPL-MCNC: 8 MG/DL (ref 6–20)
BUN/CREAT SERPL: 7 (ref 12–20)
CALCIUM SERPL-MCNC: 8.7 MG/DL (ref 8.5–10.1)
CALCULATED P AXIS, ECG09: 43 DEGREES
CALCULATED R AXIS, ECG10: -14 DEGREES
CALCULATED T AXIS, ECG11: 150 DEGREES
CHLORIDE SERPL-SCNC: 106 MMOL/L (ref 97–108)
CO2 SERPL-SCNC: 25 MMOL/L (ref 21–32)
COLOR UR: ABNORMAL
CREAT SERPL-MCNC: 1.21 MG/DL (ref 0.55–1.02)
DIAGNOSIS, 93000: NORMAL
EPITH CASTS URNS QL MICRO: ABNORMAL /LPF
ERYTHROCYTE [DISTWIDTH] IN BLOOD BY AUTOMATED COUNT: 19.2 % (ref 11.5–14.5)
GLUCOSE SERPL-MCNC: 93 MG/DL (ref 65–100)
GLUCOSE UR STRIP.AUTO-MCNC: NEGATIVE MG/DL
HCT VFR BLD AUTO: 29.8 % (ref 35–47)
HGB BLD-MCNC: 9.5 G/DL (ref 11.5–16)
HGB UR QL STRIP: NEGATIVE
HYALINE CASTS URNS QL MICRO: ABNORMAL /LPF (ref 0–5)
KETONES UR QL STRIP.AUTO: ABNORMAL MG/DL
LEUKOCYTE ESTERASE UR QL STRIP.AUTO: ABNORMAL
MCH RBC QN AUTO: 23.8 PG (ref 26–34)
MCHC RBC AUTO-ENTMCNC: 31.9 G/DL (ref 30–36.5)
MCV RBC AUTO: 74.7 FL (ref 80–99)
NITRITE UR QL STRIP.AUTO: NEGATIVE
P-R INTERVAL, ECG05: 144 MS
PH UR STRIP: 6.5 [PH] (ref 5–8)
PLATELET # BLD AUTO: 329 K/UL (ref 150–400)
POTASSIUM SERPL-SCNC: 4 MMOL/L (ref 3.5–5.1)
PROT UR STRIP-MCNC: ABNORMAL MG/DL
Q-T INTERVAL, ECG07: 420 MS
QRS DURATION, ECG06: 90 MS
QTC CALCULATION (BEZET), ECG08: 478 MS
RBC # BLD AUTO: 3.99 M/UL (ref 3.8–5.2)
RBC #/AREA URNS HPF: ABNORMAL /HPF (ref 0–5)
SODIUM SERPL-SCNC: 141 MMOL/L (ref 136–145)
SP GR UR REFRACTOMETRY: 1.02 (ref 1–1.03)
UA: UC IF INDICATED,UAUC: ABNORMAL
UROBILINOGEN UR QL STRIP.AUTO: 1 EU/DL (ref 0.2–1)
VENTRICULAR RATE, ECG03: 78 BPM
WBC # BLD AUTO: 7.1 K/UL (ref 3.6–11)
WBC URNS QL MICRO: ABNORMAL /HPF (ref 0–4)

## 2017-02-24 PROCEDURE — 36415 COLL VENOUS BLD VENIPUNCTURE: CPT | Performed by: ANESTHESIOLOGY

## 2017-02-24 PROCEDURE — 85027 COMPLETE CBC AUTOMATED: CPT | Performed by: ANESTHESIOLOGY

## 2017-02-24 PROCEDURE — 93005 ELECTROCARDIOGRAM TRACING: CPT

## 2017-02-24 PROCEDURE — 80048 BASIC METABOLIC PNL TOTAL CA: CPT | Performed by: ANESTHESIOLOGY

## 2017-02-24 PROCEDURE — 81001 URINALYSIS AUTO W/SCOPE: CPT | Performed by: SURGERY

## 2017-02-24 PROCEDURE — 87086 URINE CULTURE/COLONY COUNT: CPT | Performed by: SURGERY

## 2017-02-24 NOTE — PERIOP NOTES
[facility]  Preoperative Instructions        Surgery Date 03/02/17          Time of Arrival 0630  Contact # [phone] cell    1. On the day of your surgery, please report to the Surgical Services Registration Desk and sign in at your designated time. The Surgery Center is located to the right of the Emergency Room. 2. You must have someone with you to drive you home. You should not drive a car for 24 hours following surgery. Please make arrangements for a friend or family member to stay with you for the first 24 hours after your surgery. 3. Do not have anything to eat or drink (including water, gum, mints, coffee, juice) after midnight        . This may not apply to medications prescribed by your physician. Please note special instructions, if applicable. If you are currently taking Plavix, Coumadin, or other blood-thinning agents, contact your surgeon for instructions. 4. We recommend you do not drink any alcoholic beverages for 24 hours before and after your surgery. 5. Have a list of all current medications, including vitamins, herbal supplements and any other over the counter medications. Stop all Aspirin and non-steroidal anti-inflammatory drugs (I.e. Advil, Aleve), as directed by your surgeon's office. Stop all vitamins and herbal supplements seven days prior to your surgery. 6. Wear comfortable clothes. Wear glasses instead of contacts. Do not bring any money or jewelry. Please bring picture ID, insurance card, and any prearranged co-payment or hospital payment. Do not wear make-up, particularly mascara the morning of your surgery. Do not wear nail polish, particularly if you are having foot /hand surgery. Wear your hair loose or down, no ponytails, buns, cesar pins or clips. All body piercings must be removed.   Please shower with antibacterial soap for three consecutive days before and on the morning of surgery, but do not apply any lotions, powders or deodorants after the shower on the day of surgery. Please use a fresh towels after each shower. Please sleep in clean clothes and change bed linens the night before surgery. Please do not shave for 48 hours prior to surgery. Shaving of the face is acceptable. 7. You should understand that if you do not follow these instructions your surgery may be cancelled. If your physical condition changes (I.e. fever, cold or flu) please contact your surgeon as soon as possible. 8. It is important that you be on time. If a situation occurs where you may be late, please call [phone] (OR Holding Area). 9. If you have any questions and or problems, please call [phone] (Pre-admission Testing). 10. Your surgery time may be subject to change. You will receive a phone call the evening prior if your time changes. 11.  If having outpatient surgery, you must have someone to drive you here, stay with you during the duration of your stay, and to drive you home at time of discharge. Special Instructions: prescriptions(amiodarone,midodrine,lasix) from [address] [place] [address] A [employer] [place] [employer] as needed,gabapentin,metoprolol,prilosec-------amiodarone and midodrine also if picked up from pharmacy      I understand a pre-operative phone call will be made to verify my surgery time. In the event that I am not available, I give permission for a message to be left on my answering service and/or with another person?   yes         ___________________      __________   _________    (Signature of Patient)             (Witness)                (Date and Time)

## 2017-02-24 NOTE — PERIOP NOTES
Dr. Aisha Parr office/Brittany notified patient has not been taking cardiac/blood pressure medications regularly (has not picked up discharge medications due to cost of medications). Office informed waiting on call back from patient to confirm /clarify exact medications available at home .

## 2017-02-26 LAB
BACTERIA SPEC CULT: NORMAL
CC UR VC: NORMAL
SERVICE CMNT-IMP: NORMAL

## 2017-02-27 ENCOUNTER — APPOINTMENT (OUTPATIENT)
Dept: CT IMAGING | Age: 72
DRG: 419 | End: 2017-02-27
Attending: PHYSICIAN ASSISTANT
Payer: MEDICARE

## 2017-02-27 ENCOUNTER — APPOINTMENT (OUTPATIENT)
Dept: ULTRASOUND IMAGING | Age: 72
DRG: 419 | End: 2017-02-27
Attending: PHYSICIAN ASSISTANT
Payer: MEDICARE

## 2017-02-27 ENCOUNTER — HOSPITAL ENCOUNTER (INPATIENT)
Age: 72
LOS: 5 days | Discharge: HOME HEALTH | DRG: 419 | End: 2017-03-05
Attending: EMERGENCY MEDICINE | Admitting: FAMILY MEDICINE
Payer: MEDICARE

## 2017-02-27 DIAGNOSIS — K81.0 ACUTE CHOLECYSTITIS: Primary | ICD-10-CM

## 2017-02-27 DIAGNOSIS — R11.2 NON-INTRACTABLE VOMITING WITH NAUSEA, UNSPECIFIED VOMITING TYPE: ICD-10-CM

## 2017-02-27 DIAGNOSIS — D72.828 OTHER ELEVATED WHITE BLOOD CELL (WBC) COUNT: ICD-10-CM

## 2017-02-27 LAB
ALBUMIN SERPL BCP-MCNC: 2.9 G/DL (ref 3.5–5)
ALBUMIN/GLOB SERPL: 0.7 {RATIO} (ref 1.1–2.2)
ALP SERPL-CCNC: 83 U/L (ref 45–117)
ALT SERPL-CCNC: 10 U/L (ref 12–78)
ANION GAP BLD CALC-SCNC: 15 MMOL/L (ref 5–15)
APPEARANCE UR: CLEAR
AST SERPL W P-5'-P-CCNC: 25 U/L (ref 15–37)
BACTERIA URNS QL MICRO: NEGATIVE /HPF
BASOPHILS # BLD AUTO: 0 K/UL (ref 0–0.1)
BASOPHILS # BLD: 0 % (ref 0–1)
BILIRUB SERPL-MCNC: 0.5 MG/DL (ref 0.2–1)
BILIRUB UR QL: NEGATIVE
BUN SERPL-MCNC: 12 MG/DL (ref 6–20)
BUN/CREAT SERPL: 8 (ref 12–20)
CALCIUM SERPL-MCNC: 8.2 MG/DL (ref 8.5–10.1)
CHLORIDE SERPL-SCNC: 108 MMOL/L (ref 97–108)
CK SERPL-CCNC: 53 U/L (ref 26–192)
CO2 SERPL-SCNC: 21 MMOL/L (ref 21–32)
COLOR UR: NORMAL
CREAT SERPL-MCNC: 1.55 MG/DL (ref 0.55–1.02)
EOSINOPHIL # BLD: 0.1 K/UL (ref 0–0.4)
EOSINOPHIL NFR BLD: 1 % (ref 0–7)
EPITH CASTS URNS QL MICRO: NORMAL /LPF
ERYTHROCYTE [DISTWIDTH] IN BLOOD BY AUTOMATED COUNT: 19.2 % (ref 11.5–14.5)
GLOBULIN SER CALC-MCNC: 3.9 G/DL (ref 2–4)
GLUCOSE SERPL-MCNC: 139 MG/DL (ref 65–100)
GLUCOSE UR STRIP.AUTO-MCNC: NEGATIVE MG/DL
HCT VFR BLD AUTO: 31.1 % (ref 35–47)
HGB BLD-MCNC: 9.8 G/DL (ref 11.5–16)
HGB UR QL STRIP: NEGATIVE
KETONES UR QL STRIP.AUTO: NEGATIVE MG/DL
LACTATE SERPL-SCNC: 1.7 MMOL/L (ref 0.4–2)
LEUKOCYTE ESTERASE UR QL STRIP.AUTO: NEGATIVE
LIPASE SERPL-CCNC: 135 U/L (ref 73–393)
LYMPHOCYTES # BLD AUTO: 14 % (ref 12–49)
LYMPHOCYTES # BLD: 2.4 K/UL (ref 0.8–3.5)
MAGNESIUM SERPL-MCNC: 1.8 MG/DL (ref 1.6–2.4)
MCH RBC QN AUTO: 23.6 PG (ref 26–34)
MCHC RBC AUTO-ENTMCNC: 31.5 G/DL (ref 30–36.5)
MCV RBC AUTO: 74.9 FL (ref 80–99)
MONOCYTES # BLD: 1.1 K/UL (ref 0–1)
MONOCYTES NFR BLD AUTO: 7 % (ref 5–13)
NEUTS SEG # BLD: 13.8 K/UL (ref 1.8–8)
NEUTS SEG NFR BLD AUTO: 78 % (ref 32–75)
NITRITE UR QL STRIP.AUTO: NEGATIVE
PH UR STRIP: 6.5 [PH] (ref 5–8)
PLATELET # BLD AUTO: 371 K/UL (ref 150–400)
POTASSIUM SERPL-SCNC: 4.5 MMOL/L (ref 3.5–5.1)
PROT SERPL-MCNC: 6.8 G/DL (ref 6.4–8.2)
PROT UR STRIP-MCNC: NEGATIVE MG/DL
RBC # BLD AUTO: 4.15 M/UL (ref 3.8–5.2)
RBC #/AREA URNS HPF: NORMAL /HPF (ref 0–5)
SODIUM SERPL-SCNC: 144 MMOL/L (ref 136–145)
SP GR UR REFRACTOMETRY: 1.01 (ref 1–1.03)
TROPONIN I SERPL-MCNC: <0.04 NG/ML
UA: UC IF INDICATED,UAUC: NORMAL
UROBILINOGEN UR QL STRIP.AUTO: 1 EU/DL (ref 0.2–1)
WBC # BLD AUTO: 17.4 K/UL (ref 3.6–11)
WBC URNS QL MICRO: NORMAL /HPF (ref 0–4)

## 2017-02-27 PROCEDURE — 74011250637 HC RX REV CODE- 250/637: Performed by: EMERGENCY MEDICINE

## 2017-02-27 PROCEDURE — 83735 ASSAY OF MAGNESIUM: CPT | Performed by: EMERGENCY MEDICINE

## 2017-02-27 PROCEDURE — 80053 COMPREHEN METABOLIC PANEL: CPT | Performed by: EMERGENCY MEDICINE

## 2017-02-27 PROCEDURE — 36415 COLL VENOUS BLD VENIPUNCTURE: CPT | Performed by: EMERGENCY MEDICINE

## 2017-02-27 PROCEDURE — 93005 ELECTROCARDIOGRAM TRACING: CPT

## 2017-02-27 PROCEDURE — 74011250637 HC RX REV CODE- 250/637: Performed by: PHYSICIAN ASSISTANT

## 2017-02-27 PROCEDURE — 74011250636 HC RX REV CODE- 250/636: Performed by: PHYSICIAN ASSISTANT

## 2017-02-27 PROCEDURE — 84484 ASSAY OF TROPONIN QUANT: CPT | Performed by: EMERGENCY MEDICINE

## 2017-02-27 PROCEDURE — 76705 ECHO EXAM OF ABDOMEN: CPT

## 2017-02-27 PROCEDURE — 83605 ASSAY OF LACTIC ACID: CPT | Performed by: PHYSICIAN ASSISTANT

## 2017-02-27 PROCEDURE — 74011000258 HC RX REV CODE- 258: Performed by: PHYSICIAN ASSISTANT

## 2017-02-27 PROCEDURE — 74176 CT ABD & PELVIS W/O CONTRAST: CPT

## 2017-02-27 PROCEDURE — 82550 ASSAY OF CK (CPK): CPT | Performed by: EMERGENCY MEDICINE

## 2017-02-27 PROCEDURE — 81001 URINALYSIS AUTO W/SCOPE: CPT | Performed by: PHYSICIAN ASSISTANT

## 2017-02-27 PROCEDURE — 99285 EMERGENCY DEPT VISIT HI MDM: CPT

## 2017-02-27 PROCEDURE — 83690 ASSAY OF LIPASE: CPT | Performed by: EMERGENCY MEDICINE

## 2017-02-27 PROCEDURE — 85025 COMPLETE CBC W/AUTO DIFF WBC: CPT | Performed by: EMERGENCY MEDICINE

## 2017-02-27 PROCEDURE — 96365 THER/PROPH/DIAG IV INF INIT: CPT

## 2017-02-27 PROCEDURE — 96375 TX/PRO/DX INJ NEW DRUG ADDON: CPT

## 2017-02-27 RX ORDER — ONDANSETRON 4 MG/1
4 TABLET, ORALLY DISINTEGRATING ORAL
Status: COMPLETED | OUTPATIENT
Start: 2017-02-27 | End: 2017-02-27

## 2017-02-27 RX ORDER — MAGNESIUM CITRATE
296 SOLUTION, ORAL ORAL
Status: COMPLETED | OUTPATIENT
Start: 2017-02-27 | End: 2017-02-27

## 2017-02-27 RX ORDER — ONDANSETRON 4 MG/1
4 TABLET, ORALLY DISINTEGRATING ORAL
Qty: 10 TAB | Refills: 0 | Status: SHIPPED | OUTPATIENT
Start: 2017-02-27 | End: 2017-06-06

## 2017-02-27 RX ORDER — ONDANSETRON 2 MG/ML
4 INJECTION INTRAMUSCULAR; INTRAVENOUS
Status: COMPLETED | OUTPATIENT
Start: 2017-02-27 | End: 2017-02-27

## 2017-02-27 RX ORDER — TRAMADOL HYDROCHLORIDE 50 MG/1
50 TABLET ORAL
Status: COMPLETED | OUTPATIENT
Start: 2017-02-27 | End: 2017-02-27

## 2017-02-27 RX ORDER — ONDANSETRON 4 MG/1
4 TABLET, ORALLY DISINTEGRATING ORAL
Qty: 10 TAB | Refills: 0 | Status: SHIPPED | OUTPATIENT
Start: 2017-02-27 | End: 2017-02-27

## 2017-02-27 RX ADMIN — PIPERACILLIN SODIUM,TAZOBACTAM SODIUM 3.38 G: 3; .375 INJECTION, POWDER, FOR SOLUTION INTRAVENOUS at 23:49

## 2017-02-27 RX ADMIN — TRAMADOL HYDROCHLORIDE 50 MG: 50 TABLET, FILM COATED ORAL at 20:57

## 2017-02-27 RX ADMIN — MAGESIUM CITRATE 296 ML: 1.75 LIQUID ORAL at 18:44

## 2017-02-27 RX ADMIN — ONDANSETRON 4 MG: 4 TABLET, ORALLY DISINTEGRATING ORAL at 21:36

## 2017-02-27 RX ADMIN — ONDANSETRON HYDROCHLORIDE 4 MG: 2 INJECTION, SOLUTION INTRAMUSCULAR; INTRAVENOUS at 16:40

## 2017-02-27 NOTE — IP AVS SNAPSHOT
[address] [facility] 
[phone] Patient: Erica Reddy MRN: [id number] TELLO:[birth year] You are allergic to the following Allergen Reactions Percocet (Oxycodone-Acetaminophen) Other (comments) Confused Recent Documentation Height Weight BMI OB Status Smoking Status 1.702 m 61.7 kg 21.3 kg/m2 Postmenopausal Never Smoker Emergency Contacts Name Discharge Info Relation Home Work Mobile 23 Mirtha Weaver Said CAREGIVER [3] Son [22]   [phone] Fareed Villagomez  Daughter [21] [phone] About your hospitalization You were admitted on:  February 28, 2017 You last received care in the:  [facility] [place] [facility] GENERAL SURGERY You were discharged on:  March 5, 2017 Unit phone number:  [phone] Why you were hospitalized Your primary diagnosis was:  Not on File Your diagnoses also included:  Acute Cholecystitis Providers Seen During Your Hospitalizations Provider Role Specialty Primary office phone Marjorie Jaimes MD Attending Provider Emergency Medicine [phone] Gavin Solis MD Attending Provider General Surgery [phone] Your Primary Care Physician (PCP) Primary Care Physician Office Phone Office Fax Cher Hood [phone] Follow-up Information Follow up With Details Comments Contact Info JOSE Staton Schedule an appointment as soon as possible for a visit As needed [address] [place] [address] 
[phone] Gavin Solis MD  as scheduled or sooner if needed. [address] [facility] 
[phone] [address]  As needed [phone] Current Discharge Medication List  
  
START taking these medications Dose & Instructions Dispensing Information Comments Morning Noon Evening Bedtime cholestyramine-sucrose 4 gram powder Commonly known as:  Phong Potter Your next dose is: Today, Tomorrow Other:  _________ Dose:  1 g Take 1 g by mouth three (3) times daily (with meals) for 30 days. Quantity:  1 Can Refills:  0 HYDROmorphone 2 mg tablet Commonly known as:  DILAUDID Your next dose is: Today, Tomorrow Other:  _________ Dose:  2-4 mg Take 1-2 Tabs by mouth every four (4) hours as needed for Pain. Max Daily Amount: 24 mg. Quantity:  40 Tab Refills:  0  
     
   
   
   
  
 loperamide 1 mg/5 mL solution Commonly known as:  IMODIUM Your next dose is: Today, Tomorrow Other:  _________ Dose:  1 tsp Take 5 mL by mouth four (4) times daily as needed for Diarrhea. Quantity:  60 mL Refills:  0  
     
   
   
   
  
 ondansetron 4 mg disintegrating tablet Commonly known as:  ZOFRAN ODT Your next dose is: Today, Tomorrow Other:  _________ Dose:  4 mg Take 1 Tab by mouth every eight (8) hours as needed for Nausea. Quantity:  10 Tab Refills:  0 CONTINUE these medications which have CHANGED Dose & Instructions Dispensing Information Comments Morning Noon Evening Bedtime  
 gabapentin 300 mg capsule Commonly known as:  NEURONTIN What changed:  Another medication with the same name was removed. Continue taking this medication, and follow the directions you see here. Your next dose is: Today, Tomorrow Other:  _________ Dose:  300 mg Take 300 mg by mouth three (3) times daily. Refills:  0 CONTINUE these medications which have NOT CHANGED Dose & Instructions Dispensing Information Comments Morning Noon Evening Bedtime  
 amiodarone 200 mg tablet Commonly known as:  CORDARONE Your next dose is: Today, Tomorrow Other:  _________ Dose:  200 mg Take 1 Tab by mouth daily for 60 days. Quantity:  30 Tab Refills:  1  
     
   
   
   
  
 hydroCHLOROthiazide 25 mg tablet Commonly known as:  HYDRODIURIL Your next dose is: Today, Tomorrow Other:  _________ Dose:  25 mg Take 25 mg by mouth daily. Refills:  0  
     
   
   
   
  
 lisinopril 40 mg tablet Commonly known as:  [place] Your next dose is: Today, Tomorrow Other:  _________ Dose:  40 mg Take 40 mg by mouth daily. Refills:  0  
     
   
   
   
  
 midodrine 10 mg tablet Commonly known as:  Ortiz Bene Your next dose is: Today, Tomorrow Other:  _________ Dose:  10 mg Take 1 Tab by mouth three (3) times daily (with meals) for 60 days. Quantity:  90 Tab Refills:  1  
     
   
   
   
  
 omeprazole 20 mg capsule Commonly known as:  PRILOSEC Your next dose is: Today, Tomorrow Other:  _________ Dose:  20 mg Take 20 mg by mouth daily. Indications: GASTROESOPHAGEAL REFLUX Refills:  0  
     
   
   
   
  
 predniSONE 10 mg tablet Commonly known as:  Devon Sprawls Your next dose is: Today, Tomorrow Other:  _________ Dose:  10 mg Take 10 mg by mouth daily. Refills:  0 STOP taking these medications HYDROcodone-acetaminophen 5-325 mg per tablet Commonly known as:  Aisha Parr Where to Get Your Medications These medications were sent to [facility], [address] AT [address] [place] [address] [address], [address] [facility] Hours:  24-hours Phone:  [phone]  
  ondansetron 4 mg disintegrating tablet Information on where to get these meds will be given to you by the nurse or doctor. ! Ask your nurse or doctor about these medications  
  cholestyramine-sucrose 4 gram powder HYDROmorphone 2 mg tablet loperamide 1 mg/5 mL solution Discharge Instructions Patient Discharge Instructions Jonathon Leon / [id number] : 1945 Admitted 2017 Discharged: 3/3/2017 What to do at [facility] Recommended diet: Low fat, Low cholesterol Recommended activity: no heavy lifting > 20 lbs x 2 weeks; no driving while taking narcotics for pain. May take shower, but no bath x 2 weeks. Empty drain daily. Call office immediately if drain output is bilious or greenish in color. If you experience a lot of drainage, develop redness around the wound, or a fever over 101 F occurs please call the office. Narcotics and anesthesia sometimes cause nausea and vomiting. If persistent please call the office. Do not hesitate to call with questions or concerns. Follow-up with Dr. Charmaine Mosqueda in 1 week. Please call [phone] for an appointment. Information obtained by : 
I understand that if any problems occur once I am at home I am to contact my physician. I understand and acknowledge receipt of the instructions indicated above. Physician's or R.N.'s Signature                                                                  Date/Time Patient or Representative Signature                                                          Date/Time Discharge Instructions Attachments/References HEART FAILURE ZONES: GENERAL INFO (ENGLISH) HEART FAILURE: AVOIDING TRIGGERS (ENGLISH) Discharge Orders None [employer] Announcement We are excited to announce that we are making your provider's discharge notes available to you in [employer].   You will see these notes when they are completed and signed by the physician that discharged you from your recent hospital stay. If you have any questions or concerns about any information you see in [employer], please call the Health Information Department where you were seen or reach out to your Primary Care Provider for more information about your plan of care. Introducing [facility]! [employer] introduces [employer] patient portal. Now you can access parts of your medical record, email your doctor's office, and request medication refills online. 1. In your internet browser, go to [email or web address] 2. Click on the First Time User? Click Here link in the Sign In box. You will see the New Member Sign Up page. 3. Enter your [employer] Access Code exactly as it appears below. You will not need to use this code after youve completed the sign-up process. If you do not sign up before the expiration date, you must request a new code. · [employer] Access Code: [email or web address] Expires: 4/9/2017 11:18 AM 
 
4. Enter the last four digits of your Social Security Number (xxxx) and Date of Birth (mm/dd/yyyy) as indicated and click Submit. You will be taken to the next sign-up page. 5. Create a [employer] ID. This will be your [employer] login ID and cannot be changed, so think of one that is secure and easy to remember. 6. Create a [employer] password. You can change your password at any time. 7. Enter your Password Reset Question and Answer. This can be used at a later time if you forget your password. 8. Enter your e-mail address. You will receive e-mail notification when new information is available in [address] 9. Click Sign Up. You can now view and download portions of your medical record. 10. Click the Download Summary menu link to download a portable copy of your medical information.  
 
If you have questions, please visit the Frequently Asked Questions section of [employer]. Remember, MyChart is NOT to be used for urgent needs. For medical emergencies, dial 911. Now available from your iPhone and Android! General Information Please provide this summary of care documentation to your next provider. Patient Signature:  ____________________________________________________________ Date:  ____________________________________________________________  
  
Wong Manrique Provider Signature:  ____________________________________________________________ Date:  ____________________________________________________________ More Information Learning About Heart Failure Zones What are heart failure zones? Heart failure zones give you an easy way to see changes in your heart failure symptoms. They also tell you when you need to get help. Check every day to see which zone you are in. Green zone. You are doing well. This is where you want to be. · Your weight is stable. This means it is not going up or down. · You breathe easily. · You are sleeping well. You are able to lie flat without shortness of breath. · You can do your usual activities. Yellow zone. Be careful. Your symptoms are changing. Call your doctor. · You have new or increased shortness of breath. · You are dizzy or lightheaded, or you feel like you may faint. · You have sudden weight gain, such as 3 pounds or more in 2 to 3 days. · You have increased swelling in your legs, ankles, or feet. · You are so tired or weak that you cannot do your usual activities. · You are not sleeping well. Shortness of breath wakes you up at night. You need extra pillows. Your doctor's name: ____________________________________________________________ Your doctor's contact information: _________________________________________________ Red zone. This is an emergency. Call 911. You have symptoms of sudden heart failure, such as: · You have severe trouble breathing. · You cough up pink, foamy mucus. · You have a new irregular or fast heartbeat. You have symptoms of a heart attack. These may include: · Chest pain or pressure, or a strange feeling in the chest. 
· Sweating. · Shortness of breath. · Nausea or vomiting. · Pain, pressure, or a strange feeling in the back, neck, jaw, or upper belly or in one or both shoulders or arms. · Lightheadedness or sudden weakness. · A fast or irregular heartbeat. If you have symptoms of a heart attack: After you call 911, the  may tell you to chew 1 adult-strength or 2 to 4 low-dose aspirin. Wait for an ambulance. Do not try to drive yourself. Follow-up care is a key part of your treatment and safety. Be sure to make and go to all appointments, and call your doctor if you are having problems. It's also a good idea to know your test results and keep a list of the medicines you take. Where can you learn more? Go to http://nohemy[employer]ixon Technologiesjosé miguel.info/. Enter T174 in the search box to learn more about \"Learning About Heart Failure Zones. \" Current as of: January 27, 2016 Content Version: 11.1 © 0110-3615 [employer]. Care instructions adapted under license by [employer] (which disclaims liability or warranty for this information). If you have questions about a medical condition or this instruction, always ask your healthcare professional. [place] [address] any warranty or liability for your use of this information. Avoiding Triggers With Heart Failure: Care Instructions Your Care Instructions Triggers are anything that make your heart failure flare up. A flare-up is also called \"sudden heart failure\" or \"acute heart failure. \" When you have a flare-up, fluid builds up in your lungs, and you have problems breathing.  You might need to go to the hospital. By watching for changes in your condition and avoiding triggers, you can prevent heart failure flare-ups. Follow-up care is a key part of your treatment and safety. Be sure to make and go to all appointments, and call your doctor if you are having problems. It's also a good idea to know your test results and keep a list of the medicines you take. How can you care for yourself at home? Watch for changes in your weight and condition · Weigh yourself without clothing at the same time each day. Record your weight. Call your doctor if you gain 3 pounds or more in 2 to 3 days. A sudden weight gain may mean that your heart failure is getting worse. · Keep a daily record of your symptoms. Write down any changes in how you feel, such as new shortness of breath, cough, or problems eating. Also record if your ankles are more swollen than usual and if you have to urinate in the night more often. Note anything that you ate or did that could have triggered these changes. Limit sodium Sodium causes your body to hold on to water, making it harder for your heart to pump. People get most of their sodium from processed foods. Fast food and restaurant meals also tend to be very high in sodium. · Your doctor may suggest that you limit sodium to 2,000 milligrams (mg) a day or less. That is less than 1 teaspoon of salt a day, including all the salt you eat in cooking or in packaged foods. · Read food labels on cans and food packages. They tell you how much sodium you get in one serving. Check the serving size. If you eat more than one serving, you are getting more sodium. · Be aware that sodium can come in forms other than salt, including monosodium glutamate (MSG), sodium citrate, and sodium bicarbonate (baking soda). MSG is often added to Asian food. You can sometimes ask for food without MSG or salt. · Slowly reducing salt will help you adjust to the taste. Take the salt shaker off the table. · Flavor your food with garlic, lemon juice, onion, vinegar, herbs, and spices instead of salt. Do not use soy sauce, steak sauce, onion salt, garlic salt, mustard, or ketchup on your food, unless it is labeled \"low-sodium\" or \"low-salt. \" 
· Make your own salad dressings, sauces, and ketchup without adding salt. · Use fresh or frozen ingredients, instead of canned ones, whenever you can. Choose low-sodium canned goods. · Eat less processed food and food from restaurants, including fast food. Exercise as directed Moderate, regular exercise is very good for your heart. It improves your blood flow and helps control your weight. But too much exercise can stress your heart and cause a heart failure flare-up. · Check with your doctor before you start an exercise program. 
· Walking is an easy way to get exercise. Start out slowly. Gradually increase the length and pace of your walk. Swimming, riding a bike, and using a treadmill are also good forms of exercise. · When you exercise, watch for signs that your heart is working too hard. You are pushing yourself too hard if you cannot talk while you are exercising. If you become short of breath or dizzy or have chest pain, stop, sit down, and rest. 
· Do not exercise when you do not feel well. Take medicines correctly · Take your medicines exactly as prescribed. Call your doctor if you think you are having a problem with your medicine. · Make a list of all the medicines you take. Include those prescribed to you by other doctors and any over-the-counter medicines, vitamins, or supplements you take. Take this list with you when you go to any doctor. · Take your medicines at the same time every day. It may help you to post a list of all the medicines you take every day and what time of day you take them. · Make taking your medicine as simple as you can.  Plan times to take your medicines when you are doing other things, such as eating a meal or getting ready for bed. This will make it easier to remember to take your medicines. · Get organized. Use helpful tools, such as daily or weekly pill containers. When should you call for help? Call 911 if you have symptoms of sudden heart failure such as: 
· You have severe trouble breathing. · You cough up pink, foamy mucus. · You have a new irregular or rapid heartbeat. Call your doctor now or seek immediate medical care if: 
· You have new or increased shortness of breath. · You are dizzy or lightheaded, or you feel like you may faint. · You have sudden weight gain, such as 3 pounds or more in 2 to 3 days. · You have increased swelling in your legs, ankles, or feet. · You are suddenly so tired or weak that you cannot do your usual activities. Watch closely for changes in your health, and be sure to contact your doctor if you develop new symptoms. Where can you learn more? Go to http://nohemy-josé miguel.info/. Enter G908 in the search box to learn more about \"Avoiding Triggers With Heart Failure: Care Instructions. \" Current as of: April 27, 2016 Content Version: 11.1 © 1772-3338 Healthwise[employer]orporated. Care instructions adapted under license by [employer] (which disclaims liability or warranty for this information). If you have questions about a medical condition or this instruction, always ask your healthcare professional. [address] any warranty or liability for your use of this information.

## 2017-02-27 NOTE — IP AVS SNAPSHOT
Current Discharge Medication List  
  
Take these medications at their scheduled times Dose & Instructions Dispensing Information Comments Morning Noon Evening Bedtime  
 amiodarone 200 mg tablet Commonly known as:  CORDARONE Your next dose is: Today, Tomorrow Other:  ____________ Dose:  200 mg Take 1 Tab by mouth daily for 60 days. Quantity:  30 Tab Refills:  1 cholestyramine-sucrose 4 gram powder Commonly known as:  Beverli [place] Your next dose is: Today, Tomorrow Other:  ____________ Dose:  1 g Take 1 g by mouth three (3) times daily (with meals) for 30 days. Quantity:  1 Can Refills:  0  
     
   
   
   
  
 gabapentin 300 mg capsule Commonly known as:  NEURONTIN Your next dose is: Today, Tomorrow Other:  ____________ Dose:  300 mg Take 300 mg by mouth three (3) times daily. Refills:  0  
     
   
   
   
  
 hydroCHLOROthiazide 25 mg tablet Commonly known as:  HYDRODIURIL Your next dose is: Today, Tomorrow Other:  ____________ Dose:  25 mg Take 25 mg by mouth daily. Refills:  0  
     
   
   
   
  
 lisinopril 40 mg tablet Commonly known as:  Zeina Loss Your next dose is: Today, Tomorrow Other:  ____________ Dose:  40 mg Take 40 mg by mouth daily. Refills:  0  
     
   
   
   
  
 midodrine 10 mg tablet Commonly known as:  Lamberto Cobia Your next dose is: Today, Tomorrow Other:  ____________ Dose:  10 mg Take 1 Tab by mouth three (3) times daily (with meals) for 60 days. Quantity:  90 Tab Refills:  1  
     
   
   
   
  
 omeprazole 20 mg capsule Commonly known as:  PRILOSEC Your next dose is: Today, Tomorrow Other:  ____________ Dose:  20 mg Take 20 mg by mouth daily. Indications: GASTROESOPHAGEAL REFLUX Refills:  0 predniSONE 10 mg tablet Commonly known as:  Heaton Stair Your next dose is: Today, Tomorrow Other:  ____________ Dose:  10 mg Take 10 mg by mouth daily. Refills:  0 Take these medications as needed Dose & Instructions Dispensing Information Comments Morning Noon Evening Bedtime HYDROmorphone 2 mg tablet Commonly known as:  DILAUDID Your next dose is: Today, Tomorrow Other:  ____________ Dose:  2-4 mg Take 1-2 Tabs by mouth every four (4) hours as needed for Pain. Max Daily Amount: 24 mg. Quantity:  40 Tab Refills:  0  
     
   
   
   
  
 loperamide 1 mg/5 mL solution Commonly known as:  IMODIUM Your next dose is: Today, Tomorrow Other:  ____________ Dose:  1 tsp Take 5 mL by mouth four (4) times daily as needed for Diarrhea. Quantity:  60 mL Refills:  0  
     
   
   
   
  
 ondansetron 4 mg disintegrating tablet Commonly known as:  ZOFRAN ODT Your next dose is: Today, Tomorrow Other:  ____________ Dose:  4 mg Take 1 Tab by mouth every eight (8) hours as needed for Nausea. Quantity:  10 Tab Refills:  0 Where to Get Your Medications These medications were sent to [facility] AT [facility] [address], [address] Hours:  24-hours Phone:  [phone]  
  ondansetron 4 mg disintegrating tablet Information about where to get these medications is not yet available ! Ask your nurse or doctor about these medications  
  cholestyramine-sucrose 4 gram powder HYDROmorphone 2 mg tablet  
 loperamide 1 mg/5 mL solution

## 2017-02-27 NOTE — ED TRIAGE NOTES
Pt. Reports having 8/10 abdominal pain and nausea after inadvertently taking all of her home blood pressure medications today at once.  Pt. Denies any intention of self harm

## 2017-02-27 NOTE — ED PROVIDER NOTES
HPI Comments: Baron Ya is a 80 y.o. female who was admitted for diverticulitis and acute cholecystitis on 1/26/17 who presents via EMS to the ED to be evaluated for vomiting and hypotension. Patient states she \"must have taken too much of my blood pressure medication\" today at 12:30 today stating she began to feel nausea and vomited once after. She later notes she took her normal medications today. She notes she hasn't had a BM for a couple days. Pt states her son called EMS around 15:00 today. Upon arrival EMS reports pt c/o dizziness and had a BP of 80/50. EMS notes administering a 1/3 bag of IV-fluid. Pt is scheduled to have an elective cholecystectomy by Dr. Torri Killian on 03/02 due to having cholecystitis and gallstones on US during her admission on 1/26, but diverticulitis was initially treated and she underwent a cholecystostomy tube placement which fell out prior to her last visit to Dr. Torri Killian on 2/13 for pre-op evaluation. Per chart review, pt had an EF of 25% on 1/10/2017. Pt specifically denies fever, chills, leg swelling, CP, SOB or urinary sxs.  on 1/10/2017    PCP: JOSE Virk  General Surgery: Torri Killian    Social Hx: -tobacco, -EtOH, -Illicit Drugs  PMHX: CHF, CAD with LAD stent, HTN, GERD, DM, renal failure, anemia,       There are no other complaints, changes or physical findings at this time. The history is provided by the patient.         Past Medical History:   Diagnosis Date    Arthritis     Autoimmune disease ([address]     rheumatoid     CAD (coronary artery disease)     Chronic pain     neuropathy bilateral feet,knees    Diabetes ([address]     GERD (gastroesophageal reflux disease)     Hypertension     Ill-defined condition     anemia    Ill-defined condition     blood transfusion hx    Other ill-defined conditions(799.89)     high cholesterol    Renal cell carcinoma of right kidney [facility])     s/p resection 12/16       Past Surgical History:   Procedure Laterality Date    CARDIAC SURG PROCEDURE UNLIST      three stents placed 2005    CARDIAC SURG PROCEDURE UNLIST      HX PACEMAKER  2017/January    ICD    HX TONSILLECTOMY      HX UROLOGICAL  12/22/2016    RIGHT LAPOROSCOPIC HAND ASSISTED RADICAL NEPHRECTOMY         Family History:   Problem Relation Age of Onset   [facility] Mother      stomach    Other Father      aneurysym   [facility] Brother      lung    Other Brother      stomach problems    Stroke Brother        Social History     Social History    Marital status:      Spouse name: N/A    Number of children: N/A    Years of education: N/A     Occupational History    Not on file. Social History Main Topics    Smoking status: Never Smoker    Smokeless tobacco: Never Used    Alcohol use No    Drug use: No    Sexual activity: Yes     Birth control/ protection: None     Other Topics Concern    Not on file     Social History Narrative         ALLERGIES: Percocet [oxycodone-acetaminophen]    Review of Systems   Constitutional: Negative. Negative for activity change, chills, fatigue and unexpected weight change. Respiratory: Negative for cough, chest tightness, shortness of breath and wheezing. Cardiovascular: Negative. Negative for chest pain and palpitations. Gastrointestinal: Positive for abdominal pain (mid), constipation, nausea and vomiting. Negative for diarrhea. Genitourinary: Negative. Negative for dysuria, flank pain, frequency and hematuria. Musculoskeletal: Negative. Negative for arthralgias, back pain, neck pain and neck stiffness. Skin: Negative. Negative for color change and rash. Neurological: Negative for numbness and headaches. Psychiatric/Behavioral: Negative. Negative for confusion. All other systems reviewed and are negative.       Patient Vitals for the past 24 hrs:   Temp Pulse Resp BP SpO2   02/28/17 0048 - 71 - 149/81 94 %   02/27/17 2351 - 70 18 (!) 109/94 97 %   02/27/17 2059 - 68 17 150/79 98 %   02/27/17 1825 - 72 - 139/79 97 %   02/27/17 1739 - 74 - 138/73 98 %   02/27/17 1705 - 78 - 113/71 100 %   02/27/17 1558 97.7 °F (36.5 °C) 70 14 118/65 98 %       Physical Exam   Constitutional: She is oriented to person, place, and time. She appears well-developed and well-nourished. She is active. Non-toxic appearance. She appears distressed. uncomfortably appearing   HENT:   Head: Normocephalic and atraumatic. Eyes: Conjunctivae are normal. Pupils are equal, round, and reactive to light. Right eye exhibits no discharge. Left eye exhibits no discharge. Neck: Normal range of motion and full passive range of motion without pain. Neck supple. No tracheal tenderness present. Cardiovascular: Normal rate, regular rhythm, normal heart sounds, intact distal pulses and normal pulses. Exam reveals no gallop and no friction rub. No murmur heard. Pulmonary/Chest: Effort normal and breath sounds normal. No respiratory distress. She has no wheezes. She has no rales. She exhibits no tenderness. Lungs clear   Abdominal: Soft. Bowel sounds are normal. She exhibits no distension. There is tenderness in the right lower quadrant and periumbilical area. There is no rebound and no guarding. Musculoskeletal: Normal range of motion. She exhibits no edema or tenderness. Neurological: She is alert and oriented to person, place, and time. She has normal strength. No cranial nerve deficit or sensory deficit. Coordination normal.   Skin: Skin is warm, dry and intact. No abrasion and no rash noted. She is not diaphoretic. No erythema. Pale   Psychiatric: She has a normal mood and affect. Her speech is normal and behavior is normal. Cognition and memory are normal.   Nursing note and vitals reviewed.        MDM  Number of Diagnoses or Management Options  Gastroenteritis, acute:   Leukocytosis, unspecified type:   Non-intractable vomiting with nausea, unspecified vomiting type:   Diagnosis management comments:   DDx:diverticulits, SBO, pancreatitis, UTI, electrolyte abnormality       Amount and/or Complexity of Data Reviewed  Clinical lab tests: ordered and reviewed  Tests in the radiology section of CPT®: ordered and reviewed  Tests in the medicine section of CPT®: ordered and reviewed  Obtain history from someone other than the patient: yes (EMS  Chart Review)  Review and summarize past medical records: yes  Discuss the patient with other providers: yes (General surgery)  Independent visualization of images, tracings, or specimens: yes      ED Course       Procedures     ED EKG interpretation: 16:42  Rhythm: normal sinus rhythm; and regular . Rate (approx.): 72; Axis: normal; P wave: normal; QRS interval: normal ; ST/T wave: non-specific changes; Other findings: abnormal ekg (but unchanged from prior EKGs). This EKG was interpreted by Yvette Guzman MD,ED Provider. Progress Note:  4:26 PM  Pt vomited while drinking contrast. No hematemesis noted. Will obtain dry CT. Written by Sandra Finley ED Scribe, as dictated by Corky Salazar    Progress Note:  6:53 PM  Pt re-evaluated. Pt is having BM after a few sips of mag citrate. Written by Sandra Finley, ED Scribe, as dictated by Corky Salazar    7:40 PM  Patient re-assessed, feels better. Had a BM, no gross blood, was loose. Tolerating PO water. Discussed f/u with general surgery Dr. Kimberly Mcpherson this week as scheduled or sooner if needed. Leukocytosis without UTI or other acute process; CT without acute process and improved compared to previous one on 1/26- most likely a stress response. Discussed case with Dr. Jaswinder Tomlinson who agrees with care plan and states patient can be discharged. MICHAEL Pope    Progress Note:  Pt re-evaluated. Pt is requesting pain medication before discharge. Pt denies h/o of seizures.  Will administer dose of Tramadol prior to D/C, she states she has tolerated this well in the past.  Written by Sandra Finley ED Scribe, as dictated by Savannah Rodriguez Adolfo     9:50 AM  I have reviewed the additional findings with the patient and encouraged them to follow-up with a primary care provider for appropriate outpatient evaluation and treatment. I have encouraged them to see the official results in [facility]\" or to retrieve the specifics of their results from medical records. The patient states that they understand that there were additional findings and that they agree to follow-up as recommended. Progress Note:  9:53 PM  Pt vomited in the waiting room after receiving discharge paperwork. Re-evaluated pt and given Zofran, but continuing to vomit. Will order US, although she has no focal RUQ tenderness and normal LFTs / alk phos, due to her history and persistent vomiting. Written by Jazmyn Fisher, ED Scribe, as dictated by Pancho Kat    CONSULT NOTE:   11:25 PM  MICHAEL Wetzel spoke with Dr. Justice Navarro,  Specialty: General Surgery  Discussed pt's hx, disposition, and available diagnostic and imaging results. Reviewed care plans. Consultant agrees with plans as outlined. He recommends administering a dose of Zosyn and to keep the pt NPO and he will come see and evaluate patient for admission. Written by Jazmyn Fisher, ED Scribe, as dictated by Pancho Kat.     LABORATORY TESTS:  Recent Results (from the past 12 hour(s))   CK W/ REFLX CKMB    Collection Time: 02/27/17  4:33 PM   Result Value Ref Range    CK 53 26 - 192 U/L   TROPONIN I    Collection Time: 02/27/17  4:33 PM   Result Value Ref Range    Troponin-I, Qt. <0.04 <0.05 ng/mL   CBC WITH AUTOMATED DIFF    Collection Time: 02/27/17  4:33 PM   Result Value Ref Range    WBC 17.4 (H) 3.6 - 11.0 K/uL    RBC 4.15 3.80 - 5.20 M/uL    HGB 9.8 (L) 11.5 - 16.0 g/dL    HCT 31.1 (L) 35.0 - 47.0 %    MCV 74.9 (L) 80.0 - 99.0 FL    MCH 23.6 (L) 26.0 - 34.0 PG    MCHC 31.5 30.0 - 36.5 g/dL    RDW 19.2 (H) 11.5 - 14.5 %    PLATELET 236 136 - 118 K/uL    NEUTROPHILS 78 (H) 32 - 75 %    LYMPHOCYTES 14 12 - 49 %    MONOCYTES 7 5 - 13 %    EOSINOPHILS 1 0 - 7 %    BASOPHILS 0 0 - 1 %    ABS. NEUTROPHILS 13.8 (H) 1.8 - 8.0 K/UL    ABS. LYMPHOCYTES 2.4 0.8 - 3.5 K/UL    ABS. MONOCYTES 1.1 (H) 0.0 - 1.0 K/UL    ABS. EOSINOPHILS 0.1 0.0 - 0.4 K/UL    ABS. BASOPHILS 0.0 0.0 - 0.1 K/UL   MAGNESIUM    Collection Time: 02/27/17  4:33 PM   Result Value Ref Range    Magnesium 1.8 1.6 - 2.4 mg/dL   LIPASE    Collection Time: 02/27/17  4:33 PM   Result Value Ref Range    Lipase 135 73 - 470 U/L   METABOLIC PANEL, COMPREHENSIVE    Collection Time: 02/27/17  4:33 PM   Result Value Ref Range    Sodium 144 136 - 145 mmol/L    Potassium 4.5 3.5 - 5.1 mmol/L    Chloride 108 97 - 108 mmol/L    CO2 21 21 - 32 mmol/L    Anion gap 15 5 - 15 mmol/L    Glucose 139 (H) 65 - 100 mg/dL    BUN 12 6 - 20 MG/DL    Creatinine 1.55 (H) 0.55 - 1.02 MG/DL    BUN/Creatinine ratio 8 (L) 12 - 20      GFR est AA 40 (L) >60 ml/min/1.73m2    GFR est non-AA 33 (L) >60 ml/min/1.73m2    Calcium 8.2 (L) 8.5 - 10.1 MG/DL    Bilirubin, total 0.5 0.2 - 1.0 MG/DL    ALT (SGPT) 10 (L) 12 - 78 U/L    AST (SGOT) 25 15 - 37 U/L    Alk.  phosphatase 83 45 - 117 U/L    Protein, total 6.8 6.4 - 8.2 g/dL    Albumin 2.9 (L) 3.5 - 5.0 g/dL    Globulin 3.9 2.0 - 4.0 g/dL    A-G Ratio 0.7 (L) 1.1 - 2.2     URINALYSIS W/ REFLEX CULTURE    Collection Time: 02/27/17  4:47 PM   Result Value Ref Range    Color YELLOW/STRAW      Appearance CLEAR CLEAR      Specific gravity 1.011 1.003 - 1.030      pH (UA) 6.5 5.0 - 8.0      Protein NEGATIVE  NEG mg/dL    Glucose NEGATIVE  NEG mg/dL    Ketone NEGATIVE  NEG mg/dL    Bilirubin NEGATIVE  NEG      Blood NEGATIVE  NEG      Urobilinogen 1.0 0.2 - 1.0 EU/dL    Nitrites NEGATIVE  NEG      Leukocyte Esterase NEGATIVE  NEG      WBC 0-4 0 - 4 /hpf    RBC 0-5 0 - 5 /hpf    Epithelial cells FEW FEW /lpf    Bacteria NEGATIVE  NEG /hpf    UA:UC IF INDICATED CULTURE NOT INDICATED BY UA RESULT CNI     LACTIC ACID, PLASMA    Collection Time: 02/27/17  4:47 PM   Result Value Ref Range    Lactic acid 1.7 0.4 - 2.0 MMOL/L       IMAGING RESULTS:    CT ABD PELV WO CONT  INDICATION: RLQ pain; vomiting; 8/10 abdominal pain and nausea after taking all  of her home blood pressure medications today and once. Patient developed onset  mid abdominal pain with nausea and vomiting shortly thereafter.     COMPARISON: CT January 26, 2017     TECHNIQUE:   Thin axial images were obtained through the abdomen and pelvis. Coronal and  sagittal reconstructions were generated. Oral and IV contrast were not, per  order, administered. CT dose reduction was achieved through use of a  standardized protocol tailored for this examination and automatic exposure  control for dose modulation.      The absence of intravenous and oral contrast material substantially reduces the  capacity of CT for evaluation of the solid parenchymal organs of the abdomen and  bowel.      FINDINGS:   LUNG BASES: Clear. INCIDENTALLY IMAGED HEART AND MEDIASTINUM: AICD device partly demonstrated. Mild  cardiac contour enlargement. No pleural or pericardial effusion. LIVER: No mass or biliary dilatation. GALLBLADDER: Currently nondistended. SPLEEN: No mass. PANCREAS: No mass or ductal dilatation. ADRENALS: Resected on right. KIDNEYS/URETERS: Right kidney resected. No hydronephrosis or calculus. 1 cm  exophytic lesion of mid pole shown posteriorly, nonspecific. STOMACH: Nondistended  SMALL BOWEL: Nondistended  COLON: Previous appearance of hepatic flexure diverticulitis no longer shown. Abundant diffuse colonic diverticula again demonstrated. APPENDIX: Unremarkable. PERITONEUM: No ascites or pneumoperitoneum. RETROPERITONEUM: No lymphadenopathy or aortic aneurysm. REPRODUCTIVE ORGANS:  URINARY BLADDER: No mass or calculus. BONES: Mild superior endplate vertebral compression fracture of L1 again shown. Lower lumbar and upper lumbar degenerative changes again noted.   ADDITIONAL COMMENTS: N/A     IMPRESSION  IMPRESSION:     1. Appearance of hepatic flecture diverticulitis not currently demonstrated. 2. Gallbladder distention not currently shown. 3. Status post right nephrectomy and resection of adrenal gland. EXAM: Right upper quadrant limited abdominal ultrasound.     CLINICAL INDICATION: Right upper quadrant pain today with history of  gallstones. .     TECHNIQUE: High-resolution selected color flow evaluation of the right upper  quadrant performed for biliary process.     COMPARISON: None.     FINDINGS:     The gallbladder contains echogenic shadowing stones and sludge with 9 mm diffuse  thickening of the wall and mild pericholecystic fluid. The patient reports  tenderness with direct insonation of the gallbladder. The common bile duct  measures 5 mm. Visualized liver appears unremarkable. There is hepatopedal  portal venous flow within the liver. Because pancreas appears unremarkable. The  right kidney is surgically absent.     IMPRESSION  IMPRESSION: Gallbladder wall thickening and pericholecystic fluid with pain  compatible with acute cholecystitis. Cholelithiasis.      MEDICATIONS GIVEN:  Medications   diatrizoate meglumine-d.sodium (MD-GASTROVIEW,GASTROGRAFIN) 66-10 % contrast solution 30 mL (0 mL Oral Held 2/27/17 1611)   sodium chloride (NS) flush 5-10 mL (not administered)   sodium chloride (NS) flush 5-10 mL (not administered)   piperacillin-tazobactam (ZOSYN) 3.375 g in 0.9% sodium chloride (MBP/ADV) 100 mL (not administered)   HYDROmorphone (PF) (DILAUDID) injection 0.5 mg (not administered)   ondansetron (ZOFRAN) injection 4 mg (not administered)   dextrose 5% - 0.45% NaCl with KCl 10 mEq/L infusion (not administered)   amiodarone (CORDARONE) tablet 200 mg (not administered)   metoprolol succinate (TOPROL-XL) XL tablet 12.5 mg (not administered)   pantoprazole (PROTONIX) 40 mg in sodium chloride 0.9 % 10 mL injection (not administered)   ondansetron (ZOFRAN) injection 4 mg (4 mg IntraVENous Given 2/27/17 1640)   magnesium citrate solution 296 mL (296 mL Oral Given 2/27/17 1844)   traMADol (ULTRAM) tablet 50 mg (50 mg Oral Given 2/27/17 2057)   ondansetron (ZOFRAN ODT) tablet 4 mg (4 mg Oral Given 2/27/17 2136)   piperacillin-tazobactam (ZOSYN) 3.375 g in 0.9% sodium chloride (MBP/ADV) 100 mL (3.375 g IntraVENous New Bag 2/27/17 2349)       IMPRESSION:  1. Acute cholecystitis    2. Non-intractable vomiting with nausea, unspecified vomiting type    3. Other elevated white blood cell (WBC) count        PLAN:  1. To be admitted per General Surgery    11:28 PM  Patient is being admitted to the hospital by Dr. Nikko Garcia. The results of their tests and reasons for their admission have been discussed with them and/or available family. They convey agreement and understanding for the need to be admitted and for their admission diagnosis. Consultation has been made with the inpatient physician specialist for hospitalization. Written by SONY Soriano, as dictated by Kimberly Rockwell. MICHAEL Castorena Mins: The melindaibe's documentation has been prepared under my direction and personally reviewed by me in its entirety. I confirm that the note above accurately reflects all work, treatment, procedures, and medical decision making performed by me.

## 2017-02-27 NOTE — PERIOP NOTES
Left message with Dr. Shawanda Rodriguez office of  patient need for clarification of medication---has been discharged from rehab facility and has been home with home care nurses. Patient stated at pre-op visit (02/24/17) she had not been taking several medications due to cost/money issues. Also left message at patient's home to clarify home health nurse name/number.

## 2017-02-28 ENCOUNTER — SURGERY (OUTPATIENT)
Age: 72
End: 2017-02-28

## 2017-02-28 ENCOUNTER — ANESTHESIA EVENT (OUTPATIENT)
Dept: SURGERY | Age: 72
DRG: 419 | End: 2017-02-28
Payer: MEDICARE

## 2017-02-28 ENCOUNTER — ANESTHESIA (OUTPATIENT)
Dept: SURGERY | Age: 72
DRG: 419 | End: 2017-02-28
Payer: MEDICARE

## 2017-02-28 ENCOUNTER — APPOINTMENT (OUTPATIENT)
Dept: GENERAL RADIOLOGY | Age: 72
DRG: 419 | End: 2017-02-28
Attending: SURGERY
Payer: MEDICARE

## 2017-02-28 PROBLEM — K81.0 ACUTE CHOLECYSTITIS: Status: ACTIVE | Noted: 2017-02-28

## 2017-02-28 LAB
ANION GAP BLD CALC-SCNC: 13 MMOL/L (ref 5–15)
ATRIAL RATE: 72 BPM
BUN BLD-MCNC: 24 MG/DL (ref 9–20)
CA-I BLD-MCNC: 1.04 MMOL/L (ref 1.12–1.32)
CALCULATED P AXIS, ECG09: 22 DEGREES
CALCULATED R AXIS, ECG10: 74 DEGREES
CALCULATED T AXIS, ECG11: -109 DEGREES
CHLORIDE BLD-SCNC: 106 MMOL/L (ref 98–107)
CO2 BLD-SCNC: 27 MMOL/L (ref 21–32)
CREAT BLD-MCNC: 1.3 MG/DL (ref 0.6–1.3)
DIAGNOSIS, 93000: NORMAL
GLUCOSE BLD STRIP.AUTO-MCNC: 115 MG/DL (ref 65–100)
GLUCOSE BLD STRIP.AUTO-MCNC: 138 MG/DL (ref 65–100)
GLUCOSE BLD STRIP.AUTO-MCNC: 149 MG/DL (ref 65–100)
GLUCOSE BLD-MCNC: 124 MG/DL (ref 65–105)
HCT VFR BLD CALC: 32 % (ref 35–47)
HGB BLD-MCNC: 10.9 GM/DL (ref 11.5–16)
P-R INTERVAL, ECG05: 154 MS
POTASSIUM BLD-SCNC: 5.8 MMOL/L (ref 3.5–5.1)
Q-T INTERVAL, ECG07: 442 MS
QRS DURATION, ECG06: 92 MS
QTC CALCULATION (BEZET), ECG08: 483 MS
SERVICE CMNT-IMP: ABNORMAL
SODIUM BLD-SCNC: 139 MMOL/L (ref 136–145)
VENTRICULAR RATE, ECG03: 72 BPM

## 2017-02-28 PROCEDURE — 77030035051: Performed by: SURGERY

## 2017-02-28 PROCEDURE — 77030002967 HC SUT PDS J&J -B: Performed by: SURGERY

## 2017-02-28 PROCEDURE — 77030002895 HC DEV VASC CLOSR COVD -B: Performed by: SURGERY

## 2017-02-28 PROCEDURE — 77030012406 HC DRN WND PENRS BARD -A: Performed by: SURGERY

## 2017-02-28 PROCEDURE — BF121ZZ FLUOROSCOPY OF GALLBLADDER USING LOW OSMOLAR CONTRAST: ICD-10-PCS | Performed by: SURGERY

## 2017-02-28 PROCEDURE — 74011250636 HC RX REV CODE- 250/636: Performed by: FAMILY MEDICINE

## 2017-02-28 PROCEDURE — 77030012022 HC APPL CLP ENDOSC COVD -C: Performed by: SURGERY

## 2017-02-28 PROCEDURE — 77030013079 HC BLNKT BAIR HGGR 3M -A: Performed by: ANESTHESIOLOGY

## 2017-02-28 PROCEDURE — 77030008756 HC TU IRR SUC STRY -B: Performed by: SURGERY

## 2017-02-28 PROCEDURE — 77030010507 HC ADH SKN DERMBND J&J -B: Performed by: SURGERY

## 2017-02-28 PROCEDURE — 80047 BASIC METABLC PNL IONIZED CA: CPT

## 2017-02-28 PROCEDURE — 88304 TISSUE EXAM BY PATHOLOGIST: CPT | Performed by: FAMILY MEDICINE

## 2017-02-28 PROCEDURE — 74011000250 HC RX REV CODE- 250: Performed by: SURGERY

## 2017-02-28 PROCEDURE — 77030013567 HC DRN WND RESERV BARD -A: Performed by: SURGERY

## 2017-02-28 PROCEDURE — 77030008771 HC TU NG SALEM SUMP -A: Performed by: ANESTHESIOLOGY

## 2017-02-28 PROCEDURE — 77030009957 HC RELD ENDOSTCH COVD -C: Performed by: SURGERY

## 2017-02-28 PROCEDURE — 77030031139 HC SUT VCRL2 J&J -A: Performed by: SURGERY

## 2017-02-28 PROCEDURE — 74011000250 HC RX REV CODE- 250

## 2017-02-28 PROCEDURE — 77030020263 HC SOL INJ SOD CL0.9% LFCR 1000ML: Performed by: SURGERY

## 2017-02-28 PROCEDURE — 74011636320 HC RX REV CODE- 636/320: Performed by: SURGERY

## 2017-02-28 PROCEDURE — 74011000250 HC RX REV CODE- 250: Performed by: FAMILY MEDICINE

## 2017-02-28 PROCEDURE — 77030018875 HC APPL CLP LIG4 J&J -B: Performed by: SURGERY

## 2017-02-28 PROCEDURE — 77030018876 HC APPL CLP LIG4 COVD -B: Performed by: SURGERY

## 2017-02-28 PROCEDURE — 76060000036 HC ANESTHESIA 2.5 TO 3 HR: Performed by: SURGERY

## 2017-02-28 PROCEDURE — 77030035045 HC TRCR ENDOSC VRSPRT BLDLSS COVD -B: Performed by: SURGERY

## 2017-02-28 PROCEDURE — 77030008684 HC TU ET CUF COVD -B: Performed by: ANESTHESIOLOGY

## 2017-02-28 PROCEDURE — 77030002916 HC SUT ETHLN J&J -A: Performed by: SURGERY

## 2017-02-28 PROCEDURE — 74300 X-RAY BILE DUCTS/PANCREAS: CPT

## 2017-02-28 PROCEDURE — 77030020256 HC SOL INJ NACL 0.9%  500ML: Performed by: SURGERY

## 2017-02-28 PROCEDURE — 0FT44ZZ RESECTION OF GALLBLADDER, PERCUTANEOUS ENDOSCOPIC APPROACH: ICD-10-PCS | Performed by: SURGERY

## 2017-02-28 PROCEDURE — 74011250637 HC RX REV CODE- 250/637: Performed by: FAMILY MEDICINE

## 2017-02-28 PROCEDURE — 74011250636 HC RX REV CODE- 250/636

## 2017-02-28 PROCEDURE — 77030008023 HC RELD SUT ENDOSC COVD -B: Performed by: SURGERY

## 2017-02-28 PROCEDURE — 74011000258 HC RX REV CODE- 258: Performed by: FAMILY MEDICINE

## 2017-02-28 PROCEDURE — 65270000029 HC RM PRIVATE

## 2017-02-28 PROCEDURE — 82962 GLUCOSE BLOOD TEST: CPT

## 2017-02-28 PROCEDURE — 77030026438 HC STYL ET INTUB CARD -A: Performed by: ANESTHESIOLOGY

## 2017-02-28 PROCEDURE — 77030020053 HC ELECTRD LAPSCP COVD -B: Performed by: SURGERY

## 2017-02-28 PROCEDURE — 77030009852 HC PCH RTVR ENDOSC COVD -B: Performed by: SURGERY

## 2017-02-28 PROCEDURE — 77030035048 HC TRCR ENDOSC OPTCL COVD -B: Performed by: SURGERY

## 2017-02-28 PROCEDURE — 77030011640 HC PAD GRND REM COVD -A: Performed by: SURGERY

## 2017-02-28 PROCEDURE — C9113 INJ PANTOPRAZOLE SODIUM, VIA: HCPCS | Performed by: FAMILY MEDICINE

## 2017-02-28 PROCEDURE — 77030032490 HC SLV COMPR SCD KNE COVD -B: Performed by: SURGERY

## 2017-02-28 PROCEDURE — 76010000132 HC OR TIME 2.5 TO 3 HR: Performed by: SURGERY

## 2017-02-28 PROCEDURE — 74011250636 HC RX REV CODE- 250/636: Performed by: ANESTHESIOLOGY

## 2017-02-28 PROCEDURE — 77030010837 HC CATH CHOL INTRO TELE -B: Performed by: SURGERY

## 2017-02-28 PROCEDURE — 76210000006 HC OR PH I REC 0.5 TO 1 HR: Performed by: SURGERY

## 2017-02-28 PROCEDURE — C1758 CATHETER, URETERAL: HCPCS | Performed by: SURGERY

## 2017-02-28 RX ORDER — ONDANSETRON 2 MG/ML
4 INJECTION INTRAMUSCULAR; INTRAVENOUS
Status: DISCONTINUED | OUTPATIENT
Start: 2017-02-28 | End: 2017-03-05 | Stop reason: HOSPADM

## 2017-02-28 RX ORDER — HYDROCHLOROTHIAZIDE 25 MG/1
25 TABLET ORAL DAILY
COMMUNITY
End: 2017-06-13

## 2017-02-28 RX ORDER — ONDANSETRON 2 MG/ML
INJECTION INTRAMUSCULAR; INTRAVENOUS AS NEEDED
Status: DISCONTINUED | OUTPATIENT
Start: 2017-02-28 | End: 2017-02-28 | Stop reason: HOSPADM

## 2017-02-28 RX ORDER — GLYCOPYRROLATE 0.2 MG/ML
INJECTION INTRAMUSCULAR; INTRAVENOUS AS NEEDED
Status: DISCONTINUED | OUTPATIENT
Start: 2017-02-28 | End: 2017-02-28 | Stop reason: HOSPADM

## 2017-02-28 RX ORDER — PHENYLEPHRINE HCL IN 0.9% NACL 0.4MG/10ML
SYRINGE (ML) INTRAVENOUS AS NEEDED
Status: DISCONTINUED | OUTPATIENT
Start: 2017-02-28 | End: 2017-02-28 | Stop reason: HOSPADM

## 2017-02-28 RX ORDER — FENTANYL CITRATE 50 UG/ML
INJECTION, SOLUTION INTRAMUSCULAR; INTRAVENOUS AS NEEDED
Status: DISCONTINUED | OUTPATIENT
Start: 2017-02-28 | End: 2017-02-28 | Stop reason: HOSPADM

## 2017-02-28 RX ORDER — LIDOCAINE HYDROCHLORIDE 20 MG/ML
INJECTION, SOLUTION EPIDURAL; INFILTRATION; INTRACAUDAL; PERINEURAL AS NEEDED
Status: DISCONTINUED | OUTPATIENT
Start: 2017-02-28 | End: 2017-02-28 | Stop reason: HOSPADM

## 2017-02-28 RX ORDER — HYDROMORPHONE HYDROCHLORIDE 1 MG/ML
.2-.5 INJECTION, SOLUTION INTRAMUSCULAR; INTRAVENOUS; SUBCUTANEOUS
Status: DISCONTINUED | OUTPATIENT
Start: 2017-02-28 | End: 2017-02-28 | Stop reason: HOSPADM

## 2017-02-28 RX ORDER — LISINOPRIL 20 MG/1
40 TABLET ORAL DAILY
Status: DISCONTINUED | OUTPATIENT
Start: 2017-03-01 | End: 2017-03-05 | Stop reason: HOSPADM

## 2017-02-28 RX ORDER — SODIUM CHLORIDE, SODIUM LACTATE, POTASSIUM CHLORIDE, CALCIUM CHLORIDE 600; 310; 30; 20 MG/100ML; MG/100ML; MG/100ML; MG/100ML
25 INJECTION, SOLUTION INTRAVENOUS CONTINUOUS
Status: DISCONTINUED | OUTPATIENT
Start: 2017-03-02 | End: 2017-02-28 | Stop reason: HOSPADM

## 2017-02-28 RX ORDER — ROCURONIUM BROMIDE 10 MG/ML
INJECTION, SOLUTION INTRAVENOUS AS NEEDED
Status: DISCONTINUED | OUTPATIENT
Start: 2017-02-28 | End: 2017-02-28 | Stop reason: HOSPADM

## 2017-02-28 RX ORDER — SUCCINYLCHOLINE CHLORIDE 20 MG/ML
INJECTION INTRAMUSCULAR; INTRAVENOUS AS NEEDED
Status: DISCONTINUED | OUTPATIENT
Start: 2017-02-28 | End: 2017-02-28 | Stop reason: HOSPADM

## 2017-02-28 RX ORDER — AMIODARONE HYDROCHLORIDE 200 MG/1
200 TABLET ORAL DAILY
Status: DISCONTINUED | OUTPATIENT
Start: 2017-02-28 | End: 2017-03-05 | Stop reason: HOSPADM

## 2017-02-28 RX ORDER — DEXTROSE, SODIUM CHLORIDE, AND POTASSIUM CHLORIDE 5; .45; .075 G/100ML; G/100ML; G/100ML
25 INJECTION INTRAVENOUS CONTINUOUS
Status: DISCONTINUED | OUTPATIENT
Start: 2017-02-28 | End: 2017-03-05 | Stop reason: HOSPADM

## 2017-02-28 RX ORDER — BUPIVACAINE HYDROCHLORIDE AND EPINEPHRINE 2.5; 5 MG/ML; UG/ML
INJECTION, SOLUTION EPIDURAL; INFILTRATION; INTRACAUDAL; PERINEURAL AS NEEDED
Status: DISCONTINUED | OUTPATIENT
Start: 2017-02-28 | End: 2017-02-28 | Stop reason: HOSPADM

## 2017-02-28 RX ORDER — SODIUM CHLORIDE 0.9 % (FLUSH) 0.9 %
5-10 SYRINGE (ML) INJECTION AS NEEDED
Status: DISCONTINUED | OUTPATIENT
Start: 2017-02-28 | End: 2017-02-28

## 2017-02-28 RX ORDER — GABAPENTIN 300 MG/1
300 CAPSULE ORAL 3 TIMES DAILY
COMMUNITY
End: 2018-09-27

## 2017-02-28 RX ORDER — FENTANYL CITRATE 50 UG/ML
50 INJECTION, SOLUTION INTRAMUSCULAR; INTRAVENOUS AS NEEDED
Status: DISCONTINUED | OUTPATIENT
Start: 2017-02-28 | End: 2017-02-28 | Stop reason: HOSPADM

## 2017-02-28 RX ORDER — GABAPENTIN 300 MG/1
300 CAPSULE ORAL 3 TIMES DAILY
Status: DISCONTINUED | OUTPATIENT
Start: 2017-02-28 | End: 2017-03-05 | Stop reason: HOSPADM

## 2017-02-28 RX ORDER — DIPHENHYDRAMINE HYDROCHLORIDE 50 MG/ML
12.5 INJECTION, SOLUTION INTRAMUSCULAR; INTRAVENOUS AS NEEDED
Status: DISCONTINUED | OUTPATIENT
Start: 2017-02-28 | End: 2017-02-28 | Stop reason: HOSPADM

## 2017-02-28 RX ORDER — SODIUM CHLORIDE 0.9 % (FLUSH) 0.9 %
5-10 SYRINGE (ML) INJECTION EVERY 8 HOURS
Status: DISCONTINUED | OUTPATIENT
Start: 2017-02-28 | End: 2017-02-28

## 2017-02-28 RX ORDER — MORPHINE SULFATE 10 MG/ML
2 INJECTION, SOLUTION INTRAMUSCULAR; INTRAVENOUS
Status: DISCONTINUED | OUTPATIENT
Start: 2017-02-28 | End: 2017-02-28 | Stop reason: HOSPADM

## 2017-02-28 RX ORDER — LISINOPRIL 40 MG/1
40 TABLET ORAL DAILY
COMMUNITY
End: 2017-06-13

## 2017-02-28 RX ORDER — PREDNISONE 10 MG/1
10 TABLET ORAL DAILY
COMMUNITY
End: 2017-07-11 | Stop reason: ALTCHOICE

## 2017-02-28 RX ORDER — HYDROMORPHONE HYDROCHLORIDE 1 MG/ML
0.5 INJECTION, SOLUTION INTRAMUSCULAR; INTRAVENOUS; SUBCUTANEOUS
Status: DISCONTINUED | OUTPATIENT
Start: 2017-02-28 | End: 2017-03-04

## 2017-02-28 RX ORDER — LIDOCAINE HYDROCHLORIDE 10 MG/ML
0.1 INJECTION, SOLUTION EPIDURAL; INFILTRATION; INTRACAUDAL; PERINEURAL AS NEEDED
Status: DISCONTINUED | OUTPATIENT
Start: 2017-02-28 | End: 2017-02-28 | Stop reason: HOSPADM

## 2017-02-28 RX ORDER — SODIUM CHLORIDE 0.9 % (FLUSH) 0.9 %
5-10 SYRINGE (ML) INJECTION AS NEEDED
Status: DISCONTINUED | OUTPATIENT
Start: 2017-02-28 | End: 2017-02-28 | Stop reason: HOSPADM

## 2017-02-28 RX ORDER — METOPROLOL SUCCINATE 25 MG/1
12.5 TABLET, EXTENDED RELEASE ORAL DAILY
Status: DISCONTINUED | OUTPATIENT
Start: 2017-02-28 | End: 2017-03-05 | Stop reason: HOSPADM

## 2017-02-28 RX ORDER — HYDROMORPHONE HYDROCHLORIDE 2 MG/1
2-4 TABLET ORAL
Status: DISCONTINUED | OUTPATIENT
Start: 2017-02-28 | End: 2017-03-05 | Stop reason: HOSPADM

## 2017-02-28 RX ORDER — MIDAZOLAM HYDROCHLORIDE 1 MG/ML
INJECTION, SOLUTION INTRAMUSCULAR; INTRAVENOUS AS NEEDED
Status: DISCONTINUED | OUTPATIENT
Start: 2017-02-28 | End: 2017-02-28 | Stop reason: HOSPADM

## 2017-02-28 RX ORDER — PROPOFOL 10 MG/ML
INJECTION, EMULSION INTRAVENOUS AS NEEDED
Status: DISCONTINUED | OUTPATIENT
Start: 2017-02-28 | End: 2017-02-28 | Stop reason: HOSPADM

## 2017-02-28 RX ORDER — HYDROCODONE BITARTRATE AND ACETAMINOPHEN 5; 325 MG/1; MG/1
1 TABLET ORAL
COMMUNITY
End: 2017-03-05

## 2017-02-28 RX ORDER — FENTANYL CITRATE 50 UG/ML
25 INJECTION, SOLUTION INTRAMUSCULAR; INTRAVENOUS
Status: DISCONTINUED | OUTPATIENT
Start: 2017-02-28 | End: 2017-02-28 | Stop reason: HOSPADM

## 2017-02-28 RX ORDER — MIDAZOLAM HYDROCHLORIDE 1 MG/ML
0.5 INJECTION, SOLUTION INTRAMUSCULAR; INTRAVENOUS
Status: DISCONTINUED | OUTPATIENT
Start: 2017-02-28 | End: 2017-02-28 | Stop reason: HOSPADM

## 2017-02-28 RX ORDER — SODIUM CHLORIDE, SODIUM LACTATE, POTASSIUM CHLORIDE, CALCIUM CHLORIDE 600; 310; 30; 20 MG/100ML; MG/100ML; MG/100ML; MG/100ML
25 INJECTION, SOLUTION INTRAVENOUS CONTINUOUS
Status: DISCONTINUED | OUTPATIENT
Start: 2017-02-28 | End: 2017-02-28 | Stop reason: HOSPADM

## 2017-02-28 RX ORDER — NEOSTIGMINE METHYLSULFATE 1 MG/ML
INJECTION INTRAVENOUS AS NEEDED
Status: DISCONTINUED | OUTPATIENT
Start: 2017-02-28 | End: 2017-02-28 | Stop reason: HOSPADM

## 2017-02-28 RX ORDER — HYDROCHLOROTHIAZIDE 25 MG/1
25 TABLET ORAL DAILY
Status: DISCONTINUED | OUTPATIENT
Start: 2017-03-01 | End: 2017-03-05 | Stop reason: HOSPADM

## 2017-02-28 RX ADMIN — Medication 80 MCG: at 16:51

## 2017-02-28 RX ADMIN — Medication 40 MCG: at 18:15

## 2017-02-28 RX ADMIN — MIDAZOLAM HYDROCHLORIDE 1 MG: 1 INJECTION, SOLUTION INTRAMUSCULAR; INTRAVENOUS at 16:20

## 2017-02-28 RX ADMIN — LIDOCAINE HYDROCHLORIDE 60 MG: 20 INJECTION, SOLUTION EPIDURAL; INFILTRATION; INTRACAUDAL; PERINEURAL at 16:27

## 2017-02-28 RX ADMIN — PIPERACILLIN SODIUM,TAZOBACTAM SODIUM 3.38 G: 3; .375 INJECTION, POWDER, FOR SOLUTION INTRAVENOUS at 06:28

## 2017-02-28 RX ADMIN — Medication 80 MCG: at 18:07

## 2017-02-28 RX ADMIN — SODIUM CHLORIDE 40 MG: 9 INJECTION INTRAMUSCULAR; INTRAVENOUS; SUBCUTANEOUS at 09:27

## 2017-02-28 RX ADMIN — FENTANYL CITRATE 75 MCG: 50 INJECTION, SOLUTION INTRAMUSCULAR; INTRAVENOUS at 16:27

## 2017-02-28 RX ADMIN — SUCCINYLCHOLINE CHLORIDE 125 MG: 20 INJECTION INTRAMUSCULAR; INTRAVENOUS at 16:27

## 2017-02-28 RX ADMIN — HYDROMORPHONE HYDROCHLORIDE 0.5 MG: 1 INJECTION, SOLUTION INTRAMUSCULAR; INTRAVENOUS; SUBCUTANEOUS at 15:30

## 2017-02-28 RX ADMIN — FENTANYL CITRATE 25 MCG: 50 INJECTION, SOLUTION INTRAMUSCULAR; INTRAVENOUS at 17:50

## 2017-02-28 RX ADMIN — Medication 120 MCG: at 17:19

## 2017-02-28 RX ADMIN — HYDROMORPHONE HYDROCHLORIDE 0.5 MG: 1 INJECTION, SOLUTION INTRAMUSCULAR; INTRAVENOUS; SUBCUTANEOUS at 02:02

## 2017-02-28 RX ADMIN — ONDANSETRON HYDROCHLORIDE 4 MG: 2 INJECTION, SOLUTION INTRAMUSCULAR; INTRAVENOUS at 02:02

## 2017-02-28 RX ADMIN — ONDANSETRON 4 MG: 2 INJECTION INTRAMUSCULAR; INTRAVENOUS at 16:49

## 2017-02-28 RX ADMIN — ONDANSETRON HYDROCHLORIDE 4 MG: 2 INJECTION, SOLUTION INTRAMUSCULAR; INTRAVENOUS at 06:34

## 2017-02-28 RX ADMIN — Medication 10 ML: at 09:31

## 2017-02-28 RX ADMIN — PROPOFOL 120 MG: 10 INJECTION, EMULSION INTRAVENOUS at 16:27

## 2017-02-28 RX ADMIN — SODIUM CHLORIDE, SODIUM LACTATE, POTASSIUM CHLORIDE, AND CALCIUM CHLORIDE 25 ML/HR: 600; 310; 30; 20 INJECTION, SOLUTION INTRAVENOUS at 16:15

## 2017-02-28 RX ADMIN — Medication 80 MCG: at 16:53

## 2017-02-28 RX ADMIN — ROCURONIUM BROMIDE 20 MG: 10 INJECTION, SOLUTION INTRAVENOUS at 16:48

## 2017-02-28 RX ADMIN — ROCURONIUM BROMIDE 10 MG: 10 INJECTION, SOLUTION INTRAVENOUS at 16:27

## 2017-02-28 RX ADMIN — BUPIVACAINE HYDROCHLORIDE AND EPINEPHRINE 11 ML: 2.5; 5 INJECTION, SOLUTION EPIDURAL; INFILTRATION; INTRACAUDAL; PERINEURAL at 16:47

## 2017-02-28 RX ADMIN — FENTANYL CITRATE 25 MCG: 50 INJECTION, SOLUTION INTRAMUSCULAR; INTRAVENOUS at 17:54

## 2017-02-28 RX ADMIN — Medication 80 MCG: at 17:23

## 2017-02-28 RX ADMIN — IOTHALAMATE MEGLUMINE 26 ML: 600 INJECTION INTRAVASCULAR at 18:05

## 2017-02-28 RX ADMIN — PIPERACILLIN SODIUM,TAZOBACTAM SODIUM 3.38 G: 3; .375 INJECTION, POWDER, FOR SOLUTION INTRAVENOUS at 14:09

## 2017-02-28 RX ADMIN — HYDROMORPHONE HYDROCHLORIDE 0.5 MG: 1 INJECTION, SOLUTION INTRAMUSCULAR; INTRAVENOUS; SUBCUTANEOUS at 08:02

## 2017-02-28 RX ADMIN — METOPROLOL SUCCINATE 12.5 MG: 25 TABLET, EXTENDED RELEASE ORAL at 09:28

## 2017-02-28 RX ADMIN — Medication 40 MCG: at 16:48

## 2017-02-28 RX ADMIN — DEXTROSE MONOHYDRATE, SODIUM CHLORIDE, AND POTASSIUM CHLORIDE 100 ML/HR: 50; 4.5; .745 INJECTION, SOLUTION INTRAVENOUS at 02:15

## 2017-02-28 RX ADMIN — NEOSTIGMINE METHYLSULFATE 3 MG: 1 INJECTION INTRAVENOUS at 18:50

## 2017-02-28 RX ADMIN — Medication 10 ML: at 02:18

## 2017-02-28 RX ADMIN — DEXTROSE MONOHYDRATE, SODIUM CHLORIDE, AND POTASSIUM CHLORIDE 100 ML/HR: 50; 4.5; .745 INJECTION, SOLUTION INTRAVENOUS at 09:39

## 2017-02-28 RX ADMIN — Medication 10 ML: at 06:28

## 2017-02-28 RX ADMIN — GLYCOPYRROLATE 0.6 MG: 0.2 INJECTION INTRAMUSCULAR; INTRAVENOUS at 18:50

## 2017-02-28 RX ADMIN — Medication 40 MCG: at 18:03

## 2017-02-28 RX ADMIN — ROCURONIUM BROMIDE 5 MG: 10 INJECTION, SOLUTION INTRAVENOUS at 17:54

## 2017-02-28 RX ADMIN — Medication 80 MCG: at 17:57

## 2017-02-28 RX ADMIN — FENTANYL CITRATE 25 MCG: 50 INJECTION, SOLUTION INTRAMUSCULAR; INTRAVENOUS at 17:02

## 2017-02-28 NOTE — PROGRESS NOTES
Pt is a 80 y.o  [place] female admitted with Acute Cholecystitis. Pt was recently discharged from [facility] on 2/1/17 with an admitting dx of Diverticulitis. Pt was discharged from [facility] to [facility] on 2/1/17. Pt stated she was discharged last week from [facility] and was receiving home health nursing and therapy from [address] [place] [address]. Demographic information verified and all is correct. Pt lives with her son in a 1 story home with 2 steps to the entrance. Pt is independent with her ADL's and IADL's. Pt uses a cane and walker at home. Preferred pharmacy is [facility] [address] [employer] [facility]. Pt's son or daughter can transport her home at discharge. CM will continue to follow for discharge planning needs. Care Management Interventions  PCP Verified by CM: Yes (Dr. Flavia Ulrich )  Mode of Transport at Discharge: Other (see comment)  Transition of Care Consult (CM Consult): Discharge Planning  Discharge Durable Medical Equipment: No (pt has a cane and walker at home)  Physical Therapy Consult: No  Occupational Therapy Consult: No  Speech Therapy Consult: No  Current Support Network:  Other, Own Home (lives with her son in a 1 story home with 2 steps to the entrance)  Confirm Follow Up Transport: Family  Dis[facility]  [facility] [address] [place] [address] [place]

## 2017-02-28 NOTE — CARDIO/PULMONARY
C/P REHAB:  Chart reviewed. Admitted with acute cholecystitis. History significant for CAD, chronic systolic CHF, ICD, PAF, S/P resection of renal cell carcinoma of right kidney, rheumatoid arthritis, DM, GERD. LVEF 25% on echo 1/20/17. Nonsmoker. Pt had been seen by Cardiac Rehab on admission in January of 2017. Met with patient who states she is awaiting surgery. Gave and briefly reviewed CHF handouts including CHD \"zones,\" avoiding triggers and low sodium diet. Reviewed daily weights, when to call MD for fluid weight gain and low sodium diet. Pt does not have scales at home to weigh but states she thought she could get some. She was able to name her diuretic with a little coaching and she states she is med compliant. Reviewed the importance of low sodium diet. Cardiac teaching kept brief. Pt indicated understanding but would benefit from reinforcement.

## 2017-02-28 NOTE — PROGRESS NOTES
Pharmacy Medication Reconciliation : Note: Medical Noncompliance    The patient was interviewed regarding current PTA medication list, use and drug allergies. The patient was questioned regarding use of any other inhalers, topical products, over the counter medications, herbal medications, vitamin products or ophthalmic/nasal/otic medication use. Recommendations/Findings: The following amendments were made to the patient's active medication list on file at [facility]:     1) Additions:   - Lisinopril 40 mg by mouth once daily  - Hydrochlorothiazide 25 mg by mouth once daily   - Prednisone 10 mg by mouth once daily   - Hydrocodone/acetaminophen 5/325 mg by mouth every 6 hours as needed (patient states she typically takes two tablets daily)     2) Deletions:  - Aspirin 81 mg by mouth once daily (states she was told to not take this medication anymore)  - Metoprolol succinate 25 mg by mouth daily (patient states she was instructed to stop lisinopril and start metoprolol but that she only took metoprolol a few times then switched back to lisinopril. - Furosemide (patient states she did not receive a prescription for this medication, but that she was suppose to be taking it)    3) Patient did not fill amiodarone, midodrine, and omeprazole until 2/26/2017. She only took one dose of each of those medications before this hospital admission.     -Clarified PTA med list with patient and Rx query. PTA medication list was corrected to the following:     Prior to Admission Medications   Prescriptions Last Dose Informant Patient Reported? Taking? HYDROcodone-acetaminophen (NORCO) 5-325 mg per tablet   Yes Yes   Sig: Take 1 Tab by mouth every six (6) hours as needed for Pain. amiodarone (CORDARONE) 200 mg tablet 2/26/2017  No Yes   Sig: Take 1 Tab by mouth daily for 60 days. gabapentin (NEURONTIN) 300 mg capsule   Yes Yes   Sig: Take 300 mg by mouth three (3) times daily.    hydroCHLOROthiazide (HYDRODIURIL) 25 mg tablet Yes Yes   Sig: Take 25 mg by mouth daily. lisinopril (PRINIVIL, ZESTRIL) 40 mg tablet   Yes Yes   Sig: Take 40 mg by mouth daily. midodrine (PROAMITINE) 10 mg tablet 2/26/2017  No Yes   Sig: Take 1 Tab by mouth three (3) times daily (with meals) for 60 days. omeprazole (PRILOSEC) 20 mg capsule 2/26/2017  Yes Yes   Sig: Take 20 mg by mouth daily. Indications: GASTROESOPHAGEAL REFLUX   predniSONE (DELTASONE) 10 mg tablet   Yes Yes   Sig: Take 10 mg by mouth daily.       Facility-Administered Medications: None     Thank you,  Real Ritter  PharmD Candidate [id number]

## 2017-02-28 NOTE — PROGRESS NOTES
End of Shift Nursing Note    Bedside shift change report given to Yumiko Goff RN (oncoming nurse) by Giovanni Blanco RN (offgoing nurse). Report included the following information SBAR, Kardex, ED Summary, Intake/Output, MAR, Recent Results and Alarm Parameters . Zone Phone:  [phone]    Significant changes during shift:    Medicated for pain & nausea with positive results NPO    Non-emergent issues for physician to address:   none     Number times ambulated in hallway past shift: 0      Number of times OOB to chair past shift: 0    POD #: n/a     Vital Signs:    Temp: 97.9 °F (36.6 °C)     Pulse (Heart Rate): 75     BP: 153/83     Resp Rate: 16     O2 Sat (%): 100 %    Lines & Drains:       NG tube [] in [] removed [x] not applicable   Drains [] in [] removed [x] not applicable     Skin Integrity:      Wounds: no  Dressings Present: no    Wound Concerns: no      GI:    Current diet:  DIET NPO Except Meds    Nausea: NO  Vomiting:prior to admission  Bowel Sounds: YES  Flatus: YES  Last Bowel Movement: yesterday   Appearance:     Respiratory:  Supplemental O2: No      Incentive Spirometer: NO  Volume:   Coughing and Deep Breathing: YES  Oral Care: YES  Understanding (patient/family education): YES   Getting out of bed: NO  Head of bed elevation: YES    Patient Safety:    Falls Score: 2  Mobility Score: 1  Bed Alarm On? No  Sitter? No      Opportunity for questions and clarification was given to oncoming nurse. Patient bed is in low position, side rails are up x 3, door & observation blinds open as needed, call bell within reach and patient not in distress.     Lina Allen RN

## 2017-02-28 NOTE — ANESTHESIA PREPROCEDURE EVALUATION
Anesthetic History   No history of anesthetic complications            Review of Systems / Medical History  Patient summary reviewed, nursing notes reviewed and pertinent labs reviewed    Pulmonary  Within defined limits                 Neuro/Psych             Comments: neuropathy  Cardiovascular    Hypertension      CHF  Dysrhythmias : atrial fibrillation  Pacemaker, CAD and hyperlipidemia    Exercise tolerance: <4 METS  Comments: Coronary stent x 3  Orthostatic hypotension  Hx SVT      GI/Hepatic/Renal     GERD    Renal disease: CRI      Comments: Cholecystitis  Hx Right sided Renal cell carcinoma   Hx Diverticulitis Endo/Other    Diabetes    Arthritis and anemia     Other Findings   Comments: rheumatoid arthritis           Physical Exam    Airway  Mallampati: IV    Neck ROM: normal range of motion   Mouth opening: Diminished (comment)    Comments: Small mouth opening Cardiovascular  Regular rate and rhythm,  S1 and S2 normal,  no murmur, click, rub, or gallop             Dental         Pulmonary  Breath sounds clear to auscultation               Abdominal  GI exam deferred       Other Findings            Anesthetic Plan    ASA: 3  Anesthesia type: general          Induction: Intravenous  Anesthetic plan and risks discussed with: Patient

## 2017-02-28 NOTE — ED NOTES
Upon wheeling patient out to  area, patient proceeded to vomit. Rylie Smith MD and Jaylin Crowe notified.

## 2017-02-28 NOTE — ED NOTES
Spoke to son regarding patient plan of care. Son's number is [phone] and would like to be notified when patient has gone for sx in morning.

## 2017-02-28 NOTE — PROGRESS NOTES
SURGERY PROGRESS NOTE      Admit Date: 2017    Subjective:     Complaining of RUQ pain. Objective:     Visit Vitals    /84 (BP 1 Location: Left arm, BP Patient Position: At rest)    Pulse 70    Temp 98.1 °F (36.7 °C)    Resp 18    Ht 5' 7\" (1.702 m)    Wt 61.7 kg (136 lb)    SpO2 100%    BMI 21.3 kg/m2        Temp (24hrs), Av.8 °F (36.6 °C), Min:97.4 °F (36.3 °C), Max:98.1 °F (36.7 °C)      Physical Exam:     Abdomen:  Soft. Non-distended. RUQ tenderness. No rebound or guarding. Lab Results  Component Value Date/Time   WBC 17.4 2017 04:33 PM   Hemoglobin (POC) 9.2 2016 07:02 AM   HGB 9.8 2017 04:33 PM   Hematocrit (POC) 27 2016 07:02 AM   HCT 31.1 2017 04:33 PM   PLATELET 870  04:33 PM   MCV 74.9 2017 04:33 PM       Lab Results  Component Value Date/Time   ALT (SGPT) 10 2017 04:33 PM   AST (SGOT) 25 2017 04:33 PM   Alk. phosphatase 83 2017 04:33 PM   Bilirubin, total 0.5 2017 04:33 PM       Lab Results  Component Value Date/Time   GFR est AA 40 2017 04:33 PM   GFR est non-AA 33 2017 04:33 PM   Creatinine (POC) 1.0 2016 07:02 AM   Creatinine 1.55 2017 04:33 PM   BUN 12 2017 04:33 PM   BUN (POC) 12 2016 07:02 AM   Sodium (POC) 137 2016 07:02 AM   Sodium 144 2017 04:33 PM   Potassium 4.5 2017 04:33 PM   Potassium (POC) 3.9 2016 07:02 AM   Chloride (POC) 98 2016 07:02 AM   Chloride 108 2017 04:33 PM   CO2 21 2017 04:33 PM        Assessment:     Active Problems:    Acute cholecystitis (2017)        Plan/Recommendations/Medical Decision Making:     Continue antibiotics. Plan for lap tana/IOC. Risks, benefit, and alternative to surgery was discussed with the patient.   The risks of surgery include but not limited to infection, bleeding, intraabdominal organ injury, bile duct injury, retained stone, possible conversion to open, and the risks of general anesthetic. Patient is agreeable to surgery. All questions answered.

## 2017-02-28 NOTE — PROGRESS NOTES
Problem: Nausea/Vomiting (Adult)  Goal: *Absence of nausea/vomiting  Outcome: Not Progressing Towards Goal  Patient nausea, with vomiting x2 today. Given green emesis bag, medicating with IV Zofran as needed.

## 2017-02-28 NOTE — PROGRESS NOTES
Nutrition Assessment:    RECOMMENDATIONS:   Advance diet as medically able s/p tana     DIETITIANS INTERVENTIONS/PLAN:   Monitor plan of care    ASSESSMENT:   Pt admitted with acute cholecystitis. PMH: CAD, DM, GERD, HTN, nephrectomy. Chart reviewed. Pt with active n/v.  Plan is for lap cholecystectomy (unsure of when). MST triggered for wt loss, noted pt has had 15.6% wt loss over the past month which is both significant and severe. Unsure of source of current or previous wt. -149. No skin breakdown noted. Will monitor plan of care postop. Will likely need ONS vs nutrition support. SUBJECTIVE/OBJECTIVE:   Pt actively n/v   Diet Order: NPO  % Eaten:  No data found. Pertinent Medications:protonix, zosyn, KCl; IVF(D5, [email or web address]); PRN Meds: zofran. Chemistries:  Lab Results   Component Value Date/Time    Sodium 144 02/27/2017 04:33 PM    Potassium 4.5 02/27/2017 04:33 PM    Chloride 108 02/27/2017 04:33 PM    CO2 21 02/27/2017 04:33 PM    Anion gap 15 02/27/2017 04:33 PM    Glucose 139 02/27/2017 04:33 PM    BUN 12 02/27/2017 04:33 PM    Creatinine 1.55 02/27/2017 04:33 PM    BUN/Creatinine ratio 8 02/27/2017 04:33 PM    GFR est AA 40 02/27/2017 04:33 PM    GFR est non-AA 33 02/27/2017 04:33 PM    Calcium 8.2 02/27/2017 04:33 PM    AST (SGOT) 25 02/27/2017 04:33 PM    Alk. phosphatase 83 02/27/2017 04:33 PM    Protein, total 6.8 02/27/2017 04:33 PM    Albumin 2.9 02/27/2017 04:33 PM    Globulin 3.9 02/27/2017 04:33 PM    A-G Ratio 0.7 02/27/2017 04:33 PM    ALT (SGPT) 10 02/27/2017 04:33 PM      Anthropometrics: Height: 5' 7\" (170.2 cm) Weight: 61.7 kg (136 lb 0.4 oz)    IBW (%IBW):   ( ) UBW (%UBW): 73.1 kg (161 lb 2.5 oz) (1 month ago ) (84.4 %)    BMI: Body mass index is 21.3 kg/(m^2).     This BMI is indicative of:  []Underweight   [x]Normal   []Overweight   [] Obesity   [] Extreme Obesity (BMI>40)  Estimated Nutrition Needs (Based on): 1521 Kcals/day (MSJ 1170 x 1.3) , 67 g (1gPro/kg) Protein  Carbohydrate: At Least 130 g/day  Fluids: 1600 mL/day    Last BM: 2/28   [x]Active     []Hyperactive  []Hypoactive       [] Absent   BS  Skin:    [x] Intact   [] Incision  [] Breakdown   [] DTI   [] Tears/Excoriation/Abrasion  []Edema [] Other: Wt Readings from Last 30 Encounters:   02/28/17 61.7 kg (136 lb 0.4 oz)   02/24/17 62.1 kg (136 lb 14.5 oz)   02/13/17 73 kg (161 lb)   01/26/17 73.1 kg (161 lb 2.5 oz)   01/17/17 66.9 kg (147 lb 8 oz)   12/22/16 68.2 kg (150 lb 5.7 oz)   12/14/16 70.1 kg (154 lb 8.7 oz)   10/21/16 83.9 kg (185 lb)   10/15/16 84.4 kg (186 lb)   10/03/16 83.8 kg (184 lb 11.9 oz)   04/11/15 89.4 kg (197 lb)   06/01/14 88.3 kg (194 lb 10.7 oz)   06/08/13 83.7 kg (184 lb 8.4 oz)   03/02/12 91.1 kg (200 lb 13.4 oz)      NUTRITION DIAGNOSES:   Problem:  Unintended weight loss      Etiology: related to current dx and n/v      Signs/Symptoms: as evidenced by 15.6% wt loss over the past month. NUTRITION INTERVENTIONS:                     GOAL:   Plan of care will be determined re: nutrition in 2-3 days.      Cultural, Caodaism, or Ethnic Dietary Needs: None     LEARNING NEEDS (Diet, Food/Nutrient-Drug Interaction):    [x] None Identified   [] Identified and Education Provided/Documented   [] Identified and Pt declined/was not appropriate      [x] Interdisciplinary Care Plan Reviewed/Documented    [x] Participated in Discharge Planning: Unable to determine    [] Interdisciplinary Rounds     NUTRITION RISK:    [x] High              [] Moderate           []  Low  []  Minimal/Uncompromised    PT SEEN FOR:    []  MD Consult: []Calorie Count      []Diabetic Diet Education        []Diet Education     []Electrolyte Management     []General Nutrition Management and Supplements     []Management of Tube Feeding     []TPN Recommendations    [x]  RN Referral:  [x]MST score >=2     []Enteral/Parenteral Nutrition PTA     []Pregnant: Gestational DM or Multigestation   []  Low BMI  []  DTR Referral Ronen Centeno RD  Pager [phone]  Weekend Pager [phone]

## 2017-02-28 NOTE — H&P
Surgery History and Physical    Subjective:      Stefania Morrell is a 80 y.o. female who presents for evaluation of abdominal discomfort and vomiting. . Patient has been seen by Dr Steve Guo in the past for Cholecystitis and diverticulitis in January. She had had a cholecystostomy tube which apparently came out recently and plans were made for the patient to have Cholecystectomy later this week. She comes in to the ER and on US is found to have:\"IMPRESSION: Gallbladder wall thickening and pericholecystic fluid with pain  compatible with acute cholecystitis. Cholelithiasis. \"    WBC is 17.4    Past Medical History:   Diagnosis Date    Arthritis     Autoimmune disease ([address]     rheumatoid     CAD (coronary artery disease)     Chronic pain     neuropathy bilateral feet,knees    [facility] [address]     GERD (gastroesophageal reflux disease)     Hypertension     Ill-defined condition     anemia    Ill-defined condition     blood transfusion hx    Other ill-defined conditions(799.89)     high cholesterol    Renal cell carcinoma of right kidney (HCC)     s/p resection 12/16     Past Surgical History:   Procedure Laterality Date    CARDIAC SURG PROCEDURE UNLIST      three stents placed 2005   [address] UNLIST      HX PACEMAKER  2017/January    ICD    HX TONSILLECTOMY      HX UROLOGICAL  12/22/2016    RIGHT LAPOROSCOPIC HAND ASSISTED RADICAL NEPHRECTOMY      Family History   Problem Relation Age of Onset    Cancer Mother      stomach    Other Father      [address] Cancer Brother      lung    Other Brother      stomach problems    Stroke Brother      Social History   Substance Use Topics    Smoking status: Never Smoker    Smokeless tobacco: Never Used    Alcohol use No      Prior to Admission medications    Medication Sig Start Date End Date Taking? Authorizing Provider   ondansetron (ZOFRAN ODT) 4 mg disintegrating tablet Take 1 Tab by mouth every eight (8) hours as needed for Nausea.  2/27/17 Yes Micky Howe PA-C   furosemide (LASIX) 20 mg tablet Take 60mg PO daily but may hold on days if she is not tolerating PO intake properly. 17  Yes Pratik Salinas MD   omeprazole (PRILOSEC) 20 mg capsule Take 20 mg by mouth daily. Indications: GASTROESOPHAGEAL REFLUX   Yes Historical Provider   gabapentin (NEURONTIN) 400 mg capsule Take 1 Cap by mouth three (3) times daily for 60 days. 1/23/17 3/24/17 Yes Kaiden Flores MD   metoprolol succinate (TOPROL-XL) 25 mg XL tablet Take 12.5 mg by mouth daily. 17  Yes Kaiden Flores MD   amiodarone (CORDARONE) 200 mg tablet Take 1 Tab by mouth daily for 60 days. 1/23/17 3/24/17 Yes Kaiden Flores MD   midodrine (PROAMITINE) 10 mg tablet Take 1 Tab by mouth three (3) times daily (with meals) for 60 days. 1/23/17 3/24/17 Yes Kaiden Flores MD   aspirin delayed-release 81 mg tablet Take 81 mg by mouth daily. Yes Historical Provider   acetaminophen (TYLENOL) 325 mg tablet Take 2 Tabs by mouth every six (6) hours as needed. 17   Pratik Salinas MD      Allergies   Allergen Reactions    Percocet [Oxycodone-Acetaminophen] Other (comments)     Confused       Review of Systems see HPI/PMH    Objective:     Patient Vitals for the past 8 hrs:   BP Pulse Resp SpO2   17 2351 (!) 109/94 70 18 97 %   17 2059 150/79 68 17 98 %   17 1825 139/79 72 - 97 %   17 1739 138/73 74 - 98 %   17 1705 113/71 78 - 100 %       Temp (24hrs), Av.7 °F (36.5 °C), Min:97.7 °F (36.5 °C), Max:97.7 °F (36.5 °C)      Physical Exam Constitutional: She is oriented to person, place, and time. She appears well-developed and well-nourished. NAD  HENT:   Head: Normocephalic and atraumatic. Eyes: Conjunctivae are normal. Pupils are equal, round, and reactive  Sclera not icteric   Neck:trach midline   Cardiovascular: Normal rate, regular rhythm,  Pulmonary/Chest: Effort normal  Abdominal: Soft. Bowel sounds are present She exhibits no distension.  RUQ tenderness There is no rebound and no guarding. Musculoskeletal: grossly wnl  Neurological: She is alert and oriented to person, place, and time. She has normal strength. No cranial nerve deficit or sensory deficit appreciated  Skin: Skin is warm, dry and intact. Psychiatric: She has a normal mood and affect.  Her speech is normal and behavior is normal. Cognition and memory are normal.     Assessment:     Acute Cholecystitis  Scheduled for Surgery on Thursday with Dr Gomez Part:   Admit  IV antibx  NPO  Pain med and antiemetics    Evaluation by Dr Linsey Littlejohn to determine timing of cholecystectomy    Signed By: Wayne Campo MD   [id number] [facility]    February 28, 2017

## 2017-02-28 NOTE — PROGRESS NOTES
Primary Nurse Zee Beckham RN and Diane Zheng RN performed a dual skin assessment on this patient No impairment noted except for blanchable redness @ sacrum.  Will encourage pt to lay on side  Evan score is 17

## 2017-02-28 NOTE — ED NOTES
TRANSFER - OUT REPORT:    Verbal report given to Tiffany Lan (name) on [id number] [facility]  being transferred to [facility] S[facility]  (unit) for routine progression of care       Report consisted of patients Situation, Background, Assessment and   Recommendations(SBAR). Information from the following report(s) SBAR, ED Summary and MAR was reviewed with the receiving nurse. Lines:   Peripheral IV 02/27/17 Right Antecubital (Active)   Site Assessment Clean, dry, & intact 2/27/2017 10:47 PM   Phlebitis Assessment 0 2/27/2017 10:47 PM   Infiltration Assessment 0 2/27/2017 10:47 PM   Dressing Status Clean, dry, & intact 2/27/2017 10:47 PM   Dressing Type Transparent 2/27/2017 10:47 PM   Hub Color/Line Status Blue;Flushed 2/27/2017 10:47 PM        Opportunity for questions and clarification was provided.       Patient transported with:   [employer]

## 2017-02-28 NOTE — PERIOP NOTES
TRANSFER - IN REPORT:    Verbal report received from Mable Da Silva RN (name) on [id number] [facility]  being received from General Surgery (unit) for ordered procedure      Report consisted of patients Situation, Background, Assessment and   Recommendations(SBAR). Information from the following report(s) SBAR, MAR and Recent Results was reviewed with the receiving nurse. Opportunity for questions and clarification was provided. Assessment completed upon patients arrival to unit and care assumed.

## 2017-02-28 NOTE — PROGRESS NOTES
Pharmacy Automatic Renal Dosing Protocol - Antimicrobials    Indication for Antimicrobials: Cholecystitis    Current Regimen of Each Antimicrobial (Start Day & Day of Therapy):  Zosyn 3.375gm IV q8h; start ; Day #1    Significant Cultures:   : US Abd: Gallbladder wall thickening and pericholecystic fluid with pain  compatible with acute cholecystitis. Cholelithiasis. CAPD, Hemodialysis or Renal Replacement Therapy: n/a   Paralysis, amputations, malnutrition: n/a    Recent Labs      17   1633   CREA  1.55*   BUN  12   WBC  17.4*     Temp (24hrs), Av.8 °F (36.6 °C), Min:97.4 °F (36.3 °C), Max:98.1 °F (36.7 °C)    Creatinine Clearance (Creatinine Clearance (ml/min)): 33     Impression/Plan: Zosyn 3.375gm IV q8h is dosed appropriately based on indication and renal function. Infuse rate changed to 25ml/hr (4hr) per protocol       Pharmacy will follow daily and adjust medications as appropriate for renal function and/or serum levels.     Thank you,  Phyllis Calabrese, [address] [facility]     Renal Dosing Tables on [employer]

## 2017-03-01 LAB
ALBUMIN SERPL BCP-MCNC: 2.3 G/DL (ref 3.5–5)
ALBUMIN/GLOB SERPL: 0.7 {RATIO} (ref 1.1–2.2)
ALP SERPL-CCNC: 81 U/L (ref 45–117)
ALT SERPL-CCNC: 8 U/L (ref 12–78)
ANION GAP BLD CALC-SCNC: 13 MMOL/L (ref 5–15)
AST SERPL W P-5'-P-CCNC: 27 U/L (ref 15–37)
BILIRUB SERPL-MCNC: 0.4 MG/DL (ref 0.2–1)
BUN SERPL-MCNC: 19 MG/DL (ref 6–20)
BUN/CREAT SERPL: 14 (ref 12–20)
CALCIUM SERPL-MCNC: 7.4 MG/DL (ref 8.5–10.1)
CHLORIDE SERPL-SCNC: 109 MMOL/L (ref 97–108)
CO2 SERPL-SCNC: 20 MMOL/L (ref 21–32)
CREAT SERPL-MCNC: 1.35 MG/DL (ref 0.55–1.02)
ERYTHROCYTE [DISTWIDTH] IN BLOOD BY AUTOMATED COUNT: 18.8 % (ref 11.5–14.5)
GLOBULIN SER CALC-MCNC: 3.5 G/DL (ref 2–4)
GLUCOSE BLD STRIP.AUTO-MCNC: 103 MG/DL (ref 65–100)
GLUCOSE BLD STRIP.AUTO-MCNC: 110 MG/DL (ref 65–100)
GLUCOSE BLD STRIP.AUTO-MCNC: 97 MG/DL (ref 65–100)
GLUCOSE SERPL-MCNC: 98 MG/DL (ref 65–100)
HCT VFR BLD AUTO: 29.1 % (ref 35–47)
HGB BLD-MCNC: 9.1 G/DL (ref 11.5–16)
MCH RBC QN AUTO: 23.4 PG (ref 26–34)
MCHC RBC AUTO-ENTMCNC: 31.3 G/DL (ref 30–36.5)
MCV RBC AUTO: 74.8 FL (ref 80–99)
PLATELET # BLD AUTO: 275 K/UL (ref 150–400)
POTASSIUM SERPL-SCNC: 4.4 MMOL/L (ref 3.5–5.1)
PROT SERPL-MCNC: 5.8 G/DL (ref 6.4–8.2)
RBC # BLD AUTO: 3.89 M/UL (ref 3.8–5.2)
SERVICE CMNT-IMP: ABNORMAL
SERVICE CMNT-IMP: ABNORMAL
SERVICE CMNT-IMP: NORMAL
SODIUM SERPL-SCNC: 142 MMOL/L (ref 136–145)
WBC # BLD AUTO: 23.5 K/UL (ref 3.6–11)

## 2017-03-01 PROCEDURE — 77010033678 HC OXYGEN DAILY

## 2017-03-01 PROCEDURE — 82962 GLUCOSE BLOOD TEST: CPT

## 2017-03-01 PROCEDURE — 74011250637 HC RX REV CODE- 250/637: Performed by: FAMILY MEDICINE

## 2017-03-01 PROCEDURE — 74011250636 HC RX REV CODE- 250/636: Performed by: FAMILY MEDICINE

## 2017-03-01 PROCEDURE — 74011000258 HC RX REV CODE- 258: Performed by: SURGERY

## 2017-03-01 PROCEDURE — 74011250636 HC RX REV CODE- 250/636: Performed by: SURGERY

## 2017-03-01 PROCEDURE — C9113 INJ PANTOPRAZOLE SODIUM, VIA: HCPCS | Performed by: FAMILY MEDICINE

## 2017-03-01 PROCEDURE — 74011000250 HC RX REV CODE- 250: Performed by: FAMILY MEDICINE

## 2017-03-01 PROCEDURE — 74011250637 HC RX REV CODE- 250/637: Performed by: SURGERY

## 2017-03-01 PROCEDURE — 80053 COMPREHEN METABOLIC PANEL: CPT | Performed by: SURGERY

## 2017-03-01 PROCEDURE — 85027 COMPLETE CBC AUTOMATED: CPT | Performed by: SURGERY

## 2017-03-01 PROCEDURE — 65270000029 HC RM PRIVATE

## 2017-03-01 PROCEDURE — 36415 COLL VENOUS BLD VENIPUNCTURE: CPT | Performed by: SURGERY

## 2017-03-01 RX ADMIN — PIPERACILLIN SODIUM,TAZOBACTAM SODIUM 3.38 G: 3; .375 INJECTION, POWDER, FOR SOLUTION INTRAVENOUS at 01:07

## 2017-03-01 RX ADMIN — PIPERACILLIN SODIUM,TAZOBACTAM SODIUM 3.38 G: 3; .375 INJECTION, POWDER, FOR SOLUTION INTRAVENOUS at 05:49

## 2017-03-01 RX ADMIN — GABAPENTIN 300 MG: 300 CAPSULE ORAL at 09:06

## 2017-03-01 RX ADMIN — AMIODARONE HYDROCHLORIDE 200 MG: 200 TABLET ORAL at 09:06

## 2017-03-01 RX ADMIN — PIPERACILLIN SODIUM,TAZOBACTAM SODIUM 3.38 G: 3; .375 INJECTION, POWDER, FOR SOLUTION INTRAVENOUS at 23:01

## 2017-03-01 RX ADMIN — DEXTROSE MONOHYDRATE, SODIUM CHLORIDE, AND POTASSIUM CHLORIDE 25 ML/HR: 50; 4.5; .745 INJECTION, SOLUTION INTRAVENOUS at 13:19

## 2017-03-01 RX ADMIN — METOPROLOL SUCCINATE 12.5 MG: 25 TABLET, EXTENDED RELEASE ORAL at 09:06

## 2017-03-01 RX ADMIN — HYDROCHLOROTHIAZIDE 25 MG: 25 TABLET ORAL at 09:06

## 2017-03-01 RX ADMIN — GABAPENTIN 300 MG: 300 CAPSULE ORAL at 01:06

## 2017-03-01 RX ADMIN — SODIUM CHLORIDE 40 MG: 9 INJECTION INTRAMUSCULAR; INTRAVENOUS; SUBCUTANEOUS at 09:05

## 2017-03-01 RX ADMIN — GABAPENTIN 300 MG: 300 CAPSULE ORAL at 23:01

## 2017-03-01 RX ADMIN — PIPERACILLIN SODIUM,TAZOBACTAM SODIUM 3.38 G: 3; .375 INJECTION, POWDER, FOR SOLUTION INTRAVENOUS at 13:23

## 2017-03-01 RX ADMIN — LISINOPRIL 40 MG: 20 TABLET ORAL at 09:06

## 2017-03-01 RX ADMIN — GABAPENTIN 300 MG: 300 CAPSULE ORAL at 18:13

## 2017-03-01 RX ADMIN — ONDANSETRON HYDROCHLORIDE 4 MG: 2 INJECTION, SOLUTION INTRAMUSCULAR; INTRAVENOUS at 10:46

## 2017-03-01 RX ADMIN — DEXTROSE MONOHYDRATE, SODIUM CHLORIDE, AND POTASSIUM CHLORIDE 125 ML/HR: 50; 4.5; .745 INJECTION, SOLUTION INTRAVENOUS at 02:25

## 2017-03-01 NOTE — PROGRESS NOTES
End of Shift Nursing Note    Bedside shift change report given to kelin sinclair (oncoming nurse) by Priti Toure (offgoing nurse). Report included the following information SBAR and Kardex. Zone Phone:   [phone]    Significant changes during shift:    Went to OR for lap choly   Non-emergent issues for physician to address:   n/a     Number times ambulated in hallway past shift: 0      Number of times OOB to chair past shift: 0    POD #:      Vital Signs:    Temp: 97.8 °F (36.6 °C)     Pulse (Heart Rate): 72     BP: 139/74     Resp Rate: 16     O2 Sat (%): 94 %    Lines & Drains:         NG tube [] in [] removed [x] not applicable   Drains [] in [] removed [x] not applicable     Skin Integrity:      Wounds: no   Dressings Present: no    Wound Concerns: no      GI:    Current diet:  DIET NPO Except Meds    Nausea: YES  Vomiting: NO  Bowel Sounds: YES  Flatus: YES  Last Bowel Movement: today   Appearance: as told in report    Respiratory:  Supplemental O2: No          Incentive Spirometer: NO  Volume: went to OR before able to assist with  Coughing and Deep Breathing: YES  Oral Care: YES  Understanding (patient/family education): YES   Getting out of bed: YES  Head of bed elevation: YES    Patient Safety:    Falls Score: 2  Mobility Score: 3  Bed Alarm On? Not applicable  Sitter? Not applicable      Opportunity for questions and clarification was given to oncoming nurse. Patient bed is in low position, side rails are up x 2, door & observation blinds open as needed, call bell within reach and patient not in distress.     Sheryl Noe RN

## 2017-03-01 NOTE — ANESTHESIA POSTPROCEDURE EVALUATION
Post-Anesthesia Evaluation and Assessment    Patient: Holger Fulton MRN: [id number]  SSN: [id number]    YOB: 1945  Age: 80 y.o. Sex: female       Cardiovascular Function/Vital Signs  Visit Vitals    /73 (BP 1 Location: Right arm, BP Patient Position: At rest)    Pulse 70    Temp 36.6 °C (97.8 °F)    Resp 11    Ht 5' 7\" (1.702 m)    Wt 61.7 kg (136 lb 0.4 oz)    SpO2 100%    BMI 21.3 kg/m2       Patient is status post general anesthesia for Procedure(s):  CHOLECYSTECTOMY LAPAROSCOPIC WITH CHOLANGIOGRAM.    Nausea/Vomiting: None    Postoperative hydration reviewed and adequate. Pain:  Pain Scale 1: Numeric (0 - 10) (02/28/17 1930)  Pain Intensity 1: 0 (02/28/17 1930)   Managed    Neurological Status:   Neuro (WDL): Exceptions to WDL (02/28/17 1909)  Neuro  Neurologic State: Appropriate for age;Drowsy; Eyes open spontaneously; Eyes open to voice (02/28/17 1909)  Orientation Level: Oriented to person;Oriented to place;Oriented to situation (02/28/17 1909)  Cognition: Follows commands (02/28/17 1909)  Speech: Clear (02/28/17 1615)  LUE Motor Response: Purposeful (02/28/17 1909)  LLE Motor Response: Purposeful (02/28/17 1909)  RUE Motor Response: Purposeful (02/28/17 1909)  RLE Motor Response: Purposeful (02/28/17 1909)   At baseline    Mental Status and Level of Consciousness: Arousable    Pulmonary Status:   O2 Device: Nasal cannula (02/28/17 1945)   Adequate oxygenation and airway patent    Complications related to anesthesia: None    Post-anesthesia assessment completed.  No concerns    Signed By: Rey Granger MD     February 28, 2017

## 2017-03-01 NOTE — PROGRESS NOTES
Problem: Falls - Risk of  Goal: *Absence of falls  Outcome: Progressing Towards Goal  Call bell within reach, siderails upx2.

## 2017-03-01 NOTE — CARDIO/PULMONARY
Cardiopulmonary Rehab Nursing Entry:    Chart reviewed. Admitted with acute cholecystitis. History significant for CAD, chronic systolic CHF, ICD, PAF, S/P resection of renal cell carcinoma of right kidney, rheumatoid arthritis, DM, GERD. LVEF 25% on echo 1/20/17. Nonsmoker. Pt had been seen by Cardiac Rehab on admission in January of 2017. Pt s/p laparoscopic cholecystectomy     Met with pt to f/u and reinforce CHF teaching. Pt sitting up in chair reporting not feeling well. Session brief reminding pt to follow low sodium diet, take prescriptions and keep f/u appts, weigh herself daily and to walk regularly. Pt without questions.

## 2017-03-01 NOTE — PROGRESS NOTES
CDIFF order , patient taken off enteric contact precautions at this time. Patient has not had BM since admission to floor per primary nurse.

## 2017-03-01 NOTE — PROGRESS NOTES
End of Shift Nursing Note    Bedside shift change report given to David Baker (oncoming nurse) by Fifi Carson RN (offgoing nurse). Report included the following information SBAR. Zone Phone:       Significant changes during shift:    No c/o pain ,pt is hardstick   Non-emergent issues for physician to address:        Number times ambulated in hallway past shift: 0      Number of times OOB to chair past shift: 0    POD #: 1     Vital Signs:    Temp: 97.7 °F (36.5 °C)     Pulse (Heart Rate): 70     BP: 157/87     Resp Rate: 18     O2 Sat (%): 92 %    Lines & Drains:     Urinary Catheter? No       NG tube [] in [] removed [x] not applicable   Drains [x] in [] removed [] not applicable     Skin Integrity:      Wounds: yes   Dressings Present: no    Wound Concerns: no      GI:    Current diet:  DIET CLEAR LIQUID    Nausea: NO  Vomiting: NO  Bowel Sounds: YES  Flatus: NO  Last Bowel Movement: several days ago   Appearance:     Respiratory:  Supplemental O2: Yes      Device: nasal cannula   via 2 Liters/min     Incentive Spirometer: YES  Volume:   Coughing and Deep Breathing: YES  Oral Care: YES  Understanding (patient/family education): YES   Getting out of bed: YES  Head of bed elevation: YES    Patient Safety:    Falls Score: 2  Mobility Score: 3  Bed Alarm On? No  Sitter? No      Opportunity for questions and clarification was given to oncoming nurse. Patient bed is in low position, side rails are up x 3, door & observation blinds open as needed, call bell within reach and patient not in distress.     Clemente Sauceda

## 2017-03-01 NOTE — PROGRESS NOTES
End of Shift Nursing Note    Bedside shift change report given to Grant (oncoming nurse) by Rose Marie Barraza (offgoing nurse). Report included the following information SBAR, Kardex, Intake/Output, MAR and Recent Results. Zone Phone:   [phone]    Significant changes during shift:    0   Non-emergent issues for physician to address:   0     Number times ambulated in hallway past shift: 0      Number of times OOB to chair past shift: 1    POD #: 1     Vital Signs:    Temp: 97.9 °F (36.6 °C)     Pulse (Heart Rate): 73     BP: 118/74     Resp Rate: 18     O2 Sat (%): 99 %    Lines & Drains:     Urinary Catheter? No   Placement Date: 0   Medical Necessity: 0  Central Line? No   Placement Date: 0   Medical Necessity: 0  PICC Line? No   Placement Date: 0   Medical Necessity: 0    NG tube [] in [] removed [x] not applicable   Drains [] in [] removed [x] not applicable     Skin Integrity:      Wounds: no   Dressings Present: no    Wound Concerns: no      GI:    Current diet:  DIET REGULAR 50GM Fat    Nausea: NO  Vomiting: NO  Bowel Sounds: YES  Flatus: YES  Last Bowel Movement: yesterday   Appearance: 0    Respiratory:  Supplemental O2: No      Device: 0   via 0 Liters/min     Incentive Spirometer: no Volume: 0  Coughing and Deep Breathing: YES  Oral Care: YES  Understanding (patient/family education): YES   Getting out of bed: YES  Head of bed elevation: YES    Patient Safety:    Falls Score: 1  Mobility Score: 1  Bed Alarm On? No  Sitter? No      Opportunity for questions and clarification was given to oncoming nurse. Patient bed is in low position, side rails are up x 2, door & observation blinds open as needed, call bell within reach and patient not in distress.     Kathya Calzada RN

## 2017-03-01 NOTE — PERIOP NOTES
Handoff Report from Operating Room to PACU    Report received from DONITA Rondon RN and Maru Yanes CRNA regarding Brenda Saha. Surgeon(s):  Rosi Lopez MD  And Procedure(s) (LRB):  CHOLECYSTECTOMY LAPAROSCOPIC WITH CHOLANGIOGRAM (N/A)  confirmed   with allergies and drains discussed. Anesthesia type, drugs, patient history, complications, estimated blood loss, vital signs, intake and output, and last pain medication, lines, reversal medications and temperature were reviewed.

## 2017-03-01 NOTE — OP NOTES
Patient ID:   Name: Nicole Gilliam   Medical Record Number: [id number]   YOB: 1945    Date of Surgery: 2/28/2017      Preoperative Diagnosis:   Cholecystitis    Postoperative Diagnosis:  Gangrenous cholecystitis    Procedures:  Laparoscopic cholecystectomy with intraoperative cholangiogram    Surgeon: Joyce Lua MD      Anesthesia: General    Findings:   1. No filling defect within the common bile duct  2. Gangrenous gallbladder    Estimated Blood Loss: 20cc    Specimens:   gallbladder    Indications: This is a 80 y.o. female admitted with cholecystitis presents today for cholecystectomy. Procedure Details: The patient was seen in the Holding Room. The risks, benefits, complications, treatment options, and expected outcomes were discussed with the patient. After informed consent was performed, patient was taken to the operating room and was placed supine in the operating table. After establishing general anesthesia, abdomen was prepped and draped in standard surgical fashion. Small right subcostal incision was made and a 5 mm blunt trocar was placed under direct vision. CO2 pneumoperitoneum was then created. Additional 12 mm blunt trocar was placed infraumbilical and a 5 mm trocar was placed in the epigastric area. Abdomen was explored. Patient had adhesions covering the gallbladder. This was taken down carefully. Patient found to have severely inflamed gangrenous gallbladder. Additional 5 mm trocar was placed in the right upper quadrant to assist with retraction of the gallbladder. The gallbladder was aspirated and drained 30 cc purulent material.  The base of the gallbladder was carefully dissected. Dissection was quite difficult due to significant inflammatory changes. Duodenum was adherent to the cystic duct. During the dissection caused small serosal tear in the duodenum. Cystic duct was carefully identified and isolated.    Cystic duct was quite dilated and was unable to get the clip all the way across. A small incision was made within the cystic duct for the cholangiogram.  Cholangiogram catheter was placed but noted there was small tear posteriorly in the cystic duct. With multiple attempts, catheter was placed beyond this area and cholangiogram was performed. No filling defect was noted within the common bile duct. Cholangiogram catheter was removed and 2 clips were applied to the cystic duct on the patient side. Of note, the 5 mm clip applier was too small to go across the cystic duct. Therefore, 5 mm epigastric port was switched out to 12 mm trocar to use 10 mm clip applier. Cystic duct was divided. Cystic artery was identified  and isolated. Cystic artery was then clipped x 3 and divided. Gallbladder was then removed off of the gallbladder fossa using electrocautery. Gallbladder was retrieved using the Endocatch bag through the epigastric port site. The cystic duct was then secured with 2-0 PDS Endoloop. Care was done to place the endoloop below the area of small tear in the cystic duct. The duodenal serosal tear was oversewn with interrupted 2-0 Surgidec using Endostitch. Omental patch over the repaired site was performed using interrupted 2-0 Vicryl. Abdomen was irrigated. Good hemostasis was obtained. A #10 AD was placed in the gallbladder fossa and was brought out through the right upper quadrant trocar site. Drain was secured with 3-0 Nylon. The 12 mm trocar fascial defects in the epigastric and periumbilical port was closed with figure of eight 0-0 Vicryl using Endoclose. Trocars were removed. CO2 pneumoperitoneum was released. Skin was then approximated using 4-0 Vicryl subcuticular stitch followed by Dermabond. Instrument, sponge, and needle counts were correct prior to closure and at the conclusion of the case. The patient was taken to recovery room in good condition having tolerated the procedure well.       CC:  Shaun Friedman PA

## 2017-03-01 NOTE — PROGRESS NOTES
SURGERY PROGRESS NOTE      Admit Date: 2017    POD 1 Day Post-Op    Procedure: Procedure(s):  CHOLECYSTECTOMY LAPAROSCOPIC WITH CHOLANGIOGRAM      Subjective:     Abdominal pain better this morning. Tolerating clears. Objective:     Visit Vitals    /84    Pulse 71    Temp 97.7 °F (36.5 °C)    Resp 16    Ht 5' 7\" (1.702 m)    Wt 61.7 kg (136 lb 0.4 oz)    SpO2 93%    BMI 21.3 kg/m2        Temp (24hrs), Av.8 °F (36.6 °C), Min:97.3 °F (36.3 °C), Max:98.2 °F (36.8 °C)      Physical Exam:     Abdomen:  Soft. Non-distended. Incision C/D/I. AD with serosanguinous drainage. Assessment:     Active Problems:    Acute cholecystitis (2017)    POD #1    Plan/Recommendations/Medical Decision Making:     PT/OT  Advance to regular diet. Plan for discharge in AM with AD.

## 2017-03-01 NOTE — PERIOP NOTES
TRANSFER - OUT REPORT:    Verbal report given to Florence Guzman RN (name) on [id number] [facility]  being transferred to 210 (unit) for routine post - op       Report consisted of patients Situation, Background, Assessment and   Recommendations(SBAR). Information from the following report(s) SBAR, OR Summary, Intake/Output, MAR, Recent Results and Med Rec Status was reviewed with the receiving nurse. Opportunity for questions and clarification was provided.       Patient transported with:   O2 @ 1 liters  Registered Nurse

## 2017-03-01 NOTE — PROGRESS NOTES
Spiritual Care Partner Volunteer visited patient in Gen Surg on 3/1/17.   Documented by:  YAKOV Casarez, [facility], [address] [facility] [address]     Paging Service  [phone])

## 2017-03-02 LAB
ANION GAP BLD CALC-SCNC: 11 MMOL/L (ref 5–15)
BUN SERPL-MCNC: 18 MG/DL (ref 6–20)
BUN/CREAT SERPL: 16 (ref 12–20)
C DIFF TOX GENS STL QL NAA+PROBE: NEGATIVE
CALCIUM SERPL-MCNC: 7.6 MG/DL (ref 8.5–10.1)
CHLORIDE SERPL-SCNC: 106 MMOL/L (ref 97–108)
CO2 SERPL-SCNC: 24 MMOL/L (ref 21–32)
CREAT SERPL-MCNC: 1.11 MG/DL (ref 0.55–1.02)
ERYTHROCYTE [DISTWIDTH] IN BLOOD BY AUTOMATED COUNT: 18.2 % (ref 11.5–14.5)
GLUCOSE BLD STRIP.AUTO-MCNC: 78 MG/DL (ref 65–100)
GLUCOSE BLD STRIP.AUTO-MCNC: 83 MG/DL (ref 65–100)
GLUCOSE BLD STRIP.AUTO-MCNC: 90 MG/DL (ref 65–100)
GLUCOSE SERPL-MCNC: 76 MG/DL (ref 65–100)
HCT VFR BLD AUTO: 27 % (ref 35–47)
HGB BLD-MCNC: 8.7 G/DL (ref 11.5–16)
MCH RBC QN AUTO: 23.8 PG (ref 26–34)
MCHC RBC AUTO-ENTMCNC: 32.2 G/DL (ref 30–36.5)
MCV RBC AUTO: 74 FL (ref 80–99)
PLATELET # BLD AUTO: 255 K/UL (ref 150–400)
POTASSIUM SERPL-SCNC: 3.6 MMOL/L (ref 3.5–5.1)
RBC # BLD AUTO: 3.65 M/UL (ref 3.8–5.2)
SERVICE CMNT-IMP: NORMAL
SODIUM SERPL-SCNC: 141 MMOL/L (ref 136–145)
WBC # BLD AUTO: 14.5 K/UL (ref 3.6–11)

## 2017-03-02 PROCEDURE — 74011250637 HC RX REV CODE- 250/637: Performed by: FAMILY MEDICINE

## 2017-03-02 PROCEDURE — 80048 BASIC METABOLIC PNL TOTAL CA: CPT | Performed by: SURGERY

## 2017-03-02 PROCEDURE — 77010033678 HC OXYGEN DAILY

## 2017-03-02 PROCEDURE — 74011000258 HC RX REV CODE- 258: Performed by: SURGERY

## 2017-03-02 PROCEDURE — 97535 SELF CARE MNGMENT TRAINING: CPT

## 2017-03-02 PROCEDURE — C9113 INJ PANTOPRAZOLE SODIUM, VIA: HCPCS | Performed by: FAMILY MEDICINE

## 2017-03-02 PROCEDURE — 36415 COLL VENOUS BLD VENIPUNCTURE: CPT | Performed by: SURGERY

## 2017-03-02 PROCEDURE — G8988 SELF CARE GOAL STATUS: HCPCS

## 2017-03-02 PROCEDURE — 74011000250 HC RX REV CODE- 250: Performed by: FAMILY MEDICINE

## 2017-03-02 PROCEDURE — 74011250636 HC RX REV CODE- 250/636: Performed by: SURGERY

## 2017-03-02 PROCEDURE — 74011250637 HC RX REV CODE- 250/637: Performed by: SURGERY

## 2017-03-02 PROCEDURE — G8987 SELF CARE CURRENT STATUS: HCPCS

## 2017-03-02 PROCEDURE — 82962 GLUCOSE BLOOD TEST: CPT

## 2017-03-02 PROCEDURE — 74011250636 HC RX REV CODE- 250/636: Performed by: FAMILY MEDICINE

## 2017-03-02 PROCEDURE — 85027 COMPLETE CBC AUTOMATED: CPT | Performed by: SURGERY

## 2017-03-02 PROCEDURE — 97165 OT EVAL LOW COMPLEX 30 MIN: CPT

## 2017-03-02 PROCEDURE — 87493 C DIFF AMPLIFIED PROBE: CPT | Performed by: SURGERY

## 2017-03-02 PROCEDURE — 65270000029 HC RM PRIVATE

## 2017-03-02 RX ORDER — ENOXAPARIN SODIUM 100 MG/ML
30 INJECTION SUBCUTANEOUS EVERY 24 HOURS
Status: DISCONTINUED | OUTPATIENT
Start: 2017-03-02 | End: 2017-03-05 | Stop reason: HOSPADM

## 2017-03-02 RX ORDER — LOPERAMIDE HYDROCHLORIDE 2 MG/1
2 CAPSULE ORAL
Status: DISCONTINUED | OUTPATIENT
Start: 2017-03-02 | End: 2017-03-05 | Stop reason: HOSPADM

## 2017-03-02 RX ORDER — CHOLESTYRAMINE 4 G/4.8G
4 POWDER, FOR SUSPENSION ORAL
Status: DISCONTINUED | OUTPATIENT
Start: 2017-03-03 | End: 2017-03-05 | Stop reason: HOSPADM

## 2017-03-02 RX ADMIN — PIPERACILLIN SODIUM,TAZOBACTAM SODIUM 3.38 G: 3; .375 INJECTION, POWDER, FOR SOLUTION INTRAVENOUS at 05:15

## 2017-03-02 RX ADMIN — Medication 1 CAPSULE: at 11:17

## 2017-03-02 RX ADMIN — LISINOPRIL 40 MG: 20 TABLET ORAL at 10:03

## 2017-03-02 RX ADMIN — GABAPENTIN 300 MG: 300 CAPSULE ORAL at 16:27

## 2017-03-02 RX ADMIN — ENOXAPARIN SODIUM 30 MG: 30 INJECTION SUBCUTANEOUS at 10:04

## 2017-03-02 RX ADMIN — HYDROCHLOROTHIAZIDE 25 MG: 25 TABLET ORAL at 10:03

## 2017-03-02 RX ADMIN — SODIUM CHLORIDE 40 MG: 9 INJECTION INTRAMUSCULAR; INTRAVENOUS; SUBCUTANEOUS at 10:03

## 2017-03-02 RX ADMIN — AMIODARONE HYDROCHLORIDE 200 MG: 200 TABLET ORAL at 10:03

## 2017-03-02 RX ADMIN — METOPROLOL SUCCINATE 12.5 MG: 25 TABLET, EXTENDED RELEASE ORAL at 10:03

## 2017-03-02 RX ADMIN — HYDROMORPHONE HYDROCHLORIDE 2 MG: 2 TABLET ORAL at 21:15

## 2017-03-02 RX ADMIN — GABAPENTIN 300 MG: 300 CAPSULE ORAL at 10:03

## 2017-03-02 RX ADMIN — GABAPENTIN 300 MG: 300 CAPSULE ORAL at 21:08

## 2017-03-02 NOTE — PROGRESS NOTES
SURGERY PROGRESS NOTE      Admit Date: 2017    POD 2 Days Post-Op    Procedure: Procedure(s):  CHOLECYSTECTOMY LAPAROSCOPIC WITH CHOLANGIOGRAM      Subjective:     +diarrhea. Tolerating diet. Abdominal pain improving. Objective:     Visit Vitals    /78 (BP 1 Location: Left arm, BP Patient Position: At rest)    Pulse 70    Temp 98.6 °F (37 °C)    Resp 18    Ht 5' 7\" (1.702 m)    Wt 61.7 kg (136 lb 0.4 oz)    SpO2 95%    BMI 21.3 kg/m2        Temp (24hrs), Av.2 °F (36.8 °C), Min:97.4 °F (36.3 °C), Max:99 °F (37.2 °C)      Physical Exam:     Abdomen:  Soft. Non-distended. Incision C/D/I. AD with serosanguinous drainage. Assessment:     Active Problems:    Acute cholecystitis (2017)    Diarrhea    Plan/Recommendations/Medical Decision Making:     Check C. Diff. PT/OT for discharge planning.

## 2017-03-02 NOTE — PROGRESS NOTES
End of Shift Nursing Note    Bedside shift change report given to Lamberto Bullard (oncoming nurse) by Moe Malagon RN (offgoing nurse). Report included the following information SBAR and Kardex. Zone Phone:       Significant changes during shift:    none   Non-emergent issues for physician to address:   none     Number times ambulated in hallway past shift: 0      Number of times OOB to chair past shift: 0    POD #:      Vital Signs:    Temp: 97.9 °F (36.6 °C)     Pulse (Heart Rate): 73     BP: 118/74     Resp Rate: 18     O2 Sat (%): 96 %    Lines & Drains:     Urinary Catheter? Yes       NG tube [] in [] removed [] not applicable   Drains [] in [] removed [] not applicable     Skin Integrity:      Wounds: yes   Dressings Present: no    Wound Concerns: no      GI:    Current diet:  DIET REGULAR 50GM Fat    Nausea: NO  Vomiting: NO  Bowel Sounds: YES  Flatus: NO  Last Bowel Movement: yesterday   Appearance:     Respiratory:  Supplemental O2: No      Device:    via  Liters/min     Incentive Spirometer: NO  Volume:   Coughing and Deep Breathing: NO  Oral Care: NO  Understanding (patient/family education): YES   Getting out of bed: NO  Head of bed elevation: YES    Patient Safety:    Falls Score: 3  Mobility Score: 1  Bed Alarm On? No  Sitter? No      Opportunity for questions and clarification was given to oncoming nurse. Patient bed is in low position, side rails are up x 2, door & observation blinds open as needed, call bell within reach and patient not in distress.     Michelle Richmond RN

## 2017-03-02 NOTE — PROGRESS NOTES
Problem: Self Care Deficits Care Plan (Adult)  Goal: *Acute Goals and Plan of Care (Insert Text)  Occupational Therapy Goals  Initiated 3/2/2017  1. Patient will perform grooming in standing VSS with modified independence within 7 day(s). 2. Patient will perform upper body dressing with supervision/set-up within 7 day(s). 3. Patient will perform lower body dressing with modified independence within 7 day(s). 4. Patient will perform toilet transfers with modified independence within 7 day(s). 5. Patient will perform all aspects of toileting with modified independence within 7 day(s). 6. Patient will participate in upper extremity therapeutic exercise/activities with modified independence for 10 minutes within 7 day(s). 7. Patient will utilize energy conservation techniques during functional activities with verbal, visual and tactile cues within 7 day(s). OCCUPATIONAL THERAPY EVALUATION  Patient: Lisa Pollock (67 y.o. female)  Date: 3/2/2017  Primary Diagnosis: Acute cholecystitis  cholecystitis  ACUTE CHOLECYSTITIS  Procedure(s) (LRB):  CHOLECYSTECTOMY LAPAROSCOPIC WITH CHOLANGIOGRAM (N/A) 2 Days Post-Op   Precautions:   Contact, Fall (c-diff? LVEF 25%) C-Diff has been sent for check      ASSESSMENT :  Based on the objective data described below, the patient presents with admit to ER 2-27 with subsequent gangrenous gallbladder removal. Note drain present. Patient reports very frequent diarrhea today making her feel too weak to sit up; declines BSC as \"it comes too often and too fast.\" (indeed did demonstrate this during this eval. Also reports limited warning with urine.) While declining to move out of bed this AM due to diarrhea, reports she is able to sit up to edge of bed, only with assist of bed rail, while at home she is mod I. Here she is mod A overall/D-by her report primarily limited today by diarrhea and fatigue.  Patient reports her son is home and able to assist PRN, indeed due to RA assisted PRN with all IADLs and even occasional ADLs PTA when RA was too painful in hands and feet. Expect she may benefit from [facility] vs SNF depending on progress in acute care. Not able to tolerate even eating at this time due to diarrhea. Patient will benefit from skilled intervention to address the above impairments. Patients rehabilitation potential is considered to be Good  Factors which may influence rehabilitation potential include:   [ ]             None noted  [ ]             Mental ability/status  [X]             Medical condition  [ ]             Home/family situation and support systems  [ ]             Safety awareness  [ ]             Pain tolerance/management  [ ]             Other:        PLAN :  Recommendations and Planned Interventions:  [X]               Self Care Training                  [X]        Therapeutic Activities  [X]               Functional Mobility Training    [X]        Cognitive Retraining  [X]               Therapeutic Exercises           [X]        Endurance Activities  [X]               Balance Training                   [ ]        Neuromuscular Re-Education  [ ]               Visual/Perceptual Training     [X]   Home Safety Training  [X]               Patient Education                 [X]        Family Training/Education  [ ]               Other (comment):     Frequency/Duration: Patient will be followed by occupational therapy 5 times a week to address goals.   Discharge Recommendations: Home Health, [facility] and To Be Determined  Further Equipment Recommendations for Discharge: has shower chair; recommended grab bars at front of tub; aware of benefits of safety; AE/DME catalog provided as well as EC introduction with current dx       SUBJECTIVE:   Patient stated I go to the bathroom almost every time I move; A bedside commode wont help because I cant get there fast enough      OBJECTIVE DATA SUMMARY:   HISTORY:   Past Medical History:   Diagnosis Date    Arthritis  Autoimmune disease ([address]     rheumatoid     CAD (coronary artery disease)     Chronic pain     neuropathy bilateral feet,knees    Diabetes ([address]     GERD (gastroesophageal reflux disease)     Hypertension     Ill-defined condition     anemia    Ill-defined condition     blood transfusion hx    Other ill-defined conditions(799.89)     high cholesterol    Renal cell carcinoma of right kidney [facility])     s/p resection 12/16     Past Surgical History:   Procedure Laterality Date    CARDIAC SURG PROCEDURE UNLIST      three stents placed 2005   [address] UNLIST      HX PACEMAKER  2017/January    ICD    HX TONSILLECTOMY      HX UROLOGICAL  12/22/2016    RIGHT LAPOROSCOPIC HAND ASSISTED RADICAL NEPHRECTOMY        Prior Level of Function/Home Situation: reports she is retired and her adult son lives with her; She reports her RA is most limiting in hands and feet: at times she is mod I and other times she must have ADL assist from son or his girlfriend. She also reports that she is unable to write well due to \"tremors\" in her hands; RA not deforming of UEs; she no longer drives; guarded or possibly just fatigued in answering questions.  Chart review indicates patient recently discharged from [address] [place] [address] but not clear which one/kind. (patient now busy with nsg unable to clarify)  Expanded or extensive additional review of patient history:      Home Situation  Home Environment: Private residence  # Steps to Enter: 2  Rails to Enter: Yes (only in back)  [employer] :  (on back steps  6 steps)  One/Two Story Residence: One story  Living Alone: No  Support Systems: Child(macy) (adult healthy son)  Patient Expects to be Discharged to[de-identified] Private residence  Current DME Used/Available at Home: Margareth Loveless, rolling, Shower chair, Cane, straight  Tub or Shower Type: Tub/Shower combination  [X]  Right hand dominant             [ ]  Left hand dominant     EXAMINATION OF PERFORMANCE DEFICITS:  Cognitive/Behavioral Status:  Neurologic State: Alert  Orientation Level: Oriented X4  Cognition: Appropriate decision making; Appropriate for age attention/concentration; Appropriate safety awareness; Follows commands  Perception: Appears intact  Perseveration: Perseverates during conversation (only re diarrhea)  Safety/Judgement: Fall prevention;Home safety  Skin: see nsg notes; at risk for breakdown due to so much diarrhea  Edema: none B UE  Vision/Perceptual:                           Acuity: Impaired near vision [facility])    Corrective Lenses: Reading glasses  Range of Motion:  B UE  AROM: Generally decreased, functional (RA; affects hands and feet most)  PROM: Generally decreased, functional                    Strength:  B UE  Strength: Generally decreased, functional              Coordination:  Coordination:  (WFL Gross motor and gross deficits in fine motor coordinatio)  Fine Motor Skills-Upper: Left Impaired;Right Impaired    Gross Motor Skills-Upper: Left Intact; Right Intact  Tone & Sensation:     Tone: Normal  Sensation: Intact                       Balance:  Sitting:  (unable to test due to diarrhea)  Standing:  (unable to test due to ongoing diarrhea; 2x in interview)     Functional Mobility and Transfers for ADLs: not formally tested: this by patient report; She appears cog intact  Bed Mobility:  Rolling: Minimum assistance  Supine to Sit: Supervision;Minimum assistance (by patient report)  Sit to Supine: Supervision;Minimum assistance  Scooting: Contact guard assistance (by patient report)     Transfers:   NT formally     ADL Assessment:  Feeding: Minimum assistance (not able to eat due to diarrhea)     Oral Facial Hygiene/Grooming: Setup     Bathing: Moderate assistance (inferred)     Upper Body Dressing: Setup     Lower Body Dressing: Minimum assistance; Moderate assistance (inferred)     Toileting:  Moderate assistance;Maximum assistance (limited by very frequent diarrhea)                 ADL Intervention and task modifications:         bathroom safety training initiated, demonstrated Grab bar placement and use recommendations to maximize fall prevention; Patient provided with AE/DME recommendations for safety and energy conservation                                   Cognitive Retraining  Safety/Judgement: Fall prevention;Home safety        Functional Measure:  Barthel Index:      Bathin  Bladder: 0  Bowels: 0  Groomin  Dressin  Feedin  Mobility: 0  Stairs: 0  Toilet Use: 0  Transfer (Bed to Chair and Back): 5  Total: 10         Barthel and G-code impairment scale:  Percentage of impairment CH  0% CI  1-19% CJ  20-39% CK  40-59% CL  60-79% CM  80-99% CN  100%   Barthel Score 0-100 100 99-80 79-60 59-40 20-39 1-19    0   Barthel Score 0-20 20 17-19 13-16 9-12 5-8 1-4 0      The Barthel ADL Index: Guidelines  1. The index should be used as a record of what a patient does, not as a record of what a patient could do. 2. The main aim is to establish degree of independence from any help, physical or verbal, however minor and for whatever reason. 3. The need for supervision renders the patient not independent. 4. A patient's performance should be established using the best available evidence. Asking the patient, friends/relatives and nurses are the usual sources, but direct observation and common sense are also important. However direct testing is not needed. 5. Usually the patient's performance over the preceding 24-48 hours is important, but occasionally longer periods will be relevant. 6. Middle categories imply that the patient supplies over 50 per cent of the effort. 7. Use of aids to be independent is allowed. Link ., Barthel, D.W. (0757). Functional evaluation: the Barthel Index. [address] [facility] (14)2. Nelly Bowie florence [facility], Francis Jurado., Francesco Cintron., Shana, [address] ().  Measuring the change indisability after inpatient rehabilitation; comparison of the responsiveness of the Barthel Index and Functional [place] Measure. Journal of Neurology, Neurosurgery, and Psychiatry, 66(4), 151-570. POLA Ruiz, LIUDMILA Hubbard, & Oscar Flaherty M.A. (2004.) Assessment of post-stroke quality of life in cost-effectiveness studies: The usefulness of the Barthel Index and the EuroQoL-5D. Quality of Life Research, 13, 519-95         G codes: In compliance with CMSs Claims Based Outcome Reporting, the following G-code set was chosen for this patient based on their primary functional limitation being treated: The outcome measure chosen to determine the severity of the functional limitation was the Barthel Index with a score of 10/100 which was correlated with the impairment scale. · Self Care:               - CURRENT STATUS:    CM - 80%-99% impaired, limited or restricted               - GOAL STATUS:           CJ - 20%-39% impaired, limited or restricted               - D/C STATUS:                       ---------------To be determined---------------      Occupational Therapy Evaluation Charge Determination   History Examination Decision-Making   LOW Complexity : Brief history review  MEDIUM Complexity : 3-5 performance deficits relating to physical, cognitive , or psychosocial skils that result in activity limitations and / or participation restrictions HIGH Complexity : Patient presents with comorbidities that affect occupational performance. Signifigant modification of tasks or assistance (eg, physical or verbal) with assessment (s) is necessary to enable patient to complete evaluation       Based on the above components, the patient evaluation is determined to be of the following complexity level: LOW   Pain:         stomach discomfort with diarrhea; nsg aware           Activity Tolerance:   Poor, weak and limited by frequency of diarrhea  Please refer to the flowsheet for vital signs taken during this treatment.   After treatment:   [ ] Patient left in no apparent distress sitting up in chair  [X] Patient left in no apparent new distress in bed, but quite anxious re diarrhea  [X] Call bell left within reach  [X] Nursing notified  [ ] Caregiver present  [ ] Bed alarm activated      COMMUNICATION/EDUCATION:   The patients plan of care was discussed with: Physical Therapist and Registered Nurse.  [X] Home safety education was provided and the patient/caregiver indicated understanding. [X] Patient/family have participated as able in goal setting and plan of care. [X] Patient/family agree to work toward stated goals and plan of care. [ ] Patient understands intent and goals of therapy, but is neutral about his/her participation. [ ] Patient is unable to participate in goal setting and plan of care. This patients plan of care is appropriate for delegation to [facility].      Thank you for this referral.  Heather Reynoso OTR/L  Time Calculation: 30 mins

## 2017-03-02 NOTE — PROGRESS NOTES
End of Shift Nursing Note    Bedside shift change report given to Cornelious Felty (oncoming nurse) by Joyce Batse RN (offgoing nurse). Report included the following information SBAR. Zone Phone:       Significant changes during shift:    Loose stools x2,no complaints of pain   Non-emergent issues for physician to address:   none     Number times ambulated in hallway past shift: 0      Number of times OOB to chair past shift: 0    POD #: 2     Vital Signs:    Temp: 98.6 °F (37 °C)     Pulse (Heart Rate): 70     BP: 135/78     Resp Rate: 18     O2 Sat (%): 95 %    Lines & Drains:     Urinary Catheter? No       Skin Integrity:      Wounds: yes  Dressings Present: yes    Wound Concerns: no      GI:    Current diet:  DIET REGULAR 50GM Fat    Nausea: NO  Vomiting: NO  Bowel Sounds: YES  Flatus: YES  Last Bowel Movement: today   Appearance: largex2 loose    Respiratory:  Supplemental O2: No        Incentive Spirometer: YES  Volume:   Coughing and Deep Breathing: YES  Oral Care: YES  Understanding (patient/family education): YES   Getting out of bed: YES  Head of bed elevation: YES    Patient Safety:    Falls Score: 2  Mobility Score: 3  Bed Alarm On? No  Sitter? No      Opportunity for questions and clarification was given to oncoming nurse. Patient bed is in low position, side rails are up x 2, door & observation blinds open as needed, call bell within reach and patient not in distress.     Oksana Diaz

## 2017-03-02 NOTE — CARDIO/PULMONARY
Cardiopulmonary Rehab Nursing Entry:     Chart reviewed. Admitted with acute cholecystitis. History significant for CAD, chronic systolic CHF, ICD, PAF, S/P resection of renal cell carcinoma of right kidney, rheumatoid arthritis, DM, GERD. LVEF 25% on echo 1/20/17. Nonsmoker. Pt had been seen by Cardiac Rehab on admission in January of 2017. Pt s/p laparoscopic cholecystectomy        Attempt x2 to meet with pt for CHF follow up teaching- currently refused PT/OT due to diarrhea every time she moves. Currently with staff in room cleaning pt up/no family at bedside. Will continue to follow.

## 2017-03-02 NOTE — PROGRESS NOTES
End of Shift Nursing Note    Bedside shift change report given to Jhonny Bae RN (oncoming nurse) by Darby Wellington RN (offgoing nurse). Report included the following information SBAR, Kardex and Intake/Output. Zone Phone:       Significant changes during shift:    none   Non-emergent issues for physician to address:   none     Number times ambulated in hallway past shift: 0      Number of times OOB to chair past shift: 0    POD #:      Vital Signs:    Temp: 98.4 °F (36.9 °C)     Pulse (Heart Rate): 71     BP: 135/73     Resp Rate: 16     O2 Sat (%): 95 %    Lines & Drains:     Urinary Catheter? No   Placement Date:    Medical Necessity:   Central Line? No   Placement Date:    Medical Necessity:   PICC Line? No   Placement Date:   Medical Necessity:     NG tube [] in [] removed [x] not applicable   Drains [x] in [] removed [x] not applicable     Skin Integrity:      Wounds: yes   Dressings Present: yes    Wound Concerns: no      GI:    Current diet:  DIET REGULAR 50GM Fat    Nausea: NO  Vomiting: NO  Bowel Sounds: YES  Flatus: YES  Last Bowel Movement: today   Appearance: loose, brown    Respiratory:  Supplemental O2: No      Device:    via  Liters/min     Incentive Spirometer: YES  Volume:   Coughing and Deep Breathing: YES  Oral Care: YES  Understanding (patient/family education): YES   Getting out of bed: NO  Head of bed elevation: YES    Patient Safety:    Falls Score: 2  Mobility Score: 2  Bed Alarm On? No  Sitter? No      Opportunity for questions and clarification was given to oncoming nurse. Patient bed is in low position, side rails are up x 2, door & observation blinds open as needed, call bell within reach and patient not in distress.     Shanda Brown

## 2017-03-02 NOTE — PROGRESS NOTES
Orders received, chart reviewed. Attempted to see pt this AM for PT evaluation however pt refusing OOB mobility/participation, stating that she has diarrhea \"everytime she moves. \" Educated pt regarding importance of OOB mobility in regaining functional independence as well as importance in assisting with discharge planning however pt continued to refuse mobilization as a result of diarrhea. Pt requesting PT return this afternoon. Will attempt to f/u as schedule allows. Bina Ronquillo.  Kristyn Orellana, DPT

## 2017-03-03 LAB
ANION GAP BLD CALC-SCNC: 10 MMOL/L (ref 5–15)
BUN SERPL-MCNC: 15 MG/DL (ref 6–20)
BUN/CREAT SERPL: 16 (ref 12–20)
CALCIUM SERPL-MCNC: 7.6 MG/DL (ref 8.5–10.1)
CHLORIDE SERPL-SCNC: 106 MMOL/L (ref 97–108)
CO2 SERPL-SCNC: 23 MMOL/L (ref 21–32)
CREAT SERPL-MCNC: 0.92 MG/DL (ref 0.55–1.02)
GLUCOSE SERPL-MCNC: 74 MG/DL (ref 65–100)
POTASSIUM SERPL-SCNC: 3.4 MMOL/L (ref 3.5–5.1)
SODIUM SERPL-SCNC: 139 MMOL/L (ref 136–145)

## 2017-03-03 PROCEDURE — 65270000029 HC RM PRIVATE

## 2017-03-03 PROCEDURE — 36415 COLL VENOUS BLD VENIPUNCTURE: CPT | Performed by: SURGERY

## 2017-03-03 PROCEDURE — 97110 THERAPEUTIC EXERCISES: CPT

## 2017-03-03 PROCEDURE — C9113 INJ PANTOPRAZOLE SODIUM, VIA: HCPCS | Performed by: FAMILY MEDICINE

## 2017-03-03 PROCEDURE — 74011250637 HC RX REV CODE- 250/637: Performed by: SURGERY

## 2017-03-03 PROCEDURE — 74011250637 HC RX REV CODE- 250/637: Performed by: FAMILY MEDICINE

## 2017-03-03 PROCEDURE — 74011250636 HC RX REV CODE- 250/636: Performed by: SURGERY

## 2017-03-03 PROCEDURE — 74011250636 HC RX REV CODE- 250/636: Performed by: FAMILY MEDICINE

## 2017-03-03 PROCEDURE — 80048 BASIC METABOLIC PNL TOTAL CA: CPT | Performed by: SURGERY

## 2017-03-03 PROCEDURE — 97161 PT EVAL LOW COMPLEX 20 MIN: CPT

## 2017-03-03 PROCEDURE — G8978 MOBILITY CURRENT STATUS: HCPCS

## 2017-03-03 PROCEDURE — 74011000250 HC RX REV CODE- 250: Performed by: FAMILY MEDICINE

## 2017-03-03 PROCEDURE — G8979 MOBILITY GOAL STATUS: HCPCS

## 2017-03-03 RX ORDER — CHOLESTYRAMINE 4 G/9G
1 POWDER, FOR SUSPENSION ORAL
Qty: 1 CAN | Refills: 0 | Status: SHIPPED | OUTPATIENT
Start: 2017-03-03 | End: 2017-03-13 | Stop reason: SDUPTHER

## 2017-03-03 RX ORDER — HYDROMORPHONE HYDROCHLORIDE 2 MG/1
2-4 TABLET ORAL
Qty: 40 TAB | Refills: 0 | Status: SHIPPED | OUTPATIENT
Start: 2017-03-03 | End: 2017-06-06

## 2017-03-03 RX ORDER — POTASSIUM CHLORIDE 750 MG/1
40 TABLET, FILM COATED, EXTENDED RELEASE ORAL
Status: COMPLETED | OUTPATIENT
Start: 2017-03-03 | End: 2017-03-03

## 2017-03-03 RX ADMIN — HYDROCHLOROTHIAZIDE 25 MG: 25 TABLET ORAL at 09:17

## 2017-03-03 RX ADMIN — CHOLESTYRAMINE 4 G: 4 POWDER, FOR SUSPENSION ORAL at 13:08

## 2017-03-03 RX ADMIN — CHOLESTYRAMINE 4 G: 4 POWDER, FOR SUSPENSION ORAL at 09:17

## 2017-03-03 RX ADMIN — Medication 1 CAPSULE: at 09:17

## 2017-03-03 RX ADMIN — CHOLESTYRAMINE 4 G: 4 POWDER, FOR SUSPENSION ORAL at 17:43

## 2017-03-03 RX ADMIN — SODIUM CHLORIDE 40 MG: 9 INJECTION INTRAMUSCULAR; INTRAVENOUS; SUBCUTANEOUS at 09:17

## 2017-03-03 RX ADMIN — METOPROLOL SUCCINATE 12.5 MG: 25 TABLET, EXTENDED RELEASE ORAL at 09:17

## 2017-03-03 RX ADMIN — GABAPENTIN 300 MG: 300 CAPSULE ORAL at 17:43

## 2017-03-03 RX ADMIN — ENOXAPARIN SODIUM 30 MG: 30 INJECTION SUBCUTANEOUS at 09:18

## 2017-03-03 RX ADMIN — LISINOPRIL 40 MG: 20 TABLET ORAL at 09:17

## 2017-03-03 RX ADMIN — GABAPENTIN 300 MG: 300 CAPSULE ORAL at 21:21

## 2017-03-03 RX ADMIN — POTASSIUM CHLORIDE 40 MEQ: 750 TABLET, FILM COATED, EXTENDED RELEASE ORAL at 09:17

## 2017-03-03 RX ADMIN — AMIODARONE HYDROCHLORIDE 200 MG: 200 TABLET ORAL at 09:17

## 2017-03-03 RX ADMIN — DEXTROSE MONOHYDRATE, SODIUM CHLORIDE, AND POTASSIUM CHLORIDE 25 ML/HR: 50; 4.5; .745 INJECTION, SOLUTION INTRAVENOUS at 09:16

## 2017-03-03 RX ADMIN — GABAPENTIN 300 MG: 300 CAPSULE ORAL at 09:17

## 2017-03-03 NOTE — DISCHARGE INSTRUCTIONS
Patient Discharge Instructions    Nichole Randle / [id number] : 1945    Admitted 2017 Discharged: 3/3/2017         What to do at Home    Recommended diet: Low fat, Low cholesterol    Recommended activity: no heavy lifting > 20 lbs x 2 weeks; no driving while taking narcotics for pain. May take shower, but no bath x 2 weeks. Empty drain daily. Call office immediately if drain output is bilious or greenish in color. If you experience a lot of drainage, develop redness around the wound, or a fever over 101 F occurs please call the office. Narcotics and anesthesia sometimes cause nausea and vomiting. If persistent please call the office. Do not hesitate to call with questions or concerns. Follow-up with Dr. Rafael Doe in 1 week. Please call [phone] for an appointment. Information obtained by :  I understand that if any problems occur once I am at home I am to contact my physician. I understand and acknowledge receipt of the instructions indicated above.                                                                                                                                                Physician's or R.N.'s Signature                                                                  Date/Time                                                                                                                                              Patient or Representative Signature                                                          Date/Time

## 2017-03-03 NOTE — ADT AUTH CERT NOTES
Progress Notes  Date of Service: 03/03/17 [address], MD   Internal Medicine      []Hide copied text  Letter of Status Determination: Current Status INPATIENT is Appropriate           Pt Name:  Danica Griffiths   MR#  [id number]   [facility]#   [id number]   [address] [place] [address]  2107/01 @ [facility]   Admit date  2/27/2017 3:50 PM   Current Attending Physician  Beata Nair MD   Principal diagnosis  <principal problem not specified>   Acute cholecystitis. Clinicals  80 y.o. y.o female hospitalized with above diagnosis. This pt has complex medical issues including but not limited to CAD. HTN, renal cell cancer s/p Right nephrectomy, GERD, Diabetes mellitus etc.      Her PTA meds list includes chronic use of Steroids - leading to immune suppressed status. Recently she was diagnosed with Diverticulitis, and treated accordingly -discharged about one month ago from Acute medical care. She had recent cholecystostomy placed and plan for cholecystectomy had be moved up due to her presenting with what we now know as Gangrenous Cholecystitis and cholelithiasis. Her WBC went upto 23k the next day after admission. Cultures are pending. She remain on IV Zosyn   She is continuing to have post choly diarrhea. [place] MCG criteria   Does apply Cholecystectomy RRG RRG: S-360-RRG (ISC)  This case deserves physician level of understanding of the gravity of her illness.     STATUS DETERMINATION  On the basis of clinical data, available documentaion, we believe that the current status of this patient as INPATIENT is Appropriate    Additional comments  Please send a copy of this note to [facility] medical director    Insurance  Payor: Diane Calvillo / Plan: Octavio Reyez / Product Type: Managed Care Medicare /             Velasquez Wheat MD MPH FACP      Physician Pasha Cook [facility]   President Medical Staff, [address] [place] [address]    Cell  [phone]                 Electronically signed by Cholo Wills MD at 03/03/17 1310        ED to Hosp-Admission (Current) on 2/27/2017              Detailed Report             Note shared with patient   Note Details   Author Cholo Wills MD File Time 03/03/17 3573   Author Type Physician Status Signed   Last  Cholo Wills MD [facility] Internal Medicine   Hospital Acct # [de-identified] Admit Date 2/27/2017

## 2017-03-03 NOTE — PROGRESS NOTES
Letter of Status Determination: Current Status INPATIENT is Appropriate        Pt Name:  Chapin Robles   MR#  [id number]   [facility]#   [id number]   [address] [place] [address]  2107/01  @ [facility]   Admit date  2/27/2017  3:50 PM   Current Attending Physician  Sloan Olivares MD   Principal diagnosis  <principal problem not specified>   Acute cholecystitis. Clinicals  80 y.o. y.o  female hospitalized with above diagnosis. This pt has complex medical issues including but not limited to CAD. HTN, renal cell cancer s/p Right nephrectomy, GERD, Diabetes mellitus etc.     Her PTA meds list includes chronic use of Steroids - leading to immune suppressed status. Recently she was diagnosed with Diverticulitis, and treated accordingly -discharged about one month ago from Acute medical care. She had recent cholecystostomy placed and plan for cholecystectomy had be moved up due to her presenting with what we now know as Gangrenous Cholecystitis and cholelithiasis. Her WBC went upto 23k the next day after admission. Cultures are pending. She remain on IV Zosyn   She is continuing to have post choly diarrhea. [place] MCG criteria   Does   apply Cholecystectomy RRG RRG: S-360-RRG (ISC)  This case deserves physician level of understanding of the gravity of her illness. STATUS DETERMINATION  On the basis of clinical data, available documentaion, we believe that the current status of this patient as INPATIENT is Appropriate      Additional comments  Please send a copy of this note to [facility]  medical director   3/9/2017 - asked Aurea to send this note to [facility] doctor for reconsideration.    .3/9/2017 1:34 PM - I have setup a peer to peer with Dr. Stoner Sickle: Dianne Obregon / Plan: Suellen Domínguez / Product Type: Managed Care Medicare /          Sandy Brown MD MPH FACP     Physician [address] Documentation Management Program  [address] [facility]     President Medical Staff, [facility]    Cell  [phone]

## 2017-03-03 NOTE — PROGRESS NOTES
End of Shift Nursing Note    Bedside shift change report given to Heather Chan RN (oncoming nurse) by Jose Prieto RN (offgoing nurse). Report included the following information SBAR, Kardex and Intake/Output. Zone Phone:       Significant changes during shift:    none   Non-emergent issues for physician to address:   none     Number times ambulated in hallway past shift: 0      Number of times OOB to chair past shift: 1    POD #:      Vital Signs:    Temp: 98.3 °F (36.8 °C)     Pulse (Heart Rate): 72     BP: 146/80     Resp Rate: 18     O2 Sat (%): 100 %    Lines & Drains:     Urinary Catheter? No   Placement Date:    Medical Necessity:   Central Line? No   Placement Date:    Medical Necessity:   PICC Line? No   Placement Date:    Medical Necessity:     NG tube [] in [] removed [x] not applicable   Drains [x] in [] removed [] not applicable     Skin Integrity:      Wounds: yes   Dressings Present: yes    Wound Concerns: no      GI:    Current diet:  DIET REGULAR 50GM Fat  DIET NUTRITIONAL SUPPLEMENTS Breakfast, Lunch, Dinner; Ensure Clear    Nausea: NO  Vomiting: NO  Bowel Sounds: YES  Flatus: YES  Last Bowel Movement: today   Appearance:     Respiratory:  Supplemental O2: No      Device:   via  Liters/min     Incentive Spirometer: YES  Volume:   Coughing and Deep Breathing: YES  Oral Care: YES  Understanding (patient/family education): YES   Getting out of bed:YES  Head of bed elevation: YES    Patient Safety:    Falls Score: 2  Mobility Score: 2  Bed Alarm On? No  Sitter? No      Opportunity for questions and clarification was given to oncoming nurse. Patient bed is in low position, side rails are up x 2, door & observation blinds open as needed, call bell within reach and patient not in distress.     [facility]

## 2017-03-03 NOTE — PROGRESS NOTES
SURGERY PROGRESS NOTE      Admit Date: 2017    POD 3 Days Post-Op    Procedure: Procedure(s):  CHOLECYSTECTOMY LAPAROSCOPIC WITH CHOLANGIOGRAM      Subjective:     Still having diarrhea but little better. C. Diff negative. Objective:     Visit Vitals    /73 (BP 1 Location: Left arm, BP Patient Position: At rest)    Pulse 70    Temp 97.7 °F (36.5 °C)    Resp 18    Ht 5' 7\" (1.702 m)    Wt 61.7 kg (136 lb 0.4 oz)    SpO2 100%    BMI 21.3 kg/m2        Temp (24hrs), Av.2 °F (36.8 °C), Min:97.1 °F (36.2 °C), Max:99.3 °F (37.4 °C)      Physical Exam:     Abdomen:  Soft. Non-distended. Incision C/D/I. AD with serosanguinous drainage. Lab Results  Component Value Date/Time   GFR est AA >60 2017 05:39 AM   GFR est non-AA >60 2017 05:39 AM   Creatinine (POC) 1.3 2017 04:16 PM   Creatinine 0.92 2017 05:39 AM   BUN 15 2017 05:39 AM   BUN (POC) 24 2017 04:16 PM   Sodium (POC) 139 2017 04:16 PM   Sodium 139 2017 05:39 AM   Potassium 3.4 2017 05:39 AM   Potassium (POC) 5.8 2017 04:16 PM   Chloride (POC) 106 2017 04:16 PM   Chloride 106 2017 05:39 AM   CO2 23 2017 05:39 AM        Assessment:     Active Problems:    Acute cholecystitis (2017)    Post-cholecystectomy diarrhea    Plan/Recommendations/Medical Decision Making:     Replete K+  Questran  Imodium prn  PT/OT to assess for placement on discharge. Plan for discharge in AM if diarrhea better.

## 2017-03-03 NOTE — PROGRESS NOTES
Problem: Mobility Impaired (Adult and Pediatric)  Goal: *Acute Goals and Plan of Care (Insert Text)  Physical Therapy Goals  Initiated 3/3/2017  1. Patient will move from supine to sit and sit to supine , scoot up and down and roll side to side in bed with modified independence within 7 day(s). 2. Patient will transfer from bed to chair and chair to bed with supervision/set-up using the least restrictive device within 7 day(s). 3. Patient will perform sit to stand with supervision/set-up within 7 day(s). 4. Patient will ambulate with supervision/set-up for 150 feet with the least restrictive device within 7 day(s). 5. Patient will ascend/descend 2 stairs with bilateral handrail(s) with supervision/set-up within 7 day(s). PHYSICAL THERAPY EVALUATION  Patient: Jasson Austin (67 y.o. female)  Date: 3/3/2017  Primary Diagnosis: Acute cholecystitis  cholecystitis  ACUTE CHOLECYSTITIS  Procedure(s) (LRB):  CHOLECYSTECTOMY LAPAROSCOPIC WITH CHOLANGIOGRAM (N/A) 3 Days Post-Op   Precautions: Fall      ASSESSMENT :  Based on the objective data described below, the patient presents with impaired functional mobility secondary to generalized weakness, decreased endurance/activity tolerance, poor motivation for mobility/participation with therapy, and impaired safety awareness. Prior to admission, pt recently discharged home from SNF and was home for ~2 wks receiving [facility] PT prior to returning to [facility]. Pt reports ambulation w/ RW, denies history of falls, and lives with her son who she reports provides 24hr supervision. Today, pt required increased encouragement for participation with therapy/OOB mobility however able to assume seated position EOB at supervision level. Pt sit>>stand w/ CGA and ambulated 30ft w/ RW and CGA. Pt with fair gait stability overall secondary to decreased gait speed and decreased step height bilaterally.  Pt declined progressing ambulation distance past 30ft and was reluctant to sit up in bedside chair. Recommend pt continue to have 1 person assist w/ use of RW during all OOB mobility. Will plan to continue increasing ambulation distance and activity tolerance as well as encourage increased mobility. Pt is able to ambulate to bathroom and does not need to be using purewick or bedpan. RN notified and in agreement. Upon discharge, recommend pt return home w/ continued 24hr supervision of son and resumption of HH PT. Patient will benefit from skilled intervention to address the above impairments. Patients rehabilitation potential is considered to be Good  Factors which may influence rehabilitation potential include:   [X]         None noted  [ ]         Mental ability/status  [ ]         Medical condition  [ ]         Home/family situation and support systems  [ ]         Safety awareness  [ ]         Pain tolerance/management  [ ]         Other:        PLAN :  Recommendations and Planned Interventions:  [X]           Bed Mobility Training             [ ]    Neuromuscular Re-Education  [X]           Transfer Training                   [ ]    Orthotic/Prosthetic Training  [X]           Gait Training                         [ ]    Modalities  [X]           Therapeutic Exercises           [ ]    Edema Management/Control  [X]           Therapeutic Activities            [X]    Patient and Family Training/Education  [X]           Other (comment): stair climbing     Frequency/Duration: Patient will be followed by physical therapy  4 times a week to address goals. Discharge Recommendations: Home Health w/ 24hr supervision of son  Further Equipment Recommendations for Discharge: TBD however likely none as pt owns RW       SUBJECTIVE:   Patient stated Why? [place]      OBJECTIVE DATA SUMMARY:   HISTORY:    Past Medical History:   Diagnosis Date    Arthritis      Autoimmune disease ([facility] [address]       rheumatoid     CAD (coronary artery disease)      Chronic pain       neuropathy bilateral feet,knees    Diabetes ([facility] [address]      GERD (gastroesophageal reflux disease)      Hypertension      Ill-defined condition       anemia    Ill-defined condition       blood transfusion hx    Other ill-defined conditions(799.89)       high cholesterol    Renal cell carcinoma of right kidney [facility])       s/p resection 12/16     Past Surgical History:   Procedure Laterality Date    CARDIAC SURG PROCEDURE UNLIST         three stents placed 2005    CARDIAC SURG PROCEDURE UNLIST        HX PACEMAKER   2017/January     ICD    HX TONSILLECTOMY        HX UROLOGICAL   12/22/2016     RIGHT LAPOROSCOPIC HAND ASSISTED RADICAL NEPHRECTOMY     Prior Level of Function/Home Situation: Pt reports ambulation with use of rolling walker and denies history of falls. Pt lives with son who provides 24hr supervision/assistance. Pt states that son assists her with getting out of bed in the AM and provides supervision during ambulation. Pt recently discharged home from SNF and was home for ~2 weeks prior to returning to [facility]. Pt receiving  PT during that time.    Personal factors and/or comorbidities impacting plan of care:      Home Situation  Home Environment: Private residence  # Steps to Enter: 2  Rails to Enter: Yes  [employer] :  (on back steps  6 steps)  One/Two Story Residence: One story  Living Alone: No  Support Systems: Child(macy)  Patient Expects to be Discharged to[de-identified] Private residence  Current DME Used/Available at Home: Walker, rolling  Tub or Shower Type: Tub/Shower combination     EXAMINATION/PRESENTATION/DECISION MAKING:   Critical Behavior:  Neurologic State: Alert  Orientation Level: Oriented X4  Cognition: Appropriate decision making, Appropriate for age attention/concentration, Appropriate safety awareness, Follows commands  Safety/Judgement: Fall prevention, Home safety  Skin:  Intact   Strength:    Strength: Generally decreased, functional                    Tone & Sensation:   Tone: Normal              Sensation: Intact Range Of Motion:  AROM: Within functional limits                       Coordination:  Coordination: Within functional limits     Functional Mobility:  Bed Mobility:  Rolling: Supervision  Supine to Sit: Supervision     Scooting: Supervision  Transfers:  Sit to Stand: Contact guard assistance  Stand to Sit: Contact guard assistance        Bed to Chair: Contact guard assistance              Balance:   Sitting: Intact  Standing: Impaired; With support  Standing - Static: Good;Constant support  Standing - Dynamic : Fair  Ambulation/Gait Training:  Distance (ft): 30 Feet (ft)  Assistive Device: Walker, rolling;Gait belt  Ambulation - Level of Assistance: Contact guard assistance        Gait Abnormalities: Decreased step clearance        Base of Support: Widened     Speed/Maria Luisa: Pace decreased (<100 feet/min)  Step Length: Left shortened;Right shortened                             Pt ambulated 30ft w/ RW and CGA, exhibiting mild gait instability secondary to decreased gait speed and decreased step height bilaterally. Functional Measure:  Barthel Index:      Bathin  Bladder: 5  Bowels: 5  Groomin  Dressin  Feeding: 10  Mobility: 0  Stairs: 0  Toilet Use: 5  Transfer (Bed to Chair and Back): 10  Total: 45         Barthel and G-code impairment scale:  Percentage of impairment CH  0% CI  1-19% CJ  20-39% CK  40-59% CL  60-79% CM  80-99% CN  100%   Barthel Score 0-100 100 99-80 79-60 59-40 20-39 1-19    0   Barthel Score 0-20 20 17-19 13-16 9-12 5-8 1-4 0      The Barthel ADL Index: Guidelines  1. The index should be used as a record of what a patient does, not as a record of what a patient could do. 2. The main aim is to establish degree of independence from any help, physical or verbal, however minor and for whatever reason. 3. The need for supervision renders the patient not independent. 4. A patient's performance should be established using the best available evidence.  Asking the patient, friends/relatives and nurses are the usual sources, but direct observation and common sense are also important. However direct testing is not needed. 5. Usually the patient's performance over the preceding 24-48 hours is important, but occasionally longer periods will be relevant. 6. Middle categories imply that the patient supplies over 50 per cent of the effort. 7. Use of aids to be independent is allowed. Ricky Bill., Barthel, D.W. (1596). Functional evaluation: the Barthel Index. [facility] (14)2. Soheila Chatterjee florence [facility], Juliet Chi, Kevin Lou., [place], [facility] (1999). Measuring the change indisability after inpatient rehabilitation; comparison of the responsiveness of the Barthel Index and Functional [place] Measure. Journal of Neurology, Neurosurgery, and Psychiatry, 66(4), 099-822. POLA Cotter, LIUDMILA Hubbard, & Angy Arambula M.A. (2004.) Assessment of post-stroke quality of life in cost-effectiveness studies: The usefulness of the Barthel Index and the EuroQoL-5D. Quality of Life Research, 13, 898-29         G codes: In compliance with CMSs Claims Based Outcome Reporting, the following G-code set was chosen for this patient based on their primary functional limitation being treated: The outcome measure chosen to determine the severity of the functional limitation was the Barthel Index with a score of 45/100 which was correlated with the impairment scale.       · Mobility - Walking and Moving Around:               - CURRENT STATUS:    CK - 40%-59% impaired, limited or restricted               - GOAL STATUS:           CI - 1%-19% impaired, limited or restricted               - D/C STATUS:                       ---------------To be determined---------------      Physical Therapy Evaluation Charge Determination   History Examination Presentation Decision-Making   MEDIUM  Complexity : 1-2 comorbidities / personal factors will impact the outcome/ POC MEDIUM Complexity : 3 Standardized tests and measures addressing body structure, function, activity limitation and / or participation in recreation  MEDIUM Complexity : Evolving with changing characteristics  MEDIUM Complexity : FOTO score of 26-74      Based on the above components, the patient evaluation is determined to be of the following complexity level: MEDIUM     Pain:  Pain Scale 1: Numeric (0 - 10)  Pain Intensity 1: 0              Activity Tolerance:   VSS throughout on RA   Please refer to the flowsheet for vital signs taken during this treatment. After treatment:   [X]         Patient left in no apparent distress sitting up in chair  [ ]         Patient left in no apparent distress in bed  [X]         Call bell left within reach  [X]         Nursing notified  [ ]         Caregiver present  [ ]         Bed alarm activated      COMMUNICATION/EDUCATION:   The patients plan of care was discussed with: Registered Nurse.  [X]         Fall prevention education was provided and the patient/caregiver indicated understanding. [X]         Patient/family have participated as able in goal setting and plan of care. [X]         Patient/family agree to work toward stated goals and plan of care. [ ]         Patient understands intent and goals of therapy, but is neutral about his/her participation. [ ]         Patient is unable to participate in goal setting and plan of care.      Thank you for this referral.  Ezra Palacio, PT, DPT   Time Calculation: 15 mins

## 2017-03-03 NOTE — PROGRESS NOTES
Received update from [address] and they have accepted pt.      Harshil Burnham, [address]

## 2017-03-03 NOTE — PROGRESS NOTES
CM met with pt and discussed therapy's recommendation of SNF or home health. Pt would like to go home with home health. Pt lives with her son and she said he can help her at home. Pt stated she had [address] home health prior to admission and would like them again. FOC form signed. CM will send referral to [address] via allscripts. CM provided pt with the 2nd  Medicare letter, pt understood and signed it. Copy left with pt.      Amira Pelletier, [address]

## 2017-03-03 NOTE — PROGRESS NOTES
End of Shift Nursing Note    Bedside shift change report given to Hiren Ramirez (oncoming nurse) by Rere Candelaria RN (offgoing nurse). Report included the following information SBAR. Zone Phone:       Significant changes during shift:    none   Non-emergent issues for physician to address:   none     Number times ambulated in hallway past shift: 0      Number of times OOB to chair past shift: 0    POD #:      Vital Signs:    Temp: 97.1 °F (36.2 °C)     Pulse (Heart Rate): 72     BP: (!) 146/96     Resp Rate: 18     O2 Sat (%): 97 %    Lines & Drains:     Urinary Catheter? No       NG tube [] in [] removed [x] not applicable   Drains [x] in [] removed [] not applicable     Skin Integrity:      Wounds: yes  Dressings Present: yes    Wound Concerns: no      GI:    Current diet:  DIET REGULAR 50GM Fat    Nausea: NO  Vomiting: NO  Bowel Sounds: NO  Flatus: YES  Last Bowel Movement: yesterday   Appearance: loose    Respiratory:  Supplemental O2: No      Coughing and Deep Breathing: YES  Oral Care: YES  Understanding (patient/family education): YES   Getting out of bed: YES  Head of bed elevation: YES    Patient Safety:    Falls Score: 2  Mobility Score: 3  Bed Alarm On? No  Sitter? No      Opportunity for questions and clarification was given to oncoming nurse. Patient bed is in low position, side rails are up x 3, door & observation blinds open as needed, call bell within reach and patient not in distress.     Emma Tovar

## 2017-03-03 NOTE — PROGRESS NOTES
Problem: Self Care Deficits Care Plan (Adult)  Goal: *Acute Goals and Plan of Care (Insert Text)  Occupational Therapy Goals  Initiated 3/2/2017  1. Patient will perform grooming in standing VSS with modified independence within 7 day(s). 2. Patient will perform upper body dressing with supervision/set-up within 7 day(s). 3. Patient will perform lower body dressing with modified independence within 7 day(s). 4. Patient will perform toilet transfers with modified independence within 7 day(s). 5. Patient will perform all aspects of toileting with modified independence within 7 day(s). 6. Patient will participate in upper extremity therapeutic exercise/activities with modified independence for 10 minutes within 7 day(s). 7. Patient will utilize energy conservation techniques during functional activities with verbal, visual and tactile cues within 7 day(s). OCCUPATIONAL THERAPY TREATMENT  Patient: Phyllis Rodriguez (89 y.o. female)  Date: 3/3/2017  Diagnosis: Acute cholecystitis  cholecystitis  ACUTE CHOLECYSTITIS <principal problem not specified>  Procedure(s) (LRB):  CHOLECYSTECTOMY LAPAROSCOPIC WITH CHOLANGIOGRAM (N/A) 3 Days Post-Op  Precautions: Contact, Fall (c-diff? LVEF 25%)  Chart, occupational therapy assessment, plan of care, and goals were reviewed. ASSESSMENT:  Patient reports she is miserable with diarrhea still at least every 2 hours and every time she eats clear liquids; anxious re discharge secondary to fear of BM accidents and fall risks related to needing to rush to toilet so often. Performing transfers CGA and has BSC for home use; will need assist for bowel accidents; recommend she wear disposable undergarment so she is less likely to rush in unsafe manner. Reports her son is available 24/7 for PRN assist. [facility] to maximize safety, energy conservation and participation in ADLs and IADLs in the home environment.   Progression toward goals:  [X]          Improving appropriately and progressing toward goals  [ ]          Improving slowly and progressing toward goals  [ ]          Not making progress toward goals and plan of care will be adjusted       PLAN:  Patient continues to benefit from skilled intervention to address the above impairments. Continue treatment per established plan of care. Discharge Recommendations:  Home Health  Further Equipment Recommendations for Discharge:  TBA       SUBJECTIVE:   Patient stated I just feel so sick still, I feel terrible.       OBJECTIVE DATA SUMMARY:   Cognitive/Behavioral Status:  Neurologic State: Alert  Orientation Level: Oriented X4  Cognition: Appropriate decision making; Appropriate for age attention/concentration; Appropriate safety awareness; Follows commands  Perception: Appears intact  Perseveration: Perseverates during conversation (regarding having diarrhea)  Safety/Judgement: Awareness of environment; Fall prevention;Good awareness of safety precautions; Home safety  Functional Mobility and Transfers for ADLs:              Bed Mobility:  Rolling: Supervision  Supine to Sit: Supervision     Scooting: Supervision              Transfers:  Sit to Stand: Contact guard assistance     Bed to Chair: Contact guard assistance                                                                             Bathroom Mobility: Contact guard assistance                 Balance:  Sitting: Intact  Standing: Impaired; With support  Standing - Static: Good;Constant support  Standing - Dynamic : Fair  ADL Intervention:  Feeding  Feeding Assistance: Supervision/set-up (poor appetite; still clear liquids and diarrhea)     Grooming  Grooming Assistance: Supervision/set up                             [address]: Moderate assistance (still incontinent of bowl due to speed of onset)     Cognitive Retraining  Attention to Task: Single task  Maintains Attention For (Time): Greater than 10 minutes  Following Commands:  Follows two step commands/directions  Safety/Judgement: Awareness of environment; Fall prevention;Good awareness of safety precautions; Home safety        Therapeutic Exercises:   Trained patient in B UE AROM HEP, against gravity; goal of 30 cumulative minutes daily including benefit of 60% stronger immune system with consistent participation. Able to demonstrate B UE AROM without c/o increased abdominal pain. Pain:      \"My stomach hurts so bad, everytime I have the diarrhea. \"                 Activity Tolerance:    Primary limiting factor; at risk for too much inactivity with subsequent general debility/weakness; encouraged self discipline including up to [facility] rather than bedpan. Please refer to the flowsheet for vital signs taken during this treatment.   After treatment:   [ ]  Patient left in no apparent distress sitting up in chair  [X]  Patient left in no apparent distress in bed  [X]  Call bell left within reach  [ ]  Nursing notified  [ ]  Caregiver present  [ ]  Bed alarm activated      COMMUNICATION/COLLABORATION:   The patients plan of care was discussed with: Registered Nurse     Salima Herrera OTR/L  Time Calculation: 24 mins

## 2017-03-03 NOTE — PROGRESS NOTES
Nutrition Assessment:    RECOMMENDATIONS:   Continue Low Fat Diet  RD to add Ensure Clear TID    ASSESSMENT:   Chart reviewed, pt lying in bed awake and alert. She is s/p lap tana. Pt reports severe diarrhea and is worried about going home like this. She states she had no appetite PTA and that is why she lost so much weight. Pt has had ensure clear in the past and is open to trying it again. I encouraged her to drink this at home to get her weight up as supplement shakes tend to be high fat and may exacerbate diarrhea. Provided written education on Low Fat diet, pt agreed to review it later today. Dietitians Intervention(s)/Plan(s): Continue diet, add PO supplement, provided education  SUBJECTIVE/OBJECTIVE:   \"I can't go home like this\"   Diet Order: Other (comment) (Low Fat)  % Eaten:  Patient Vitals for the past 72 hrs:   % Diet Eaten   03/02/17 1310 0 %   03/02/17 0824 0 %   03/01/17 0952 50 %        Pertinent Medications:questran, protonix, hever Q, zosyn; IVF(D5, [email or web address]). Chemistries:  Lab Results   Component Value Date/Time    Sodium 139 03/03/2017 05:39 AM    Potassium 3.4 03/03/2017 05:39 AM    Chloride 106 03/03/2017 05:39 AM    CO2 23 03/03/2017 05:39 AM    Anion gap 10 03/03/2017 05:39 AM    Glucose 74 03/03/2017 05:39 AM    BUN 15 03/03/2017 05:39 AM    Creatinine 0.92 03/03/2017 05:39 AM    BUN/Creatinine ratio 16 03/03/2017 05:39 AM    GFR est AA >60 03/03/2017 05:39 AM    GFR est non-AA >60 03/03/2017 05:39 AM    Calcium 7.6 03/03/2017 05:39 AM    Albumin 2.3 03/01/2017 08:41 AM      Anthropometrics: Height: 5' 7\" (170.2 cm) Weight: 61.7 kg (136 lb 0.4 oz)    IBW (%IBW):   ( ) UBW (%UBW): 73.1 kg (161 lb 2.5 oz) (1 month ago ) (84.4 %)    BMI: Body mass index is 21.3 kg/(m^2).     This BMI is indicative of:  []Underweight   [x]Normal   []Overweight   [] Obesity   [] Extreme Obesity (BMI>40)  Estimated Nutrition Needs (Based on): 1521 Kcals/day (MSJ 1170 x 1.3) , 67 g (1gPro/kg) Protein  Carbohydrate: At Least 130 g/day  Fluids: 1600 mL/day    Last BM: 3/2-diarrhea   [x]Active     []Hyperactive  []Hypoactive       [] Absent   BS  Skin:    [] Intact   [x] Incision  [] Breakdown   [] DTI   [] Tears/Excoriation/Abrasion  []Edema [] Other: Wt Readings from Last 30 Encounters:   02/28/17 61.7 kg (136 lb 0.4 oz)   02/24/17 62.1 kg (136 lb 14.5 oz)   02/13/17 73 kg (161 lb)   01/26/17 73.1 kg (161 lb 2.5 oz)   01/17/17 66.9 kg (147 lb 8 oz)   12/22/16 68.2 kg (150 lb 5.7 oz)   12/14/16 70.1 kg (154 lb 8.7 oz)   10/21/16 83.9 kg (185 lb)   10/15/16 84.4 kg (186 lb)   10/03/16 83.8 kg (184 lb 11.9 oz)   04/11/15 89.4 kg (197 lb)   06/01/14 88.3 kg (194 lb 10.7 oz)   06/08/13 83.7 kg (184 lb 8.4 oz)   03/02/12 91.1 kg (200 lb 13.4 oz)      NUTRITION DIAGNOSES:   Problem:  Unintended weight loss      Etiology: related to current dx and n/v      Signs/Symptoms: as evidenced by 15.6% wt loss over the past month. Previous dx re: wt loss continues, pt continues with a poor appetite. NUTRITION INTERVENTIONS:  Meals/Snacks: General/healthful diet   Supplements: Commercial supplement              GOAL:   Pt will consume >50% of meals/supplements with lessening diarrhea in 2-4 days.      NUTRITION MONITORING AND EVALUATION   Previous Goal: Plan of care will be determined re: nutrition in 2-3 days  Previous Goal Met: Yes (diet advanced)   Previous Recommendations Implemented: Yes   Cultural, Gnosticist, or Ethnic Dietary Needs: None   LEARNING NEEDS (Diet, Food/Nutrient-Drug Interaction):    [x] None Identified   [x] Identified and Education Provided/Documented   [] Identified and Pt declined/was not appropriate      [x] Interdisciplinary Care Plan Reviewed/Documented    [x] Participated in Discharge Planning: Low Fat diet + Ensure Clear 2-3 x day    [] Interdisciplinary Rounds     NUTRITION RISK:    [x] High              [] Moderate           []  Low  []  Minimal/Uncompromised      Reda Pace Kecia Seals, [address] [place] [address]  Pager [phone]  Weekend Pager [phone]

## 2017-03-04 PROCEDURE — 74011250637 HC RX REV CODE- 250/637: Performed by: SURGERY

## 2017-03-04 PROCEDURE — 65270000029 HC RM PRIVATE

## 2017-03-04 PROCEDURE — 74011250637 HC RX REV CODE- 250/637: Performed by: FAMILY MEDICINE

## 2017-03-04 PROCEDURE — 74011250636 HC RX REV CODE- 250/636: Performed by: FAMILY MEDICINE

## 2017-03-04 PROCEDURE — 74011000250 HC RX REV CODE- 250: Performed by: FAMILY MEDICINE

## 2017-03-04 PROCEDURE — C9113 INJ PANTOPRAZOLE SODIUM, VIA: HCPCS | Performed by: FAMILY MEDICINE

## 2017-03-04 PROCEDURE — 74011250636 HC RX REV CODE- 250/636: Performed by: SURGERY

## 2017-03-04 RX ADMIN — HYDROMORPHONE HYDROCHLORIDE 2 MG: 2 TABLET ORAL at 12:48

## 2017-03-04 RX ADMIN — GABAPENTIN 300 MG: 300 CAPSULE ORAL at 16:55

## 2017-03-04 RX ADMIN — Medication 1 CAPSULE: at 08:28

## 2017-03-04 RX ADMIN — GABAPENTIN 300 MG: 300 CAPSULE ORAL at 21:56

## 2017-03-04 RX ADMIN — GABAPENTIN 300 MG: 300 CAPSULE ORAL at 08:28

## 2017-03-04 RX ADMIN — CHOLESTYRAMINE 4 G: 4 POWDER, FOR SUSPENSION ORAL at 12:48

## 2017-03-04 RX ADMIN — HYDROCHLOROTHIAZIDE 25 MG: 25 TABLET ORAL at 08:28

## 2017-03-04 RX ADMIN — LOPERAMIDE HYDROCHLORIDE 2 MG: 2 CAPSULE ORAL at 08:28

## 2017-03-04 RX ADMIN — CHOLESTYRAMINE 4 G: 4 POWDER, FOR SUSPENSION ORAL at 16:55

## 2017-03-04 RX ADMIN — ENOXAPARIN SODIUM 30 MG: 30 INJECTION SUBCUTANEOUS at 08:28

## 2017-03-04 RX ADMIN — LOPERAMIDE HYDROCHLORIDE 2 MG: 2 CAPSULE ORAL at 16:55

## 2017-03-04 RX ADMIN — LISINOPRIL 40 MG: 20 TABLET ORAL at 08:28

## 2017-03-04 RX ADMIN — CHOLESTYRAMINE 4 G: 4 POWDER, FOR SUSPENSION ORAL at 08:29

## 2017-03-04 RX ADMIN — SODIUM CHLORIDE 40 MG: 9 INJECTION INTRAMUSCULAR; INTRAVENOUS; SUBCUTANEOUS at 08:29

## 2017-03-04 RX ADMIN — METOPROLOL SUCCINATE 12.5 MG: 25 TABLET, EXTENDED RELEASE ORAL at 08:29

## 2017-03-04 RX ADMIN — AMIODARONE HYDROCHLORIDE 200 MG: 200 TABLET ORAL at 08:28

## 2017-03-04 NOTE — PROGRESS NOTES
End of Shift Nursing Note    Bedside shift change report given to Cee Araujo RN (oncoming nurse) by Arlyn Pope RN (offgoing nurse). Report included the following information SBAR, Kardex, Intake/Output, MAR and Accordion. Zone Phone:   [phone]    Significant changes during shift:       Non-emergent issues for physician to address:        Number times ambulated in hallway past shift: 0      Number of times OOB to chair past shift: 0    POD #: 5     Vital Signs:    Temp: 98 °F (36.7 °C)     Pulse (Heart Rate): 72     BP: (!) 153/93     Resp Rate: 18     O2 Sat (%): 100 %    Lines & Drains:     Urinary Catheter? No     Central Line? No     PICC Line? No       NG tube [] in [] removed [x] not applicable   Drains [x] in AD to Left Abdomen [] removed [] not applicable     Skin Integrity:      Wounds: no   Dressings Present: no    Wound Concerns: no      GI:    Current diet:  DIET REGULAR 50GM Fat  DIET NUTRITIONAL SUPPLEMENTS Breakfast, Lunch, Dinner; Ensure Clear    Nausea: NO  Vomiting: NO  Bowel Sounds: YES  Flatus: YES  Last Bowel Movement: today   Appearance: loose    Respiratory:  Supplemental O2: No      Device: room air       Incentive Spirometer: YES  Volume: 750  Coughing and Deep Breathing: YES  Oral Care: YES  Understanding (patient/family education): YES   Getting out of bed: YES  Head of bed elevation: YES    Patient Safety:    Falls Score: 1  Mobility Score: 1  Bed Alarm On? No  Sitter? No      Opportunity for questions and clarification was given to oncoming nurse. Patient bed is in low position, side rails are up x 2, door & observation blinds open as needed, call bell within reach and patient not in distress.     Arlyn Pope RN

## 2017-03-04 NOTE — PROGRESS NOTES
Surgery      Patient reports diarrhea(3 stools this AM)  Feeling fatigued, refusing to get out of bed    She says plan is to go home(approved for home health)    Patient uncomfortable going home today    PE stable    Check labs in AM  Needs to be up and moving. Kailash Puga MD, [facility]

## 2017-03-05 VITALS
OXYGEN SATURATION: 100 % | SYSTOLIC BLOOD PRESSURE: 177 MMHG | TEMPERATURE: 98 F | HEART RATE: 69 BPM | BODY MASS INDEX: 21.35 KG/M2 | RESPIRATION RATE: 16 BRPM | DIASTOLIC BLOOD PRESSURE: 89 MMHG | WEIGHT: 136.02 LBS | HEIGHT: 67 IN

## 2017-03-05 PROCEDURE — 74011250637 HC RX REV CODE- 250/637: Performed by: SURGERY

## 2017-03-05 PROCEDURE — 74011250636 HC RX REV CODE- 250/636: Performed by: SURGERY

## 2017-03-05 PROCEDURE — 74011000250 HC RX REV CODE- 250: Performed by: FAMILY MEDICINE

## 2017-03-05 PROCEDURE — 74011250636 HC RX REV CODE- 250/636: Performed by: FAMILY MEDICINE

## 2017-03-05 PROCEDURE — C9113 INJ PANTOPRAZOLE SODIUM, VIA: HCPCS | Performed by: FAMILY MEDICINE

## 2017-03-05 PROCEDURE — 74011250637 HC RX REV CODE- 250/637: Performed by: FAMILY MEDICINE

## 2017-03-05 RX ADMIN — HYDROCHLOROTHIAZIDE 25 MG: 25 TABLET ORAL at 10:21

## 2017-03-05 RX ADMIN — GABAPENTIN 300 MG: 300 CAPSULE ORAL at 10:20

## 2017-03-05 RX ADMIN — Medication 1 CAPSULE: at 10:21

## 2017-03-05 RX ADMIN — CHOLESTYRAMINE 4 G: 4 POWDER, FOR SUSPENSION ORAL at 10:20

## 2017-03-05 RX ADMIN — METOPROLOL SUCCINATE 12.5 MG: 25 TABLET, EXTENDED RELEASE ORAL at 10:21

## 2017-03-05 RX ADMIN — DEXTROSE MONOHYDRATE, SODIUM CHLORIDE, AND POTASSIUM CHLORIDE 25 ML/HR: 50; 4.5; .745 INJECTION, SOLUTION INTRAVENOUS at 03:11

## 2017-03-05 RX ADMIN — LISINOPRIL 40 MG: 20 TABLET ORAL at 10:21

## 2017-03-05 RX ADMIN — AMIODARONE HYDROCHLORIDE 200 MG: 200 TABLET ORAL at 10:21

## 2017-03-05 RX ADMIN — SODIUM CHLORIDE 40 MG: 9 INJECTION INTRAMUSCULAR; INTRAVENOUS; SUBCUTANEOUS at 10:20

## 2017-03-05 RX ADMIN — ENOXAPARIN SODIUM 30 MG: 30 INJECTION SUBCUTANEOUS at 10:21

## 2017-03-05 NOTE — PROGRESS NOTES
Pt repeated refused to get up to chair and to ambulate due to c/o stomach pains or being tired. Did finally get up to chair at dinner time to allow for incontinence care and for bedsheets to be changed. Pt was set up with basin to perform own personal hygiene. Declined suggestion to ambulate to bathroom for toileting and returned to bed.

## 2017-03-05 NOTE — PROGRESS NOTES
IV out, tip intact without redness or swelling, Vital signs are stable, discharge instructions have been reviewed and signed, scripts were given. Care plans and education completed. Pt home with family. Drain education completed and instructions given,  made home health agency aware of patient dc.

## 2017-03-05 NOTE — PROGRESS NOTES
End of Shift Nursing Note    Bedside shift change report given to ISAIAS Spann (oncoming nurse) by Anthony Patel RN (offgoing nurse). Report included the following information SBAR, Kardex, Intake/Output, MAR and Accordion. Zone Phone:   [phone]    Significant changes during shift:       Non-emergent issues for physician to address:        Number times ambulated in hallway past shift: 0      Number of times OOB to chair past shift: 0    POD #: 5     Vital Signs:    Temp: 97.5 °F (36.4 °C)     Pulse (Heart Rate): 76     BP: 150/80     Resp Rate: 16     O2 Sat (%): 100 %    Lines & Drains:     Urinary Catheter? No     Central Line? No     PICC Line? No       NG tube [] in [] removed [x] not applicable   Drains [x] in AD to Left Abdomen [] removed [] not applicable     Skin Integrity:      Wounds: no   Dressings Present: no    Wound Concerns: no      GI:    Current diet:  DIET REGULAR 50GM Fat  DIET NUTRITIONAL SUPPLEMENTS Breakfast, Lunch, Dinner; Ensure Clear    Nausea: NO  Vomiting: NO  Bowel Sounds: YES  Flatus: YES  Last Bowel Movement: today   Appearance: loose    Respiratory:  Supplemental O2: No      Device: room air       Incentive Spirometer: YES  Volume: 750  Coughing and Deep Breathing: YES  Oral Care: YES  Understanding (patient/family education): YES   Getting out of bed: YES  Head of bed elevation: YES    Patient Safety:    Falls Score: 1  Mobility Score: 1  Bed Alarm On? No  Sitter? No      Opportunity for questions and clarification was given to oncoming nurse. Patient bed is in low position, side rails are up x 2, door & observation blinds open as needed, call bell within reach and patient not in distress.     Anthony Patel RN

## 2017-03-05 NOTE — DISCHARGE SUMMARY
Physician Discharge Summary     Patient ID:  Cinthia Bustos  [id number]  38 y.o.  1945    Admit Date: 2/27/2017    Discharge Date: 3/5/2017    Admission Diagnoses: Acute cholecystitis;cholecystitis;ACUTE CHOLECYSTITIS    Discharge Diagnoses: Active Problems:    Acute cholecystitis (2/28/2017)         Admission Condition: Fair    Discharge Condition: Good    Last Procedure: Procedure(s):  CHOLECYSTECTOMY LAPAROSCOPIC WITH CHOLANGIOGRAM      Hospital Course:   Normal hospital course for this procedure. Consults: Internal Medicine    Disposition: home with home health    Patient Instructions:   Current Discharge Medication List      START taking these medications    Details   HYDROmorphone (DILAUDID) 2 mg tablet Take 1-2 Tabs by mouth every four (4) hours as needed for Pain. Max Daily Amount: 24 mg. Qty: 40 Tab, Refills: 0      cholestyramine-sucrose (QUESTRAN) 4 gram powder Take 1 g by mouth three (3) times daily (with meals) for 30 days. Qty: 1 Can, Refills: 0      loperamide (IMODIUM) 1 mg/5 mL solution Take 5 mL by mouth four (4) times daily as needed for Diarrhea. Qty: 60 mL, Refills: 0      ondansetron (ZOFRAN ODT) 4 mg disintegrating tablet Take 1 Tab by mouth every eight (8) hours as needed for Nausea. Qty: 10 Tab, Refills: 0         CONTINUE these medications which have NOT CHANGED    Details   gabapentin (NEURONTIN) 300 mg capsule Take 300 mg by mouth three (3) times daily. lisinopril (PRINIVIL, ZESTRIL) 40 mg tablet Take 40 mg by mouth daily. predniSONE (DELTASONE) 10 mg tablet Take 10 mg by mouth daily. hydroCHLOROthiazide (HYDRODIURIL) 25 mg tablet Take 25 mg by mouth daily. omeprazole (PRILOSEC) 20 mg capsule Take 20 mg by mouth daily. Indications: GASTROESOPHAGEAL REFLUX      amiodarone (CORDARONE) 200 mg tablet Take 1 Tab by mouth daily for 60 days.   Qty: 30 Tab, Refills: 1      midodrine (PROAMITINE) 10 mg tablet Take 1 Tab by mouth three (3) times daily (with meals) for 60 days. Qty: 90 Tab, Refills: 1         STOP taking these medications       HYDROcodone-acetaminophen (NORCO) 5-325 mg per tablet Comments:   Reason for Stopping:         metoprolol succinate (TOPROL-XL) 25 mg XL tablet Comments:   Reason for Stopping:             Activity: No lifting,or Strenuous exercise for 4 weeks  Diet: Low fat, Low cholesterol  Wound Care: As directed, empty drain    Follow-up with Dr Alfredito Jacob in 1 week.       Signed:  Beryl Gallardo MD  [id number] [address] [facility]  3/5/2017  9:45 AM

## 2017-03-05 NOTE — PROGRESS NOTES
End of Shift Nursing Note    Bedside shift change report given to Smiley Camarillo (oncoming nurse) by Abraham Duvall (offgoing nurse). Report included the following information SBAR and Intake/Output.       Significant changes during shift:    Diarrhea x2, poor PO intake (\"no appetite\")   Non-emergent issues for physician to address:   none     Number times ambulated in hallway past shift: 0      Number of times OOB to chair past shift: 1        Vital Signs:    Temp: 99 °F (37.2 °C)     Pulse (Heart Rate): 70     BP: 151/87     Resp Rate: 16     O2 Sat (%): 100 %      GI:    Current diet:  DIET REGULAR 50GM Fat  DIET NUTRITIONAL SUPPLEMENTS Breakfast, Lunch, Dinner; Ensure Clear    Nausea: NO  Vomiting: NO  Bowel Sounds: YES  Flatus: YES  Last Bowel Movement: today         Incentive Spirometer: YES  Volume: [place]

## 2017-03-10 ENCOUNTER — OFFICE VISIT (OUTPATIENT)
Dept: SURGERY | Age: 72
End: 2017-03-10

## 2017-03-10 VITALS
OXYGEN SATURATION: 99 % | HEIGHT: 67 IN | DIASTOLIC BLOOD PRESSURE: 78 MMHG | HEART RATE: 64 BPM | SYSTOLIC BLOOD PRESSURE: 140 MMHG | TEMPERATURE: 98.1 F | RESPIRATION RATE: 16 BRPM | BODY MASS INDEX: 21.03 KG/M2 | WEIGHT: 134 LBS

## 2017-03-10 DIAGNOSIS — Z09 POSTOPERATIVE EXAMINATION: Primary | ICD-10-CM

## 2017-03-10 NOTE — PROGRESS NOTES
Patient is here for follow-up. Patient underwent lap tana/IOC on 2/28/17. Doing well other than poor appetite. Path reviewed with the patient. PE:  Visit Vitals    /78 (BP 1 Location: Left arm, BP Patient Position: Sitting)    Pulse 64    Temp 98.1 °F (36.7 °C) (Oral)    Resp 16    Ht 5' 7\" (1.702 m)    Wt 60.8 kg (134 lb)    SpO2 99%    BMI 20.99 kg/m2     Abdomen:  [employer] C/D/I. AD with serosanguinous drainage.     Assessment:  S/p lap tana/IOC    Plan:  -D/c AD  -Encouraged to increase po  -f/u prn

## 2017-03-10 NOTE — MR AVS SNAPSHOT
Visit Information Date & Time Provider Department Dept. Phone Encounter #  
 3/10/2017  1:00 PM Rosendo Goodrich MD [facility] [id number] Upcoming Health Maintenance Date Due Hepatitis C Screening 1945 DTaP/Tdap/Td series (1 - Tdap) 8/3/1967 ZOSTER VACCINE AGE 60> 8/3/2006 GLAUCOMA SCREENING Q2Y 8/3/2011 OSTEOPOROSIS SCREENING (DEXA) 8/3/2011 Pneumococcal 65+ High/Highest Risk (1 of 2 - PCV13) 8/3/2011 MEDICARE YEARLY EXAM 8/3/2011 BREAST CANCER SCRN MAMMOGRAM 8/29/2015 FOBT Q 1 YEAR AGE 50-75 1/29/2018 Allergies as of 3/10/2017  Review Complete On: 3/10/2017 By: Bolivar Delong LPN Severity Noted Reaction Type Reactions Percocet [Oxycodone-acetaminophen]  01/09/2017    Other (comments) Confused Current Immunizations  Never Reviewed Name Date Influenza Vaccine 12/1/2016 Not reviewed this visit Vitals BP Pulse Temp Resp Height(growth percentile) Weight(growth percentile) 140/78 (BP 1 Location: Left arm, BP Patient Position: Sitting) 64 98.1 °F (36.7 °C) (Oral) 16 5' 7\" (1.702 m) 134 lb (60.8 kg) SpO2 BMI OB Status Smoking Status 99% 20.99 kg/m2 Postmenopausal Never Smoker BMI and BSA Data Body Mass Index Body Surface Area  
 20.99 kg/m 2 1.7 m 2 Preferred [facility] [address] [place] [address] [place]  [place] [phone] Your Updated Medication List  
  
   
This list is accurate as of: 3/10/17  2:09 PM.  Always use your most recent med list.  
  
  
  
  
 amiodarone 200 mg tablet Commonly known as:  CORDARONE Take 1 Tab by mouth daily for 60 days. cholestyramine-sucrose 4 gram powder Commonly known as:  Rach Gu Take 1 g by mouth three (3) times daily (with meals) for 30 days. gabapentin 300 mg capsule Commonly known as:  NEURONTIN  
 Take 300 mg by mouth three (3) times daily. hydroCHLOROthiazide 25 mg tablet Commonly known as:  HYDRODIURIL Take 25 mg by mouth daily. HYDROmorphone 2 mg tablet Commonly known as:  DILAUDID Take 1-2 Tabs by mouth every four (4) hours as needed for Pain. Max Daily Amount: 24 mg.  
  
 lisinopril 40 mg tablet Commonly known as:  Ospina Edman Take 40 mg by mouth daily. loperamide 1 mg/5 mL solution Commonly known as:  IMODIUM Take 5 mL by mouth four (4) times daily as needed for Diarrhea.  
  
 midodrine 10 mg tablet Commonly known as:  Alexei Croon Take 1 Tab by mouth three (3) times daily (with meals) for 60 days. omeprazole 20 mg capsule Commonly known as:  PRILOSEC Take 20 mg by mouth daily. Indications: GASTROESOPHAGEAL REFLUX  
  
 ondansetron 4 mg disintegrating tablet Commonly known as:  ZOFRAN ODT Take 1 Tab by mouth every eight (8) hours as needed for Nausea. predniSONE 10 mg tablet Commonly known as:  Janie Roers Take 10 mg by mouth daily. Introducing [facility]! Regla Harris introduces [employer] patient portal. Now you can access parts of your medical record, email your doctor's office, and request medication refills online. 1. In your internet browser, go to [email or web address] 2. Click on the First Time User? Click Here link in the Sign In box. You will see the New Member Sign Up page. 3. Enter your [employer] Access Code exactly as it appears below. You will not need to use this code after youve completed the sign-up process. If you do not sign up before the expiration date, you must request a new code. · [employer] Access Code: [email or web address] Expires: 4/9/2017 11:18 AM 
 
4. Enter the last four digits of your Social Security Number (xxxx) and Date of Birth (mm/dd/yyyy) as indicated and click Submit. You will be taken to the next sign-up page. 5. Create a [employer] ID. This will be your [employer] login ID and cannot be changed, so think of one that is secure and easy to remember. 6. Create a [employer] password. You can change your password at any time. 7. Enter your Password Reset Question and Answer. This can be used at a later time if you forget your password. 8. Enter your e-mail address. You will receive e-mail notification when new information is available in [address] 9. Click Sign Up. You can now view and download portions of your medical record. 10. Click the Download Summary menu link to download a portable copy of your medical information. If you have questions, please visit the Frequently Asked Questions section of the [employer] website. Remember, [employer] is NOT to be used for urgent needs. For medical emergencies, dial 911. Now available from your iPhone and Android! Please provide this summary of care documentation to your next provider. Your primary care clinician is listed as René Rivera. If you have any questions after today's visit, please call [phone].

## 2017-03-14 RX ORDER — CHOLESTYRAMINE 4 G/9G
POWDER, FOR SUSPENSION ORAL
Qty: 1134 G | Refills: 0 | Status: SHIPPED | OUTPATIENT
Start: 2017-03-14 | End: 2017-12-21

## 2017-06-06 ENCOUNTER — HOSPITAL ENCOUNTER (INPATIENT)
Age: 72
LOS: 6 days | Discharge: HOME HEALTH | DRG: 683 | End: 2017-06-13
Attending: EMERGENCY MEDICINE | Admitting: INTERNAL MEDICINE
Payer: MEDICARE

## 2017-06-06 ENCOUNTER — APPOINTMENT (OUTPATIENT)
Dept: GENERAL RADIOLOGY | Age: 72
DRG: 683 | End: 2017-06-06
Attending: EMERGENCY MEDICINE
Payer: MEDICARE

## 2017-06-06 DIAGNOSIS — K92.2 GASTROINTESTINAL HEMORRHAGE, UNSPECIFIED GASTROINTESTINAL HEMORRHAGE TYPE: Primary | ICD-10-CM

## 2017-06-06 DIAGNOSIS — D64.9 ANEMIA, UNSPECIFIED TYPE: ICD-10-CM

## 2017-06-06 DIAGNOSIS — N28.9 ACUTE RENAL INSUFFICIENCY: ICD-10-CM

## 2017-06-06 LAB
ALBUMIN SERPL BCP-MCNC: 3.1 G/DL (ref 3.5–5)
ALBUMIN/GLOB SERPL: 0.9 {RATIO} (ref 1.1–2.2)
ALP SERPL-CCNC: 86 U/L (ref 45–117)
ALT SERPL-CCNC: 19 U/L (ref 12–78)
ANION GAP BLD CALC-SCNC: 11 MMOL/L (ref 5–15)
AST SERPL W P-5'-P-CCNC: 28 U/L (ref 15–37)
BASOPHILS # BLD AUTO: 0 K/UL (ref 0–0.1)
BASOPHILS # BLD: 0 % (ref 0–1)
BILIRUB SERPL-MCNC: 0.7 MG/DL (ref 0.2–1)
BUN SERPL-MCNC: 24 MG/DL (ref 6–20)
BUN/CREAT SERPL: 16 (ref 12–20)
CALCIUM SERPL-MCNC: 9 MG/DL (ref 8.5–10.1)
CHLORIDE SERPL-SCNC: 109 MMOL/L (ref 97–108)
CK SERPL-CCNC: 64 U/L (ref 26–192)
CO2 SERPL-SCNC: 21 MMOL/L (ref 21–32)
CREAT SERPL-MCNC: 1.52 MG/DL (ref 0.55–1.02)
EOSINOPHIL # BLD: 0 K/UL (ref 0–0.4)
EOSINOPHIL NFR BLD: 0 % (ref 0–7)
ERYTHROCYTE [DISTWIDTH] IN BLOOD BY AUTOMATED COUNT: 18.9 % (ref 11.5–14.5)
GLOBULIN SER CALC-MCNC: 3.5 G/DL (ref 2–4)
GLUCOSE SERPL-MCNC: 129 MG/DL (ref 65–100)
HCT VFR BLD AUTO: 25 % (ref 35–47)
HGB BLD-MCNC: 8.1 G/DL (ref 11.5–16)
LACTATE SERPL-SCNC: 2 MMOL/L (ref 0.4–2)
LIPASE SERPL-CCNC: 58 U/L (ref 73–393)
LYMPHOCYTES # BLD AUTO: 22 % (ref 12–49)
LYMPHOCYTES # BLD: 1.9 K/UL (ref 0.8–3.5)
MCH RBC QN AUTO: 23.6 PG (ref 26–34)
MCHC RBC AUTO-ENTMCNC: 32.4 G/DL (ref 30–36.5)
MCV RBC AUTO: 72.9 FL (ref 80–99)
MONOCYTES # BLD: 0.6 K/UL (ref 0–1)
MONOCYTES NFR BLD AUTO: 7 % (ref 5–13)
NEUTS SEG # BLD: 5.9 K/UL (ref 1.8–8)
NEUTS SEG NFR BLD AUTO: 71 % (ref 32–75)
NRBC # BLD: 0.02 K/UL (ref 0–0.01)
NRBC BLD-RTO: 0.3 PER 100 WBC
PLATELET # BLD AUTO: 229 K/UL (ref 150–400)
POTASSIUM SERPL-SCNC: 3.8 MMOL/L (ref 3.5–5.1)
PROT SERPL-MCNC: 6.6 G/DL (ref 6.4–8.2)
RBC # BLD AUTO: 3.43 M/UL (ref 3.8–5.2)
SODIUM SERPL-SCNC: 141 MMOL/L (ref 136–145)
TROPONIN I SERPL-MCNC: 0.16 NG/ML
WBC # BLD AUTO: 8.4 K/UL (ref 3.6–11)
WBC NRBC COR # BLD: ABNORMAL 10*3/UL

## 2017-06-06 PROCEDURE — 84484 ASSAY OF TROPONIN QUANT: CPT | Performed by: EMERGENCY MEDICINE

## 2017-06-06 PROCEDURE — 96374 THER/PROPH/DIAG INJ IV PUSH: CPT

## 2017-06-06 PROCEDURE — 83690 ASSAY OF LIPASE: CPT | Performed by: EMERGENCY MEDICINE

## 2017-06-06 PROCEDURE — 71010 XR CHEST PORT: CPT

## 2017-06-06 PROCEDURE — 85025 COMPLETE CBC W/AUTO DIFF WBC: CPT | Performed by: EMERGENCY MEDICINE

## 2017-06-06 PROCEDURE — 82550 ASSAY OF CK (CPK): CPT | Performed by: EMERGENCY MEDICINE

## 2017-06-06 PROCEDURE — 74011250636 HC RX REV CODE- 250/636: Performed by: EMERGENCY MEDICINE

## 2017-06-06 PROCEDURE — 36415 COLL VENOUS BLD VENIPUNCTURE: CPT | Performed by: EMERGENCY MEDICINE

## 2017-06-06 PROCEDURE — 93005 ELECTROCARDIOGRAM TRACING: CPT

## 2017-06-06 PROCEDURE — 83605 ASSAY OF LACTIC ACID: CPT | Performed by: EMERGENCY MEDICINE

## 2017-06-06 PROCEDURE — 96361 HYDRATE IV INFUSION ADD-ON: CPT

## 2017-06-06 PROCEDURE — 80053 COMPREHEN METABOLIC PANEL: CPT | Performed by: EMERGENCY MEDICINE

## 2017-06-06 PROCEDURE — 99285 EMERGENCY DEPT VISIT HI MDM: CPT

## 2017-06-06 RX ORDER — ONDANSETRON 2 MG/ML
4 INJECTION INTRAMUSCULAR; INTRAVENOUS
Status: COMPLETED | OUTPATIENT
Start: 2017-06-06 | End: 2017-06-06

## 2017-06-06 RX ADMIN — SODIUM CHLORIDE 500 ML: 900 INJECTION, SOLUTION INTRAVENOUS at 22:50

## 2017-06-06 RX ADMIN — ONDANSETRON HYDROCHLORIDE 4 MG: 2 INJECTION, SOLUTION INTRAMUSCULAR; INTRAVENOUS at 20:29

## 2017-06-06 NOTE — IP AVS SNAPSHOT
[address] [facility] 
[phone] Patient: Mohan Basilio MRN: [id number] You are allergic to the following Allergen Reactions Percocet (Oxycodone-Acetaminophen) Other (comments) Confused Recent Documentation Height Weight BMI OB Status Smoking Status 1.702 m 59.9 kg 20.69 kg/m2 Postmenopausal Never Smoker Emergency Contacts Name Discharge Info Relation Home Work Mobile 23 Mirtha Weaver Said CAREGIVER [3] Son [22]   [phone] Mario Alberto Reinoso  Daughter [21] [phone] Nighat Rivas  Child [2] [phone] About your hospitalization You were admitted on:  June 7, 2017 You last received care in the:  [facility] 2 CARDIOPULMONARY CARE You were discharged on:  June 13, 2017 Unit phone number:  [phone] Why you were hospitalized Your primary diagnosis was:  Not on File Your diagnoses also included:  Accelerated Hypertension Providers Seen During Your Hospitalizations Provider Role Specialty Primary office phone Chel Henry MD Attending Provider Emergency Medicine [phone] Dory Michael MD Attending Provider Hospitalist [phone] Jadiel Ramirez MD Attending Provider Internal Medicine [phone] Your Primary Care Physician (PCP) Primary Care Physician Office Phone Office Fax Jordan Salcido [phone] Follow-up Information Follow up With Details Comments Contact Info Yeni Lomax III, DO Go on 7/5/2017 Cardiology follow up at 3:45 PM [address] [facility] 
[phone] Onel Valentine NP Go on 6/27/2017 PCP follow up at 10:30 AM Jim Martinez [address]  
[address] [address] [phone]  Dorothy Mansfield MD In 2 weeks for fluid pills ajustment & renal failure follow up [address]  [address] [facility] 
[phone] Meghan Sharp MD In 1 week For EGD/Colonoscopy as recommended [address] [facility] 
[phone] [address] [facility] [place] [address] 
[phone] Your Appointments Tuesday July 11, 2017  1:00 PM EDT New Patient with Kenton Hopson MD  
[facility] [address] [facility] [address] [facility] [address]  
[phone] Current Discharge Medication List  
  
START taking these medications Dose & Instructions Dispensing Information Comments Morning Noon Evening Bedtime  
 aspirin 81 mg chewable tablet Your next dose is:  TOMORROW Dose:  81 mg Take 1 Tab by mouth daily. Quantity:  30 Tab Refills:  0  
     
  
   
   
   
  
 atorvastatin 20 mg tablet Commonly known as:  LIPITOR Your next dose is:  TODAY Dose:  20 mg Take 1 Tab by mouth nightly. Quantity:  30 Tab Refills:  0  
     
   
   
   
  
  
 bumetanide 0.5 mg tablet Commonly known as:  Chayito Hove Your next dose is:  TOMORROW Dose:  0.5 mg Take 1 Tab by mouth daily. Quantity:  30 Tab Refills:  0  
     
  
   
   
   
  
 carvedilol 6.25 mg tablet Commonly known as:  Llana Milo Your next dose is:  TODAY Dose:  6.25 mg Take 1 Tab by mouth two (2) times daily (with meals). Quantity:  60 Tab Refills:  0  
     
   
   
  
   
  
 hydrALAZINE 10 mg tablet Commonly known as:  APRESOLINE Your next dose is:  TODAY Dose:  10 mg Take 1 Tab by mouth three (3) times daily. Quantity:  90 Tab Refills:  0  
     
   
   
   
  
 isosorbide mononitrate ER 30 mg tablet Commonly known as:  IMDUR Your next dose is:  TOMORROW Dose:  30 mg Take 1 Tab by mouth daily. Quantity:  30 Tab Refills:  0 CONTINUE these medications which have NOT CHANGED Dose & Instructions Dispensing Information Comments Morning Noon Evening Bedtime  
 cholestyramine-sucrose 4 gram powder MIX 1 SCOOPFUL WITH WATER AND DRINK BY MOUTH THREE TIMES DAILY WITH MEALS Quantity:  1134 g Refills:  0  
 **Patient requests 90 days supply**  
    
   
   
   
  
 gabapentin 300 mg capsule Commonly known as:  NEURONTIN Your next dose is:  TODAY Dose:  300 mg Take 300 mg by mouth three (3) times daily. Refills:  0  
     
   
   
   
  
 loperamide 1 mg/5 mL solution Commonly known as:  IMODIUM Dose:  1 tsp Take 5 mL by mouth four (4) times daily as needed for Diarrhea. Quantity:  60 mL Refills:  0  
     
   
   
   
  
 omeprazole 20 mg capsule Commonly known as:  PRILOSEC Your next dose is:  TOMORROW Dose:  20 mg Take 20 mg by mouth daily. Indications: GASTROESOPHAGEAL REFLUX Refills:  0  
     
   
   
   
  
 predniSONE 10 mg tablet Commonly known as:  Villegas Gang Your next dose is:  TOMORROW Dose:  10 mg Take 10 mg by mouth daily. Refills:  0 STOP taking these medications   
 hydroCHLOROthiazide 25 mg tablet Commonly known as:  HYDRODIURIL  
   
  
 lisinopril 40 mg tablet Commonly known as:  Toribio Rincon Where to Get Your Medications Information on where to get these meds will be given to you by the nurse or doctor. ! Ask your nurse or doctor about these medications  
  aspirin 81 mg chewable tablet  
 atorvastatin 20 mg tablet  
 bumetanide 0.5 mg tablet  
 carvedilol 6.25 mg tablet  
 hydrALAZINE 10 mg tablet  
 isosorbide mononitrate ER 30 mg tablet Discharge Instructions HOSPITALIST DISCHARGE INSTRUCTIONS 
 
NAME: Stefania Arellano Mao :  1945 MRN:  [id number] Date/Time:  2017 12:25 PM 
 
ADMIT DATE: 2017 DISCHARGE DATE: 6/13/2017 DISCHARGE DIAGNOSIS: 
Acute renal failure POA Essential hypertension POA peak /112 - now improved on BP meds adjusted by cardiology Chronic systolic CHF POA LVEF 70-54% - cont Bumex 0.5mg daily for now as per nephrology Dr Oliver Reed- follow up in 2 weeks in office as recommended Elevated troponin POA peak 0.16 now trending down - started on statin, Imdur, coreg, unable to give ACE- due to Renal failure Recurrent exertional chest pain POA improved - negative stress test this admission Moderate Pulmonary HTN PA pressure 58 Probable gastroenteritis POA resolved Anemia of chronic disease POA HgB dropped to 7.2, now stable Heme positive stool POA - Outpatient GI workup as recommended by Dr Destini Lopez Sick Sinus syndrome s/p PPM Jan 2017 Renal cell cancer s/p S/P right nephrectomy 12/2016 S/P cholecystectomy Active Problems: Accelerated hypertension (6/7/2017) MEDICATIONS: 
As per medication reconciliation  list 
· It is important that you take the medication exactly as they are prescribed. · Keep your medication in the bottles provided by the pharmacist and keep a list of the medication names, dosages, and times to be taken in your wallet. · Do not take other medications without consulting your doctor. Pain Management: per above medications What to do at [facility] Recommended diet:  Cardiac Diet and Low fat, Low cholesterol Recommended activity: Activity as tolerated If you have questions regarding the hospital related prescriptions or hospital related issues please call James Mcneill at . If you experience any of the following symptoms then please call your primary care physician or return to the emergency room if you cannot get hold of your doctor: 
Fever, chills, nausea, vomiting, diarrhea, change in mentation, falling, bleeding, shortness of breath, Follow Up: 
 Dr. Olamide Chance. Geri Medina NP  you are to call and set up an appointment to see them in 7-10 days. Dr Carl Jarquin (Gastroenterologist) for EGD/Colonoscopy as recommended in office Dr Eliseo Cervantes (Nephrology) in 2 weeks for further adjustment of Fluid pills. Dr Sherryle Holler (Cardiology) as needed Information obtained by : 
I understand that if any problems occur once I am at home I am to contact my physician. I understand and acknowledge receipt of the instructions indicated above. Physician's or R.N.'s Signature                                                                  Date/Time Patient or Representative Signature                                                          Date/Time Discharge Instructions Attachments/References HEART FAILURE ZONES: GENERAL INFO (ENGLISH) HEART FAILURE: AVOIDING TRIGGERS (ENGLISH) Discharge Orders None [employer] Announcement We are excited to announce that we are making your provider's discharge notes available to you in [employer]. You will see these notes when they are completed and signed by the physician that discharged you from your recent hospital stay. If you have any questions or concerns about any information you see in [employer], please call the Health Information Department where you were seen or reach out to your Primary Care Provider for more information about your plan of care. Introducing [facility]! Sakina Lackey introduces [employer] patient portal. Now you can access parts of your medical record, email your doctor's office, and request medication refills online. 1. In your internet browser, go to [email or web address] 2. Click on the First Time User? Click Here link in the Sign In box. You will see the New Member Sign Up page. 3. Enter your [employer] Access Code exactly as it appears below. You will not need to use this code after youve completed the sign-up process. If you do not sign up before the expiration date, you must request a new code. · [employer] Access Code: [email or web address] Expires: 9/5/2017  7:49 AM 
 
4. Enter the last four digits of your Social Security Number (xxxx) and Date of Birth (mm/dd/yyyy) as indicated and click Submit. You will be taken to the next sign-up page. 5. Create a [employer] ID. This will be your [employer] login ID and cannot be changed, so think of one that is secure and easy to remember. 6. Create a [employer] password. You can change your password at any time. 7. Enter your Password Reset Question and Answer. This can be used at a later time if you forget your password. 8. Enter your e-mail address. You will receive e-mail notification when new information is available in [address] 9. Click Sign Up. You can now view and download portions of your medical record. 10. Click the Download Summary menu link to download a portable copy of your medical information. If you have questions, please visit the Frequently Asked Questions section of the [employer] website. Remember, [employer] is NOT to be used for urgent needs. For medical emergencies, dial 911. Now available from your iPhone and Android! General Information Please provide this summary of care documentation to your next provider. Patient Signature:  ____________________________________________________________ Date:  ____________________________________________________________  
  
Kayla Ciro Provider Signature:  ____________________________________________________________ Date:  ____________________________________________________________ More Information Learning About Heart Failure Zones What are heart failure zones? Heart failure zones give you an easy way to see changes in your heart failure symptoms. They also tell you when you need to get help. Check every day to see which zone you are in. Green zone. You are doing well. This is where you want to be. · Your weight is stable. This means it is not going up or down. · You breathe easily. · You are sleeping well. You are able to lie flat without shortness of breath. · You can do your usual activities. Yellow zone. Be careful. Your symptoms are changing. Call your doctor. · You have new or increased shortness of breath. · You are dizzy or lightheaded, or you feel like you may faint. · You have sudden weight gain, such as 3 pounds or more in 2 to 3 days. · You have increased swelling in your legs, ankles, or feet. · You are so tired or weak that you cannot do your usual activities. · You are not sleeping well. Shortness of breath wakes you up at night. You need extra pillows. Your doctor's name: ____________________________________________________________ Your doctor's contact information: _________________________________________________ Red zone. This is an emergency. Call 911. You have symptoms of sudden heart failure, such as: 
· You have severe trouble breathing. · You cough up pink, foamy mucus. · You have a new irregular or fast heartbeat. You have symptoms of a heart attack. These may include: · Chest pain or pressure, or a strange feeling in the chest. 
· Sweating. · Shortness of breath. · Nausea or vomiting. · Pain, pressure, or a strange feeling in the back, neck, jaw, or upper belly or in one or both shoulders or arms. · Lightheadedness or sudden weakness. · A fast or irregular heartbeat. If you have symptoms of a heart attack: After you call 911, the  may tell you to chew 1 adult-strength or 2 to 4 low-dose aspirin. Wait for an ambulance. Do not try to drive yourself. Follow-up care is a key part of your treatment and safety. Be sure to make and go to all appointments, and call your doctor if you are having problems. It's also a good idea to know your test results and keep a list of the medicines you take. Where can you learn more? Go to http://nohemy-josé miguel.info/. Enter T174 in the search box to learn more about \"Learning About Heart Failure Zones. \" Current as of: January 27, 2016 Content Version: 11.2 © 5116-0238 [employer]. Care instructions adapted under license by [employer] (which disclaims liability or warranty for this information). If you have questions about a medical condition or this instruction, always ask your healthcare professional. [address] any warranty or liability for your use of this information. Avoiding Triggers With Heart Failure: Care Instructions Your Care Instructions Triggers are anything that make your heart failure flare up. A flare-up is also called \"sudden heart failure\" or \"acute heart failure. \" When you have a flare-up, fluid builds up in your lungs, and you have problems breathing. You might need to go to the hospital. By watching for changes in your condition and avoiding triggers, you can prevent heart failure flare-ups. Follow-up care is a key part of your treatment and safety. Be sure to make and go to all appointments, and call your doctor if you are having problems. It's also a good idea to know your test results and keep a list of the medicines you take. How can you care for yourself at home? Watch for changes in your weight and condition · Weigh yourself without clothing at the same time each day. Record your weight. Call your doctor if you gain 3 pounds or more in 2 to 3 days. A sudden weight gain may mean that your heart failure is getting worse. · Keep a daily record of your symptoms.  Write down any changes in how you feel, such as new shortness of breath, cough, or problems eating. Also record if your ankles are more swollen than usual and if you have to urinate in the night more often. Note anything that you ate or did that could have triggered these changes. Limit sodium Sodium causes your body to hold on to extra water. This may cause your heart failure symptoms to get worse. People get most of their sodium from processed foods. Fast food and restaurant meals also tend to be very high in sodium. · Your doctor may suggest that you limit sodium to 2,000 milligrams (mg) a day or less. That is less than 1 teaspoon of salt a day, including all the salt you eat in cooking or in packaged foods. · Read food labels on cans and food packages. They tell you how much sodium you get in one serving. Check the serving size. If you eat more than one serving, you are getting more sodium. · Be aware that sodium can come in forms other than salt, including monosodium glutamate (MSG), sodium citrate, and sodium bicarbonate (baking soda). MSG is often added to Asian food. You can sometimes ask for food without MSG or salt. · Slowly reducing salt will help you adjust to the taste. Take the salt shaker off the table. · Flavor your food with garlic, lemon juice, onion, vinegar, herbs, and spices instead of salt. Do not use soy sauce, steak sauce, onion salt, garlic salt, mustard, or ketchup on your food, unless it is labeled \"low-sodium\" or \"low-salt. \" 
· Make your own salad dressings, sauces, and ketchup without adding salt. · Use fresh or frozen ingredients, instead of canned ones, whenever you can. Choose low-sodium canned goods. · Eat less processed food and food from restaurants, including fast food. Exercise as directed Moderate, regular exercise is very good for your heart. It improves your blood flow and helps control your weight. But too much exercise can stress your heart and cause a heart failure flare-up. · Check with your doctor before you start an exercise program. 
· Walking is an easy way to get exercise. Start out slowly. Gradually increase the length and pace of your walk. Swimming, riding a bike, and using a treadmill are also good forms of exercise. · When you exercise, watch for signs that your heart is working too hard. You are pushing yourself too hard if you cannot talk while you are exercising. If you become short of breath or dizzy or have chest pain, stop, sit down, and rest. 
· Do not exercise when you do not feel well. Take medicines correctly · Take your medicines exactly as prescribed. Call your doctor if you think you are having a problem with your medicine. · Make a list of all the medicines you take. Include those prescribed to you by other doctors and any over-the-counter medicines, vitamins, or supplements you take. Take this list with you when you go to any doctor. · Take your medicines at the same time every day. It may help you to post a list of all the medicines you take every day and what time of day you take them. · Make taking your medicine as simple as you can. Plan times to take your medicines when you are doing other things, such as eating a meal or getting ready for bed. This will make it easier to remember to take your medicines. · Get organized. Use helpful tools, such as daily or weekly pill containers. When should you call for help? Call 911 if you have symptoms of sudden heart failure such as: 
· You have severe trouble breathing. · You cough up pink, foamy mucus. · You have a new irregular or rapid heartbeat. Call your doctor now or seek immediate medical care if: 
· You have new or increased shortness of breath. · You are dizzy or lightheaded, or you feel like you may faint. · You have sudden weight gain, such as 3 pounds or more in 2 to 3 days. · You have increased swelling in your legs, ankles, or feet. · You are suddenly so tired or weak that you cannot do your usual activities. Watch closely for changes in your health, and be sure to contact your doctor if you develop new symptoms. Where can you learn more? Go to http://nohemy-josé miguel.info/. Enter R250 in the search box to learn more about \"Avoiding Triggers With Heart Failure: Care Instructions. \" Current as of: November 15, 2016 Content Version: 11.2 © 0507-9351 weave energy. Care instructions adapted under license by [employer] (which disclaims liability or warranty for this information). If you have questions about a medical condition or this instruction, always ask your healthcare professional. [address] any warranty or liability for your use of this information.

## 2017-06-06 NOTE — ED TRIAGE NOTES
Assumed care of pt from EMS at this time, report received. Pt arrives with c/o cough, CP, vomiting, diarrhea x yesterday. Pt states CP started yesterday with productive cough, yellow-green sputum. Pt states x 3 episodes vomiting today, pt states able to maintain PO liquids, not solids. Pt with x 2 episodes diarrhea today, no blood noted to stool. Pt states 8/10 chest \"aching\" to substernal chest at this time. Pt denies any fevers, body aches, chills at home. Pt denies any urinary symptoms. Pt resting comfortably on the stretcher in a position of comfort.  Pt in no acute distress at this time.  Call bell within reach.  Side rails x 3.  Cardiac monitor x 2.  Stretcher locked in the lowest position.  Pt aware of plan to await for MD/PA-C/NP assessment, and pt/family verbalizes understanding.  Will continue to monitor CP, diarrhea, vomiting, cough.

## 2017-06-07 ENCOUNTER — APPOINTMENT (OUTPATIENT)
Dept: ULTRASOUND IMAGING | Age: 72
DRG: 683 | End: 2017-06-07
Attending: INTERNAL MEDICINE
Payer: MEDICARE

## 2017-06-07 PROBLEM — I10 ACCELERATED HYPERTENSION: Status: ACTIVE | Noted: 2017-06-07

## 2017-06-07 LAB
ATRIAL RATE: 90 BPM
CALCULATED P AXIS, ECG09: 34 DEGREES
CALCULATED R AXIS, ECG10: 13 DEGREES
CALCULATED T AXIS, ECG11: 144 DEGREES
DIAGNOSIS, 93000: NORMAL
P-R INTERVAL, ECG05: 156 MS
Q-T INTERVAL, ECG07: 440 MS
QRS DURATION, ECG06: 94 MS
QTC CALCULATION (BEZET), ECG08: 538 MS
TROPONIN I SERPL-MCNC: 0.12 NG/ML
VENTRICULAR RATE, ECG03: 90 BPM

## 2017-06-07 PROCEDURE — 65660000000 HC RM CCU STEPDOWN

## 2017-06-07 PROCEDURE — 74011636637 HC RX REV CODE- 636/637: Performed by: INTERNAL MEDICINE

## 2017-06-07 PROCEDURE — 76770 US EXAM ABDO BACK WALL COMP: CPT

## 2017-06-07 PROCEDURE — 74011250636 HC RX REV CODE- 250/636: Performed by: INTERNAL MEDICINE

## 2017-06-07 PROCEDURE — 84484 ASSAY OF TROPONIN QUANT: CPT | Performed by: INTERNAL MEDICINE

## 2017-06-07 PROCEDURE — 74011250637 HC RX REV CODE- 250/637: Performed by: INTERNAL MEDICINE

## 2017-06-07 PROCEDURE — 36415 COLL VENOUS BLD VENIPUNCTURE: CPT | Performed by: INTERNAL MEDICINE

## 2017-06-07 PROCEDURE — 93306 TTE W/DOPPLER COMPLETE: CPT

## 2017-06-07 RX ORDER — GABAPENTIN 300 MG/1
300 CAPSULE ORAL 3 TIMES DAILY
Status: DISCONTINUED | OUTPATIENT
Start: 2017-06-07 | End: 2017-06-10

## 2017-06-07 RX ORDER — PANTOPRAZOLE SODIUM 40 MG/1
40 TABLET, DELAYED RELEASE ORAL
Status: DISCONTINUED | OUTPATIENT
Start: 2017-06-07 | End: 2017-06-13 | Stop reason: HOSPADM

## 2017-06-07 RX ORDER — HEPARIN SODIUM 5000 [USP'U]/ML
5000 INJECTION, SOLUTION INTRAVENOUS; SUBCUTANEOUS EVERY 12 HOURS
Status: DISCONTINUED | OUTPATIENT
Start: 2017-06-07 | End: 2017-06-13 | Stop reason: HOSPADM

## 2017-06-07 RX ORDER — PREDNISONE 10 MG/1
10 TABLET ORAL DAILY
Status: DISCONTINUED | OUTPATIENT
Start: 2017-06-07 | End: 2017-06-13 | Stop reason: HOSPADM

## 2017-06-07 RX ORDER — ACETAMINOPHEN 325 MG/1
650 TABLET ORAL
Status: DISCONTINUED | OUTPATIENT
Start: 2017-06-07 | End: 2017-06-13 | Stop reason: HOSPADM

## 2017-06-07 RX ORDER — LABETALOL HYDROCHLORIDE 5 MG/ML
10 INJECTION, SOLUTION INTRAVENOUS
Status: DISCONTINUED | OUTPATIENT
Start: 2017-06-07 | End: 2017-06-08

## 2017-06-07 RX ORDER — ISOSORBIDE MONONITRATE 30 MG/1
30 TABLET, EXTENDED RELEASE ORAL DAILY
Status: DISCONTINUED | OUTPATIENT
Start: 2017-06-07 | End: 2017-06-13 | Stop reason: HOSPADM

## 2017-06-07 RX ORDER — SODIUM CHLORIDE 9 MG/ML
100 INJECTION, SOLUTION INTRAVENOUS CONTINUOUS
Status: DISCONTINUED | OUTPATIENT
Start: 2017-06-07 | End: 2017-06-08

## 2017-06-07 RX ORDER — HYDROCHLOROTHIAZIDE 25 MG/1
25 TABLET ORAL DAILY
Status: DISCONTINUED | OUTPATIENT
Start: 2017-06-07 | End: 2017-06-08

## 2017-06-07 RX ORDER — POLYETHYLENE GLYCOL 3350 17 G/17G
17 POWDER, FOR SOLUTION ORAL DAILY
Status: DISCONTINUED | OUTPATIENT
Start: 2017-06-07 | End: 2017-06-13 | Stop reason: HOSPADM

## 2017-06-07 RX ORDER — SODIUM CHLORIDE 0.9 % (FLUSH) 0.9 %
5-10 SYRINGE (ML) INJECTION AS NEEDED
Status: DISCONTINUED | OUTPATIENT
Start: 2017-06-07 | End: 2017-06-13 | Stop reason: HOSPADM

## 2017-06-07 RX ORDER — POLYETHYLENE GLYCOL 3350 17 G/17G
17 POWDER, FOR SOLUTION ORAL DAILY
Status: DISCONTINUED | OUTPATIENT
Start: 2017-06-07 | End: 2017-06-07

## 2017-06-07 RX ORDER — METOPROLOL TARTRATE 25 MG/1
25 TABLET, FILM COATED ORAL EVERY 12 HOURS
Status: DISCONTINUED | OUTPATIENT
Start: 2017-06-07 | End: 2017-06-08

## 2017-06-07 RX ORDER — SODIUM CHLORIDE 0.9 % (FLUSH) 0.9 %
5-10 SYRINGE (ML) INJECTION EVERY 8 HOURS
Status: DISCONTINUED | OUTPATIENT
Start: 2017-06-07 | End: 2017-06-13 | Stop reason: HOSPADM

## 2017-06-07 RX ORDER — ONDANSETRON 2 MG/ML
4 INJECTION INTRAMUSCULAR; INTRAVENOUS
Status: DISCONTINUED | OUTPATIENT
Start: 2017-06-07 | End: 2017-06-13 | Stop reason: HOSPADM

## 2017-06-07 RX ADMIN — ISOSORBIDE MONONITRATE 30 MG: 30 TABLET, EXTENDED RELEASE ORAL at 15:57

## 2017-06-07 RX ADMIN — HEPARIN SODIUM 5000 UNITS: 5000 INJECTION, SOLUTION INTRAVENOUS; SUBCUTANEOUS at 10:47

## 2017-06-07 RX ADMIN — SODIUM CHLORIDE 100 ML/HR: 900 INJECTION, SOLUTION INTRAVENOUS at 06:37

## 2017-06-07 RX ADMIN — PANTOPRAZOLE SODIUM 40 MG: 40 TABLET, DELAYED RELEASE ORAL at 10:47

## 2017-06-07 RX ADMIN — HYDROCHLOROTHIAZIDE 25 MG: 25 TABLET ORAL at 10:53

## 2017-06-07 RX ADMIN — GABAPENTIN 300 MG: 300 CAPSULE ORAL at 21:01

## 2017-06-07 RX ADMIN — METOPROLOL TARTRATE 25 MG: 25 TABLET ORAL at 21:01

## 2017-06-07 RX ADMIN — POLYETHYLENE GLYCOL 3350 17 G: 17 POWDER, FOR SOLUTION ORAL at 10:53

## 2017-06-07 RX ADMIN — SODIUM CHLORIDE 100 ML/HR: 900 INJECTION, SOLUTION INTRAVENOUS at 22:29

## 2017-06-07 RX ADMIN — HEPARIN SODIUM 5000 UNITS: 5000 INJECTION, SOLUTION INTRAVENOUS; SUBCUTANEOUS at 21:01

## 2017-06-07 RX ADMIN — Medication 10 ML: at 21:01

## 2017-06-07 RX ADMIN — Medication 10 ML: at 06:38

## 2017-06-07 RX ADMIN — METOPROLOL TARTRATE 25 MG: 25 TABLET ORAL at 06:34

## 2017-06-07 RX ADMIN — PREDNISONE 10 MG: 10 TABLET ORAL at 10:47

## 2017-06-07 RX ADMIN — GABAPENTIN 300 MG: 300 CAPSULE ORAL at 15:58

## 2017-06-07 RX ADMIN — GABAPENTIN 300 MG: 300 CAPSULE ORAL at 10:47

## 2017-06-07 RX ADMIN — Medication 10 ML: at 14:08

## 2017-06-07 NOTE — ED NOTES
Bedside and Verbal shift change report given to Calli KAYE RN (oncoming nurse) by Sydnee Brown RN (offgoing nurse). Report included the following information SBAR, Kardex, ED Summary, [facility] and Recent Results.

## 2017-06-07 NOTE — ED NOTES
Report received from ISAIAS Mccurdy. AKUA, Kardex, ED Summary and MAR was discussed. Fae Fleischer, RN     Pt. Off unit for ultrasound at this time.

## 2017-06-07 NOTE — ED NOTES
Bedside and Verbal shift change report given to Rosalia NINA RN (oncoming nurse) by Vincent Pond (offgoing nurse). Report included the following information SBAR, ED Summary, Intake/Output, MAR and Recent Results. Monitor x 3. Call bell in reach. Bed in lowest position, with side rails up x 2. Pt updated on plan of care. Resting at this time.

## 2017-06-07 NOTE — ED PROVIDER NOTES
HPI Comments: Marcus Fritz, 80 y.o. female with significant PMHx for MI, cardiac stents, HTN, GERD, and DM, presents ambulatory to ED [facility] ED with cc of sudden onset constant centralized CP since yesterday. Pt describes her pain as a pressure. She reports associated dyspnea on exertion. Pt also reports nausea, 3 episodes of vomiting, and 3 episodes of diarrhea today. She denies taking any medication for her symptoms. She states she has not taken her regular medication in 2 days due to her symptoms. Pt denies any recent sick contact or any recent use of antiobiotics. Pt specifically denies fever or chills. PCP: Moriah Calvillo NP  Cardiologist: Dr. Pamela Benz history significant for: - Tobacco, - EtOH, - Illicit Drug Use    There are no other complaints, changes, or physical findings at this time. The history is provided by the patient. No  was used. Past Medical History:   Diagnosis Date    Arthritis     Autoimmune disease ([facility] [address]     rheumatoid     CAD (coronary artery disease)     CHF (congestive heart failure) (HCC)     Chronic pain     neuropathy bilateral feet,knees    Diabetes ([address]     GERD (gastroesophageal reflux disease)     Hypertension     Ill-defined condition     anemia    Ill-defined condition     blood transfusion hx    MI, old     Other ill-defined conditions     high cholesterol    Renal cell carcinoma of right kidney (HCC)     s/p resection 12/16       Past Surgical History:   Procedure Laterality Date    CARDIAC SURG PROCEDURE UNLIST      three stents placed 2005    CARDIAC SURG PROCEDURE UNLIST      HX CHOLECYSTECTOMY  02/28/2017    lap tana with IOC.     HX PACEMAKER  2017/January    ICD    HX TONSILLECTOMY      HX UROLOGICAL  12/22/2016    RIGHT LAPOROSCOPIC HAND ASSISTED RADICAL NEPHRECTOMY         Family History:   Problem Relation Age of Onset    Cancer Mother      stomach    Other Father      aneurysym    Cancer Brother lung    Other Brother      stomach problems    Stroke Brother        Social History     Social History    Marital status:      Spouse name: N/A    Number of children: N/A    Years of education: N/A     Occupational History    Not on file. Social History Main Topics    Smoking status: Never Smoker    Smokeless tobacco: Never Used    Alcohol use No    Drug use: No    Sexual activity: Yes     Birth control/ protection: None     Other Topics Concern    Not on file     Social History Narrative         ALLERGIES: Percocet [oxycodone-acetaminophen]    Review of Systems   Constitutional: Negative for activity change, chills and fever. HENT: Negative for congestion and sore throat. Eyes: Negative for pain and redness. Respiratory: Positive for shortness of breath. Negative for cough and chest tightness. Cardiovascular: Positive for chest pain. Negative for palpitations. Gastrointestinal: Positive for diarrhea, nausea and vomiting. Negative for abdominal pain. Genitourinary: Negative for dysuria, frequency and urgency. Musculoskeletal: Negative for back pain and neck pain. Skin: Negative for rash. Neurological: Negative for syncope, light-headedness and headaches. Psychiatric/Behavioral: Negative for confusion. All other systems reviewed and are negative.     Patient Vitals for the past 12 hrs:   Temp Pulse Resp BP SpO2   06/07/17 0015 - 84 11 (!) 147/101 95 %   06/06/17 2330 - 85 13 (!) 146/102 96 %   06/06/17 2315 - 87 23 (!) 145/105 98 %   06/06/17 2300 - 85 17 (!) 146/105 99 %   06/06/17 2245 - 89 21 (!) 152/94 98 %   06/06/17 2230 - 88 17 151/88 96 %   06/06/17 2215 - 88 (!) 7 (!) 140/95 95 %   06/06/17 2200 - 91 16 (!) 145/100 99 %   06/06/17 2145 - 89 17 151/89 97 %   06/06/17 2130 - 90 22 (!) 80/30 98 %   06/06/17 2115 - 88 16 (!) 146/93 96 %   06/06/17 2100 - 87 17 (!) 145/104 95 %   06/06/17 2045 - 89 18 (!) 150/102 97 %   06/06/17 2030 - 89 17 (!) 153/103 99 % 06/06/17 1945 - 91 18 (!) 158/112 99 %   06/06/17 1942 97 °F (36.1 °C) 91 13 (!) 158/109 99 %       Physical Exam   Constitutional: She is oriented to person, place, and time. She appears well-developed and well-nourished. No distress. HENT:   Head: Normocephalic. Nose: Nose normal.   Mouth/Throat: Oropharynx is clear and moist. No oropharyngeal exudate. Eyes: Conjunctivae are normal. Pupils are equal, round, and reactive to light. No scleral icterus. Neck: Normal range of motion. Neck supple. No JVD present. No tracheal deviation present. No thyromegaly present. Cardiovascular: Normal rate, regular rhythm and intact distal pulses. Exam reveals no gallop and no friction rub. No murmur heard. Pulmonary/Chest: Effort normal and breath sounds normal. No stridor. No respiratory distress. She has no wheezes. She has no rales. Abdominal: Soft. Bowel sounds are normal. She exhibits no distension. There is no tenderness. There is no rebound and no guarding. Genitourinary:   Genitourinary Comments: Rectal exam: small non-thrombosed hemorrhoid     Musculoskeletal: Normal range of motion. She exhibits no edema. Lymphadenopathy:     She has no cervical adenopathy. Neurological: She is alert and oriented to person, place, and time. No cranial nerve deficit. She exhibits normal muscle tone. Coordination normal.   Skin: Skin is warm and dry. No rash noted. She is not diaphoretic. No erythema. Psychiatric: She has a normal mood and affect. Her behavior is normal.   Nursing note and vitals reviewed.        MDM  Number of Diagnoses or Management Options  Acute renal insufficiency:   Anemia, unspecified type:   Gastrointestinal hemorrhage, unspecified gastrointestinal hemorrhage type:   Diagnosis management comments: DDx: gastroenteritis, metabolic abnormality, ACS        Amount and/or Complexity of Data Reviewed  Clinical lab tests: ordered and reviewed  Tests in the radiology section of CPT®: ordered and reviewed  Tests in the medicine section of CPT®: ordered and reviewed  Review and summarize past medical records: yes  Discuss the patient with other providers: yes (Cardiology, hospitalist)  Independent visualization of images, tracings, or specimens: yes    Risk of Complications, Morbidity, and/or Mortality  General comments: Will admit for gi bleed, acute renal insufficiency, anemia; vss; Anjali Beauchamp MD        ED Course       Procedures    ED EKG interpretation: 19:42  Rhythm: normal sinus rhythm; and regular . Rate (approx.): 90; Axis: normal; CT Interval: normal; QRS interval: normal ; ST/T wave: non-specific changes; LVH with repolarization abnormality; This EKG was interpreted by Anjali Beauchamp MD,ED Provider. Procedure Note - Rectal Exam:   8:50 PM  Performed by: Anjali Beauchamp MD   Chaperoned by: nurse  Rectal exam performed. Dark brown stool was collected. Stool was Hemoccult tested, and found to be heme Positive. The procedure took 1-15 minutes, and pt tolerated well. Written by SONY Elleribe, as dictated by Anjali Beauchamp MD.    Consult Note:  10:23 PM  Anjali Beauchamp MD spoke with Dr. Speedy Gilbert,  Specialty: cardiology  Discussed pt's hx, disposition, and available diagnostic and imaging results. Reviewed care plans. Consultant agrees with plans as outlined. Dr. Speedy Gilbert will consult as needed. Written by SOYN Elleribe, as dictated by Anjali Beauchamp MD.    Consult Note:  10:24 PM  Anjali Beauchamp MD spoke with Dr. Maxcine Kawasaki,  Specialty: hospitalist  Discussed pt's hx, disposition, and available diagnostic and imaging results. Reviewed care plans. Consultant agrees with plans as outlined. Will evaluate patient for admission.   Written by SONY Elleribe, as dictated by Anjali Beauchamp MD.      LABORATORY TESTS:  Recent Results (from the past 12 hour(s))   EKG, 12 LEAD, INITIAL    Collection Time: 06/06/17  7:42 PM   Result Value Ref Range    Ventricular Rate 90 BPM Atrial Rate 90 BPM    P-R Interval 156 ms    QRS Duration 94 ms    Q-T Interval 440 ms    QTC Calculation (Bezet) 538 ms    Calculated P Axis 34 degrees    Calculated R Axis 13 degrees    Calculated T Axis 144 degrees    Diagnosis       Normal sinus rhythm  Left ventricular hypertrophy with repolarization abnormality  Abnormal ECG  When compared with ECG of 27-FEB-2017 16:42,  Questionable change in QRS axis  ST no longer depressed in Inferior leads  T wave inversion no longer evident in Inferior leads  QT has lengthened     CBC WITH AUTOMATED DIFF    Collection Time: 06/06/17  8:14 PM   Result Value Ref Range    WBC 8.4 3.6 - 11.0 K/uL    RBC 3.43 (L) 3.80 - 5.20 M/uL    HGB 8.1 (L) 11.5 - 16.0 g/dL    HCT 25.0 (L) 35.0 - 47.0 %    MCV 72.9 (L) 80.0 - 99.0 FL    MCH 23.6 (L) 26.0 - 34.0 PG    MCHC 32.4 30.0 - 36.5 g/dL    RDW 18.9 (H) 11.5 - 14.5 %    PLATELET 951 780 - 073 K/uL    NEUTROPHILS 71 32 - 75 %    LYMPHOCYTES 22 12 - 49 %    MONOCYTES 7 5 - 13 %    EOSINOPHILS 0 0 - 7 %    BASOPHILS 0 0 - 1 %    ABS. NEUTROPHILS 5.9 1.8 - 8.0 K/UL    ABS. LYMPHOCYTES 1.9 0.8 - 3.5 K/UL    ABS. MONOCYTES 0.6 0.0 - 1.0 K/UL    ABS. EOSINOPHILS 0.0 0.0 - 0.4 K/UL    ABS.  BASOPHILS 0.0 0.0 - 0.1 K/UL   LACTIC ACID, PLASMA    Collection Time: 06/06/17  8:14 PM   Result Value Ref Range    Lactic acid 2.0 0.4 - 2.0 MMOL/L   NUCLEATED RBC    Collection Time: 06/06/17  8:14 PM   Result Value Ref Range    NRBC 0.3 (H) 0  WBC    ABSOLUTE NRBC 0.02 (H) 0.00 - 0.01 K/uL    WBC CORRECTED FOR NR ADJUSTED FOR NUCLEATED RBC'S     TROPONIN I    Collection Time: 06/06/17  9:27 PM   Result Value Ref Range    Troponin-I, Qt. 0.16 (H) <3.06 ng/mL   METABOLIC PANEL, COMPREHENSIVE    Collection Time: 06/06/17  9:27 PM   Result Value Ref Range    Sodium 141 136 - 145 mmol/L    Potassium 3.8 3.5 - 5.1 mmol/L    Chloride 109 (H) 97 - 108 mmol/L    CO2 21 21 - 32 mmol/L    Anion gap 11 5 - 15 mmol/L    Glucose 129 (H) 65 - 100 mg/dL    BUN 24 (H) 6 - 20 MG/DL    Creatinine 1.52 (H) 0.55 - 1.02 MG/DL    BUN/Creatinine ratio 16 12 - 20      GFR est AA 41 (L) >60 ml/min/1.73m2    GFR est non-AA 34 (L) >60 ml/min/1.73m2    Calcium 9.0 8.5 - 10.1 MG/DL    Bilirubin, total 0.7 0.2 - 1.0 MG/DL    ALT (SGPT) 19 12 - 78 U/L    AST (SGOT) 28 15 - 37 U/L    Alk. phosphatase 86 45 - 117 U/L    Protein, total 6.6 6.4 - 8.2 g/dL    Albumin 3.1 (L) 3.5 - 5.0 g/dL    Globulin 3.5 2.0 - 4.0 g/dL    A-G Ratio 0.9 (L) 1.1 - 2.2     CK    Collection Time: 06/06/17  9:27 PM   Result Value Ref Range    CK 64 26 - 192 U/L   LIPASE    Collection Time: 06/06/17  9:27 PM   Result Value Ref Range    Lipase 58 (L) 73 - 393 U/L       IMAGING RESULTS:  XR CHEST PORT   Final Result  EXAM: XR CHEST PORT     INDICATION: CP, cough     COMPARISON: 1/29/2017     FINDINGS: A portable AP radiograph of the chest was obtained at 1952 hours. Left  axillary pacemaker with right atrial and ventricular leads is again shown. There is probably venous congestion with borderline-mild interstitial pulmonary  edema. There is no pneumothorax or pleural effusion. No consolidation is shown. Mild cardiac contour enlargement and thoracic aortic tortuosity appear  unchanged. The bones are mildly osteopenic.      IMPRESSION  IMPRESSION: Borderline interstitial pulmonary edema. MEDICATIONS GIVEN:  Medications   ondansetron (ZOFRAN) injection 4 mg (4 mg IntraVENous Given 6/6/17 2029)   sodium chloride 0.9 % bolus infusion 500 mL (500 mL IntraVENous New Bag 6/6/17 2250)       IMPRESSION:  1. Gastrointestinal hemorrhage, unspecified gastrointestinal hemorrhage type    2. Anemia, unspecified type    3. Acute renal insufficiency        PLAN:  1. Admit to Hospitalist    Admission Note:  10:44 PM  Patient is being admitted to the hospital by Dr. Viji Bassett. The results of their tests and reasons for their admission have been discussed with them and available family.  They convey agreement and understanding for the need to be admitted and for their admission diagnosis. Written by Gabe Rodriguez ED Scribe, as dictated by Kobe Simpson MD.    This note is prepared by Gabe Rodriguez and Ziyad Vences, acting as Scribes for Kobe Simpson MD.    Kobe Simpson MD: The scribe's documentation has been prepared under my direction and personally reviewed by me in its entirety. I confirm that the note above accurately reflects all work, treatment, procedures, and medical decision making performed by me.

## 2017-06-07 NOTE — ED NOTES
Monitor x 3. Call bell in reach. Bed in lowest position, with side rails up x 2. Pt updated on plan of care.

## 2017-06-07 NOTE — ED NOTES
TRANSFER - OUT REPORT:    Verbal report given to [facility] (name) on [id number] [facility]  being transferred to [address] (unit) for routine progression of care       Report consisted of patients Situation, Background, Assessment and   Recommendations(SBAR). Information from the following report(s) SBAR, Kardex, ED Summary and MAR was reviewed with the receiving nurse. Lines:   Peripheral IV 06/07/17 Right Antecubital (Active)   Site Assessment Clean, dry, & intact 6/7/2017  9:08 AM   Phlebitis Assessment 0 6/7/2017  9:08 AM   Infiltration Assessment 0 6/7/2017  9:08 AM   Dressing Status Clean, dry, & intact 6/7/2017  9:08 AM   Dressing Type Tape;Transparent 6/7/2017  9:08 AM   Hub Color/Line Status Pink;Flushed;Patent 6/7/2017  9:08 AM        Opportunity for questions and clarification was provided.

## 2017-06-07 NOTE — CONSULTS
GI Consultation Note Deisy Elias)    NAME: Alex Whaley : 1945 MRN: [id number]   ATTG: [unfilled] PCP: Nayeli Ariza. Caroline Rachel NP  Date/Time:  2017 9:43 AM  Subjective:   REASON FOR CONSULT:    +FOBT    Stefania is a 80 y.o.   female who I was asked to see for above. Pt has PMH pertinent for CAD s/p stent and presented to ED with CP. In ED pt was noted to be anemic (Hgb of 8.1) and rectal exam revealed dark brown stool and was heme occult positive. Pt denies any overt GI bleeding. No hematemesis, melena, hematochezia. Notably pt had an EGD in  by Dr. Rosa Tam and was found to have a 4 mm clean based DU and was +H. Pylori. Pt reports she completed her treatment back then for the H. Pylori. Pt reports she has been having n/v/diarrhea over the past 2-3 days. Pt reports she had a colonoscopy 5 years ago but reports she was not deemed 'clean enough'. No FH of CRC. Past Medical History:   Diagnosis Date    Arthritis     Autoimmune disease ([address]     rheumatoid     CAD (coronary artery disease)     CHF (congestive heart failure) (HCC)     Chronic pain     neuropathy bilateral feet,knees    Diabetes ([address]     GERD (gastroesophageal reflux disease)     Hypertension     Ill-defined condition     anemia    Ill-defined condition     blood transfusion hx    MI, old     Other ill-defined conditions     high cholesterol    Renal cell carcinoma of right kidney (HCC)     s/p resection       Past Surgical History:   Procedure Laterality Date    CARDIAC SURG PROCEDURE UNLIST      three stents placed     CARDIAC SURG PROCEDURE UNLIST      HX CHOLECYSTECTOMY  2017    lap tana with IOC.     HX PACEMAKER      ICD    HX TONSILLECTOMY      HX UROLOGICAL  2016    RIGHT LAPOROSCOPIC HAND ASSISTED RADICAL NEPHRECTOMY     Social History   Substance Use Topics    Smoking status: Never Smoker    Smokeless tobacco: Never Used    Alcohol use No      Family History   Problem Relation Age of Onset    Cancer Mother      stomach    Other Father      aneurysym   Tereza Lucero Cancer Brother      lung    Other Brother      stomach problems    Stroke Brother       Allergies   Allergen Reactions    Percocet [Oxycodone-Acetaminophen] Other (comments)     Confused      Home Medications:  Prior to Admission Medications   Prescriptions Last Dose Informant Patient Reported? Taking? cholestyramine-sucrose 4 gram powder   No No   Sig: MIX 1 SCOOPFUL WITH WATER AND DRINK BY MOUTH THREE TIMES DAILY WITH MEALS   gabapentin (NEURONTIN) 300 mg capsule 6/6/2017 at Unknown time  Yes Yes   Sig: Take 300 mg by mouth three (3) times daily. hydroCHLOROthiazide (HYDRODIURIL) 25 mg tablet 5/30/2017 at Unknown time  Yes Yes   Sig: Take 25 mg by mouth daily. lisinopril (PRINIVIL, ZESTRIL) 40 mg tablet 5/30/2017 at Unknown time  Yes Yes   Sig: Take 40 mg by mouth daily. loperamide (IMODIUM) 1 mg/5 mL solution 6/6/2017 at Unknown time  No Yes   Sig: Take 5 mL by mouth four (4) times daily as needed for Diarrhea. omeprazole (PRILOSEC) 20 mg capsule Unknown at Unknown time  Yes No   Sig: Take 20 mg by mouth daily. Indications: GASTROESOPHAGEAL REFLUX   predniSONE (DELTASONE) 10 mg tablet Unknown at Unknown time  Yes No   Sig: Take 10 mg by mouth daily.       Facility-Administered Medications: None     Hospital medications:  Current Facility-Administered Medications   Medication Dose Route Frequency    labetalol (NORMODYNE;TRANDATE) injection 10 mg  10 mg IntraVENous Q2H PRN    gabapentin (NEURONTIN) capsule 300 mg  300 mg Oral TID    predniSONE (DELTASONE) tablet 10 mg  10 mg Oral DAILY    hydroCHLOROthiazide (HYDRODIURIL) tablet 25 mg  25 mg Oral DAILY    acetaminophen (TYLENOL) tablet 650 mg  650 mg Oral Q4H PRN    ondansetron (ZOFRAN) injection 4 mg  4 mg IntraVENous Q4H PRN    pantoprazole (PROTONIX) tablet 40 mg  40 mg Oral ACB    heparin (porcine) injection 5,000 Units  5,000 Units SubCUTAneous Q12H    0.9% sodium chloride infusion  100 mL/hr IntraVENous CONTINUOUS    sodium chloride (NS) flush 5-10 mL  5-10 mL IntraVENous Q8H    sodium chloride (NS) flush 5-10 mL  5-10 mL IntraVENous PRN    metoprolol tartrate (LOPRESSOR) tablet 25 mg  25 mg Oral Q12H    polyethylene glycol (MIRALAX) packet 17 g  17 g Oral DAILY     Current Outpatient Prescriptions   Medication Sig    loperamide (IMODIUM) 1 mg/5 mL solution Take 5 mL by mouth four (4) times daily as needed for Diarrhea.  gabapentin (NEURONTIN) 300 mg capsule Take 300 mg by mouth three (3) times daily.  lisinopril (PRINIVIL, ZESTRIL) 40 mg tablet Take 40 mg by mouth daily.  hydroCHLOROthiazide (HYDRODIURIL) 25 mg tablet Take 25 mg by mouth daily.  cholestyramine-sucrose 4 gram powder MIX 1 SCOOPFUL WITH WATER AND DRINK BY MOUTH THREE TIMES DAILY WITH MEALS    predniSONE (DELTASONE) 10 mg tablet Take 10 mg by mouth daily.  omeprazole (PRILOSEC) 20 mg capsule Take 20 mg by mouth daily.  Indications: GASTROESOPHAGEAL REFLUX     REVIEW OF SYSTEMS:        [x]    Total of 11 systems reviewed as follows:  Const:  negative fever, negative chills, negative weight loss  Eyes:   negative diplopia or visual changes, negative eye pain  ENT:   negative coryza, negative sore throat  Resp:   negative cough, hemoptysis, dyspnea  Cards:  positive for chest pain  :  negative for frequency, dysuria and hematuria  Skin:   negative for rash and pruritus  Heme:  negative for easy bruising and gum/nose bleeding  MS:  negative for myalgias, arthralgias, back pain and muscle weakness  Neurolo:  negative for headaches, dizziness, vertigo, memory problems   Psych:  negative for feelings of anxiety, depression         Objective:   VITALS:    Visit Vitals    BP (!) 153/98 (BP 1 Location: Right arm, BP Patient Position: At rest)    Pulse 72    Temp 97.9 °F (36.6 °C)    Resp 17    Ht 5' 7\" (1.702 m)    Wt 60.8 kg (134 lb)    SpO2 100%    BMI 20.99 kg/m2     Temp (24hrs), Av.5 °F (36.4 °C), Min:97 °F (36.1 °C), Max:97.9 °F (36.6 °C)    PHYSICAL EXAM:   General:    Alert, cooperative, no distress, appears stated age. Head:   Normocephalic, without obvious abnormality, atraumatic. Eyes:   Conjunctivae clear, anicteric sclerae. Pupils are equal  Nose:  Nares normal. No drainage or sinus tenderness. Throat:    Lips, mucosa, and tongue normal.  No Thrush  Neck:  Supple, symmetrical,  no adenopathy, thyroid: non tender  Back:    Symmetric,  No CVA tenderness. Lungs:   CTA bilaterally. No wheezing/rhonchi/rales. Chest wall:  No tenderness or deformity. No Accessory muscle use. Heart:   Regular rate and rhythm,  no murmur, rub or gallop. Abdomen:   Soft, non-tender. Not distended. Bowel sounds normal. No masses  Extremities: Atraumatic, No cyanosis. No edema. No clubbing  Skin:     Texture, turgor normal. No rashes/lesions/jaundice  Lymph: Cervical, supraclavicular normal.  Psych:  Good insight. Not depressed. Not anxious or agitated. Neurologic: EOMs intact. No facial asymmetry. No aphasia or slurred speech. Normal   strength, A/O X 3.    Rectal:  Dark brown stool (per ED)    LAB DATA REVIEWED:    Recent Results (from the past 48 hour(s))   EKG, 12 LEAD, INITIAL    Collection Time: 17  7:42 PM   Result Value Ref Range    Ventricular Rate 90 BPM    Atrial Rate 90 BPM    P-R Interval 156 ms    QRS Duration 94 ms    Q-T Interval 440 ms    QTC Calculation (Bezet) 538 ms    Calculated P Axis 34 degrees    Calculated R Axis 13 degrees    Calculated T Axis 144 degrees    Diagnosis       Normal sinus rhythm  Left ventricular hypertrophy with repolarization abnormality  Abnormal ECG  When compared with ECG of 2017 16:42,  Questionable change in QRS axis  ST no longer depressed in Inferior leads  T wave inversion no longer evident in Inferior leads  QT has lengthened     CBC WITH AUTOMATED DIFF    Collection Time: 17  8:14 PM   Result Value Ref Range WBC 8.4 3.6 - 11.0 K/uL    RBC 3.43 (L) 3.80 - 5.20 M/uL    HGB 8.1 (L) 11.5 - 16.0 g/dL    HCT 25.0 (L) 35.0 - 47.0 %    MCV 72.9 (L) 80.0 - 99.0 FL    MCH 23.6 (L) 26.0 - 34.0 PG    MCHC 32.4 30.0 - 36.5 g/dL    RDW 18.9 (H) 11.5 - 14.5 %    PLATELET 879 996 - 946 K/uL    NEUTROPHILS 71 32 - 75 %    LYMPHOCYTES 22 12 - 49 %    MONOCYTES 7 5 - 13 %    EOSINOPHILS 0 0 - 7 %    BASOPHILS 0 0 - 1 %    ABS. NEUTROPHILS 5.9 1.8 - 8.0 K/UL    ABS. LYMPHOCYTES 1.9 0.8 - 3.5 K/UL    ABS. MONOCYTES 0.6 0.0 - 1.0 K/UL    ABS. EOSINOPHILS 0.0 0.0 - 0.4 K/UL    ABS. BASOPHILS 0.0 0.0 - 0.1 K/UL   LACTIC ACID, PLASMA    Collection Time: 06/06/17  8:14 PM   Result Value Ref Range    Lactic acid 2.0 0.4 - 2.0 MMOL/L   NUCLEATED RBC    Collection Time: 06/06/17  8:14 PM   Result Value Ref Range    NRBC 0.3 (H) 0  WBC    ABSOLUTE NRBC 0.02 (H) 0.00 - 0.01 K/uL    WBC CORRECTED FOR NR ADJUSTED FOR NUCLEATED RBC'S     TROPONIN I    Collection Time: 06/06/17  9:27 PM   Result Value Ref Range    Troponin-I, Qt. 0.16 (H) <1.63 ng/mL   METABOLIC PANEL, COMPREHENSIVE    Collection Time: 06/06/17  9:27 PM   Result Value Ref Range    Sodium 141 136 - 145 mmol/L    Potassium 3.8 3.5 - 5.1 mmol/L    Chloride 109 (H) 97 - 108 mmol/L    CO2 21 21 - 32 mmol/L    Anion gap 11 5 - 15 mmol/L    Glucose 129 (H) 65 - 100 mg/dL    BUN 24 (H) 6 - 20 MG/DL    Creatinine 1.52 (H) 0.55 - 1.02 MG/DL    BUN/Creatinine ratio 16 12 - 20      GFR est AA 41 (L) >60 ml/min/1.73m2    GFR est non-AA 34 (L) >60 ml/min/1.73m2    Calcium 9.0 8.5 - 10.1 MG/DL    Bilirubin, total 0.7 0.2 - 1.0 MG/DL    ALT (SGPT) 19 12 - 78 U/L    AST (SGOT) 28 15 - 37 U/L    Alk.  phosphatase 86 45 - 117 U/L    Protein, total 6.6 6.4 - 8.2 g/dL    Albumin 3.1 (L) 3.5 - 5.0 g/dL    Globulin 3.5 2.0 - 4.0 g/dL    A-G Ratio 0.9 (L) 1.1 - 2.2     CK    Collection Time: 06/06/17  9:27 PM   Result Value Ref Range    CK 64 26 - 192 U/L   LIPASE    Collection Time: 06/06/17  9:27 PM Result Value Ref Range    Lipase 58 (L) 73 - 393 U/L     IMAGING RESULTS:      Assessment/Plan:    80 y.o. F w/ PMH signficiant for CAD s/p stent, clean based small DU in 2003 and +H. Pylori s/p treatment presented to ED with CP and was found to have a positive troponin. Cardiac evaluation is ongoing currently. GI is consulted for anemia and +FOBT on rectal exam. Given patient's significant cardiac history and presenting chief complaint of CP and +TN along with lack of overt   bleeding and Hgb around baseline would defer any endoscopic evaluation at this time. ___________________________________________________  RECOMMENDATIONS:    - Agree with PO PPI therapy  - At this time would recommend any endoscopic evaluation to be done as outpatient pending Cardiac evaluation. Pt instructed to follow up in clinic to discuss likely EGD/Colonoscopy.   - Please contact us if pt were to develop any overt GI bleeding      ___________________________________________________  Care Plan discussed with:    [x]    Patient    [x]    Attending  [x ] Nurse     ___________________________________________________  GI: Nichole Hernandez MD

## 2017-06-07 NOTE — ED NOTES
Bedside and Verbal shift change report given to ISAIAS Mccurdy (oncoming nurse) by Demetra Wall (offgoing nurse). Report included the following information SBAR, ED Summary, Intake/Output, MAR and Recent Results. Monitor x 3. Call bell in reach. Bed in lowest position, with side rails up x 2. Pt updated on plan of care.

## 2017-06-07 NOTE — PROGRESS NOTES
TRANSFER - IN REPORT:    Verbal report received from Nasir Coker RN(name) on Stefania Go  being received from ED(unit) for routine progression of care      Report consisted of patients Situation, Background, Assessment and   Recommendations(SBAR). Information from the following report(s) SBAR, Kardex, ED Summary, Intake/Output, MAR, Recent Results, Med Rec Status and Cardiac Rhythm NSR was reviewed with the receiving nurse. Opportunity for questions and clarification was provided. Assessment completed upon patients arrival to unit and care assumed.

## 2017-06-07 NOTE — PROGRESS NOTES
[address] SHIFT NURSING NOTE    Bedside shift change report given to Χηνίτσα Isaac (oncoming nurse) by Kay Galeano (offgoing nurse). Report included the following information SBAR, Kardex, Intake/Output, MAR and Recent Results. SHIFT SUMMARY:         Admission Date 6/6/2017   Admission Diagnosis Accelerated hypertension   Consults IP CONSULT TO CARDIOLOGY  IP CONSULT TO CARDIOLOGY  IP CONSULT TO GASTROENTEROLOGY        Consults   [] PT   [] OT   [] Speech   [] Palliative      [] Hospice    [x] Case Management   [] None   Cardiac Monitoring   [x] Yes   [] No     Antibiotics   [] Yes   [x] No   GI Prophylaxis  (Ex: Protonix, Pepcid, etc,.)   [x] Yes   [] No          DVT Prophylaxis   SCDs:             Santiago stockings:         [x] Medication (Ex: Lovenox, Eliquis, Brilinta, Coumadin,  Heparin, etc..)   [] Contraindicated   [] No VTE needed       Urinary Catheter             LDAs               Peripheral IV 06/07/17 Right Antecubital (Active)   Site Assessment Clean, dry, & intact 6/7/2017  7:37 PM   Phlebitis Assessment 0 6/7/2017  7:37 PM   Infiltration Assessment 0 6/7/2017  7:37 PM   Dressing Status Clean, dry, & intact 6/7/2017  7:37 PM   Dressing Type Transparent 6/7/2017  7:37 PM   Hub Color/Line Status Pink; Infusing 6/7/2017  7:37 PM                      I/Os   Intake/Output Summary (Last 24 hours) at 06/07/17 1940  Last data filed at 06/07/17 1938   Gross per 24 hour   Intake          1301.67 ml   Output                0 ml   Net          1301.67 ml         Activity Level Activity Level: Up with Assistance     Activity Assistance: Partial (one person)   Diet Active Orders   Diet    DIET CARDIAC Regular      Purposeful Rounding every 1-2 hour?    [x] Yes    Zuri Score  Total Score: 3   Bed Alarm (If score 3 or >)   [x] Yes    [] Refused (See signed refusal form in chart)   Evan Score  Evan Score: 18       Evan Score (if score 14 or less)   [] PMT consult   [] Nutrition consult   [] Wound Care consult      [] Specialty bed         Influenza Vaccine Received Flu Vaccine for Current Season (usually Sept-March): Not Flu Season               Needs prior to discharge:   Home O2 required:    [] Yes   [x] No     If yes, how much O2 required?     Other:    Last Bowel Movement Date: 06/07/17   Readmission Risk Assessment Tool Score High Risk            23       Total Score        3 Relationship with PCP    11 More than 1 Admission in calendar year    5 Patient Insurance is Medicare, Medicaid or Self Pay    4 Charlson Comorbidity Score        Criteria that do not apply:    Patient Living Status    Patient Length of Stay > 5       Expected Length of Stay 3d 12h   Actual Length of Stay 0

## 2017-06-07 NOTE — ED NOTES
PT. Back to unit from echo. GI doctor at bedside to evaluate patient. PT. In position of comfort with call bell in reach.

## 2017-06-07 NOTE — CONSULTS
Consult    NAME: Kris Gardner   :  1945   MRN:  [id number]     Date/Time:  2017 11:13 AM    Patient PCP: Ivory Alexander. Octavio Arango, NP  ________________________________________________________________________     Assessment:     1. Elevated troponin  2. Acute on chronic kidney disease  3. Anemia; slightly worse @ 8.1 from baseline in   4. Tachy-danny syndrome s/p SJM DC-ICD 2017  5. Ischemic cardiomyopathy s/p prior AWMI with PCI (BMS) of LAD (severe obliterative disease in the distal LAD, some diagonals, and the RCA)  6. Nuclear stress  w/ EF 61% vineet gain in  w/ IWMI, AWMI and no ischemia; EF 33%  7. Hypertensive heart disease with chronic systolic heart failure; EF 25%  8. Diabetes  9. Hyperlipidemia -- intolerant of statins  10. Remote RIGHT nephrectomy for RCC   11. Obestiy  12. Arthritis        Plan:   EKG shows SR with LVH and mild repol changes  CXR with borderline ISE  U/S shows normal single left kidney    1. Trend troponins  2. HTN management per primary; on HCTZ (on ACEi as outpt)  3. Continue metoprolol 25mg Q12h  4. Start imdur 30mg daily  5. Please restart ASA 81mg when deemed safe from a GI perspective  6. Had been on lasix 20mg daily as outpt  7. Not sure an echo will be useful; has known ICM with low EF    Thank you for this consult and allowing me to take part in this patients care. Please call with questions. [x]        High complexity decision making was performed        Subjective:   CHIEF COMPLAINT: CP    HISTORY OF PRESENT ILLNESS:     Stefania is a 80 y.o.  female who presents with central CP PTA; described as a pressure associated with ROCK, n/v/d. No radiation with the CP and no sweats. She has not taken her meds for a couple of days. No CP now. In the ER she was really HTNive with mildly elevated TnI and increased sCr. We were asked to consult for work up and evaluation of the above problems.      Past Medical History: Diagnosis Date    Arthritis     Autoimmune disease ([facility] [address]     rheumatoid     CAD (coronary artery disease)     CHF (congestive heart failure) (HCC)     Chronic pain     neuropathy bilateral feet,knees    Diabetes ([facility] [address]     GERD (gastroesophageal reflux disease)     Hypertension     Ill-defined condition     anemia    Ill-defined condition     blood transfusion hx    MI, old     Other ill-defined conditions     high cholesterol    Renal cell carcinoma of right kidney [facility])     s/p resection 12/16      Past Surgical History:   Procedure Laterality Date    CARDIAC SURG PROCEDURE UNLIST      three stents placed 2005    CARDIAC SURG PROCEDURE UNLIST      HX CHOLECYSTECTOMY  02/28/2017    lap tana with IOC.     HX PACEMAKER  2017/January    ICD    HX TONSILLECTOMY      HX UROLOGICAL  12/22/2016    RIGHT LAPOROSCOPIC HAND ASSISTED RADICAL NEPHRECTOMY     Allergies   Allergen Reactions    Percocet [Oxycodone-Acetaminophen] Other (comments)     Confused      Meds:  See below  Social History   Substance Use Topics    Smoking status: Never Smoker    Smokeless tobacco: Never Used    Alcohol use No      Family History   Problem Relation Age of Onset    Cancer Mother      stomach    Other Father      aneurysym   José Donaldson Cancer Brother      lung    Other Brother      stomach problems    Stroke Brother        REVIEW OF SYSTEMS:     []         Unable to obtain  ROS due to ---   [x]         Total of 12 systems reviewed as follows:    Constitutional: negative fever, negative chills, negative weight loss  Eyes:   negative visual changes  ENT:   negative sore throat, tongue or lip swelling  Respiratory:  negative cough, negative dyspnea  Cards:  negative for chest pain, palpitations, lower extremity edema  GI:   negative for nausea, vomiting, diarrhea, and abdominal pain  Genitourinary: negative for frequency, dysuria  Integument:  negative for rash   Hematologic:  negative for easy bruising and gum/nose bleeding  Musculoskel: negative for myalgias,  back pain  Neurological:  negative for headaches, dizziness, vertigo, weakness  Behavl/Psych: negative for feelings of anxiety, depression     Pertinent Positives include :    Objective:      Physical Exam:    Last 24hrs VS reviewed since prior progress note. Most recent are:    Visit Vitals    BP (!) 155/95    Pulse 72    Temp 97.9 °F (36.6 °C)    Resp 17    Ht 5' 7\" (1.702 m)    Wt 60.8 kg (134 lb)    SpO2 100%    BMI 20.99 kg/m2     No intake or output data in the 24 hours ending 06/07/17 1113     General Appearance: Well developed, well nourished, alert & oriented x 3,    no acute distress. Ears/Nose/Mouth/Throat: Pupils equal and round, Hearing grossly normal.  Neck: Supple. JVP within normal limits. Carotids good upstrokes, with no bruit. Chest: Lungs clear to auscultation bilaterally. Cardiovascular: Regular rate and rhythm, S1S2 normal, DORIAN  Abdomen: Soft, non-tender, bowel sounds are active. No organomegaly. Extremities: 1+ edema bilaterally. Femoral pulses +2, Distal Pulses +1. Skin: Warm and dry. Neuro: CN II-XII grossly intact, Strength and sensation grossly intact. []         Post-cath site without hematoma, bruit, tenderness, or thrill. Distal pulses intact. Data:      Telemetry:  SR    EKG:  SR, LVH, NSSTTWc  []  No new EKG for review. Prior to Admission medications    Medication Sig Start Date End Date Taking? Authorizing Provider   loperamide (IMODIUM) 1 mg/5 mL solution Take 5 mL by mouth four (4) times daily as needed for Diarrhea. 3/3/17  Yes Carlos A Moore MD   gabapentin (NEURONTIN) 300 mg capsule Take 300 mg by mouth three (3) times daily. Yes Historical Provider   lisinopril (PRINIVIL, ZESTRIL) 40 mg tablet Take 40 mg by mouth daily. Yes Historical Provider   hydroCHLOROthiazide (HYDRODIURIL) 25 mg tablet Take 25 mg by mouth daily.    Yes Historical Provider   cholestyramine-sucrose 4 gram powder MIX 1 SCOOPFUL WITH WATER AND DRINK BY MOUTH THREE TIMES DAILY WITH MEALS 3/14/17   Andrey Conn MD   predniSONE (DELTASONE) 10 mg tablet Take 10 mg by mouth daily. Historical Provider   omeprazole (PRILOSEC) 20 mg capsule Take 20 mg by mouth daily. Indications: GASTROESOPHAGEAL REFLUX    Historical Provider       Recent Results (from the past 24 hour(s))   EKG, 12 LEAD, INITIAL    Collection Time: 06/06/17  7:42 PM   Result Value Ref Range    Ventricular Rate 90 BPM    Atrial Rate 90 BPM    P-R Interval 156 ms    QRS Duration 94 ms    Q-T Interval 440 ms    QTC Calculation (Bezet) 538 ms    Calculated P Axis 34 degrees    Calculated R Axis 13 degrees    Calculated T Axis 144 degrees    Diagnosis       Normal sinus rhythm  Left ventricular hypertrophy with repolarization abnormality  Abnormal ECG  When compared with ECG of 27-FEB-2017 16:42,  Questionable change in QRS axis  ST no longer depressed in Inferior leads  T wave inversion no longer evident in Inferior leads  QT has lengthened     CBC WITH AUTOMATED DIFF    Collection Time: 06/06/17  8:14 PM   Result Value Ref Range    WBC 8.4 3.6 - 11.0 K/uL    RBC 3.43 (L) 3.80 - 5.20 M/uL    HGB 8.1 (L) 11.5 - 16.0 g/dL    HCT 25.0 (L) 35.0 - 47.0 %    MCV 72.9 (L) 80.0 - 99.0 FL    MCH 23.6 (L) 26.0 - 34.0 PG    MCHC 32.4 30.0 - 36.5 g/dL    RDW 18.9 (H) 11.5 - 14.5 %    PLATELET 273 310 - 484 K/uL    NEUTROPHILS 71 32 - 75 %    LYMPHOCYTES 22 12 - 49 %    MONOCYTES 7 5 - 13 %    EOSINOPHILS 0 0 - 7 %    BASOPHILS 0 0 - 1 %    ABS. NEUTROPHILS 5.9 1.8 - 8.0 K/UL    ABS. LYMPHOCYTES 1.9 0.8 - 3.5 K/UL    ABS. MONOCYTES 0.6 0.0 - 1.0 K/UL    ABS. EOSINOPHILS 0.0 0.0 - 0.4 K/UL    ABS.  BASOPHILS 0.0 0.0 - 0.1 K/UL   LACTIC ACID, PLASMA    Collection Time: 06/06/17  8:14 PM   Result Value Ref Range    Lactic acid 2.0 0.4 - 2.0 MMOL/L   NUCLEATED RBC    Collection Time: 06/06/17  8:14 PM   Result Value Ref Range    NRBC 0.3 (H) 0  WBC    ABSOLUTE NRBC 0.02 (H) 0.00 - 0.01 K/uL WBC CORRECTED FOR NR ADJUSTED FOR NUCLEATED RBC'S     TROPONIN I    Collection Time: 06/06/17  9:27 PM   Result Value Ref Range    Troponin-I, Qt. 0.16 (H) <0.22 ng/mL   METABOLIC PANEL, COMPREHENSIVE    Collection Time: 06/06/17  9:27 PM   Result Value Ref Range    Sodium 141 136 - 145 mmol/L    Potassium 3.8 3.5 - 5.1 mmol/L    Chloride 109 (H) 97 - 108 mmol/L    CO2 21 21 - 32 mmol/L    Anion gap 11 5 - 15 mmol/L    Glucose 129 (H) 65 - 100 mg/dL    BUN 24 (H) 6 - 20 MG/DL    Creatinine 1.52 (H) 0.55 - 1.02 MG/DL    BUN/Creatinine ratio 16 12 - 20      GFR est AA 41 (L) >60 ml/min/1.73m2    GFR est non-AA 34 (L) >60 ml/min/1.73m2    Calcium 9.0 8.5 - 10.1 MG/DL    Bilirubin, total 0.7 0.2 - 1.0 MG/DL    ALT (SGPT) 19 12 - 78 U/L    AST (SGOT) 28 15 - 37 U/L    Alk.  phosphatase 86 45 - 117 U/L    Protein, total 6.6 6.4 - 8.2 g/dL    Albumin 3.1 (L) 3.5 - 5.0 g/dL    Globulin 3.5 2.0 - 4.0 g/dL    A-G Ratio 0.9 (L) 1.1 - 2.2     CK    Collection Time: 06/06/17  9:27 PM   Result Value Ref Range    CK 64 26 - 192 U/L   LIPASE    Collection Time: 06/06/17  9:27 PM   Result Value Ref Range    Lipase 58 (L) 73 - 393 U/L        Awilda Wheat III DO

## 2017-06-07 NOTE — PROGRESS NOTES
1520  Patient arrived on unit. Oriented to room, no complaints of pain. 1630  Discussed troponin results with Dr. Jose Oden. New orders received. No more troponin levels needed and patient may eat.     Primary Nurse Nikki Monsalve and Angelina Cuenca RN performed a dual skin assessment on this patient No impairment noted  Evan score is 18

## 2017-06-07 NOTE — H&P
Hospitalist Admission Note    NAME: Vinny Waterman   :  1945   MRN:  [id number]     Date/Time:  2017 1:21 AM    Patient PCP: Elida Tsang. Flavio Oliva, NP  ________________________________________________________________________    Given the patient's current clinical presentation, I have a high level of concern for decompensation if discharged from the ED. Complex decision making was performed which includes reviewing the patient's available past medical records, laboratory results, and Xray films. I have also directly communicated my plan and discussed this case with the involved ED physician. My assessment of this patient's clinical condition and my plan of care is as follows:    Assessment / Plan:  Malignant hypertension  Acute renal failure  Elevated troponin  -labetalol IV prn sbp >170  -she did not take her home medicine x 3-4 days per her report. Is not on clonidine which could cause a rebound effect  -added metoprolol  -need to discuss with cardiology use of NTG and or CCB in this patient based on cardiac RF  -trend trop  -creat should drift down with some gentle hydration however baseline is 1.3-1.5. This could be LOYDA on CKD  -US renal pending    Acute blood loss anemia  GI bleed of unclear origin  -abdominal pain with GERD symptoms could represent UGI origin of bleeding. She had recent gastroenteritis symptoms  -? Ulcer vs gastritis  -ppi to continue  -GI to see and weigh in re use of anticoagulation  -hold on testing at this time. Chest pain, precordial, concerning for cardiac etiology  coronary artery disease  Hypertension, benign essential  hypercholesterolemia  -bumped troponin  -cardiology to follow.    -Crescendo symptoms concerning  -heparin sq for now  -TTE pending  -trend trop  -remain on statin if able  -follow BP - should remain on bb if able to tolerate (start metoprolol)  -no acei/arb due to renal dysfunction at this time  -no asa due to GI bleed  -cardiac risk factors:   Cardiac Risk Factors Y/N   Prior cardiac disease y   Family history of cardiac disease    Dyslipidemia y   Diabetes mellitus y   Obesity n   Tobacco abuse n   Sedentary lifestyle n   Male gender n   Hypertension y     I have personally reviewed the radiographs, laboratory data in Epic and decisions and statements above are based partially on this personal interpretation. Code Status: Full Code  DVT Prophylaxis: Hep SQ  GI Prophylaxis: PPI          Subjective:   CHIEF COMPLAINT: \"i was having chest pains\"    HISTORY OF PRESENT ILLNESS:     Stefania is a 80 y.o.  female with known history as listed below presents to ED with complaint noted above. Available records were reviewed at the time of H&P. Patient with chest pains crescendo status over last weeks. She notes they are sscp, heaviness in character, do not radiate. She notes they last 7-10min usually then subside. Sooner if she stops the activity that caused it. She notes difficulty ambulating to the commode over the last several weeks due to increased SOB, worse over the last few days. No overt fluid gains. No f/c. ++viral gastroenrteriits per her report. She notes increase nausea, soft stools. Non bloody. In ED noted to have bump trop, bumped creat. Severely elevated bp's. She freely admits to not taking mediactions over the last 3 days due to nausea however and poor PO intake. We were asked to admit for work up and evaluation of the above problems.      Past Medical History:   Diagnosis Date    Arthritis     Autoimmune disease ([facility] [address]     rheumatoid     CAD (coronary artery disease)     CHF (congestive heart failure) (HCC)     Chronic pain     neuropathy bilateral feet,knees    Diabetes ([facility] [address]     GERD (gastroesophageal reflux disease)     Hypertension     Ill-defined condition     anemia    Ill-defined condition     blood transfusion hx    MI, old     Other ill-defined conditions     high cholesterol    Renal cell carcinoma of right kidney [facility])     s/p resection 12/16      Past Surgical History:   Procedure Laterality Date    CARDIAC SURG PROCEDURE UNLIST      three stents placed 2005    CARDIAC SURG PROCEDURE UNLIST      HX CHOLECYSTECTOMY  02/28/2017    lap tana with IOC.  HX PACEMAKER  2017/January    ICD    HX TONSILLECTOMY      HX UROLOGICAL  12/22/2016    RIGHT LAPOROSCOPIC HAND ASSISTED RADICAL NEPHRECTOMY     Social History   Substance Use Topics    Smoking status: Never Smoker    Smokeless tobacco: Never Used    Alcohol use No      Family History   Problem Relation Age of Onset    Cancer Mother      stomach    Other Father      aneurysym   [employer] Cancer Brother      lung    Other Brother      stomach problems    Stroke Brother         Allergies   Allergen Reactions    Percocet [Oxycodone-Acetaminophen] Other (comments)     Confused        Prior to Admission medications    Medication Sig Start Date End Date Taking? Authorizing Provider   loperamide (IMODIUM) 1 mg/5 mL solution Take 5 mL by mouth four (4) times daily as needed for Diarrhea. 3/3/17  Yes Jeanmarie Selby MD   gabapentin (NEURONTIN) 300 mg capsule Take 300 mg by mouth three (3) times daily. Yes Historical Provider   lisinopril (PRINIVIL, ZESTRIL) 40 mg tablet Take 40 mg by mouth daily. Yes Historical Provider   hydroCHLOROthiazide (HYDRODIURIL) 25 mg tablet Take 25 mg by mouth daily. Yes Historical Provider   cholestyramine-sucrose 4 gram powder MIX 1 SCOOPFUL WITH WATER AND DRINK BY MOUTH THREE TIMES DAILY WITH MEALS 3/14/17   Jeanmarie Selby MD   predniSONE (DELTASONE) 10 mg tablet Take 10 mg by mouth daily. Historical Provider   omeprazole (PRILOSEC) 20 mg capsule Take 20 mg by mouth daily.  Indications: GASTROESOPHAGEAL REFLUX    Historical Provider     REVIEW OF SYSTEMS:  See HPI for details  General: negative for fever, chills, sweats, weakness, weight loss  Eyes: negative for blurred vision, eye pain, loss of vision, diplopia  Ear Nose and Throat: negative for rhinorrhea, pharyngitis, otalgia, tinnitus, speech or swallowing difficulties  Respiratory:  negative for pleuritic pain, cough, sputum production, wheezing, +ROCK with approx 10 feet.  No orthopnea  Cardiology:  ++ chest pain, no palpitations, PND, edema, syncope   Gastrointestinal: negative for abdominal pain, N/V, dysphagia, change in bowel habits, bleeding  Genitourinary: negative for frequency, urgency, dysuria, hematuria, incontinence  Muskuloskeletal : negative for arthralgia, myalgia  Hematology: negative for easy bruising, bleeding, lymphadenopathy  Dermatological: negative for rash, ulceration, mole change, new lesion  Endocrine: negative for hot flashes or polydipsia  Neurological: negative for headache, dizziness, confusion, focal weakness, paresthesia, memory loss, gait disturbance  Psychological: negative for anxiety, depression, agitation    Objective:   VITALS:    Visit Vitals    BP (!) 147/101    Pulse 84    Temp 97 °F (36.1 °C)    Resp 11    Ht 5' 7\" (1.702 m)    Wt 60.8 kg (134 lb)    SpO2 95%    BMI 20.99 kg/m2     PHYSICAL EXAM:     GENERAL:    WD y   WN y   Cachectic    Thin y   Obese    Disheveled y   Ill Appearing Critically    Ill Appearing Chronically y   Acute Distress n   Other      HEENT:    NC/AT/EOMI y   PERRLA y   Conjunctivae Pink    Conjunctivae Pale y   Moist Mucosa    Dry Mucosa y   Hearing intact to voice y   Other      NECK:    Supple y   Masses n   Thyroid Tender n   Other                   RESPIRATORY:    CTA bilaterally WITHOUT wheezing/rhonchi/rales or crackles y   Wheezing    Rhonchi    Crackles    Use of accessory muscles n   Other      CARDIAC:    regular rate and rhythm No murmurs/rubs/gallops    Murmur 2/6   Rubs n   Gallops n   Rate Regular/Irregular reg   Carotid Bruit Left/Right n   Lower Extremity Edema n   JVP  n   Other Normal capillary refill     ABDOMEN:    Soft non distended non tender +bowel sounds no HSM y   Rigid    Tenderness n   Hepatomegaly Splenomegaly    Distended n   Increased girth due to habitus    Normal/Hyper/Hypo Active Bowel Sounds nml   Other      SKIN / MUSCULOSKELETAL:    Rashes n   Ecchymosis n   Ulcers    Tight to palpitation    Turgor Good/Poor g   Cyanosis/Clubbing n   Amputation(s) n   Other      NEUROLOGY:    cranial nerves II-XII grossly intact y   Cranial Nerve Deficit    Facial Droop    Slurred Speech n   Aphasia    Strength Normal y   Weakness n   Meningismus/Kernig's Sign/ Brudzinsky n   Follows Commands y   Other      PSYCHIATRIC:    AAOx3 in no acute distress y   Insight Poor    Insight Good y   Alert and Oriented to Person     Alert and Oriented to Place    Alert and Oriented toTime    Depressed n   Anxious n   Agitated n   Lethargic n   Stuporous n   Sedated    Other    _______________________________________________________________________  Care Plan discussed with:    Comments   Patient x Discussed with patient in room. POC outlined and Questions answered (25   Family   None in room   RN x  5   Care Manager                    Consultant:  maria a CARDOZA MD 5   _______________________________________________________________________  Recommended Disposition:   Home with Family y   HH/PT/OT/RN    SNF/LTC    GUILHERME    ________________________________________________________________________  TOTAL TIME:  48 Minutes    Critical Care Provided     Minutes non procedure based      Comments   >50% of visit spent in counseling and coordination of care x Chart review  Discussion with patient and/or family and questions answered     ________________________________________________________________________  Signed: Bebo Smith MD    This note will not be viewable in [address] Procedures: see electronic medical records for all procedures/Xrays and details which were not copied into this note but were reviewed prior to creation of Plan.     LAB DATA REVIEWED:    Recent Results (from the past 24 hour(s))   EKG, 12 LEAD, INITIAL    Collection Time: 06/06/17  7:42 PM   Result Value Ref Range    Ventricular Rate 90 BPM    Atrial Rate 90 BPM    P-R Interval 156 ms    QRS Duration 94 ms    Q-T Interval 440 ms    QTC Calculation (Bezet) 538 ms    Calculated P Axis 34 degrees    Calculated R Axis 13 degrees    Calculated T Axis 144 degrees    Diagnosis       Normal sinus rhythm  Left ventricular hypertrophy with repolarization abnormality  Abnormal ECG  When compared with ECG of 27-FEB-2017 16:42,  Questionable change in QRS axis  ST no longer depressed in Inferior leads  T wave inversion no longer evident in Inferior leads  QT has lengthened     CBC WITH AUTOMATED DIFF    Collection Time: 06/06/17  8:14 PM   Result Value Ref Range    WBC 8.4 3.6 - 11.0 K/uL    RBC 3.43 (L) 3.80 - 5.20 M/uL    HGB 8.1 (L) 11.5 - 16.0 g/dL    HCT 25.0 (L) 35.0 - 47.0 %    MCV 72.9 (L) 80.0 - 99.0 FL    MCH 23.6 (L) 26.0 - 34.0 PG    MCHC 32.4 30.0 - 36.5 g/dL    RDW 18.9 (H) 11.5 - 14.5 %    PLATELET 368 252 - 332 K/uL    NEUTROPHILS 71 32 - 75 %    LYMPHOCYTES 22 12 - 49 %    MONOCYTES 7 5 - 13 %    EOSINOPHILS 0 0 - 7 %    BASOPHILS 0 0 - 1 %    ABS. NEUTROPHILS 5.9 1.8 - 8.0 K/UL    ABS. LYMPHOCYTES 1.9 0.8 - 3.5 K/UL    ABS. MONOCYTES 0.6 0.0 - 1.0 K/UL    ABS. EOSINOPHILS 0.0 0.0 - 0.4 K/UL    ABS.  BASOPHILS 0.0 0.0 - 0.1 K/UL   LACTIC ACID, PLASMA    Collection Time: 06/06/17  8:14 PM   Result Value Ref Range    Lactic acid 2.0 0.4 - 2.0 MMOL/L   NUCLEATED RBC    Collection Time: 06/06/17  8:14 PM   Result Value Ref Range    NRBC 0.3 (H) 0  WBC    ABSOLUTE NRBC 0.02 (H) 0.00 - 0.01 K/uL    WBC CORRECTED FOR NR ADJUSTED FOR NUCLEATED RBC'S     TROPONIN I    Collection Time: 06/06/17  9:27 PM   Result Value Ref Range    Troponin-I, Qt. 0.16 (H) <9.84 ng/mL   METABOLIC PANEL, COMPREHENSIVE    Collection Time: 06/06/17  9:27 PM   Result Value Ref Range    Sodium 141 136 - 145 mmol/L    Potassium 3.8 3.5 - 5.1 mmol/L    Chloride 109 (H) 97 - 108 mmol/L    CO2 21 21 - 32 mmol/L    Anion gap 11 5 - 15 mmol/L    Glucose 129 (H) 65 - 100 mg/dL    BUN 24 (H) 6 - 20 MG/DL    Creatinine 1.52 (H) 0.55 - 1.02 MG/DL    BUN/Creatinine ratio 16 12 - 20      GFR est AA 41 (L) >60 ml/min/1.73m2    GFR est non-AA 34 (L) >60 ml/min/1.73m2    Calcium 9.0 8.5 - 10.1 MG/DL    Bilirubin, total 0.7 0.2 - 1.0 MG/DL    ALT (SGPT) 19 12 - 78 U/L    AST (SGOT) 28 15 - 37 U/L    Alk.  phosphatase 86 45 - 117 U/L    Protein, total 6.6 6.4 - 8.2 g/dL    Albumin 3.1 (L) 3.5 - 5.0 g/dL    Globulin 3.5 2.0 - 4.0 g/dL    A-G Ratio 0.9 (L) 1.1 - 2.2     CK    Collection Time: 06/06/17  9:27 PM   Result Value Ref Range    CK 64 26 - 192 U/L   LIPASE    Collection Time: 06/06/17  9:27 PM   Result Value Ref Range    Lipase 58 (L) 73 - 393 U/L

## 2017-06-07 NOTE — ED NOTES
Pt ringing out on call bell. Reporting need to urinate. Assisted onto the bedpan. Pt then taken off bedpan and cleaned. Was repositioned for comfort. No other complaints voiced at this time. Call bell left within reach.

## 2017-06-07 NOTE — ED NOTES
Pt resting comfortably at this time, no further needs expressed. Pt still awaiting hospitalist consult.

## 2017-06-08 LAB
ABO + RH BLD: NORMAL
ANION GAP BLD CALC-SCNC: 5 MMOL/L (ref 5–15)
BASOPHILS # BLD AUTO: 0 K/UL (ref 0–0.1)
BASOPHILS # BLD: 0 % (ref 0–1)
BLOOD GROUP ANTIBODIES SERPL: NORMAL
BUN SERPL-MCNC: 26 MG/DL (ref 6–20)
BUN/CREAT SERPL: 17 (ref 12–20)
CALCIUM SERPL-MCNC: 7.5 MG/DL (ref 8.5–10.1)
CHLORIDE SERPL-SCNC: 107 MMOL/L (ref 97–108)
CHOLEST SERPL-MCNC: 177 MG/DL
CK MB CFR SERPL CALC: NORMAL % (ref 0–2.5)
CK MB SERPL-MCNC: <1 NG/ML (ref 5–25)
CK SERPL-CCNC: 29 U/L (ref 26–192)
CO2 SERPL-SCNC: 25 MMOL/L (ref 21–32)
CREAT SERPL-MCNC: 1.55 MG/DL (ref 0.55–1.02)
EOSINOPHIL # BLD: 0 K/UL (ref 0–0.4)
EOSINOPHIL NFR BLD: 0 % (ref 0–7)
ERYTHROCYTE [DISTWIDTH] IN BLOOD BY AUTOMATED COUNT: 18.7 % (ref 11.5–14.5)
GLUCOSE SERPL-MCNC: 85 MG/DL (ref 65–100)
HCT VFR BLD AUTO: 21.6 % (ref 35–47)
HCT VFR BLD AUTO: 22.5 % (ref 35–47)
HDLC SERPL-MCNC: 40 MG/DL
HDLC SERPL: 4.4 {RATIO} (ref 0–5)
HGB BLD-MCNC: 7.2 G/DL (ref 11.5–16)
HGB BLD-MCNC: 7.4 G/DL (ref 11.5–16)
LDLC SERPL CALC-MCNC: 107.6 MG/DL (ref 0–100)
LIPID PROFILE,FLP: ABNORMAL
LYMPHOCYTES # BLD AUTO: 27 % (ref 12–49)
LYMPHOCYTES # BLD: 1.9 K/UL (ref 0.8–3.5)
MAGNESIUM SERPL-MCNC: 1.5 MG/DL (ref 1.6–2.4)
MCH RBC QN AUTO: 24.7 PG (ref 26–34)
MCHC RBC AUTO-ENTMCNC: 33.3 G/DL (ref 30–36.5)
MCV RBC AUTO: 74 FL (ref 80–99)
MONOCYTES # BLD: 0.6 K/UL (ref 0–1)
MONOCYTES NFR BLD AUTO: 8 % (ref 5–13)
NEUTS SEG # BLD: 4.6 K/UL (ref 1.8–8)
NEUTS SEG NFR BLD AUTO: 65 % (ref 32–75)
PLATELET # BLD AUTO: 202 K/UL (ref 150–400)
POTASSIUM SERPL-SCNC: 3.5 MMOL/L (ref 3.5–5.1)
RBC # BLD AUTO: 2.92 M/UL (ref 3.8–5.2)
SODIUM SERPL-SCNC: 137 MMOL/L (ref 136–145)
SPECIMEN EXP DATE BLD: NORMAL
TRIGL SERPL-MCNC: 147 MG/DL (ref ?–150)
TSH SERPL DL<=0.05 MIU/L-ACNC: 0.63 UIU/ML (ref 0.36–3.74)
VLDLC SERPL CALC-MCNC: 29.4 MG/DL
WBC # BLD AUTO: 7.1 K/UL (ref 3.6–11)

## 2017-06-08 PROCEDURE — 74011250637 HC RX REV CODE- 250/637: Performed by: INTERNAL MEDICINE

## 2017-06-08 PROCEDURE — 84443 ASSAY THYROID STIM HORMONE: CPT | Performed by: INTERNAL MEDICINE

## 2017-06-08 PROCEDURE — 65660000000 HC RM CCU STEPDOWN

## 2017-06-08 PROCEDURE — 86900 BLOOD TYPING SEROLOGIC ABO: CPT | Performed by: INTERNAL MEDICINE

## 2017-06-08 PROCEDURE — 74011250636 HC RX REV CODE- 250/636: Performed by: INTERNAL MEDICINE

## 2017-06-08 PROCEDURE — 82550 ASSAY OF CK (CPK): CPT | Performed by: INTERNAL MEDICINE

## 2017-06-08 PROCEDURE — 74011636637 HC RX REV CODE- 636/637: Performed by: INTERNAL MEDICINE

## 2017-06-08 PROCEDURE — 83735 ASSAY OF MAGNESIUM: CPT | Performed by: INTERNAL MEDICINE

## 2017-06-08 PROCEDURE — 85018 HEMOGLOBIN: CPT | Performed by: INTERNAL MEDICINE

## 2017-06-08 PROCEDURE — 80061 LIPID PANEL: CPT | Performed by: INTERNAL MEDICINE

## 2017-06-08 PROCEDURE — 77010033678 HC OXYGEN DAILY

## 2017-06-08 PROCEDURE — 85025 COMPLETE CBC W/AUTO DIFF WBC: CPT | Performed by: INTERNAL MEDICINE

## 2017-06-08 PROCEDURE — 80048 BASIC METABOLIC PNL TOTAL CA: CPT | Performed by: INTERNAL MEDICINE

## 2017-06-08 PROCEDURE — 36415 COLL VENOUS BLD VENIPUNCTURE: CPT | Performed by: INTERNAL MEDICINE

## 2017-06-08 RX ORDER — HYDRALAZINE HYDROCHLORIDE 25 MG/1
25 TABLET, FILM COATED ORAL 3 TIMES DAILY
Status: DISCONTINUED | OUTPATIENT
Start: 2017-06-08 | End: 2017-06-13 | Stop reason: HOSPADM

## 2017-06-08 RX ORDER — HYDRALAZINE HYDROCHLORIDE 20 MG/ML
10 INJECTION INTRAMUSCULAR; INTRAVENOUS
Status: DISCONTINUED | OUTPATIENT
Start: 2017-06-08 | End: 2017-06-13 | Stop reason: HOSPADM

## 2017-06-08 RX ORDER — GUAIFENESIN 100 MG/5ML
81 LIQUID (ML) ORAL DAILY
Status: DISCONTINUED | OUTPATIENT
Start: 2017-06-09 | End: 2017-06-13 | Stop reason: HOSPADM

## 2017-06-08 RX ORDER — CARVEDILOL 6.25 MG/1
6.25 TABLET ORAL 2 TIMES DAILY WITH MEALS
Status: DISCONTINUED | OUTPATIENT
Start: 2017-06-08 | End: 2017-06-13 | Stop reason: HOSPADM

## 2017-06-08 RX ORDER — ATORVASTATIN CALCIUM 20 MG/1
20 TABLET, FILM COATED ORAL
Status: DISCONTINUED | OUTPATIENT
Start: 2017-06-08 | End: 2017-06-13 | Stop reason: HOSPADM

## 2017-06-08 RX ORDER — AMLODIPINE BESYLATE 5 MG/1
5 TABLET ORAL DAILY
Status: DISCONTINUED | OUTPATIENT
Start: 2017-06-09 | End: 2017-06-08

## 2017-06-08 RX ADMIN — GABAPENTIN 300 MG: 300 CAPSULE ORAL at 17:12

## 2017-06-08 RX ADMIN — PANTOPRAZOLE SODIUM 40 MG: 40 TABLET, DELAYED RELEASE ORAL at 10:26

## 2017-06-08 RX ADMIN — ATORVASTATIN CALCIUM 20 MG: 20 TABLET, FILM COATED ORAL at 21:58

## 2017-06-08 RX ADMIN — CARVEDILOL 6.25 MG: 6.25 TABLET, FILM COATED ORAL at 20:33

## 2017-06-08 RX ADMIN — HEPARIN SODIUM 5000 UNITS: 5000 INJECTION, SOLUTION INTRAVENOUS; SUBCUTANEOUS at 10:25

## 2017-06-08 RX ADMIN — GABAPENTIN 300 MG: 300 CAPSULE ORAL at 21:58

## 2017-06-08 RX ADMIN — ISOSORBIDE MONONITRATE 30 MG: 30 TABLET, EXTENDED RELEASE ORAL at 10:26

## 2017-06-08 RX ADMIN — PREDNISONE 10 MG: 10 TABLET ORAL at 10:27

## 2017-06-08 RX ADMIN — METOPROLOL TARTRATE 25 MG: 25 TABLET ORAL at 10:27

## 2017-06-08 RX ADMIN — HYDRALAZINE HYDROCHLORIDE 25 MG: 25 TABLET ORAL at 21:58

## 2017-06-08 RX ADMIN — HEPARIN SODIUM 5000 UNITS: 5000 INJECTION, SOLUTION INTRAVENOUS; SUBCUTANEOUS at 20:33

## 2017-06-08 RX ADMIN — HYDROCHLOROTHIAZIDE 25 MG: 25 TABLET ORAL at 10:25

## 2017-06-08 RX ADMIN — Medication 10 ML: at 21:58

## 2017-06-08 RX ADMIN — Medication 10 ML: at 02:57

## 2017-06-08 RX ADMIN — GABAPENTIN 300 MG: 300 CAPSULE ORAL at 10:25

## 2017-06-08 RX ADMIN — POLYETHYLENE GLYCOL 3350 17 G: 17 POWDER, FOR SOLUTION ORAL at 10:27

## 2017-06-08 RX ADMIN — SODIUM CHLORIDE 100 ML/HR: 900 INJECTION, SOLUTION INTRAVENOUS at 11:37

## 2017-06-08 NOTE — PROGRESS NOTES
Progress Note      6/8/2017 5:24 PM  NAME: Hector Jean   MRN:  [id number]   Admit Diagnosis: Accelerated hypertension      Problem List:      1. Indeterminate troponin  2. Acute on chronic kidney disease  3. Anemia; slightly worse @ 8.1 from baseline in 8/9s  4. 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 Ischemic cardiomyopathy s/p prior AWMI with PCI (BMS) of LAD (severe obliterative disease in the distal LAD, some diagonals, and the RCA)  6. Nuclear stress '05 w/ EF 61% vineet gain in 11/16 w/ IWMI, AWMI and no ischemia; EF 33%  7. Hypertensive heart disease with chronic systolic heart failure; EF 25%  8. Diabetes  9. Hyperlipidemia -- intolerant of statins  10. Remote RIGHT nephrectomy for RCC 12/16  11. Obestiy  12. Arthritis     Assessment/Plan:   EKG shows SR with LVH and mild repol changes  CXR with borderline ISE  U/S shows normal single left kidney  Remains anemic; stable    1. Continue ASA  2. BP management per primary  3. Continue imdur  4. Diuretics per primary  5. She can follow with me as an outpt for a stress test.  Appt with me in a few weeks. 6. Will sign off. [x]       High complexity decision making was performed in this patient at high risk for decompensation with multiple organ involvement. Subjective:     Stefania Tellez denies chest pain, dyspnea. Discussed with RN events overnight. Review of Systems:    Symptom Y/N Comments  Symptom Y/N Comments   Fever/Chills N   Chest Pain N    Poor Appetite N   Edema N    Cough N   Abdominal Pain N    Sputum N   Joint Pain N    SOB/ROCK N   Pruritis/Rash N    Nausea/vomit N   Tolerating PT/OT Y    Diarrhea N   Tolerating Diet Y    Constipation N   Other       Could NOT obtain due to:      Objective:      Physical Exam:    Last 24hrs VS reviewed since prior progress note.  Most recent are:    Visit Vitals    /83 (BP 1 Location: Left arm, BP Patient Position: At rest)    Pulse 73    Temp 98.4 °F (36.9 °C)    Resp 16  Ht 5' 7\" (1.702 m)    Wt 62.7 kg (138 lb 4.8 oz)    SpO2 97%    BMI 21.66 kg/m2       Intake/Output Summary (Last 24 hours) at 06/08/17 1724  Last data filed at 06/08/17 1520   Gross per 24 hour   Intake             4005 ml   Output              650 ml   Net             3355 ml        General Appearance: Well developed, well nourished, alert & oriented x 3,   no acute distress. Ears/Nose/Mouth/Throat: Pupils equal and round, Hearing grossly normal.  Neck: Supple. JVP within normal limits. Carotids good upstrokes, with no bruit. Chest: Lungs clear to auscultation bilaterally. Cardiovascular: Regular rate and rhythm, S1S2 normal, DORIAN  Abdomen: Soft, non-tender, bowel sounds are active. No organomegaly. Extremities: 1+ edema bilaterally. Femoral pulses +2, Distal Pulses +1. Skin: Warm and dry. Neuro: CN II-XII grossly intact, Strength and sensation grossly intact. []         Post-cath site without hematoma, bruit, tenderness, or thrill. Distal pulses intact. PMH/SH reviewed - no change compared to H&P    Data Review    Telemetry: sinus rhythm     EKG:   []  No new EKG for review    Lab Data Personally Reviewed:    Recent Labs      06/08/17   1652  06/08/17 0225 06/06/17 2014   WBC   --   7.1  8.4   HGB  7.4*  7.2*  8.1*   HCT  22.5*  21.6*  25.0*   PLT   --   202  229     No results for input(s): INR, PTP, APTT in the last 72 hours.     No lab exists for component: INREXT   Recent Labs      06/08/17 0225 06/06/17 2127   NA  137  141   K  3.5  3.8   CL  107  109*   CO2  25  21   BUN  26*  24*   CREA  1.55*  1.52*   GLU  85  129*   CA  7.5*  9.0   MG  1.5*   --      Recent Labs      06/08/17 0225 06/07/17   1306  06/06/17 2127   CPK  29   --   64   CKNDX  Cannot be calulated   --    --    TROIQ   --   0.12*  0.16*     Lab Results   Component Value Date/Time    Cholesterol, total 177 06/08/2017 02:25 AM    HDL Cholesterol 40 06/08/2017 02:25 AM    LDL, calculated 107.6 06/08/2017 02:25 AM Triglyceride 147 06/08/2017 02:25 AM    CHOL/HDL Ratio 4.4 06/08/2017 02:25 AM       Recent Labs      06/06/17   2127   SGOT  28   AP  86   TP  6.6   ALB  3.1*   GLOB  3.5   LPSE  58*     No results for input(s): PH, PCO2, PO2 in the last 72 hours.     Medications Personally Reviewed:    Current Facility-Administered Medications   Medication Dose Route Frequency    carvedilol (COREG) tablet 6.25 mg  6.25 mg Oral BID WITH MEALS    [START ON 6/9/2017] aspirin chewable tablet 81 mg  81 mg Oral DAILY    atorvastatin (LIPITOR) tablet 20 mg  20 mg Oral QHS    hydrALAZINE (APRESOLINE) 20 mg/mL injection 10 mg  10 mg IntraVENous Q6H PRN    hydrALAZINE (APRESOLINE) tablet 25 mg  25 mg Oral TID    gabapentin (NEURONTIN) capsule 300 mg  300 mg Oral TID    predniSONE (DELTASONE) tablet 10 mg  10 mg Oral DAILY    acetaminophen (TYLENOL) tablet 650 mg  650 mg Oral Q4H PRN    ondansetron (ZOFRAN) injection 4 mg  4 mg IntraVENous Q4H PRN    pantoprazole (PROTONIX) tablet 40 mg  40 mg Oral ACB    heparin (porcine) injection 5,000 Units  5,000 Units SubCUTAneous Q12H    sodium chloride (NS) flush 5-10 mL  5-10 mL IntraVENous Q8H    sodium chloride (NS) flush 5-10 mL  5-10 mL IntraVENous PRN    polyethylene glycol (MIRALAX) packet 17 g  17 g Oral DAILY    isosorbide mononitrate ER (IMDUR) tablet 30 mg  30 mg Oral DAILY         Rosemarie Pinon III, DO

## 2017-06-08 NOTE — PROGRESS NOTES
Occupational Therapy Screening:  Services are not indicated at this time. [facility] based on results obtained during the nursing admission assessment. The patients chart was reviewed and the patient is not appropriate for a skilled therapy evaluation at this time. Please consult occupational therapy if any therapy needs arise. Thank you.     Nick Antunez OTR/L

## 2017-06-08 NOTE — CARDIO/PULMONARY
C/P Rehab Note:    Stefania is a 80 y.o.  female who presents with central CP PTA; described as a pressure associated with ROCK, n/v/d. No radiation with the CP and no sweats. She has not taken her meds for a couple of days. No CP now per Cardiologist note. EF 25-30% on echo yesterday 6/7/17. PMH: Rheumatoid arthritis, CHF, DM, GERD, HTN, Anemia, old MI, Renal cell carcinoma of right kidney. Patient was seen by us for CHF teaching in March of 2017. Met with patient who is sitting up in bed. This was a follow-up visit to answer questions and reinforce prior teaching re: CHF, S&Ss, medication management, Low NA diet, daily weights, when to call the doctor and balancing rest/activity. Patient states she recalls teaching. New folder given with calendar to record weights. She states she has not been weighing daily, but will begin doing this when she goes back home. Encouraged her to notify MD if wt. Up  2-3 lb in 24 hr.  period and to follow low sodium diet. Verbalized understanding. Will continue to follow.

## 2017-06-08 NOTE — PROGRESS NOTES
Spiritual Care Partner Volunteer visited patient on 6/8/17. Documented by:    Ladan Peñaloza M.S.   Spiritual Care Department  If needs arise please call Harry-ELIAS ([phone])

## 2017-06-08 NOTE — PROGRESS NOTES
This is a 80 yr old AAF who presented ambulatory to ER with complaint of chest pain and has been admitted for malignant HTN, acute renal failure,  elevated troponin, GI bleed and CP. This is patient's 5th hospitalization in the last 6 months. Upon interview patient verified demographics and PCP. In fact patient had PCP appointment today but she called to let the office know she was hospitalized. At baseline patient stated she uses her cane for mobility at home though she has a rollator and walker. Patient does drive but her son takes her to medical appointments. Will follow hospital course and assess for discharge needs. Care Management Interventions  PCP Verified by CM: Yes (Had a PCP appointment scheduled for today but was hospitalized)  MyChart Signup: No  Discharge Durable Medical Equipment: No (owns a walker, rollator and cane)  Physical Therapy Consult: No  Occupational Therapy Consult: No  Speech Therapy Consult: No  Current Support Network: Own Home (Son and daughter live with patient)  Confirm Follow Up Transport: Family  Plan discussed with Pt/Family/Caregiver: Yes  Freedom of Choice Offered:  Yes

## 2017-06-08 NOTE — PROGRESS NOTES
Initial Nutrition Assessment:    INTERVENTIONS/RECOMMENDATIONS:   · Meals/Snacks: General/healthful diet: Continue Cardiac Diet  · Supplements: Commercial supplement: Glucerna (Vanilla) TID     ASSESSMENT:   Pt medically noted with acute on chronic kidney disease, HTN, chronic systolic heart failure, DM, and hyperlipidemia. PMH noted below. Chart reviewed; Pt was alert and oriented at time of visit, no family members present. Pt states an improving appetite, noteing her appetite is higher here than home. Pt denies N/V/D today. Pt notes that she is experiencing abdominal pain, likely from consuming miralax. Weight loss discussed, pt reports a continuous weight loss due to multiple hospitalizations from Dec 2016-March 2017 for a nephrectomy and cholecystectomy. Pt noted with 14% wt loss in 5 months and 26% wt loss in 8 months, severe and significant wt loss. Pt reports that her son and his girl friend prepare and shop for food mainly consisting of take out, frozen meals and some home cooked meals. Supplements discussed, pt reports drinking supplements PTA. Will add glucerna to aid in increasing PO intake. Will monitor pt appetite/PO intake, weight changes and acceptance to ONS.     Past Medical History:   Diagnosis Date    Arthritis     Autoimmune disease ([address]     rheumatoid     CAD (coronary artery disease)     CHF (congestive heart failure) (HCC)     Chronic pain     neuropathy bilateral feet,knees    Diabetes ([address]     GERD (gastroesophageal reflux disease)     Hypertension     Ill-defined condition     anemia    Ill-defined condition     blood transfusion hx    MI, old     Other ill-defined conditions     high cholesterol    Renal cell carcinoma of right kidney (HCC)     s/p resection 12/16     Diet Order: Cardiac  % Eaten:  Patient Vitals for the past 72 hrs:   % Diet Eaten   06/08/17 1139 90 %     Pertinent Medications: [x]Reviewed []Other: NS @100ml/hr; heparin; imdur; zofran; miralax; neurontin; hydroiuril; lopressor; protonix; prenisone  Pertinent Labs: [x]Reviewed []Other: BUN 26; Mg 1.5; Cr 1.55; Gluc   Food Allergies: [x]None []Other   Last BM: 6/7   [x]Active     []Hyperactive  []Hypoactive       [] Absent BS  Skin:    [x] Intact   [] Incision  [] Breakdown  [] Other:    Anthropometrics:   Height: 5' 7\" (170.2 cm) Weight: 62.7 kg (138 lb 4.8 oz)   IBW (%IBW):   ( ) UBW (%UBW):   (  %)   Last Weight Metrics:  Weight Loss Metrics 6/8/2017 3/10/2017 2/28/2017 2/24/2017 2/13/2017 1/26/2017 1/17/2017   Today's Wt 138 lb 4.8 oz 134 lb 136 lb 0.4 oz 136 lb 14.5 oz 161 lb 161 lb 2.5 oz 147 lb 8 oz   BMI 21.66 kg/m2 20.99 kg/m2 21.3 kg/m2 21.44 kg/m2 25.22 kg/m2 25.24 kg/m2 23.1 kg/m2       BMI: Body mass index is 21.66 kg/(m^2). This BMI is indicative of:   []Underweight    [x]Normal    []Overweight    [] Obesity   [] Extreme Obesity (BMI>40)     Estimated Nutrition Needs (Based on):   1534 Kcals/day (BMR 1180 x 1. 3AF) , 63 g (1.0g/kg bw) Protein  Carbohydrate: At Least 130 g/day  Fluids: 1550 mL/day (1ml/kcal) or per MD    Pt expected to meet estimated nutrient needs: [x]Yes []No    NUTRITION DIAGNOSES:   Problem:  Inadequate oral food/beverage intake      Etiology: related to current medical status     Signs/Symptoms: as evidenced by 14% wt loss in 5 months      NUTRITION INTERVENTIONS:  Meals/Snacks: General/healthful diet   Supplements: Commercial supplement              GOAL:   Pt will consume >75% meals/supplements next 3-5 days    LEARNING NEEDS (Diet, Food/Nutrient-Drug Interaction):    [x] None Identified   [] Identified and Education Provided/Documented   [] Identified and Pt declined/was not appropriate     Cultureal, Alevism, OR Ethnic Dietary Needs:    [x] None Identified   [] Identified and Addressed     [x] Interdisciplinary Care Plan Reviewed/Documented    [x] Discharge Planning: Heart Healthy Diet      MONITORING /EVALUATION:      Food/Nutrient Intake Outcomes:  Total energy intake, Liquid meal replacement  Physical Signs/Symptoms Outcomes: Weight/weight change, GI, Electrolyte and renal profile, Acid-base balance, GI profile    NUTRITION RISK:    [] High              [x] Moderate           []  Low  []  Minimal/Uncompromised    PT SEEN FOR:    []  MD Consult: []Calorie Count      []Diabetic Diet Education        []Diet Education     []Electrolyte Management     []General Nutrition Management and Supplements     []Management of Tube Feeding     []TPN Recommendations    [x]  RN Referral:  [x]MST score >=2     []Enteral/Parenteral Nutrition PTA     []Pregnant: Gestational DM or Multigestation     []Pressure Ulcer/Wound Care needs        []  Low BMI  []  DTR Referral       Tacey Poag  Pager [phone]  Weekend Pager [phone]

## 2017-06-08 NOTE — PROGRESS NOTES
Hospitalist Progress Note    NAME: Dasia Blood   :  1945   MRN:  [id number]     Admit date: 2017    Today's date: 17    PCP: Deepak Robles. KUMAR Miguel M.D. Cell [phone]      Assessment / Plan:  Probable gastroenteritis POA  Recurrent nausea and vomiting with diarrhea at home  None since admission, abdominal exam is benign  Able to take PO  No further work up unless recurrent    Anemia of chronic disease POA HgB dropped to 7.2  Heme positive stool POA  Prior baseline HgB ~ 9.0 range  HgB decreased with IVF  Stool dark brown, heme positive, but denies any overt GI bleeding  PPI  Serial HGbs  Check iron studies and ferritin  Ask GI to see in AM, not sure further w/u is needed    Acute renal failure POA creatinine 1.55  Essential hypertension POA peak /112  Prior baseline creatinine ~ 0.92  Renal US with single left kidney, no hydronephrosis  Hold lisinopril and HCTZ with elevated creatinine  Add hydralazine and change to coreg  PRN hydralazine  Heplock IVF  Check BNP    Elevated troponin POA peak 0.16  Recurrent exertional chest pain POA  Chronic systolic CHF POA LVEF 79-16%  Moderate Pulmonary HTN PA pressure 58  ASA 81 mg if HgB remains stable, coreg  Statin  Cardiology following, await their further input  Resume ACE In once creatinine improves  Stop IVF    Hyperlipidemia   Statin    Sick Sinus syndrome s/p PPM 2017    Renal cell cancer s/p S/P right nephrectomy 2016    S/P cholecystectomy     Code status: Full  Prophylaxis: Lovenox  Recommended Disposition: Home w/Family     Subjective:     Chief Complaint / Reason for Physician Visit  \"no vomiting or diarrhea since I have been here\". GI symptoms seemed to have resolved since admit  Vomitus was green, denied melena or BRBPR  No CP at present time, denies SOB  Denied  Discussed with RN events overnight.      Review of Systems:  Symptom Y/N Comments  Symptom Y/N Comments Fever/Chills n   Chest Pain n    Poor Appetite    Edema     Cough n   Abdominal Pain n    Sputum    Joint Pain     SOB/ROCK n   Pruritis/Rash     Nausea/vomit less   Tolerating PT/OT     Diarrhea less   Tolerating Diet Y    Constipation    Other       Could NOT obtain due to:      Objective:     VITALS:   Last 24hrs VS reviewed since prior progress note. Most recent are:  Patient Vitals for the past 24 hrs:   Temp Pulse Resp BP SpO2   06/08/17 1520 98.4 °F (36.9 °C) 73 16 132/83 97 %   06/08/17 1118 97.6 °F (36.4 °C) 79 16 (!) 140/93 98 %   06/08/17 0737 97.6 °F (36.4 °C) 72 18 144/87 98 %   06/08/17 0311 97.8 °F (36.6 °C) 70 18 (!) 143/98 95 %   06/07/17 2309 97 °F (36.1 °C) 69 18 121/78 95 %   06/07/17 1945 98 °F (36.7 °C) 81 18 141/80 91 %       Intake/Output Summary (Last 24 hours) at 06/08/17 1656  Last data filed at 06/08/17 1520   Gross per 24 hour   Intake             4005 ml   Output              650 ml   Net             3355 ml        Wt Readings from Last 12 Encounters:   06/08/17 62.7 kg (138 lb 4.8 oz)   03/10/17 60.8 kg (134 lb)   02/28/17 61.7 kg (136 lb 0.4 oz)   02/24/17 62.1 kg (136 lb 14.5 oz)   02/13/17 73 kg (161 lb)   01/26/17 73.1 kg (161 lb 2.5 oz)   01/17/17 66.9 kg (147 lb 8 oz)   12/22/16 68.2 kg (150 lb 5.7 oz)   12/14/16 70.1 kg (154 lb 8.7 oz)   10/21/16 83.9 kg (185 lb)   10/15/16 84.4 kg (186 lb)   10/03/16 83.8 kg (184 lb 11.9 oz)       PHYSICAL EXAM:  General: WD, WN. Alert, cooperative, no acute distress    EENT:  PERRL. Anicteric sclerae. MMM  Neck:  No meningismus, no thyromegaly  Resp:  CTA bilaterally, no wheezing or rales. No accessory muscle use  CV:  Regular  rhythm,  No edema  GI:  Soft, Non distended, Non tender.  +Bowel sounds, no rebound  LN:  No cervical or inguinal LAKESHA  Neurologic:  Alert and oriented X 3, normal speech, non focal motor exam  Psych:   Good insight. Not anxious nor agitated  Skin:  No rashes.   No jaundice    Reviewed most current lab test results and cultures  YES  Reviewed most current radiology test results   YES  Review and summation of old records today    NO  Reviewed patient's current orders and MAR    YES  PMH/SH reviewed - no change compared to H&P  ________________________________________________________________________  Care Plan discussed with:    Comments   Patient x    Family      RN x    Care Manager     Consultant                        Multidiciplinary team rounds were held today with , nursing, pharmacist and clinical coordinator. Patient's plan of care was discussed; medications were reviewed and discharge planning was addressed. ________________________________________________________________________  Total NON critical care TIME: 25   Minutes    Total CRITICAL CARE TIME Spent:   Minutes non procedure based      Comments   >50% of visit spent in counseling and coordination of care     ________________________________________________________________________  Chloe Lam MD     Procedures: see electronic medical records for all procedures/Xrays and details which were not copied into this note but were reviewed prior to creation of Plan. LABS:  I reviewed today's most current labs and imaging studies.   Pertinent labs include:  Recent Labs      06/08/17 0225 06/06/17 2014   WBC  7.1  8.4   HGB  7.2*  8.1*   HCT  21.6*  25.0*   PLT  202  229     Recent Labs      06/08/17 0225 06/06/17 2127   NA  137  141   K  3.5  3.8   CL  107  109*   CO2  25  21   GLU  85  129*   BUN  26*  24*   CREA  1.55*  1.52*   CA  7.5*  9.0   MG  1.5*   --    ALB   --   3.1*   TBILI   --   0.7   SGOT   --   28   ALT   --   19

## 2017-06-09 LAB
ANION GAP BLD CALC-SCNC: 8 MMOL/L (ref 5–15)
BASOPHILS # BLD AUTO: 0 K/UL (ref 0–0.1)
BASOPHILS # BLD: 0 % (ref 0–1)
BNP SERPL-MCNC: 2592 PG/ML (ref 0–100)
BUN SERPL-MCNC: 28 MG/DL (ref 6–20)
BUN/CREAT SERPL: 17 (ref 12–20)
CALCIUM SERPL-MCNC: 7.6 MG/DL (ref 8.5–10.1)
CHLORIDE SERPL-SCNC: 107 MMOL/L (ref 97–108)
CO2 SERPL-SCNC: 22 MMOL/L (ref 21–32)
CREAT SERPL-MCNC: 1.64 MG/DL (ref 0.55–1.02)
EOSINOPHIL # BLD: 0 K/UL (ref 0–0.4)
EOSINOPHIL NFR BLD: 1 % (ref 0–7)
ERYTHROCYTE [DISTWIDTH] IN BLOOD BY AUTOMATED COUNT: 19 % (ref 11.5–14.5)
FERRITIN SERPL-MCNC: 787 NG/ML (ref 8–252)
GLUCOSE SERPL-MCNC: 106 MG/DL (ref 65–100)
HCT VFR BLD AUTO: 22.3 % (ref 35–47)
HGB BLD-MCNC: 7.2 G/DL (ref 11.5–16)
IRON SATN MFR SERPL: 30 % (ref 20–50)
IRON SERPL-MCNC: 56 UG/DL (ref 35–150)
LYMPHOCYTES # BLD AUTO: 26 % (ref 12–49)
LYMPHOCYTES # BLD: 1.8 K/UL (ref 0.8–3.5)
MCH RBC QN AUTO: 23.7 PG (ref 26–34)
MCHC RBC AUTO-ENTMCNC: 32.3 G/DL (ref 30–36.5)
MCV RBC AUTO: 73.4 FL (ref 80–99)
MONOCYTES # BLD: 0.5 K/UL (ref 0–1)
MONOCYTES NFR BLD AUTO: 7 % (ref 5–13)
NEUTS SEG # BLD: 4.6 K/UL (ref 1.8–8)
NEUTS SEG NFR BLD AUTO: 66 % (ref 32–75)
PLATELET # BLD AUTO: 216 K/UL (ref 150–400)
POTASSIUM SERPL-SCNC: 3.9 MMOL/L (ref 3.5–5.1)
RBC # BLD AUTO: 3.04 M/UL (ref 3.8–5.2)
SODIUM SERPL-SCNC: 137 MMOL/L (ref 136–145)
TIBC SERPL-MCNC: 187 UG/DL (ref 250–450)
TROPONIN I SERPL-MCNC: 0.04 NG/ML
WBC # BLD AUTO: 6.9 K/UL (ref 3.6–11)

## 2017-06-09 PROCEDURE — 84484 ASSAY OF TROPONIN QUANT: CPT | Performed by: INTERNAL MEDICINE

## 2017-06-09 PROCEDURE — 74011250636 HC RX REV CODE- 250/636: Performed by: INTERNAL MEDICINE

## 2017-06-09 PROCEDURE — 80048 BASIC METABOLIC PNL TOTAL CA: CPT | Performed by: INTERNAL MEDICINE

## 2017-06-09 PROCEDURE — 83540 ASSAY OF IRON: CPT | Performed by: INTERNAL MEDICINE

## 2017-06-09 PROCEDURE — 97161 PT EVAL LOW COMPLEX 20 MIN: CPT | Performed by: PHYSICAL THERAPIST

## 2017-06-09 PROCEDURE — G8979 MOBILITY GOAL STATUS: HCPCS | Performed by: PHYSICAL THERAPIST

## 2017-06-09 PROCEDURE — 36415 COLL VENOUS BLD VENIPUNCTURE: CPT | Performed by: INTERNAL MEDICINE

## 2017-06-09 PROCEDURE — 74011250637 HC RX REV CODE- 250/637: Performed by: INTERNAL MEDICINE

## 2017-06-09 PROCEDURE — G8978 MOBILITY CURRENT STATUS: HCPCS | Performed by: PHYSICAL THERAPIST

## 2017-06-09 PROCEDURE — 65660000000 HC RM CCU STEPDOWN

## 2017-06-09 PROCEDURE — 97116 GAIT TRAINING THERAPY: CPT | Performed by: PHYSICAL THERAPIST

## 2017-06-09 PROCEDURE — 83880 ASSAY OF NATRIURETIC PEPTIDE: CPT | Performed by: INTERNAL MEDICINE

## 2017-06-09 PROCEDURE — 74011636637 HC RX REV CODE- 636/637: Performed by: INTERNAL MEDICINE

## 2017-06-09 PROCEDURE — 85025 COMPLETE CBC W/AUTO DIFF WBC: CPT | Performed by: INTERNAL MEDICINE

## 2017-06-09 PROCEDURE — 82728 ASSAY OF FERRITIN: CPT | Performed by: INTERNAL MEDICINE

## 2017-06-09 RX ORDER — FUROSEMIDE 10 MG/ML
40 INJECTION INTRAMUSCULAR; INTRAVENOUS DAILY
Status: DISCONTINUED | OUTPATIENT
Start: 2017-06-09 | End: 2017-06-10

## 2017-06-09 RX ADMIN — CARVEDILOL 6.25 MG: 6.25 TABLET, FILM COATED ORAL at 17:10

## 2017-06-09 RX ADMIN — HEPARIN SODIUM 5000 UNITS: 5000 INJECTION, SOLUTION INTRAVENOUS; SUBCUTANEOUS at 08:42

## 2017-06-09 RX ADMIN — HYDRALAZINE HYDROCHLORIDE 25 MG: 25 TABLET ORAL at 22:43

## 2017-06-09 RX ADMIN — HYDRALAZINE HYDROCHLORIDE 25 MG: 25 TABLET ORAL at 08:42

## 2017-06-09 RX ADMIN — ATORVASTATIN CALCIUM 20 MG: 20 TABLET, FILM COATED ORAL at 22:43

## 2017-06-09 RX ADMIN — GABAPENTIN 300 MG: 300 CAPSULE ORAL at 08:41

## 2017-06-09 RX ADMIN — Medication 10 ML: at 08:43

## 2017-06-09 RX ADMIN — GABAPENTIN 300 MG: 300 CAPSULE ORAL at 22:43

## 2017-06-09 RX ADMIN — FUROSEMIDE 40 MG: 10 INJECTION, SOLUTION INTRAMUSCULAR; INTRAVENOUS at 15:30

## 2017-06-09 RX ADMIN — PREDNISONE 10 MG: 10 TABLET ORAL at 08:40

## 2017-06-09 RX ADMIN — CARVEDILOL 6.25 MG: 6.25 TABLET, FILM COATED ORAL at 08:42

## 2017-06-09 RX ADMIN — ISOSORBIDE MONONITRATE 30 MG: 30 TABLET, EXTENDED RELEASE ORAL at 13:25

## 2017-06-09 RX ADMIN — GABAPENTIN 300 MG: 300 CAPSULE ORAL at 15:31

## 2017-06-09 RX ADMIN — HEPARIN SODIUM 5000 UNITS: 5000 INJECTION, SOLUTION INTRAVENOUS; SUBCUTANEOUS at 22:43

## 2017-06-09 RX ADMIN — Medication 10 ML: at 22:44

## 2017-06-09 RX ADMIN — ASPIRIN 81 MG CHEWABLE TABLET 81 MG: 81 TABLET CHEWABLE at 08:40

## 2017-06-09 RX ADMIN — Medication 10 ML: at 15:31

## 2017-06-09 RX ADMIN — HYDRALAZINE HYDROCHLORIDE 25 MG: 25 TABLET ORAL at 15:30

## 2017-06-09 RX ADMIN — PANTOPRAZOLE SODIUM 40 MG: 40 TABLET, DELAYED RELEASE ORAL at 08:41

## 2017-06-09 NOTE — PROGRESS NOTES
[facility] SHIFT NURSING NOTE    Bedside and Verbal shift change report given to [id number] [address] AD[address]av[address] Moyervenus De La Cruz (oncoming nurse) by Beverly Frederick (offgoing nurse). Report included the following information SBAR. SHIFT SUMMARY:         Admission Date 6/6/2017   Admission Diagnosis Accelerated hypertension   Consults IP CONSULT TO CARDIOLOGY  IP CONSULT TO CARDIOLOGY  IP CONSULT TO GASTROENTEROLOGY  IP CONSULT TO GASTROENTEROLOGY        Consults   [] PT   [] OT   [] Speech   [] Palliative      [] Hospice    [] Case Management   [] None   Cardiac Monitoring   [x] Yes   [] No     Antibiotics   [] Yes   [] No   GI Prophylaxis  (Ex: Protonix, Pepcid, etc,.)   [x] Yes   [] No          DVT Prophylaxis   SCDs:             Santiago stockings:         [x] Medication (Ex: Lovenox, Eliquis, Brilinta, Coumadin,  Heparin, etc..)   [] Contraindicated   [] No VTE needed       Urinary Catheter             LDAs               Peripheral IV 06/07/17 Right Antecubital (Active)   Site Assessment Clean, dry, & intact 6/8/2017  8:06 PM   Phlebitis Assessment 0 6/8/2017  8:06 PM   Infiltration Assessment 0 6/8/2017  8:06 PM   Dressing Status Clean, dry, & intact 6/8/2017  8:06 PM   Dressing Type Transparent 6/8/2017  8:06 PM   Hub Color/Line Status Pink;Capped;Flushed 6/8/2017  8:06 PM                      I/Os   Intake/Output Summary (Last 24 hours) at 06/08/17 2011  Last data filed at 06/08/17 1725   Gross per 24 hour   Intake          2703.33 ml   Output              850 ml   Net          1853.33 ml         Activity Level Activity Level: Up with Assistance     Activity Assistance: Partial (one person)   Diet Active Orders   Diet    DIET CARDIAC Regular      Purposeful Rounding every 1-2 hour?    [x] Yes    Zuri Score  Total Score: 3   Bed Alarm (If score 3 or >)   [] Yes    [] Refused (See signed refusal form in chart)   Evan Score  Evan Score: 19       Evan Score (if score 14 or less)   [] PMT consult   [] Nutrition consult   [] Wound Care consult      [] Specialty bed         Influenza Vaccine Received Flu Vaccine for Current Season (usually Sept-March): Not Flu Season               Needs prior to discharge:   Home O2 required:    [] Yes   [x] No     If yes, how much O2 required?     Other:    Last Bowel Movement Date: 06/08/17   Readmission Risk Assessment Tool Score High Risk            23       Total Score        3 Relationship with PCP    11 More than 1 Admission in calendar year    5 Patient Insurance is Medicare, Medicaid or Self Pay    4 Charlson Comorbidity Score        Criteria that do not apply:    Patient Living Status    Patient Length of Stay > 5       Expected Length of Stay 3d 12h   Actual Length of Stay 1

## 2017-06-09 NOTE — INTERDISCIPLINARY ROUNDS
Cardiopulmonary Care Interdisciplinary rounds were held today to discuss patient plan of care and outcomes. The following members were present: PT, NP/Physician, Pharmacy, Nursing, Nutritionist and Case Management.       Plan of Care: Continue current treatment plan - GI consult pending

## 2017-06-09 NOTE — PROGRESS NOTES
Bedside shift change report given to [facility]. (oncoming nurse) by Rome Roper (offgoing nurse). Report included the following information SBAR, Intake/Output, MAR, Accordion and Cardiac Rhythm paced. [address] SHIFT NURSING NOTE    Bedside shift change report given to Rome Roper (oncoming nurse) by Aleena AmbrxDavid (offgoing nurse). Report included the following information SBAR, Kardex, Intake/Output, MAR, Recent Results and Cardiac Rhythm paced. SHIFT SUMMARY:       Admission Date 6/6/2017   Admission Diagnosis Accelerated hypertension   Consults IP CONSULT TO CARDIOLOGY  IP CONSULT TO CARDIOLOGY  IP CONSULT TO GASTROENTEROLOGY  IP CONSULT TO GASTROENTEROLOGY        Consults   [x] PT   [x] OT   [] Speech   [] Palliative      [] Hospice    [] Case Management   [] None   Cardiac Monitoring   [x] Yes   [] No     Antibiotics   [] Yes   [] No   GI Prophylaxis  (Ex: Protonix, Pepcid, etc,.)   [] Yes   [] No          DVT Prophylaxis   SCDs:             Santiago stockings:         [x] Medication (Ex: Lovenox, Eliquis, Brilinta, Coumadin,  Heparin, etc..)   [] Contraindicated   [] No VTE needed       Urinary Catheter             LDAs               Peripheral IV 06/07/17 Right Antecubital (Active)   Site Assessment Clean, dry, & intact 6/9/2017  3:47 PM   Phlebitis Assessment 0 6/9/2017  3:47 PM   Infiltration Assessment 0 6/9/2017  3:47 PM   Dressing Status Clean, dry, & intact 6/9/2017  3:47 PM   Dressing Type Transparent;Tape 6/9/2017  3:47 PM   Hub Color/Line Status Pink;Capped;Flushed;Patent 6/9/2017  3:47 PM   Alcohol Cap Used No 6/9/2017  3:47 PM                      I/Os   Intake/Output Summary (Last 24 hours) at 06/09/17 1628  Last data filed at 06/09/17 1335   Gross per 24 hour   Intake              480 ml   Output              700 ml   Net             -220 ml         Activity Level Activity Level:  Up with Assistance     Activity Assistance: Partial (one person)   Diet Active Orders   Diet    DIET CARDIAC Regular Purposeful Rounding every 1-2 hour? [x] Yes    Zuri Score  Total Score: 3   Bed Alarm (If score 3 or >)   [] Yes    [] Refused (See signed refusal form in chart)   Evan Score  Evan Score: 19       Evan Score (if score 14 or less)   [] PMT consult   [] Nutrition consult   [] Wound Care consult      []  Specialty bed         Influenza Vaccine Received Flu Vaccine for Current Season (usually Sept-March): Not Flu Season               Needs prior to discharge:   Home O2 required:    [] Yes   [x] No     If yes, how much O2 required?     Other:    Last Bowel Movement Date: 06/08/17   Readmission Risk Assessment Tool Score High Risk            23       Total Score        3 Relationship with PCP    11 More than 1 Admission in calendar year    5 Patient Insurance is Medicare, Medicaid or Self Pay    4 Charlson Comorbidity Score        Criteria that do not apply:    Patient Living Status    Patient Length of Stay > 5       Expected Length of Stay 3d 12h   Actual Length of Stay 2

## 2017-06-09 NOTE — PROGRESS NOTES
Problem: Mobility Impaired (Adult and Pediatric)  Goal: *Acute Goals and Plan of Care (Insert Text)  Physical Therapy Goals  Initiated 6/9/2017  1. Patient will move from supine to sit and sit to supine , scoot up and down and roll side to side in bed with independence within 7 day(s). 2. Patient will transfer from bed to chair and chair to bed with independence using the least restrictive device within 7 day(s). 3. Patient will perform sit to stand with independence within 7 day(s). 4. Patient will ambulate with independence for 250 feet with the least restrictive device within 7 day(s). 5. Patient will ascend/descend 2 stairs with 0 handrail(s) with supervision/set-up within 7 day(s). PHYSICAL THERAPY EVALUATION  Patient: Raman Mendiola (6 y.o. female)  Date: 6/9/2017  Primary Diagnosis: Accelerated hypertension        Precautions: falls         ASSESSMENT :  Based on the objective data described below, the patient presents with generalized weakness and mildly impaired functional mobility, balance and endurance. Patient demonstrating independence with bed mobility and required supervision for transfers and ambulation using RW for support. Patient reporting increased chest tightness with activity- VSS and nursing notified. Patient reports independence at baseline and uses a cane in the community. Son provides 24 hour supervision. Patient is slightly below her functional baseline at this time. Patient requesting HHPT therefore recommend HHPT for safety evaluation following discharge to ensure safe mobility in home setting. Patient will benefit from skilled intervention to address the above impairments.   Patients rehabilitation potential is considered to be Good  Factors which may influence rehabilitation potential include:   [ ]         None noted  [ ]         Mental ability/status  [X]         Medical condition  [ ]         Home/family situation and support systems  [ ]         Safety awareness  [ ]         Pain tolerance/management  [ ]         Other:        PLAN :  Recommendations and Planned Interventions:  [X]           Bed Mobility Training             [ ]    Neuromuscular Re-Education  [X]           Transfer Training                   [ ]    Orthotic/Prosthetic Training  [X]           Gait Training                         [ ]    Modalities  [ ]           Therapeutic Exercises           [ ]    Edema Management/Control  [X]           Therapeutic Activities            [X]    Patient and Family Training/Education  [ ]           Other (comment):     Frequency/Duration: Patient will be followed by physical therapy 1-2 more visits to address goals. Discharge Recommendations: Home Health  Further Equipment Recommendations for Discharge: has appropriate equipment       SUBJECTIVE:   Patient stated I have been getting this chest tightness lately when I'm up moving around.       OBJECTIVE DATA SUMMARY:   HISTORY:    Past Medical History:   Diagnosis Date    Arthritis      Autoimmune disease ([facility] [address]       rheumatoid     CAD (coronary artery disease)      CHF (congestive heart failure) (HCC)      Chronic pain       neuropathy bilateral feet,knees    Diabetes ([facility] [address]      GERD (gastroesophageal reflux disease)      Hypertension      Ill-defined condition       anemia    Ill-defined condition       blood transfusion hx    MI, old      Other ill-defined conditions       high cholesterol    Renal cell carcinoma of right kidney (HCC)       s/p resection 12/16     Past Surgical History:   Procedure Laterality Date    CARDIAC SURG PROCEDURE UNLIST         three stents placed 2005    CARDIAC SURG PROCEDURE UNLIST        HX CHOLECYSTECTOMY   02/28/2017     lap tana with IOC.     HX PACEMAKER   2017/January     ICD    HX TONSILLECTOMY        HX UROLOGICAL   12/22/2016     RIGHT LAPOROSCOPIC HAND ASSISTED RADICAL NEPHRECTOMY     Prior Level of Function/Home Situation: patient reports independence with all mobility and ADLs; uses cane for ambulation in the community        Home Situation  Home Environment: Private residence  # Steps to Enter: 2  Rails to Enter: No (has pole she hold onto)  One/Two Story Residence: One story  Living Alone: No  Support Systems: Child(macy)  Patient Expects to be Discharged to[de-identified] Private residence  Current DME Used/Available at Home: Bobby Amabile, straight, Walker, rolling, Lunette Leghorn, rollator, [id number] [place] chair  Tub or Shower Type: Tub/Shower combination     EXAMINATION/PRESENTATION/DECISION MAKING:   Critical Behavior:  Neurologic State: Alert, Appropriate for age  Orientation Level: Oriented X4        Hearing: Auditory  Auditory Impairment: None     Range Of Motion:  AROM: Within functional limits                       Strength:    Strength: Generally decreased, functional                    Tone & Sensation:   Tone: Normal                              Coordination:  Coordination: Within functional limits  Vision:      Functional Mobility:  Bed Mobility:  Rolling: Independent  Supine to Sit: Independent     Scooting: Independent  Transfers:  Sit to Stand: Supervision  Stand to Sit: Supervision           Balance:   Sitting: Intact  Standing: Impaired  Standing - Static: Good; Unsupported  Standing - Dynamic : Good (using RW and for short distance unsupported)  Ambulation/Gait Training:  Distance (ft): 100 Feet (ft)  Assistive Device: Walker, rolling  Ambulation - Level of Assistance: Supervision        Gait Abnormalities: Decreased step clearance              Speed/Maria Luisa: Pace decreased (<100 feet/min)  Step Length: Left shortened;Right shortened      Patient ambulated 100 feet in the gleason using RW for support- gait is steady overall with no LOB noted. Patient reporting increased chest tightness-VSS.  Once in the room patient pushed the walker aside and was able to ambulate to chair with CGA- gait remained steady but widened base of support noted           Functional Measure:     Elder Mobility Scale 18/20            EMS and G-code impairment scale:  Percentage of impairment CH  0% CI  1-19% CJ  20-39% CK  40-59% CL  60-79% CM  80-99% CN  100%   EMS Score 0-20 20 17-19 13-16 9-12 5-8 1-4 0      Scores under 10  generally these patients are dependent in mobility maneuvers; require help with  basic ADL, such as transfers, toileting and dressing. Scores between 10  13  generally these patients are borderline in terms of safe mobility and  independence in ADL i.e. they require some help with some mobility maneuvers. Scores over 14  Generally these patients are able to perform mobility maneuvers alone and safely  and are independent in basic ADL. G codes: In compliance with CMSs Claims Based Outcome Reporting, the following G-code set was chosen for this patient based on their primary functional limitation being treated: The outcome measure chosen to determine the severity of the functional limitation was the Elderly Mobility scale with a score of 17/20 which was correlated with the impairment scale. · Mobility - Walking and Moving Around:               - CURRENT STATUS:    CI - 1%-19% impaired, limited or restricted               - GOAL STATUS:           CH - 0% impaired, limited or restricted               - D/C STATUS:                       ---------------To be determined---------------            Pain:  Pain Scale 1:  (pt reporting chest tightness weih activity)  Pain Intensity 1: 0              Activity Tolerance:   VSS  Please refer to the flowsheet for vital signs taken during this treatment.   After treatment:   [X]         Patient left in no apparent distress sitting up in chair  [ ]         Patient left in no apparent distress in bed  [X]         Call bell left within reach  [X]         Nursing notified  [ ]         Caregiver present  [X]         Bed alarm activated      COMMUNICATION/EDUCATION:   The patients plan of care was discussed with: Registered Nurse and Rehabilitation Attendant. [X]         Fall prevention education was provided and the patient/caregiver indicated understanding. [X]         Patient/family have participated as able in goal setting and plan of care. [X]         Patient/family agree to work toward stated goals and plan of care. [ ]         Patient understands intent and goals of therapy, but is neutral about his/her participation. [ ]         Patient is unable to participate in goal setting and plan of care.      Thank you for this referral.  Crystal Crain, PT   Time Calculation: 18 mins

## 2017-06-09 NOTE — PROGRESS NOTES
0730 - report received from [facility].    1800 - GI consult called, Dr. Elyssa Hernandes on call, did not receive return call. Passed on in report to call in AM.    1915 - report given to ISAIAS Espinoza.

## 2017-06-09 NOTE — PROGRESS NOTES
Hospitalist Progress Note    NAME: Sundeep Shane   :  1945   MRN:  [id number]     Admit date: 2017    Today's date: 17    PCP: Marifer Mcneal. KUMAR Jama M.D.  Cell [phone]      Assessment / Plan:  Acute renal failure POA creatinine 1.64  Essential hypertension POA peak /112 improved  Prior baseline creatinine ~ 0.92  Renal US with single left kidney, no hydronephrosis  Hold lisinopril and HCTZ with elevated creatinine  Add hydralazine and change to coreg, missed meds prior to admit  PRN hydralazine  Creatinine not improved with IVF  BNP markedly elevated, could this be a cardiorenal syndrome  Gentle IV diuretics  Ambulate she how her breathing is    Elevated troponin POA peak 0.16 now trending down  Recurrent exertional chest pain POA improved  Chronic systolic CHF POA LVEF 42-38%  Moderate Pulmonary HTN PA pressure 58  ASA 81 mg, coreg  Off ACE In with elevated creatinine  Statin  Cardiology following, l see as outpatient in several weeks  Resume ACE In once creatinine improves  BNP markedly elevated  2592 and creatinine remains elevated  Gentle diuresis    Probable gastroenteritis POA resolved  Recurrent nausea and vomiting with diarrhea at home x days  None since admission, abdominal exam is benign  Able to take PO  No further work up unless recurrent    Anemia of chronic disease POA HgB dropped to 7.2  Heme positive stool POA  Prior baseline HgB ~ 9.0 range  Stool dark brown, heme positive in ED, but denies any overt GI bleeding  PPI  Serial HGbs  Iron studies likely chronic disease  Pt pushing to get GI work up in house, hard for her to get rides  Serial HgBs    Hyperlipidemia   Statin    Sick Sinus syndrome s/p PPM 2017    Renal cell cancer s/p S/P right nephrectomy 2016    S/P cholecystectomy     Code status: Full  Prophylaxis: Lovenox  Recommended Disposition: Home w/Family     Subjective:     Chief Complaint / Reason for Physician Visit  \"I was nauseated last night\". No further diarrhea, nausea, but no vomiting  No Chest pain  Dizzy when she gets up  No recent BM  Discussed with RN events overnight. Review of Systems:  Symptom Y/N Comments  Symptom Y/N Comments   Fever/Chills n   Chest Pain n    Poor Appetite    Edema     Cough n   Abdominal Pain n    Sputum    Joint Pain     SOB/ROCK n   Pruritis/Rash     Nausea/vomit Nausea   Tolerating PT/OT     Diarrhea n   Tolerating Diet Y    Constipation    Other       Could NOT obtain due to:      Objective:     VITALS:   Last 24hrs VS reviewed since prior progress note. Most recent are:  Patient Vitals for the past 24 hrs:   Temp Pulse Resp BP SpO2   06/09/17 1141 98.3 °F (36.8 °C) 74 18 138/89 100 %   06/09/17 0832 97.6 °F (36.4 °C) 70 18 139/90 98 %   06/09/17 0219 97.4 °F (36.3 °C) 70 18 133/79 100 %   06/08/17 2155 97.5 °F (36.4 °C) 72 18 134/86 99 %   06/08/17 1957 97.9 °F (36.6 °C) 73 18 (!) 133/93 98 %   06/08/17 1520 98.4 °F (36.9 °C) 73 16 132/83 97 %       Intake/Output Summary (Last 24 hours) at 06/09/17 1426  Last data filed at 06/09/17 1335   Gross per 24 hour   Intake              680 ml   Output              700 ml   Net              -20 ml        Wt Readings from Last 12 Encounters:   06/08/17 62.7 kg (138 lb 4.8 oz)   03/10/17 60.8 kg (134 lb)   02/28/17 61.7 kg (136 lb 0.4 oz)   02/24/17 62.1 kg (136 lb 14.5 oz)   02/13/17 73 kg (161 lb)   01/26/17 73.1 kg (161 lb 2.5 oz)   01/17/17 66.9 kg (147 lb 8 oz)   12/22/16 68.2 kg (150 lb 5.7 oz)   12/14/16 70.1 kg (154 lb 8.7 oz)   10/21/16 83.9 kg (185 lb)   10/15/16 84.4 kg (186 lb)   10/03/16 83.8 kg (184 lb 11.9 oz)       PHYSICAL EXAM:  General: WD, WN. Alert, cooperative, no acute distress    EENT:  PERRL. Anicteric sclerae. MMM  Resp:  Few crackles at bases bilaterally, no wheezing or rales.   No accessory muscle use  CV:  Regular  rhythm,  No edema  GI:  Soft, Non distended, Non tender.  +Bowel sounds, no rebound  LN:  No cervical or inguinal LAKESHA  Neurologic:  Alert and oriented X 3, normal speech, non focal motor exam  Psych:   Good insight. Not anxious nor agitated  Skin:  No rashes. No jaundice    Reviewed most current lab test results and cultures  YES  Reviewed most current radiology test results   YES  Review and summation of old records today    NO  Reviewed patient's current orders and MAR    YES  PMH/SH reviewed - no change compared to H&P  ________________________________________________________________________  Care Plan discussed with:    Comments   Patient x    Family      RN x    Care Manager     Consultant                        Multidiciplinary team rounds were held today with , nursing, pharmacist and clinical coordinator. Patient's plan of care was discussed; medications were reviewed and discharge planning was addressed. ________________________________________________________________________  Total NON critical care TIME: 25   Minutes    Total CRITICAL CARE TIME Spent:   Minutes non procedure based      Comments   >50% of visit spent in counseling and coordination of care     ________________________________________________________________________  Sesar Diaz MD     Procedures: see electronic medical records for all procedures/Xrays and details which were not copied into this note but were reviewed prior to creation of Plan. LABS:  I reviewed today's most current labs and imaging studies.   Pertinent labs include:  Recent Labs      06/09/17   0216  06/08/17   1652  06/08/17   0225  06/06/17 2014   WBC  6.9   --   7.1  8.4   HGB  7.2*  7.4*  7.2*  8.1*   HCT  22.3*  22.5*  21.6*  25.0*   PLT  216   --   202  229     Recent Labs      06/09/17   0216  06/08/17   0225  06/06/17 2127   NA  137  137  141   K  3.9  3.5  3.8   CL  107  107  109*   CO2  22  25  21   GLU  106*  85  129*   BUN  28*  26*  24*   CREA  1.64*  1.55*  1.52*   CA  7.6*  7.5*  9.0   MG   -- 1.5*   --    ALB   --    --   3.1*   TBILI   --    --   0.7   SGOT   --    --   28   ALT   --    --   19

## 2017-06-10 LAB
ANION GAP BLD CALC-SCNC: 9 MMOL/L (ref 5–15)
APPEARANCE UR: ABNORMAL
BACTERIA URNS QL MICRO: ABNORMAL /HPF
BASOPHILS # BLD AUTO: 0 K/UL (ref 0–0.1)
BASOPHILS # BLD: 0 % (ref 0–1)
BILIRUB UR QL: NEGATIVE
BUN SERPL-MCNC: 30 MG/DL (ref 6–20)
BUN/CREAT SERPL: 17 (ref 12–20)
CALCIUM SERPL-MCNC: 8 MG/DL (ref 8.5–10.1)
CHLORIDE SERPL-SCNC: 107 MMOL/L (ref 97–108)
CO2 SERPL-SCNC: 25 MMOL/L (ref 21–32)
COLOR UR: ABNORMAL
CREAT SERPL-MCNC: 1.81 MG/DL (ref 0.55–1.02)
CREAT UR-MCNC: 23.1 MG/DL
EOSINOPHIL # BLD: 0.1 K/UL (ref 0–0.4)
EOSINOPHIL NFR BLD: 1 % (ref 0–7)
EPITH CASTS URNS QL MICRO: ABNORMAL /LPF
ERYTHROCYTE [DISTWIDTH] IN BLOOD BY AUTOMATED COUNT: 19.2 % (ref 11.5–14.5)
GLUCOSE SERPL-MCNC: 102 MG/DL (ref 65–100)
GLUCOSE UR STRIP.AUTO-MCNC: NEGATIVE MG/DL
HCT VFR BLD AUTO: 23.8 % (ref 35–47)
HGB BLD-MCNC: 7.8 G/DL (ref 11.5–16)
HGB UR QL STRIP: NEGATIVE
KETONES UR QL STRIP.AUTO: NEGATIVE MG/DL
LEUKOCYTE ESTERASE UR QL STRIP.AUTO: ABNORMAL
LYMPHOCYTES # BLD AUTO: 29 % (ref 12–49)
LYMPHOCYTES # BLD: 2.3 K/UL (ref 0.8–3.5)
MCH RBC QN AUTO: 24.5 PG (ref 26–34)
MCHC RBC AUTO-ENTMCNC: 32.8 G/DL (ref 30–36.5)
MCV RBC AUTO: 74.6 FL (ref 80–99)
MONOCYTES # BLD: 0.7 K/UL (ref 0–1)
MONOCYTES NFR BLD AUTO: 9 % (ref 5–13)
NEUTS SEG # BLD: 4.7 K/UL (ref 1.8–8)
NEUTS SEG NFR BLD AUTO: 61 % (ref 32–75)
NITRITE UR QL STRIP.AUTO: NEGATIVE
PH UR STRIP: 5.5 [PH] (ref 5–8)
PLATELET # BLD AUTO: 248 K/UL (ref 150–400)
POTASSIUM SERPL-SCNC: 3.8 MMOL/L (ref 3.5–5.1)
PROT UR STRIP-MCNC: NEGATIVE MG/DL
RBC # BLD AUTO: 3.19 M/UL (ref 3.8–5.2)
RBC #/AREA URNS HPF: ABNORMAL /HPF (ref 0–5)
SODIUM SERPL-SCNC: 141 MMOL/L (ref 136–145)
SODIUM UR-SCNC: 36 MMOL/L
SP GR UR REFRACTOMETRY: 1 (ref 1–1.03)
UROBILINOGEN UR QL STRIP.AUTO: 0.2 EU/DL (ref 0.2–1)
WBC # BLD AUTO: 7.8 K/UL (ref 3.6–11)
WBC URNS QL MICRO: ABNORMAL /HPF (ref 0–4)

## 2017-06-10 PROCEDURE — 74011000250 HC RX REV CODE- 250: Performed by: INTERNAL MEDICINE

## 2017-06-10 PROCEDURE — 82570 ASSAY OF URINE CREATININE: CPT | Performed by: INTERNAL MEDICINE

## 2017-06-10 PROCEDURE — 74011250636 HC RX REV CODE- 250/636: Performed by: INTERNAL MEDICINE

## 2017-06-10 PROCEDURE — 74011250637 HC RX REV CODE- 250/637: Performed by: INTERNAL MEDICINE

## 2017-06-10 PROCEDURE — 36415 COLL VENOUS BLD VENIPUNCTURE: CPT | Performed by: INTERNAL MEDICINE

## 2017-06-10 PROCEDURE — 85025 COMPLETE CBC W/AUTO DIFF WBC: CPT | Performed by: INTERNAL MEDICINE

## 2017-06-10 PROCEDURE — 65660000000 HC RM CCU STEPDOWN

## 2017-06-10 PROCEDURE — 74011636637 HC RX REV CODE- 636/637: Performed by: INTERNAL MEDICINE

## 2017-06-10 PROCEDURE — 81001 URINALYSIS AUTO W/SCOPE: CPT | Performed by: INTERNAL MEDICINE

## 2017-06-10 PROCEDURE — 80048 BASIC METABOLIC PNL TOTAL CA: CPT | Performed by: INTERNAL MEDICINE

## 2017-06-10 PROCEDURE — 84300 ASSAY OF URINE SODIUM: CPT | Performed by: INTERNAL MEDICINE

## 2017-06-10 RX ORDER — BUMETANIDE 0.25 MG/ML
1 INJECTION INTRAMUSCULAR; INTRAVENOUS ONCE
Status: COMPLETED | OUTPATIENT
Start: 2017-06-10 | End: 2017-06-10

## 2017-06-10 RX ORDER — SODIUM CHLORIDE 0.9 % (FLUSH) 0.9 %
SYRINGE (ML) INJECTION
Status: COMPLETED
Start: 2017-06-10 | End: 2017-06-10

## 2017-06-10 RX ORDER — GABAPENTIN 300 MG/1
300 CAPSULE ORAL 2 TIMES DAILY
Status: DISCONTINUED | OUTPATIENT
Start: 2017-06-11 | End: 2017-06-13 | Stop reason: HOSPADM

## 2017-06-10 RX ADMIN — BUMETANIDE 1 MG: 0.25 INJECTION INTRAMUSCULAR; INTRAVENOUS at 17:39

## 2017-06-10 RX ADMIN — ASPIRIN 81 MG CHEWABLE TABLET 81 MG: 81 TABLET CHEWABLE at 09:05

## 2017-06-10 RX ADMIN — Medication 10 ML: at 20:43

## 2017-06-10 RX ADMIN — HYDRALAZINE HYDROCHLORIDE 25 MG: 25 TABLET ORAL at 20:42

## 2017-06-10 RX ADMIN — CARVEDILOL 6.25 MG: 6.25 TABLET, FILM COATED ORAL at 09:04

## 2017-06-10 RX ADMIN — ISOSORBIDE MONONITRATE 30 MG: 30 TABLET, EXTENDED RELEASE ORAL at 09:04

## 2017-06-10 RX ADMIN — HYDRALAZINE HYDROCHLORIDE 25 MG: 25 TABLET ORAL at 17:39

## 2017-06-10 RX ADMIN — HEPARIN SODIUM 5000 UNITS: 5000 INJECTION, SOLUTION INTRAVENOUS; SUBCUTANEOUS at 20:43

## 2017-06-10 RX ADMIN — GABAPENTIN 300 MG: 300 CAPSULE ORAL at 09:04

## 2017-06-10 RX ADMIN — PANTOPRAZOLE SODIUM 40 MG: 40 TABLET, DELAYED RELEASE ORAL at 09:05

## 2017-06-10 RX ADMIN — POLYETHYLENE GLYCOL 3350 17 G: 17 POWDER, FOR SOLUTION ORAL at 09:04

## 2017-06-10 RX ADMIN — Medication 10 ML: at 15:08

## 2017-06-10 RX ADMIN — ATORVASTATIN CALCIUM 20 MG: 20 TABLET, FILM COATED ORAL at 20:43

## 2017-06-10 RX ADMIN — Medication 10 ML: at 17:40

## 2017-06-10 RX ADMIN — ERYTHROPOIETIN 10000 UNITS: 10000 INJECTION, SOLUTION INTRAVENOUS; SUBCUTANEOUS at 18:10

## 2017-06-10 RX ADMIN — PREDNISONE 10 MG: 10 TABLET ORAL at 09:05

## 2017-06-10 RX ADMIN — HEPARIN SODIUM 5000 UNITS: 5000 INJECTION, SOLUTION INTRAVENOUS; SUBCUTANEOUS at 09:05

## 2017-06-10 RX ADMIN — CARVEDILOL 6.25 MG: 6.25 TABLET, FILM COATED ORAL at 17:39

## 2017-06-10 RX ADMIN — HYDRALAZINE HYDROCHLORIDE 25 MG: 25 TABLET ORAL at 09:05

## 2017-06-10 NOTE — CONSULTS
Assessment:  LOYDA on CKD-3: Rising renal indices. Etiology multifactorial-> maybe seeing some cardiorenal effects with attempts to IV diuresis. Anemia itself can cause mild ATN. Underlying CKD from decreased renal mass (solitary left kidney). Baseline Cr 1.2-1.5mg/dl. No obstruction noted on renal ultrasound. Need UA to assess for proteinuria/hematuria-> +NSAID use    CHF (EF 25%): acute on chronic-> +pulm edema/Elevated BNP    Anemia: contributing to symptoms. Iron panel okay. Underlying CKD maybe contributing. HTN: stable    CAD : s/p stents    RCC s/p right nephrectomy (12/2016)    Plan/Recommendations:  IV Bumex 1mg x1 dose -> Cr may rise some more but patient deserves some effective diuresis  Epogen 10k sq x 1dose today  UA, Urine Na, Urine Cr  Strict I/Os, daily weights  Avoid nephrotoxins  Renally adjusted meds (ie Gabapentin to bid)  Am labs    Discussed with patient and RN    Thanks for the consultation. Renal service will follow patient with you. Please contact me with any questions or concerns. Initial Consult note         Patient name: Laila Roman  MR no: [id number]  Date of admission: 6/6/2017  Date of consultation: 6/10/2017  Requested by: Dr. Laverna Lennox  Reason for consult: LOYDA/CKD    Patient seen and examined. History obtained from patient and chart review. Relevant labs, data and notes reviewed. HPI: Laila Roman is a 80 y.o. female with PMH significant for CKD stage 3, RCC s/p total right nephrectomy (12/2016), CHF EF (25-30%), CAD s/p stents presented on 6/6/17 with worsening SOB/N/V/D. Serum Cr on admisson was 1.5mg/dl. Given IVF initially. Patient noted to have significant ROCK. +Elevated BNP >2500. Given a dose IV lasix yesterday. Serum Cr today up to 1.8mg/dl. Nephrology consulted to evaluate and manage LOYDA on CKD/Volume overload. Patient denies any orthopnea. Admits to dyspnea on minimal exertion. No CP.  Admits to intermittent NSAID use prior to admission for arthritic pain. PMH:  Past Medical History:   Diagnosis Date    Arthritis     Autoimmune disease ([facility] [address]     rheumatoid     CAD (coronary artery disease)     CHF (congestive heart failure) (HCC)     Chronic pain     neuropathy bilateral feet,knees    Diabetes ([facility] [address]     GERD (gastroesophageal reflux disease)     Hypertension     Ill-defined condition     anemia    Ill-defined condition     blood transfusion hx    MI, old     Other ill-defined conditions     high cholesterol    Renal cell carcinoma of right kidney ([facility]     s/p resection 12/16     PSH:  Past Surgical History:   Procedure Laterality Date    CARDIAC SURG PROCEDURE UNLIST      three stents placed 2005    CARDIAC SURG PROCEDURE UNLIST      HX CHOLECYSTECTOMY  02/28/2017    lap tana with IOC.  HX PACEMAKER  2017/January    ICD    HX TONSILLECTOMY      HX UROLOGICAL  12/22/2016    RIGHT LAPOROSCOPIC HAND ASSISTED RADICAL NEPHRECTOMY       Social history:   Social History   Substance Use Topics    Smoking status: Never Smoker    Smokeless tobacco: Never Used    Alcohol use No       Family history:  No history of CKD or ESRD in the family.      Allergies   Allergen Reactions    Percocet [Oxycodone-Acetaminophen] Other (comments)     Confused       Current Facility-Administered Medications   Medication Dose Route Frequency Last Dose    carvedilol (COREG) tablet 6.25 mg  6.25 mg Oral BID WITH MEALS 6.25 mg at 06/10/17 0904    aspirin chewable tablet 81 mg  81 mg Oral DAILY 81 mg at 06/10/17 0905    atorvastatin (LIPITOR) tablet 20 mg  20 mg Oral QHS 20 mg at 06/09/17 2243    hydrALAZINE (APRESOLINE) 20 mg/mL injection 10 mg  10 mg IntraVENous Q6H PRN      hydrALAZINE (APRESOLINE) tablet 25 mg  25 mg Oral TID 25 mg at 06/10/17 0905    gabapentin (NEURONTIN) capsule 300 mg  300 mg Oral  mg at 06/10/17 0904    predniSONE (DELTASONE) tablet 10 mg  10 mg Oral DAILY 10 mg at 06/10/17 0905    acetaminophen (TYLENOL) tablet 650 mg  650 mg Oral Q4H PRN      ondansetron (ZOFRAN) injection 4 mg  4 mg IntraVENous Q4H PRN      pantoprazole (PROTONIX) tablet 40 mg  40 mg Oral ACB 40 mg at 06/10/17 0905    heparin (porcine) injection 5,000 Units  5,000 Units SubCUTAneous Q12H 5,000 Units at 06/10/17 0905    sodium chloride (NS) flush 5-10 mL  5-10 mL IntraVENous Q8H 10 mL at 06/10/17 1508    sodium chloride (NS) flush 5-10 mL  5-10 mL IntraVENous PRN      polyethylene glycol (MIRALAX) packet 17 g  17 g Oral DAILY 17 g at 06/10/17 0904    isosorbide mononitrate ER (IMDUR) tablet 30 mg  30 mg Oral DAILY 30 mg at 06/10/17 0904       ROS (besides HPI):    General: No fever. No weight changes  ENT: No hearing loss or visual changes  Cardiovascular: No Chest pain. +ROCK  Pulmonary: No SOB  GI: No abdominal pain. No Nausea/Vomiting/Diarrhea. No blood in stool  : No blood in urine. No foamy or cloudy urine  Musculoskeletal: No joint swelling or redness. No morning stiffness  Endocrine: no cold or heat intolerance  Psych: denies anxiety or depression  Neuro: No light headedness or dizziness    Objective   Visit Vitals    /81 (BP 1 Location: Right arm, BP Patient Position: Sitting)    Pulse 71    Temp 97.9 °F (36.6 °C)    Resp 18    Ht 5' 7\" (1.702 m)    Wt 62.5 kg (137 lb 12.6 oz)    SpO2 98%    BMI 21.58 kg/m2       Physical Exam:    Gen: NAD    HEENT: AT/NC, EOMI, moist mucous membrane, no scleral icterus    Neck: supple    Lungs/Chest wall: +bibasilar crackles    Cardiovascular: S1/S2, normal rate, regular rhythm. +PPM    Abdomen: soft, NT, ND, BS+, no HSM    Ext: Trace LE edema    Skin: warm and dry. No rashes    : no CVA tenderness    CNS: alert awake. Answers appropriately.      Labs/Data:    Lab Results   Component Value Date/Time    Sodium 141 06/10/2017 03:44 AM    Potassium 3.8 06/10/2017 03:44 AM    Chloride 107 06/10/2017 03:44 AM    CO2 25 06/10/2017 03:44 AM    Anion gap 9 06/10/2017 03:44 AM    Glucose 102 06/10/2017 03:44 AM    BUN 30 06/10/2017 03:44 AM    Creatinine 1.81 06/10/2017 03:44 AM    BUN/Creatinine ratio 17 06/10/2017 03:44 AM    GFR est AA 34 06/10/2017 03:44 AM    GFR est non-AA 28 06/10/2017 03:44 AM    Calcium 8.0 06/10/2017 03:44 AM       Lab Results   Component Value Date/Time    WBC 7.8 06/10/2017 03:44 AM    Hemoglobin (POC) 10.9 02/28/2017 04:16 PM    HGB 7.8 06/10/2017 03:44 AM    Hematocrit (POC) 32 02/28/2017 04:16 PM    HCT 23.8 06/10/2017 03:44 AM    PLATELET 884 36/02/6841 03:44 AM    MCV 74.6 06/10/2017 03:44 AM       Urine analysis: ordered    Renal US: reviewed    No components found for: SPEP, UPEP  No results found for: PUQ, PROTU2, PROTU1, BJP1, CPE1, IMEL1, MET2  No results found for: MCACR, MCA1, MCA2, MCA3, MCA4, UMCA, MCAU, MICALBRAT, MCAU2, MCALPOCT      Intake/Output Summary (Last 24 hours) at 06/10/17 1538  Last data filed at 06/10/17 1528   Gross per 24 hour   Intake              720 ml   Output             1900 ml   Net            -1180 ml       Wt Readings from Last 3 Encounters:   06/10/17 62.5 kg (137 lb 12.6 oz)   03/10/17 60.8 kg (134 lb)   02/28/17 61.7 kg (136 lb 0.4 oz)       Signed by:  Jacquelyn Stoddard MD  Nephrology and Hypertension  [facility]

## 2017-06-10 NOTE — PROGRESS NOTES
Bedside shift change report given to Aleena [employer] (oncoming nurse) by Shanel Cardoza (offgoing nurse). Report included the following information SBAR, Kardex, Intake/Output, MAR, Recent Results and Cardiac Rhythm paced. [address] SHIFT NURSING NOTE    Bedside shift change report given to nurse (oncoming nurse) by Solfo. (offgoing nurse). Report included the following information SBAR, Kardex, Intake/Output, MAR, Recent Results and Cardiac Rhythm paced. SHIFT SUMMARY:     Admission Date 6/6/2017   Admission Diagnosis Accelerated hypertension   Consults IP CONSULT TO CARDIOLOGY  IP CONSULT TO CARDIOLOGY  IP CONSULT TO GASTROENTEROLOGY  IP CONSULT TO GASTROENTEROLOGY  IP CONSULT TO NEPHROLOGY        Consults   [] PT   [] OT   [] Speech   [] Palliative      [] Hospice    [] Case Management   [] None   Cardiac Monitoring   [x] Yes   [] No     Antibiotics   [] Yes   [x] No   GI Prophylaxis  (Ex: Protonix, Pepcid, etc,.)   [x] Yes   [] No          DVT Prophylaxis   SCDs:             Santiago stockings:         [x] Medication (Ex: Lovenox, Eliquis, Brilinta, Coumadin,  Heparin, etc..)   [] Contraindicated   [] No VTE needed       Urinary Catheter             LDAs               Peripheral IV 06/07/17 Right Antecubital (Active)   Site Assessment Clean, dry, & intact 6/10/2017  7:39 AM   Phlebitis Assessment 0 6/10/2017  7:39 AM   Infiltration Assessment 0 6/10/2017  7:39 AM   Dressing Status Clean, dry, & intact 6/10/2017  7:39 AM   Dressing Type Transparent;Tape 6/10/2017  7:39 AM   Hub Color/Line Status Pink;Capped 6/10/2017  7:39 AM   Alcohol Cap Used No 6/10/2017  7:39 AM                      I/Os   Intake/Output Summary (Last 24 hours) at 06/10/17 1546  Last data filed at 06/10/17 1528   Gross per 24 hour   Intake              720 ml   Output             1900 ml   Net            -1180 ml         Activity Level Activity Level:  Up with Assistance     Activity Assistance: Partial (one person)   Diet Active Orders   Diet    DIET CARDIAC Regular      Purposeful Rounding every 1-2 hour? [x] Yes    Zuri Score  Total Score: 3   Bed Alarm (If score 3 or >)   [] Yes    [] Refused (See signed refusal form in chart)   Evan Score  Evan Score: 19       Evan Score (if score 14 or less)   [] PMT consult   [] Nutrition consult   [] Wound Care consult      []  Specialty bed         Influenza Vaccine Received Flu Vaccine for Current Season (usually Sept-March): Not Flu Season               Needs prior to discharge:   Home O2 required:    [] Yes   [x] No     If yes, how much O2 required?     Other:    Last Bowel Movement Date: 06/08/17   Readmission Risk Assessment Tool Score High Risk            23       Total Score        3 Relationship with PCP    11 More than 1 Admission in calendar year    5 Patient Insurance is Medicare, Medicaid or Self Pay    4 Charlson Comorbidity Score        Criteria that do not apply:    Patient Living Status    Patient Length of Stay > 5       Expected Length of Stay 3d 12h   Actual Length of Stay 3

## 2017-06-10 NOTE — PROGRESS NOTES
Hospitalist Progress Note    NAME: Chay Sung   :  1945   MRN:  [id number]     Admit date: 2017    Today's date: 06/10/17    PCP: Melia Grullon. KUMAR Mazariegos M.D. Cell [phone]      Assessment / Plan:  Acute renal failure POA creatinine 1.81 this AM  Essential hypertension POA peak /112 improved  Prior baseline creatinine ~ 0.92  S/P right nephrectomy for Renal cell cancer  Renal US with single left kidney, no hydronephrosis  Held lisinopril and HCTZ at admit with elevated creatinine  Hydralazine, coreg, imdur  Initially thought the patient was hypovolemic with the recent N/V, diarrhea       Creatinine not improved with IVF at admission  BNP 2592, crackles on exam, ?  Volume overloaded       Gave dose of lasix last PM with bump in creatinine  Hold further diuretics  I am confused as to her volume status and why her renal function is not imroving, will ask renal to see    Elevated troponin POA peak 0.16 now trending down  Recurrent exertional chest pain POA improved  Chronic systolic CHF POA LVEF 10-75%  Moderate Pulmonary HTN PA pressure 58  ASA 81 mg, coreg  Off ACE In with elevated creatinine  Statin  Cardiology following, Will discuss with them if sooner cardiac w/u is needed  Resume ACE In once creatinine improves  BNP markedly elevated  2592 and creatinine remains elevated    Probable gastroenteritis POA resolved  Recurrent nausea and vomiting with diarrhea at home x days  None since admission, abdominal exam is benign  Able to take PO  No further work up unless recurrent    Anemia of chronic disease POA HgB dropped to 7.2  Heme positive stool POA  Prior baseline HgB ~ 9.0 range  Stool dark brown, heme positive in ED, but denies any overt GI bleeding  PPI  Serial HGbs  Iron studies likely chronic disease  Serial HgBs  Outpatient GI work up    Hyperlipidemia   Statin    Sick Sinus syndrome s/p PPM 2017    Renal cell cancer s/p S/P right nephrectomy 12/2016    S/P cholecystectomy     Code status: Full  Prophylaxis: Lovenox  Recommended Disposition: Home w/Family     Subjective:     Chief Complaint / Reason for Physician Visit  \"I had chest pain when I walked yesterday\"  No further diarrhea or vomting  No Chest pain at rest, positive ROCK when she walks  Discussed with RN events overnight. Review of Systems:  Symptom Y/N Comments  Symptom Y/N Comments   Fever/Chills n   Chest Pain n    Poor Appetite    Edema     Cough n   Abdominal Pain n    Sputum    Joint Pain     SOB/ROCK n   Pruritis/Rash     Nausea/vomit Nausea   Tolerating PT/OT     Diarrhea n   Tolerating Diet Y    Constipation    Other       Could NOT obtain due to:      Objective:     VITALS:   Last 24hrs VS reviewed since prior progress note. Most recent are:  Patient Vitals for the past 24 hrs:   Temp Pulse Resp BP SpO2   06/10/17 0736 97.8 °F (36.6 °C) 67 18 134/83 98 %   06/10/17 0425 97.6 °F (36.4 °C) 70 18 134/86 100 %   06/09/17 2234 97.7 °F (36.5 °C) 70 18 127/70 97 %   06/09/17 1926 97.5 °F (36.4 °C) 70 18 127/75 100 %   06/09/17 1619 - 85 - (!) 147/94 100 %   06/09/17 1615 - 70 - 137/81 -   06/09/17 1507 98.3 °F (36.8 °C) 75 18 138/82 100 %   06/09/17 1141 98.3 °F (36.8 °C) 74 18 138/89 100 %       Intake/Output Summary (Last 24 hours) at 06/10/17 0857  Last data filed at 06/10/17 0746   Gross per 24 hour   Intake              720 ml   Output             1900 ml   Net            -1180 ml        Wt Readings from Last 12 Encounters:   06/10/17 62.5 kg (137 lb 12.6 oz)   03/10/17 60.8 kg (134 lb)   02/28/17 61.7 kg (136 lb 0.4 oz)   02/24/17 62.1 kg (136 lb 14.5 oz)   02/13/17 73 kg (161 lb)   01/26/17 73.1 kg (161 lb 2.5 oz)   01/17/17 66.9 kg (147 lb 8 oz)   12/22/16 68.2 kg (150 lb 5.7 oz)   12/14/16 70.1 kg (154 lb 8.7 oz)   10/21/16 83.9 kg (185 lb)   10/15/16 84.4 kg (186 lb)   10/03/16 83.8 kg (184 lb 11.9 oz)       PHYSICAL EXAM:  General: WD, WN.  Alert, cooperative, no acute distress    EENT:  PERRL. Anicteric sclerae. MMM  Resp:  crackles at bases bilaterally, no wheezing or rales. No accessory muscle use  CV:  Regular  rhythm,  No edema  GI:  Soft, Non distended, Non tender.  +Bowel sounds, no rebound  LN:  No cervical or inguinal LAKESHA  Neurologic:  Alert and oriented X 3, normal speech, non focal motor exam  Psych:   Good insight. Not anxious nor agitated  Skin:  No rashes. No jaundice    Reviewed most current lab test results and cultures  YES  Reviewed most current radiology test results   YES  Review and summation of old records today    NO  Reviewed patient's current orders and MAR    YES  PMH/ reviewed - no change compared to H&P  ________________________________________________________________________  Care Plan discussed with:    Comments   Patient x    Family      RN x    Care Manager     Consultant                        Multidiciplinary team rounds were held today with , nursing, pharmacist and clinical coordinator. Patient's plan of care was discussed; medications were reviewed and discharge planning was addressed. ________________________________________________________________________  Total NON critical care TIME: 25   Minutes    Total CRITICAL CARE TIME Spent:   Minutes non procedure based      Comments   >50% of visit spent in counseling and coordination of care     ________________________________________________________________________  Jesusita Guerrero MD     Procedures: see electronic medical records for all procedures/Xrays and details which were not copied into this note but were reviewed prior to creation of Plan. LABS:  I reviewed today's most current labs and imaging studies.   Pertinent labs include:  Recent Labs      06/10/17   0344  06/09/17   0216  06/08/17   1652  06/08/17   0225   WBC  7.8  6.9   --   7.1   HGB  7.8*  7.2*  7.4*  7.2*   HCT  23.8*  22.3*  22.5*  21.6*   PLT  248  216   --   202     Recent Labs 06/10/17   0344  06/09/17   0216  06/08/17   0225   NA  141  137  137   K  3.8  3.9  3.5   CL  107  107  107   CO2  25  22  25   GLU  102*  106*  85   BUN  30*  28*  26*   CREA  1.81*  1.64*  1.55*   CA  8.0*  7.6*  7.5*   MG   --    --   1.5*

## 2017-06-11 LAB
ALBUMIN SERPL BCP-MCNC: 2.8 G/DL (ref 3.5–5)
ALBUMIN/GLOB SERPL: 0.9 {RATIO} (ref 1.1–2.2)
ALP SERPL-CCNC: 89 U/L (ref 45–117)
ALT SERPL-CCNC: 16 U/L (ref 12–78)
ANION GAP BLD CALC-SCNC: 7 MMOL/L (ref 5–15)
AST SERPL W P-5'-P-CCNC: 6 U/L (ref 15–37)
BILIRUB SERPL-MCNC: 0.4 MG/DL (ref 0.2–1)
BUN SERPL-MCNC: 31 MG/DL (ref 6–20)
BUN/CREAT SERPL: 19 (ref 12–20)
CALCIUM SERPL-MCNC: 8.1 MG/DL (ref 8.5–10.1)
CHLORIDE SERPL-SCNC: 107 MMOL/L (ref 97–108)
CO2 SERPL-SCNC: 27 MMOL/L (ref 21–32)
CREAT SERPL-MCNC: 1.67 MG/DL (ref 0.55–1.02)
ERYTHROCYTE [DISTWIDTH] IN BLOOD BY AUTOMATED COUNT: 19.6 % (ref 11.5–14.5)
GLOBULIN SER CALC-MCNC: 3 G/DL (ref 2–4)
GLUCOSE SERPL-MCNC: 97 MG/DL (ref 65–100)
HCT VFR BLD AUTO: 25.2 % (ref 35–47)
HGB BLD-MCNC: 8.3 G/DL (ref 11.5–16)
MAGNESIUM SERPL-MCNC: 1.6 MG/DL (ref 1.6–2.4)
MCH RBC QN AUTO: 24.6 PG (ref 26–34)
MCHC RBC AUTO-ENTMCNC: 32.9 G/DL (ref 30–36.5)
MCV RBC AUTO: 74.6 FL (ref 80–99)
PHOSPHATE SERPL-MCNC: 2.6 MG/DL (ref 2.6–4.7)
PLATELET # BLD AUTO: 285 K/UL (ref 150–400)
POTASSIUM SERPL-SCNC: 3.8 MMOL/L (ref 3.5–5.1)
PROT SERPL-MCNC: 5.8 G/DL (ref 6.4–8.2)
RBC # BLD AUTO: 3.38 M/UL (ref 3.8–5.2)
SODIUM SERPL-SCNC: 141 MMOL/L (ref 136–145)
WBC # BLD AUTO: 8.7 K/UL (ref 3.6–11)

## 2017-06-11 PROCEDURE — 74011000250 HC RX REV CODE- 250: Performed by: INTERNAL MEDICINE

## 2017-06-11 PROCEDURE — 85027 COMPLETE CBC AUTOMATED: CPT | Performed by: INTERNAL MEDICINE

## 2017-06-11 PROCEDURE — 83735 ASSAY OF MAGNESIUM: CPT | Performed by: INTERNAL MEDICINE

## 2017-06-11 PROCEDURE — 84100 ASSAY OF PHOSPHORUS: CPT | Performed by: INTERNAL MEDICINE

## 2017-06-11 PROCEDURE — 74011636637 HC RX REV CODE- 636/637: Performed by: INTERNAL MEDICINE

## 2017-06-11 PROCEDURE — 80053 COMPREHEN METABOLIC PANEL: CPT | Performed by: INTERNAL MEDICINE

## 2017-06-11 PROCEDURE — 65660000000 HC RM CCU STEPDOWN

## 2017-06-11 PROCEDURE — 36415 COLL VENOUS BLD VENIPUNCTURE: CPT | Performed by: INTERNAL MEDICINE

## 2017-06-11 PROCEDURE — 74011250637 HC RX REV CODE- 250/637: Performed by: INTERNAL MEDICINE

## 2017-06-11 PROCEDURE — 74011250636 HC RX REV CODE- 250/636: Performed by: INTERNAL MEDICINE

## 2017-06-11 RX ORDER — BUMETANIDE 0.25 MG/ML
1 INJECTION INTRAMUSCULAR; INTRAVENOUS 2 TIMES DAILY
Status: DISCONTINUED | OUTPATIENT
Start: 2017-06-11 | End: 2017-06-13

## 2017-06-11 RX ADMIN — ISOSORBIDE MONONITRATE 30 MG: 30 TABLET, EXTENDED RELEASE ORAL at 09:42

## 2017-06-11 RX ADMIN — Medication 5 ML: at 14:00

## 2017-06-11 RX ADMIN — PANTOPRAZOLE SODIUM 40 MG: 40 TABLET, DELAYED RELEASE ORAL at 09:43

## 2017-06-11 RX ADMIN — PREDNISONE 10 MG: 10 TABLET ORAL at 09:42

## 2017-06-11 RX ADMIN — HEPARIN SODIUM 5000 UNITS: 5000 INJECTION, SOLUTION INTRAVENOUS; SUBCUTANEOUS at 09:43

## 2017-06-11 RX ADMIN — GABAPENTIN 300 MG: 300 CAPSULE ORAL at 09:42

## 2017-06-11 RX ADMIN — ASPIRIN 81 MG CHEWABLE TABLET 81 MG: 81 TABLET CHEWABLE at 09:42

## 2017-06-11 RX ADMIN — GABAPENTIN 300 MG: 300 CAPSULE ORAL at 17:27

## 2017-06-11 RX ADMIN — HYDRALAZINE HYDROCHLORIDE 25 MG: 25 TABLET ORAL at 09:43

## 2017-06-11 RX ADMIN — CARVEDILOL 6.25 MG: 6.25 TABLET, FILM COATED ORAL at 17:27

## 2017-06-11 RX ADMIN — HYDRALAZINE HYDROCHLORIDE 25 MG: 25 TABLET ORAL at 21:47

## 2017-06-11 RX ADMIN — Medication 10 ML: at 21:48

## 2017-06-11 RX ADMIN — Medication 5 ML: at 09:43

## 2017-06-11 RX ADMIN — BUMETANIDE 1 MG: 0.25 INJECTION INTRAMUSCULAR; INTRAVENOUS at 13:13

## 2017-06-11 RX ADMIN — CARVEDILOL 6.25 MG: 6.25 TABLET, FILM COATED ORAL at 09:43

## 2017-06-11 RX ADMIN — HEPARIN SODIUM 5000 UNITS: 5000 INJECTION, SOLUTION INTRAVENOUS; SUBCUTANEOUS at 21:48

## 2017-06-11 RX ADMIN — BUMETANIDE 1 MG: 0.25 INJECTION INTRAMUSCULAR; INTRAVENOUS at 17:25

## 2017-06-11 RX ADMIN — HYDRALAZINE HYDROCHLORIDE 25 MG: 25 TABLET ORAL at 17:27

## 2017-06-11 RX ADMIN — ATORVASTATIN CALCIUM 20 MG: 20 TABLET, FILM COATED ORAL at 21:48

## 2017-06-11 NOTE — PROGRESS NOTES
0700: Report received from Zhao Bullock, [facility], [place], [facility], ED SUMMARY, [facility], RECENT RESULTS were discussed.     Neri Whaley RN

## 2017-06-11 NOTE — PROGRESS NOTES
Hospitalist Progress Note    NAME: Seth East   :  1945   MRN:  [id number]     Admit date: 2017    Today's date: 17    PCP: Bong Colbert, KUMAR Chang M.D.  Cell [phone]      Assessment / Plan:  Acute renal failure POA  Essential hypertension POA peak /112 improved  Prior baseline creatinine ~ 0.92, renal thinks baseline might be higher 1.2 to 1.5 range  S/P right nephrectomy for Renal cell cancer  Renal US with single left kidney, no hydronephrosis  Held lisinopril and HCTZ at admit  Hydralazine, coreg, imdur for BP control  Initially thought the patient was hypovolemic with the recent N/V, diarrhea       Creatinine not improved with IVF at admission  BNP 2592, crackles on exam, Alberteen Folds Dr Aaliyah Hale input, likely volume overloaded with CHF  Continue diuresis and watch renal function    Chronic systolic CHF POA LVEF 22-04%  Elevated troponin POA peak 0.16 now trending down  Recurrent exertional chest pain POA improved  Moderate Pulmonary HTN PA pressure 58  ASA 81 mg, coreg  Off ACE In with elevated creatinine  Statin  Cardiology following, will discuss with them if sooner cardiac w/u is needed  Resume ACE In once creatinine improves  BNP markedly elevated  2592 and creatinine remains elevated    Probable gastroenteritis POA resolved  Recurrent nausea and vomiting with diarrhea at home x days  None since admission, abdominal exam is benign  Able to take PO  No further work up unless recurrent    Anemia of chronic disease POA HgB dropped to 7.2  Heme positive stool POA  Prior baseline HgB ~ 9.0 range  Stool dark brown, heme positive in ED, but denies any overt GI bleeding  PPI  Serial HGbs  Iron studies likely chronic disease  Serial HgBs  Outpatient GI work up    Hyperlipidemia   Statin    Sick Sinus syndrome s/p PPM 2017    Renal cell cancer s/p S/P right nephrectomy 2016    S/P cholecystectomy     Code status: Full  Prophylaxis: Lovenox  Recommended Disposition: Home w/Family     Subjective:     Chief Complaint / Reason for Physician Visit  \"I had chest pain when I walked yesterday\"  No further diarrhea or vomting  No Chest pain at rest, positive ROCK when she walks  Discussed with RN events overnight. Review of Systems:  Symptom Y/N Comments  Symptom Y/N Comments   Fever/Chills n   Chest Pain n    Poor Appetite    Edema     Cough n   Abdominal Pain n    Sputum    Joint Pain     SOB/ROCK n   Pruritis/Rash     Nausea/vomit Nausea   Tolerating PT/OT     Diarrhea n   Tolerating Diet Y    Constipation    Other       Could NOT obtain due to:      Objective:     VITALS:   Last 24hrs VS reviewed since prior progress note. Most recent are:  Patient Vitals for the past 24 hrs:   Temp Pulse Resp BP SpO2   06/11/17 0728 97.7 °F (36.5 °C) 72 18 140/79 100 %   06/11/17 0233 97.4 °F (36.3 °C) 68 16 129/72 99 %   06/10/17 2042 - 73 - 131/75 -   06/10/17 1951 98 °F (36.7 °C) 70 18 136/70 95 %   06/10/17 1512 97.9 °F (36.6 °C) 71 18 126/81 98 %   06/10/17 1107 97.8 °F (36.6 °C) 70 18 129/78 97 %       Intake/Output Summary (Last 24 hours) at 06/11/17 1034  Last data filed at 06/11/17 0728   Gross per 24 hour   Intake              240 ml   Output             1700 ml   Net            -1460 ml        Wt Readings from Last 12 Encounters:   06/10/17 62.5 kg (137 lb 12.6 oz)   03/10/17 60.8 kg (134 lb)   02/28/17 61.7 kg (136 lb 0.4 oz)   02/24/17 62.1 kg (136 lb 14.5 oz)   02/13/17 73 kg (161 lb)   01/26/17 73.1 kg (161 lb 2.5 oz)   01/17/17 66.9 kg (147 lb 8 oz)   12/22/16 68.2 kg (150 lb 5.7 oz)   12/14/16 70.1 kg (154 lb 8.7 oz)   10/21/16 83.9 kg (185 lb)   10/15/16 84.4 kg (186 lb)   10/03/16 83.8 kg (184 lb 11.9 oz)       PHYSICAL EXAM:  General: WD, WN. Alert, cooperative, no acute distress    EENT:  PERRL. Anicteric sclerae. MMM  Resp:  crackles at bases bilaterally, no wheezing or rales.   No accessory muscle use  CV:  Regular rhythm,  No edema  GI:  Soft, Non distended, Non tender.  +Bowel sounds, no rebound  LN:  No cervical or inguinal LAKESHA  Neurologic:  Alert and oriented X 3, normal speech, non focal motor exam  Psych:   Good insight. Not anxious nor agitated  Skin:  No rashes. No jaundice    Reviewed most current lab test results and cultures  YES  Reviewed most current radiology test results   YES  Review and summation of old records today    NO  Reviewed patient's current orders and MAR    YES  PMH/SH reviewed - no change compared to H&P  ________________________________________________________________________  Care Plan discussed with:    Comments   Patient x    Family      RN x    Care Manager     Consultant                        Multidiciplinary team rounds were held today with , nursing, pharmacist and clinical coordinator. Patient's plan of care was discussed; medications were reviewed and discharge planning was addressed. ________________________________________________________________________  Total NON critical care TIME: 25   Minutes    Total CRITICAL CARE TIME Spent:   Minutes non procedure based      Comments   >50% of visit spent in counseling and coordination of care     ________________________________________________________________________  Sunitha Lopez MD     Procedures: see electronic medical records for all procedures/Xrays and details which were not copied into this note but were reviewed prior to creation of Plan. LABS:  I reviewed today's most current labs and imaging studies.   Pertinent labs include:  Recent Labs      06/11/17   0234  06/10/17   0344  06/09/17   0216   WBC  8.7  7.8  6.9   HGB  8.3*  7.8*  7.2*   HCT  25.2*  23.8*  22.3*   PLT  285  248  216     Recent Labs      06/11/17   0234  06/10/17   0344  06/09/17   0216   NA  141  141  137   K  3.8  3.8  3.9   CL  107  107  107   CO2  27  25  22   GLU  97  102*  106*   BUN  31*  30*  28*   CREA  1.67*  1.81*  1.64*   CA 8. 1*  8.0*  7.6*   MG  1.6   --    --    PHOS  2.6   --    --    ALB  2.8*   --    --    TBILI  0.4   --    --    SGOT  6*   --    --    ALT  16   --    --

## 2017-06-11 NOTE — PROGRESS NOTES
Patient name: Claudio Griffin  MRN: [id number]    Nephrology Progress note:    Assessment:  LOYDA on CKD-3: Renal indices fluctuating b/w 1.5-1.8mg/dl. Etiology multifactorial->  Anemia itself can cause mild ATN. UA shows no proteinuria/hematuria-> +NSAID use as outpt. Underlying CKD from decreased renal mass (solitary left kidney). Prior baseline Cr 1.2-1.5mg/dl. No obstruction noted on renal ultrasound.      CHF (EF 25%): acute on chronic-> +pulm edema/Elevated BNP. Responding to IV bumex     Anemia: contributing to symptoms. Iron panel okay. Underlying CKD maybe contributing. Epogen 6/10/17     HTN: stable     CAD : s/p stents     RCC s/p right nephrectomy (12/2016)    Plan/Recommendations:  Agree with IV bumex 1mg bid -> watch Cr  Strict I/Os, daily weights  Avoid nephrotoxins  Renally adjusted meds (ie Gabapentin to bid)  Am labs      Subjective:  No events overnight.  Denies any SOB upon walking to the bathroom    ROS:   No nausea, no vomiting  No chest pain, no shortness of breath    Exam:  Visit Vitals    /80 (BP 1 Location: Left arm, BP Patient Position: At rest)    Pulse 70    Temp 97.7 °F (36.5 °C)    Resp 16    Ht 5' 7\" (1.702 m)    Wt 62.5 kg (137 lb 12.6 oz)    SpO2 100%    BMI 21.58 kg/m2     Wt Readings from Last 3 Encounters:   06/10/17 62.5 kg (137 lb 12.6 oz)   03/10/17 60.8 kg (134 lb)   02/28/17 61.7 kg (136 lb 0.4 oz)       Intake/Output Summary (Last 24 hours) at 06/11/17 1418  Last data filed at 06/11/17 1303   Gross per 24 hour   Intake 720 ml   Output             2300 ml   Net            -1580 ml       Gen: NAD  HEENT: No icterus, mmm, no oral exudate, AT/NC  Lungs/Chest wall: Bibasilar crackles-> better. . No accessory muscle use. Cardiovascular: Regular rate, normal rhythm. +PPM  Abdomen/: Soft, NT, ND, BS+. Ext:  No peripheral edema  CNS: alert and awake.  Answers appropriately      Current Facility-Administered Medications   Medication Dose Route Frequency Last Dose    bumetanide (BUMEX) injection 1 mg  1 mg IntraVENous BID 1 mg at 06/11/17 1313    gabapentin (NEURONTIN) capsule 300 mg  300 mg Oral  mg at 06/11/17 0942    carvedilol (COREG) tablet 6.25 mg  6.25 mg Oral BID WITH MEALS 6.25 mg at 06/11/17 0943    aspirin chewable tablet 81 mg  81 mg Oral DAILY 81 mg at 06/11/17 0942    atorvastatin (LIPITOR) tablet 20 mg  20 mg Oral QHS 20 mg at 06/10/17 2043    hydrALAZINE (APRESOLINE) 20 mg/mL injection 10 mg  10 mg IntraVENous Q6H PRN      hydrALAZINE (APRESOLINE) tablet 25 mg  25 mg Oral TID 25 mg at 06/11/17 0943    predniSONE (DELTASONE) tablet 10 mg  10 mg Oral DAILY 10 mg at 06/11/17 0942    acetaminophen (TYLENOL) tablet 650 mg  650 mg Oral Q4H PRN      ondansetron (ZOFRAN) injection 4 mg  4 mg IntraVENous Q4H PRN      pantoprazole (PROTONIX) tablet 40 mg  40 mg Oral ACB 40 mg at 06/11/17 0943    heparin (porcine) injection 5,000 Units  5,000 Units SubCUTAneous Q12H 5,000 Units at 06/11/17 0943    sodium chloride (NS) flush 5-10 mL  5-10 mL IntraVENous Q8H 5 mL at 06/11/17 1400    sodium chloride (NS) flush 5-10 mL  5-10 mL IntraVENous PRN      polyethylene glycol (MIRALAX) packet 17 g  17 g Oral DAILY 17 g at 06/10/17 0904    isosorbide mononitrate ER (IMDUR) tablet 30 mg  30 mg Oral DAILY 30 mg at 06/11/17 1523       Labs/Data:    Lab Results   Component Value Date/Time    WBC 8.7 06/11/2017 02:34 AM    Hemoglobin (POC) 10.9 02/28/2017 04:16 PM    HGB 8.3 06/11/2017 02:34 AM    Hematocrit (POC) 32 02/28/2017 04:16 PM    HCT 25.2 06/11/2017 02:34 AM    PLATELET 953 08/57/7585 02:34 AM    MCV 74.6 06/11/2017 02:34 AM       Lab Results   Component Value Date/Time    Sodium 141 06/11/2017 02:34 AM    Potassium 3.8 06/11/2017 02:34 AM    Chloride 107 06/11/2017 02:34 AM    CO2 27 06/11/2017 02:34 AM    Anion gap 7 06/11/2017 02:34 AM    Glucose 97 06/11/2017 02:34 AM    BUN 31 06/11/2017 02:34 AM    Creatinine 1.67 06/11/2017 02:34 AM    BUN/Creatinine ratio 19 06/11/2017 02:34 AM    GFR est AA 37 06/11/2017 02:34 AM    GFR est non-AA 30 06/11/2017 02:34 AM    Calcium 8.1 06/11/2017 02:34 AM       Patient seen and examined. Chart reviewed. Labs, data and other pertinent notes reviewed in last 24 hrs.     Discussed with patient and RN    Signed by:  Myesha Valle MD

## 2017-06-12 ENCOUNTER — APPOINTMENT (OUTPATIENT)
Dept: NUCLEAR MEDICINE | Age: 72
DRG: 683 | End: 2017-06-12
Attending: INTERNAL MEDICINE
Payer: MEDICARE

## 2017-06-12 LAB
ANION GAP BLD CALC-SCNC: 8 MMOL/L (ref 5–15)
ATTENDING PHYSICIAN, CST07: NORMAL
BASOPHILS # BLD AUTO: 0 K/UL (ref 0–0.1)
BASOPHILS # BLD: 0 % (ref 0–1)
BUN SERPL-MCNC: 36 MG/DL (ref 6–20)
BUN/CREAT SERPL: 21 (ref 12–20)
CALCIUM SERPL-MCNC: 8.3 MG/DL (ref 8.5–10.1)
CHLORIDE SERPL-SCNC: 101 MMOL/L (ref 97–108)
CO2 SERPL-SCNC: 28 MMOL/L (ref 21–32)
CREAT SERPL-MCNC: 1.73 MG/DL (ref 0.55–1.02)
DIAGNOSIS, 93000: NORMAL
DUKE TM SCORE RESULT, CST14: NORMAL
DUKE TREADMILL SCORE, CST13: 5456
ECG INTERP BEFORE EX, CST11: NORMAL
ECG INTERP DURING EX, CST12: NORMAL
EOSINOPHIL # BLD: 0.1 K/UL (ref 0–0.4)
EOSINOPHIL NFR BLD: 1 % (ref 0–7)
ERYTHROCYTE [DISTWIDTH] IN BLOOD BY AUTOMATED COUNT: 19.9 % (ref 11.5–14.5)
FUNCTIONAL CAPACITY, CST17: NORMAL
GLUCOSE BLD STRIP.AUTO-MCNC: 161 MG/DL (ref 65–100)
GLUCOSE SERPL-MCNC: 86 MG/DL (ref 65–100)
HCT VFR BLD AUTO: 26.6 % (ref 35–47)
HGB BLD-MCNC: 8.6 G/DL (ref 11.5–16)
KNOWN CARDIAC CONDITION, CST08: NORMAL
LYMPHOCYTES # BLD AUTO: 22 % (ref 12–49)
LYMPHOCYTES # BLD: 2.3 K/UL (ref 0.8–3.5)
MAGNESIUM SERPL-MCNC: 1.6 MG/DL (ref 1.6–2.4)
MAX. DIASTOLIC BP, CST04: 95 MMHG
MAX. HEART RATE, CST05: 85 BPM
MAX. SYSTOLIC BP, CST03: 164 MMHG
MCH RBC QN AUTO: 24.2 PG (ref 26–34)
MCHC RBC AUTO-ENTMCNC: 32.3 G/DL (ref 30–36.5)
MCV RBC AUTO: 74.7 FL (ref 80–99)
MONOCYTES # BLD: 0.8 K/UL (ref 0–1)
MONOCYTES NFR BLD AUTO: 7 % (ref 5–13)
NEUTS SEG # BLD: 7.2 K/UL (ref 1.8–8)
NEUTS SEG NFR BLD AUTO: 70 % (ref 32–75)
OVERALL BP RESPONSE TO EXERCISE, CST16: NORMAL
OVERALL HR RESPONSE TO EXERCISE, CST15: NORMAL
PEAK EX METS, CST10: 1 METS
PHOSPHATE SERPL-MCNC: 2.8 MG/DL (ref 2.6–4.7)
PLATELET # BLD AUTO: 291 K/UL (ref 150–400)
POTASSIUM SERPL-SCNC: 4 MMOL/L (ref 3.5–5.1)
PROTOCOL NAME, CST01: NORMAL
RBC # BLD AUTO: 3.56 M/UL (ref 3.8–5.2)
SERVICE CMNT-IMP: ABNORMAL
SODIUM SERPL-SCNC: 137 MMOL/L (ref 136–145)
TEST INDICATION, CST09: NORMAL
WBC # BLD AUTO: 10.4 K/UL (ref 3.6–11)

## 2017-06-12 PROCEDURE — 80048 BASIC METABOLIC PNL TOTAL CA: CPT | Performed by: INTERNAL MEDICINE

## 2017-06-12 PROCEDURE — 65660000000 HC RM CCU STEPDOWN

## 2017-06-12 PROCEDURE — 74011000250 HC RX REV CODE- 250: Performed by: INTERNAL MEDICINE

## 2017-06-12 PROCEDURE — 36415 COLL VENOUS BLD VENIPUNCTURE: CPT | Performed by: INTERNAL MEDICINE

## 2017-06-12 PROCEDURE — A9500 TC99M SESTAMIBI: HCPCS

## 2017-06-12 PROCEDURE — 74011250636 HC RX REV CODE- 250/636: Performed by: INTERNAL MEDICINE

## 2017-06-12 PROCEDURE — 74011250636 HC RX REV CODE- 250/636

## 2017-06-12 PROCEDURE — 85025 COMPLETE CBC W/AUTO DIFF WBC: CPT | Performed by: INTERNAL MEDICINE

## 2017-06-12 PROCEDURE — 74011250637 HC RX REV CODE- 250/637: Performed by: INTERNAL MEDICINE

## 2017-06-12 PROCEDURE — 74011636637 HC RX REV CODE- 636/637: Performed by: INTERNAL MEDICINE

## 2017-06-12 PROCEDURE — 82962 GLUCOSE BLOOD TEST: CPT

## 2017-06-12 PROCEDURE — 83735 ASSAY OF MAGNESIUM: CPT | Performed by: INTERNAL MEDICINE

## 2017-06-12 PROCEDURE — 93017 CV STRESS TEST TRACING ONLY: CPT

## 2017-06-12 PROCEDURE — 84100 ASSAY OF PHOSPHORUS: CPT | Performed by: INTERNAL MEDICINE

## 2017-06-12 RX ORDER — MAGNESIUM SULFATE HEPTAHYDRATE 40 MG/ML
2 INJECTION, SOLUTION INTRAVENOUS ONCE
Status: COMPLETED | OUTPATIENT
Start: 2017-06-12 | End: 2017-06-12

## 2017-06-12 RX ORDER — SODIUM CHLORIDE 0.9 % (FLUSH) 0.9 %
SYRINGE (ML) INJECTION
Status: COMPLETED
Start: 2017-06-12 | End: 2017-06-12

## 2017-06-12 RX ADMIN — BUMETANIDE 1 MG: 0.25 INJECTION INTRAMUSCULAR; INTRAVENOUS at 16:00

## 2017-06-12 RX ADMIN — CARVEDILOL 6.25 MG: 6.25 TABLET, FILM COATED ORAL at 10:09

## 2017-06-12 RX ADMIN — GABAPENTIN 300 MG: 300 CAPSULE ORAL at 18:03

## 2017-06-12 RX ADMIN — PANTOPRAZOLE SODIUM 40 MG: 40 TABLET, DELAYED RELEASE ORAL at 10:09

## 2017-06-12 RX ADMIN — HYDRALAZINE HYDROCHLORIDE 25 MG: 25 TABLET ORAL at 18:03

## 2017-06-12 RX ADMIN — Medication 5 ML: at 06:00

## 2017-06-12 RX ADMIN — REGADENOSON 0.4 MG: 0.08 INJECTION, SOLUTION INTRAVENOUS at 09:49

## 2017-06-12 RX ADMIN — PREDNISONE 10 MG: 10 TABLET ORAL at 10:10

## 2017-06-12 RX ADMIN — ISOSORBIDE MONONITRATE 30 MG: 30 TABLET, EXTENDED RELEASE ORAL at 10:09

## 2017-06-12 RX ADMIN — Medication 10 ML: at 09:49

## 2017-06-12 RX ADMIN — HEPARIN SODIUM 5000 UNITS: 5000 INJECTION, SOLUTION INTRAVENOUS; SUBCUTANEOUS at 10:09

## 2017-06-12 RX ADMIN — HEPARIN SODIUM 5000 UNITS: 5000 INJECTION, SOLUTION INTRAVENOUS; SUBCUTANEOUS at 21:24

## 2017-06-12 RX ADMIN — HYDRALAZINE HYDROCHLORIDE 25 MG: 25 TABLET ORAL at 10:09

## 2017-06-12 RX ADMIN — GABAPENTIN 300 MG: 300 CAPSULE ORAL at 10:09

## 2017-06-12 RX ADMIN — ACETAMINOPHEN 650 MG: 325 TABLET, FILM COATED ORAL at 10:28

## 2017-06-12 RX ADMIN — Medication 10 ML: at 21:26

## 2017-06-12 RX ADMIN — MAGNESIUM SULFATE HEPTAHYDRATE 2 G: 40 INJECTION, SOLUTION INTRAVENOUS at 10:08

## 2017-06-12 RX ADMIN — Medication 5 ML: at 14:00

## 2017-06-12 RX ADMIN — CARVEDILOL 6.25 MG: 6.25 TABLET, FILM COATED ORAL at 18:03

## 2017-06-12 RX ADMIN — ATORVASTATIN CALCIUM 20 MG: 20 TABLET, FILM COATED ORAL at 21:24

## 2017-06-12 RX ADMIN — BUMETANIDE 1 MG: 0.25 INJECTION INTRAMUSCULAR; INTRAVENOUS at 10:08

## 2017-06-12 RX ADMIN — ASPIRIN 81 MG CHEWABLE TABLET 81 MG: 81 TABLET CHEWABLE at 10:09

## 2017-06-12 NOTE — PROGRESS NOTES
Attempted to see pt this pm and pt declined 2/2 to nausea and vomiting. Will continue to follow and tx when pt is able and when cleared by nursing.

## 2017-06-12 NOTE — PROGRESS NOTES
Lexiscan stress test completed. Second set of Cardiolite images to follow later today.  Pt may resume previous diet per Dr. Luther Newton

## 2017-06-12 NOTE — PROGRESS NOTES
Spiritual Care Assessment/Progress Notes    Shelly Juarez [id number]  [id number]    1945  80 y.o.  female    Patient Telephone Number: There is no home phone number on file. Jew Affiliation: Sabianism   Language: English   Extended Emergency Contact Information  Primary Emergency Contact: Jr Hobbs James  Address: [facility] [employer]  Mobile Phone: [phone]  Relation: Son  Secondary Emergency Contact: Libby Landry Phone: [phone]  Relation: Daughter   Patient Active Problem List    Diagnosis Date Noted    Accelerated hypertension 06/07/2017    Acute cholecystitis 02/28/2017    Diverticulitis 01/26/2017    Leukocytosis 01/26/2017    History of permanent cardiac pacemaker placement 01/26/2017    Chronic systolic heart failure ([facility] 01/26/2017    Paroxysmal atrial fibrillation ([facility] 01/13/2017    Orthostatic hypotension 01/13/2017    Left lower lobe pneumonia 01/13/2017    SVT (supraventricular tachycardia) ([facility] 01/09/2017    Renal cell carcinoma of right kidney ([facility] 01/04/2017    Renal mass 12/22/2016        Date: 6/12/2017       Level of Jew/Spiritual Activity:  [x]         Involved in danette tradition/spiritual practice    []         Not involved in danette tradition/spiritual practice  [x]         Spiritually oriented    []         Claims no spiritual orientation    []         seeking spiritual identity  []         Feels alienated from Mormon practice/tradition  []         Feels angry about Mormon practice/tradition  [x]         Spirituality/Mormon tradition is a resource for coping at this time.   []         Not able to assess due to medical condition    Services Provided Today:  []         crisis intervention    []         reading Scriptures  [x]         spiritual assessment    []         prayer  [x]         empathic listening/emotional support  []         rites and rituals (cite in comments)  []         life review     []         Latter-day support  []         theological development   []         advocacy  []         ethical dialog     []         blessing  []         bereavement support    []         support to family  []         anticipatory grief support   []         help with AMD  []         spiritual guidance    []         meditation      Spiritual Care Needs  []         Emotional Support  []         Spiritual/Adventist Care  []         Loss/Adjustment  []         Advocacy/Referral                /Ethics  [x]         No needs expressed at               this time  []         Other: (note in               comments)  Spiritual Care Plan  []         Follow up visits with               pt/family  []         Provide materials  []         Schedule sacraments  []         Contact Community               Clergy  [x]         Follow up as needed  []         Other: (note in               comments)     Comments:  initiated visit due to pt's length of stay.  provided empathic listening and supportive presence as pt shared that her family has been very supportive through this time. She added that she has a big family and they are close to one another. Pt is a member of [employer] where she is involved heavily in. Pt shared that since her medical conditions she has been missing Yazdanism and she is ready to be going back once she recovers. Pt seemed hopeful of her continued recovery and in high spirits. Advised of availability and assured her of continued support as needed/ desired. Michael Chang M.S.   Spiritual Care Department  If needs arise please call DISHA ([phone])

## 2017-06-12 NOTE — PROGRESS NOTES
Patient name: Marcus Fritz  MRN: [id number]    Nephrology Progress note:    Assessment:  LOYDA on CKD-3: Creatinine fluctuating b/w 1.5-1.8 (. Etiology multifactorial->  Anemia itself can cause mild ATN. UA shows no proteinuria/hematuria-> +NSAID use as outpt. Underlying CKD from decreased renal mass (solitary left kidney). Prior baseline Cr 1.2-1.5mg/dl. No obstruction noted on renal ultrasound.      CHF (EF 25%): acute on chronic-> +pulm edema/Elevated BNP. Responding to IV bumex     Anemia: contributing to symptoms. Iron panel okay. Underlying CKD maybe contributing. Epogen 6/10/17     HTN: stable     CAD : s/p stents     RCC s/p right nephrectomy (12/2016)    Plan/Recommendations:    Agree with IV bumex 1mg bid -> watch Cr, O>I; switch to po tomorrow  Strict I/Os, daily weights  Avoid nephrotoxins  Renally adjusted meds   Am labs      Subjective:  No events overnight.  Denies any SOB upon walking to the bathroom    ROS:   No nausea, no vomiting  No chest pain, no shortness of breath    Exam:  Visit Vitals    /77    Pulse 70    Temp 97.7 °F (36.5 °C)    Resp 16    Ht 5' 7\" (1.702 m)    Wt 62.5 kg (137 lb 12.6 oz)    SpO2 100%    BMI 21.58 kg/m2     Wt Readings from Last 3 Encounters:   06/10/17 62.5 kg (137 lb 12.6 oz)   03/10/17 60.8 kg (134 lb)   02/28/17 61.7 kg (136 lb 0.4 oz)       Intake/Output Summary (Last 24 hours) at 06/12/17 0622  Last data filed at 06/11/17 2321   Gross per 24 hour   Intake              720 ml   Output             3050 ml   Net -2330 ml       Gen: NAD  HEENT: No icterus, mmm, no oral exudate, AT/NC  Lungs/Chest wall: cta david. No accessory muscle use. Cardiovascular: Regular rate, normal rhythm. +PPM  Abdomen/: Soft, NT, ND, BS+. Ext:  No peripheral edema  CNS: alert and awake.  Answers appropriately      Current Facility-Administered Medications   Medication Dose Route Frequency Last Dose    bumetanide (BUMEX) injection 1 mg  1 mg IntraVENous BID 1 mg at 06/11/17 1725    regadenoson (LEXISCAN) injection 0.4 mg  0.4 mg IntraVENous RAD ONCE      gabapentin (NEURONTIN) capsule 300 mg  300 mg Oral  mg at 06/11/17 1727    carvedilol (COREG) tablet 6.25 mg  6.25 mg Oral BID WITH MEALS 6.25 mg at 06/11/17 1727    aspirin chewable tablet 81 mg  81 mg Oral DAILY 81 mg at 06/11/17 0942    atorvastatin (LIPITOR) tablet 20 mg  20 mg Oral QHS 20 mg at 06/11/17 2148    hydrALAZINE (APRESOLINE) 20 mg/mL injection 10 mg  10 mg IntraVENous Q6H PRN      hydrALAZINE (APRESOLINE) tablet 25 mg  25 mg Oral TID 25 mg at 06/11/17 2147    predniSONE (DELTASONE) tablet 10 mg  10 mg Oral DAILY 10 mg at 06/11/17 0942    acetaminophen (TYLENOL) tablet 650 mg  650 mg Oral Q4H PRN      ondansetron (ZOFRAN) injection 4 mg  4 mg IntraVENous Q4H PRN      pantoprazole (PROTONIX) tablet 40 mg  40 mg Oral ACB 40 mg at 06/11/17 0943    heparin (porcine) injection 5,000 Units  5,000 Units SubCUTAneous Q12H 5,000 Units at 06/11/17 2148    sodium chloride (NS) flush 5-10 mL  5-10 mL IntraVENous Q8H 10 mL at 06/11/17 2148    sodium chloride (NS) flush 5-10 mL  5-10 mL IntraVENous PRN      polyethylene glycol (MIRALAX) packet 17 g  17 g Oral DAILY 17 g at 06/10/17 0904    isosorbide mononitrate ER (IMDUR) tablet 30 mg  30 mg Oral DAILY 30 mg at 06/11/17 7876       Labs/Data:    Lab Results   Component Value Date/Time    WBC 10.4 06/12/2017 03:34 AM    Hemoglobin (POC) 10.9 02/28/2017 04:16 PM    HGB 8.6 06/12/2017 03:34 AM    Hematocrit (POC) 32 02/28/2017 04:16 PM    HCT 26.6 06/12/2017 03:34 AM    PLATELET 554 75/98/6777 03:34 AM    MCV 74.7 06/12/2017 03:34 AM       Lab Results   Component Value Date/Time    Sodium 137 06/12/2017 03:34 AM    Potassium 4.0 06/12/2017 03:34 AM    Chloride 101 06/12/2017 03:34 AM    CO2 28 06/12/2017 03:34 AM    Anion gap 8 06/12/2017 03:34 AM    Glucose 86 06/12/2017 03:34 AM    BUN 36 06/12/2017 03:34 AM    Creatinine 1.73 06/12/2017 03:34 AM    BUN/Creatinine ratio 21 06/12/2017 03:34 AM    GFR est AA 35 06/12/2017 03:34 AM    GFR est non-AA 29 06/12/2017 03:34 AM    Calcium 8.3 06/12/2017 03:34 AM       Patient seen and examined. Chart reviewed. Labs, data and other pertinent notes reviewed in last 24 hrs.     Discussed with patient and RN    Signed by:  Dilia Reddy MD

## 2017-06-12 NOTE — PROGRESS NOTES
Progress Note      6/12/2017 5:24 PM  NAME: Papa Garcia   MRN:  [id number]   Admit Diagnosis: Accelerated hypertension      Problem List:      1. Indeterminate troponin  2. Stress 6/12/17 with inferolateral infarct, no ischemia, EF 44%  3. Acute on chronic kidney disease  4. Anemia; slightly worse @ 8.1 from baseline in 8/9s  5. 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 Ischemic cardiomyopathy s/p prior AWMI with PCI (BMS) of LAD (severe obliterative disease in the distal LAD, some diagonals, and the RCA)  7. Nuclear stress '05 w/ EF 61% vineet gain in 11/16 w/ IWMI, AWMI and no ischemia; EF 33%  8. Hypertensive heart disease with chronic systolic heart failure; EF 25%  9. Diabetes  10. Hyperlipidemia -- intolerant of statins  11. Remote RIGHT nephrectomy for RCC 12/16  12. Obestiy  13. Arthritis     Assessment/Plan:   EKG shows SR with LVH and mild repol changes  U/S shows normal single left kidney  Remains anemic; stable  Stress above; no ischemia    1. Continue ASA 81mg  2. Continue coreg 6.25mg BID  3. Continue atorvastatin 20mg -- would increase to 40mg  4. BP management per primary  5. Continue imdur 30mg  6. Diuretics per primary  7. Will sign off. [x]       High complexity decision making was performed in this patient at high risk for decompensation with multiple organ involvement. Subjective:     Stefania Galicia denies dyspnea. Still with exertional discomfort in her chest; better. Discussed with RN events overnight. Review of Systems:    Symptom Y/N Comments  Symptom Y/N Comments   Fever/Chills N   Chest Pain N    Poor Appetite N   Edema N    Cough N   Abdominal Pain N    Sputum N   Joint Pain N    SOB/ROCK N   Pruritis/Rash N    Nausea/vomit N   Tolerating PT/OT Y    Diarrhea N   Tolerating Diet Y    Constipation N   Other       Could NOT obtain due to:      Objective:      Physical Exam:    Last 24hrs VS reviewed since prior progress note.  Most recent are:    Visit Vitals    /75 (BP 1 Location: Left arm, BP Patient Position: At rest)    Pulse 70    Temp 97.4 °F (36.3 °C)    Resp 18    Ht 5' 7\" (1.702 m)    Wt 62.5 kg (137 lb 12.6 oz)    SpO2 97%    BMI 21.58 kg/m2       Intake/Output Summary (Last 24 hours) at 06/12/17 1432  Last data filed at 06/12/17 1232   Gross per 24 hour   Intake              240 ml   Output             2851 ml   Net            -2611 ml        General Appearance: Well developed, well nourished, alert & oriented x 3,   no acute distress. Ears/Nose/Mouth/Throat: Pupils equal and round, Hearing grossly normal.  Neck: Supple. JVP within normal limits. Carotids good upstrokes, with no bruit. Chest: Lungs clear to auscultation bilaterally. Cardiovascular: Regular rate and rhythm, S1S2 normal, DORIAN  Abdomen: Soft, non-tender, bowel sounds are active. No organomegaly. Extremities: 1+ edema bilaterally. Femoral pulses +2, Distal Pulses +1. Skin: Warm and dry. Neuro: CN II-XII grossly intact, Strength and sensation grossly intact. []         Post-cath site without hematoma, bruit, tenderness, or thrill. Distal pulses intact. PMH/SH reviewed - no change compared to H&P    Data Review    Telemetry: sinus rhythm     EKG:   []  No new EKG for review    Lab Data Personally Reviewed:    Recent Labs      06/12/17   0334 06/11/17   0234   WBC  10.4  8.7   HGB  8.6*  8.3*   HCT  26.6*  25.2*   PLT  291  285     No results for input(s): INR, PTP, APTT in the last 72 hours. No lab exists for component: Coram Levels   Recent Labs      06/12/17   0334 06/11/17   0234  06/10/17   0344   NA  137  141  141   K  4.0  3.8  3.8   CL  101  107  107   CO2  28  27  25   BUN  36*  31*  30*   CREA  1.73*  1.67*  1.81*   GLU  86  97  102*   CA  8.3*  8.1*  8.0*   MG  1.6  1.6   --      No results for input(s): CPK, CKNDX, TROIQ in the last 72 hours.     No lab exists for component: CPKMB  Lab Results   Component Value Date/Time    Cholesterol, total 177 06/08/2017 02:25 AM    HDL Cholesterol 40 06/08/2017 02:25 AM    LDL, calculated 107.6 06/08/2017 02:25 AM    Triglyceride 147 06/08/2017 02:25 AM    CHOL/HDL Ratio 4.4 06/08/2017 02:25 AM       Recent Labs      06/11/17   0234   SGOT  6*   AP  89   TP  5.8*   ALB  2.8*   GLOB  3.0     No results for input(s): PH, PCO2, PO2 in the last 72 hours.     Medications Personally Reviewed:    Current Facility-Administered Medications   Medication Dose Route Frequency    bumetanide (BUMEX) injection 1 mg  1 mg IntraVENous BID    gabapentin (NEURONTIN) capsule 300 mg  300 mg Oral BID    carvedilol (COREG) tablet 6.25 mg  6.25 mg Oral BID WITH MEALS    aspirin chewable tablet 81 mg  81 mg Oral DAILY    atorvastatin (LIPITOR) tablet 20 mg  20 mg Oral QHS    hydrALAZINE (APRESOLINE) 20 mg/mL injection 10 mg  10 mg IntraVENous Q6H PRN    hydrALAZINE (APRESOLINE) tablet 25 mg  25 mg Oral TID    predniSONE (DELTASONE) tablet 10 mg  10 mg Oral DAILY    acetaminophen (TYLENOL) tablet 650 mg  650 mg Oral Q4H PRN    ondansetron (ZOFRAN) injection 4 mg  4 mg IntraVENous Q4H PRN    pantoprazole (PROTONIX) tablet 40 mg  40 mg Oral ACB    heparin (porcine) injection 5,000 Units  5,000 Units SubCUTAneous Q12H    sodium chloride (NS) flush 5-10 mL  5-10 mL IntraVENous Q8H    sodium chloride (NS) flush 5-10 mL  5-10 mL IntraVENous PRN    polyethylene glycol (MIRALAX) packet 17 g  17 g Oral DAILY    isosorbide mononitrate ER (IMDUR) tablet 30 mg  30 mg Oral DAILY         Yamilet Parra III, DO

## 2017-06-12 NOTE — PROGRESS NOTES
0700: Report received from Florence Llanes, [facility], [facility], ED SUMMARY, [place], RECENT RESULTS were discussed. Sowmya Schuler RN    1200: Pt c/o having an upstomach upset. Pt ambulated to bathroom had one loose stool followed by vomiting 200ml yellow emesis. Pt feels better now. Will continue to monitor.

## 2017-06-12 NOTE — PROGRESS NOTES
1900 Received report from Kelly Strickland RN. Assumed patient care. [address] SHIFT NURSING NOTE    Bedside shift change report given to Ermias Hussein RN (oncoming nurse) by Yamile Peralta RN (offgoing nurse). Report included the following information SBAR, Kardex, Intake/Output, MAR, Recent Results and Cardiac Rhythm Paced. SHIFT SUMMARY:         Admission Date 6/6/2017   Admission Diagnosis Accelerated hypertension   Consults IP CONSULT TO CARDIOLOGY  IP CONSULT TO CARDIOLOGY  IP CONSULT TO GASTROENTEROLOGY  IP CONSULT TO GASTROENTEROLOGY  IP CONSULT TO NEPHROLOGY        Consults   [x] PT   [] OT   [] Speech   [] Palliative      [] Hospice    [] Case Management   [] None   Cardiac Monitoring   [x] Yes   [] No     Antibiotics   [] Yes   [x] No   GI Prophylaxis  (Ex: Protonix, Pepcid, etc,.)   [x] Yes   [] No          DVT Prophylaxis   SCDs:             Santiago stockings:         [x] Medication (Ex: Lovenox, Eliquis, Brilinta, Coumadin,  Heparin, etc..)   [] Contraindicated   [] No VTE needed       Urinary Catheter             LDAs               Peripheral IV 06/10/17 Right;Upper Arm (Active)   Site Assessment Clean, dry, & intact 6/12/2017  7:54 PM   Phlebitis Assessment 0 6/12/2017  7:54 PM   Infiltration Assessment 0 6/12/2017  7:54 PM   Dressing Status Clean, dry, & intact 6/12/2017  7:54 PM   Dressing Type Tape;Transparent 6/12/2017  7:54 PM   Hub Color/Line Status Pink;Flushed;Patent 6/12/2017  7:54 PM   Action Taken Other (comment) 6/10/2017  4:41 PM                      I/Os   Intake/Output Summary (Last 24 hours) at 06/13/17 0115  Last data filed at 06/13/17 0111   Gross per 24 hour   Intake              290 ml   Output             1951 ml   Net            -1661 ml         Activity Level Activity Level: Head of bed elevated (degrees)     Activity Assistance: Partial (one person)   Diet Active Orders   Diet    DIET CARDIAC Regular      Purposeful Rounding every 1-2 hour?    [x] Yes    Zuri Score  Total Score: 2   Bed Alarm (If score 3 or >)   [] Yes    [] Refused (See signed refusal form in chart)   Evan Score  Evan Score: 19       Evan Score (if score 14 or less)   [] PMT consult   [] Nutrition consult   [] Wound Care consult      []  Specialty bed         Influenza Vaccine Received Flu Vaccine for Current Season (usually Sept-March): Not Flu Season               Needs prior to discharge:   Home O2 required:    [] Yes   [x] No     If yes, how much O2 required?     Other:    Last Bowel Movement Date: 06/12/17   Readmission Risk Assessment Tool Score High Risk            26       Total Score        3 Relationship with PCP    3 Patient Length of Stay > 5    11 More than 1 Admission in calendar year    5 Patient Insurance is Medicare, Medicaid or Self Pay    4 Charlson Comorbidity Score        Criteria that do not apply:    Patient Living Status       Expected Length of Stay 3d 12h   Actual Length of Stay 6

## 2017-06-12 NOTE — PROGRESS NOTES
Hospitalist Progress Note    NAME: Dominic Garcia   :  1945   MRN:  [id number]     Admit date: 2017    Today's date: 17    PCP: Deangelo De. KUMAR Fagan M.D.  Cell [phone]      Assessment / Plan:  Acute renal failure POA  Essential hypertension POA peak /112 improved  Prior baseline creatinine ~ 0.92, renal thinks baseline might be higher 1.2 to 1.5 range  S/P right nephrectomy for Renal cell cancer 6 months ago  Renal US with single left kidney, no hydronephrosis  Held lisinopril and HCTZ at admit  Hydralazine, coreg, imdur for BP control, better  Initially thought the patient was hypovolemic with the recent N/V, diarrhea       Creatinine not improved with IVF at admission  BNP 2592, crackles on exam, Sam Dark Dr Mckeon Bullion input, likely volume overloaded with CHF  Continue diuresis and watch renal function  Hopefully change to PO and discharge to home if ambulates well    Chronic systolic CHF POA LVEF 71-11%  Elevated troponin POA peak 0.16 now trending down  Recurrent exertional chest pain POA improved  Moderate Pulmonary HTN PA pressure 58  ASA 81 mg, coreg  Off ACE In with elevated creatinine  Statin  Stress test negative for ischemia  Diuresis    Probable gastroenteritis POA resolved  Recurrent nausea and vomiting with diarrhea at home x days  None since admission until today, one episode of recurrent N/V  Now able to take PO  No further work up unless recurrent    Anemia of chronic disease POA HgB dropped to 7.2, now stable  Heme positive stool POA  Prior baseline HgB ~ 9.0 range  Stool dark brown, heme positive in ED, but denies any overt GI bleeding  PPI  Serial HGbs  Iron studies likely chronic disease  Serial HgBs  Outpatient GI work up    Hyperlipidemia   Statin    Sick Sinus syndrome s/p PPM 2017    Renal cell cancer s/p S/P right nephrectomy 2016    S/P cholecystectomy     Code status: Full  Prophylaxis: Lovenox  Recommended Disposition: Home w/Family     Subjective:     Chief Complaint / Reason for Physician Visit  \"I threw up after the stress test\"\"  Recurrent N/V and diarrhea after stress test \" it was the injection they gave me\"  No quieted down by diner  No Chest pain at rest, not SOB walking in the room  Discussed with RN events overnight. Review of Systems:  Symptom Y/N Comments  Symptom Y/N Comments   Fever/Chills n   Chest Pain n    Poor Appetite    Edema     Cough n   Abdominal Pain n    Sputum    Joint Pain     SOB/ROCK n   Pruritis/Rash     Nausea/vomit Nausea   Tolerating PT/OT     Diarrhea n   Tolerating Diet Y    Constipation    Other       Could NOT obtain due to:      Objective:     VITALS:   Last 24hrs VS reviewed since prior progress note. Most recent are:  Patient Vitals for the past 24 hrs:   Temp Pulse Resp BP SpO2   06/12/17 1523 98 °F (36.7 °C) 69 18 111/54 97 %   06/12/17 1139 97.4 °F (36.3 °C) 70 18 108/75 97 %   06/12/17 0755 97.6 °F (36.4 °C) 70 18 133/84 98 %   06/12/17 0326 97.7 °F (36.5 °C) 70 16 127/77 100 %   06/11/17 2145 98 °F (36.7 °C) 72 20 110/67 100 %       Intake/Output Summary (Last 24 hours) at 06/12/17 1958  Last data filed at 06/12/17 1803   Gross per 24 hour   Intake              240 ml   Output             2201 ml   Net            -1961 ml        Wt Readings from Last 12 Encounters:   06/10/17 62.5 kg (137 lb 12.6 oz)   03/10/17 60.8 kg (134 lb)   02/28/17 61.7 kg (136 lb 0.4 oz)   02/24/17 62.1 kg (136 lb 14.5 oz)   02/13/17 73 kg (161 lb)   01/26/17 73.1 kg (161 lb 2.5 oz)   01/17/17 66.9 kg (147 lb 8 oz)   12/22/16 68.2 kg (150 lb 5.7 oz)   12/14/16 70.1 kg (154 lb 8.7 oz)   10/21/16 83.9 kg (185 lb)   10/15/16 84.4 kg (186 lb)   10/03/16 83.8 kg (184 lb 11.9 oz)       PHYSICAL EXAM:  General: WD, WN. Alert, cooperative, no acute distress    EENT:  PERRL. Anicteric sclerae. MMM  Resp:  Decreased BS at bases bilaterally, no wheezing.   No accessory muscle use  CV:  Regular  rhythm,  No edema  GI:  Soft, Non distended, Non tender.  +Bowel sounds, no rebound  LN:  No cervical or inguinal LAKESHA  Neurologic:  Alert and oriented X 3, normal speech, non focal motor exam  Psych:   Good insight. Not anxious nor agitated  Skin:  No rashes. No jaundice    Reviewed most current lab test results and cultures  YES  Reviewed most current radiology test results   YES  Review and summation of old records today    NO  Reviewed patient's current orders and MAR    YES  PMH/SH reviewed - no change compared to H&P  ________________________________________________________________________  Care Plan discussed with:    Comments   Patient x    Family      RN x    Care Manager     Consultant                        Multidiciplinary team rounds were held today with , nursing, pharmacist and clinical coordinator. Patient's plan of care was discussed; medications were reviewed and discharge planning was addressed. ________________________________________________________________________  Total NON critical care TIME: 25   Minutes    Total CRITICAL CARE TIME Spent:   Minutes non procedure based      Comments   >50% of visit spent in counseling and coordination of care     ________________________________________________________________________  Esperanza Ortiz MD     Procedures: see electronic medical records for all procedures/Xrays and details which were not copied into this note but were reviewed prior to creation of Plan. LABS:  I reviewed today's most current labs and imaging studies.   Pertinent labs include:  Recent Labs      06/12/17   0334  06/11/17   0234  06/10/17   0344   WBC  10.4  8.7  7.8   HGB  8.6*  8.3*  7.8*   HCT  26.6*  25.2*  23.8*   PLT  291  285  248     Recent Labs      06/12/17   0334  06/11/17   0234  06/10/17   0344   NA  137  141  141   K  4.0  3.8  3.8   CL  101  107  107   CO2  28  27  25   GLU  86  97  102*   BUN  36*  31*  30*   CREA  1.73* 1.67*  1.81*   CA  8.3*  8.1*  8.0*   MG  1.6  1.6   --    PHOS  2.8  2.6   --    ALB   --   2.8*   --    TBILI   --   0.4   --    SGOT   --   6*   --    ALT   --   16   --

## 2017-06-12 NOTE — CARDIO/PULMONARY
C/P REHAB:  Chart reviewed. Stefania is a 80 y.o.  female who presents with central CP PTA; described as a pressure associated with ROCK, n/v/d. No radiation with the CP and no sweats. She has not taken her meds for a couple of days. No CP now per Cardiologist note.     EF 25-30% on echo yesterday 6/7/17.      PMH: Rheumatoid arthritis, CHF, DM, GERD, HTN, Anemia, old MI, Renal cell carcinoma of right kidney.     Patient was seen by us for CHF teaching in March of 2017.      Pt currently off unit for stress test per primary nurse. Will continue to follow for education and support.

## 2017-06-13 ENCOUNTER — HOME HEALTH ADMISSION (OUTPATIENT)
Dept: HOME HEALTH SERVICES | Facility: HOME HEALTH | Age: 72
End: 2017-06-13
Payer: MEDICARE

## 2017-06-13 VITALS
RESPIRATION RATE: 16 BRPM | DIASTOLIC BLOOD PRESSURE: 56 MMHG | HEART RATE: 70 BPM | BODY MASS INDEX: 20.73 KG/M2 | TEMPERATURE: 98.4 F | SYSTOLIC BLOOD PRESSURE: 98 MMHG | WEIGHT: 132.1 LBS | OXYGEN SATURATION: 99 % | HEIGHT: 67 IN

## 2017-06-13 LAB
ANION GAP BLD CALC-SCNC: 6 MMOL/L (ref 5–15)
BASOPHILS # BLD AUTO: 0 K/UL (ref 0–0.1)
BASOPHILS # BLD: 0 % (ref 0–1)
BUN SERPL-MCNC: 40 MG/DL (ref 6–20)
BUN/CREAT SERPL: 21 (ref 12–20)
CALCIUM SERPL-MCNC: 8.5 MG/DL (ref 8.5–10.1)
CHLORIDE SERPL-SCNC: 101 MMOL/L (ref 97–108)
CO2 SERPL-SCNC: 30 MMOL/L (ref 21–32)
CREAT SERPL-MCNC: 1.92 MG/DL (ref 0.55–1.02)
EOSINOPHIL # BLD: 0.1 K/UL (ref 0–0.4)
EOSINOPHIL NFR BLD: 1 % (ref 0–7)
ERYTHROCYTE [DISTWIDTH] IN BLOOD BY AUTOMATED COUNT: 19.7 % (ref 11.5–14.5)
GLUCOSE SERPL-MCNC: 102 MG/DL (ref 65–100)
HCT VFR BLD AUTO: 26.1 % (ref 35–47)
HGB BLD-MCNC: 8.6 G/DL (ref 11.5–16)
LYMPHOCYTES # BLD AUTO: 21 % (ref 12–49)
LYMPHOCYTES # BLD: 2.3 K/UL (ref 0.8–3.5)
MAGNESIUM SERPL-MCNC: 2 MG/DL (ref 1.6–2.4)
MCH RBC QN AUTO: 24.7 PG (ref 26–34)
MCHC RBC AUTO-ENTMCNC: 33 G/DL (ref 30–36.5)
MCV RBC AUTO: 75 FL (ref 80–99)
MONOCYTES # BLD: 0.8 K/UL (ref 0–1)
MONOCYTES NFR BLD AUTO: 7 % (ref 5–13)
NEUTS SEG # BLD: 7.9 K/UL (ref 1.8–8)
NEUTS SEG NFR BLD AUTO: 71 % (ref 32–75)
PLATELET # BLD AUTO: 286 K/UL (ref 150–400)
POTASSIUM SERPL-SCNC: 4.1 MMOL/L (ref 3.5–5.1)
RBC # BLD AUTO: 3.48 M/UL (ref 3.8–5.2)
SODIUM SERPL-SCNC: 137 MMOL/L (ref 136–145)
WBC # BLD AUTO: 11.1 K/UL (ref 3.6–11)

## 2017-06-13 PROCEDURE — 74011250637 HC RX REV CODE- 250/637: Performed by: INTERNAL MEDICINE

## 2017-06-13 PROCEDURE — 97110 THERAPEUTIC EXERCISES: CPT

## 2017-06-13 PROCEDURE — 83735 ASSAY OF MAGNESIUM: CPT | Performed by: INTERNAL MEDICINE

## 2017-06-13 PROCEDURE — 74011636637 HC RX REV CODE- 636/637: Performed by: INTERNAL MEDICINE

## 2017-06-13 PROCEDURE — 85025 COMPLETE CBC W/AUTO DIFF WBC: CPT | Performed by: INTERNAL MEDICINE

## 2017-06-13 PROCEDURE — 97116 GAIT TRAINING THERAPY: CPT

## 2017-06-13 PROCEDURE — 80048 BASIC METABOLIC PNL TOTAL CA: CPT | Performed by: INTERNAL MEDICINE

## 2017-06-13 PROCEDURE — 74011250636 HC RX REV CODE- 250/636: Performed by: INTERNAL MEDICINE

## 2017-06-13 PROCEDURE — 36415 COLL VENOUS BLD VENIPUNCTURE: CPT | Performed by: INTERNAL MEDICINE

## 2017-06-13 RX ORDER — BUMETANIDE 1 MG/1
0.5 TABLET ORAL DAILY
Status: DISCONTINUED | OUTPATIENT
Start: 2017-06-13 | End: 2017-06-13 | Stop reason: HOSPADM

## 2017-06-13 RX ORDER — GUAIFENESIN 100 MG/5ML
81 LIQUID (ML) ORAL DAILY
Qty: 30 TAB | Refills: 0 | Status: ON HOLD | OUTPATIENT
Start: 2017-06-13 | End: 2018-06-05

## 2017-06-13 RX ORDER — ISOSORBIDE MONONITRATE 30 MG/1
30 TABLET, EXTENDED RELEASE ORAL DAILY
Qty: 30 TAB | Refills: 0 | Status: ON HOLD | OUTPATIENT
Start: 2017-06-13 | End: 2018-06-05

## 2017-06-13 RX ORDER — ATORVASTATIN CALCIUM 20 MG/1
20 TABLET, FILM COATED ORAL
Qty: 30 TAB | Refills: 0 | Status: ON HOLD | OUTPATIENT
Start: 2017-06-13 | End: 2018-06-05

## 2017-06-13 RX ORDER — HYDRALAZINE HYDROCHLORIDE 10 MG/1
10 TABLET, FILM COATED ORAL 3 TIMES DAILY
Qty: 90 TAB | Refills: 0 | Status: SHIPPED | OUTPATIENT
Start: 2017-06-13 | End: 2018-04-10

## 2017-06-13 RX ORDER — BUMETANIDE 0.5 MG/1
0.5 TABLET ORAL DAILY
Qty: 30 TAB | Refills: 0 | Status: ON HOLD | OUTPATIENT
Start: 2017-06-13 | End: 2018-06-05

## 2017-06-13 RX ORDER — CARVEDILOL 6.25 MG/1
6.25 TABLET ORAL 2 TIMES DAILY WITH MEALS
Qty: 60 TAB | Refills: 0 | Status: ON HOLD | OUTPATIENT
Start: 2017-06-13 | End: 2018-06-05

## 2017-06-13 RX ADMIN — ASPIRIN 81 MG CHEWABLE TABLET 81 MG: 81 TABLET CHEWABLE at 08:58

## 2017-06-13 RX ADMIN — CARVEDILOL 6.25 MG: 6.25 TABLET, FILM COATED ORAL at 08:58

## 2017-06-13 RX ADMIN — HEPARIN SODIUM 5000 UNITS: 5000 INJECTION, SOLUTION INTRAVENOUS; SUBCUTANEOUS at 08:58

## 2017-06-13 RX ADMIN — GABAPENTIN 300 MG: 300 CAPSULE ORAL at 08:58

## 2017-06-13 RX ADMIN — Medication 10 ML: at 05:31

## 2017-06-13 RX ADMIN — ISOSORBIDE MONONITRATE 30 MG: 30 TABLET, EXTENDED RELEASE ORAL at 08:58

## 2017-06-13 RX ADMIN — BUMETANIDE 0.5 MG: 1 TABLET ORAL at 08:58

## 2017-06-13 RX ADMIN — HYDRALAZINE HYDROCHLORIDE 25 MG: 25 TABLET ORAL at 17:10

## 2017-06-13 RX ADMIN — CARVEDILOL 6.25 MG: 6.25 TABLET, FILM COATED ORAL at 17:10

## 2017-06-13 RX ADMIN — HYDRALAZINE HYDROCHLORIDE 25 MG: 25 TABLET ORAL at 08:58

## 2017-06-13 RX ADMIN — GABAPENTIN 300 MG: 300 CAPSULE ORAL at 17:10

## 2017-06-13 RX ADMIN — PREDNISONE 10 MG: 10 TABLET ORAL at 08:58

## 2017-06-13 RX ADMIN — PANTOPRAZOLE SODIUM 40 MG: 40 TABLET, DELAYED RELEASE ORAL at 08:58

## 2017-06-13 NOTE — PROGRESS NOTES
Problem: Mobility Impaired (Adult and Pediatric)  Goal: *Acute Goals and Plan of Care (Insert Text)  Physical Therapy Goals  Initiated 6/9/2017  1. Patient will move from supine to sit and sit to supine , scoot up and down and roll side to side in bed with independence within 7 day(s). 2. Patient will transfer from bed to chair and chair to bed with independence using the least restrictive device within 7 day(s). 3. Patient will perform sit to stand with independence within 7 day(s). 4. Patient will ambulate with independence for 250 feet with the least restrictive device within 7 day(s). 5. Patient will ascend/descend 2 stairs with 0 handrail(s) with supervision/set-up within 7 day(s). PHYSICAL THERAPY TREATMENT  Patient: Hector Jean (15 y.o. female)  Date: 6/13/2017  Diagnosis: Accelerated hypertension        Precautions:  falls  Chart, physical therapy assessment, plan of care and goals were reviewed. ASSESSMENT: pt progressing well towards goals, no LOB or SOB, did well with bed mob and ther-ex, good motivation, could use some HHPT for home safety eval and increased strength and endurance, vc's for safety and proper RW use. Progression toward goals:  [X]      Improving appropriately and progressing toward goals  [ ]      Improving slowly and progressing toward goals  [ ]      Not making progress toward goals and plan of care will be adjusted       PLAN:  Patient continues to benefit from skilled intervention to address the above impairments. Continue treatment per established plan of care.   Discharge Recommendations:  Home Health PT  Further Equipment Recommendations for Discharge: has rollator       OBJECTIVE DATA SUMMARY:      Critical Behavior:  Neurologic State: Alert  Orientation Level: Oriented X4     Functional Mobility Training:  Bed Mobility:  Rolling: Independent  Supine to Sit: Independent  Scooting: Independent  Level of Assistance: Independent              Interventions: Verbal cues      Transfers:  Sit to Stand: Supervision  Stand to Sit: Supervision  Bed to Chair: Supervision  Interventions: Verbal cues  Level of Assistance: Supervision      Balance:  Sitting: Intact; Without support  Standing: Intact; With support  Standing - Static: Good;Constant support  Standing - Dynamic : Good      Ambulation/Gait Training:  Distance (ft): 200 Feet (ft)  Assistive Device: Gait belt;Walker, rolling  Ambulation - Level of Assistance: Supervision  Gait Abnormalities: Decreased step clearance  Right Side Weight Bearing: Full  Left Side Weight Bearing: Full  Base of Support: NORMAL  Stance:  (equal)  Speed/Maria Luisa: Pace decreased (<100 feet/min)  Step Length: Left shortened;Right shortened     Therapeutic Exercises:   sitting  EXERCISE   Sets   Reps   Active Active Assist   Passive   Comments   Ankle pumps 1 10 [X] [ ] [ ] bilat   Heel raises 1 10 [X] [ ] [ ] \"   Toe tap 1 10 [X] [ ] [ ] \"   Knee ext 1 10 [X] [ ] [ ] \"   Hip flex 1 10 [X] [ ] [ ] \"   Abd & Add 1 10 [X] [ ] [ ] \"      Pain:  Pain Scale 1: Numeric (0 - 10)  Pain Intensity 1: 0     Activity Tolerance: fair     After treatment:   [X] Patient left in no apparent distress sitting up in chair  [ ] Patient left in no apparent distress in bed  [X] Call bell left within reach  [X] Nursing notified  [ ] Caregiver present  [ ] Bed alarm activated      COMMUNICATION/COLLABORATION:   The patients plan of care was discussed with: Registered Nurse     Roman Head PTA   Time Calculation: 25 mins

## 2017-06-13 NOTE — FACE TO FACE
Home Health Care Discharge Planning: [facility]  Face to Face Encounter      NAME: Kunal Holm   :  1945   MRN:  [id number]     Primary Diagnosis: Acute renal failure, Chronic systolic CHF, Pulmonary HTN    Date of Face to Face:  2017 2:46 PM                                  Face to Face Encounter findings are related to primary reason for home care:   YES    1. I certify that the patient needs intermittent skilled nursing care, physical therapy and/or speech therapy. I will not be following this patient in the Community and Dr. Enoch Ruff. Kymberly Bartlett NP will be responsible for signing the [address] 2. Initial Orders for Care: Physical Therapy    3. I certify that this patient is homebound because of illness or injury, need the aid of supportive devices such as crutches, canes, wheelchairs, and walkers; the use of special transportation; or the assistance of another person in order to leave their place of residence. There exists a normal inability to leave home and leaving home requires a considerable and taxing effort. 4. I certify that this patient is under my care and that I had a Face-to-Face Encounter that meets the physician Face-to-Face Encounter requirements. Document the physical findings from the [address] Encounter that support the need for skilled services: Has new finding of weakness and altered mobility that requires skilled physical therapy services for evaluation and interventions.      Teri Soliz MD  Discharging Physician  Office: [phone]  Fax:   [phone]

## 2017-06-13 NOTE — PROGRESS NOTES
Patient has been cleared for discharge home. Patient is arranging for her son to transport. Discussed the recommendation for home PT and freedom of choice provided. Referral placed to [facility] per patient request. Specialist has scheduled patient's PCP appointment. No other discharge needs identified    Care Management Interventions  PCP Verified by CM:  Yes (Had a PCP appointment scheduled for today but was hospitalized)  Transition of Care Consult (CM Consult): [address]: Yes  MyChart Signup: No  Discharge Durable Medical Equipment: No (owns a walker, rollator and cane)  Physical Therapy Consult: No  Occupational Therapy Consult: No  Speech Therapy Consult: No  Current Support Network: Own Home (Son and daughter live with patient)  Confirm Follow Up Transport: Family  Plan discussed with Pt/Family/Caregiver: Yes  Freedom of Choice Offered: Yes  Discharge Location  Discharge Placement: Home with home health

## 2017-06-13 NOTE — DISCHARGE INSTRUCTIONS
HOSPITALIST DISCHARGE INSTRUCTIONS    NAME: Sue Sehikh   :  1945   MRN:  [id number]     Date/Time:  2017 12:25 PM    ADMIT DATE: 2017     DISCHARGE DATE: 2017     DISCHARGE DIAGNOSIS:  Acute renal failure POA  Essential hypertension POA peak /112 - now improved on BP meds adjusted by cardiology  Chronic systolic CHF POA LVEF 18-61% - cont Bumex 0.5mg daily for now as per nephrology Dr Givens Lung- follow up in 2 weeks in office as recommended  Elevated troponin POA peak 0.16 now trending down - started on statin, Imdur, coreg, unable to give ACE- due to Renal failure  Recurrent exertional chest pain POA improved - negative stress test this admission  Moderate Pulmonary HTN PA pressure 58  Probable gastroenteritis POA resolved  Anemia of chronic disease POA HgB dropped to 7.2, now stable  Heme positive stool POA - Outpatient GI workup as recommended by Dr Marco Antonio Hernandez  Sick Sinus syndrome s/p PPM 2017  Renal cell cancer s/p S/P right nephrectomy 2016  S/P cholecystectomy     Active Problems:    Accelerated hypertension (2017)         MEDICATIONS:  As per medication reconciliation  list  · It is important that you take the medication exactly as they are prescribed. · Keep your medication in the bottles provided by the pharmacist and keep a list of the medication names, dosages, and times to be taken in your wallet. · Do not take other medications without consulting your doctor. Pain Management: per above medications    What to do at Home    Recommended diet:  Cardiac Diet and Low fat, Low cholesterol    Recommended activity: Activity as tolerated    If you have questions regarding the hospital related prescriptions or hospital related issues please [facility] .     If you experience any of the following symptoms then please call your primary care physician or return to the emergency room if you cannot get hold of your doctor:  Fever, chills, nausea, vomiting, diarrhea, change in mentation, falling, bleeding, shortness of breath,     Follow Up:  Dr. Deangelo De. Jeffrey Pro NP  you are to call and set up an appointment to see them in 7-10 days. Dr Amparo Smith (Gastroenterologist) for EGD/Colonoscopy as recommended in office  Dr Sherry Sung (Nephrology) in 2 weeks for further adjustment of Fluid pills. Dr Jignesh Foster (Cardiology) as needed    Information obtained by :  I understand that if any problems occur once I am at home I am to contact my physician. I understand and acknowledge receipt of the instructions indicated above.                                                                                                                                            Physician's or R.N.'s Signature                                                                  Date/Time                                                                                                                                              Patient or Representative Signature                                                          Date/Time

## 2017-06-13 NOTE — DISCHARGE SUMMARY
Hospitalist Discharge Summary     Patient ID:  Shelly Juarez  [id number]  80 y.o.  1945    PCP on record: Sabi Bateman. Catrina Mancilla NP    Admit date: 6/6/2017  Discharge date and time: 6/13/2017      DISCHARGE DIAGNOSIS:    Acute renal failure POA  Essential hypertension POA peak /112 - now improved on BP meds adjusted by cardiology  Chronic systolic CHF POA LVEF 82-92% - cont Bumex 0.5mg daily for now as per nephrology Dr Brody Carty- follow up in 2 weeks in office as recommended  Elevated troponin POA peak 0.16 now trending down - started on statin, Imdur, coreg, unable to give ACE- due to Renal failure  Recurrent exertional chest pain POA improved - negative stress test this admission  Moderate Pulmonary HTN PA pressure 58  Probable gastroenteritis POA resolved  Anemia of chronic disease POA HgB dropped to 7.2, now stable  Heme positive stool POA - Outpatient GI workup as recommended by Dr Holger Pierce  Sick Sinus syndrome s/p PPM Jan 2017  Renal cell cancer s/p S/P right nephrectomy 12/2016  S/P cholecystectomy       CONSULTATIONS:  IP CONSULT TO CARDIOLOGY  IP CONSULT TO CARDIOLOGY  IP CONSULT TO GASTROENTEROLOGY  IP CONSULT TO GASTROENTEROLOGY  IP CONSULT TO NEPHROLOGY    Excerpted HPI from H&P of Kevan Live MD:  \"79 y.o.  female with known history as listed below presents to ED with complaint noted above. Available records were reviewed at the time of H&P. Patient with chest pains crescendo status over last weeks. She notes they are sscp, heaviness in character, do not radiate. She notes they last 7-10min usually then subside. Sooner if she stops the activity that caused it. She notes difficulty ambulating to the commode over the last several weeks due to increased SOB, worse over the last few days. No overt fluid gains. No f/c. ++viral gastroenrteriits per her report. She notes increase nausea, soft stools. Non bloody. In ED noted to have bump trop, bumped creat. Severely elevated bp's. She freely admits to not taking mediactions over the last 3 days due to nausea however and poor PO intake. We were asked to admit for work up and evaluation of the above problems. \"    ______________________________________________________________________  DISCHARGE SUMMARY/HOSPITAL COURSE:  for full details see H&P, daily progress notes, labs, consult notes.      Acute renal failure POA  Essential hypertension POA peak /112 improved  Prior baseline creatinine ~ 0.92, renal thinks baseline might be higher 1.2 to 1.5 range  S/P right nephrectomy for Renal cell cancer 6 months ago  Renal US with single left kidney, no hydronephrosis  Held lisinopril and HCTZ at admit  Hydralazine, coreg, imdur for BP control, better  Initially thought the patient was hypovolemic with the recent N/V, diarrhea  Creatinine not improved with IVF at admission  BNP 2592, crackles on exam, [place] [address] Dr Willard Romero input, likely volume overloaded with CHF  Continue diuresis and watch renal function  Hopefully change to PO and discharge to home if ambulates well     Chronic systolic CHF POA LVEF 70-13%  Elevated troponin POA peak 0.16 now trending down  Recurrent exertional chest pain POA improved  Moderate Pulmonary HTN PA pressure 58  ASA 81 mg, coreg  Off ACE In with elevated creatinine  Statin  Stress test negative for ischemia  Diuresis   Probable gastroenteritis POA resolved  Recurrent nausea and vomiting with diarrhea at home x days  None since admission until today, one episode of recurrent N/V  Now able to take PO  No further work up unless recurrent     Anemia of chronic disease POA HgB dropped to 7.2, now stable  Heme positive stool POA  Prior baseline HgB ~ 9.0 range  Stool dark brown, heme positive in ED, but denies any overt GI bleeding  PPI  Serial HGbs  Iron studies likely chronic disease  Serial HgBs  Outpatient GI work up     Hyperlipidemia   Statin     Sick Sinus syndrome s/p PPM Jarrell 2017     Renal cell cancer s/p S/P right nephrectomy 12/2016     S/P cholecystectomy           _______________________________________________________________________  Patient seen and examined by me on discharge day. Pertinent Findings:  Gen:    Not in distress  Chest: Clear lungs  CVS:   Regular rhythm. No edema  Abd:  Soft, not distended, not tender  Neuro:  Alert, oriented x 3  _______________________________________________________________________  DISCHARGE MEDICATIONS:   Current Discharge Medication List      START taking these medications    Details   aspirin 81 mg chewable tablet Take 1 Tab by mouth daily. Qty: 30 Tab, Refills: 0      atorvastatin (LIPITOR) 20 mg tablet Take 1 Tab by mouth nightly. Qty: 30 Tab, Refills: 0      bumetanide (BUMEX) 0.5 mg tablet Take 1 Tab by mouth daily. Qty: 30 Tab, Refills: 0      carvedilol (COREG) 6.25 mg tablet Take 1 Tab by mouth two (2) times daily (with meals). Qty: 60 Tab, Refills: 0      isosorbide mononitrate ER (IMDUR) 30 mg tablet Take 1 Tab by mouth daily. Qty: 30 Tab, Refills: 0      hydrALAZINE (APRESOLINE) 10 mg tablet Take 1 Tab by mouth three (3) times daily. Qty: 90 Tab, Refills: 0         CONTINUE these medications which have NOT CHANGED    Details   loperamide (IMODIUM) 1 mg/5 mL solution Take 5 mL by mouth four (4) times daily as needed for Diarrhea. Qty: 60 mL, Refills: 0      gabapentin (NEURONTIN) 300 mg capsule Take 300 mg by mouth three (3) times daily. cholestyramine-sucrose 4 gram powder MIX 1 SCOOPFUL WITH WATER AND DRINK BY MOUTH THREE TIMES DAILY WITH MEALS  Qty: 1134 g, Refills: 0    Comments: **Patient requests 90 days supply**      predniSONE (DELTASONE) 10 mg tablet Take 10 mg by mouth daily. omeprazole (PRILOSEC) 20 mg capsule Take 20 mg by mouth daily.  Indications: GASTROESOPHAGEAL REFLUX         STOP taking these medications       lisinopril (PRINIVIL, ZESTRIL) 40 mg tablet Comments:   Reason for Stopping: hydroCHLOROthiazide (HYDRODIURIL) 25 mg tablet Comments:   Reason for Stopping:               My Recommended Diet, Activity, Wound Care, and follow-up labs are listed in the patient's Discharge Insturctions which I have personally completed and reviewed. _______________________________________________________________________  DISPOSITION:    Home with Family: x   Home with HH/PT/OT/RN: x   SNF/LTC:    GUILHERME:    OTHER:        Condition at Discharge:  Stable  _______________________________________________________________________  Follow up with:   PCP : Jannie Feliz NP  Follow-up Information     Follow up With Details Comments [address], [place] Schedule an appointment as soon as possible for a visit in 3 weeks  [address]  [address]  [address] [phone]                Total time in minutes spent coordinating this discharge (includes going over instructions, follow-up, prescriptions, and preparing report for sign off to her PCP) :  35 minutes    Signed:  Vilma Molina MD

## 2017-06-13 NOTE — CARDIO/PULMONARY
C/P REHAB:    Chart reviewed. Stefania is a 80 y.o.  female who presents with central CP PTA; described as a pressure associated with ROCK, n/v/d. No radiation with the CP and no sweats. She has not taken her meds for a couple of days. No CP now per Cardiologist note.  [place] Setters  EF 25-30% on echo yesterday 6/7/17.       PMH: Rheumatoid arthritis, CHF, DM, GERD, HTN, Anemia, old MI, Renal cell carcinoma of right kidney. Pt visited. This was a follow-up visit to answer questions and reinforce prior teaching re: CHF, S&Ss, medication management, Low NA diet, daily weights and when to call the doctor. Pt unable to list foods high in sodium. Reviewed foods including frozen dinners, preprepared meals, canned foods, and processed meats and condiments. Pt doesn't  own a scale. Stressed importance of monitoring daily weights and reviewed zones of HF. Reviewed s&s to report to MD.    Stressed importance of  medication compliance. Pt unable to identify name of her diuretic. Reviewed. Pt verbalized understanding.

## 2017-06-13 NOTE — PROGRESS NOTES
[address] SHIFT NURSING NOTE    Bedside shift change report given to Frankie Thornton (oncoming nurse) by Marcela Arana (offgoing nurse). Report included the following information SBAR, Kardex and MAR. SHIFT SUMMARY:         Admission Date 6/6/2017   Admission Diagnosis Accelerated hypertension   Consults IP CONSULT TO CARDIOLOGY  IP CONSULT TO CARDIOLOGY  IP CONSULT TO GASTROENTEROLOGY  IP CONSULT TO GASTROENTEROLOGY  IP CONSULT TO NEPHROLOGY        Consults   [x] PT   [] OT   [] Speech   [] Palliative      [] Hospice    [] Case Management   [] None   Cardiac Monitoring   [x] Yes   [] No     Antibiotics   [] Yes   [x] No   GI Prophylaxis  (Ex: Protonix, Pepcid, etc,.)   [x] Yes   [] No          DVT Prophylaxis   SCDs:             Santiago stockings:         [x] Medication (Ex: Lovenox, Eliquis, Brilinta, Coumadin,  Heparin, etc..)   [] Contraindicated   [] No VTE needed       Urinary Catheter             LDAs               Peripheral IV 06/10/17 Right;Upper Arm (Active)   Site Assessment Clean, dry, & intact 6/13/2017  7:40 AM   Phlebitis Assessment 0 6/13/2017  7:40 AM   Infiltration Assessment 0 6/13/2017  7:40 AM   Dressing Status Clean, dry, & intact 6/13/2017  7:40 AM   Dressing Type Transparent;Tape 6/13/2017  7:40 AM   Hub Color/Line Status Pink;Flushed 6/13/2017  7:40 AM   Action Taken Other (comment) 6/10/2017  4:41 PM                      I/Os   Intake/Output Summary (Last 24 hours) at 06/13/17 0755  Last data filed at 06/13/17 0309   Gross per 24 hour   Intake              340 ml   Output             1951 ml   Net            -1611 ml         Activity Level Activity Level: Up with Assistance     Activity Assistance: Partial (one person)   Diet Active Orders   Diet    DIET CARDIAC Regular      Purposeful Rounding every 1-2 hour?    [x] Yes    Zuri Score  Total Score: 2   Bed Alarm (If score 3 or >)   [] Yes    [] Refused (See signed refusal form in chart)   Evan Score  Evan Score: 18       Evan Score (if score 14 or less)   [] PMT consult   [] Nutrition consult   [] Wound Care consult      []  Specialty bed         Influenza Vaccine Received Flu Vaccine for Current Season (usually Sept-March): Not Flu Season               Needs prior to discharge:   Home O2 required:    [] Yes   [x] No     If yes, how much O2 required?     Other:    Last Bowel Movement Date: 06/12/17   Readmission Risk Assessment Tool Score High Risk            26       Total Score        3 Relationship with PCP    3 Patient Length of Stay > 5    11 More than 1 Admission in calendar year    5 Patient Insurance is Medicare, Medicaid or Self Pay    4 Charlson Comorbidity Score        Criteria that do not apply:    Patient Living Status       Expected Length of Stay 3d 12h   Actual Length of Stay 6

## 2017-06-13 NOTE — ADT AUTH CERT NOTES
Patient Demographics        Patient Name [address] Sex  Address Phone       Waleska Monzon [id number] Female 1945 [address] [facility] [address] [phone] (Mobile)           CSN:       [id number]           Admit Date: Admit Time Room Bed       2017  7:28 PM 2210 [[id number] [[id number]]           Attending Providers        Provider Pager From To       Jose London MD  17       Halie Rincon MD  17       Bill Solis MD  17            Emergency Contact(s)        Name Relation Home Work Mobile       Bradley Frederick Son   [phone]       Lorraine Juarez Daughter [phone]         Jf Julesburg [phone]           Utilization Review           Hypertension - Care Day 6 (2017) by Lori Robles        Review Entered Review Status       2017 Completed       Details              Care Day: 6 Care Date: 2017 Level of Care: Telemetry       Guideline Day 2        Level Of Care       (X) Possible transfer to floor              Clinical Status       ( ) * Renal function and mental status at baseline or improved       2017 2:49 PM EDT by Duncan Belts         * bun 36, cr 1.73.   --gfr non aa 29. hh 8.6/ 26.6., Mag 1.6.              (X) * Pulmonary edema absent or improved       (X) * Blood pressure improved       2017 2:49 PM EDT by Duncan Belts         97.6  °F (36.4  °C) 70 bp 133/84 -- At rest 18 sat 98 % Room air                     Activity       (X) * Increasing activity tolerated              Routes       (X) Oral hydration       (X) Parenteral or oral medications       2017 2:49 PM EDT by Duncan Belts         hep sq , Mag sulfate 2 g iv , bumex iv bid              (X) Low-salt diet              Interventions       (X) Possible cardiac monitoring              Medications       (X) Transition to oral antihypertensives       2017 2:49 PM EDT by Duncan Belts         COREG PO , apresoline po tid, imdur po                     6/13/2017 2:57 PM EDT by Mckinley Zapata       Subject: Additional Clinical Information       PER CARDIOLOGY;  Stress test reviewed  Mildly reduced LVEF; 44%. Inferolateral infarct. No ischemia. 1.  Indeterminate troponin2. Fer Carlos 6/12/17 with inferolateral infarct, no ischemia, EF 44%3.  Acute on chronic kidney disease4.  Anemia; slightly worse @ 8.1 from baseline in 8/9s5.  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  Ischemic cardiomyopathy s/p prior AWMI with PCI (BMS) of LAD (severe obliterative disease in the distal LAD, some diagonals, and the RCA)7.  Nuclear stress '05 w/ EF 61% vineet gain in 11/16 w/ IWMI, AWMI and no ischemia; EF 33%8.  Hypertensive heart disease with chronic systolic heart failure; EF 25%9.  Oxmtpclm69.  Hyperlipidemia -- intolerant of pkdceug01.  Remote RIGHT nephrectomy for RCC 12/1612.  CCLEWHE55.  Arthritis  Assessment/Plan: EKG shows SR with LVH and mild repol changesU/S shows normal single left kidneyRemains anemic; stableStress above; no ischemia  1.  Continue ASA 81mg2.  Continue coreg 6.25mg BID3.  Continue atorvastatin 20mg -- would increase to 40mg4.  BP management per primary5.  Continue imdur 30mg6.  Diuretics per primary              6/13/2017 2:49 PM EDT by Mckinley Zapata       Subject: Additional Clinical Information       NM CARDIAC SPECT-  No evidence for ischemia or infarction. Left ventricular ejection fraction is 44  %.                                   * Milestone              Additional Notes       ------(Nephrology consult Niki 10; LOYDA on CKD-3: Rising renal indices. Etiology multifactorial-> maybe seeing some cardiorenal effects with attempts to IV diuresis. Anemia itself can cause mild ATN. Underlying CKD from decreased renal mass (solitary left kidney). Baseline Cr 1.2-1.5mg/dl. No obstruction noted on renal ultrasound.  Need UA to assess for proteinuria/hematuria-> +NSAID use. .       --CHF (EF 25%): acute on chronic-> +pulm edema/Elevated BNP.         --Anemia: contributing to symptoms. Iron panel okay. Underlying CKD maybe contributing.        ---RCC s/p right nephrectomy (12/2016).         --------Plan/Recommendations:       IV Bumex 1mg x1 dose -> Cr may rise some more but patient deserves some effective diuresis.       Epogen 10k sq x 1dose today.       UA, Urine Na, Urine Cr.       Strict I/Os, daily weights.       Avoid nephrotoxins.       Renally adjusted meds (ie Gabapentin to bid). .       Am labs )              ------------PER RENAL ON June 12; Assessment:       LOYDA on CKD-3: Creatinine fluctuating b/w 1.5-1.8 (. Etiology multifactorial-> Anemia itself can cause mild ATN. UA shows no proteinuria/hematuria-> +NSAID use as outpt. Underlying CKD from decreased renal mass (solitary left kidney). Prior baseline Cr 1.2-1.5mg/dl. No obstruction noted on renal ultrasound.                 CHF (EF 25%): acute on chronic-> +pulm edema/Elevated BNP. Responding to IV bumex                Anemia: contributing to symptoms. Iron panel okay. Underlying CKD maybe contributing. Epogen 6/10/17                  RCC s/p right nephrectomy (12/2016).               Plan/Recommendations:        Agree with IV bumex 1mg bid -> watch Cr, O>I; switch to po tomorrow       Strict I/Os, daily weights.       Avoid nephrotoxins.       Renally adjusted meds .       Am labs.              ----------PER MEDICINE;  Acute renal failure POA       Essential hypertension POA peak /112 improved       Prior baseline creatinine ~ 0.92, renal thinks baseline might be higher 1.2 to 1.5 range       Held lisinopril and HCTZ at admit       Hydralazine, coreg, imdur for BP control, better       Initially thought the patient was hypovolemic with the recent N/V, diarrhea       BNP 2592, crackles on exam, Maday Bright Must input, likely volume overloaded with CHF       Continue diuresis and watch renal function       Hopefully change to PO and discharge to home if ambulates well               Chronic systolic CHF POA LVEF 87-17%       Elevated troponin POA peak 0.16 now trending down       Recurrent exertional chest pain POA improved       Moderate Pulmonary HTN PA pressure 58       ASA 81 mg, coreg       Off ACE In with elevated creatinine       Statin       Stress test negative for ischemia       Diuresis        Probable gastroenteritis POA resolved       Recurrent nausea and vomiting with diarrhea at home x days       None since admission until today, one episode of recurrent N/V       Now able to take PO       No further work up unless recurrent               Anemia of chronic disease POA HgB dropped to 7.2, now stable       Heme positive stool POA       Prior baseline HgB ~ 9.0 range       Stool dark brown, heme positive in ED, but denies any overt GI bleeding       PPI       Serial HGbs       Iron studies likely chronic disease       Serial HgBs       Outpatient GI work up           Hypertension - Care Day 3 (6/9/2017) by Carolynn Brown        Review Entered Review Status       6/13/2017 Completed       Details              Care Day: 3 Care Date: 6/9/2017 Level of Care: Telemetry       Guideline Day 2        Level Of Care       (X) Possible transfer to floor              Clinical Status       ( ) * Renal function and mental status at baseline or improved       6/13/2017 2:36 PM EDT by Braxton Lau         hh 7.2/ 22.3. bun 28, cr 1.64. ca 7.6.  BNP 2,592, TIBC 187, FERRITIN 787              (X) * Pulmonary edema absent or improved       (X) * Blood pressure improved       6/13/2017 2:36 PM EDT by Braxton Lau         vs 98.3-74-18, bp 138/89, sat 100 RA.                     Activity       (X) * Increasing activity tolerated       6/13/2017 2:36 PM EDT by Marcella Palma   Ambulates [facility]  Who recommends Home with Home Health as DC plan                     Routes       (X) Oral hydration       (X) Parenteral or oral medications       6/13/2017 2:36 PM EDT by Josi Renteria         MEDS; ASA PO , LIPITOR PO, heparin sq, Lasix iv              (X) Low-salt diet              Interventions       (X) Possible cardiac monitoring              Medications       (X) Transition to oral antihypertensives       6/13/2017 2:36 PM EDT by Josi Renteria         coreg po ,apresoline po , imdur po                     6/13/2017 2:36 PM EDT by Josi Renteria       Subject: Additional Clinical Information        PER MD; Everrett Mons   \"I was nauseated last night\". nausea, but no vomiting. Dizzy when she gets up. -------------PER P.T. ; âI have been getting this chest tightness lately when I'm up moving around. â                                   * Milestone              Additional Notes       Acute renal failure POA . *creatinine 1.64(today).       Essential hypertension POA peak /112 improved.       *Prior baseline creatinine ~ 0.92       Renal US with single left kidney, no hydronephrosis       Hold lisinopril and HCTZ with elevated creatinine.       Add hydralazine and change to coreg, (missed meds prior to admit)       *Creatinine not improved with IVF.       BNP markedly elevated, could this be a cardiorenal syndrome.       Gentle IV diuretics.       Ambulate she how her breathing is.               Elevated troponin POA peak 0.16 now trending down       Recurrent exertional chest pain POA improved       Chronic systolic CHF POA LVEF 96-80%       Moderate Pulmonary HTN PA pressure 58       ASA 81 mg, coreg.       Off ACE In with elevated creatinine.       Statin.       Cardiology following.       Resume ACE In once creatinine improves.       BNP markedly elevated 2592 and creatinine remains elevated.       Gentle diuresis .        Probable gastroenteritis POA resolved.       Recurrent nausea and vomiting with diarrhea at home x days.       None since admission, abdominal exam is benign       Able to take PO.       No further work up unless recurrent.               Anemia of chronic disease POA HgB dropped to 7.2       Heme positive stool POA       Prior baseline HgB ~ 9.0 range       Stool dark brown, heme positive in ED, but denies any overt GI bleeding.       PPI.       Serial HGbs.        Iron studies likely chronic disease.       .       Serial HgBs.               Sick Sinus syndrome s/p PPM Jan 2017               Renal cell cancer s/p S/P right nephrectomy 12/2016               S/P cholecystectomy

## 2017-06-13 NOTE — PROGRESS NOTES
Patient name: Adrian Mohamud  MRN: [id number]    Nephrology Progress note:    Assessment:  LOYDA on CKD-3: Creatinine fluctuating b/w 1.5-1.8 (1.9 today). Etiology multifactorial->  Anemia itself can cause mild ATN. UA shows no proteinuria/hematuria-> +NSAID use as outpt. Underlying CKD from decreased renal mass (solitary left kidney). Prior baseline Cr 1.2-1.5mg/dl. No obstruction noted on renal ultrasound.      CHF (EF 25%): com     Anemia: contributing to symptoms. Iron panel okay. Underlying CKD maybe contributing. Epogen 6/10/17     HTN: stable     CAD : s/p stents     RCC s/p right nephrectomy (12/2016)    Plan/Recommendations:    Creatinine up a tad, O>I; Switch to PO bumex 0.5 mg po daily for now    Continue to hold Lisinopril and HCTZ - will see in a week or two after discharge (would like to restart RAAS blocker given low EF) - ok for discharge from my standpoint. Subjective:  No events overnight. Denies any SOB. OOB in chair. We discussed the above.     ROS:   No nausea, no vomiting  No chest pain, no shortness of breath    Exam:  Visit Vitals    BP 93/61 (BP Patient Position: At rest)    Pulse 70    Temp 97.6 °F (36.4 °C)    Resp 18    Ht 5' 7\" (1.702 m)    Wt 62.5 kg (137 lb 12.6 oz)    SpO2 96%    BMI 21.58 kg/m2     Wt Readings from Last 3 Encounters:   06/10/17 62.5 kg (137 lb 12.6 oz)   03/10/17 60.8 kg (134 lb)   02/28/17 61.7 kg (136 lb 0.4 oz)       Intake/Output Summary (Last 24 hours) at 06/13/17 3702  Last data filed at 06/13/17 0309   Gross per 24 hour   Intake              340 ml   Output             1951 ml   Net            -1611 ml       Gen: NAD  HEENT: No icterus, mmm, no oral exudate, AT/NC  Lungs/Chest wall: cta david. No accessory muscle use. Cardiovascular: Regular rate, normal rhythm. +PPM  Abdomen/: Soft, NT, ND, BS+. Ext:  No peripheral edema  CNS: alert and awake.  Answers appropriately      Current Facility-Administered Medications   Medication Dose Route Frequency Last Dose    bumetanide (BUMEX) injection 1 mg  1 mg IntraVENous BID 1 mg at 06/12/17 1600    gabapentin (NEURONTIN) capsule 300 mg  300 mg Oral  mg at 06/12/17 1803    carvedilol (COREG) tablet 6.25 mg  6.25 mg Oral BID WITH MEALS 6.25 mg at 06/12/17 1803    aspirin chewable tablet 81 mg  81 mg Oral DAILY 81 mg at 06/12/17 1009    atorvastatin (LIPITOR) tablet 20 mg  20 mg Oral QHS 20 mg at 06/12/17 2124    hydrALAZINE (APRESOLINE) 20 mg/mL injection 10 mg  10 mg IntraVENous Q6H PRN      hydrALAZINE (APRESOLINE) tablet 25 mg  25 mg Oral TID Stopped at 06/12/17 2124    predniSONE (DELTASONE) tablet 10 mg  10 mg Oral DAILY 10 mg at 06/12/17 1010    acetaminophen (TYLENOL) tablet 650 mg  650 mg Oral Q4H  mg at 06/12/17 1028    ondansetron (ZOFRAN) injection 4 mg  4 mg IntraVENous Q4H PRN      pantoprazole (PROTONIX) tablet 40 mg  40 mg Oral ACB 40 mg at 06/12/17 1009    heparin (porcine) injection 5,000 Units  5,000 Units SubCUTAneous Q12H 5,000 Units at 06/12/17 2124    sodium chloride (NS) flush 5-10 mL  5-10 mL IntraVENous Q8H 10 mL at 06/13/17 0531    sodium chloride (NS) flush 5-10 mL  5-10 mL IntraVENous PRN 10 mL at 06/12/17 0949    polyethylene glycol (MIRALAX) packet 17 g  17 g Oral DAILY 17 g at 06/10/17 0904    isosorbide mononitrate ER (IMDUR) tablet 30 mg  30 mg Oral DAILY 30 mg at 06/12/17 1009       Labs/Data:    Lab Results   Component Value Date/Time    WBC 11.1 06/13/2017 02:02 AM    Hemoglobin (POC) 10.9 02/28/2017 04:16 PM    HGB 8.6 06/13/2017 02:02 AM    Hematocrit (POC) 32 02/28/2017 04:16 PM    HCT 26.1 06/13/2017 02:02 AM    PLATELET 539 51/05/3623 02:02 AM    MCV 75.0 06/13/2017 02:02 AM       Lab Results   Component Value Date/Time    Sodium 137 06/13/2017 02:02 AM    Potassium 4.1 06/13/2017 02:02 AM    Chloride 101 06/13/2017 02:02 AM    CO2 30 06/13/2017 02:02 AM    Anion gap 6 06/13/2017 02:02 AM    Glucose 102 06/13/2017 02:02 AM    BUN 40 06/13/2017 02:02 AM    Creatinine 1.92 06/13/2017 02:02 AM    BUN/Creatinine ratio 21 06/13/2017 02:02 AM    GFR est AA 31 06/13/2017 02:02 AM    GFR est non-AA 26 06/13/2017 02:02 AM    Calcium 8.5 06/13/2017 02:02 AM       Patient seen and examined. Chart reviewed. Labs, data and other pertinent notes reviewed in last 24 hrs.     Discussed with patient and Dr. Deysi Zayas    Signed by:  Ilana Escalera MD

## 2017-06-15 ENCOUNTER — HOME CARE VISIT (OUTPATIENT)
Dept: SCHEDULING | Facility: HOME HEALTH | Age: 72
End: 2017-06-15
Payer: MEDICARE

## 2017-06-15 PROCEDURE — 3331090002 HH PPS REVENUE DEBIT

## 2017-06-15 PROCEDURE — 3331090001 HH PPS REVENUE CREDIT

## 2017-06-15 PROCEDURE — 400013 HH SOC

## 2017-06-15 PROCEDURE — G0151 HHCP-SERV OF PT,EA 15 MIN: HCPCS

## 2017-06-16 PROCEDURE — 3331090001 HH PPS REVENUE CREDIT

## 2017-06-16 PROCEDURE — 3331090002 HH PPS REVENUE DEBIT

## 2017-06-17 PROCEDURE — 3331090001 HH PPS REVENUE CREDIT

## 2017-06-17 PROCEDURE — 3331090002 HH PPS REVENUE DEBIT

## 2017-06-18 ENCOUNTER — HOME CARE VISIT (OUTPATIENT)
Dept: HOME HEALTH SERVICES | Facility: HOME HEALTH | Age: 72
End: 2017-06-18
Payer: MEDICARE

## 2017-06-18 PROCEDURE — 3331090001 HH PPS REVENUE CREDIT

## 2017-06-18 PROCEDURE — 3331090002 HH PPS REVENUE DEBIT

## 2017-06-19 ENCOUNTER — HOME CARE VISIT (OUTPATIENT)
Dept: SCHEDULING | Facility: HOME HEALTH | Age: 72
End: 2017-06-19
Payer: MEDICARE

## 2017-06-19 VITALS
DIASTOLIC BLOOD PRESSURE: 80 MMHG | HEIGHT: 61 IN | HEART RATE: 88 BPM | TEMPERATURE: 98.1 F | SYSTOLIC BLOOD PRESSURE: 124 MMHG | WEIGHT: 132.8 LBS | BODY MASS INDEX: 25.07 KG/M2 | OXYGEN SATURATION: 96 %

## 2017-06-19 PROCEDURE — 3331090002 HH PPS REVENUE DEBIT

## 2017-06-19 PROCEDURE — G0157 HHC PT ASSISTANT EA 15: HCPCS

## 2017-06-19 PROCEDURE — 3331090001 HH PPS REVENUE CREDIT

## 2017-06-20 VITALS
HEART RATE: 76 BPM | BODY MASS INDEX: 26.07 KG/M2 | SYSTOLIC BLOOD PRESSURE: 118 MMHG | DIASTOLIC BLOOD PRESSURE: 76 MMHG | WEIGHT: 138 LBS | OXYGEN SATURATION: 98 % | TEMPERATURE: 96.7 F

## 2017-06-20 PROCEDURE — 3331090002 HH PPS REVENUE DEBIT

## 2017-06-20 PROCEDURE — 3331090001 HH PPS REVENUE CREDIT

## 2017-06-21 ENCOUNTER — HOME CARE VISIT (OUTPATIENT)
Dept: SCHEDULING | Facility: HOME HEALTH | Age: 72
End: 2017-06-21
Payer: MEDICARE

## 2017-06-21 VITALS
TEMPERATURE: 97.4 F | WEIGHT: 139.4 LBS | SYSTOLIC BLOOD PRESSURE: 125 MMHG | HEART RATE: 70 BPM | BODY MASS INDEX: 26.34 KG/M2 | OXYGEN SATURATION: 97 % | DIASTOLIC BLOOD PRESSURE: 84 MMHG

## 2017-06-21 VITALS
DIASTOLIC BLOOD PRESSURE: 70 MMHG | TEMPERATURE: 98.8 F | WEIGHT: 139.4 LBS | RESPIRATION RATE: 16 BRPM | HEART RATE: 70 BPM | SYSTOLIC BLOOD PRESSURE: 128 MMHG | OXYGEN SATURATION: 99 % | BODY MASS INDEX: 26.34 KG/M2

## 2017-06-21 PROCEDURE — G0299 HHS/HOSPICE OF RN EA 15 MIN: HCPCS

## 2017-06-21 PROCEDURE — G0157 HHC PT ASSISTANT EA 15: HCPCS

## 2017-06-21 PROCEDURE — 3331090001 HH PPS REVENUE CREDIT

## 2017-06-21 PROCEDURE — 3331090002 HH PPS REVENUE DEBIT

## 2017-06-22 PROCEDURE — 3331090002 HH PPS REVENUE DEBIT

## 2017-06-22 PROCEDURE — 3331090001 HH PPS REVENUE CREDIT

## 2017-06-23 ENCOUNTER — HOME CARE VISIT (OUTPATIENT)
Dept: SCHEDULING | Facility: HOME HEALTH | Age: 72
End: 2017-06-23
Payer: MEDICARE

## 2017-06-23 VITALS
RESPIRATION RATE: 16 BRPM | OXYGEN SATURATION: 99 % | HEART RATE: 70 BPM | TEMPERATURE: 98.3 F | DIASTOLIC BLOOD PRESSURE: 72 MMHG | SYSTOLIC BLOOD PRESSURE: 140 MMHG

## 2017-06-23 PROCEDURE — G0300 HHS/HOSPICE OF LPN EA 15 MIN: HCPCS

## 2017-06-23 PROCEDURE — 3331090001 HH PPS REVENUE CREDIT

## 2017-06-23 PROCEDURE — 3331090002 HH PPS REVENUE DEBIT

## 2017-06-24 PROCEDURE — 3331090001 HH PPS REVENUE CREDIT

## 2017-06-24 PROCEDURE — 3331090002 HH PPS REVENUE DEBIT

## 2017-06-25 PROCEDURE — 3331090001 HH PPS REVENUE CREDIT

## 2017-06-25 PROCEDURE — 3331090002 HH PPS REVENUE DEBIT

## 2017-06-26 ENCOUNTER — HOME CARE VISIT (OUTPATIENT)
Dept: SCHEDULING | Facility: HOME HEALTH | Age: 72
End: 2017-06-26
Payer: MEDICARE

## 2017-06-26 PROCEDURE — G0300 HHS/HOSPICE OF LPN EA 15 MIN: HCPCS

## 2017-06-26 PROCEDURE — 3331090002 HH PPS REVENUE DEBIT

## 2017-06-26 PROCEDURE — 3331090001 HH PPS REVENUE CREDIT

## 2017-06-27 ENCOUNTER — HOME CARE VISIT (OUTPATIENT)
Dept: HOME HEALTH SERVICES | Facility: HOME HEALTH | Age: 72
End: 2017-06-27
Payer: MEDICARE

## 2017-06-27 PROCEDURE — 3331090002 HH PPS REVENUE DEBIT

## 2017-06-27 PROCEDURE — 3331090001 HH PPS REVENUE CREDIT

## 2017-06-28 ENCOUNTER — HOME CARE VISIT (OUTPATIENT)
Dept: SCHEDULING | Facility: HOME HEALTH | Age: 72
End: 2017-06-28
Payer: MEDICARE

## 2017-06-28 VITALS
OXYGEN SATURATION: 98 % | SYSTOLIC BLOOD PRESSURE: 132 MMHG | TEMPERATURE: 96.8 F | HEART RATE: 70 BPM | DIASTOLIC BLOOD PRESSURE: 86 MMHG

## 2017-06-28 PROCEDURE — G0157 HHC PT ASSISTANT EA 15: HCPCS

## 2017-06-28 PROCEDURE — 3331090001 HH PPS REVENUE CREDIT

## 2017-06-28 PROCEDURE — 3331090002 HH PPS REVENUE DEBIT

## 2017-06-29 VITALS
SYSTOLIC BLOOD PRESSURE: 142 MMHG | BODY MASS INDEX: 26.17 KG/M2 | DIASTOLIC BLOOD PRESSURE: 92 MMHG | WEIGHT: 138.5 LBS | HEART RATE: 70 BPM | RESPIRATION RATE: 16 BRPM | TEMPERATURE: 98.2 F | OXYGEN SATURATION: 98 %

## 2017-06-29 PROCEDURE — 3331090002 HH PPS REVENUE DEBIT

## 2017-06-29 PROCEDURE — 3331090001 HH PPS REVENUE CREDIT

## 2017-06-30 ENCOUNTER — HOME CARE VISIT (OUTPATIENT)
Dept: SCHEDULING | Facility: HOME HEALTH | Age: 72
End: 2017-06-30
Payer: MEDICARE

## 2017-06-30 VITALS
HEART RATE: 68 BPM | RESPIRATION RATE: 18 BRPM | BODY MASS INDEX: 25.71 KG/M2 | DIASTOLIC BLOOD PRESSURE: 90 MMHG | SYSTOLIC BLOOD PRESSURE: 120 MMHG | OXYGEN SATURATION: 98 % | TEMPERATURE: 98.4 F | WEIGHT: 136.06 LBS

## 2017-06-30 VITALS
SYSTOLIC BLOOD PRESSURE: 147 MMHG | TEMPERATURE: 98.3 F | OXYGEN SATURATION: 98 % | DIASTOLIC BLOOD PRESSURE: 95 MMHG | HEART RATE: 69 BPM

## 2017-06-30 PROCEDURE — G0157 HHC PT ASSISTANT EA 15: HCPCS

## 2017-06-30 PROCEDURE — G0300 HHS/HOSPICE OF LPN EA 15 MIN: HCPCS

## 2017-06-30 PROCEDURE — 3331090001 HH PPS REVENUE CREDIT

## 2017-06-30 PROCEDURE — 3331090002 HH PPS REVENUE DEBIT

## 2017-07-01 PROCEDURE — 3331090002 HH PPS REVENUE DEBIT

## 2017-07-01 PROCEDURE — 3331090001 HH PPS REVENUE CREDIT

## 2017-07-02 PROCEDURE — 3331090001 HH PPS REVENUE CREDIT

## 2017-07-02 PROCEDURE — 3331090002 HH PPS REVENUE DEBIT

## 2017-07-03 ENCOUNTER — HOME CARE VISIT (OUTPATIENT)
Dept: SCHEDULING | Facility: HOME HEALTH | Age: 72
End: 2017-07-03
Payer: MEDICARE

## 2017-07-03 PROCEDURE — 3331090001 HH PPS REVENUE CREDIT

## 2017-07-03 PROCEDURE — G0300 HHS/HOSPICE OF LPN EA 15 MIN: HCPCS

## 2017-07-03 PROCEDURE — 3331090002 HH PPS REVENUE DEBIT

## 2017-07-04 PROCEDURE — 3331090001 HH PPS REVENUE CREDIT

## 2017-07-04 PROCEDURE — 3331090002 HH PPS REVENUE DEBIT

## 2017-07-05 ENCOUNTER — HOME CARE VISIT (OUTPATIENT)
Dept: SCHEDULING | Facility: HOME HEALTH | Age: 72
End: 2017-07-05
Payer: MEDICARE

## 2017-07-05 VITALS
WEIGHT: 139 LBS | TEMPERATURE: 98.9 F | DIASTOLIC BLOOD PRESSURE: 89 MMHG | BODY MASS INDEX: 26.26 KG/M2 | SYSTOLIC BLOOD PRESSURE: 120 MMHG | OXYGEN SATURATION: 98 % | RESPIRATION RATE: 18 BRPM | HEART RATE: 72 BPM

## 2017-07-05 PROCEDURE — 3331090002 HH PPS REVENUE DEBIT

## 2017-07-05 PROCEDURE — 3331090001 HH PPS REVENUE CREDIT

## 2017-07-06 ENCOUNTER — HOME CARE VISIT (OUTPATIENT)
Dept: SCHEDULING | Facility: HOME HEALTH | Age: 72
End: 2017-07-06
Payer: MEDICARE

## 2017-07-06 ENCOUNTER — TELEPHONE (OUTPATIENT)
Dept: RHEUMATOLOGY | Age: 72
End: 2017-07-06

## 2017-07-06 VITALS
DIASTOLIC BLOOD PRESSURE: 80 MMHG | WEIGHT: 138 LBS | TEMPERATURE: 98.8 F | RESPIRATION RATE: 18 BRPM | BODY MASS INDEX: 26.07 KG/M2 | SYSTOLIC BLOOD PRESSURE: 122 MMHG | OXYGEN SATURATION: 99 % | HEART RATE: 74 BPM

## 2017-07-06 VITALS
HEART RATE: 78 BPM | TEMPERATURE: 98.4 F | OXYGEN SATURATION: 95 % | SYSTOLIC BLOOD PRESSURE: 174 MMHG | DIASTOLIC BLOOD PRESSURE: 110 MMHG

## 2017-07-06 PROCEDURE — G0299 HHS/HOSPICE OF RN EA 15 MIN: HCPCS

## 2017-07-06 PROCEDURE — 3331090001 HH PPS REVENUE CREDIT

## 2017-07-06 PROCEDURE — 3331090002 HH PPS REVENUE DEBIT

## 2017-07-07 PROCEDURE — 3331090001 HH PPS REVENUE CREDIT

## 2017-07-07 PROCEDURE — 3331090002 HH PPS REVENUE DEBIT

## 2017-07-08 PROCEDURE — 3331090002 HH PPS REVENUE DEBIT

## 2017-07-08 PROCEDURE — 3331090001 HH PPS REVENUE CREDIT

## 2017-07-09 PROCEDURE — 3331090001 HH PPS REVENUE CREDIT

## 2017-07-09 PROCEDURE — 3331090002 HH PPS REVENUE DEBIT

## 2017-07-10 PROCEDURE — 3331090002 HH PPS REVENUE DEBIT

## 2017-07-10 PROCEDURE — 3331090001 HH PPS REVENUE CREDIT

## 2017-07-11 ENCOUNTER — OFFICE VISIT (OUTPATIENT)
Dept: RHEUMATOLOGY | Age: 72
End: 2017-07-11

## 2017-07-11 VITALS
TEMPERATURE: 98.5 F | WEIGHT: 138 LBS | SYSTOLIC BLOOD PRESSURE: 162 MMHG | HEIGHT: 61 IN | HEART RATE: 73 BPM | RESPIRATION RATE: 18 BRPM | BODY MASS INDEX: 26.06 KG/M2 | OXYGEN SATURATION: 99 % | DIASTOLIC BLOOD PRESSURE: 97 MMHG

## 2017-07-11 DIAGNOSIS — M06.9 RHEUMATOID ARTHRITIS WITH UNKNOWN RHEUMATOID FACTOR STATUS (HCC): Primary | ICD-10-CM

## 2017-07-11 DIAGNOSIS — M17.0 PRIMARY OSTEOARTHRITIS OF BOTH KNEES: ICD-10-CM

## 2017-07-11 DIAGNOSIS — E55.9 VITAMIN D DEFICIENCY: ICD-10-CM

## 2017-07-11 DIAGNOSIS — C64.1 RENAL CELL CARCINOMA, RIGHT (HCC): ICD-10-CM

## 2017-07-11 DIAGNOSIS — N18.4 CKD (CHRONIC KIDNEY DISEASE) STAGE 4, GFR 15-29 ML/MIN (HCC): ICD-10-CM

## 2017-07-11 PROCEDURE — 3331090002 HH PPS REVENUE DEBIT

## 2017-07-11 PROCEDURE — 3331090001 HH PPS REVENUE CREDIT

## 2017-07-11 RX ORDER — PREDNISONE 5 MG/1
TABLET ORAL
Qty: 70 TAB | Refills: 0 | Status: SHIPPED | OUTPATIENT
Start: 2017-07-11 | End: 2017-12-21 | Stop reason: ALTCHOICE

## 2017-07-11 RX ORDER — LEFLUNOMIDE 20 MG/1
20 TABLET ORAL DAILY
Qty: 90 TAB | Refills: 0 | Status: SHIPPED | OUTPATIENT
Start: 2017-07-11 | End: 2017-10-09

## 2017-07-11 NOTE — MR AVS SNAPSHOT
Visit Information Date & Time Provider Department Dept. Phone Encounter #  
 7/11/2017  1:00 PM Claudia Grimaldo MD [facility] [id number] Follow-up Instructions Return in about 4 weeks (around 8/8/2017). Upcoming Health Maintenance Date Due Hepatitis C Screening 1945 DTaP/Tdap/Td series (1 - Tdap) 8/3/1967 ZOSTER VACCINE AGE 60> 8/3/2006 GLAUCOMA SCREENING Q2Y 8/3/2011 OSTEOPOROSIS SCREENING (DEXA) 8/3/2011 Pneumococcal 65+ High/Highest Risk (1 of 2 - PCV13) 8/3/2011 MEDICARE YEARLY EXAM 8/3/2011 BREAST CANCER SCRN MAMMOGRAM 8/29/2015 INFLUENZA AGE 9 TO ADULT 8/1/2017 FOBT Q 1 YEAR AGE 50-75 1/29/2018 Allergies as of 7/11/2017  Review Complete On: 7/11/2017 By: Claudia Grimaldo MD  
  
 Severity Noted Reaction Type Reactions Percocet [Oxycodone-acetaminophen]  01/09/2017    Other (comments) Confused Current Immunizations  Never Reviewed Name Date Influenza Vaccine 12/1/2016 Not reviewed this visit You Were Diagnosed With   
  
 Codes Comments Rheumatoid arthritis with unknown rheumatoid factor status ([facility].)    -  Primary ICD-10-CM: M06.9 ICD-9-CM: 714.0 Vitals BP Pulse Temp Resp Height(growth percentile) Weight(growth percentile) (!) 162/97 (BP 1 Location: Left arm, BP Patient Position: Sitting) 73 98.5 °F (36.9 °C) 18 5' 1\" (1.549 m) 138 lb (62.6 kg) SpO2 BMI OB Status Smoking Status 99% 26.07 kg/m2 Postmenopausal Never Smoker BMI and BSA Data Body Mass Index Body Surface Area 26.07 kg/m 2 1.64 m 2 Preferred Pharmacy Pharmacy Name Phone [facility] [address]  [place] [phone] Your Updated Medication List  
  
   
This list is accurate as of: 7/11/17  1:49 PM.  Always use your most recent med list.  
  
  
  
  
 aspirin 81 mg chewable tablet Take 1 Tab by mouth daily. atorvastatin 20 mg tablet Commonly known as:  LIPITOR Take 1 Tab by mouth nightly. bumetanide 0.5 mg tablet Commonly known as:  Tura Gustavo Take 1 Tab by mouth daily. carvedilol 6.25 mg tablet Commonly known as:  Jaisonn Pita Take 1 Tab by mouth two (2) times daily (with meals). cholestyramine-sucrose 4 gram powder MIX 1 SCOOPFUL WITH WATER AND DRINK BY MOUTH THREE TIMES DAILY WITH MEALS  
  
 gabapentin 300 mg capsule Commonly known as:  NEURONTIN Take 300 mg by mouth three (3) times daily. hydrALAZINE 10 mg tablet Commonly known as:  APRESOLINE Take 1 Tab by mouth three (3) times daily. isosorbide mononitrate ER 30 mg tablet Commonly known as:  IMDUR Take 1 Tab by mouth daily. leflunomide 20 mg tablet Commonly known as:  Belkis Alvarez Take 1 Tab by mouth daily for 90 days. Take half tab daily for 7 days and then one tab if tolerated  
  
 loperamide 1 mg/5 mL solution Commonly known as:  IMODIUM Take 5 mL by mouth four (4) times daily as needed for Diarrhea. omeprazole 20 mg capsule Commonly known as:  PRILOSEC Take 20 mg by mouth daily. Indications: GASTROESOPHAGEAL REFLUX  
  
 predniSONE 5 mg tablet Commonly known as:  DELTASONE  
4 tabs daily for 7 days, 3 tabs for 7 days, 2 tabs for 7 days, 1 tab for 7 days Prescriptions Sent to Pharmacy Refills  
 predniSONE (DELTASONE) 5 mg tablet 0 Si tabs daily for 7 days, 3 tabs for 7 days, 2 tabs for 7 days, 1 tab for 7 days Class: Normal  
 Pharmacy: [facility] [address] [place] [address] AT [address] [place] [address] Ph #: [phone]  
 leflunomide (ARAVA) 20 mg tablet 0 Sig: Take 1 Tab by mouth daily for 90 days. Take half tab daily for 7 days and then one tab if tolerated  Class: Normal  
 Pharmacy: [facility] [address] [place] AT [address] [place] [address] #: [phone] Route: Oral  
  
We Performed the Following C REACTIVE PROTEIN, QT [41588 CPT(R)] CHRONIC HEPATITIS PANEL [GMQ4588 Custom] [address], IGG S879131 CPT(R)] PROTEIN ELECTROPHORESIS W/ REFLX ESTHELA [[id number] Custom] QUANTIFERON TB GOLD [[id number] Custom] RHEUMATOID FACTOR, QL C2951924 CPT(R)] SED RATE (ESR) V818069 CPT(R)] VITAMIN D, 25 HYDROXY [id number] CPT(R)] Follow-up Instructions Return in about 4 weeks (around 8/8/2017). To-Do List   
 07/11/2017 Imaging:  XR FOOT LT MIN 3 V   
  
 07/11/2017 Imaging:  XR FOOT RT MIN 3 V   
  
 07/11/2017 Imaging:  XR HAND LT MIN 3 V   
  
 07/11/2017 Imaging:  XR HAND RT MIN 3 V   
  
 07/12/2017 To Be Determined Appointment with Denis Gayle at [place] [address]  
  
 07/14/2017 To Be Determined Appointment with Denis Gayle at [place] [address]  
  
 07/17/2017 To Be Determined Appointment with Denis Gayle at [place] [address]  
  
 07/20/2017 To Be Determined Appointment with Denis Gayle at [place] [address]  
  
 07/20/2017 To Be Determined Appointment with Denis Gayle at [place] [address] Patient Instructions ARAVA (leflunomide) I prescribed you Arava (leflunomide) 20 mg tablets. Please start with half a tablet (10 mg) daily for 7 days and if you have no side effects, such as diarrhea or upset stomach, please increase to one full tablet daily (20 mg). We discussed the potential adverse effects, which may include: nausea, vomiting, dyspepsia, diarrhea, oral ulcers, infection, liver function abnormalities, blood count abnormalities, and rarely melanoma and non-melanoma skin cancer.    
 
You will need routine every 3 month blood counts and liver testing to evaluate for side effects. You should avoid sick people and be vaccinated with the Flu and Pneumonia vaccines (Prevnar-13 and Pneumovax-23) You should avoid pregnancy due to risk of birth defects. Leflunomide may take 6 to 12 weeks to be effective. If you have any problems or side effects with this medication, please call me immediately to inform me. I will start you on a short course of prednisone, called a prednisone taper, with 5 mg tablets. This regimen is to be taken as follows:  
 
 - 4 tablets (20 mg), all at once, daily for 7 days - 3 tablets (15 mg), all at once, daily for 7 days - 2 tablets (10 mg), all at once, daily for 7 days - 1 tablet (5 mg), daily for 7 days - then STOP Introducing [address]! [employer] introduces [employer] patient portal. Now you can access parts of your medical record, email your doctor's office, and request medication refills online. 1. In your internet browser, go to [email or web address] 2. Click on the First Time User? Click Here link in the Sign In box. You will see the New Member Sign Up page. 3. Enter your [employer]APT Access Code exactly as it appears below. You will not need to use this code after youve completed the sign-up process. If you do not sign up before the expiration date, you must request a new code. · 6APT Access Code: [email or web address] Expires: 9/5/2017  7:49 AM 
 
4. Enter the last four digits of your Social Security Number (xxxx) and Date of Birth (mm/dd/yyyy) as indicated and click Submit. You will be taken to the next sign-up page. 5. Create a 6APT ID. This will be your 6APT login ID and cannot be changed, so think of one that is secure and easy to remember. 6. Create a [employer]APT password. You can change your password at any time. 7. Enter your Password Reset Question and Answer. This can be used at a later time if you forget your password. 8. Enter your e-mail address. You will receive e-mail notification when new information is available in [address] 9. Click Sign Up. You can now view and download portions of your medical record. 10. Click the Download Summary menu link to download a portable copy of your medical information. If you have questions, please visit the Frequently Asked Questions section of the [employer] website. Remember, [employer] is NOT to be used for urgent needs. For medical emergencies, dial 911. Now available from your iPhone and Android! Please provide this summary of care documentation to your next provider. Your primary care clinician is listed as Jannie Archuleta. If you have any questions after today's visit, please call [phone].

## 2017-07-11 NOTE — PATIENT INSTRUCTIONS
ARAVA (leflunomide)    I prescribed you Arava (leflunomide) 20 mg tablets. Please start with half a tablet (10 mg) daily for 7 days and if you have no side effects, such as diarrhea or upset stomach, please increase to one full tablet daily (20 mg). We discussed the potential adverse effects, which may include: nausea, vomiting, dyspepsia, diarrhea, oral ulcers, infection, liver function abnormalities, blood count abnormalities, and rarely melanoma and non-melanoma skin cancer. You will need routine every 3 month blood counts and liver testing to evaluate for side effects. You should avoid sick people and be vaccinated with the Flu and Pneumonia vaccines (Prevnar-13 and Pneumovax-23)    You should avoid pregnancy due to risk of birth defects. Leflunomide may take 6 to 12 weeks to be effective. If you have any problems or side effects with this medication, please call me immediately to inform me. I will start you on a short course of prednisone, called a prednisone taper, with 5 mg tablets.      This regimen is to be taken as follows:      - 4 tablets (20 mg), all at once, daily for 7 days   - 3 tablets (15 mg), all at once, daily for 7 days   - 2 tablets (10 mg), all at once, daily for 7 days   - 1 tablet (5 mg), daily for 7 days   - then STOP

## 2017-07-11 NOTE — PROGRESS NOTES
REASON FOR VISIT    This is the initial evaluation for Ms. Carlos Chin a 80 y.o.  female for question of an inflammatory arthritis. The patient is referred to the Arthritis and [address] [place] [address] at the request of Dr. Dee Dee Andrew. HISTORY OF PRESENT ILLNESS      I have reviewed and summarized old records from [facility]    She has a history of Rheumatoid Arthritis diagnosed by Dr. Julissa Tracy. She had been on methotrexate 20 mg weekly without relief. No records available. She was on it for about 6 months but then lost insurance coverage. She does not recall it helping. In 10/21/2016, CTA Abdomen and Pelvis with contrast showed the visualized portions of the lung bases are clear. There is a small sliding hiatal hernia. There is a lobulated surgery recently enhancing upper pole right renal mass with some coarse calcifications measuring 7.3 x 10.0 x 8.8 cm. The mass replaces portions of the upper pole parenchyma and appears to cause mass effect and indentation onto the upper pole calyces of the right kidney. No definite collecting system invasion. The left kidney demonstrates a couple of subcentimeter hypodense lesions which are not fully characterized on this exam. There is no evident renal vascular invasion. There are no focal abnormalities within the liver, spleen, pancreas, or adrenal glands. The aorta tapers without aneurysm. There is no retroperitoneal adenopathy or mass. There is no ascites or free intraperitoneal air.  There are noninflamed appearing left and sigmoid colon diverticula. The bowel otherwise appears normal. The appendix is normal.  The uterus and ovaries appear normal.   There is no pelvic mass or adenopathy.  The surrounding musculoskeletal structures are unremarkable apart from degenerative spine change    In 12/22/2016, Right kidney, right radical nephrectomy Renal cell carcinoma, conventional papillary type I subtype     In 6/13/2017, labs showed WBC 10.4, lymphocytes 2.3, Hct 26.6%, platelets 419,014, creatinine 1.73 mg/dL, eGFR 29. Today, she complains of stiffness in her waist, knees and legs lasting up to 3 hours for the a couple of years. Activity makes it better and rest makes it worse. Ibuprofen helped but she was told not take it due to CKD. She has pain in her arms and feet that is shooting neuropathy pain. She has had neuropathy for about a year. She has swelling her feet. She was given a prednisone several months ago which helped. Therapy History includes:    Current DMARD therapy includes: none  Prior DMARD therapy includes: methotrexate (6 months - 2 years ago)  The following DMARDs have been ineffective: none  The following DMARDs were stopped because of side effects: none  Contra-Indicated DMARDs due to CKD: methotrexate     REVIEW OF SYSTEMS    A 15 point review of systems was performed and summarized below. The questionnaire was reviewed with the patient and scanned into the patient's medical record.     General: endorses recent 30 lbs weight gain, weakness, denies recent weight loss, fatigue, fever, night sweats  Musculoskeletal: endorses joint pain, joint swelling, morning stiffness (lasting 3 hours), muscle weakness  Ears: denies ringing in ears, loss of hearing, deafness  Eyes: denies pain, redness, loss of vision, double vision, blurred vision, dryness, foreign body sensation  Mouth: denies sore tongue, oral ulcers, bleeding gums, loss of taste, dryness, increased dental caries  Nose: denies nosebleeds, loss of smell, nasal ulcers  Throat: denies frequent sore throats, hoarseness, difficulty in swallowing, pain in jaw while chewing  Neck: denies swollen glands, tender glands  Cardiopulmonary: endorses shortness of breath, swollen legs or feet, denies pain in chest, irregular heart beat, sudden changes in heart beat, difficulty breathing at night, cough, coughing of blood, wheezing  Gastrointestinal: endorses nausea, denies heartburn, stomach pain relieved by food, vomiting of blood/\"coffee grounds\", jaundice, increasing constipation, persistent diarrhea, blood in stools, black stools  Genitourinary: endorses frequent urination, vaginal dryness, denies getting up at night to pass urine, difficult urination, pain or burning on urination, blood in urine, cloudy urine, pus in urine, genital discharge,rash/ulcers, sexual difficulties   Hematologic: endorses anemia, denies bleeding tendency, blood clots  Skin: denies easy bruising, redness, rash, hives, sun sensitive, skin tightness, nodules/bumps, hair loss, color changes of hands or feet in the cold (Raynaud's)  Neurologic: endorses headaches, denies dizziness, fainting of loss of consciousness, numbness or tingling in hands/feet, memory loss, muscle weakness  Psychiatric: denies depression, excessive worries  Sleep: endorses poor sleep (6-7 hours), snoring, denies daytime somnolence, difficulty falling asleep, difficulty staying asleep     PAST MEDICAL HISTORY    She has a past medical history of Arthritis; Autoimmune disease ([address] CAD (coronary artery disease); CHF (congestive heart failure) ([address] Chronic pain; Diabetes ([address] GERD (gastroesophageal reflux disease); Hypertension; Ill-defined condition; Ill-defined condition; MI, old; Other ill-defined conditions; and Renal cell carcinoma of right kidney ([address] FAMILY HISTORY    Her family history includes Cancer in her brother and mother; Other in her brother and father; Stroke in her brother. SOCIAL HISTORY    She reports that she has never smoked. She has never used smokeless tobacco. She reports that she does not drink alcohol or use illicit drugs.     GYNECOLOGIC HISTORY     1, Para 1, Living 1, Miscarriage 0    She denies severe pre-eclampsia, eclampsia or placental insufficiency    HEALTH MAINTENANCE    Immunizations  Immunization History   Administered Date(s) Administered    Influenza Vaccine 2016 MEDICATIONS    Current Outpatient Prescriptions   Medication Sig Dispense Refill    predniSONE (DELTASONE) 5 mg tablet 4 tabs daily for 7 days, 3 tabs for 7 days, 2 tabs for 7 days, 1 tab for 7 days 70 Tab 0    leflunomide (ARAVA) 20 mg tablet Take 1 Tab by mouth daily for 90 days. Take half tab daily for 7 days and then one tab if tolerated 90 Tab 0    aspirin 81 mg chewable tablet Take 1 Tab by mouth daily. 30 Tab 0    atorvastatin (LIPITOR) 20 mg tablet Take 1 Tab by mouth nightly. 30 Tab 0    bumetanide (BUMEX) 0.5 mg tablet Take 1 Tab by mouth daily. 30 Tab 0    carvedilol (COREG) 6.25 mg tablet Take 1 Tab by mouth two (2) times daily (with meals). 60 Tab 0    isosorbide mononitrate ER (IMDUR) 30 mg tablet Take 1 Tab by mouth daily. 30 Tab 0    hydrALAZINE (APRESOLINE) 10 mg tablet Take 1 Tab by mouth three (3) times daily. (Patient taking differently: Take 25 mg by mouth three (3) times daily. dose increased from 10mg 3x/ day to 25mg 3x/day (bottle viewed)) 90 Tab 0    cholestyramine-sucrose 4 gram powder MIX 1 SCOOPFUL WITH WATER AND DRINK BY MOUTH THREE TIMES DAILY WITH MEALS 1134 g 0    loperamide (IMODIUM) 1 mg/5 mL solution Take 5 mL by mouth four (4) times daily as needed for Diarrhea. 60 mL 0    gabapentin (NEURONTIN) 300 mg capsule Take 300 mg by mouth three (3) times daily.  omeprazole (PRILOSEC) 20 mg capsule Take 20 mg by mouth daily. Indications: GASTROESOPHAGEAL REFLUX         ALLERGIES    Allergies   Allergen Reactions    Percocet [Oxycodone-Acetaminophen] Other (comments)     Confused       PHYSICAL EXAMINATION    Visit Vitals    BP (!) 162/97 (BP 1 Location: Left arm, BP Patient Position: Sitting)    Pulse 73    Temp 98.5 °F (36.9 °C)    Resp 18    Ht 5' 1\" (1.549 m)    Wt 138 lb (62.6 kg)    SpO2 99%    BMI 26.07 kg/m2     Body mass index is 26.07 kg/(m^2).     General: Patient is alert, oriented x 3, not in acute distress, son at bedside    HEENT:   Conjunctiva are not injected and appear moist, oral mucous membranes are moist, there are no ulcers present, there is no alopecia, neck is supple, there is no lymphadenopathy. Salivary glands are normal    Cardiovascular:  Heart is regular rate and rhythm, no murmurs. Chest:  Lungs are clear to auscultation bilaterally. Abdomen:  Soft, non-tender    Extremities:  Free of clubbing, cyanosis, edema, extremities well perfused. Neurological exam:  No focal sensory deficits, muscle strength is full in upper and lower extremities. Skin exam:  There are no rashes, no tophi, no psoriasis, no active Raynaud's, no livedo reticularis, no periungual erythema. Musculoskeletal exam:  A comprehensive musculoskeletal exam was performed for all joints of each upper and lower extremity and assessed for swelling, tenderness and range of motion. Pertinent results are documented as below:    Decreased active and passive ROM of shoulders due to pain  Bilateral knee crepitus without effusion.   Bilateral ankle synovitis  Bilateral MTP tenderness    Joint Count 7/11/2017   Patient pain (0-100) 40   MHAQ 0.375   Left wrist- Tender 1   Left wrist- Swollen 1   Left 1st MCP - Swollen 1   Left 2nd MCP - Swollen 1   Left 3rd MCP - Swollen 1   Left 4th MCP - Swollen 1   Left 5th MCP - Swollen 1   Left 3rd PIP - Tender 1   Left 3rd PIP - Swollen 1   Left 4th PIP - Tender 1   Left 4th PIP - Swollen 1   Left 5th PIP - Tender 1   Left 5th PIP - Swollen 1   Right elbow - Swollen 1   Right wrist- Tender 1   Right wrist- Swollen 1   Right 1st MCP - Tender 1   Right 1st MCP - Swollen 1   Right 2nd MCP - Tender 1   Right 2nd MCP - Swollen 1   Right 3rd MCP - Tender 1   Right 3rd MCP - Swollen 1   Right 4th MCP - Tender 1   Right 4th MCP - Swollen 1   Right 5th MCP - Tender 1   Right 5th MCP - Swollen 1   Right thumb IP - Tender 1   Right thumb IP - Swollen 1   Right 2nd PIP - Tender 1   Right 2nd PIP - Swollen 1   Right 3rd PIP - Tender 1   Right 3rd PIP - Swollen 1   Right 4th PIP - Tender 1   Right 5th PIP - Tender 1   Tender Joint Count (Total) 15   Swollen Joint Count (Total) 19   Physician Assessment (0-10) 6   Patient Assessment (0-10) 6   CDAI Total (calculated) 46       DATA REVIEW    Prior medical records were reviewed and are summarized as below:    Laboratory data: summarized in the HPI    Imaging: summarized in the HPI. ASSESSMENT AND PLAN    1) Rheumatoid Arthritis. She has a history of Rheumatoid Arthritis several years ago and was placed on methotrexate for about 6 months. It was stopped because she lost insurance coverage with her rheumatologist. Today, her CDAI was 46 with 15 tender and 19 swollen joints, including bilateral ankle and MTP involvement. Methotrexate is contra-indicated due to CKD. I discussed initiation with the DMARD Arava (leflunomide). I informed the patient the potential adverse effects, which may include: nausea, vomiting, dyspepsia, diarrhea, oral ulcers, infection, liver function abnormalities, blood count abnormalities, and rarely melanoma and non-melanoma skin cancer. The patient understood these possible adverse effects. I informed the patient of the need for routine CBC and CMP as a measure for long term use of immunosuppressants. I also instructed the patient to avoid ill contacts and the needs for annual influenza vaccines and the pneumonia vaccines. In childbearing patients, pregnancy is contra-indicated on leflunomide due to risk for birth defects. I informed the patient that leflunomide may take 4 to 12 weeks to be effective. I will check labs today and have the patient follow up with me in 4 weeks for repeat labs. I prescribed the patient Arava (leflunomide) 20 mg, and instructed to start with half a tablet (10 mg) daily for 7 days and then increase to a full tablet (20 mg) if she has no side effects, such as diarrhea or upset stomach.  I will start her on a short course of prednisone, called a prednisone taper, with 5 mg tablets. This regimen is to be taken as follows: 4 tabs (20 mg) for 7 days, 3 tabs (15 mg) for 7 days, 2 tabs (10 mg) for 7 days and then 1 tab (5 mg) for 7 days. I will check labs and radiographs today and have her follow up in 4 weeks. 2) CKD Stage IV. Her creatinine 1.73 mg/dL, eGFR 29. She had labs drawn yesterday at her PCP. 3) Right Renal cell carcinoma, conventional papillary type I subtype. She underwent radical nephrectomy. 4) Bilateral Knee Osteoarthritis. The patient has osteoarthritis, which is also known as \"wear and tear arthritis,\" non-inflammatory arthritis or mechanical arthritis. There are hereditary, vocational and posttraumatic joint injuries predisposing factors. I recommend maintaining a healthy weight to slow the progression of osteoarthritis in addition to following the [employer] of Rheumatology Osteoarthritis Treatment Guidelines: (1) non-pharmacologic modalities such as aerobic, aquatic, and/or resistance exercises as well as weight loss for overweight patients; in addition to (2) pharmacologic modalities such as acetaminophen as first line, oral and topical NSAIDs as second line, tramadol as third line and intra-articular corticosteroid injections as fourth line (Priscilla MC, et al. Arthritis Care Res [facility] ORTHOPEDIC). 2012;64(4):465). Naproxen is a low WAITE-2 selectivity inhibitor that poses lower cardiovascular risk than other NSAIDs (Sergo DJ, Ashia SM. Clinical Pharmacology and Cardiovascular Safety of Naproxen. Am J Cardiovasc Drugs. 2016 Nov 8). NSAIDs should not be used in patients on blood thinners, chronic kidney disease, high risk coronary artery disease, and inflammatory bowel disease (ulcerative colitis or Crohn's disease) Joint replacement surgery if all previous fail, knowing that there is a 10 year lifespan per prosthesis. The patient voiced understanding of the aforementioned assessment and plan.  Summary of plan was provided in the After Visit Summary patient instructions. I also provided education about MyChart setup and utility.     TODAY'S ORDERS    Orders Placed This Encounter    QUANTIFERON TB GOLD    XR FOOT LT MIN 3 V    XR FOOT RT MIN 3 V    XR HAND LT MIN 3 V    XR HAND RT MIN 3 V    CYCLIC CITRUL PEPTIDE AB, IGG    CHRONIC HEPATITIS PANEL    C REACTIVE PROTEIN, QT    SED RATE (ESR)    RHEUMATOID FACTOR, QL    PROTEIN ELECTROPHORESIS W/ REFLX ESTHELA    VITAMIN D, 25 HYDROXY    predniSONE (DELTASONE) 5 mg tablet    leflunomide (ARAVA) 20 mg tablet       Future Appointments  Date Time Provider Department Center   7/12/2017 To Be Determined [place] Doing [address]   7/14/2017 To Be Determined [place] Doing [facility] [address]   7/17/2017 To Be Determined [place] Doing [facility] [address]   7/20/2017 To Be Determined [facility] [address]   7/20/2017 To Be Determined [facility] [address]   8/8/2017 10:20 AM MD Hima Biggs MD, [address] [facility] [address]    Adult Rheumatology   Musculoskeletal Ultrasound Certified  [employer] [facility] , [place], [address] [place] [address]   Phone [phone]  Fax [phone]

## 2017-07-12 ENCOUNTER — HOME CARE VISIT (OUTPATIENT)
Dept: SCHEDULING | Facility: HOME HEALTH | Age: 72
End: 2017-07-12
Payer: MEDICARE

## 2017-07-12 VITALS
HEART RATE: 76 BPM | TEMPERATURE: 97.9 F | RESPIRATION RATE: 18 BRPM | BODY MASS INDEX: 26.24 KG/M2 | DIASTOLIC BLOOD PRESSURE: 80 MMHG | WEIGHT: 138.9 LBS | SYSTOLIC BLOOD PRESSURE: 110 MMHG | OXYGEN SATURATION: 97 %

## 2017-07-12 PROCEDURE — 3331090002 HH PPS REVENUE DEBIT

## 2017-07-12 PROCEDURE — G0299 HHS/HOSPICE OF RN EA 15 MIN: HCPCS

## 2017-07-12 PROCEDURE — 3331090001 HH PPS REVENUE CREDIT

## 2017-07-13 PROCEDURE — 3331090002 HH PPS REVENUE DEBIT

## 2017-07-13 PROCEDURE — 3331090001 HH PPS REVENUE CREDIT

## 2017-07-14 ENCOUNTER — HOME CARE VISIT (OUTPATIENT)
Dept: SCHEDULING | Facility: HOME HEALTH | Age: 72
End: 2017-07-14
Payer: MEDICARE

## 2017-07-14 PROCEDURE — G0300 HHS/HOSPICE OF LPN EA 15 MIN: HCPCS

## 2017-07-14 PROCEDURE — 3331090002 HH PPS REVENUE DEBIT

## 2017-07-14 PROCEDURE — 3331090001 HH PPS REVENUE CREDIT

## 2017-07-15 PROCEDURE — 3331090002 HH PPS REVENUE DEBIT

## 2017-07-15 PROCEDURE — 3331090001 HH PPS REVENUE CREDIT

## 2017-07-16 PROCEDURE — 3331090001 HH PPS REVENUE CREDIT

## 2017-07-16 PROCEDURE — 3331090002 HH PPS REVENUE DEBIT

## 2017-07-17 VITALS
DIASTOLIC BLOOD PRESSURE: 70 MMHG | HEART RATE: 74 BPM | TEMPERATURE: 98.5 F | SYSTOLIC BLOOD PRESSURE: 122 MMHG | RESPIRATION RATE: 18 BRPM | OXYGEN SATURATION: 98 %

## 2017-07-17 PROCEDURE — 3331090002 HH PPS REVENUE DEBIT

## 2017-07-17 PROCEDURE — 3331090001 HH PPS REVENUE CREDIT

## 2017-07-18 ENCOUNTER — HOME CARE VISIT (OUTPATIENT)
Dept: SCHEDULING | Facility: HOME HEALTH | Age: 72
End: 2017-07-18
Payer: MEDICARE

## 2017-07-18 PROCEDURE — G0299 HHS/HOSPICE OF RN EA 15 MIN: HCPCS

## 2017-07-18 PROCEDURE — 3331090001 HH PPS REVENUE CREDIT

## 2017-07-18 PROCEDURE — 3331090002 HH PPS REVENUE DEBIT

## 2017-07-19 VITALS
BODY MASS INDEX: 27.4 KG/M2 | HEART RATE: 70 BPM | OXYGEN SATURATION: 99 % | TEMPERATURE: 98.5 F | WEIGHT: 145 LBS | DIASTOLIC BLOOD PRESSURE: 60 MMHG | SYSTOLIC BLOOD PRESSURE: 120 MMHG | RESPIRATION RATE: 18 BRPM

## 2017-07-19 PROCEDURE — 3331090002 HH PPS REVENUE DEBIT

## 2017-07-19 PROCEDURE — 3331090001 HH PPS REVENUE CREDIT

## 2017-07-20 LAB
25(OH)D3+25(OH)D2 SERPL-MCNC: 11.6 NG/ML (ref 30–100)
ALBUMIN SERPL ELPH-MCNC: 3.3 G/DL (ref 2.9–4.4)
ALBUMIN/GLOB SERPL: 1.1 {RATIO} (ref 0.7–1.7)
ALPHA1 GLOB SERPL ELPH-MCNC: 0.2 G/DL (ref 0–0.4)
ALPHA2 GLOB SERPL ELPH-MCNC: 0.7 G/DL (ref 0.4–1)
B-GLOBULIN SERPL ELPH-MCNC: 0.9 G/DL (ref 0.7–1.3)
CCP IGA+IGG SERPL IA-ACNC: >250 UNITS (ref 0–19)
COMMENT, 144067: NORMAL
CRP SERPL-MCNC: 1.9 MG/L (ref 0–4.9)
ERYTHROCYTE [SEDIMENTATION RATE] IN BLOOD BY WESTERGREN METHOD: 17 MM/HR (ref 0–40)
GAMMA GLOB SERPL ELPH-MCNC: 1.1 G/DL (ref 0.4–1.8)
GLOBULIN SER CALC-MCNC: 2.9 G/DL (ref 2.2–3.9)
HBV CORE AB SERPL QL IA: NEGATIVE
HBV CORE IGM SERPL QL IA: NEGATIVE
HBV E AB SERPL QL IA: NEGATIVE
HBV E AG SERPL QL IA: NEGATIVE
HBV SURFACE AB SER QL: NON REACTIVE
HBV SURFACE AG SERPL QL IA: NEGATIVE
HCV AB S/CO SERPL IA: <0.1 S/CO RATIO (ref 0–0.9)
M PROTEIN SERPL ELPH-MCNC: NORMAL G/DL
PLEASE NOTE, 011150: NORMAL
PROT PATTERN SERPL ELPH-IMP: NORMAL
PROT SERPL-MCNC: 6.2 G/DL (ref 6–8.5)
RHEUMATOID FACT SERPL-ACNC: 154.1 IU/ML (ref 0–13.9)

## 2017-07-20 PROCEDURE — 3331090002 HH PPS REVENUE DEBIT

## 2017-07-20 PROCEDURE — 3331090001 HH PPS REVENUE CREDIT

## 2017-07-21 ENCOUNTER — HOME CARE VISIT (OUTPATIENT)
Dept: SCHEDULING | Facility: HOME HEALTH | Age: 72
End: 2017-07-21
Payer: MEDICARE

## 2017-07-21 PROCEDURE — 3331090001 HH PPS REVENUE CREDIT

## 2017-07-21 PROCEDURE — G0299 HHS/HOSPICE OF RN EA 15 MIN: HCPCS

## 2017-07-21 PROCEDURE — 3331090002 HH PPS REVENUE DEBIT

## 2017-07-22 LAB
ANNOTATION COMMENT IMP: NORMAL
GAMMA INTERFERON BACKGROUND BLD IA-ACNC: 0.06 IU/ML
M TB IFN-G BLD-IMP: NEGATIVE
M TB IFN-G CD4+ BCKGRND COR BLD-ACNC: <0 IU/ML
M TB IFN-G CD4+ T-CELLS BLD-ACNC: 0.04 IU/ML
MITOGEN IGNF BLD-ACNC: 5.84 IU/ML
QUANTIFERON INCUBATION: NORMAL
SERVICE CMNT-IMP: NORMAL

## 2017-07-22 PROCEDURE — 3331090001 HH PPS REVENUE CREDIT

## 2017-07-22 PROCEDURE — 3331090002 HH PPS REVENUE DEBIT

## 2017-07-23 PROCEDURE — 3331090002 HH PPS REVENUE DEBIT

## 2017-07-23 PROCEDURE — 3331090001 HH PPS REVENUE CREDIT

## 2017-07-24 VITALS
OXYGEN SATURATION: 99 % | RESPIRATION RATE: 18 BRPM | SYSTOLIC BLOOD PRESSURE: 150 MMHG | HEART RATE: 70 BPM | TEMPERATURE: 98 F | DIASTOLIC BLOOD PRESSURE: 70 MMHG | WEIGHT: 144 LBS | BODY MASS INDEX: 27.21 KG/M2

## 2017-07-24 PROCEDURE — 3331090002 HH PPS REVENUE DEBIT

## 2017-07-24 PROCEDURE — 3331090001 HH PPS REVENUE CREDIT

## 2017-07-24 RX ORDER — ERGOCALCIFEROL 1.25 MG/1
50000 CAPSULE ORAL
Qty: 12 CAP | Refills: 3 | Status: SHIPPED | OUTPATIENT
Start: 2017-07-24 | End: 2018-06-02

## 2017-07-25 PROCEDURE — 3331090002 HH PPS REVENUE DEBIT

## 2017-07-25 PROCEDURE — 3331090001 HH PPS REVENUE CREDIT

## 2017-07-26 ENCOUNTER — HOME CARE VISIT (OUTPATIENT)
Dept: SCHEDULING | Facility: HOME HEALTH | Age: 72
End: 2017-07-26
Payer: MEDICARE

## 2017-07-26 PROCEDURE — 3331090002 HH PPS REVENUE DEBIT

## 2017-07-26 PROCEDURE — G0300 HHS/HOSPICE OF LPN EA 15 MIN: HCPCS

## 2017-07-26 PROCEDURE — 3331090001 HH PPS REVENUE CREDIT

## 2017-07-27 PROCEDURE — 3331090002 HH PPS REVENUE DEBIT

## 2017-07-27 PROCEDURE — 3331090001 HH PPS REVENUE CREDIT

## 2017-07-28 ENCOUNTER — HOME CARE VISIT (OUTPATIENT)
Dept: HOME HEALTH SERVICES | Facility: HOME HEALTH | Age: 72
End: 2017-07-28
Payer: MEDICARE

## 2017-07-28 PROCEDURE — 3331090002 HH PPS REVENUE DEBIT

## 2017-07-28 PROCEDURE — 3331090001 HH PPS REVENUE CREDIT

## 2017-07-29 PROCEDURE — 3331090001 HH PPS REVENUE CREDIT

## 2017-07-29 PROCEDURE — 3331090002 HH PPS REVENUE DEBIT

## 2017-07-30 PROCEDURE — 3331090001 HH PPS REVENUE CREDIT

## 2017-07-30 PROCEDURE — 3331090002 HH PPS REVENUE DEBIT

## 2017-07-31 ENCOUNTER — HOME CARE VISIT (OUTPATIENT)
Dept: SCHEDULING | Facility: HOME HEALTH | Age: 72
End: 2017-07-31
Payer: MEDICARE

## 2017-07-31 PROCEDURE — 3331090001 HH PPS REVENUE CREDIT

## 2017-07-31 PROCEDURE — G0300 HHS/HOSPICE OF LPN EA 15 MIN: HCPCS

## 2017-07-31 PROCEDURE — 3331090002 HH PPS REVENUE DEBIT

## 2017-08-01 VITALS
RESPIRATION RATE: 18 BRPM | DIASTOLIC BLOOD PRESSURE: 88 MMHG | BODY MASS INDEX: 26.45 KG/M2 | TEMPERATURE: 98.1 F | DIASTOLIC BLOOD PRESSURE: 86 MMHG | SYSTOLIC BLOOD PRESSURE: 150 MMHG | OXYGEN SATURATION: 98 % | TEMPERATURE: 98.9 F | HEART RATE: 84 BPM | OXYGEN SATURATION: 98 % | RESPIRATION RATE: 18 BRPM | HEART RATE: 70 BPM | SYSTOLIC BLOOD PRESSURE: 140 MMHG | WEIGHT: 140 LBS

## 2017-08-01 PROCEDURE — 3331090002 HH PPS REVENUE DEBIT

## 2017-08-01 PROCEDURE — 3331090001 HH PPS REVENUE CREDIT

## 2017-08-02 PROCEDURE — 3331090001 HH PPS REVENUE CREDIT

## 2017-08-02 PROCEDURE — 3331090002 HH PPS REVENUE DEBIT

## 2017-08-03 ENCOUNTER — HOME CARE VISIT (OUTPATIENT)
Dept: SCHEDULING | Facility: HOME HEALTH | Age: 72
End: 2017-08-03
Payer: MEDICARE

## 2017-08-03 PROCEDURE — 3331090002 HH PPS REVENUE DEBIT

## 2017-08-03 PROCEDURE — 3331090001 HH PPS REVENUE CREDIT

## 2017-08-03 PROCEDURE — G0300 HHS/HOSPICE OF LPN EA 15 MIN: HCPCS

## 2017-08-04 VITALS
SYSTOLIC BLOOD PRESSURE: 155 MMHG | TEMPERATURE: 98.3 F | DIASTOLIC BLOOD PRESSURE: 97 MMHG | OXYGEN SATURATION: 98 % | WEIGHT: 142 LBS | HEART RATE: 90 BPM | BODY MASS INDEX: 26.83 KG/M2 | RESPIRATION RATE: 18 BRPM

## 2017-08-04 PROCEDURE — 3331090002 HH PPS REVENUE DEBIT

## 2017-08-04 PROCEDURE — 3331090001 HH PPS REVENUE CREDIT

## 2017-08-05 PROCEDURE — 3331090001 HH PPS REVENUE CREDIT

## 2017-08-05 PROCEDURE — 3331090002 HH PPS REVENUE DEBIT

## 2017-08-06 PROCEDURE — 3331090001 HH PPS REVENUE CREDIT

## 2017-08-06 PROCEDURE — 3331090002 HH PPS REVENUE DEBIT

## 2017-08-07 ENCOUNTER — HOME CARE VISIT (OUTPATIENT)
Dept: SCHEDULING | Facility: HOME HEALTH | Age: 72
End: 2017-08-07
Payer: MEDICARE

## 2017-08-07 VITALS
RESPIRATION RATE: 18 BRPM | DIASTOLIC BLOOD PRESSURE: 90 MMHG | HEART RATE: 78 BPM | WEIGHT: 145 LBS | SYSTOLIC BLOOD PRESSURE: 140 MMHG | BODY MASS INDEX: 27.4 KG/M2 | OXYGEN SATURATION: 98 %

## 2017-08-07 PROCEDURE — G0300 HHS/HOSPICE OF LPN EA 15 MIN: HCPCS

## 2017-08-07 PROCEDURE — 3331090002 HH PPS REVENUE DEBIT

## 2017-08-07 PROCEDURE — 3331090001 HH PPS REVENUE CREDIT

## 2017-08-08 PROCEDURE — 3331090001 HH PPS REVENUE CREDIT

## 2017-08-08 PROCEDURE — 3331090002 HH PPS REVENUE DEBIT

## 2017-08-09 PROCEDURE — 3331090002 HH PPS REVENUE DEBIT

## 2017-08-09 PROCEDURE — 3331090001 HH PPS REVENUE CREDIT

## 2017-08-10 PROCEDURE — 3331090002 HH PPS REVENUE DEBIT

## 2017-08-10 PROCEDURE — 3331090001 HH PPS REVENUE CREDIT

## 2017-08-11 ENCOUNTER — HOME CARE VISIT (OUTPATIENT)
Dept: HOME HEALTH SERVICES | Facility: HOME HEALTH | Age: 72
End: 2017-08-11
Payer: MEDICARE

## 2017-08-11 PROCEDURE — 3331090001 HH PPS REVENUE CREDIT

## 2017-08-11 PROCEDURE — G0300 HHS/HOSPICE OF LPN EA 15 MIN: HCPCS

## 2017-08-11 PROCEDURE — 3331090002 HH PPS REVENUE DEBIT

## 2017-08-12 ENCOUNTER — HOME CARE VISIT (OUTPATIENT)
Dept: SCHEDULING | Facility: HOME HEALTH | Age: 72
End: 2017-08-12
Payer: MEDICARE

## 2017-08-12 VITALS
BODY MASS INDEX: 27.02 KG/M2 | WEIGHT: 143 LBS | DIASTOLIC BLOOD PRESSURE: 70 MMHG | HEART RATE: 69 BPM | SYSTOLIC BLOOD PRESSURE: 136 MMHG | TEMPERATURE: 97.6 F | OXYGEN SATURATION: 98 % | RESPIRATION RATE: 18 BRPM

## 2017-08-12 VITALS
SYSTOLIC BLOOD PRESSURE: 132 MMHG | DIASTOLIC BLOOD PRESSURE: 72 MMHG | HEART RATE: 70 BPM | OXYGEN SATURATION: 97 % | TEMPERATURE: 97.8 F | RESPIRATION RATE: 16 BRPM

## 2017-08-12 PROCEDURE — 3331090002 HH PPS REVENUE DEBIT

## 2017-08-12 PROCEDURE — 3331090003 HH PPS REVENUE ADJ

## 2017-08-12 PROCEDURE — 3331090001 HH PPS REVENUE CREDIT

## 2017-08-12 PROCEDURE — G0299 HHS/HOSPICE OF RN EA 15 MIN: HCPCS

## 2017-08-13 PROCEDURE — 3331090001 HH PPS REVENUE CREDIT

## 2017-08-13 PROCEDURE — 3331090002 HH PPS REVENUE DEBIT

## 2017-08-13 PROCEDURE — 3331090003 HH PPS REVENUE ADJ

## 2017-08-17 ENCOUNTER — OFFICE VISIT (OUTPATIENT)
Dept: RHEUMATOLOGY | Age: 72
End: 2017-08-17

## 2017-08-17 VITALS
SYSTOLIC BLOOD PRESSURE: 176 MMHG | BODY MASS INDEX: 27.72 KG/M2 | WEIGHT: 146.8 LBS | OXYGEN SATURATION: 97 % | TEMPERATURE: 98.1 F | HEIGHT: 61 IN | RESPIRATION RATE: 18 BRPM | DIASTOLIC BLOOD PRESSURE: 113 MMHG | HEART RATE: 70 BPM

## 2017-08-17 DIAGNOSIS — N18.4 CKD (CHRONIC KIDNEY DISEASE) STAGE 4, GFR 15-29 ML/MIN (HCC): ICD-10-CM

## 2017-08-17 DIAGNOSIS — Z79.60 LONG-TERM USE OF IMMUNOSUPPRESSANT MEDICATION: ICD-10-CM

## 2017-08-17 DIAGNOSIS — M17.0 PRIMARY OSTEOARTHRITIS OF BOTH KNEES: ICD-10-CM

## 2017-08-17 DIAGNOSIS — M05.79 SEROPOSITIVE RHEUMATOID ARTHRITIS OF MULTIPLE SITES (HCC): Primary | ICD-10-CM

## 2017-08-17 NOTE — MR AVS SNAPSHOT
Visit Information Date & Time Provider Department Dept. Phone Encounter #  
 8/17/2017  8:40 AM Jenny Mccarthy MD [address] of [facility] [id number] Follow-up Instructions Return in about 2 months (around 10/17/2017). Upcoming Health Maintenance Date Due DTaP/Tdap/Td series (1 - Tdap) 8/3/1967 ZOSTER VACCINE AGE 60> 6/3/2006 GLAUCOMA SCREENING Q2Y 8/3/2011 OSTEOPOROSIS SCREENING (DEXA) 8/3/2011 Pneumococcal 65+ High/Highest Risk (1 of 2 - PCV13) 8/3/2011 MEDICARE YEARLY EXAM 8/3/2011 BREAST CANCER SCRN MAMMOGRAM 8/29/2015 INFLUENZA AGE 9 TO ADULT 8/1/2017 FOBT Q 1 YEAR AGE 50-75 1/29/2018 Allergies as of 8/17/2017  Review Complete On: 8/17/2017 By: Dillon Michael RN Severity Noted Reaction Type Reactions Percocet [Oxycodone-acetaminophen]  01/09/2017    Other (comments) Confused Current Immunizations  Never Reviewed Name Date Influenza Vaccine 12/1/2016 Not reviewed this visit You Were Diagnosed With   
  
 Codes Comments Seropositive rheumatoid arthritis of multiple sites [facility])    -  Primary ICD-10-CM: M05.79 ICD-9-CM: 714.0 Long-term use of immunosuppressant medication     ICD-10-CM: Z79.899 ICD-9-CM: V58.69 Vitals BP Pulse Temp Resp Height(growth percentile) Weight(growth percentile) (!) 176/113 (BP 1 Location: Right arm, BP Patient Position: Sitting) 70 98.1 °F (36.7 °C) (Oral) 18 5' 1\" (1.549 m) 146 lb 12.8 oz (66.6 kg) SpO2 BMI OB Status Smoking Status 97% 27.74 kg/m2 Postmenopausal Never Smoker Vitals History BMI and BSA Data Body Mass Index Body Surface Area  
 27.74 kg/m 2 1.69 m 2 Preferred Pharmacy Pharmacy Name Phone [facility] [address]  [place] [phone] Your Updated Medication List  
  
   
 This list is accurate as of: 8/17/17  9:35 AM.  Always use your most recent med list.  
  
  
  
  
 aspirin 81 mg chewable tablet Take 1 Tab by mouth daily. atorvastatin 20 mg tablet Commonly known as:  LIPITOR Take 1 Tab by mouth nightly. bumetanide 0.5 mg tablet Commonly known as:  Cassidy Bora Take 1 Tab by mouth daily. carvedilol 6.25 mg tablet Commonly known as:  Harley Pryor Take 1 Tab by mouth two (2) times daily (with meals). cholestyramine-sucrose 4 gram powder MIX 1 SCOOPFUL WITH WATER AND DRINK BY MOUTH THREE TIMES DAILY WITH MEALS  
  
 ergocalciferol 50,000 unit capsule Commonly known as:  ERGOCALCIFEROL Take 1 Cap by mouth every seven (7) days. gabapentin 300 mg capsule Commonly known as:  NEURONTIN Take 300 mg by mouth three (3) times daily. hydrALAZINE 10 mg tablet Commonly known as:  APRESOLINE Take 1 Tab by mouth three (3) times daily. isosorbide mononitrate ER 30 mg tablet Commonly known as:  IMDUR Take 1 Tab by mouth daily. leflunomide 20 mg tablet Commonly known as:  Kumar Pummel Take 1 Tab by mouth daily for 90 days. Take half tab daily for 7 days and then one tab if tolerated  
  
 loperamide 1 mg/5 mL solution Commonly known as:  IMODIUM Take 5 mL by mouth four (4) times daily as needed for Diarrhea. omeprazole 20 mg capsule Commonly known as:  PRILOSEC Take 20 mg by mouth daily. Indications: GASTROESOPHAGEAL REFLUX  
  
 predniSONE 5 mg tablet Commonly known as:  DELTASONE  
4 tabs daily for 7 days, 3 tabs for 7 days, 2 tabs for 7 days, 1 tab for 7 days We Performed the Following C REACTIVE PROTEIN, QT [37657 CPT(R)] CBC WITH AUTOMATED DIFF [73106 CPT(R)] METABOLIC PANEL, COMPREHENSIVE [30717 CPT(R)] SED RATE (ESR) [id number] CPT(R)] Follow-up Instructions Return in about 2 months (around 10/17/2017). Introducing [facility]! Soni De Jesus introduces [employer]outOut patient portal. Now you can access parts of your medical record, email your doctor's office, and request medication refills online. 1. In your internet browser, go to [email or web address] 2. Click on the First Time User? Click Here link in the Sign In box. You will see the New Member Sign Up page. 3. Enter your ShoutOut Access Code exactly as it appears below. You will not need to use this code after youve completed the sign-up process. If you do not sign up before the expiration date, you must request a new code. · ShoutOut Access Code: [email or web address] Expires: 9/5/2017  7:49 AM 
 
4. Enter the last four digits of your Social Security Number (xxxx) and Date of Birth (mm/dd/yyyy) as indicated and click Submit. You will be taken to the next sign-up page. 5. Create a ShoutOut ID. This will be your ShoutOut login ID and cannot be changed, so think of one that is secure and easy to remember. 6. Create a ShoutOut password. You can change your password at any time. 7. Enter your Password Reset Question and Answer. This can be used at a later time if you forget your password. 8. Enter your e-mail address. You will receive e-mail notification when new information is available in [address] 9. Click Sign Up. You can now view and download portions of your medical record. 10. Click the Download Summary menu link to download a portable copy of your medical information. If you have questions, please visit the Frequently Asked Questions section of the ShoutOut website. Remember, ShoutOut is NOT to be used for urgent needs. For medical emergencies, dial 911. Now available from your iPhone and Android! Please provide this summary of care documentation to your next provider. Your primary care clinician is listed as Briseyda Lopez. If you have any questions after today's visit, please call [phone].

## 2017-08-17 NOTE — PROGRESS NOTES
Chief Complaint   Patient presents with    Arthritis       1. Have you been to the ER, urgent care clinic since your last visit? Hospitalized since your last visit? No    2. Have you seen or consulted any other health care providers outside of the [address] [place] [address] since your last visit? Include any pap smears or colon screening. No    Visit Vitals    BP (!) 176/113 (BP 1 Location: Right arm, BP Patient Position: Sitting)    Pulse 70    Temp 98.1 °F (36.7 °C) (Oral)    Resp 18    Ht 5' 1\" (1.549 m)    Wt 146 lb 12.8 oz (66.6 kg)    SpO2 97%    BMI 27.74 kg/m2     pts BP elevated. Taken twice. MD made aware.  Pt states she has not taken her morning medications

## 2017-08-17 NOTE — PROGRESS NOTES
REASON FOR VISIT    This is a follow-up visit for Ms. Abhijit St for Seropositive Erosive Rheumatoid Arthritis. Inflammatory arthritis phenotype includes:  Anti-CCP positive: yes (>250)  Rheumatoid factor positive: yes (154.1)  Erosive disease: yes  Extra-articular manifestations include: none    Immunosuppression Screening (7/18/2017): Quantiferon TB: negative  PPD:  Not performed  Hepatitis B: negative  Hepatitis C: negative    Therapy History includes:  Current DMARD therapy include: leflunomide 20 mg daily  Prior DMARD therapy include: none  Discontinued DMARDs because of inefficacy: None  Discontinued DMARDs because of side effects: None    Immunizations:   Immunization History   Administered Date(s) Administered    Influenza Vaccine 12/01/2016       Active problems include:    Patient Active Problem List   Diagnosis Code    Renal mass N28.89    Renal cell carcinoma of right kidney (HCC) C64.1    SVT (supraventricular tachycardia) (HCC) I47.1    Paroxysmal atrial fibrillation (HCC) I48.0    Orthostatic hypotension I95.1    Left lower lobe pneumonia ([facility]) J18.1    Diverticulitis K57.92    Leukocytosis D72.829    History of permanent cardiac pacemaker placement Z95.0    Chronic systolic heart failure (HCC) I50.22    Acute cholecystitis K81.0    Accelerated hypertension I10    Rheumatoid arthritis with unknown rheumatoid factor status ([facility]) M06.9    CKD (chronic kidney disease) stage 4, GFR 15-29 ml/min ([facility]) N18.4    Seropositive rheumatoid arthritis of multiple sites ([facility]) M05.79    Long-term use of immunosuppressant medication Z79.899    Primary osteoarthritis of both knees M17.0       HISTORY OF PRESENT ILLNESS    Ms. Abhijit St returns for a follow-up. On her last visit, I started her on leflunomide 20 mg daily and a prednisone taper. She completed her prednisone. Today, she feels better. She denies pain, swelling, or stiffness.      Ms. Abhijit St has continued her medications for arthritis and reports good tolerance without significant side effects. Vitamin D was 11.6    Last toxicity monitoring by blood work was done on 6/11/2017and did not reveal any significant adverse effects, except creatinine 1.67 mg/dL, eGFR 37, Hct 26.6%. Most recent inflammatory markers from 7/18/2017 revealed a ESR 17 mm/hr (previously N/A mm/hr) and CRP 1.9 mg/L (previously N/A mg/L). The patient has not had any interval hospital admissions, infections, or surgeries. REVIEW OF SYSTEMS    A comprehensive review of systems was performed and pertinent results are documented in the HPI, review of systems is otherwise non-contributory. PAST MEDICAL HISTORY    She has a past medical history of Arthritis; Autoimmune disease ([facility] CAD (coronary artery disease); CHF (congestive heart failure) ([facility] Chronic pain; Diabetes ([facility] GERD (gastroesophageal reflux disease); Hypertension; Ill-defined condition; Ill-defined condition; MI, old; Other ill-defined conditions; and Renal cell carcinoma of right kidney ([facility] [facility] FAMILY HISTORY    Her family history includes Cancer in her brother and mother; Other in her brother and father; Stroke in her brother. SOCIAL HISTORY    She reports that she has never smoked. She has never used smokeless tobacco. She reports that she does not drink alcohol or use illicit drugs. MEDICATIONS    Current Outpatient Prescriptions   Medication Sig Dispense Refill    ergocalciferol (ERGOCALCIFEROL) 50,000 unit capsule Take 1 Cap by mouth every seven (7) days. 12 Cap 3    predniSONE (DELTASONE) 5 mg tablet 4 tabs daily for 7 days, 3 tabs for 7 days, 2 tabs for 7 days, 1 tab for 7 days 70 Tab 0    leflunomide (ARAVA) 20 mg tablet Take 1 Tab by mouth daily for 90 days. Take half tab daily for 7 days and then one tab if tolerated 90 Tab 0    aspirin 81 mg chewable tablet Take 1 Tab by mouth daily. 30 Tab 0    atorvastatin (LIPITOR) 20 mg tablet Take 1 Tab by mouth nightly.  30 Tab 0  bumetanide (BUMEX) 0.5 mg tablet Take 1 Tab by mouth daily. 30 Tab 0    carvedilol (COREG) 6.25 mg tablet Take 1 Tab by mouth two (2) times daily (with meals). 60 Tab 0    isosorbide mononitrate ER (IMDUR) 30 mg tablet Take 1 Tab by mouth daily. 30 Tab 0    hydrALAZINE (APRESOLINE) 10 mg tablet Take 1 Tab by mouth three (3) times daily. (Patient taking differently: Take 25 mg by mouth three (3) times daily. dose increased from 10mg 3x/ day to 25mg 3x/day (bottle viewed)) 90 Tab 0    loperamide (IMODIUM) 1 mg/5 mL solution Take 5 mL by mouth four (4) times daily as needed for Diarrhea. 60 mL 0    gabapentin (NEURONTIN) 300 mg capsule Take 300 mg by mouth three (3) times daily.  omeprazole (PRILOSEC) 20 mg capsule Take 20 mg by mouth daily. Indications: GASTROESOPHAGEAL REFLUX      cholestyramine-sucrose 4 gram powder MIX 1 SCOOPFUL WITH WATER AND DRINK BY MOUTH THREE TIMES DAILY WITH MEALS 1134 g 0        ALLERGIES    Allergies   Allergen Reactions    Percocet [Oxycodone-Acetaminophen] Other (comments)     Confused       PHYSICAL EXAMINATION    Visit Vitals    BP (!) 176/113 (BP 1 Location: Right arm, BP Patient Position: Sitting)    Pulse 70    Temp 98.1 °F (36.7 °C) (Oral)    Resp 18    Ht 5' 1\" (1.549 m)    Wt 146 lb 12.8 oz (66.6 kg)    SpO2 97%    BMI 27.74 kg/m2     Body mass index is 27.74 kg/(m^2). General: Patient is alert, oriented x 3, not in acute distress    HEENT:   Sclerae are not injected and appear moist.  Oral mucous membranes are moist, there are no ulcers present. There is no alopecia. Neck is supple, there is no lymphadenopathy. Cardiovascular:  Heart is regular rate and rhythm, no murmurs. Chest:  Lungs are clear to auscultation bilaterally. No rhonchi, wheezes, or crackles. Abdomen:  Soft, non-tender.     Extremities:  Free of clubbing, cyanosis, edema    Neurological exam:  No focal sensory deficits, muscle strength is full in upper and lower extremities Skin exam:  There are no rashes, no alopecia, no discoid lesions, no active Raynaud's, no livedo reticularis, no periungual erythema. Musculoskeletal exam:  A comprehensive musculoskeletal exam was performed for all joints of each upper and lower extremity and assessed for swelling, tenderness and range of motion. Positive results are documented as below:     Decreased active and passive ROM of shoulders due to pain  Bilateral knee crepitus without effusion.   Bilateral ankle synovitis (left more than right)  Left MTP tenderness       Joint Count 8/17/2017 7/11/2017   Patient pain (0-100) 20 40   MHAQ 0.25 0.375   Left wrist- Tender - 1   Left wrist- Swollen 1 1   Left 1st MCP - Swollen - 1   Left 2nd MCP - Swollen 1 1   Left 3rd MCP - Swollen 1 1   Left 4th MCP - Swollen 1 1   Left 5th MCP - Swollen 1 1   Left 3rd PIP - Tender 1 1   Left 3rd PIP - Swollen 1 1   Left 4th PIP - Tender - 1   Left 4th PIP - Swollen - 1   Left 5th PIP - Tender - 1   Left 5th PIP - Swollen - 1   Right elbow - Swollen 1 1   Right wrist- Tender 1 1   Right wrist- Swollen 1 1   Right 1st MCP - Tender - 1   Right 1st MCP - Swollen 1 1   Right 2nd MCP - Tender - 1   Right 2nd MCP - Swollen 1 1   Right 3rd MCP - Tender - 1   Right 3rd MCP - Swollen 1 1   Right 4th MCP - Tender - 1   Right 4th MCP - Swollen 1 1   Right 5th MCP - Tender - 1   Right 5th MCP - Swollen 1 1   Right thumb IP - Tender - 1   Right thumb IP - Swollen - 1   Right 2nd PIP - Tender - 1   Right 2nd PIP - Swollen 1 1   Right 3rd PIP - Tender 1 1   Right 3rd PIP - Swollen 1 1   Right 4th PIP - Tender 1 1   Right 4th PIP - Swollen 1 -   Right 5th PIP - Tender 1 1   Right 5th PIP - Swollen 1 -   Tender Joint Count (Total) 5 15   Swollen Joint Count (Total) 17 19   Physician Assessment (0-10) 4 6   Patient Assessment (0-10) 2 6   CDAI Total (calculated) 28 46       DATA REVIEW    Laboratory     The following laboratory results were reviewed and discussed with the patient:    Office Visit on 07/11/2017   Component Date Value    CCP Antibodies IgG/IgA 07/18/2017 >250*    Hep B surface Ag screen 07/18/2017 Negative     Hepatitis Be Antigen 07/18/2017 Negative     Hep B Core Ab, IgM 07/18/2017 Negative     Hep B Core Ab, total 07/18/2017 Negative     Hepatitis Be Antibody 07/18/2017 Negative     HEP B SURFACE AB, QUAL 07/18/2017 Non Reactive     HCV Ab 07/18/2017 <0.1     C-Reactive Protein, Qt 07/18/2017 1.9     Sed rate (ESR) 07/18/2017 17     QuantiFERON Incubation 07/18/2017                      Value:Incubated, specimen forwarded to [facility], [place] for  completion of the assay.  Rheumatoid factor 07/18/2017 154.1*    Protein, total 07/18/2017 6.2     Albumin 07/18/2017 3.3     Alpha-1-globulin 07/18/2017 0.2     ALPHA-2 GLOBULIN 07/18/2017 0.7     Beta globulin 07/18/2017 0.9     Gamma globulin 07/18/2017 1.1     M-spike 07/18/2017 Not Observed     Globulin, total 07/18/2017 2.9     A/G ratio 07/18/2017 1.1     Please note 07/18/2017 Comment     Interpretation (see belo* 07/18/2017 Comment     VITAMIN D, 25-HYDROXY 07/18/2017 11.6*    Comment 07/18/2017 Comment     QuantiFERON TB Gold 07/18/2017 Negative     QUANTIFERON CRITERIA 07/18/2017 Comment     QuantiFERON TB Ag Value 07/18/2017 0.04     QuantiFERON Nil Value 07/18/2017 0.06     QuantiFERON Mitogen Value 07/18/2017 5.84     QFT TB Ag minus Nil Value 07/18/2017 <0.00     Interpretation: 07/18/2017 Comment    Admission on 06/06/2017, Discharged on 06/13/2017   No results displayed because visit has over 200 results. Imaging    Musculoskeletal Ultrasound    None    Radiographs    Bilateral Hand 7/18/2017: RIGHT: normal alignment with diffuse osteopenia. No erosive changes. There is widening of the scapholunate interval with subcortical erosion along the radial margin of the lunate.  Subtle lucency is noted distal ulnar styloid and radial styloid as well as erosive changes at all carpometacarpal joints. .  The soft tissues are within normal limits. LEFT: normal alignment and diffuse osteopenia. There is no joint space loss demonstrated. A discrete erosion is not identified. No periostitis. The soft tissues are within normal limits. Bilateral Foot 7/18/2017: RIGHT: metatarsus primus varus with hallux valgus deformity mild degenerative changes at the first MTP joint. Bone mineral density is decreased. No acute fracture. There is soft tissue swelling overlying the midfoot. No discrete erosive change or periostitis. LEFT: metatarsus primus varus with hallux valgus deformity and mild degeneration of the first MTP joint. No erosive change. Bone mineral density is decreased. No periostitis or fracture. There is soft tissue swelling. CT Imaging    CTA Abdomen and Pelvis with contrast 10/21/2016: the visualized portions of the lung bases are clear. There is a small sliding hiatal hernia. There is a lobulated surgery recently enhancing upper pole right renal mass with some coarse calcifications measuring 7.3 x 10.0 x 8.8 cm. The mass replaces portions of the upper pole parenchyma and appears to cause mass effect and indentation onto the upper pole calyces of the right kidney. No definite collecting system invasion. The left kidney demonstrates a couple of subcentimeter hypodense lesions which are not fully characterized on this exam. There is no evident renal vascular invasion. There are no focal abnormalities within the liver, spleen, pancreas, or adrenal glands. The aorta tapers without aneurysm. There is no retroperitoneal adenopathy or mass. There is no ascites or free intraperitoneal air.  There are noninflamed appearing left and sigmoid colon diverticula. The bowel otherwise appears normal. The appendix is normal.  The uterus and ovaries appear normal.   There is no pelvic mass or adenopathy.  The surrounding musculoskeletal structures are unremarkable apart from degenerative spine change    MR Imaging    None    DXA     None    PATHOLOGY    Right kidney, right radical nephrectomy 12/22/2016: Renal cell carcinoma, conventional papillary type I subtype     ASSESSMENT AND PLAN    This is a follow-up visit for Ms. Rebecca Ponce. 1) Seropositive Erosive Rheumatoid Arthritis. She is tolerating leflunomide 20 mg daily. She has felt improving after completing her prednisone taper. Today, her CDAI was 28 (previously 46) with 5 tender and 17 swollen joints, with improvement bilateral ankle and MTP involvement. Methotrexate is contra-indicated due to CKD. I will continue treatment for now and reassess in 2 months. Labs today.    2) Long Term Use of Immunosuppressants. The patient remains on immunomodulatory medications (leflunomide) and requires frequent toxicity monitoring by blood work. Respective labs were ordered (CBC and CMP).    3) CKD Stage IV. Her creatinine 1.73 mg/dL, eGFR 29. She had labs drawn yesterday at her PCP.    4) Bilateral Knee Osteoarthritis. This was not an active issue today. The patient voiced understanding of the aforementioned assessment and plan. Summary of plan was provided in the After Visit Summary patient instructions.      TODAY'S ORDERS    Orders Placed This Encounter    CBC WITH AUTOMATED DIFF    METABOLIC PANEL, COMPREHENSIVE    C REACTIVE PROTEIN, QT    SED RATE (ESR)       Future Appointments  Date Time Provider Kelly Nuñez   10/18/2017 9:40 AM MD Jake Wilson MD, [facility]    Adult Rheumatology   Musculoskeletal Ultrasound Certified  [address] [place] [address], [address] [place] [address]   Phone [phone]  Fax [phone]

## 2017-08-18 LAB
ALBUMIN SERPL-MCNC: 4.1 G/DL (ref 3.5–4.8)
ALBUMIN/GLOB SERPL: 1.5 {RATIO} (ref 1.2–2.2)
ALP SERPL-CCNC: 130 IU/L (ref 39–117)
ALT SERPL-CCNC: 13 IU/L (ref 0–32)
AST SERPL-CCNC: 20 IU/L (ref 0–40)
BASOPHILS # BLD AUTO: 0 X10E3/UL (ref 0–0.2)
BASOPHILS NFR BLD AUTO: 0 %
BILIRUB SERPL-MCNC: 0.2 MG/DL (ref 0–1.2)
BUN SERPL-MCNC: 25 MG/DL (ref 8–27)
BUN/CREAT SERPL: 15 (ref 12–28)
CALCIUM SERPL-MCNC: 9.1 MG/DL (ref 8.7–10.3)
CHLORIDE SERPL-SCNC: 104 MMOL/L (ref 96–106)
CO2 SERPL-SCNC: 20 MMOL/L (ref 18–29)
CREAT SERPL-MCNC: 1.62 MG/DL (ref 0.57–1)
CRP SERPL-MCNC: 14.8 MG/L (ref 0–4.9)
EOSINOPHIL # BLD AUTO: 0.1 X10E3/UL (ref 0–0.4)
EOSINOPHIL NFR BLD AUTO: 1 %
ERYTHROCYTE [DISTWIDTH] IN BLOOD BY AUTOMATED COUNT: 15.5 % (ref 12.3–15.4)
ERYTHROCYTE [SEDIMENTATION RATE] IN BLOOD BY WESTERGREN METHOD: 26 MM/HR (ref 0–40)
GLOBULIN SER CALC-MCNC: 2.7 G/DL (ref 1.5–4.5)
GLUCOSE SERPL-MCNC: 76 MG/DL (ref 65–99)
HCT VFR BLD AUTO: 33 % (ref 34–46.6)
HGB BLD-MCNC: 10.5 G/DL (ref 11.1–15.9)
IMM GRANULOCYTES # BLD: 0 X10E3/UL (ref 0–0.1)
IMM GRANULOCYTES NFR BLD: 0 %
LYMPHOCYTES # BLD AUTO: 1.9 X10E3/UL (ref 0.7–3.1)
LYMPHOCYTES NFR BLD AUTO: 26 %
MCH RBC QN AUTO: 23.5 PG (ref 26.6–33)
MCHC RBC AUTO-ENTMCNC: 31.8 G/DL (ref 31.5–35.7)
MCV RBC AUTO: 74 FL (ref 79–97)
MONOCYTES # BLD AUTO: 0.7 X10E3/UL (ref 0.1–0.9)
MONOCYTES NFR BLD AUTO: 9 %
NEUTROPHILS # BLD AUTO: 4.7 X10E3/UL (ref 1.4–7)
NEUTROPHILS NFR BLD AUTO: 64 %
PLATELET # BLD AUTO: 223 X10E3/UL (ref 150–379)
POTASSIUM SERPL-SCNC: 3.8 MMOL/L (ref 3.5–5.2)
PROT SERPL-MCNC: 6.8 G/DL (ref 6–8.5)
RBC # BLD AUTO: 4.47 X10E6/UL (ref 3.77–5.28)
SODIUM SERPL-SCNC: 145 MMOL/L (ref 134–144)
WBC # BLD AUTO: 7.5 X10E3/UL (ref 3.4–10.8)

## 2017-08-18 NOTE — PROGRESS NOTES
The results were reviewed and a letter was sent. Improvement of anemia. Stable CKD.  Elevated inflammatory marker (CRP, ALK)

## 2017-12-21 ENCOUNTER — OFFICE VISIT (OUTPATIENT)
Dept: RHEUMATOLOGY | Age: 72
End: 2017-12-21

## 2017-12-21 VITALS
TEMPERATURE: 98.2 F | WEIGHT: 155 LBS | SYSTOLIC BLOOD PRESSURE: 178 MMHG | HEIGHT: 61 IN | HEART RATE: 71 BPM | BODY MASS INDEX: 29.27 KG/M2 | RESPIRATION RATE: 18 BRPM | DIASTOLIC BLOOD PRESSURE: 98 MMHG

## 2017-12-21 DIAGNOSIS — Z79.60 LONG-TERM USE OF IMMUNOSUPPRESSANT MEDICATION: ICD-10-CM

## 2017-12-21 DIAGNOSIS — N18.4 CKD (CHRONIC KIDNEY DISEASE) STAGE 4, GFR 15-29 ML/MIN (HCC): ICD-10-CM

## 2017-12-21 DIAGNOSIS — M05.79 SEROPOSITIVE RHEUMATOID ARTHRITIS OF MULTIPLE SITES (HCC): Primary | ICD-10-CM

## 2017-12-21 DIAGNOSIS — M17.0 PRIMARY OSTEOARTHRITIS OF BOTH KNEES: ICD-10-CM

## 2017-12-21 PROBLEM — M06.9 RHEUMATOID ARTHRITIS WITH UNKNOWN RHEUMATOID FACTOR STATUS (HCC): Status: RESOLVED | Noted: 2017-07-11 | Resolved: 2017-12-21

## 2017-12-21 RX ORDER — LEFLUNOMIDE 20 MG/1
20 TABLET ORAL DAILY
Qty: 90 TAB | Refills: 0 | Status: SHIPPED | OUTPATIENT
Start: 2017-12-21 | End: 2018-03-21

## 2017-12-21 NOTE — PROGRESS NOTES
REASON FOR VISIT    This is a follow-up visit for Ms. Millicent Naylor for Seropositive Erosive Rheumatoid Arthritis. Inflammatory arthritis phenotype includes:  Anti-CCP positive: yes (>250)  Rheumatoid factor positive: yes (154.1)  Erosive disease: yes  Extra-articular manifestations include: none    Immunosuppression Screening (7/18/2017): Quantiferon TB: negative  PPD:  Not performed  Hepatitis B: negative  Hepatitis C: negative    Therapy History includes:  Current DMARD therapy include: leflunomide 20 mg daily (7/11/2017) - patient taking every 7 days  Prior DMARD therapy include: methotrexate (6 months in 2015)  Discontinued DMARDs because of inefficacy: None  Discontinued DMARDs because of side effects: None  Contra-Indicated DMARDS due to CKD: methotrexate    Immunizations:   Immunization History   Administered Date(s) Administered    Influenza Vaccine 12/01/2016       Active problems include:    Patient Active Problem List   Diagnosis Code    Renal mass N28.89    Renal cell carcinoma of right kidney (HCC) C64.1    SVT (supraventricular tachycardia) (HCC) I47.1    Paroxysmal atrial fibrillation (HCC) I48.0    Orthostatic hypotension I95.1    Left lower lobe pneumonia (HCC) J18.1    Diverticulitis K57.92    Leukocytosis D72.829    History of permanent cardiac pacemaker placement Z95.0    Chronic systolic heart failure (HCC) I50.22    Acute cholecystitis K81.0    Accelerated hypertension I10    CKD (chronic kidney disease) stage 4, GFR 15-29 ml/min (HCC) N18.4    Seropositive rheumatoid arthritis of multiple sites ([facility] M05.79    Long-term use of immunosuppressant medication Z79.899    Primary osteoarthritis of both knees M17.0       HISTORY OF PRESENT ILLNESS    Ms. Millicent Naylor returns for a follow-up. On her last visit, I continued her on leflunomide 20 mg daily. She missed her follow up. She reports taking leflunomide every 7 days due to misunderstanding the instructions.  She also has been taking prednisone 5 mg every days as well. Today, she feels better. She denies pain or swelling. She has stiffness in her hands lasting an hour. Her knees have been popping. Ms. Alvia Leyden has continued her medications for arthritis and reports good tolerance without significant side effects. Vitamin D was 11.6    Last toxicity monitoring by blood work was done on 8/17/2017and did not reveal any significant adverse effects, except creatinine 1.62 mg/dL (previously 1.67 mg/dL), eGFR 37, Hct 33.0 % (previously 26.6%).    Most recent inflammatory markers from 8/17/2017 revealed a ESR 26 mm/hr (previously 17 mm/hr) and CRP 14.8 mg/L (previously 1.9 mg/L). The patient has not had any interval hospital admissions, infections, or surgeries. REVIEW OF SYSTEMS    A comprehensive review of systems was performed and pertinent results are documented in the HPI, review of systems is otherwise non-contributory. PAST MEDICAL HISTORY    She has a past medical history of Arthritis; Autoimmune disease ([address] CAD (coronary artery disease); CHF (congestive heart failure) ([address] Chronic pain; Diabetes ([address] GERD (gastroesophageal reflux disease); Hypertension; Ill-defined condition; Ill-defined condition; MI, old; Other ill-defined conditions(799.89); and Renal cell carcinoma of right kidney ([address] FAMILY HISTORY    Her family history includes Cancer in her brother and mother; Other in her brother and father; Stroke in her brother. SOCIAL HISTORY    She reports that she has never smoked. She has never used smokeless tobacco. She reports that she does not drink alcohol or use illicit drugs. MEDICATIONS    Current Outpatient Prescriptions   Medication Sig Dispense Refill    leflunomide (ARAVA) 20 mg tablet Take 1 Tab by mouth daily for 90 days. Take half tab daily for 7 days and then one tab if tolerated 90 Tab 0    ergocalciferol (ERGOCALCIFEROL) 50,000 unit capsule Take 1 Cap by mouth every seven (7) days.  12 Cap 3    aspirin 81 mg chewable tablet Take 1 Tab by mouth daily. 30 Tab 0    atorvastatin (LIPITOR) 20 mg tablet Take 1 Tab by mouth nightly. 30 Tab 0    bumetanide (BUMEX) 0.5 mg tablet Take 1 Tab by mouth daily. 30 Tab 0    carvedilol (COREG) 6.25 mg tablet Take 1 Tab by mouth two (2) times daily (with meals). 60 Tab 0    isosorbide mononitrate ER (IMDUR) 30 mg tablet Take 1 Tab by mouth daily. 30 Tab 0    hydrALAZINE (APRESOLINE) 10 mg tablet Take 1 Tab by mouth three (3) times daily. (Patient taking differently: Take 25 mg by mouth three (3) times daily. dose increased from 10mg 3x/ day to 25mg 3x/day (bottle viewed)) 90 Tab 0    loperamide (IMODIUM) 1 mg/5 mL solution Take 5 mL by mouth four (4) times daily as needed for Diarrhea. 60 mL 0    gabapentin (NEURONTIN) 300 mg capsule Take 300 mg by mouth three (3) times daily.  omeprazole (PRILOSEC) 20 mg capsule Take 20 mg by mouth daily. Indications: GASTROESOPHAGEAL REFLUX          ALLERGIES    Allergies   Allergen Reactions    Percocet [Oxycodone-Acetaminophen] Other (comments)     Confused       PHYSICAL EXAMINATION    Visit Vitals    BP (!) 178/98 (BP 1 Location: Right arm, BP Patient Position: Sitting)    Pulse 71    Temp 98.2 °F (36.8 °C)    Resp 18    Ht 5' 1\" (1.549 m)    Wt 155 lb (70.3 kg)    BMI 29.29 kg/m2     Body mass index is 29.29 kg/(m^2). General: Patient is alert, oriented x 3, not in acute distress    HEENT:   Sclerae are not injected and appear moist.  Oral mucous membranes are moist, there are no ulcers present. There is no alopecia. Neck is supple     Cardiovascular:  Heart is regular rate and rhythm, no murmurs. Chest:  Lungs are clear to auscultation bilaterally. No rhonchi, wheezes, or crackles.     Extremities:  Free of clubbing, cyanosis, edema    Neurological exam:  No focal sensory deficits, muscle strength is full in upper and lower extremities     Skin exam:  There are no rashes, no alopecia, no discoid lesions, no active Raynaud's, no livedo reticularis, no periungual erythema. Musculoskeletal exam:  A comprehensive musculoskeletal exam was performed for all joints of each upper and lower extremity and assessed for swelling, tenderness and range of motion. Positive results are documented as below:     Decreased active and passive ROM of shoulders due to pain  Bilateral knee crepitus without effusion.   Bilateral ankle synovitis (left more than right)  Left MTP tenderness       Joint Count 12/21/2017 8/17/2017 7/11/2017   Patient pain (0-100) 20 20 40   MHAQ 0 0.25 0.375   Left wrist- Tender 1 - 1   Left wrist- Swollen 1 1 1   Left 1st MCP - Tender 1 - -   Left 1st MCP - Swollen 1 - 1   Left 2nd MCP - Swollen 1 1 1   Left 3rd MCP - Swollen 1 1 1   Left 4th MCP - Tender 1 - -   Left 4th MCP - Swollen 1 1 1   Left 5th MCP - Swollen 1 1 1   Left 3rd PIP - Tender - 1 1   Left 3rd PIP - Swollen - 1 1   Left 4th PIP - Tender 1 - 1   Left 4th PIP - Swollen - - 1   Left 5th PIP - Tender 1 - 1   Left 5th PIP - Swollen 1 - 1   Right elbow - Swollen - 1 1   Right wrist- Tender - 1 1   Right wrist- Swollen 1 1 1   Right 1st MCP - Tender - - 1   Right 1st MCP - Swollen 1 1 1   Right 2nd MCP - Tender - - 1   Right 2nd MCP - Swollen 1 1 1   Right 3rd MCP - Tender - - 1   Right 3rd MCP - Swollen 1 1 1   Right 4th MCP - Tender - - 1   Right 4th MCP - Swollen 1 1 1   Right 5th MCP - Tender - - 1   Right 5th MCP - Swollen 1 1 1   Right thumb IP - Tender 1 - 1   Right thumb IP - Swollen - - 1   Right 2nd PIP - Tender - - 1   Right 2nd PIP - Swollen 1 1 1   Right 3rd PIP - Tender - 1 1   Right 3rd PIP - Swollen 1 1 1   Right 4th PIP - Tender - 1 1   Right 4th PIP - Swollen - 1 -   Right 5th PIP - Tender - 1 1   Right 5th PIP - Swollen - 1 -   Tender Joint Count (Total) 6 5 15   Swollen Joint Count (Total) 15 17 19   Physician Assessment (0-10) 3 4 6   Patient Assessment (0-10) 2 2 6   CDAI Total (calculated) 26 28 46       DATA REVIEW    Laboratory     The following laboratory results were reviewed and discussed with the patient:    No visits with results within 16 Week(s) from this visit. Latest known visit with results is:    Office Visit on 08/17/2017   Component Date Value    WBC 08/17/2017 7.5     RBC 08/17/2017 4.47     HGB 08/17/2017 10.5*    HCT 08/17/2017 33.0*    MCV 08/17/2017 74*    MCH 08/17/2017 23.5*    MCHC 08/17/2017 31.8     RDW 08/17/2017 15.5*    PLATELET 60/77/5959 000     NEUTROPHILS 08/17/2017 64     Lymphocytes 08/17/2017 26     MONOCYTES 08/17/2017 9     EOSINOPHILS 08/17/2017 1     BASOPHILS 08/17/2017 0     ABS. NEUTROPHILS 08/17/2017 4.7     Abs Lymphocytes 08/17/2017 1.9     ABS. MONOCYTES 08/17/2017 0.7     ABS. EOSINOPHILS 08/17/2017 0.1     ABS. BASOPHILS 08/17/2017 0.0     IMMATURE GRANULOCYTES 08/17/2017 0     ABS. IMM. GRANS. 08/17/2017 0.0     Glucose 08/17/2017 76     BUN 08/17/2017 25     Creatinine 08/17/2017 1.62*    GFR est non-AA 08/17/2017 32*    GFR est AA 08/17/2017 37*    BUN/Creatinine ratio 08/17/2017 15     Sodium 08/17/2017 145*    Potassium 08/17/2017 3.8     Chloride 08/17/2017 104     CO2 08/17/2017 20     Calcium 08/17/2017 9.1     Protein, total 08/17/2017 6.8     Albumin 08/17/2017 4.1     GLOBULIN, TOTAL 08/17/2017 2.7     A-G Ratio 08/17/2017 1.5     Bilirubin, total 08/17/2017 0.2     Alk. phosphatase 08/17/2017 130*    AST (SGOT) 08/17/2017 20     ALT (SGPT) 08/17/2017 13     C-Reactive Protein, Qt 08/17/2017 14.8*    Sed rate (ESR) 08/17/2017 26        Imaging    Musculoskeletal Ultrasound    None    Radiographs    Bilateral Hand 7/18/2017: RIGHT: normal alignment with diffuse osteopenia. No erosive changes. There is widening of the scapholunate interval with subcortical erosion along the radial margin of the lunate. Subtle lucency is noted distal ulnar styloid and radial styloid as well as erosive changes at all carpometacarpal joints. Kaye Fleming The soft tissues are within normal limits. LEFT: normal alignment and diffuse osteopenia. There is no joint space loss demonstrated. A discrete erosion is not identified. No periostitis. The soft tissues are within normal limits. Bilateral Foot 7/18/2017: RIGHT: metatarsus primus varus with hallux valgus deformity mild degenerative changes at the first MTP joint. Bone mineral density is decreased. No acute fracture. There is soft tissue swelling overlying the midfoot. No discrete erosive change or periostitis. LEFT: metatarsus primus varus with hallux valgus deformity and mild degeneration of the first MTP joint. No erosive change. Bone mineral density is decreased. No periostitis or fracture. There is soft tissue swelling. CT Imaging    CTA Abdomen and Pelvis with contrast 10/21/2016: the visualized portions of the lung bases are clear. There is a small sliding hiatal hernia. There is a lobulated surgery recently enhancing upper pole right renal mass with some coarse calcifications measuring 7.3 x 10.0 x 8.8 cm. The mass replaces portions of the upper pole parenchyma and appears to cause mass effect and indentation onto the upper pole calyces of the right kidney. No definite collecting system invasion. The left kidney demonstrates a couple of subcentimeter hypodense lesions which are not fully characterized on this exam. There is no evident renal vascular invasion. There are no focal abnormalities within the liver, spleen, pancreas, or adrenal glands. The aorta tapers without aneurysm. There is no retroperitoneal adenopathy or mass. There is no ascites or free intraperitoneal air.  There are noninflamed appearing left and sigmoid colon diverticula. The bowel otherwise appears normal. The appendix is normal.  The uterus and ovaries appear normal.   There is no pelvic mass or adenopathy.  The surrounding musculoskeletal structures are unremarkable apart from degenerative spine change    MR Imaging    None    DXA     None    PATHOLOGY    Right kidney, right radical nephrectomy 12/22/2016: Renal cell carcinoma, conventional papillary type I subtype     ASSESSMENT AND PLAN    This is a follow-up visit for Ms. Ly Fortune. 1) Seropositive Erosive Rheumatoid Arthritis. She is tolerating leflunomide 20 mg but has been taking it every 7 days due to misunderstanding. She has been taking prednisone every 7 days as well. Today, her CDAI was 26 (previously 28, 46) with 6 tender and 15 swollen joints, with ankle and MTP involvement. I reminded her that leflunomide is a daily medication and to take it as such. She understood. Methotrexate is contra-indicated due to CKD. Labs today.    2) Long Term Use of Immunosuppressants. The patient remains on immunomodulatory medications (leflunomide) and requires frequent toxicity monitoring by blood work. Respective labs were ordered (CBC and CMP).    3) CKD Stage IV. Her creatinine 1.62 mg/dL and eGFR 37 (previously 1.73 mg/dL, eGFR 29).    4) Bilateral Knee Osteoarthritis. This was not an active issue today. The patient voiced understanding of the aforementioned assessment and plan. Summary of plan was provided in the After Visit Summary patient instructions.      TODAY'S ORDERS    Orders Placed This Encounter    CBC WITH AUTOMATED DIFF    METABOLIC PANEL, COMPREHENSIVE    C REACTIVE PROTEIN, QT    SED RATE (ESR)    leflunomide (ARAVA) 20 mg tablet     Future Appointments  Date Time Provider Kelly Nuñez   3/26/2018 10:20 AM MD Aime Kennedy Mai, MD, [address] [place] [address]    Adult Rheumatology   Musculoskeletal Ultrasound Certified  [address] [place] [address]   Phone [phone]  Fax [phone]

## 2017-12-21 NOTE — MR AVS SNAPSHOT
Visit Information Date & Time Provider Department Dept. Phone Encounter #  
 12/21/2017  4:00 PM Sen [facility] [id number] Follow-up Instructions Return in about 3 months (around 3/21/2018) for Schedule with JOSE White. Upcoming Health Maintenance Date Due DTaP/Tdap/Td series (1 - Tdap) 8/3/1967 ZOSTER VACCINE AGE 60> 6/3/2006 GLAUCOMA SCREENING Q2Y 8/3/2011 OSTEOPOROSIS SCREENING (DEXA) 8/3/2011 Pneumococcal 65+ High/Highest Risk (1 of 2 - PCV13) 8/3/2011 MEDICARE YEARLY EXAM 8/3/2011 Influenza Age 5 to Adult 8/1/2017 FOBT Q 1 YEAR AGE 50-75 1/29/2018 Allergies as of 12/21/2017  Review Complete On: 12/21/2017 By: Lalita Vega MD  
  
 Severity Noted Reaction Type Reactions Percocet [Oxycodone-acetaminophen]  01/09/2017    Other (comments) Confused Current Immunizations  Never Reviewed Name Date Influenza Vaccine 12/1/2016 Not reviewed this visit You Were Diagnosed With   
  
 Codes Comments Seropositive rheumatoid arthritis of multiple sites [facility])    -  Primary ICD-10-CM: M05.79 ICD-9-CM: 714.0 Long-term use of immunosuppressant medication     ICD-10-CM: Z79.899 ICD-9-CM: V58.69 CKD (chronic kidney disease) stage 4, GFR 15-29 ml/min ([facility])     ICD-10-CM: N18.4 ICD-9-CM: 650. 4 Vitals BP Pulse Temp Resp Height(growth percentile) Weight(growth percentile) (!) 178/98 (BP 1 Location: Right arm, BP Patient Position: Sitting) 71 98.2 °F (36.8 °C) 18 5' 1\" (1.549 m) 155 lb (70.3 kg) BMI OB Status Smoking Status 29.29 kg/m2 Postmenopausal Never Smoker BMI and BSA Data Body Mass Index Body Surface Area  
 29.29 kg/m 2 1.74 m 2 Preferred Pharmacy Pharmacy Name Phone [facility] [address] [facility]  [place] [phone] Your Updated Medication List  
  
   
This list is accurate as of: 12/21/17  4:18 PM.  Always use your most recent med list.  
  
  
  
  
 aspirin 81 mg chewable tablet Take 1 Tab by mouth daily. atorvastatin 20 mg tablet Commonly known as:  LIPITOR Take 1 Tab by mouth nightly. bumetanide 0.5 mg tablet Commonly known as:  [place] Take 1 Tab by mouth daily. carvedilol 6.25 mg tablet Commonly known as:  Sharolyn [place] Take 1 Tab by mouth two (2) times daily (with meals). ergocalciferol 50,000 unit capsule Commonly known as:  ERGOCALCIFEROL Take 1 Cap by mouth every seven (7) days. gabapentin 300 mg capsule Commonly known as:  NEURONTIN Take 300 mg by mouth three (3) times daily. hydrALAZINE 10 mg tablet Commonly known as:  APRESOLINE Take 1 Tab by mouth three (3) times daily. isosorbide mononitrate ER 30 mg tablet Commonly known as:  IMDUR Take 1 Tab by mouth daily. leflunomide 20 mg tablet Commonly known as:  [place] Take 1 Tab by mouth daily for 90 days. Take half tab daily for 7 days and then one tab if tolerated  
  
 loperamide 1 mg/5 mL solution Commonly known as:  IMODIUM Take 5 mL by mouth four (4) times daily as needed for Diarrhea. omeprazole 20 mg capsule Commonly known as:  PRILOSEC Take 20 mg by mouth daily. Indications: GASTROESOPHAGEAL REFLUX Prescriptions Sent to Pharmacy Refills  
 leflunomide (ARAVA) 20 mg tablet 0 Sig: Take 1 Tab by mouth daily for 90 days. Take half tab daily for 7 days and then one tab if tolerated Class: Normal  
 Pharmacy: [facility] [address] Ph #: [phone] Route: Oral  
  
We Performed the Following C REACTIVE PROTEIN, QT [18786 CPT(R)] CBC WITH AUTOMATED DIFF [27768 CPT(R)] METABOLIC PANEL, COMPREHENSIVE [24277 CPT(R)] SED RATE (ESR) [id number] CPT(R)] Follow-up Instructions Return in about 3 months (around 3/21/2018) for Schedule with JOSE Antonio. Patient Instructions Please take leflunomide (Arava) 20 mg DAILY Introducing [facility]! [facility] introduces mDialog patient portal. Now you can access parts of your medical record, email your doctor's office, and request medication refills online. 1. In your internet browser, go to [email or web address] 2. Click on the First Time User? Click Here link in the Sign In box. You will see the New Member Sign Up page. 3. Enter your mDialog Access Code exactly as it appears below. You will not need to use this code after youve completed the sign-up process. If you do not sign up before the expiration date, you must request a new code. · mDialog Access Code: [facility] KISHOR THAYER Expires: 3/21/2018  4:17 PM 
 
4. Enter the last four digits of your Social Security Number (xxxx) and Date of Birth (mm/dd/yyyy) as indicated and click Submit. You will be taken to the next sign-up page. 5. Create a mDialog ID. This will be your mDialog login ID and cannot be changed, so think of one that is secure and easy to remember. 6. Create a mDialog password. You can change your password at any time. 7. Enter your Password Reset Question and Answer. This can be used at a later time if you forget your password. 8. Enter your e-mail address. You will receive e-mail notification when new information is available in [address] 9. Click Sign Up. You can now view and download portions of your medical record. 10. Click the Download Summary menu link to download a portable copy of your medical information. If you have questions, please visit the Frequently Asked Questions section of the mDialog website. Remember, mDialog is NOT to be used for urgent needs. For medical emergencies, dial 911. Now available from your iPhone and Android! Please provide this summary of care documentation to your next provider. Your primary care clinician is listed as [place]. If you have any questions after today's visit, please call [phone].

## 2017-12-22 LAB
ALBUMIN SERPL-MCNC: 4.2 G/DL (ref 3.5–4.8)
ALBUMIN/GLOB SERPL: 1.7 {RATIO} (ref 1.2–2.2)
ALP SERPL-CCNC: 136 IU/L (ref 39–117)
ALT SERPL-CCNC: 11 IU/L (ref 0–32)
AST SERPL-CCNC: 19 IU/L (ref 0–40)
BASOPHILS # BLD AUTO: 0 X10E3/UL (ref 0–0.2)
BASOPHILS NFR BLD AUTO: 0 %
BILIRUB SERPL-MCNC: 0.2 MG/DL (ref 0–1.2)
BUN SERPL-MCNC: 29 MG/DL (ref 8–27)
BUN/CREAT SERPL: 18 (ref 12–28)
CALCIUM SERPL-MCNC: 8.9 MG/DL (ref 8.7–10.3)
CHLORIDE SERPL-SCNC: 105 MMOL/L (ref 96–106)
CO2 SERPL-SCNC: 22 MMOL/L (ref 18–29)
CREAT SERPL-MCNC: 1.61 MG/DL (ref 0.57–1)
CRP SERPL-MCNC: 5.4 MG/L (ref 0–4.9)
EOSINOPHIL # BLD AUTO: 0.1 X10E3/UL (ref 0–0.4)
EOSINOPHIL NFR BLD AUTO: 2 %
ERYTHROCYTE [DISTWIDTH] IN BLOOD BY AUTOMATED COUNT: 16.7 % (ref 12.3–15.4)
ERYTHROCYTE [SEDIMENTATION RATE] IN BLOOD BY WESTERGREN METHOD: 43 MM/HR (ref 0–40)
GLOBULIN SER CALC-MCNC: 2.5 G/DL (ref 1.5–4.5)
GLUCOSE SERPL-MCNC: 88 MG/DL (ref 65–99)
HCT VFR BLD AUTO: 29.7 % (ref 34–46.6)
HGB BLD-MCNC: 9.6 G/DL (ref 11.1–15.9)
IMM GRANULOCYTES # BLD: 0 X10E3/UL (ref 0–0.1)
IMM GRANULOCYTES NFR BLD: 0 %
LYMPHOCYTES # BLD AUTO: 1.7 X10E3/UL (ref 0.7–3.1)
LYMPHOCYTES NFR BLD AUTO: 25 %
MCH RBC QN AUTO: 23.1 PG (ref 26.6–33)
MCHC RBC AUTO-ENTMCNC: 32.3 G/DL (ref 31.5–35.7)
MCV RBC AUTO: 72 FL (ref 79–97)
MONOCYTES # BLD AUTO: 0.5 X10E3/UL (ref 0.1–0.9)
MONOCYTES NFR BLD AUTO: 7 %
NEUTROPHILS # BLD AUTO: 4.7 X10E3/UL (ref 1.4–7)
NEUTROPHILS NFR BLD AUTO: 66 %
PLATELET # BLD AUTO: 204 X10E3/UL (ref 150–379)
POTASSIUM SERPL-SCNC: 4 MMOL/L (ref 3.5–5.2)
PROT SERPL-MCNC: 6.7 G/DL (ref 6–8.5)
RBC # BLD AUTO: 4.15 X10E6/UL (ref 3.77–5.28)
SODIUM SERPL-SCNC: 145 MMOL/L (ref 134–144)
WBC # BLD AUTO: 7 X10E3/UL (ref 3.4–10.8)

## 2017-12-22 NOTE — PROGRESS NOTES
The results were reviewed and a letter was sent. Anemia from chronic inflammation. Elevated inflammatory markers (CRP, ESR). Stable chronic kidney disease. Patient instructed to take leflunomide 20 mg daily rather than every 7 days.

## 2018-04-01 ENCOUNTER — HOSPITAL ENCOUNTER (EMERGENCY)
Age: 73
Discharge: HOME | End: 2018-04-01
Attending: EMERGENCY MEDICINE
Payer: MEDICARE

## 2018-04-01 ENCOUNTER — APPOINTMENT (OUTPATIENT)
Dept: GENERAL RADIOLOGY | Age: 73
End: 2018-04-01
Attending: EMERGENCY MEDICINE
Payer: MEDICARE

## 2018-04-01 VITALS
BODY MASS INDEX: 24.19 KG/M2 | DIASTOLIC BLOOD PRESSURE: 129 MMHG | TEMPERATURE: 97.4 F | SYSTOLIC BLOOD PRESSURE: 174 MMHG | HEIGHT: 67 IN | WEIGHT: 154.1 LBS | RESPIRATION RATE: 19 BRPM | HEART RATE: 85 BPM | OXYGEN SATURATION: 100 %

## 2018-04-01 DIAGNOSIS — R10.13 EPIGASTRIC DISCOMFORT: Primary | ICD-10-CM

## 2018-04-01 DIAGNOSIS — R12 HEARTBURN SYMPTOM: ICD-10-CM

## 2018-04-01 LAB
ALBUMIN SERPL-MCNC: 3.3 G/DL (ref 3.5–5)
ALBUMIN/GLOB SERPL: 1 {RATIO} (ref 1.1–2.2)
ALP SERPL-CCNC: 144 U/L (ref 45–117)
ALT SERPL-CCNC: 35 U/L (ref 12–78)
ANION GAP SERPL CALC-SCNC: 11 MMOL/L (ref 5–15)
AST SERPL-CCNC: 25 U/L (ref 15–37)
ATRIAL RATE: 83 BPM
BASOPHILS # BLD: 0 K/UL (ref 0–0.1)
BASOPHILS NFR BLD: 1 % (ref 0–1)
BILIRUB SERPL-MCNC: 0.7 MG/DL (ref 0.2–1)
BUN SERPL-MCNC: 20 MG/DL (ref 6–20)
BUN/CREAT SERPL: 15 (ref 12–20)
CALCIUM SERPL-MCNC: 8.6 MG/DL (ref 8.5–10.1)
CALCULATED P AXIS, ECG09: 38 DEGREES
CALCULATED R AXIS, ECG10: -8 DEGREES
CALCULATED T AXIS, ECG11: 7 DEGREES
CHLORIDE SERPL-SCNC: 111 MMOL/L (ref 97–108)
CO2 SERPL-SCNC: 22 MMOL/L (ref 21–32)
CREAT SERPL-MCNC: 1.35 MG/DL (ref 0.55–1.02)
DIAGNOSIS, 93000: NORMAL
DIFFERENTIAL METHOD BLD: ABNORMAL
EOSINOPHIL # BLD: 0.1 K/UL (ref 0–0.4)
EOSINOPHIL NFR BLD: 1 % (ref 0–7)
ERYTHROCYTE [DISTWIDTH] IN BLOOD BY AUTOMATED COUNT: 18.6 % (ref 11.5–14.5)
GLOBULIN SER CALC-MCNC: 3.3 G/DL (ref 2–4)
GLUCOSE SERPL-MCNC: 108 MG/DL (ref 65–100)
HCT VFR BLD AUTO: 29.5 % (ref 35–47)
HGB BLD-MCNC: 9.3 G/DL (ref 11.5–16)
IMM GRANULOCYTES # BLD: 0 K/UL (ref 0–0.04)
IMM GRANULOCYTES NFR BLD AUTO: 0 % (ref 0–0.5)
LIPASE SERPL-CCNC: 53 U/L (ref 73–393)
LYMPHOCYTES # BLD: 1.4 K/UL (ref 0.8–3.5)
LYMPHOCYTES NFR BLD: 24 % (ref 12–49)
MCH RBC QN AUTO: 23.1 PG (ref 26–34)
MCHC RBC AUTO-ENTMCNC: 31.5 G/DL (ref 30–36.5)
MCV RBC AUTO: 73.4 FL (ref 80–99)
MONOCYTES # BLD: 0.3 K/UL (ref 0–1)
MONOCYTES NFR BLD: 6 % (ref 5–13)
NEUTS SEG # BLD: 4 K/UL (ref 1.8–8)
NEUTS SEG NFR BLD: 68 % (ref 32–75)
NRBC # BLD: 0 K/UL (ref 0–0.01)
NRBC BLD-RTO: 0 PER 100 WBC
P-R INTERVAL, ECG05: 164 MS
PLATELET # BLD AUTO: 210 K/UL (ref 150–400)
PMV BLD AUTO: 11.6 FL (ref 8.9–12.9)
POTASSIUM SERPL-SCNC: 3.8 MMOL/L (ref 3.5–5.1)
PROT SERPL-MCNC: 6.6 G/DL (ref 6.4–8.2)
Q-T INTERVAL, ECG07: 428 MS
QRS DURATION, ECG06: 90 MS
QTC CALCULATION (BEZET), ECG08: 502 MS
RBC # BLD AUTO: 4.02 M/UL (ref 3.8–5.2)
SODIUM SERPL-SCNC: 144 MMOL/L (ref 136–145)
TROPONIN I BLD-MCNC: 0.04 NG/ML (ref 0–0.08)
TROPONIN I SERPL-MCNC: 0.05 NG/ML
VENTRICULAR RATE, ECG03: 83 BPM
WBC # BLD AUTO: 5.9 K/UL (ref 3.6–11)

## 2018-04-01 PROCEDURE — 99285 EMERGENCY DEPT VISIT HI MDM: CPT

## 2018-04-01 PROCEDURE — 36415 COLL VENOUS BLD VENIPUNCTURE: CPT | Performed by: EMERGENCY MEDICINE

## 2018-04-01 PROCEDURE — 85025 COMPLETE CBC W/AUTO DIFF WBC: CPT | Performed by: EMERGENCY MEDICINE

## 2018-04-01 PROCEDURE — 83690 ASSAY OF LIPASE: CPT | Performed by: EMERGENCY MEDICINE

## 2018-04-01 PROCEDURE — 71046 X-RAY EXAM CHEST 2 VIEWS: CPT

## 2018-04-01 PROCEDURE — 74011250637 HC RX REV CODE- 250/637: Performed by: EMERGENCY MEDICINE

## 2018-04-01 PROCEDURE — 80053 COMPREHEN METABOLIC PANEL: CPT | Performed by: EMERGENCY MEDICINE

## 2018-04-01 PROCEDURE — 84484 ASSAY OF TROPONIN QUANT: CPT | Performed by: EMERGENCY MEDICINE

## 2018-04-01 PROCEDURE — 93005 ELECTROCARDIOGRAM TRACING: CPT

## 2018-04-01 PROCEDURE — 74011000250 HC RX REV CODE- 250: Performed by: EMERGENCY MEDICINE

## 2018-04-01 RX ADMIN — LIDOCAINE HYDROCHLORIDE 40 ML: 20 SOLUTION ORAL; TOPICAL at 06:55

## 2018-04-01 NOTE — ED PROVIDER NOTES
EMERGENCY DEPARTMENT HISTORY AND PHYSICAL EXAM      Date: 4/1/2018  Patient Name: Latanya Gonzales    History of Presenting Illness     Chief Complaint   Patient presents with    Shortness of Breath     pt reports to ed complaining of shortness of breath and \"sick feeling\"        History Provided By: Patient    HPI: Latanya Gonzales, 71 y.o. female with PMHx significant for CAD, HTN, RA, GERD, anemia HLD, DM, CHF, MI, renal cell carcinoma, s/p [employer] pacemaker, presents ambulatory to the ED with cc of waxing and waning shortness of breath, rated mild to moderate in severity, for the last few weeks. Patient states that her most recent episode began yesterday and notes that her cc is briefly relieved with taking PO. Of note, she has seen her PCP for this problem, was d/w acid reflux, prescribed antacids but discontinued the medication on her own accord. She also c/o occasional nonproductive cough and states she has \"a sickness feeling\" in her epigastric area. She denies a h/o similar sxs prior to a few weeks ago. She denies tobacco use. PCP: Ruthy Melgar NP   Cardiology: [facility]     There are no other complaints, changes, or physical findings at this time. Current Outpatient Prescriptions   Medication Sig Dispense Refill    ergocalciferol (ERGOCALCIFEROL) 50,000 unit capsule Take 1 Cap by mouth every seven (7) days. 12 Cap 3    aspirin 81 mg chewable tablet Take 1 Tab by mouth daily. 30 Tab 0    atorvastatin (LIPITOR) 20 mg tablet Take 1 Tab by mouth nightly. 30 Tab 0    bumetanide (BUMEX) 0.5 mg tablet Take 1 Tab by mouth daily. 30 Tab 0    carvedilol (COREG) 6.25 mg tablet Take 1 Tab by mouth two (2) times daily (with meals). 60 Tab 0    isosorbide mononitrate ER (IMDUR) 30 mg tablet Take 1 Tab by mouth daily. 30 Tab 0    hydrALAZINE (APRESOLINE) 10 mg tablet Take 1 Tab by mouth three (3) times daily. (Patient taking differently: Take 25 mg by mouth three (3) times daily.  dose increased from 10mg 3x/ day to 25mg 3x/day (bottle viewed)) 90 Tab 0    gabapentin (NEURONTIN) 300 mg capsule Take 300 mg by mouth three (3) times daily.  loperamide (IMODIUM) 1 mg/5 mL solution Take 5 mL by mouth four (4) times daily as needed for Diarrhea. 60 mL 0    omeprazole (PRILOSEC) 20 mg capsule Take 20 mg by mouth daily. Indications: GASTROESOPHAGEAL REFLUX         Past History     Past Medical History:  Past Medical History:   Diagnosis Date    Arthritis     Autoimmune disease ([address]     rheumatoid     CAD (coronary artery disease)     [facility]     Chronic pain     neuropathy bilateral feet,knees    Diabetes [facility]     GERD (gastroesophageal reflux disease)     Hypertension     Ill-defined condition     anemia    Ill-defined condition     blood transfusion hx    MI, old     Other ill-defined conditions(799.89)     high cholesterol    Renal cell carcinoma of right kidney ([facility]     s/p resection 12/16       Past Surgical History:  Past Surgical History:   Procedure Laterality Date    CARDIAC SURG PROCEDURE UNLIST      three stents placed 2005    CARDIAC SURG PROCEDURE UNLIST      HX CHOLECYSTECTOMY  02/28/2017    lap tana with IOC.  HX PACEMAKER  2017/January    ICD    HX TONSILLECTOMY      HX UROLOGICAL  12/22/2016    RIGHT LAPOROSCOPIC HAND ASSISTED RADICAL NEPHRECTOMY       Family History:  Family History   Problem Relation Age of Onset   [employer] Mother      stomach    Other Father      aneurysym   [employer] Brother      lung    Other Brother      stomach problems    Stroke Brother        Social History:  Social History   Substance Use Topics    Smoking status: Never Smoker    Smokeless tobacco: Never Used    Alcohol use No       Allergies:   Allergies   Allergen Reactions    Percocet [Oxycodone-Acetaminophen] Other (comments)     Confused         Review of Systems   Review of Systems   Constitutional:        +malaise   Respiratory: Positive for cough and shortness of breath. All other systems reviewed and are negative. Physical Exam   Physical Exam  Vital signs and nursing notes reviewed    CONSTITUTIONAL: Alert, in mild distress; well-developed; well-nourished. HEAD:  Normocephalic, atraumatic  EYES: PERRL; EOM's intact. ENTM: Nose: no rhinorrhea; Throat: no erythema or exudate, mucous membranes moist  Neck:  Supple. trachea is midline. No JVD. RESP: Chest clear, equal breath sounds. - W/R/R. Speaking in full sentences. CV: S1 and S2 WNL; No murmurs, gallops or rubs. 2+ radial and DP pulses bilaterally. No peripheral edema. GI: non-distended, normal bowel sounds, abdomen soft and non-tender. No masses or organomegaly. : No costo-vertebral angle tenderness. BACK:  Non-tender, normal appearance  UPPER EXT:  Normal inspection. no joint or soft tissue swelling  LOWER EXT: No edema, no calf tenderness. NEURO: Alert and oriented x3, 5/5 strength and light touch sensation intact in bilateral upper and lower extremities. SKIN: No rashes;  Warm and dry  PSYCH: Normal mood, normal affect  Written by Lizz Bustamante ED Scribe, as dictated by Lieutenant Juanito MD.      Diagnostic Study Results     Labs -     Recent Results (from the past 12 hour(s))   EKG, 12 LEAD, INITIAL    Collection Time: 04/01/18  6:08 AM   Result Value Ref Range    Ventricular Rate 83 BPM    Atrial Rate 83 BPM    P-R Interval 164 ms    QRS Duration 90 ms    Q-T Interval 428 ms    QTC Calculation (Bezet) 502 ms    Calculated P Axis 38 degrees    Calculated R Axis -8 degrees    Calculated T Axis 7 degrees    Diagnosis       Normal sinus rhythm  Possible Left atrial enlargement  Left ventricular hypertrophy  T wave abnormality, consider anterior ischemia  Prolonged QT  When compared with ECG of 06-JUN-2017 19:42,  T wave inversion now evident in Inferior leads  T wave inversion no longer evident in Lateral leads     CBC WITH AUTOMATED DIFF    Collection Time: 04/01/18  6:58 AM   Result Value Ref Range    WBC 5.9 3.6 - 11.0 K/uL    RBC 4.02 3.80 - 5.20 M/uL    HGB 9.3 (L) 11.5 - 16.0 g/dL    HCT 29.5 (L) 35.0 - 47.0 %    MCV 73.4 (L) 80.0 - 99.0 FL    MCH 23.1 (L) 26.0 - 34.0 PG    MCHC 31.5 30.0 - 36.5 g/dL    RDW 18.6 (H) 11.5 - 14.5 %    PLATELET 557 840 - 780 K/uL    MPV 11.6 8.9 - 12.9 FL    NRBC 0.0 0  WBC    ABSOLUTE NRBC 0.00 0.00 - 0.01 K/uL    NEUTROPHILS 68 32 - 75 %    LYMPHOCYTES 24 12 - 49 %    MONOCYTES 6 5 - 13 %    EOSINOPHILS 1 0 - 7 %    BASOPHILS 1 0 - 1 %    IMMATURE GRANULOCYTES 0 0.0 - 0.5 %    ABS. NEUTROPHILS 4.0 1.8 - 8.0 K/UL    ABS. LYMPHOCYTES 1.4 0.8 - 3.5 K/UL    ABS. MONOCYTES 0.3 0.0 - 1.0 K/UL    ABS. EOSINOPHILS 0.1 0.0 - 0.4 K/UL    ABS. BASOPHILS 0.0 0.0 - 0.1 K/UL    ABS. IMM. GRANS. 0.0 0.00 - 0.04 K/UL    DF AUTOMATED     METABOLIC PANEL, COMPREHENSIVE    Collection Time: 04/01/18  6:58 AM   Result Value Ref Range    Sodium 144 136 - 145 mmol/L    Potassium 3.8 3.5 - 5.1 mmol/L    Chloride 111 (H) 97 - 108 mmol/L    CO2 22 21 - 32 mmol/L    Anion gap 11 5 - 15 mmol/L    Glucose 108 (H) 65 - 100 mg/dL    BUN 20 6 - 20 MG/DL    Creatinine 1.35 (H) 0.55 - 1.02 MG/DL    BUN/Creatinine ratio 15 12 - 20      GFR est AA 47 (L) >60 ml/min/1.73m2    GFR est non-AA 39 (L) >60 ml/min/1.73m2    Calcium 8.6 8.5 - 10.1 MG/DL    Bilirubin, total 0.7 0.2 - 1.0 MG/DL    ALT (SGPT) 35 12 - 78 U/L    AST (SGOT) 25 15 - 37 U/L    Alk.  phosphatase 144 (H) 45 - 117 U/L    Protein, total 6.6 6.4 - 8.2 g/dL    Albumin 3.3 (L) 3.5 - 5.0 g/dL    Globulin 3.3 2.0 - 4.0 g/dL    A-G Ratio 1.0 (L) 1.1 - 2.2     LIPASE    Collection Time: 04/01/18  6:58 AM   Result Value Ref Range    Lipase 53 (L) 73 - 393 U/L   TROPONIN I    Collection Time: 04/01/18  6:58 AM   Result Value Ref Range    Troponin-I, Qt. 0.05 (H) <0.05 ng/mL   EKG, 12 LEAD, SUBSEQUENT    Collection Time: 04/01/18 10:23 AM   Result Value Ref Range    Ventricular Rate 84 BPM    Atrial Rate 84 BPM    P-R Interval 156 ms QRS Duration 90 ms    Q-T Interval 410 ms    QTC Calculation (Bezet) 484 ms    Calculated P Axis 62 degrees    Calculated R Axis 9 degrees    Calculated T Axis 47 degrees    Diagnosis       Normal sinus rhythm  Possible Left atrial enlargement  Left ventricular hypertrophy with repolarization abnormality  Abnormal ECG  When compared with ECG of 01-APR-2018 06:08,  MANUAL COMPARISON REQUIRED, DATA IS UNCONFIRMED     POC TROPONIN-I    Collection Time: 04/01/18 10:45 AM   Result Value Ref Range    Troponin-I (POC) 0.04 0.00 - 0.08 ng/mL       Radiologic Studies -   XR CHEST PA LAT   Final Result        CT Results  (Last 48 hours)    None        CXR Results  (Last 48 hours)               04/01/18 0759  XR CHEST PA LAT Final result    Impression:  IMPRESSION: There is moderate cardiomegaly which is stable. Lungs are clear of   an acute process. Narrative:  EXAM:  XR CHEST PA LAT       INDICATION:   acute midline chest discomfort       COMPARISON: 2017. FINDINGS: PA and lateral radiographs of the chest demonstrate pacer leads are   intact the lungs are clear allowing for technique. . There is moderate   cardiomegaly. Aorta is mildly ectatic and unchanged. There is mild wedge   deformity L1 which is unchanged. Medical Decision Making   I am the first provider for this patient. I reviewed the vital signs, available nursing notes, past medical history, past surgical history, family history and social history. Vital Signs-Reviewed the patient's vital signs.   Patient Vitals for the past 12 hrs:   Temp Pulse Resp BP SpO2   04/01/18 0900 - 89 - - 99 %   04/01/18 0845 - 90 - - 100 %   04/01/18 0830 - 85 - - 100 %   04/01/18 0815 - 83 - - 100 %   04/01/18 0745 - 80 19 (!) 174/129 100 %   04/01/18 0730 - 76 16 (!) 163/111 100 %   04/01/18 0715 - 79 17 (!) 176/126 100 %   04/01/18 0700 - 85 19 (!) 178/120 100 %   04/01/18 0603 97.4 °F (36.3 °C) 86 16 (!) 162/123 100 % Pulse Oximetry Analysis - 100% on RA    Cardiac Monitor:   Rate: 85 bpm  Rhythm: Normal Sinus Rhythm      EKG interpretation: (Preliminary) 0608  SR at 83 bpm. nml axis. nml intervals. Old twi in V2 compared to 6/6/2017 ekg. t wave flattening II, III, aVF. No acute ST changes. Written by Domi Crum, ED Scribe, as dictated by Pelon Duff MD.       EKG interpretation: (subsequent) 1023   SR at 84 nml axis. Nml intervals. Twi V2-V4. No acute st changes. Records Reviewed: Nursing Notes, Old Medical Records and Previous electrocardiograms    Provider Notes (Medical Decision Making):   70 y/o F with feeling of hunger in her chest with mild hypertension on todays evaluation. Stable anemia with no s/xs of bleeding today. plan for xr to R/O pleural effusion. Pts sxs sound very much like an acid reflux. Very low suspicion for cardiac ischemia, PE, aortic dissection or other acute intra-thoracic emergency. ED Course:   Initial assessment performed. The patients presenting problems have been discussed, and they are in agreement with the care plan formulated and outlined with them. I have encouraged them to ask questions as they arise throughout their visit.    8:03 AM  Appears comfortable, given ginger ale. Updated on available results and awaiting CXR and pacemaker interrogation report. 8:10 AM  Pt updated on need for second Tn in two hours and results of initial Tn.     8:43 AM  Nurse attempting to troubleshoot [facility] interrogation device. Spoke with pacemaker representative regarding device. Nurse will make second attempt to interrogate. 9:06 AM  Toyin Stephenson, pacemaker representative, to come in to interrogate device as remote interrogator malfunctioning. 9:43 AM  Pacemaker representative present in ER to interrogate device. 9:54 AM   No episodes of VTach seen on interrogation report.  yestserday 3 brief episodes     Consult Note:  9:55 AM  Pelon Duff MD spoke with Aidan Balderas,  Specialty: [employer] representative   Discussed pt's hx, disposition, and available diagnostic and imaging results. Reviewed care plans. Consultant agrees with plans as outlined. No events today. No VTach or VFib. Three episodes yesterday ATAF 150s-160s all less than 4 seconds. No high V rates (80s). Two episodes on 3/21. Aidan Balderas states he's requested a new remote interrogation device for [id number] ER. Written by Neris Holden ED Scribe, as dictated by Cameron Samayoa MD.     Consult Note:  11:17 AM  Cameron Samayoa MD spoke with Pratima Carter MD,  Specialty: cardiology  Discussed pt's hx, disposition, and available diagnostic and imaging results. Reviewed care plans. Consultant agrees with plans as outlined. Dr. Davin Oshea agrees with plan for discharge. Written by Neris Holden ED Scribe, as dictated by Cameron Samayoa MD.     11:21 AM  Pt tolerating PO without difficulty. Critical Care Time: none     Disposition:  Discharged    Discharge Note:  11:12 AM  The pt is ready for discharge. The pt's signs, symptoms, diagnosis, and discharge instructions have been discussed and pt, family, guardian and/or caretaker has conveyed their understanding. The pt is to follow up as recommended or return to ER should their symptoms worsen. Plan has been discussed and pt, family, guardian and/or caretaker is in agreement. PLAN:  1. Current Discharge Medication List        2. Follow-up Information     Follow up With Details Comments Contact Info    [facility] [place] [facility]  If symptoms worsen including new chest pain, difficulty breathing or other new concerning symptoms. [address], DO In 1 week Please call Dr. Clare Gillis for a follow-up appointment in the next week in his office with him or a colleague. [address]  [address]  [facility]  [phone]          Return to ED if worse     Diagnosis     Clinical Impression:   1. Epigastric discomfort    2. Heartburn symptom        Attestations: This note is prepared by Hubert Spear, acting as Scribe for Annabella Harrell MD.       The scribe's documentation has been prepared under my direction and personally reviewed by me in its entirety. I confirm that the note above accurately reflects all work, treatment, procedures, and medical decision making performed by me.

## 2018-04-01 NOTE — ED NOTES
Geri Syed MD reviewed discharge instructions with the patient. The patient verbalized understanding. Ambulatory on discharge.

## 2018-04-01 NOTE — ED NOTES
Attempted to Interrogate Pace-maker complications with device. Notified Eloy who is the rep at [phone]. He will come in to evaluate the pt and device.

## 2018-04-01 NOTE — DISCHARGE INSTRUCTIONS

## 2018-04-02 LAB
ATRIAL RATE: 84 BPM
CALCULATED P AXIS, ECG09: 62 DEGREES
CALCULATED R AXIS, ECG10: 9 DEGREES
CALCULATED T AXIS, ECG11: 47 DEGREES
DIAGNOSIS, 93000: NORMAL
P-R INTERVAL, ECG05: 156 MS
Q-T INTERVAL, ECG07: 410 MS
QRS DURATION, ECG06: 90 MS
QTC CALCULATION (BEZET), ECG08: 484 MS
VENTRICULAR RATE, ECG03: 84 BPM

## 2018-04-06 ENCOUNTER — APPOINTMENT (OUTPATIENT)
Dept: GENERAL RADIOLOGY | Age: 73
DRG: 291 | End: 2018-04-06
Attending: EMERGENCY MEDICINE
Payer: MEDICARE

## 2018-04-06 ENCOUNTER — HOSPITAL ENCOUNTER (INPATIENT)
Age: 73
LOS: 4 days | Discharge: HOME | DRG: 291 | End: 2018-04-10
Attending: EMERGENCY MEDICINE | Admitting: INTERNAL MEDICINE
Payer: MEDICARE

## 2018-04-06 DIAGNOSIS — R77.8 ELEVATED TROPONIN I LEVEL: ICD-10-CM

## 2018-04-06 DIAGNOSIS — R07.9 CHEST PAIN, UNSPECIFIED TYPE: Primary | ICD-10-CM

## 2018-04-06 DIAGNOSIS — I10 ACCELERATED HYPERTENSION: ICD-10-CM

## 2018-04-06 DIAGNOSIS — R82.71 BACTERIA IN URINE: ICD-10-CM

## 2018-04-06 PROBLEM — I50.21 SYSTOLIC CHF, ACUTE (HCC): Status: ACTIVE | Noted: 2018-04-06

## 2018-04-06 LAB
ALBUMIN SERPL-MCNC: 3.4 G/DL (ref 3.5–5)
ALBUMIN/GLOB SERPL: 1.3 {RATIO} (ref 1.1–2.2)
ALP SERPL-CCNC: 135 U/L (ref 45–117)
ALT SERPL-CCNC: 25 U/L (ref 12–78)
ANION GAP SERPL CALC-SCNC: 11 MMOL/L (ref 5–15)
APPEARANCE UR: ABNORMAL
AST SERPL-CCNC: 12 U/L (ref 15–37)
ATRIAL RATE: 94 BPM
BACTERIA URNS QL MICRO: ABNORMAL /HPF
BASOPHILS # BLD: 0 K/UL (ref 0–0.1)
BASOPHILS NFR BLD: 0 % (ref 0–1)
BILIRUB SERPL-MCNC: 1.2 MG/DL (ref 0.2–1)
BILIRUB UR QL CFM: NEGATIVE
BNP SERPL-MCNC: ABNORMAL PG/ML (ref 0–125)
BUN SERPL-MCNC: 25 MG/DL (ref 6–20)
BUN/CREAT SERPL: 17 (ref 12–20)
CALCIUM SERPL-MCNC: 8.2 MG/DL (ref 8.5–10.1)
CALCULATED P AXIS, ECG09: 41 DEGREES
CALCULATED R AXIS, ECG10: -8 DEGREES
CALCULATED T AXIS, ECG11: 0 DEGREES
CHLORIDE SERPL-SCNC: 110 MMOL/L (ref 97–108)
CK SERPL-CCNC: 50 U/L (ref 26–192)
CO2 SERPL-SCNC: 21 MMOL/L (ref 21–32)
COLOR UR: ABNORMAL
CREAT SERPL-MCNC: 1.5 MG/DL (ref 0.55–1.02)
DIAGNOSIS, 93000: NORMAL
DIFFERENTIAL METHOD BLD: ABNORMAL
EOSINOPHIL # BLD: 0.1 K/UL (ref 0–0.4)
EOSINOPHIL NFR BLD: 1 % (ref 0–7)
EPITH CASTS URNS QL MICRO: ABNORMAL /LPF
ERYTHROCYTE [DISTWIDTH] IN BLOOD BY AUTOMATED COUNT: 19.9 % (ref 11.5–14.5)
GLOBULIN SER CALC-MCNC: 2.7 G/DL (ref 2–4)
GLUCOSE SERPL-MCNC: 107 MG/DL (ref 65–100)
GLUCOSE UR STRIP.AUTO-MCNC: NEGATIVE MG/DL
HCT VFR BLD AUTO: 28.2 % (ref 35–47)
HGB BLD-MCNC: 9.2 G/DL (ref 11.5–16)
HGB UR QL STRIP: ABNORMAL
IMM GRANULOCYTES # BLD: 0 K/UL (ref 0–0.04)
IMM GRANULOCYTES NFR BLD AUTO: 0 % (ref 0–0.5)
INR PPP: 1.3 (ref 0.9–1.1)
KETONES UR QL STRIP.AUTO: NEGATIVE MG/DL
LEUKOCYTE ESTERASE UR QL STRIP.AUTO: ABNORMAL
LIPASE SERPL-CCNC: 69 U/L (ref 73–393)
LYMPHOCYTES # BLD: 1.6 K/UL (ref 0.8–3.5)
LYMPHOCYTES NFR BLD: 18 % (ref 12–49)
MCH RBC QN AUTO: 23.8 PG (ref 26–34)
MCHC RBC AUTO-ENTMCNC: 32.6 G/DL (ref 30–36.5)
MCV RBC AUTO: 72.9 FL (ref 80–99)
MONOCYTES # BLD: 0.5 K/UL (ref 0–1)
MONOCYTES NFR BLD: 6 % (ref 5–13)
MUCOUS THREADS URNS QL MICRO: ABNORMAL /LPF
NEUTS SEG # BLD: 6.6 K/UL (ref 1.8–8)
NEUTS SEG NFR BLD: 75 % (ref 32–75)
NITRITE UR QL STRIP.AUTO: NEGATIVE
NRBC # BLD: 0.03 K/UL (ref 0–0.01)
NRBC BLD-RTO: 0.3 PER 100 WBC
P-R INTERVAL, ECG05: 146 MS
PH UR STRIP: 5.5 [PH] (ref 5–8)
PLATELET # BLD AUTO: 198 K/UL (ref 150–400)
PMV BLD AUTO: 11.1 FL (ref 8.9–12.9)
POTASSIUM SERPL-SCNC: 3.4 MMOL/L (ref 3.5–5.1)
PROT SERPL-MCNC: 6.1 G/DL (ref 6.4–8.2)
PROT UR STRIP-MCNC: 100 MG/DL
PROTHROMBIN TIME: 13 SEC (ref 9–11.1)
Q-T INTERVAL, ECG07: 410 MS
QRS DURATION, ECG06: 96 MS
QTC CALCULATION (BEZET), ECG08: 512 MS
RBC # BLD AUTO: 3.87 M/UL (ref 3.8–5.2)
RBC #/AREA URNS HPF: ABNORMAL /HPF (ref 0–5)
SODIUM SERPL-SCNC: 142 MMOL/L (ref 136–145)
SP GR UR REFRACTOMETRY: >1.03 (ref 1–1.03)
TROPONIN I BLD-MCNC: 0.07 NG/ML (ref 0–0.08)
TROPONIN I SERPL-MCNC: 0.08 NG/ML
UA: UC IF INDICATED,UAUC: ABNORMAL
UROBILINOGEN UR QL STRIP.AUTO: 1 EU/DL (ref 0.2–1)
VENTRICULAR RATE, ECG03: 94 BPM
WBC # BLD AUTO: 8.9 K/UL (ref 3.6–11)
WBC URNS QL MICRO: ABNORMAL /HPF (ref 0–4)

## 2018-04-06 PROCEDURE — 74011000258 HC RX REV CODE- 258: Performed by: EMERGENCY MEDICINE

## 2018-04-06 PROCEDURE — 80053 COMPREHEN METABOLIC PANEL: CPT | Performed by: EMERGENCY MEDICINE

## 2018-04-06 PROCEDURE — 85025 COMPLETE CBC W/AUTO DIFF WBC: CPT | Performed by: EMERGENCY MEDICINE

## 2018-04-06 PROCEDURE — 87086 URINE CULTURE/COLONY COUNT: CPT | Performed by: EMERGENCY MEDICINE

## 2018-04-06 PROCEDURE — 74011250636 HC RX REV CODE- 250/636: Performed by: EMERGENCY MEDICINE

## 2018-04-06 PROCEDURE — 93005 ELECTROCARDIOGRAM TRACING: CPT

## 2018-04-06 PROCEDURE — 96374 THER/PROPH/DIAG INJ IV PUSH: CPT

## 2018-04-06 PROCEDURE — 74011250637 HC RX REV CODE- 250/637: Performed by: INTERNAL MEDICINE

## 2018-04-06 PROCEDURE — 84484 ASSAY OF TROPONIN QUANT: CPT | Performed by: EMERGENCY MEDICINE

## 2018-04-06 PROCEDURE — 71045 X-RAY EXAM CHEST 1 VIEW: CPT

## 2018-04-06 PROCEDURE — 81001 URINALYSIS AUTO W/SCOPE: CPT | Performed by: EMERGENCY MEDICINE

## 2018-04-06 PROCEDURE — 85610 PROTHROMBIN TIME: CPT | Performed by: EMERGENCY MEDICINE

## 2018-04-06 PROCEDURE — 83880 ASSAY OF NATRIURETIC PEPTIDE: CPT | Performed by: EMERGENCY MEDICINE

## 2018-04-06 PROCEDURE — 94762 N-INVAS EAR/PLS OXIMTRY CONT: CPT

## 2018-04-06 PROCEDURE — 83690 ASSAY OF LIPASE: CPT | Performed by: EMERGENCY MEDICINE

## 2018-04-06 PROCEDURE — 65660000000 HC RM CCU STEPDOWN

## 2018-04-06 PROCEDURE — 82550 ASSAY OF CK (CPK): CPT | Performed by: EMERGENCY MEDICINE

## 2018-04-06 PROCEDURE — 99285 EMERGENCY DEPT VISIT HI MDM: CPT

## 2018-04-06 PROCEDURE — 36415 COLL VENOUS BLD VENIPUNCTURE: CPT | Performed by: EMERGENCY MEDICINE

## 2018-04-06 PROCEDURE — 74011250637 HC RX REV CODE- 250/637: Performed by: EMERGENCY MEDICINE

## 2018-04-06 RX ORDER — NITROGLYCERIN 0.4 MG/1
0.4 TABLET SUBLINGUAL
Status: COMPLETED | OUTPATIENT
Start: 2018-04-06 | End: 2018-04-06

## 2018-04-06 RX ORDER — PANTOPRAZOLE SODIUM 40 MG/1
40 TABLET, DELAYED RELEASE ORAL
Status: DISCONTINUED | OUTPATIENT
Start: 2018-04-07 | End: 2018-04-10 | Stop reason: HOSPADM

## 2018-04-06 RX ORDER — HYDRALAZINE HYDROCHLORIDE 25 MG/1
25 TABLET, FILM COATED ORAL 3 TIMES DAILY
Status: DISCONTINUED | OUTPATIENT
Start: 2018-04-06 | End: 2018-04-07

## 2018-04-06 RX ORDER — ATORVASTATIN CALCIUM 20 MG/1
20 TABLET, FILM COATED ORAL
Status: DISCONTINUED | OUTPATIENT
Start: 2018-04-06 | End: 2018-04-10 | Stop reason: HOSPADM

## 2018-04-06 RX ORDER — GABAPENTIN 300 MG/1
300 CAPSULE ORAL 3 TIMES DAILY
Status: DISCONTINUED | OUTPATIENT
Start: 2018-04-06 | End: 2018-04-10 | Stop reason: HOSPADM

## 2018-04-06 RX ORDER — SODIUM CHLORIDE 0.9 % (FLUSH) 0.9 %
5-10 SYRINGE (ML) INJECTION EVERY 8 HOURS
Status: DISCONTINUED | OUTPATIENT
Start: 2018-04-06 | End: 2018-04-10 | Stop reason: HOSPADM

## 2018-04-06 RX ORDER — ISOSORBIDE MONONITRATE 30 MG/1
30 TABLET, EXTENDED RELEASE ORAL DAILY
Status: DISCONTINUED | OUTPATIENT
Start: 2018-04-07 | End: 2018-04-10 | Stop reason: HOSPADM

## 2018-04-06 RX ORDER — SODIUM CHLORIDE 0.9 % (FLUSH) 0.9 %
5-10 SYRINGE (ML) INJECTION AS NEEDED
Status: DISCONTINUED | OUTPATIENT
Start: 2018-04-06 | End: 2018-04-10 | Stop reason: HOSPADM

## 2018-04-06 RX ORDER — CARVEDILOL 6.25 MG/1
6.25 TABLET ORAL 2 TIMES DAILY WITH MEALS
Status: DISCONTINUED | OUTPATIENT
Start: 2018-04-07 | End: 2018-04-07

## 2018-04-06 RX ORDER — AMLODIPINE BESYLATE 5 MG/1
5 TABLET ORAL DAILY
Status: DISCONTINUED | OUTPATIENT
Start: 2018-04-06 | End: 2018-04-10 | Stop reason: HOSPADM

## 2018-04-06 RX ORDER — BUMETANIDE 1 MG/1
1 TABLET ORAL DAILY
Status: DISCONTINUED | OUTPATIENT
Start: 2018-04-07 | End: 2018-04-10 | Stop reason: HOSPADM

## 2018-04-06 RX ORDER — ENOXAPARIN SODIUM 100 MG/ML
40 INJECTION SUBCUTANEOUS EVERY 24 HOURS
Status: DISCONTINUED | OUTPATIENT
Start: 2018-04-07 | End: 2018-04-08

## 2018-04-06 RX ORDER — FUROSEMIDE 10 MG/ML
40 INJECTION INTRAMUSCULAR; INTRAVENOUS
Status: COMPLETED | OUTPATIENT
Start: 2018-04-06 | End: 2018-04-06

## 2018-04-06 RX ORDER — HYDRALAZINE HYDROCHLORIDE 50 MG/1
50 TABLET, FILM COATED ORAL 3 TIMES DAILY
Status: DISCONTINUED | OUTPATIENT
Start: 2018-04-06 | End: 2018-04-08

## 2018-04-06 RX ORDER — LORAZEPAM 1 MG/1
1 TABLET ORAL
Status: DISCONTINUED | OUTPATIENT
Start: 2018-04-06 | End: 2018-04-10 | Stop reason: HOSPADM

## 2018-04-06 RX ORDER — AMLODIPINE BESYLATE 5 MG/1
5 TABLET ORAL ONCE
Status: COMPLETED | OUTPATIENT
Start: 2018-04-06 | End: 2018-04-06

## 2018-04-06 RX ORDER — GUAIFENESIN 100 MG/5ML
81 LIQUID (ML) ORAL DAILY
Status: DISCONTINUED | OUTPATIENT
Start: 2018-04-07 | End: 2018-04-10 | Stop reason: HOSPADM

## 2018-04-06 RX ADMIN — AMLODIPINE BESYLATE 5 MG: 5 TABLET ORAL at 20:58

## 2018-04-06 RX ADMIN — ATORVASTATIN CALCIUM 20 MG: 20 TABLET, FILM COATED ORAL at 23:26

## 2018-04-06 RX ADMIN — HYDRALAZINE HYDROCHLORIDE 50 MG: 50 TABLET, FILM COATED ORAL at 21:57

## 2018-04-06 RX ADMIN — GABAPENTIN 300 MG: 300 CAPSULE ORAL at 23:26

## 2018-04-06 RX ADMIN — NITROGLYCERIN 0.4 MG: 0.4 TABLET SUBLINGUAL at 17:20

## 2018-04-06 RX ADMIN — NITROGLYCERIN 0.4 MG: 0.4 TABLET SUBLINGUAL at 19:21

## 2018-04-06 RX ADMIN — Medication 10 ML: at 23:27

## 2018-04-06 RX ADMIN — CEFTRIAXONE 1 G: 1 INJECTION, POWDER, FOR SOLUTION INTRAMUSCULAR; INTRAVENOUS at 20:26

## 2018-04-06 RX ADMIN — NITROGLYCERIN 0.4 MG: 0.4 TABLET SUBLINGUAL at 19:00

## 2018-04-06 RX ADMIN — NITROGLYCERIN 1 INCH: 20 OINTMENT TOPICAL at 19:38

## 2018-04-06 RX ADMIN — FUROSEMIDE 40 MG: 10 INJECTION, SOLUTION INTRAMUSCULAR; INTRAVENOUS at 19:38

## 2018-04-06 NOTE — ED PROVIDER NOTES
EMERGENCY DEPARTMENT HISTORY AND PHYSICAL EXAM      Date: 4/6/2018  Patient Name: Elizabeth London    History of Presenting Illness     Chief Complaint   Patient presents with    Chest Pain     History Provided By: Patient    HPI: Elizabeth London, 71 y.o. female with PMHx significant for CAD, HTN, RA, GERD, anemia, CHF and PShx for cardiac stent, ICD, presents via EMS to the ED with cc of gradual onset, moderate, intermittent left sided chest pain with associated SOB and nausea that has been ongoing for several weeks and worsened today. The pain, described as pressure, is located at her ICD site which was placed 1 year ago by Dr. Tana La. The pain will radiate up and down her sternum. She notes SOB, worsened with exertion since onset of the chest pain. In the ED, pt has no pain but is SOB. She was given ASA PTA by EMS with some relief, no NTG. Pt notes episodes of emesis on 4/3-4/4/2018. Pt was [facility] at [facility] ED on 4/1/2018 for similar sxs. She has not taken recent long trips, does not use an inhaler, and has no hx of lung dz. She specifically denies hemoptysis, jaw tightness or arm heaviness. PCP: Briseyda Lopez NP  Cardiology: [facility]     Social Hx: - Tobacco, - EtOH, - Illicit Drugs    There are no other complaints, changes, or physical findings at this time.     Current Facility-Administered Medications   Medication Dose Route Frequency Provider Last Rate Last Dose    [START ON 4/7/2018] aspirin chewable tablet 81 mg  81 mg Oral DAILY Mark Colbert MD        atorvastatin (LIPITOR) tablet 20 mg  20 mg Oral QHS Mark Colbert MD        [START ON 4/7/2018] bumetanide (BUMEX) tablet 1 mg  1 mg Oral DAILY Mark Colbert MD        [START ON 4/7/2018] carvedilol (COREG) tablet 6.25 mg  6.25 mg Oral BID WITH MEALS Mark Colbert MD        gabapentin (NEURONTIN) capsule 300 mg  300 mg Oral TID Mark Colbert MD        hydrALAZINE (APRESOLINE) tablet 25 mg  25 mg Oral TID Jacqueline Nim Dora Agosto MD       [facility] isosorbide mononitrate ER (IMDUR) tablet 30 mg  30 mg Oral DAILY Aram Pereira MD        loperamide (IMODIUM) 1 mg/5 mL oral solution 1 mg  1 mg Oral QID PRN Aram Pereira MD        [START ON 4/7/2018] pantoprazole (PROTONIX) tablet 40 mg  40 mg Oral ACB Aram Pereira MD        amLODIPine (NORVASC) tablet 5 mg  5 mg Oral DAILY Aram Pereira MD        sodium chloride (NS) flush 5-10 mL  5-10 mL IntraVENous Q8H Aram Pereira MD        sodium chloride (NS) flush 5-10 mL  5-10 mL IntraVENous PRN Aram Pereira MD        LORazepam (ATIVAN) tablet 1 mg  1 mg Oral BID PRN Aram Pereira MD        [START ON 4/7/2018] enoxaparin (LOVENOX) injection 40 mg  40 mg SubCUTAneous Q24H Aram Pereira MD        hydrALAZINE (APRESOLINE) tablet 50 mg  50 mg Oral TID Aram Pereira MD   50 mg at 04/06/18 3665     Current Outpatient Prescriptions   Medication Sig Dispense Refill    ergocalciferol (ERGOCALCIFEROL) 50,000 unit capsule Take 1 Cap by mouth every seven (7) days. 12 Cap 3    aspirin 81 mg chewable tablet Take 1 Tab by mouth daily. 30 Tab 0    atorvastatin (LIPITOR) 20 mg tablet Take 1 Tab by mouth nightly. 30 Tab 0    bumetanide (BUMEX) 0.5 mg tablet Take 1 Tab by mouth daily. 30 Tab 0    carvedilol (COREG) 6.25 mg tablet Take 1 Tab by mouth two (2) times daily (with meals). 60 Tab 0    isosorbide mononitrate ER (IMDUR) 30 mg tablet Take 1 Tab by mouth daily. 30 Tab 0    hydrALAZINE (APRESOLINE) 10 mg tablet Take 1 Tab by mouth three (3) times daily. (Patient taking differently: Take 25 mg by mouth three (3) times daily. dose increased from 10mg 3x/ day to 25mg 3x/day (bottle viewed)) 90 Tab 0    loperamide (IMODIUM) 1 mg/5 mL solution Take 5 mL by mouth four (4) times daily as needed for Diarrhea. 60 mL 0    gabapentin (NEURONTIN) 300 mg capsule Take 300 mg by mouth three (3) times daily.       omeprazole (PRILOSEC) 20 mg capsule Take 20 mg by mouth daily. Indications: GASTROESOPHAGEAL REFLUX       Past History     Past Medical History:  Past Medical History:   Diagnosis Date    Arthritis     Autoimmune disease ([facility]     rheumatoid     CAD (coronary artery disease)     CHF (congestive heart failure) (HCC)     Chronic pain     neuropathy bilateral feet,knees    Diabetes ([facility]     GERD (gastroesophageal reflux disease)     Hypertension     Ill-defined condition     anemia    Ill-defined condition     blood transfusion hx    MI, old     Other ill-defined conditions(799.89)     high cholesterol    Renal cell carcinoma of right kidney ([facility]     s/p resection 12/16     Past Surgical History:  Past Surgical History:   Procedure Laterality Date    CARDIAC SURG PROCEDURE UNLIST      three stents placed 2005    CARDIAC SURG PROCEDURE UNLIST      HX CHOLECYSTECTOMY  02/28/2017    lap tana with IOC.  HX PACEMAKER  2017/January    ICD    HX TONSILLECTOMY      HX UROLOGICAL  12/22/2016    RIGHT LAPOROSCOPIC HAND ASSISTED RADICAL NEPHRECTOMY     Family History:  Family History   Problem Relation Age of Onset   [facility] Cancer Mother      stomach    Other Father      aneurysym   [facility] Brother      lung    Other Brother      stomach problems    Stroke Brother      Social History:  Social History   Substance Use Topics    Smoking status: Never Smoker    Smokeless tobacco: Never Used    Alcohol use No     Allergies: Allergies   Allergen Reactions    Percocet [Oxycodone-Acetaminophen] Other (comments)     Confused     Review of Systems   Review of Systems   Constitutional: Negative for chills and fever. HENT: Negative for congestion. Eyes: Negative for visual disturbance. Respiratory: Positive for shortness of breath. Negative for chest tightness. - hemoptysis    Cardiovascular: Positive for chest pain. Negative for leg swelling. Gastrointestinal: Positive for nausea and vomiting.  Negative for abdominal pain and diarrhea. Endocrine: Negative for polyuria. Genitourinary: Negative for dysuria and frequency. Musculoskeletal: Negative for myalgias. - jaw pain  - arm pain   Skin: Negative for color change. Allergic/Immunologic: Negative for immunocompromised state. Neurological: Negative for numbness. Physical Exam   Physical Exam  Nursing note and vitals reviewed.   General appearance:  mild tachypnea & distress   Eyes: PERRL, EOMI, conjunctiva normal, anicteric sclera  HEENT: mucous membranes slightly dry, oropharynx is clear, no JVD  Pulmonary: breath sounds somewhat diminished, no miguel wheezes or crackles, resonant to percussion, mild tachypnea    Cardiac: normal rate and regular rhythm, 2/6 systolic murmur at right sternal border, no gallops, or rubs, 2+DP pulses, 2+ radial pulses, pacemaker to left chest wall, tenderness to palpation to left anterior chest wall/costochondral junction, no erythema, cellulitis, or crepitus to site   Abdomen: soft, nontender, nondistended, bowel sounds present, no CVA tenderness   MSK: trace edema bilaterally   Neuro: Alert, answers questions appropriately  Skin: capillary refill brisk    Diagnostic Study Results     Labs -     Recent Results (from the past 12 hour(s))   EKG, 12 LEAD, INITIAL    Collection Time: 04/06/18  4:42 PM   Result Value Ref Range    Ventricular Rate 94 BPM    Atrial Rate 94 BPM    P-R Interval 146 ms    QRS Duration 96 ms    Q-T Interval 410 ms    QTC Calculation (Bezet) 512 ms    Calculated P Axis 41 degrees    Calculated R Axis -8 degrees    Calculated T Axis 0 degrees    Diagnosis       Normal sinus rhythm  Possible Left atrial enlargement  Left ventricular hypertrophy with repolarization abnormality    When compared with ECG of 01-APR-2018 10:23,  No significant change was found  Confirmed by Abigail Dolan ([id number]) on 4/6/2018 6:13:18 PM     POC TROPONIN-I    Collection Time: 04/06/18  4:56 PM   Result Value Ref Range    Troponin-I (POC) 0.07 0.00 - 0.08 ng/mL   CBC WITH AUTOMATED DIFF    Collection Time: 04/06/18  4:58 PM   Result Value Ref Range    WBC 8.9 3.6 - 11.0 K/uL    RBC 3.87 3.80 - 5.20 M/uL    HGB 9.2 (L) 11.5 - 16.0 g/dL    HCT 28.2 (L) 35.0 - 47.0 %    MCV 72.9 (L) 80.0 - 99.0 FL    MCH 23.8 (L) 26.0 - 34.0 PG    MCHC 32.6 30.0 - 36.5 g/dL    RDW 19.9 (H) 11.5 - 14.5 %    PLATELET 760 577 - 906 K/uL    MPV 11.1 8.9 - 12.9 FL    NRBC 0.3 (H) 0  WBC    ABSOLUTE NRBC 0.03 (H) 0.00 - 0.01 K/uL    NEUTROPHILS 75 32 - 75 %    LYMPHOCYTES 18 12 - 49 %    MONOCYTES 6 5 - 13 %    EOSINOPHILS 1 0 - 7 %    BASOPHILS 0 0 - 1 %    IMMATURE GRANULOCYTES 0 0.0 - 0.5 %    ABS. NEUTROPHILS 6.6 1.8 - 8.0 K/UL    ABS. LYMPHOCYTES 1.6 0.8 - 3.5 K/UL    ABS. MONOCYTES 0.5 0.0 - 1.0 K/UL    ABS. EOSINOPHILS 0.1 0.0 - 0.4 K/UL    ABS. BASOPHILS 0.0 0.0 - 0.1 K/UL    ABS. IMM. GRANS. 0.0 0.00 - 0.04 K/UL    DF AUTOMATED     PROTHROMBIN TIME + INR    Collection Time: 04/06/18  4:58 PM   Result Value Ref Range    INR 1.3 (H) 0.9 - 1.1      Prothrombin time 13.0 (H) 9.0 - 17.1 sec   METABOLIC PANEL, COMPREHENSIVE    Collection Time: 04/06/18  4:58 PM   Result Value Ref Range    Sodium 142 136 - 145 mmol/L    Potassium 3.4 (L) 3.5 - 5.1 mmol/L    Chloride 110 (H) 97 - 108 mmol/L    CO2 21 21 - 32 mmol/L    Anion gap 11 5 - 15 mmol/L    Glucose 107 (H) 65 - 100 mg/dL    BUN 25 (H) 6 - 20 MG/DL    Creatinine 1.50 (H) 0.55 - 1.02 MG/DL    BUN/Creatinine ratio 17 12 - 20      GFR est AA 41 (L) >60 ml/min/1.73m2    GFR est non-AA 34 (L) >60 ml/min/1.73m2    Calcium 8.2 (L) 8.5 - 10.1 MG/DL    Bilirubin, total 1.2 (H) 0.2 - 1.0 MG/DL    ALT (SGPT) 25 12 - 78 U/L    AST (SGOT) 12 (L) 15 - 37 U/L    Alk.  phosphatase 135 (H) 45 - 117 U/L    Protein, total 6.1 (L) 6.4 - 8.2 g/dL    Albumin 3.4 (L) 3.5 - 5.0 g/dL    Globulin 2.7 2.0 - 4.0 g/dL    A-G Ratio 1.3 1.1 - 2.2     LIPASE    Collection Time: 04/06/18  4:58 PM   Result Value Ref Range    Lipase 69 (L) 73 - 393 U/L   CK    Collection Time: 04/06/18  4:58 PM   Result Value Ref Range    CK 50 26 - 192 U/L   TROPONIN I    Collection Time: 04/06/18  4:58 PM   Result Value Ref Range    Troponin-I, Qt. 0.08 (H) <0.05 ng/mL   NT-PRO BNP    Collection Time: 04/06/18  4:58 PM   Result Value Ref Range    NT pro-BNP >45748 (H) 0 - 125 PG/ML   URINALYSIS W/ REFLEX CULTURE    Collection Time: 04/06/18  5:24 PM   Result Value Ref Range    Color DARK YELLOW      Appearance CLOUDY (A) CLEAR      Specific gravity >1.030 (H) 1.003 - 1.030    pH (UA) 5.5 5.0 - 8.0      Protein 100 (A) NEG mg/dL    Glucose NEGATIVE  NEG mg/dL    Ketone NEGATIVE  NEG mg/dL    Blood TRACE (A) NEG      Urobilinogen 1.0 0.2 - 1.0 EU/dL    Nitrites NEGATIVE  NEG      Leukocyte Esterase MODERATE (A) NEG      WBC 20-50 0 - 4 /hpf    RBC 5-10 0 - 5 /hpf    Epithelial cells MODERATE (A) FEW /lpf    Bacteria 3+ (A) NEG /hpf    UA:UC IF INDICATED URINE CULTURE ORDERED (A) CNI      Mucus 2+ (A) NEG /lpf   BILIRUBIN, CONFIRM    Collection Time: 04/06/18  5:24 PM   Result Value Ref Range    Bilirubin UA, confirm NEGATIVE  NEG       Radiologic Studies -     CXR Results  (Last 48 hours)               04/06/18 1729  XR CHEST PORT Final result    Impression:  IMPRESSION: No acute findings. Narrative:  EXAM:  XR CHEST PORT       INDICATION:  Moderate intermittent left-sided chest pain with associated   shortness of breath and nausea times several weeks with worsening today. COMPARISON:  Chest x-ray 4/1/2018. FINDINGS: A portable AP radiograph of the chest was obtained at 17:26 hours. The   patient is on a cardiac monitor. The lungs are clear. Pacemaker generator body   projects over the left chest wall with intact appearing leads traversing in   expected course.  The cardiac silhouette remains enlarged and the mediastinal   contours and pulmonary vascularity are otherwise normal.  The chest wall   structures and visualized upper abdomen show no acute findings with incidental   note of degenerative spine and shoulder changes. Medical Decision Making   I am the first provider for this patient. I reviewed the vital signs, available nursing notes, past medical history, past surgical history, family history and social history. Vital Signs-Reviewed the patient's vital signs. Patient Vitals for the past 12 hrs:   Temp Pulse Resp BP SpO2   04/06/18 2200 - - - (!) 179/121 98 %   04/06/18 2130 - - - (!) 186/140 100 %   04/06/18 2058 - 85 - (!) 187/132 -   04/06/18 2030 - - - (!) 183/123 98 %   04/06/18 2000 - - - (!) 183/123 98 %   04/06/18 1938 - 87 - (!) 176/114 -   04/06/18 1930 - - - (!) 176/114 96 %   04/06/18 1900 - 84 - (!) 181/121 99 %   04/06/18 1830 - - - (!) 170/130 97 %   04/06/18 1626 98 °F (36.7 °C) 93 20 (!) 174/140 99 %     Pulse Oximetry Analysis - 94% on RA    EKG interpretation: (Preliminary) 16:42  Rhythm: normal sinus rhythm; and regular; LVH with repolarization abnormality . Rate (approx.): 94; Axis: normal; ST/T wave: nonspecific ST/T wave changes, small lateral Q waves, no ST elevation or depression    TX interval 146 ms, QRS 96 ms,  ms, QTc 512 ms. Written by Sean Urena ED Scribe, as dictated by Ramona Lombardo MD.    Records Reviewed: Nursing Notes and Old Medical Records    Provider Notes (Medical Decision Making):   DDx: pulmonary edema, pleural effusion, PE, ACS, pneumonia, pleurisy     Suspect CP 2/2 accelerated htn, lack of bp medication compliance. Suspect some degree of volume overload on cxr. D/w cardiology, pre-load reduction, diuretics, admit to cardiology. Lower suspicion for PE given bp, cxr, recent med non-compliance. ED Course:   Initial assessment performed. The patients presenting problems have been discussed, and they are in agreement with the care plan formulated and outlined with them. I have encouraged them to ask questions as they arise throughout their visit.     CONSULT NOTE:  7:04 PM  Octavia Elias MD spoke with Dr. Rohan Simon,  Specialty: Cardiology   Discussed pt's hx, disposition, and available diagnostic and imaging results. Reviewed care plans. Consultant recommends provided 1inch NTG paste, IV Lasix, and he will come evaluate the pt. Written by Mendez Motley, ED Scribe, as dictated by Octavia Elias MD    7:53 PM  Dr. Rohan Simon in the ED. Written by Mendez Motley, ED Scribe, as dictated by Octavia Elias MD    Critical Care Time:   0    Disposition:  Admit Note:  8:53 PM  Patient is being admitted to the hospital by Dr. Rohan Simon. The results of their tests and reasons for their admission have been discussed with them and/or available family. They convey agreement and understanding for the need to be admitted and for their admission diagnosis. Consultation has been made with the inpatient physician specialist for hospitalization. PLAN: Admit to Cardiology     Diagnosis     Clinical Impression:   1. Chest pain, unspecified type    2. Accelerated hypertension    3. Elevated troponin I level    4. Bacteria in urine      Attestations:    Attestation: This note is prepared by Mendez Motley, acting as scribe for MD Octavia Godfrey MD: The scribe's documentation has been prepared under my direction and personally reviewed by me in its entirety. I confirm that the note above accurately reflects all work, treatment, procedures, and medical decision making performed by me.

## 2018-04-06 NOTE — IP AVS SNAPSHOT
[address] [facility] 
[phone] Patient: Chan Torres MRN: [id number] DXZ:[id number] A check scottie indicates which time of day the medication should be taken. My Medications START taking these medications Instructions Each Dose to Equal  
 Morning Noon Evening Bedtime  
 acetaminophen 325 mg tablet Commonly known as:  TYLENOL Take 2 Tabs by mouth every four (4) hours as needed. 650 mg  
    
   
   
   
  
 docusate sodium 100 mg capsule Commonly known as:  Arely Silk Take 1 Cap by mouth two (2) times a day for 90 days. 100 mg  
    
   
   
   
  
 polyethylene glycol 17 gram packet Commonly known as:  Lugene Minder Take 1 Packet by mouth daily. 17 g CHANGE how you take these medications Instructions Each Dose to Equal  
 Morning Noon Evening Bedtime  
 hydrALAZINE 25 mg tablet Commonly known as:  APRESOLINE What changed:   
- medication strength 
- how much to take Your next dose is:  4/10/18 at 4:00 pm and bedtime Take 1 Tab by mouth three (3) times daily. 25 mg CONTINUE taking these medications Instructions Each Dose to Equal  
 Morning Noon Evening Bedtime  
 aspirin 81 mg chewable tablet Your next dose is:  4/11/18 Take 1 Tab by mouth daily. 81 mg  
    
  
   
   
   
  
 atorvastatin 20 mg tablet Commonly known as:  LIPITOR Your next dose is:  4/10/18 Take 1 Tab by mouth nightly. 20 mg  
    
   
   
   
  
  
 bumetanide 0.5 mg tablet Commonly known as:  Londell Dancer Your next dose is:  4/11/18 Take 1 Tab by mouth daily. 0.5 mg  
    
  
   
   
   
  
 carvedilol 6.25 mg tablet Commonly known as:  Dominique Solid Your next dose is:  4/10/18 at dinner time Take 1 Tab by mouth two (2) times daily (with meals).   
 6.25 mg  
    
   
   
  
   
  
 ergocalciferol 50,000 unit capsule Commonly known as:  ERGOCALCIFEROL Notes to Patient:  Resume as you were prior to admission Take 1 Cap by mouth every seven (7) days. 60316 Units  
    
   
   
   
  
 gabapentin 300 mg capsule Commonly known as:  NEURONTIN Your next dose is:  4/10/2018 at 4 pm and at bedtime Take 300 mg by mouth three (3) times daily. 300 mg  
    
   
   
  
   
  
  
 isosorbide mononitrate ER 30 mg tablet Commonly known as:  IMDUR Your next dose is:  4/11/2018 Take 1 Tab by mouth daily. 30 mg  
    
  
   
   
   
  
 loperamide 1 mg/5 mL solution Commonly known as:  IMODIUM Take 5 mL by mouth four (4) times daily as needed for Diarrhea. 1 tsp  
    
   
   
   
  
 omeprazole 20 mg capsule Commonly known as:  PRILOSEC Your next dose is:  4/11/2018 Take 20 mg by mouth daily. Indications: GASTROESOPHAGEAL REFLUX  
 20 mg Where to Get Your Medications These medications were sent to [facility], [address] [place] [address] AT [address] [place] [address] [address], [facility] Hours:  24-hours Phone:  [phone]  
  docusate sodium 100 mg capsule  
 polyethylene glycol 17 gram packet Information on where to get these meds will be given to you by the nurse or doctor. ! Ask your nurse or doctor about these medications  
  hydrALAZINE 25 mg tablet

## 2018-04-06 NOTE — ED NOTES
Assumed care of patient. Patient arrives from home via [facility] with chief complaint of chest pain that began this morning accompanied by SOB on exertion. Patient reports pain woke her up from sleep and localizes pain over her pacemaker. Patient reports decrease in pain from 10/10 to 8/10 after taking 4 baby aspirin. Patient reports being under an increased amount of stress recently, stating her son was incarcerated yesterday. Patient's BP elevated en route and on arrival, patient reports she has not taken her BP medications in about 3 days. Patient also reports seeing her PCP yesterday in hopes of receiving something for anxiety, but was not given anything. Patient is alert and oriented x4, respirations even and unlabored when at rest. Patient hypertensive, MD aware. Monitor x2, call bell within reach. [place] provided.

## 2018-04-06 NOTE — IP AVS SNAPSHOT
[address] [facility] 
[phone] Patient: Mariana Woodward MRN: [id number] YKA:[id number] About your hospitalization You were admitted on:  April 6, 2018 You last received care in the:  [facility] CARDIOPULMONARY CARE You were discharged on:  April 10, 2018 Why you were hospitalized Your primary diagnosis was:  Not on File Your diagnoses also included:  Systolic Chf, Acute (Hcc) Follow-up Information Follow up With Details Comments Contact Info Tiffany Fleming NP Go on 4/13/2018 For hospital follow up appointment at 4:30PM  [address] [facility] 
[phone] Osbaldo Hdez MD Go on 4/24/2018 10:00AM; Cardiology follow-up with Sommer Ken NP [address] [facility] 
[phone] [facility] Office on Aging  For additional CHF education, med management, resources, and support in the community. [address]  This is the provider for your one-time hospital to home visit. If you do not hear from them within 24-48 hours, please give them a call. [address] [place] [address] 
[phone] Discharge Orders None A check scottie indicates which time of day the medication should be taken. My Medications START taking these medications Instructions Each Dose to Equal  
 Morning Noon Evening Bedtime  
 acetaminophen 325 mg tablet Commonly known as:  TYLENOL Take 2 Tabs by mouth every four (4) hours as needed. 650 mg  
    
   
   
   
  
 docusate sodium 100 mg capsule Commonly known as:  Kerry Parsley Take 1 Cap by mouth two (2) times a day for 90 days. 100 mg  
    
   
   
   
  
 polyethylene glycol 17 gram packet Commonly known as:  Yamileth Hussar Take 1 Packet by mouth daily. 17 g CHANGE how you take these medications Instructions Each Dose to Equal  
 Morning Noon Evening Bedtime  
 hydrALAZINE 25 mg tablet Commonly known as:  APRESOLINE What changed:   
- medication strength 
- how much to take Your next dose is:  4/10/18 at 4:00 pm and bedtime Take 1 Tab by mouth three (3) times daily. 25 mg CONTINUE taking these medications Instructions Each Dose to Equal  
 Morning Noon Evening Bedtime  
 aspirin 81 mg chewable tablet Your next dose is:  4/11/18 Take 1 Tab by mouth daily. 81 mg  
    
  
   
   
   
  
 atorvastatin 20 mg tablet Commonly known as:  LIPITOR Your next dose is:  4/10/18 Take 1 Tab by mouth nightly. 20 mg  
    
   
   
   
  
  
 bumetanide 0.5 mg tablet Commonly known as:  Maulik Dilling Your next dose is:  4/11/18 Take 1 Tab by mouth daily. 0.5 mg  
    
  
   
   
   
  
 carvedilol 6.25 mg tablet Commonly known as:  Peggi Medicine Your next dose is:  4/10/18 at dinner time Take 1 Tab by mouth two (2) times daily (with meals). 6.25 mg  
    
   
   
  
   
  
 ergocalciferol 50,000 unit capsule Commonly known as:  ERGOCALCIFEROL Notes to Patient:  Resume as you were prior to admission Take 1 Cap by mouth every seven (7) days. [address] Units  
    
   
   
   
  
 gabapentin 300 mg capsule Commonly known as:  NEURONTIN Your next dose is:  4/10/2018 at 4 pm and at bedtime Take 300 mg by mouth three (3) times daily. 300 mg  
    
   
   
  
   
  
  
 isosorbide mononitrate ER 30 mg tablet Commonly known as:  IMDUR Your next dose is:  4/11/2018 Take 1 Tab by mouth daily. 30 mg  
    
  
   
   
   
  
 loperamide 1 mg/5 mL solution Commonly known as:  IMODIUM Take 5 mL by mouth four (4) times daily as needed for Diarrhea. 1 tsp  
    
   
   
   
  
 omeprazole 20 mg capsule Commonly known as:  PRILOSEC  
 Your next dose is:  4/11/2018 Take 20 mg by mouth daily. Indications: GASTROESOPHAGEAL REFLUX  
 20 mg Where to Get Your Medications These medications were sent to [address] [facility] [address] AT [address]  [address] [place] [address], [address] [facility] Hours:  24-hours Phone:  [phone]  
  docusate sodium 100 mg capsule  
 polyethylene glycol 17 gram packet Information on where to get these meds will be given to you by the nurse or doctor. ! Ask your nurse or doctor about these medications  
  hydrALAZINE 25 mg tablet Discharge Instructions None Introducing [facility] HEALTH SERVICES! [employer] introduces [employer] patient portal. Now you can access parts of your medical record, email your doctor's office, and request medication refills online. 1. In your internet browser, go to [email or web address] 2. Click on the First Time User? Click Here link in the Sign In box. You will see the New Member Sign Up page. 3. Enter your [employer] Access Code exactly as it appears below. You will not need to use this code after youve completed the sign-up process. If you do not sign up before the expiration date, you must request a new code. · [employer] Access Code: [email or web address] Expires: 6/30/2018 11:22 AM 
 
4. Enter the last four digits of your Social Security Number (xxxx) and Date of Birth (mm/dd/yyyy) as indicated and click Submit. You will be taken to the next sign-up page. 5. Create a PAYMEYt ID. This will be your PAY[employer]Yt login ID and cannot be changed, so think of one that is secure and easy to remember. 6. Create a PAY[employer]Yt password. You can change your password at any time. 7. Enter your Password Reset Question and Answer. This can be used at a later time if you forget your password. 8. Enter your e-mail address. You will receive e-mail notification when new information is available in [address] 9. Click Sign Up. You can now view and download portions of your medical record. 10. Click the Download Summary menu link to download a portable copy of your medical information. If you have questions, please visit the Frequently Asked Questions section of the [email or web address])hart website. Remember, [employer] is NOT to be used for urgent needs. For medical emergencies, dial 911. Now available from your iPhone and Android! Introducing Manolo Payne As a [employer] patient, I wanted to make you aware of our electronic visit tool called Manolo Payne. [employer] 24/7 allows you to connect within minutes with a medical provider 24 hours a day, seven days a week via a mobile device or tablet or logging into a secure website from your computer. You can access Manolo Payne from anywhere in the [place]. A virtual visit might be right for you when you have a simple condition and feel like you just dont want to get out of bed, or cant get away from work for an appointment, when your regular [employer] provider is not available (evenings, weekends or holidays), or when youre out of town and need minor care. Electronic visits cost only $49 and if the [employer] 24/7 provider determines a prescription is needed to treat your condition, one can be electronically transmitted to a nearby pharmacy*. Please take a moment to enroll today if you have not already done so. The enrollment process is free and takes just a few minutes. To enroll, please download the [employer] 24/7 kailyn to your tablet or phone, or visit [email or web address] to enroll on your computer.    
And, as an [address] [place] [address] patient with a [employer] account, the results of your visits will be scanned into your electronic medical record and your primary care provider will be able to view the scanned results. We urge you to continue to see your regular [facility] provider for your ongoing medical care. And while your primary care provider may not be the one available when you seek a Manolo Oroscofin virtual visit, the peace of mind you get from getting a real diagnosis real time can be priceless. For more information on Manolo [employer], view our Frequently Asked Questions (FAQs) at [email or web address]. Sincerely, 
 
Katarina Reed MD 
Chief Medical Officer Charles Doshi *:  certain medications cannot be prescribed via [employer] Providers Seen During Your Hospitalization Provider Specialty Primary office phone [place]. Kristy Cottrell MD Emergency Medicine [phone] Heidy Greenberg MD Cardiology [phone] Your Primary Care Physician (PCP) Primary Care Physician Office Phone Office Fax [place] [phone] You are allergic to the following Allergen Reactions Percocet (Oxycodone-Acetaminophen) Other (comments) Confused Recent Documentation Height Weight BMI OB Status Smoking Status 1.702 m 66.1 kg 22.82 kg/m2 Postmenopausal Never Smoker Emergency Contacts Name Discharge Info Relation Home Work Mobile 23 Mirtha Weaver Said CAREGIVER [3] Son [22] [phone] Geroldine Pallas  Daughter [21] [phone] Natividad Martínez  Child [2] [phone] Patient Belongings The following personal items are in your possession at time of discharge: 
  Dental Appliances: None  Visual Aid: Glasses (Reading glasses)      Home Medications: None   Jewelry: None  Clothing: Pajamas, Robe, Socks, Slippers    Other Valuables: None  Personal Items Sent to Safe: none Please provide this summary of care documentation to your next provider.  
  
  
 
  
Signatures-by signing, you are acknowledging that this After Visit Summary has been reviewed with you and you have received a copy. Patient Signature:  ____________________________________________________________ Date:  ____________________________________________________________  
  
Lavanda Ferries Provider Signature:  ____________________________________________________________ Date:  ____________________________________________________________

## 2018-04-07 PROCEDURE — 65660000000 HC RM CCU STEPDOWN

## 2018-04-07 PROCEDURE — 74011250637 HC RX REV CODE- 250/637: Performed by: INTERNAL MEDICINE

## 2018-04-07 PROCEDURE — 74011250636 HC RX REV CODE- 250/636: Performed by: INTERNAL MEDICINE

## 2018-04-07 RX ORDER — CARVEDILOL 12.5 MG/1
12.5 TABLET ORAL 2 TIMES DAILY WITH MEALS
Status: DISCONTINUED | OUTPATIENT
Start: 2018-04-07 | End: 2018-04-08

## 2018-04-07 RX ADMIN — GABAPENTIN 300 MG: 300 CAPSULE ORAL at 21:07

## 2018-04-07 RX ADMIN — HYDRALAZINE HYDROCHLORIDE 50 MG: 50 TABLET, FILM COATED ORAL at 08:39

## 2018-04-07 RX ADMIN — PANTOPRAZOLE SODIUM 40 MG: 40 TABLET, DELAYED RELEASE ORAL at 08:39

## 2018-04-07 RX ADMIN — CARVEDILOL 12.5 MG: 12.5 TABLET, FILM COATED ORAL at 16:37

## 2018-04-07 RX ADMIN — ISOSORBIDE MONONITRATE 30 MG: 30 TABLET, EXTENDED RELEASE ORAL at 08:39

## 2018-04-07 RX ADMIN — Medication 10 ML: at 21:07

## 2018-04-07 RX ADMIN — ATORVASTATIN CALCIUM 20 MG: 20 TABLET, FILM COATED ORAL at 21:07

## 2018-04-07 RX ADMIN — ENOXAPARIN SODIUM 40 MG: 40 INJECTION SUBCUTANEOUS at 08:38

## 2018-04-07 RX ADMIN — HYDRALAZINE HYDROCHLORIDE 50 MG: 50 TABLET, FILM COATED ORAL at 16:37

## 2018-04-07 RX ADMIN — BUMETANIDE 1 MG: 1 TABLET ORAL at 08:39

## 2018-04-07 RX ADMIN — GABAPENTIN 300 MG: 300 CAPSULE ORAL at 16:37

## 2018-04-07 RX ADMIN — CARVEDILOL 6.25 MG: 6.25 TABLET, FILM COATED ORAL at 08:39

## 2018-04-07 RX ADMIN — HYDRALAZINE HYDROCHLORIDE 50 MG: 50 TABLET, FILM COATED ORAL at 21:07

## 2018-04-07 RX ADMIN — Medication 10 ML: at 07:14

## 2018-04-07 RX ADMIN — ASPIRIN 81 MG 81 MG: 81 TABLET ORAL at 08:39

## 2018-04-07 RX ADMIN — Medication 10 ML: at 14:00

## 2018-04-07 RX ADMIN — GABAPENTIN 300 MG: 300 CAPSULE ORAL at 08:39

## 2018-04-07 NOTE — PROGRESS NOTES
TRANSFER - IN REPORT:    Verbal report received from [facility] (name) on [address]  being received from ED (unit) for routine progression of care      Report consisted of patients Situation, Background, Assessment and   Recommendations(SBAR). Information from the following report(s) SBAR, Kardex and Recent Results was reviewed with the receiving nurse. Opportunity for questions and clarification was provided. Assessment completed upon patients arrival to unit and care assumed.

## 2018-04-07 NOTE — ED NOTES
TRANSFER - OUT REPORT:    Verbal report given to Lawrence Cosby RN (name) on [address] [facility]  being transferred to [facility] (unit) for routine progression of care       Report consisted of patients Situation, Background, Assessment and   Recommendations(SBAR). Information from the following report(s) SBAR, Kardex, ED Summary, MAR, Recent Results and Med Rec Status was reviewed with the receiving nurse. Lines:   Peripheral IV 04/06/18 Right;Upper; Inner Arm (Active)   Site Assessment Clean, dry, & intact 4/6/2018  8:24 PM   Phlebitis Assessment 0 4/6/2018  8:24 PM   Infiltration Assessment 0 4/6/2018  8:24 PM   Dressing Status Clean, dry, & intact 4/6/2018  8:24 PM   Dressing Type Transparent 4/6/2018  8:24 PM   Hub Color/Line Status Flushed 4/6/2018  8:24 PM        Opportunity for questions and clarification was provided.

## 2018-04-07 NOTE — ED NOTES
Patient's IV infiltrated. Will order new dose of Lasix per Dr. Fauzia Moffett. Lorraine Bains RN in room to attempt ultrasound guided IV access.

## 2018-04-07 NOTE — PROGRESS NOTES
7:45 AM   Received bedside report from ISAIAS Mora.    8:23 AM   Paged David Vázquez to clarify the duplicate order of hydralazine ( had 2 orders of 25 mg and 50 mg, 3 times a day). MD ordered to cancel 25 mg hydralazine 3 times a day. Bedside and Verbal shift change report given to , RN (oncoming nurse). Report included the following information SBAR, Kardex, Intake/Output and Med Rec Status. SHIFT SUMMARY:            [address] NURSING NOTE   Admission Date 4/6/2018   Admission Diagnosis Systolic CHF, acute ([facility] [address]   Consults IP CONSULT TO CARDIOLOGY      Cardiac Monitoring [x] Yes [] No      Purposeful Hourly Rounding [x] Yes    Zuri Score Total Score: 1   Zuri score 3 or > [] Bed Alarm [] Avasys [] 1:1 sitter [] Patient refused (Signed refusal form in chart)   Evan Score Evan Score: 19   Evan score 14 or < [] PMT consult [] Wound Care consult    []  Specialty bed  [] Nutrition consult      Influenza Vaccine Received Flu Vaccine for Current Season (usually Sept-March): Not Flu Season           Oxygen needs? [x] Room air Oxygen @  []1L    []2L    []3L   []4L    []5L   []6L via  NC   Chronic home O2 use? [] Yes [x] No  Perform O2 challenge test and document in progress note using smartphrase (.Homeoxygen)      Last bowel movement Last Bowel Movement Date: 04/06/18      Urinary Catheter             LDAs               Peripheral IV 04/06/18 Right;Upper; Inner Arm (Active)   Site Assessment Clean, dry, & intact 4/7/2018  3:08 PM   Phlebitis Assessment 0 4/7/2018  3:08 PM   Infiltration Assessment 0 4/7/2018  3:08 PM   Dressing Status Clean, dry, & intact 4/7/2018  3:08 PM   Dressing Type Transparent;Tape 4/7/2018  3:08 PM   Hub Color/Line Status Pink;Flushed 4/7/2018  3:08 PM                         Readmission Risk Assessment Tool Score High Risk            29       Total Score        3 Has Seen PCP in Last 6 Months (Yes=3, No=0)    4 IP Visits Last 12 Months (1-3=4, 4=9, >4=11)    5 Pt.  Coverage (Medicare=5 , Medicaid, or Self-Pay=4)    17 Charlson Comorbidity Score (Age + Comorbid Conditions)        Criteria that do not apply:    . Living with Significant Other. Assisted Living. LTAC. SNF.  or   Rehab    Patient Length of Stay (>5 days = 3)       Expected Length of Stay - - -   Actual Length of Stay 1

## 2018-04-07 NOTE — H&P
Consult/Admission    NAME: Iris Mattson   :  1945   MRN:  [id number]     Date/Time:  2018 8:14 PM    Patient PCP: Adolph Aguilar NP  ________________________________________________________________________     Assessment:     Angina  Acute on chronic systolic heart failure. Ischemic Cardiomyopathy. LV EF 25%   CAD   Remote MI  Remote PCI    S/p Nephrectomy for renal cell CA   CKD stage 3. GERD   Anemia. Diabetes type 2. Hypertension uncontrolled. Plan:     Admit   Cardiac meds  Treat systolic heart failure         [x]           High complexity decision making was performed        Subjective:   CHIEF COMPLAINT:  SOB ,  Chest pain. HISTORY OF PRESENT ILLNESS:     Stefania is a 71 y.o.  female who presnets to ER with SOB and Chest pain . Second ER visit this week. BP is markedly elevated. She has not taken her meds for several days this week. Prior cardiac history is notable for:  CAD  /  MI /  Ischemic Cardiomyopathy . We were asked to admit for work up and evaluation of the above problems. Past Medical History:   Diagnosis Date    Arthritis     Autoimmune disease ([address]     rheumatoid     CAD (coronary artery disease)     CHF (congestive heart failure) (HCC)     Chronic pain     neuropathy bilateral feet,knees    Diabetes ([address]     GERD (gastroesophageal reflux disease)     Hypertension     Ill-defined condition     anemia    Ill-defined condition     blood transfusion hx    MI, old     Other ill-defined conditions(799.89)     high cholesterol    Renal cell carcinoma of right kidney ([address]     s/p resection       Past Surgical History:   Procedure Laterality Date    CARDIAC SURG PROCEDURE UNLIST      three stents placed     CARDIAC SURG PROCEDURE UNLIST      HX CHOLECYSTECTOMY  2017    lap tana with IOC.     HX PACEMAKER      ICD    HX TONSILLECTOMY      HX UROLOGICAL  2016 RIGHT LAPOROSCOPIC HAND ASSISTED RADICAL NEPHRECTOMY     Allergies   Allergen Reactions    Percocet [Oxycodone-Acetaminophen] Other (comments)     Confused      Meds:  See below  Social History   Substance Use Topics    Smoking status: Never Smoker    Smokeless tobacco: Never Used    Alcohol use No      Family History   Problem Relation Age of Onset    Cancer Mother      stomach    Other Father      aneurysciro Weaver Cancer Brother      lung    Other Brother      stomach problems    Stroke Brother        REVIEW OF SYSTEMS:     []            Unable to obtain  ROS due to ---   [x]            Total of 12 systems reviewed as follows:    Constitutional: negative fever, negative chills, negative weight loss  Eyes:   negative visual changes  ENT:   negative sore throat, tongue or lip swelling  Respiratory:  negative cough, negative dyspnea  Cards:  negative for chest pain, palpitations, lower extremity edema  GI:   negative for nausea, vomiting, diarrhea, and abdominal pain  Genitourinary: negative for frequency, dysuria  Integument:  negative for rash   Hematologic:  negative for easy bruising and gum/nose bleeding  Musculoskel: negative for myalgias,  back pain  Neurological:  negative for headaches, dizziness, vertigo, weakness  Behavl/Psych: negative for feelings of anxiety, depression     Pertinent Positives include :    Objective:      Physical Exam:    Last 24hrs VS reviewed since prior progress note. Most recent are:    Visit Vitals    BP (!) 176/114    Pulse 87    Temp 98 °F (36.7 °C)    Resp 20    Ht 5' 7\" (1.702 m)    Wt 69.9 kg (154 lb)    SpO2 99%    BMI 24.12 kg/m2     No intake or output data in the 24 hours ending 04/06/18 2014     General Appearance: Well developed, well nourished, alert & oriented x 3,    no acute distress. Ears/Nose/Mouth/Throat: Pupils equal and round, Hearing grossly normal.  Neck: Supple. JVP within normal limits. Carotids good upstrokes, with no bruit.   Chest: Lungs clear to auscultation bilaterally. Cardiovascular: Regular rate and rhythm, S1S2 normal, no murmur, rubs, gallops. Abdomen: Soft, non-tender, bowel sounds are active. No organomegaly. Extremities: No edema bilaterally. Femoral pulses +2, Distal Pulses +1. Skin: Warm and dry. Neuro: CN II-XII grossly intact, Strength and sensation grossly intact. Data:      Prior to Admission medications    Medication Sig Start Date End Date Taking? Authorizing Provider   ergocalciferol (ERGOCALCIFEROL) 50,000 unit capsule Take 1 Cap by mouth every seven (7) days. 7/24/17 7/24/18  Inna Roque MD   aspirin 81 mg chewable tablet Take 1 Tab by mouth daily. 6/13/17   Fede Grier MD   atorvastatin (LIPITOR) 20 mg tablet Take 1 Tab by mouth nightly. 6/13/17   Fede Grier MD   bumetanide (BUMEX) 0.5 mg tablet Take 1 Tab by mouth daily. 6/13/17   Fede Grier MD   carvedilol (COREG) 6.25 mg tablet Take 1 Tab by mouth two (2) times daily (with meals). 6/13/17   Fede Grier MD   isosorbide mononitrate ER (IMDUR) 30 mg tablet Take 1 Tab by mouth daily. 6/13/17   Fede Grier MD   hydrALAZINE (APRESOLINE) 10 mg tablet Take 1 Tab by mouth three (3) times daily. Patient taking differently: Take 25 mg by mouth three (3) times daily. dose increased from 10mg 3x/ day to 25mg 3x/day (bottle viewed) 6/13/17   Fede Grier MD   loperamide (IMODIUM) 1 mg/5 mL solution Take 5 mL by mouth four (4) times daily as needed for Diarrhea. 3/3/17   Jaimie Goldberg MD   gabapentin (NEURONTIN) 300 mg capsule Take 300 mg by mouth three (3) times daily. Historical Provider   omeprazole (PRILOSEC) 20 mg capsule Take 20 mg by mouth daily.  Indications: GASTROESOPHAGEAL REFLUX    Historical Provider       Recent Results (from the past 24 hour(s))   EKG, 12 LEAD, INITIAL    Collection Time: 04/06/18  4:42 PM   Result Value Ref Range    Ventricular Rate 94 BPM    Atrial Rate 94 BPM    P-R Interval 146 ms    QRS Duration 96 ms    Q-T Interval 410 ms    QTC Calculation (Bezet) 512 ms    Calculated P Axis 41 degrees    Calculated R Axis -8 degrees    Calculated T Axis 0 degrees    Diagnosis       Normal sinus rhythm  Possible Left atrial enlargement  Left ventricular hypertrophy with repolarization abnormality    When compared with ECG of 01-APR-2018 10:23,  No significant change was found  Confirmed by Michael Lagso ([id number]) on 4/6/2018 6:13:18 PM     POC TROPONIN-I    Collection Time: 04/06/18  4:56 PM   Result Value Ref Range    Troponin-I (POC) 0.07 0.00 - 0.08 ng/mL   CBC WITH AUTOMATED DIFF    Collection Time: 04/06/18  4:58 PM   Result Value Ref Range    WBC 8.9 3.6 - 11.0 K/uL    RBC 3.87 3.80 - 5.20 M/uL    HGB 9.2 (L) 11.5 - 16.0 g/dL    HCT 28.2 (L) 35.0 - 47.0 %    MCV 72.9 (L) 80.0 - 99.0 FL    MCH 23.8 (L) 26.0 - 34.0 PG    MCHC 32.6 30.0 - 36.5 g/dL    RDW 19.9 (H) 11.5 - 14.5 %    PLATELET 788 720 - 265 K/uL    MPV 11.1 8.9 - 12.9 FL    NRBC 0.3 (H) 0  WBC    ABSOLUTE NRBC 0.03 (H) 0.00 - 0.01 K/uL    NEUTROPHILS 75 32 - 75 %    LYMPHOCYTES 18 12 - 49 %    MONOCYTES 6 5 - 13 %    EOSINOPHILS 1 0 - 7 %    BASOPHILS 0 0 - 1 %    IMMATURE GRANULOCYTES 0 0.0 - 0.5 %    ABS. NEUTROPHILS 6.6 1.8 - 8.0 K/UL    ABS. LYMPHOCYTES 1.6 0.8 - 3.5 K/UL    ABS. MONOCYTES 0.5 0.0 - 1.0 K/UL    ABS. EOSINOPHILS 0.1 0.0 - 0.4 K/UL    ABS. BASOPHILS 0.0 0.0 - 0.1 K/UL    ABS. IMM.  GRANS. 0.0 0.00 - 0.04 K/UL    DF AUTOMATED     PROTHROMBIN TIME + INR    Collection Time: 04/06/18  4:58 PM   Result Value Ref Range    INR 1.3 (H) 0.9 - 1.1      Prothrombin time 13.0 (H) 9.0 - 86.5 sec   METABOLIC PANEL, COMPREHENSIVE    Collection Time: 04/06/18  4:58 PM   Result Value Ref Range    Sodium 142 136 - 145 mmol/L    Potassium 3.4 (L) 3.5 - 5.1 mmol/L    Chloride 110 (H) 97 - 108 mmol/L    CO2 21 21 - 32 mmol/L    Anion gap 11 5 - 15 mmol/L    Glucose 107 (H) 65 - 100 mg/dL    BUN 25 (H) 6 - 20 MG/DL    Creatinine 1.50 (H) 0.55 - 1.02 MG/DL    BUN/Creatinine ratio 17 12 - 20      GFR est AA 41 (L) >60 ml/min/1.73m2    GFR est non-AA 34 (L) >60 ml/min/1.73m2    Calcium 8.2 (L) 8.5 - 10.1 MG/DL    Bilirubin, total 1.2 (H) 0.2 - 1.0 MG/DL    ALT (SGPT) 25 12 - 78 U/L    AST (SGOT) 12 (L) 15 - 37 U/L    Alk.  phosphatase 135 (H) 45 - 117 U/L    Protein, total 6.1 (L) 6.4 - 8.2 g/dL    Albumin 3.4 (L) 3.5 - 5.0 g/dL    Globulin 2.7 2.0 - 4.0 g/dL    A-G Ratio 1.3 1.1 - 2.2     LIPASE    Collection Time: 04/06/18  4:58 PM   Result Value Ref Range    Lipase 69 (L) 73 - 393 U/L   CK    Collection Time: 04/06/18  4:58 PM   Result Value Ref Range    CK 50 26 - 192 U/L   TROPONIN I    Collection Time: 04/06/18  4:58 PM   Result Value Ref Range    Troponin-I, Qt. 0.08 (H) <0.05 ng/mL   NT-PRO BNP    Collection Time: 04/06/18  4:58 PM   Result Value Ref Range    NT pro-BNP >97297 (H) 0 - 125 PG/ML   URINALYSIS W/ REFLEX CULTURE    Collection Time: 04/06/18  5:24 PM   Result Value Ref Range    Color DARK YELLOW      Appearance CLOUDY (A) CLEAR      Specific gravity >1.030 (H) 1.003 - 1.030    pH (UA) 5.5 5.0 - 8.0      Protein 100 (A) NEG mg/dL    Glucose NEGATIVE  NEG mg/dL    Ketone NEGATIVE  NEG mg/dL    Blood TRACE (A) NEG      Urobilinogen 1.0 0.2 - 1.0 EU/dL    Nitrites NEGATIVE  NEG      Leukocyte Esterase MODERATE (A) NEG      WBC 20-50 0 - 4 /hpf    RBC 5-10 0 - 5 /hpf    Epithelial cells MODERATE (A) FEW /lpf    Bacteria 3+ (A) NEG /hpf    UA:UC IF INDICATED URINE CULTURE ORDERED (A) CNI      Mucus 2+ (A) NEG /lpf   BILIRUBIN, CONFIRM    Collection Time: 04/06/18  5:24 PM   Result Value Ref Range    Bilirubin UA, confirm NEGATIVE  NEG

## 2018-04-07 NOTE — PROGRESS NOTES
Primary Nurse Olvin Reilly RN and Iris Dutton RN performed a dual skin assessment on this patient No impairment noted  Evan score is 19      SHIFT SUMMARY:            [address] NURSING NOTE   Admission Date 4/6/2018   Admission Diagnosis Systolic CHF, acute ([address]   Consults IP CONSULT TO CARDIOLOGY      Cardiac Monitoring [x] Yes [] No      Purposeful Hourly Rounding [x] Yes    Zuri Score Total Score: 2   Zuri score 3 or > [] Bed Alarm [] Avasys [] 1:1 sitter [] Patient refused (Signed refusal form in chart)   Evan Score     Evan score 14 or < [] PMT consult [] Wound Care consult    []  Specialty bed  [] Nutrition consult      Influenza Vaccine             Oxygen needs? [x] Room air Oxygen @  []1L    []2L    []3L   []4L    []5L   []6L via  NC   Chronic home O2 use? [] Yes [x] No  Perform O2 challenge test and document in progress note using [employer])      Last bowel movement        Urinary Catheter             LDAs               Peripheral IV 04/06/18 Right;Upper; Inner Arm (Active)   Site Assessment Clean, dry, & intact 4/6/2018 11:18 PM   Phlebitis Assessment 0 4/6/2018 11:18 PM   Infiltration Assessment 0 4/6/2018 11:18 PM   Dressing Status Clean, dry, & intact 4/6/2018 11:18 PM   Dressing Type Transparent 4/6/2018 11:18 PM   Hub Color/Line Status Flushed 4/6/2018  8:24 PM                         Readmission Risk Assessment Tool Score High Risk            29       Total Score        3 Has Seen PCP in Last 6 Months (Yes=3, No=0)    4 IP Visits Last 12 Months (1-3=4, 4=9, >4=11)    5 Pt. Coverage (Medicare=5 , Medicaid, or Self-Pay=4)    17 Charlson Comorbidity Score (Age + Comorbid Conditions)        Criteria that do not apply:    . Living with Significant Other. Assisted Living. LTAC. SNF.  or   Rehab    Patient Length of Stay (>5 days = 3)       Expected Length of Stay - - -   Actual Length of Stay 0

## 2018-04-07 NOTE — PROGRESS NOTES
Progress Note      4/7/2018 3:12 PM  NAME: Elizabeth London   MRN:  [id number]   Admit Diagnosis: Systolic CHF, acute ([facility] [address]     Assessment:     Angina  Acute on chronic systolic heart failure. Ischemic Cardiomyopathy. LV EF 25%   CAD   Remote MI  Remote PCI 2005   S/p Nephrectomy for renal cell CA   CKD stage 3. GERD   Anemia. Diabetes type 2. Hypertension uncontrolled. 4/7  BP is under some better control but needs further adjustment. Plan:     Increase Coreg dose. Amlodipine added    Continue diuresis. [x]        High complexity decision making was performed    Subjective:     Stefania Almaraz denies chest pain, dyspnea. Discussed with RN events overnight.      Patient Active Problem List   Diagnosis Code    Renal mass N28.89    Renal cell carcinoma of right kidney (HCC) C64.1    SVT (supraventricular tachycardia) (HCC) I47.1    Paroxysmal atrial fibrillation (HCC) I48.0    Orthostatic hypotension I95.1    Left lower lobe pneumonia ([facility]) J18.1    Diverticulitis K57.92    Leukocytosis D72.829    History of permanent cardiac pacemaker placement Z95.0    Chronic systolic heart failure (HCC) I50.22    Acute cholecystitis K81.0    Accelerated hypertension I10    CKD (chronic kidney disease) stage 4, GFR 15-29 ml/min (HCC) N18.4    Seropositive rheumatoid arthritis of multiple sites (HCC) M05.79    Long-term use of immunosuppressant medication Z79.899    Primary osteoarthritis of both knees F27.2    Systolic CHF, acute ([facility]) I50.21       Review of Systems:    Symptom Y/N Comments  Symptom Y/N Comments   Fever/Chills N   Chest Pain N    Poor Appetite N   Edema N    Cough N   Abdominal Pain N    Sputum N   Joint Pain N    SOB/ROCK N   Pruritis/Rash N    Nausea/vomit N   Tolerating PT/OT Y    Diarrhea N   Tolerating Diet Y    Constipation N   Other       Could NOT obtain due to:      Objective:      Physical Exam:    Last 24hrs VS reviewed since prior progress note. Most recent are:    Visit Vitals    /72 (BP 1 Location: Left arm, BP Patient Position: Sitting)    Pulse 74    Temp 97.4 °F (36.3 °C)    Resp 20    Ht 5' 7\" (1.702 m)    Wt 65.2 kg (143 lb 11.2 oz)    SpO2 98%    BMI 22.51 kg/m2       Intake/Output Summary (Last 24 hours) at 04/07/18 1512  Last data filed at 04/07/18 1258   Gross per 24 hour   Intake              240 ml   Output             1100 ml   Net             -860 ml        General Appearance: Well developed, well nourished, alert & oriented x 3,    no acute distress. Ears/Nose/Mouth/Throat: Hearing grossly normal.  Neck: Supple. Chest: Lungs clear to auscultation bilaterally. Cardiovascular: Regular rate and rhythm, S1S2 normal, no murmur. Abdomen: Soft, non-tender, bowel sounds are active. Extremities: No edema bilaterally. Skin: Warm and dry. PMH/SH reviewed - no change compared to H&P    Data Review    Telemetry: normal sinus rhythm     Lab Data Personally Reviewed:    Recent Labs      04/06/18   1658   WBC  8.9   HGB  9.2*   HCT  28.2*   PLT  198   LABRCNT(INR:3,PTP:3,APTT:3,)  Recent Labs      04/06/18   1658   NA  142   K  3.4*   CL  110*   CO2  21   BUN  25*   CREA  1.50*   GLU  107*   CA  8.2*   LABRCNT(CPK:3,CpKMB:3,ckndx:3,troiq:3)  Lab Results   Component Value Date/Time    Cholesterol, total 177 06/08/2017 02:25 AM    HDL Cholesterol 40 06/08/2017 02:25 AM    LDL, calculated 107.6 (H) 06/08/2017 02:25 AM    Triglyceride 147 06/08/2017 02:25 AM    CHOL/HDL Ratio 4.4 06/08/2017 02:25 AM   LABRCNT(sgot:3,gpt:3,ap:3,tbiL:3,TP:3,ALB:3,GLOB:3,ggt:3,aml:3,amyp:3,lpse:3,hlpse:3)No results for input(s): PH, PCO2, PO2 in the last 72 hours.   Lab Results   Component Value Date/Time    Cholesterol, total 177 06/08/2017 02:25 AM    HDL Cholesterol 40 06/08/2017 02:25 AM    LDL, calculated 107.6 (H) 06/08/2017 02:25 AM    Triglyceride 147 06/08/2017 02:25 AM    CHOL/HDL Ratio 4.4 06/08/2017 02:25 AM   MEDTABLERob Sams, MD  No results for input(s): PH, PCO2, PO2 in the last 72 hours.     Medications Personally Reviewed:    Current Facility-Administered Medications   Medication Dose Route Frequency    carvedilol (COREG) tablet 12.5 mg  12.5 mg Oral BID WITH MEALS    aspirin chewable tablet 81 mg  81 mg Oral DAILY    atorvastatin (LIPITOR) tablet 20 mg  20 mg Oral QHS    bumetanide (BUMEX) tablet 1 mg  1 mg Oral DAILY    gabapentin (NEURONTIN) capsule 300 mg  300 mg Oral TID    isosorbide mononitrate ER (IMDUR) tablet 30 mg  30 mg Oral DAILY    loperamide (IMODIUM) 1 mg/5 mL oral solution 1 mg  1 mg Oral QID PRN    pantoprazole (PROTONIX) tablet 40 mg  40 mg Oral ACB    amLODIPine (NORVASC) tablet 5 mg  5 mg Oral DAILY    sodium chloride (NS) flush 5-10 mL  5-10 mL IntraVENous Q8H    sodium chloride (NS) flush 5-10 mL  5-10 mL IntraVENous PRN    LORazepam (ATIVAN) tablet 1 mg  1 mg Oral BID PRN    enoxaparin (LOVENOX) injection 40 mg  40 mg SubCUTAneous Q24H    hydrALAZINE (APRESOLINE) tablet 50 mg  50 mg Oral TID         Pedro Pablo Roper MD

## 2018-04-08 ENCOUNTER — APPOINTMENT (OUTPATIENT)
Dept: GENERAL RADIOLOGY | Age: 73
DRG: 291 | End: 2018-04-08
Attending: FAMILY MEDICINE
Payer: MEDICARE

## 2018-04-08 LAB
ALBUMIN SERPL-MCNC: 3 G/DL (ref 3.5–5)
ALBUMIN/GLOB SERPL: 1 {RATIO} (ref 1.1–2.2)
ALP SERPL-CCNC: 119 U/L (ref 45–117)
ALT SERPL-CCNC: 17 U/L (ref 12–78)
AMYLASE SERPL-CCNC: 47 U/L (ref 25–115)
ANION GAP SERPL CALC-SCNC: 10 MMOL/L (ref 5–15)
ANION GAP SERPL CALC-SCNC: 9 MMOL/L (ref 5–15)
APPEARANCE UR: CLEAR
AST SERPL-CCNC: 8 U/L (ref 15–37)
BACTERIA SPEC CULT: NORMAL
BACTERIA URNS QL MICRO: NEGATIVE /HPF
BASOPHILS # BLD: 0 K/UL (ref 0–0.1)
BASOPHILS NFR BLD: 0 % (ref 0–1)
BILIRUB DIRECT SERPL-MCNC: 0.2 MG/DL (ref 0–0.2)
BILIRUB SERPL-MCNC: 0.7 MG/DL (ref 0.2–1)
BILIRUB UR QL: NEGATIVE
BUN SERPL-MCNC: 26 MG/DL (ref 6–20)
BUN SERPL-MCNC: 27 MG/DL (ref 6–20)
BUN/CREAT SERPL: 14 (ref 12–20)
BUN/CREAT SERPL: 15 (ref 12–20)
CALCIUM SERPL-MCNC: 7.8 MG/DL (ref 8.5–10.1)
CALCIUM SERPL-MCNC: 8.2 MG/DL (ref 8.5–10.1)
CC UR VC: NORMAL
CHLORIDE SERPL-SCNC: 104 MMOL/L (ref 97–108)
CHLORIDE SERPL-SCNC: 106 MMOL/L (ref 97–108)
CO2 SERPL-SCNC: 24 MMOL/L (ref 21–32)
CO2 SERPL-SCNC: 25 MMOL/L (ref 21–32)
COLOR UR: NORMAL
CREAT SERPL-MCNC: 1.76 MG/DL (ref 0.55–1.02)
CREAT SERPL-MCNC: 1.8 MG/DL (ref 0.55–1.02)
DIFFERENTIAL METHOD BLD: ABNORMAL
EOSINOPHIL # BLD: 0 K/UL (ref 0–0.4)
EOSINOPHIL NFR BLD: 0 % (ref 0–7)
EPITH CASTS URNS QL MICRO: NORMAL /LPF
ERYTHROCYTE [DISTWIDTH] IN BLOOD BY AUTOMATED COUNT: 19.9 % (ref 11.5–14.5)
GLOBULIN SER CALC-MCNC: 2.9 G/DL (ref 2–4)
GLUCOSE SERPL-MCNC: 100 MG/DL (ref 65–100)
GLUCOSE SERPL-MCNC: 101 MG/DL (ref 65–100)
GLUCOSE UR STRIP.AUTO-MCNC: NEGATIVE MG/DL
HCT VFR BLD AUTO: 28.8 % (ref 35–47)
HGB BLD-MCNC: 9.2 G/DL (ref 11.5–16)
HGB UR QL STRIP: NEGATIVE
HYALINE CASTS URNS QL MICRO: NORMAL /LPF (ref 0–5)
IMM GRANULOCYTES # BLD: 0 K/UL (ref 0–0.04)
IMM GRANULOCYTES NFR BLD AUTO: 0 % (ref 0–0.5)
KETONES UR QL STRIP.AUTO: NEGATIVE MG/DL
LACTATE SERPL-SCNC: 0.6 MMOL/L (ref 0.4–2)
LEUKOCYTE ESTERASE UR QL STRIP.AUTO: NEGATIVE
LYMPHOCYTES # BLD: 0.7 K/UL (ref 0.8–3.5)
LYMPHOCYTES NFR BLD: 6 % (ref 12–49)
MCH RBC QN AUTO: 23.7 PG (ref 26–34)
MCHC RBC AUTO-ENTMCNC: 31.9 G/DL (ref 30–36.5)
MCV RBC AUTO: 74 FL (ref 80–99)
MONOCYTES # BLD: 0.6 K/UL (ref 0–1)
MONOCYTES NFR BLD: 5 % (ref 5–13)
NEUTS SEG # BLD: 10 K/UL (ref 1.8–8)
NEUTS SEG NFR BLD: 89 % (ref 32–75)
NITRITE UR QL STRIP.AUTO: NEGATIVE
NRBC # BLD: 0 K/UL (ref 0–0.01)
NRBC BLD-RTO: 0 PER 100 WBC
PH UR STRIP: 5 [PH] (ref 5–8)
PLATELET # BLD AUTO: 198 K/UL (ref 150–400)
PMV BLD AUTO: 11.2 FL (ref 8.9–12.9)
POTASSIUM SERPL-SCNC: 3.3 MMOL/L (ref 3.5–5.1)
POTASSIUM SERPL-SCNC: 3.4 MMOL/L (ref 3.5–5.1)
PROT SERPL-MCNC: 5.9 G/DL (ref 6.4–8.2)
PROT UR STRIP-MCNC: NEGATIVE MG/DL
RBC # BLD AUTO: 3.89 M/UL (ref 3.8–5.2)
RBC #/AREA URNS HPF: NORMAL /HPF (ref 0–5)
RBC MORPH BLD: ABNORMAL
SERVICE CMNT-IMP: NORMAL
SODIUM SERPL-SCNC: 138 MMOL/L (ref 136–145)
SODIUM SERPL-SCNC: 140 MMOL/L (ref 136–145)
SP GR UR REFRACTOMETRY: 1.01 (ref 1–1.03)
UA: UC IF INDICATED,UAUC: NORMAL
UROBILINOGEN UR QL STRIP.AUTO: 0.2 EU/DL (ref 0.2–1)
WBC # BLD AUTO: 11.3 K/UL (ref 3.6–11)
WBC URNS QL MICRO: NORMAL /HPF (ref 0–4)

## 2018-04-08 PROCEDURE — 74011250637 HC RX REV CODE- 250/637: Performed by: FAMILY MEDICINE

## 2018-04-08 PROCEDURE — 74011250636 HC RX REV CODE- 250/636: Performed by: INTERNAL MEDICINE

## 2018-04-08 PROCEDURE — 81001 URINALYSIS AUTO W/SCOPE: CPT

## 2018-04-08 PROCEDURE — 80048 BASIC METABOLIC PNL TOTAL CA: CPT

## 2018-04-08 PROCEDURE — 74011250637 HC RX REV CODE- 250/637: Performed by: INTERNAL MEDICINE

## 2018-04-08 PROCEDURE — 85025 COMPLETE CBC W/AUTO DIFF WBC: CPT

## 2018-04-08 PROCEDURE — 65660000000 HC RM CCU STEPDOWN

## 2018-04-08 PROCEDURE — 36415 COLL VENOUS BLD VENIPUNCTURE: CPT | Performed by: INTERNAL MEDICINE

## 2018-04-08 PROCEDURE — 82150 ASSAY OF AMYLASE: CPT

## 2018-04-08 PROCEDURE — 83605 ASSAY OF LACTIC ACID: CPT

## 2018-04-08 PROCEDURE — 74018 RADEX ABDOMEN 1 VIEW: CPT

## 2018-04-08 PROCEDURE — 80048 BASIC METABOLIC PNL TOTAL CA: CPT | Performed by: INTERNAL MEDICINE

## 2018-04-08 PROCEDURE — 80076 HEPATIC FUNCTION PANEL: CPT

## 2018-04-08 RX ORDER — ENOXAPARIN SODIUM 100 MG/ML
30 INJECTION SUBCUTANEOUS EVERY 24 HOURS
Status: DISCONTINUED | OUTPATIENT
Start: 2018-04-09 | End: 2018-04-10 | Stop reason: HOSPADM

## 2018-04-08 RX ORDER — CARVEDILOL 6.25 MG/1
6.25 TABLET ORAL 2 TIMES DAILY WITH MEALS
Status: DISCONTINUED | OUTPATIENT
Start: 2018-04-08 | End: 2018-04-10 | Stop reason: HOSPADM

## 2018-04-08 RX ORDER — DOCUSATE SODIUM 100 MG/1
100 CAPSULE, LIQUID FILLED ORAL 2 TIMES DAILY
Status: DISCONTINUED | OUTPATIENT
Start: 2018-04-08 | End: 2018-04-09

## 2018-04-08 RX ORDER — ACETAMINOPHEN 325 MG/1
650 TABLET ORAL
Status: DISCONTINUED | OUTPATIENT
Start: 2018-04-08 | End: 2018-04-10 | Stop reason: HOSPADM

## 2018-04-08 RX ORDER — HYDRALAZINE HYDROCHLORIDE 25 MG/1
25 TABLET, FILM COATED ORAL 3 TIMES DAILY
Status: DISCONTINUED | OUTPATIENT
Start: 2018-04-08 | End: 2018-04-10 | Stop reason: HOSPADM

## 2018-04-08 RX ADMIN — AMLODIPINE BESYLATE 5 MG: 5 TABLET ORAL at 08:50

## 2018-04-08 RX ADMIN — CARVEDILOL 6.25 MG: 6.25 TABLET, FILM COATED ORAL at 16:45

## 2018-04-08 RX ADMIN — ACETAMINOPHEN 650 MG: 325 TABLET ORAL at 19:37

## 2018-04-08 RX ADMIN — Medication 10 ML: at 21:02

## 2018-04-08 RX ADMIN — Medication 10 ML: at 06:00

## 2018-04-08 RX ADMIN — GABAPENTIN 300 MG: 300 CAPSULE ORAL at 21:02

## 2018-04-08 RX ADMIN — LACTULOSE 20 G: 20 SOLUTION ORAL at 20:58

## 2018-04-08 RX ADMIN — Medication 10 ML: at 14:00

## 2018-04-08 RX ADMIN — ISOSORBIDE MONONITRATE 30 MG: 30 TABLET, EXTENDED RELEASE ORAL at 08:50

## 2018-04-08 RX ADMIN — HYDRALAZINE HYDROCHLORIDE 25 MG: 25 TABLET, FILM COATED ORAL at 21:02

## 2018-04-08 RX ADMIN — ASPIRIN 81 MG 81 MG: 81 TABLET ORAL at 08:49

## 2018-04-08 RX ADMIN — CARVEDILOL 12.5 MG: 12.5 TABLET, FILM COATED ORAL at 08:50

## 2018-04-08 RX ADMIN — HYDRALAZINE HYDROCHLORIDE 50 MG: 50 TABLET, FILM COATED ORAL at 08:50

## 2018-04-08 RX ADMIN — GABAPENTIN 300 MG: 300 CAPSULE ORAL at 08:49

## 2018-04-08 RX ADMIN — GABAPENTIN 300 MG: 300 CAPSULE ORAL at 16:45

## 2018-04-08 RX ADMIN — BUMETANIDE 1 MG: 1 TABLET ORAL at 08:49

## 2018-04-08 RX ADMIN — DOCUSATE SODIUM 100 MG: 100 CAPSULE, LIQUID FILLED ORAL at 20:58

## 2018-04-08 RX ADMIN — ATORVASTATIN CALCIUM 20 MG: 20 TABLET, FILM COATED ORAL at 21:02

## 2018-04-08 RX ADMIN — PANTOPRAZOLE SODIUM 40 MG: 40 TABLET, DELAYED RELEASE ORAL at 08:50

## 2018-04-08 RX ADMIN — ENOXAPARIN SODIUM 40 MG: 40 INJECTION SUBCUTANEOUS at 08:50

## 2018-04-08 RX ADMIN — HYDRALAZINE HYDROCHLORIDE 25 MG: 25 TABLET, FILM COATED ORAL at 16:45

## 2018-04-08 NOTE — PROGRESS NOTES
7:30 AM Received bedside report from [employer] 
10:33 AM Paged Dr. Nelson Murillo for BP 80/50 and patient is symptomatic. Patient lying down on the bed. Will continue to monitor. 10:57 AM Paged Dr. Nelson Murillo again. 2:58 PM Paged David Vázquez again as the patient is complaining about abdominal cramping and nausea. She also threw up twice. Heating pad provided for cramping.  
 
3: 40 PM Got a call back from Nurse from the cath lab stating he is in the middle of something and will get back to me when he is done. 7:00 PM Got verbal orders from Dr. Tobin Bae for tylenol 650 mg every 4 hr PRN for the abdominal pain. 7:33 PM Talked with the hospitalist on call Dr. Gallito Gauthier. MD ordered UA  culture with oriana rodriguez nd xray of the abdomen.

## 2018-04-08 NOTE — PROGRESS NOTES
Pharmacy  Enoxaparin (Lovenox®) Monitoring/Dosing      Indication: VTE ppx     Current Dose: Enoxaparin 40 mg subcutaneously every 24 hours    Creatinine Clearance (mL/min): 28 mL/min       Labs:  Recent Labs      04/08/18   0341  04/06/18   1658   CREA  1.80*  1.50*   HGB   --   9.2*   PLT   --   198   INR   --   1.3*     Wt Readings from Last 1 Encounters:   04/08/18 65.6 kg (144 lb 10 oz)     Ht Readings from Last 1 Encounters:   04/06/18 170.2 cm (67\")       Impression/Plan: Will change to enoxaparin 30 mg daily for CrCl < 30 mL/min per renal dosing protocol. Thanks,  SYLWIA LobatoD      [email or web address]/[email or web address]. pdf

## 2018-04-08 NOTE — PROGRESS NOTES
Progress Note      4/8/2018 3:12 PM  NAME: Vilma Perez   MRN:  [id number]   Admit Diagnosis: Systolic CHF, acute ([facility] [address]     Assessment:     Angina  Acute on chronic systolic heart failure. Ischemic Cardiomyopathy. LV EF 25%   CAD   Remote MI  Remote PCI 2005   S/p Nephrectomy for renal cell CA   CKD stage 3. GERD   Anemia. Diabetes type 2. Hypertension uncontrolled. 4/7  BP is under some better control but needs further adjustment. 4/8  BP a little low after meds this AM.    Will adjust.   Currently 98/55        Plan:         Adjust meds. See orders. [x]        High complexity decision making was performed    Subjective:     Stefania Walker denies chest pain, dyspnea. Discussed with RN events overnight.      Patient Active Problem List   Diagnosis Code    Renal mass N28.89    Renal cell carcinoma of right kidney (HCC) C64.1    SVT (supraventricular tachycardia) (HCC) I47.1    Paroxysmal atrial fibrillation (HCC) I48.0    Orthostatic hypotension I95.1    Left lower lobe pneumonia ([facility]) J18.1    Diverticulitis K57.92    Leukocytosis D72.829    History of permanent cardiac pacemaker placement Z95.0    Chronic systolic heart failure (HCC) I50.22    Acute cholecystitis K81.0    Accelerated hypertension I10    CKD (chronic kidney disease) stage 4, GFR 15-29 ml/min (HCC) N18.4    Seropositive rheumatoid arthritis of multiple sites (HCC) M05.79    Long-term use of immunosuppressant medication Z79.899    Primary osteoarthritis of both knees T45.5    Systolic CHF, acute (HCC) I50.21       Review of Systems:    Symptom Y/N Comments  Symptom Y/N Comments   Fever/Chills N   Chest Pain N    Poor Appetite N   Edema N    Cough N   Abdominal Pain N    Sputum N   Joint Pain N    SOB/ROCK N   Pruritis/Rash N    Nausea/vomit N   Tolerating PT/OT Y    Diarrhea N   Tolerating Diet Y    Constipation N   Other       Could NOT obtain due to:      Objective:      Physical Exam:    Last 24hrs VS reviewed since prior progress note. Most recent are:    Visit Vitals    BP 98/55 (BP 1 Location: Left arm, BP Patient Position: At rest)    Pulse 69    Temp 97.9 °F (36.6 °C)    Resp 16    Ht 5' 7\" (1.702 m)    Wt 65.6 kg (144 lb 10 oz)    SpO2 98%    BMI 22.65 kg/m2       Intake/Output Summary (Last 24 hours) at 04/08/18 1145  Last data filed at 04/08/18 1026   Gross per 24 hour   Intake              450 ml   Output              300 ml   Net              150 ml        General Appearance: Well developed, well nourished, alert & oriented x 3,    no acute distress. Ears/Nose/Mouth/Throat: Hearing grossly normal.  Neck: Supple. Chest: Lungs clear to auscultation bilaterally. Cardiovascular: Regular rate and rhythm, S1S2 normal, no murmur. Abdomen: Soft, non-tender, bowel sounds are active. Extremities: No edema bilaterally. Skin: Warm and dry. PMH/SH reviewed - no change compared to H&P    Data Review    Telemetry: normal sinus rhythm     Lab Data Personally Reviewed:    Recent Labs      04/06/18   1658   WBC  8.9   HGB  9.2*   HCT  28.2*   PLT  198   LABRCNT(INR:3,PTP:3,APTT:3,)  Recent Labs      04/08/18   0341  04/06/18   1658   NA  140  142   K  3.4*  3.4*   CL  106  110*   CO2  25  21   BUN  26*  25*   CREA  1.80*  1.50*   GLU  101*  107*   CA  8.2*  8.2*   LABRCNT(CPK:3,CpKMB:3,ckndx:3,troiq:3)  Lab Results   Component Value Date/Time    Cholesterol, total 177 06/08/2017 02:25 AM    HDL Cholesterol 40 06/08/2017 02:25 AM    LDL, calculated 107.6 (H) 06/08/2017 02:25 AM    Triglyceride 147 06/08/2017 02:25 AM    CHOL/HDL Ratio 4.4 06/08/2017 02:25 AM   LABRCNT(sgot:3,gpt:3,ap:3,tbiL:3,TP:3,ALB:3,GLOB:3,ggt:3,aml:3,amyp:3,lpse:3,hlpse:3)No results for input(s): PH, PCO2, PO2 in the last 72 hours.   Lab Results   Component Value Date/Time    Cholesterol, total 177 06/08/2017 02:25 AM    HDL Cholesterol 40 06/08/2017 02:25 AM    LDL, calculated 107.6 (H) 06/08/2017 02:25 AM Triglyceride 147 06/08/2017 02:25 AM    CHOL/HDL Ratio 4.4 06/08/2017 02:25 AM   Shonda Charlton MD  No results for input(s): PH, PCO2, PO2 in the last 72 hours.     Medications Personally Reviewed:    Current Facility-Administered Medications   Medication Dose Route Frequency    carvedilol (COREG) tablet 6.25 mg  6.25 mg Oral BID WITH MEALS    hydrALAZINE (APRESOLINE) tablet 25 mg  25 mg Oral TID    aspirin chewable tablet 81 mg  81 mg Oral DAILY    atorvastatin (LIPITOR) tablet 20 mg  20 mg Oral QHS    bumetanide (BUMEX) tablet 1 mg  1 mg Oral DAILY    gabapentin (NEURONTIN) capsule 300 mg  300 mg Oral TID    isosorbide mononitrate ER (IMDUR) tablet 30 mg  30 mg Oral DAILY    loperamide (IMODIUM) 1 mg/5 mL oral solution 1 mg  1 mg Oral QID PRN    pantoprazole (PROTONIX) tablet 40 mg  40 mg Oral ACB    amLODIPine (NORVASC) tablet 5 mg  5 mg Oral DAILY    sodium chloride (NS) flush 5-10 mL  5-10 mL IntraVENous Q8H    sodium chloride (NS) flush 5-10 mL  5-10 mL IntraVENous PRN    LORazepam (ATIVAN) tablet 1 mg  1 mg Oral BID PRN    enoxaparin (LOVENOX) injection 40 mg  40 mg SubCUTAneous Q24H         Mark Colbert MD

## 2018-04-09 LAB
ANION GAP SERPL CALC-SCNC: 13 MMOL/L (ref 5–15)
BUN SERPL-MCNC: 25 MG/DL (ref 6–20)
BUN/CREAT SERPL: 15 (ref 12–20)
CALCIUM SERPL-MCNC: 8.1 MG/DL (ref 8.5–10.1)
CHLORIDE SERPL-SCNC: 104 MMOL/L (ref 97–108)
CO2 SERPL-SCNC: 24 MMOL/L (ref 21–32)
CREAT SERPL-MCNC: 1.68 MG/DL (ref 0.55–1.02)
GLUCOSE SERPL-MCNC: 95 MG/DL (ref 65–100)
POTASSIUM SERPL-SCNC: 3.1 MMOL/L (ref 3.5–5.1)
SODIUM SERPL-SCNC: 141 MMOL/L (ref 136–145)

## 2018-04-09 PROCEDURE — 74011250637 HC RX REV CODE- 250/637: Performed by: INTERNAL MEDICINE

## 2018-04-09 PROCEDURE — 74011250636 HC RX REV CODE- 250/636: Performed by: INTERNAL MEDICINE

## 2018-04-09 PROCEDURE — 65660000000 HC RM CCU STEPDOWN

## 2018-04-09 PROCEDURE — 80048 BASIC METABOLIC PNL TOTAL CA: CPT | Performed by: INTERNAL MEDICINE

## 2018-04-09 PROCEDURE — 36415 COLL VENOUS BLD VENIPUNCTURE: CPT | Performed by: INTERNAL MEDICINE

## 2018-04-09 PROCEDURE — 74011250637 HC RX REV CODE- 250/637: Performed by: FAMILY MEDICINE

## 2018-04-09 RX ORDER — POTASSIUM CHLORIDE 750 MG/1
40 TABLET, FILM COATED, EXTENDED RELEASE ORAL ONCE
Status: COMPLETED | OUTPATIENT
Start: 2018-04-09 | End: 2018-04-09

## 2018-04-09 RX ORDER — MAGNESIUM CITRATE
296 SOLUTION, ORAL ORAL
Status: COMPLETED | OUTPATIENT
Start: 2018-04-09 | End: 2018-04-09

## 2018-04-09 RX ORDER — MAGNESIUM SULFATE HEPTAHYDRATE 40 MG/ML
2 INJECTION, SOLUTION INTRAVENOUS ONCE
Status: COMPLETED | OUTPATIENT
Start: 2018-04-09 | End: 2018-04-09

## 2018-04-09 RX ORDER — POLYETHYLENE GLYCOL 3350 17 G/17G
17 POWDER, FOR SOLUTION ORAL 2 TIMES DAILY
Status: DISCONTINUED | OUTPATIENT
Start: 2018-04-09 | End: 2018-04-10 | Stop reason: HOSPADM

## 2018-04-09 RX ADMIN — AMLODIPINE BESYLATE 5 MG: 5 TABLET ORAL at 09:42

## 2018-04-09 RX ADMIN — ASPIRIN 81 MG 81 MG: 81 TABLET ORAL at 09:42

## 2018-04-09 RX ADMIN — Medication 10 ML: at 06:00

## 2018-04-09 RX ADMIN — Medication 10 ML: at 13:48

## 2018-04-09 RX ADMIN — ENOXAPARIN SODIUM 30 MG: 30 INJECTION SUBCUTANEOUS at 09:23

## 2018-04-09 RX ADMIN — DOCUSATE SODIUM 100 MG: 100 CAPSULE, LIQUID FILLED ORAL at 18:20

## 2018-04-09 RX ADMIN — POTASSIUM CHLORIDE 40 MEQ: 750 TABLET, EXTENDED RELEASE ORAL at 21:14

## 2018-04-09 RX ADMIN — PANTOPRAZOLE SODIUM 40 MG: 40 TABLET, DELAYED RELEASE ORAL at 09:42

## 2018-04-09 RX ADMIN — BUMETANIDE 1 MG: 1 TABLET ORAL at 09:41

## 2018-04-09 RX ADMIN — CARVEDILOL 6.25 MG: 6.25 TABLET, FILM COATED ORAL at 09:42

## 2018-04-09 RX ADMIN — ISOSORBIDE MONONITRATE 30 MG: 30 TABLET, EXTENDED RELEASE ORAL at 09:41

## 2018-04-09 RX ADMIN — HYDRALAZINE HYDROCHLORIDE 25 MG: 25 TABLET, FILM COATED ORAL at 21:14

## 2018-04-09 RX ADMIN — DOCUSATE SODIUM 100 MG: 100 CAPSULE, LIQUID FILLED ORAL at 09:41

## 2018-04-09 RX ADMIN — CARVEDILOL 6.25 MG: 6.25 TABLET, FILM COATED ORAL at 18:20

## 2018-04-09 RX ADMIN — Medication 10 ML: at 21:14

## 2018-04-09 RX ADMIN — GABAPENTIN 300 MG: 300 CAPSULE ORAL at 18:20

## 2018-04-09 RX ADMIN — MAGNESIUM SULFATE HEPTAHYDRATE 2 G: 40 INJECTION, SOLUTION INTRAVENOUS at 21:15

## 2018-04-09 RX ADMIN — HYDRALAZINE HYDROCHLORIDE 25 MG: 25 TABLET, FILM COATED ORAL at 09:42

## 2018-04-09 RX ADMIN — GABAPENTIN 300 MG: 300 CAPSULE ORAL at 21:14

## 2018-04-09 RX ADMIN — GABAPENTIN 300 MG: 300 CAPSULE ORAL at 09:42

## 2018-04-09 RX ADMIN — HYDRALAZINE HYDROCHLORIDE 25 MG: 25 TABLET, FILM COATED ORAL at 18:20

## 2018-04-09 RX ADMIN — MAGESIUM CITRATE 296 ML: 1.75 LIQUID ORAL at 13:47

## 2018-04-09 RX ADMIN — ATORVASTATIN CALCIUM 20 MG: 20 TABLET, FILM COATED ORAL at 21:15

## 2018-04-09 NOTE — PROGRESS NOTES
Hospitalist Progress Note    NAME: Latanya Gonzales   :  1945   MRN:  [id number]     Assessment / Plan:  Abdominal pain, generalized suspect due to Constipation, acute  -mag citrate ordered  -miralax ordered  -once bowels start to move will see if pain improves - suspect control of bowels will help her lower chest pain as well given several days since last movement  -lab work up for etiology of abd pain essentially neg  -up OOB to moving if allowed by cardiology  -if pain does not improve then need to consider CTa abd to ensure not ischemic in etiolgoy given known cardiac function    Hypokalemia  Mild elevated LFT  Diabetes type 2, controlled  S/p Nephrectomy for renal cell CA with CKD stage 3  GERD   Anemia, chronic illness  -remain on home medications, no changes  -creat to baseline  -replace K PO  -lft returning to normal - suspect intertwined with cardiac process    Chest pain with angina in patient with known ischemic cardiomyopathy ef 25%  Hypertension uncontrolled  -per cardiology  -pain is improved  -remain on tele       Subjective:     Chief Complaint / Reason for Physician Visit  \"i feel tight in my abdomen\". Discussed with RN events overnight. No bm this day yet. abd pain is overall some improved though but \"tight\"    Review of Systems:  Symptom Y/N Comments  Symptom Y/N Comments   Fever/Chills n   Chest Pain n    Poor Appetite y   Edema     Cough n   Abdominal Pain y    Sputum n   Joint Pain     SOB/ROCK    Pruritis/Rash     Nausea/vomit    Tolerating PT/OT     Diarrhea n   Tolerating Diet n Feels full   Constipation n   Other       Could NOT obtain due to:      Objective:     VITALS:   Last 24hrs VS reviewed since prior progress note.  Most recent are:  Patient Vitals for the past 24 hrs:   Temp Pulse Resp BP SpO2   18 0728 98.4 °F (36.9 °C) 76 18 125/67 100 %   18 0252 98.3 °F (36.8 °C) 70 18 128/70 98 %   18 2336 98.2 °F (36.8 °C) 70 20 112/67 97 %   18 2042 98.1 °F (36.7 °C) 65 20 111/60 100 %   04/08/18 1538 98.2 °F (36.8 °C) 75 18 122/69 -   04/08/18 1142 - - - 98/55 -   04/08/18 1023 97.9 °F (36.6 °C) 69 16 (!) 84/50 98 %       Intake/Output Summary (Last 24 hours) at 04/09/18 1000  Last data filed at 04/09/18 0136   Gross per 24 hour   Intake              810 ml   Output              650 ml   Net              160 ml        PHYSICAL EXAM:  General: WD, chronically ill appearing f sitting up in bed, interactive, Alert, cooperative, no acute distress    EENT:  EOMI. Anicteric sclerae. MM dry  Resp:  CTA bilaterally, no wheezing or rales. No accessory muscle use  CV:  Regular  rhythm,  No edema  GI:  Soft, Non distended, minimal diffuse tenderness, +Bowel sounds  Neurologic:  Alert and oriented X 3, normal speech  Psych:   Good insight. Not anxious nor agitated  Skin:  no rashes or ulcers. No jaundice    Reviewed most current lab test results and cultures  YES  Reviewed most current radiology test results   YES  Review and summation of old records today    NO  Reviewed patient's current orders and MAR    YES  PMH/ reviewed - no change compared to H&P  ________________________________________________________________________  Care Plan discussed with:    Comments   Patient x Discussed with patient in room. POC discussed. Questions answered (20   Family      RN x 5   Care Manager     Consultant:                       Multidiciplinary team rounds were held today with , nursing, pharmacist and clinical coordinator. Patient's plan of care was discussed; medications were reviewed and discharge planning was addressed. ________________________________________________________________________  Total NON critical care TIME:  30   Minutes    Total CRITICAL CARE TIME Spent:   Minutes non procedure based. I have provided critical care time. During this entire length of time I was immediately available to the patient.  The reason for providing this level of medical care was due to a critical illness that impaired one or more vital organ systems, such that there was a high probability of imminent or life threatening deterioration in the patient's condition. This care involved high complexity decision making which includes reviewing the patient's past medical records, current laboratory results, and actual Xray films in order to assess, support vital system function, and to treat this degree of vital organ system failure, and to prevent further life threatening deterioration of the patients condition. Comments   >50% of visit spent in counseling and coordination of care x See above   ________________________________________________________________________  Procedures: see electronic medical records for all procedures/Xrays and details which were not copied into this note but were reviewed prior to creation of Plan. LABS:  Recent Labs      04/08/18 2049 04/06/18   1658   WBC  11.3*  8.9   HGB  9.2*  9.2*   HCT  28.8*  28.2*   PLT  198  198     Recent Labs      04/09/18   0253  04/08/18 2049 04/08/18   0341   NA  141  138  140   K  3.1*  3.3*  3.4*   CL  104  104  106   CO2  24  24  25   BUN  25*  27*  26*   CREA  1.68*  1.76*  1.80*   GLU  95  100  101*   CA  8.1*  7.8*  8.2*     Recent Labs      04/08/18 2049 04/06/18   1658   SGOT  8*  12*   ALT  17  25   AP  119*  135*   TBILI  0.7  1.2*   TP  5.9*  6.1*   ALB  3.0*  3.4*   GLOB  2.9  2.7   AML  47   --    LPSE   --   69*     Recent Labs      04/06/18   1658   INR  1.3*   PTP  13.0*      No results for input(s): FE, TIBC, PSAT, FERR in the last 72 hours. No results found for: FOL, RBCF   No results for input(s): PH, PCO2, PO2 in the last 72 hours. No results for input(s): PHI, PO2I, PCO2I in the last 72 hours.   Recent Labs      04/06/18   1658   CPK  50   TROIQ  0.08*     Lab Results   Component Value Date/Time    Cholesterol, total 177 06/08/2017 02:25 AM    HDL Cholesterol 40 06/08/2017 02:25 AM    LDL, calculated 107.6 (H) 06/08/2017 02:25 AM    Triglyceride 147 06/08/2017 02:25 AM    CHOL/HDL Ratio 4.4 06/08/2017 02:25 AM     Lab Results   Component Value Date/Time    Glucose (POC) 161 (H) 06/12/2017 07:57 PM    Glucose (POC) 78 03/02/2017 05:05 PM    Glucose (POC) 83 03/02/2017 11:42 AM    Glucose (POC) 90 03/02/2017 07:43 AM    Glucose (POC) 97 03/01/2017 03:06 PM     Lab Results   Component Value Date/Time    Color YELLOW/STRAW 04/08/2018 07:45 PM    Appearance CLEAR 04/08/2018 07:45 PM    Specific gravity 1.014 04/08/2018 07:45 PM    Specific gravity >1.030 (H) 04/06/2018 05:24 PM    pH (UA) 5.0 04/08/2018 07:45 PM    Protein NEGATIVE  04/08/2018 07:45 PM    Glucose NEGATIVE  04/08/2018 07:45 PM    Ketone NEGATIVE  04/08/2018 07:45 PM    Bilirubin NEGATIVE  04/08/2018 07:45 PM    Urobilinogen 0.2 04/08/2018 07:45 PM    Nitrites NEGATIVE  04/08/2018 07:45 PM    Leukocyte Esterase NEGATIVE  04/08/2018 07:45 PM    Epithelial cells FEW 04/08/2018 07:45 PM    Bacteria NEGATIVE  04/08/2018 07:45 PM    WBC 0-4 04/08/2018 07:45 PM    RBC 0-5 04/08/2018 07:45 PM       RADIOGRAPHIC STUDIES:  CXR Results  (Last 48 hours)    None          CT Results  (Last 48 hours)    None          Echo Results  (Last 48 hours)    None          VENOUS DOPPLER results  (Last 48 hours)    None          CULTURES:    Lab Results   Component Value Date/Time    Specimen Description: URINE 06/08/2013 09:00 PM    Lab Results   Component Value Date/Time    Culture result: MIXED UROGENITAL PARDEEP ISOLATED 04/06/2018 05:24 PM    Culture result: MIXED UROGENITAL PARDEEP ISOLATED 02/24/2017 02:41 PM    Culture result: NO GROWTH 4 DAYS 01/27/2017 01:30 PM    Culture result: NO GROWTH 4 DAYS 01/27/2017 01:30 PM    Culture result: NO GROWTH 6 DAYS 01/27/2017 11:16 AM          Signed: Vaibhav Werner MD    This note will not be viewable in [address]

## 2018-04-09 NOTE — PROGRESS NOTES
Pt is a 72 yo  female admitted on 2/8/60 for acute systolic CHF. Pt lives in a [place] ([address] [place]) with daughter, son. Pt is independent in ADLs/IADLs not to include driving. Pt has used [facility] and [facility] in the past, also been to [facility] in February 2017. Pt has a RW, rollator, and cane at home. Pt to dc home by private vehicle with family. Pt's family to provide transport to follow-up care appointments. Pt's preferred Rx is [facility] ([address]). CM met with pt to verify demographic info and complete initial assessment, dc planning. Pt is alert and oriented x 4. Pt has no PT/OT needs at this time. Pt sees Mary Carmen Craig, [facility]) and Dr. Hiren Acosta ([address]) outpatient. CM will continue to follow-up to ensure additional CM needs are met. Reason for Admission:  Acute systolic CHF        RRAT Score: 29          Reason for Admission as identified by patient: \"Difficulty breathing, fluid\"         Resources/supports as identified by patient/family:  Family provides significant support, friends in area       Challenges facing patient:          Finances     None at this time     Transportation   None at this time         Support system  Appropriate at this time         Living arrangements Appropriate, has supervision at home as neded         Self-care/ADLs/Cognition  Independent   Connection to healthcare providers/adherence to healthcare plan    Appropriate at this time.  Has used HH, SNF in the past.          Health literacy   Appropriate                    Prescription concerns  None at this time         Current Advanced Care Plan: Full Code - ACP on file at this time         Plan for utilizing home health: Possible HH or H2H visit          Plan while patient is hospitalized: Continue to diurese          Expected Date of Discharge:  4/10/18 vs 4/11/18       Plan for communication with patient post discharge (who, when, how): Communicate with pt and family directly by telephone. Likelihood of readmission:   Moderate    Transition of Care Plan: Maintain follow-up care appointments. Possible HH SN or H2H for CHF         Care Management Interventions  PCP Verified by CM: Yes  Palliative Care Criteria Met (RRAT>21 & CHF Dx)?: Yes  Palliative Consult Recommended?:  (CM to consult MD)  Mode of Transport at Discharge:  Other (see comment) (By private vehicle with family)  Transition of Care Consult (CM Consult): Discharge Planning  Discharge Durable Medical Equipment: No (RW, Rollator, Cane at home)  Health Maintenance Reviewed: Yes  Physical Therapy Consult: No  Occupational Therapy Consult: No  Speech Therapy Consult: No  Current Support Network: Family Lives [place], Own Home, Lives with Caregiver ([facility] ([address]) with son, daughter)  Confirm Follow Up Transport: Family  Plan discussed with Pt/Family/Caregiver: Yes  Discharge Location  Discharge Placement:  (TBD)    OZZIE Rivera Supervisee in Social Work, [employer]  [phone]

## 2018-04-09 NOTE — PROGRESS NOTES
0730  Report received from [facility]. SBAR, Kardex, ED Summary, Procedure Summary, Intake/Output, MAR, Accordion, Recent Results, Med Rec Status and Cardiac Rhythm NSR were discussed. Jessika Alanisrum    1700  Ambulated with patient in the hallway, patient had some weakness and initial dizziness. Not orthostatic. Patient had one BM prior to end of day shift. Patient complained of abdominial cramping. Patient only drank 1/4 of the magnesium citrate, refused miralax.

## 2018-04-09 NOTE — CDMP QUERY
Dr. Carlos Rincon MD :    The diagnosis of  HTN UNCONTROLLED has been documented for this patient. In ICD-10, HTN uncontrolled is an unspecified term. Based on your medical judgement, could your documentation be further specified as:     =>Hypertensive urgency  =>Hypertensive crisis  =>Hypertensive emergency  =>Other Explanation of clinical findings  =>Unable to Determine (no explanation of clinical findings)     The medical record reflects the following clinical findings, treatment, and risk factors:     Risk Factors: presents for SOB, chest pain  Clinical Indicators: /121-186/140--176/114, 'accelerated' HTH noted by Ed Md. Treatment: NitroPaste, Norvasc, Hydralazine, Amlodipine, continue to adjust medicaitons     Please clarify and document your clinical opinion in the progress notes and discharge summary including the definitive and/or presumptive diagnosis, (suspected or probable), related to the above clinical findings. Please include clinical findings supporting your diagnosis. REFERENCE:  -----------------------------------------------  Hypertensive Urgency: SBP > 180 or DBP > 120 in the absence of progressive target organ dysfunction     Hypertensive Crisis: BP elevates rapidly and severely enough to potentially cause organ damage (e.g. severe HA, SOB, nosebleed, severe anxiety, nausea/vomiting, etc.)    Hypertensive Emergency: SBP >180 or DBP > 120 with associated organ dysfunction (e.g. CVA, LOC, memory loss, MI, LOYDA, aortic dissection, angina, pulmonary edema, encephalopathy, retinopathy, HELLP, etc.)  Thank You, Jordan Daniels.  Sandip Coughlin  [address]; F;2079

## 2018-04-09 NOTE — PROGRESS NOTES
Bedside and Verbal shift change report received from [facility]. Report included the following information SBAR, Kardex and Recent Results. SHIFT SUMMARY:            [address] NURSING NOTE   Admission Date 4/6/2018   Admission Diagnosis Systolic CHF, acute ([address]   Consults IP CONSULT TO CARDIOLOGY  IP CONSULT TO HOSPITALIST      Cardiac Monitoring [x] Yes [] No      Purposeful Hourly Rounding [x] Yes    Zuri Score Total Score: 1   Zuri score 3 or > [] Bed Alarm [] Avasys [] 1:1 sitter [] Patient refused (Signed refusal form in chart)   Evan Score Evan Score: 21   Evan score 14 or < [] PMT consult [] Wound Care consult    []  Specialty bed  [] Nutrition consult      Influenza Vaccine Received Flu Vaccine for Current Season (usually Sept-March): Not Flu Season           Oxygen needs? [x] Room air Oxygen @  []1L    []2L    []3L   []4L    []5L   []6L via  NC   Chronic home O2 use? [] Yes [x] No  Perform O2 challenge test and document in progress note using [employer])      Last bowel movement Last Bowel Movement Date: 04/08/18      Urinary Catheter             LDAs               Peripheral IV 04/07/18 Left Antecubital (Active)   Site Assessment Clean, dry, & intact 4/9/2018  4:33 AM   Phlebitis Assessment 0 4/9/2018  4:33 AM   Infiltration Assessment 0 4/9/2018  4:33 AM   Dressing Status Clean, dry, & intact 4/9/2018  4:33 AM   Dressing Type Transparent;Tape 4/9/2018  4:33 AM   Hub Color/Line Status Blue;Flushed 4/9/2018  4:33 AM                         Readmission Risk Assessment Tool Score High Risk            29       Total Score        3 Has Seen PCP in Last 6 Months (Yes=3, No=0)    4 IP Visits Last 12 Months (1-3=4, 4=9, >4=11)    5 Pt. Coverage (Medicare=5 , Medicaid, or Self-Pay=4)    17 Charlson Comorbidity Score (Age + Comorbid Conditions)        Criteria that do not apply:    . Living with Significant Other. Assisted Living. LTAC. SNF.  or   Rehab    Patient Length of Stay (>5 days = 3)       Expected Length of Stay 4d 12h   Actual Length of Stay 3

## 2018-04-09 NOTE — PROGRESS NOTES
Progress Note      4/9/2018 3:12 PM  NAME: Jude Rodriguez   MRN:  [id number]   Admit Diagnosis: Systolic CHF, acute ([facility]     Assessment:     Angina     Improved. Acute on chronic systolic heart failure. Improved. Breathing is better. Ischemic Cardiomyopathy. LV EF 25%   CAD   Remote MI  Remote PCI 2005   S/p Nephrectomy for renal cell CA   CKD stage 3. GERD   Anemia. Diabetes type 2. Hypertension uncontrolled. Abdominal Pain . Improved      4/7  BP is under some better control but needs further adjustment. 4/8  BP a little low after meds this AM.    Will adjust.   Currently 98/55    4/9  BP is better with med adjsutment. Abdomen feels dejuan today. SOB resolved. Still has some rales on exam.   Needs to walk in [place]. Plan:         Adjust meds. See orders. [x]        High complexity decision making was performed    Subjective:     Stefania Sch[employer]er [employer] denies chest pain, dyspnea. Discussed with RN events overnight.      Patient Active Problem List   Diagnosis Code    Renal mass N28.89    Renal cell carcinoma of right kidney (HCC) C64.1    SVT (supraventricular tachycardia) ([facility]) I47.1    Paroxysmal atrial fibrillation (HCC) I48.0    Orthostatic hypotension I95.1    Left lower lobe pneumonia ([facility]) J18.1    Diverticulitis K57.92    Leukocytosis D72.829    History of permanent cardiac pacemaker placement Z95.0    Chronic systolic heart failure (HCC) I50.22    Acute cholecystitis K81.0    Accelerated hypertension I10    CKD (chronic kidney disease) stage 4, GFR 15-29 ml/min ([facility]) N18.4    Seropositive rheumatoid arthritis of multiple sites (HCC) M05.79    Long-term use of immunosuppressant medication Z79.899    Primary osteoarthritis of both knees B36.9    Systolic CHF, acute (HCC) I50.21       Review of Systems:    Symptom Y/N Comments  Symptom Y/N Comments   Fever/Chills N   Chest Pain N    Poor Appetite N   Edema N    Cough N Abdominal Pain N    Sputum N   Joint Pain N    SOB/ROKC N   Pruritis/Rash N    Nausea/vomit N   Tolerating PT/OT Y    Diarrhea N   Tolerating Diet Y    Constipation N   Other       Could NOT obtain due to:      Objective:      Physical Exam:    Last 24hrs VS reviewed since prior progress note. Most recent are:    Visit Vitals    /59 (BP 1 Location: Left arm, BP Patient Position: At rest)    Pulse 71    Temp 98.5 °F (36.9 °C)    Resp 18    Ht 5' 7\" (1.702 m)    Wt 65.4 kg (144 lb 1.6 oz)    SpO2 100%    BMI 22.57 kg/m2       Intake/Output Summary (Last 24 hours) at 04/09/18 1528  Last data filed at 04/09/18 0384   Gross per 24 hour   Intake              360 ml   Output              500 ml   Net             -140 ml        General Appearance: Well developed, well nourished, alert & oriented x 3,    no acute distress. Ears/Nose/Mouth/Throat: Hearing grossly normal.  Neck: Supple. Chest: Lungs clear to auscultation bilaterally. Cardiovascular: Regular rate and rhythm, S1S2 normal, no murmur. Abdomen: Soft, non-tender, bowel sounds are active. Extremities: No edema bilaterally. Skin: Warm and dry.     PMH/SH reviewed - no change compared to H&P    Data Review    Telemetry: normal sinus rhythm     Lab Data Personally Reviewed:    Recent Labs      04/08/18 2049 04/06/18   1658   WBC  11.3*  8.9   HGB  9.2*  9.2*   HCT  28.8*  28.2*   PLT  198  198   LABRCNT(INR:3,PTP:3,APTT:3,)  Recent Labs      04/09/18   0253  04/08/18 2049 04/08/18   0341   NA  141  138  140   K  3.1*  3.3*  3.4*   CL  104  104  106   CO2  24  24  25   BUN  25*  27*  26*   CREA  1.68*  1.76*  1.80*   GLU  95  100  101*   CA  8.1*  7.8*  8.2*   LABRCNT(CPK:3,CpKMB:3,ckndx:3,troiq:3)  Lab Results   Component Value Date/Time    Cholesterol, total 177 06/08/2017 02:25 AM    HDL Cholesterol 40 06/08/2017 02:25 AM    LDL, calculated 107.6 (H) 06/08/2017 02:25 AM    Triglyceride 147 06/08/2017 02:25 AM    CHOL/HDL Ratio 4.4 06/08/2017 02:25 AM   LABRCNT(sgot:3,gpt:3,ap:3,tbiL:3,TP:3,ALB:3,GLOB:3,ggt:3,aml:3,amyp:3,lpse:3,hlpse:3)No results for input(s): PH, PCO2, PO2 in the last 72 hours. Lab Results   Component Value Date/Time    Cholesterol, total 177 06/08/2017 02:25 AM    HDL Cholesterol 40 06/08/2017 02:25 AM    LDL, calculated 107.6 (H) 06/08/2017 02:25 AM    Triglyceride 147 06/08/2017 02:25 AM    CHOL/HDL Ratio 4.4 06/08/2017 02:25 AM   MEDTABLERob Mendoza MD  No results for input(s): PH, PCO2, PO2 in the last 72 hours.     Medications Personally Reviewed:    Current Facility-Administered Medications   Medication Dose Route Frequency    polyethylene glycol (MIRALAX) packet 17 g  17 g Oral BID    carvedilol (COREG) tablet 6.25 mg  6.25 mg Oral BID WITH MEALS    hydrALAZINE (APRESOLINE) tablet 25 mg  25 mg Oral TID    enoxaparin (LOVENOX) injection 30 mg  30 mg SubCUTAneous Q24H    acetaminophen (TYLENOL) tablet 650 mg  650 mg Oral Q4H PRN    docusate sodium (COLACE) capsule 100 mg  100 mg Oral BID    aspirin chewable tablet 81 mg  81 mg Oral DAILY    atorvastatin (LIPITOR) tablet 20 mg  20 mg Oral QHS    bumetanide (BUMEX) tablet 1 mg  1 mg Oral DAILY    gabapentin (NEURONTIN) capsule 300 mg  300 mg Oral TID    isosorbide mononitrate ER (IMDUR) tablet 30 mg  30 mg Oral DAILY    loperamide (IMODIUM) 1 mg/5 mL oral solution 1 mg  1 mg Oral QID PRN    pantoprazole (PROTONIX) tablet 40 mg  40 mg Oral ACB    amLODIPine (NORVASC) tablet 5 mg  5 mg Oral DAILY    sodium chloride (NS) flush 5-10 mL  5-10 mL IntraVENous Q8H    sodium chloride (NS) flush 5-10 mL  5-10 mL IntraVENous PRN    LORazepam (ATIVAN) tablet 1 mg  1 mg Oral BID PRN         Agapito Torres MD

## 2018-04-09 NOTE — PROGRESS NOTES
CM attempted to meet with pt to complete initial assessment, dc planning. Pt is currently being bathed by nursing staff. CM will follow-up at a later time.      Glendy Raymond, MSW Supervisee in Social Work, [address] [place] [address]  [phone]

## 2018-04-09 NOTE — CONSULTS
Medicine Consult Note    Patient: Elizabeth London   Age:  71 y.o. Consulting Physician: Mark Colbert MD  Reason for Consult:  Abdominal pain    HPI:   71 y.o f admitted under Cardiology service with acute CHF and Angina. Patient has PMH of DM, Anemia, CKD, CAD with remote MI and PCI. I was asked to see patient with c/o abdominal pain started this morning and vomiting. She last vomited in the am, now pain but reports some abdominal cramping. She had a BM earlier today but was small and after couple days of constipation. Patient is comfortable, in no distress. Abdomen is benign on exam.     Past Medical History:  Past Medical History:   Diagnosis Date    Arthritis     Autoimmune disease ([address]     rheumatoid     CAD (coronary artery disease)     CHF (congestive heart failure) (HCC)     Chronic pain     neuropathy bilateral feet,knees    Diabetes ([address]     GERD (gastroesophageal reflux disease)     Hypertension     Ill-defined condition     anemia    Ill-defined condition     blood transfusion hx    MI, old     Other ill-defined conditions(799.89)     high cholesterol    Renal cell carcinoma of right kidney ([address]     s/p resection 12/16       Past Surgical History:  Past Surgical History:   Procedure Laterality Date    CARDIAC SURG PROCEDURE UNLIST      three stents placed 2005   [address] UNLIST      HX CHOLECYSTECTOMY  02/28/2017    lap tana with IOC.     HX PACEMAKER  2017/January    ICD    HX TONSILLECTOMY      HX UROLOGICAL  12/22/2016    RIGHT LAPOROSCOPIC HAND ASSISTED RADICAL NEPHRECTOMY       Family History:  Family History   Problem Relation Age of Onset   24 [facility]\"" Cancer Mother      stomach    Other Father      aneurysym   24 [facility]\"" Cancer Brother      lung    Other Brother      stomach problems    Stroke Brother        Social History:  Social History     Social History    Marital status:      Spouse name: N/A    Number of children: N/A    Years of education: N/A     Social History Main Topics    Smoking status: Never Smoker    Smokeless tobacco: Never Used    Alcohol use No    Drug use: No    Sexual activity: Yes     Birth control/ protection: None     Other Topics Concern    None     Social History Narrative       Home Medications:  Prior to Admission medications    Medication Sig Start Date End Date Taking? Authorizing Provider   ergocalciferol (ERGOCALCIFEROL) 50,000 unit capsule Take 1 Cap by mouth every seven (7) days. 7/24/17 7/24/18  Scott Carrasco MD   aspirin 81 mg chewable tablet Take 1 Tab by mouth daily. 6/13/17   Liz Holm MD   atorvastatin (LIPITOR) 20 mg tablet Take 1 Tab by mouth nightly. 6/13/17   Liz Holm MD   bumetanide (BUMEX) 0.5 mg tablet Take 1 Tab by mouth daily. 6/13/17   Liz Holm MD   carvedilol (COREG) 6.25 mg tablet Take 1 Tab by mouth two (2) times daily (with meals). 6/13/17   Liz Holm MD   isosorbide mononitrate ER (IMDUR) 30 mg tablet Take 1 Tab by mouth daily. 6/13/17   Liz Holm MD   hydrALAZINE (APRESOLINE) 10 mg tablet Take 1 Tab by mouth three (3) times daily. Patient taking differently: Take 25 mg by mouth three (3) times daily. dose increased from 10mg 3x/ day to 25mg 3x/day (bottle viewed) 6/13/17   Liz Holm MD   loperamide (IMODIUM) 1 mg/5 mL solution Take 5 mL by mouth four (4) times daily as needed for Diarrhea. 3/3/17   Li Julien MD   gabapentin (NEURONTIN) 300 mg capsule Take 300 mg by mouth three (3) times daily. Historical Provider   omeprazole (PRILOSEC) 20 mg capsule Take 20 mg by mouth daily. Indications: GASTROESOPHAGEAL REFLUX    Historical Provider       Allergies: Allergies   Allergen Reactions    Percocet [Oxycodone-Acetaminophen] Other (comments)     Confused       Review of Systems  A comprehensive review of systems was negative except for that written in the History of Present Illness.   remainder of ten point review of systems reviewed with patient and negative    Physical Exam:     Visit Vitals    /69    Pulse 75    Temp 98.2 °F (36.8 °C)    Resp 18    Ht 5' 7\" (1.702 m)    Wt 65.6 kg (144 lb 10 oz)    SpO2 98%    BMI 22.65 kg/m2       Physical Exam:  General appearance: alert, cooperative, no distress, appears stated age  Head: Normocephalic, without obvious abnormality, atraumatic  Neck: supple, trachea midline  Lungs: clear to auscultation bilaterally  Heart: regular rate and rhythm, S1, S2 normal, systolic murmur   Abdomen: soft, non-tender. Bowel sounds normal   Extremities: extremities normal, atraumatic, no cyanosis or edema  Skin: Skin color, texture, turgor normal. No rashes or lesions  Neurologic: Grossly normal      Intake and Output:  Current Shift:     Last three shifts:  04/07 0701 - 04/08 1900  In: 1170 [P.O.:1170]  Out: 1050 [Urine:1050]    Lab/Data Reviewed:  Recent Results (from the past 24 hour(s))   METABOLIC PANEL, BASIC    Collection Time: 04/08/18  3:41 AM   Result Value Ref Range    Sodium 140 136 - 145 mmol/L    Potassium 3.4 (L) 3.5 - 5.1 mmol/L    Chloride 106 97 - 108 mmol/L    CO2 25 21 - 32 mmol/L    Anion gap 9 5 - 15 mmol/L    Glucose 101 (H) 65 - 100 mg/dL    BUN 26 (H) 6 - 20 MG/DL    Creatinine 1.80 (H) 0.55 - 1.02 MG/DL    BUN/Creatinine ratio 14 12 - 20      GFR est AA 34 (L) >60 ml/min/1.73m2    GFR est non-AA 28 (L) >60 ml/min/1.73m2    Calcium 8.2 (L) 8.5 - 10.1 MG/DL     All lab results for the last 24 hours reviewed. Medications Reviewed.     Assessment/Plan   Active Problems:    Systolic CHF, acute ([address] (4/6/2018)    Abdominal pain:  XR shows moderate amount of stool, all the way to ascending colon   Abdomen is benign on exam, suspect all secondary to constipation  Will send labs, including Amylase and LA for baseline but expecting to be normal     Give Lactulose 30 cc once, then continue Colace bid  Encourage ambulation     Will follow with primary team       Myke Galeana, MD    April 8, 2018

## 2018-04-10 ENCOUNTER — APPOINTMENT (OUTPATIENT)
Dept: CT IMAGING | Age: 73
DRG: 291 | End: 2018-04-10
Attending: HOSPITALIST
Payer: MEDICARE

## 2018-04-10 ENCOUNTER — HOME HEALTH ADMISSION (OUTPATIENT)
Dept: HOME HEALTH SERVICES | Facility: HOME HEALTH | Age: 73
End: 2018-04-10

## 2018-04-10 VITALS
DIASTOLIC BLOOD PRESSURE: 76 MMHG | WEIGHT: 145.7 LBS | BODY MASS INDEX: 22.87 KG/M2 | RESPIRATION RATE: 16 BRPM | HEART RATE: 68 BPM | HEIGHT: 67 IN | OXYGEN SATURATION: 100 % | SYSTOLIC BLOOD PRESSURE: 111 MMHG | TEMPERATURE: 98.1 F

## 2018-04-10 LAB
ANION GAP SERPL CALC-SCNC: 9 MMOL/L (ref 5–15)
BUN SERPL-MCNC: 27 MG/DL (ref 6–20)
BUN/CREAT SERPL: 16 (ref 12–20)
CALCIUM SERPL-MCNC: 8.1 MG/DL (ref 8.5–10.1)
CHLORIDE SERPL-SCNC: 104 MMOL/L (ref 97–108)
CO2 SERPL-SCNC: 24 MMOL/L (ref 21–32)
CREAT SERPL-MCNC: 1.7 MG/DL (ref 0.55–1.02)
GLUCOSE SERPL-MCNC: 95 MG/DL (ref 65–100)
POTASSIUM SERPL-SCNC: 4.2 MMOL/L (ref 3.5–5.1)
SODIUM SERPL-SCNC: 137 MMOL/L (ref 136–145)

## 2018-04-10 PROCEDURE — 74011250637 HC RX REV CODE- 250/637: Performed by: INTERNAL MEDICINE

## 2018-04-10 PROCEDURE — 74011250636 HC RX REV CODE- 250/636: Performed by: INTERNAL MEDICINE

## 2018-04-10 PROCEDURE — 80048 BASIC METABOLIC PNL TOTAL CA: CPT | Performed by: INTERNAL MEDICINE

## 2018-04-10 PROCEDURE — 36415 COLL VENOUS BLD VENIPUNCTURE: CPT | Performed by: INTERNAL MEDICINE

## 2018-04-10 RX ORDER — HYDRALAZINE HYDROCHLORIDE 25 MG/1
25 TABLET, FILM COATED ORAL 3 TIMES DAILY
Qty: 100 TAB | Refills: 12 | Status: SHIPPED | OUTPATIENT
Start: 2018-04-10 | End: 2018-06-05

## 2018-04-10 RX ORDER — ACETAMINOPHEN 325 MG/1
650 TABLET ORAL
Status: SHIPPED | COMMUNITY
Start: 2018-04-10 | End: 2018-09-27

## 2018-04-10 RX ORDER — BARIUM SULFATE 20 MG/ML
900 SUSPENSION ORAL
Status: DISCONTINUED | OUTPATIENT
Start: 2018-04-10 | End: 2018-04-10 | Stop reason: HOSPADM

## 2018-04-10 RX ORDER — POLYETHYLENE GLYCOL 3350 17 G/17G
17 POWDER, FOR SOLUTION ORAL DAILY
Qty: 30 PACKET | Refills: 0 | Status: SHIPPED | OUTPATIENT
Start: 2018-04-10 | End: 2018-11-08

## 2018-04-10 RX ORDER — DOCUSATE SODIUM 100 MG/1
100 CAPSULE, LIQUID FILLED ORAL 2 TIMES DAILY
Qty: 60 CAP | Refills: 2 | Status: SHIPPED | OUTPATIENT
Start: 2018-04-10 | End: 2018-07-09

## 2018-04-10 RX ADMIN — ENOXAPARIN SODIUM 30 MG: 30 INJECTION SUBCUTANEOUS at 08:03

## 2018-04-10 RX ADMIN — HYDRALAZINE HYDROCHLORIDE 25 MG: 25 TABLET, FILM COATED ORAL at 10:24

## 2018-04-10 RX ADMIN — GABAPENTIN 300 MG: 300 CAPSULE ORAL at 10:22

## 2018-04-10 RX ADMIN — ISOSORBIDE MONONITRATE 30 MG: 30 TABLET, EXTENDED RELEASE ORAL at 10:22

## 2018-04-10 RX ADMIN — Medication 10 ML: at 05:48

## 2018-04-10 RX ADMIN — BUMETANIDE 1 MG: 1 TABLET ORAL at 10:23

## 2018-04-10 RX ADMIN — POLYETHYLENE GLYCOL 3350 17 G: 17 POWDER, FOR SOLUTION ORAL at 10:24

## 2018-04-10 RX ADMIN — ASPIRIN 81 MG 81 MG: 81 TABLET ORAL at 10:23

## 2018-04-10 RX ADMIN — PANTOPRAZOLE SODIUM 40 MG: 40 TABLET, DELAYED RELEASE ORAL at 08:03

## 2018-04-10 RX ADMIN — CARVEDILOL 6.25 MG: 6.25 TABLET, FILM COATED ORAL at 08:02

## 2018-04-10 RX ADMIN — AMLODIPINE BESYLATE 5 MG: 5 TABLET ORAL at 10:24

## 2018-04-10 NOTE — ROUTINE PROCESS
PCP GURPREET appt scheduled with Dr. Luanne Fregoso on 4/13/18 at 4:30AM. Appt added to Ebony Germain, CM Specialist

## 2018-04-10 NOTE — PROGRESS NOTES
Pt to discharge home today by private vehicle with family. Pt's family to provide transportation to follow-up care appointments. Pt has no PT/OT needs at this time. Pt is agreeable to [facility] for CHF. Referral sent to [facility] for [facility] via CC, pt accepted. PCP and Cardiology f/u appointments scheduled. Referral sent to [facility]. All information entered into pt AVS.     Pt has no additional CM needs at this time. Floor nurse notified. Care Management Interventions  PCP Verified by CM: Yes  Palliative Care Criteria Met (RRAT>21 & CHF Dx)?: Yes  Palliative Consult Recommended?: No  Reason Palliative Care Not Recommended?: Provider preference to wait  Mode of Transport at Discharge:  Other (see comment) (By private vehicle with family)  Hospital Transport Time of Discharge: [facility] (CM Consult): [facility] for CHF)  [address]: Yes  Discharge Durable Medical Equipment: No (RW, Rollator, Cane at home)  Health Maintenance Reviewed: Yes  Physical Therapy Consult: No  Occupational Therapy Consult: No  Speech Therapy Consult: No  Current Support Network: Family Lives [place], Own Home, Lives with Caregiver ([facility] ([address] [place]) with son, daughter)  Confirm Follow Up Transport: Family  Plan discussed with Pt/Family/Caregiver: Yes  Freedom of Choice Offered: Yes  Discharge Location  Discharge Placement: Home with home health [facility] for CHF)    OZZIE Gamino Supervisee in Social Work, [address] [place] [address]  [phone]

## 2018-04-10 NOTE — PROGRESS NOTES
Hospitalist Progress Note    NAME: Yaron Dupree   :  1945   MRN:  [id number]       Assessment / Plan:  Abdominal pain  -suspected due to constipation  She had 2 small hard BMs yesterday   UA is clear   + mild leukocytosis on admission, + h/o diverticulitis --> will check CT   -continue aggressive bowel regiment   Addendum: pt reported to RN that she didn't have pain today, so I came back to re assess pt. Pt stated that she doesn't have pain and didn't really had it today. Inconsistent history from pt. So, will cancel CT. OK to be dc from medical standpoint. D/w pt bowel regiment and to follow with PCP/ED if pain worse. She verbalized understanding.      DM type II  -by record, not on meds   -BS stable  -hba1c 5.1 2017    Angina / Ischemic cardiomyopathy EF 25%  Acute on chronic HF systolic dysfunction / CAD   Hypertension   -management per primary team     CKD stage III  -Cr at baseline, monitor closely. Avoid nephrotoxic drugs, adjust all meds to GFR. S/p Nephrectomy for renal cell CA with CKD stage 3  GERD   Anemia, chronic illness  Hypokalemia, supplement as needed         Body mass index is 22.82 kg/(m^2). Code status: Full  Prophylaxis: per primary team  Recommended Disposition: per primary team      Subjective:     Chief Complaint / Reason for Physician Visit: following abdominal pain   Reports persistent lower abd pain 10 but was comfortably eating breakfast at that time  Reports that food is not worsening pain   No N/V  Had 2 small/hard BMs yesterday     Discussed with RN events overnight.      Review of Systems:  Symptom Y/N Comments  Symptom Y/N Comments   Fever/Chills    Chest Pain n    Poor Appetite    Edema     Cough    Abdominal Pain n    Sputum    Joint Pain     SOB/ROCK    Pruritis/Rash     Nausea/vomit n   Tolerating PT/OT     Diarrhea    Tolerating Diet     Constipation y   Other       Could NOT obtain due to:      Objective:     VITALS:   Last 24hrs VS reviewed since prior progress note. Most recent are:  Patient Vitals for the past 24 hrs:   Temp Pulse Resp BP SpO2   04/10/18 1118 98.1 °F (36.7 °C) 68 16 111/76 100 %   04/10/18 0714 98.2 °F (36.8 °C) 70 16 122/76 100 %   04/10/18 0308 98.2 °F (36.8 °C) 70 18 130/84 100 %   04/09/18 2310 98.1 °F (36.7 °C) 71 20 109/63 100 %   04/09/18 2004 98.1 °F (36.7 °C) 75 18 118/73 100 %   04/09/18 1819 - - - 107/63 -   04/09/18 1606 98.3 °F (36.8 °C) 70 18 98/60 99 %       Intake/Output Summary (Last 24 hours) at 04/10/18 1310  Last data filed at 04/10/18 0851   Gross per 24 hour   Intake              500 ml   Output             1400 ml   Net             -900 ml        PHYSICAL EXAM:  General: WD, WN. Alert, cooperative, no acute distress    EENT:  EOMI. Anicteric sclerae. MMM  Resp:  CTA bilaterally, no wheezing or rales. No accessory muscle use  CV:  Regular  rhythm,  No edema  GI:  Soft, Non distended, Non tender.  +Bowel sounds  Neurologic:  Alert and oriented X 3, normal speech,   Psych:   Good insight. Not anxious nor agitated  Skin:  No rashes. No jaundice    Reviewed most current lab test results and cultures  YES  Reviewed most current radiology test results   YES  Review and summation of old records today    NO  Reviewed patient's current orders and MAR    YES  PMH/ reviewed - no change compared to H&P  ________________________________________________________________________  Care Plan discussed with:    Comments   Patient y    Family      RN y    Care Manager     Consultant                        Multidiciplinary team rounds were held today with , nursing, pharmacist and clinical coordinator. Patient's plan of care was discussed; medications were reviewed and discharge planning was addressed.      ________________________________________________________________________  Total NON critical care TIME:  25   Minutes    Total CRITICAL CARE TIME Spent:   Minutes non procedure based      Comments   >50% of visit spent in counseling and coordination of care     ________________________________________________________________________  Camime Sims MD     Procedures: see electronic medical records for all procedures/Xrays and details which were not copied into this note but were reviewed prior to creation of Plan. LABS:  I reviewed today's most current labs and imaging studies.   Pertinent labs include:  Recent Labs      04/08/18 2049   WBC  11.3*   HGB  9.2*   HCT  28.8*   PLT  198     Recent Labs      04/10/18   0308  04/09/18   0253  04/08/18 2049   NA  137  141  138   K  4.2  3.1*  3.3*   CL  104  104  104   CO2  24  24  24   GLU  95  95  100   BUN  27*  25*  27*   CREA  1.70*  1.68*  1.76*   CA  8.1*  8.1*  7.8*   ALB   --    --   3.0*   TBILI   --    --   0.7   SGOT   --    --   8*   ALT   --    --   17       Signed: Cammie Sims MD

## 2018-04-10 NOTE — PROGRESS NOTES
0730  Report received from [place], [facility]. SBAR, Kardex, ED Summary, Procedure Summary, Intake/Output, MAR, Accordion, Recent Results, Med Rec Status and Cardiac Rhythm NSR were discussed. [address]     2146  Reviewed all discharge instructions with patient including changes in medications and follow-up appointments. Patient verbalized understanding of all instructions. Patient waiting on ride. Once ride shows up with clothes IV and tele will be removed.

## 2018-04-11 ENCOUNTER — HOME CARE VISIT (OUTPATIENT)
Dept: HOME HEALTH SERVICES | Facility: HOME HEALTH | Age: 73
End: 2018-04-11

## 2018-04-13 ENCOUNTER — HOME CARE VISIT (OUTPATIENT)
Dept: SCHEDULING | Facility: HOME HEALTH | Age: 73
End: 2018-04-13

## 2018-04-13 PROCEDURE — G0495 RN CARE TRAIN/EDU IN HH: HCPCS

## 2018-05-19 ENCOUNTER — HOSPITAL ENCOUNTER (EMERGENCY)
Age: 73
Discharge: HOME | End: 2018-05-19
Attending: INTERNAL MEDICINE
Payer: MEDICARE

## 2018-05-19 ENCOUNTER — APPOINTMENT (OUTPATIENT)
Dept: CT IMAGING | Age: 73
End: 2018-05-19
Attending: NURSE PRACTITIONER
Payer: MEDICARE

## 2018-05-19 VITALS
OXYGEN SATURATION: 99 % | SYSTOLIC BLOOD PRESSURE: 167 MMHG | RESPIRATION RATE: 16 BRPM | TEMPERATURE: 98.2 F | HEART RATE: 80 BPM | BODY MASS INDEX: 23.23 KG/M2 | WEIGHT: 148 LBS | HEIGHT: 67 IN | DIASTOLIC BLOOD PRESSURE: 108 MMHG

## 2018-05-19 DIAGNOSIS — J01.90 ACUTE SINUSITIS, RECURRENCE NOT SPECIFIED, UNSPECIFIED LOCATION: Primary | ICD-10-CM

## 2018-05-19 PROCEDURE — 70450 CT HEAD/BRAIN W/O DYE: CPT

## 2018-05-19 PROCEDURE — 74011250637 HC RX REV CODE- 250/637: Performed by: NURSE PRACTITIONER

## 2018-05-19 PROCEDURE — 99283 EMERGENCY DEPT VISIT LOW MDM: CPT

## 2018-05-19 RX ORDER — ONDANSETRON 4 MG/1
4 TABLET, ORALLY DISINTEGRATING ORAL
Qty: 12 TAB | Refills: 0 | Status: SHIPPED | OUTPATIENT
Start: 2018-05-19 | End: 2018-08-11

## 2018-05-19 RX ORDER — CETIRIZINE HCL 10 MG
10 TABLET ORAL DAILY
Qty: 5 TAB | Refills: 0 | Status: SHIPPED | OUTPATIENT
Start: 2018-05-19 | End: 2018-06-18

## 2018-05-19 RX ORDER — HYDRALAZINE HYDROCHLORIDE 25 MG/1
25 TABLET, FILM COATED ORAL
Status: COMPLETED | OUTPATIENT
Start: 2018-05-19 | End: 2018-05-19

## 2018-05-19 RX ADMIN — HYDRALAZINE HYDROCHLORIDE 25 MG: 25 TABLET, FILM COATED ORAL at 11:34

## 2018-05-19 NOTE — DISCHARGE SUMMARY
Teto Jacobo  MR#: [id number]  : 1945  ACCOUNT #: [de-identified]   ADMIT DATE: 2018  DISCHARGE DATE: 04/10/2018    DATE OF ADMISSION:  2018. DATE OF DISCHARGE:  04/10/2018. DISCHARGE DIAGNOSES:  1. Angina pectoris. 2.  Acute on chronic systolic heart failure. 3.  Ischemic cardiomyopathy with left ventricular ejection fraction 25%. 4.  Coronary artery disease. 5.  Remote myocardial infarction. 6.  Remote PCI . 7.  Status post nephrectomy for renal cell carcinoma. 8.  Chronic kidney disease stage III. 9.  Gastroesophageal reflux. 10.  Anemia. 11.  Diabetes mellitus type 2.  12.  Hypertensive emergency. 13.  Abdominal pain, improved. PRIMARY CARE:  Dr. Cecil Simpson. FOLLOWUP:  With Dr. Cecil Simpson. Followup with Dr. Reginald Cerna. DISCHARGE MEDICATIONS:  Acetaminophen 650 mg every 4 hours as needed, Colace 100 mg b.i.d., MiraLax daily, hydralazine 25 mg t.i.d., aspirin 81 mg daily, atorvastatin 20 mg daily, Bumex drip 0.5 mg daily, carvedilol 6.25 mg b.i.d., ergocalciferol weekly, gabapentin 300 mg t.i.d., Imdur 30 mg daily, Imodium p.r.n., and omeprazole as needed. HISTORY AND PHYSICAL:  Please see the dictated history and physical.  A 73-year-old female presents with shortness of breath and chest pain. This is her second visit to the emergency room this week. Blood pressure was markedly elevated. She had emergency level hypertension. She had not taken her meds for several days resulting in hypertensive crisis. She has a history of ischemic cardiomyopathy. Her initial labs showed she was anemic with a hemoglobin of 9. This remained stable. Creatinine was elevated at 1.5 and went as high as 1.8. Troponin level was normal.    NT-proBNP level was greater than 30,000. Chest x-ray showed no acute findings. Lungs were clear. The cardiac silhouette was enlarged.     HOSPITAL COURSE:  She was treated with intravenous diuresis and started on further antihypertensive medications. Amlodipine was added, hydralazine was added. Medications were adjusted. She was seen in consultation by the hospitalist for assistance in medical management. She improved on this medical regimen. With therapy, her blood pressure came under control and at discharge, her blood pressure is running in the range of 110/75 up to 651/53 and certainly very reasonably controlled on this medical regimen. FOLLOWUP:  As listed.       DISPOSITION:  home      MD ZOHREH Holloway/ALBERTO  D: 05/19/2018 17:45     T: 05/19/2018 18:29  JOB #: [id number]

## 2018-05-19 NOTE — DISCHARGE INSTRUCTIONS
Saline Nasal Washes: Care Instructions  Your Care Instructions  Saline nasal washes help keep the nasal passages open by washing out thick or dried mucus. This simple remedy can help relieve symptoms of allergies, sinusitis, and colds. It also can make the nose feel more comfortable by keeping the mucous membranes moist. You may notice a little burning sensation in your nose the first few times you use the solution, but this usually gets better in a few days. Follow-up care is a key part of your treatment and safety. Be sure to make and go to all appointments, and call your doctor if you are having problems. It's also a good idea to know your test results and keep a list of the medicines you take. How can you care for yourself at home? · You can buy premixed saline solution in a squeeze bottle or other sinus rinse products at a drugstore. Read and follow the instructions on the label. · You also can make your own saline solution by adding 1 teaspoon of salt and 1 teaspoon of baking soda to 2 cups of distilled water. · If you use a homemade solution, pour a small amount into a clean bowl. Using a rubber bulb syringe, squeeze the syringe and place the tip in the salt water. Pull a small amount of the salt water into the syringe by relaxing your hand. · Sit down with your head tilted slightly back. Do not lie down. Put the tip of the bulb syringe or the squeeze bottle a little way into one of your nostrils. Gently drip or squirt a few drops into the nostril. Repeat with the other nostril. Some sneezing and gagging are normal at first.  · Gently blow your nose. · Wipe the syringe or bottle tip clean after each use. · Repeat this 2 or 3 times a day. · Use nasal washes gently if you have nosebleeds often. When should you call for help? Watch closely for changes in your health, and be sure to contact your doctor if:  ? · You often get nosebleeds. ? · You have problems doing the nasal washes.    Where can you learn more? Go to http://nohemy-josé miguel.info/. Enter [id number] [id number] [phone] in the search box to learn more about \"Saline Nasal Washes: Care Instructions. \"  Current as of: May 12, 2017  Content Version: 11.4  © 8261-0966 [employer], [employer]. Care instructions adapted under license by [employer] (which disclaims liability or warranty for this information). If you have questions about a medical condition or this instruction, always ask your healthcare professional. [address] any warranty or liability for your use of this information.

## 2018-05-20 NOTE — ED PROVIDER NOTES
EMERGENCY DEPARTMENT HISTORY AND PHYSICAL EXAM    Date: 5/19/2018  Patient Name: Terrance Gracia    History of Presenting Illness     Chief Complaint   Patient presents with    Headache     starting couple of weeks ago. thinks may be allergy related. History Provided By: Patient    Chief Complaint: headache  Duration: 2 Weeks  Timing:  Gradual and Worsening  Location: frontal and left side of face  Quality: Pressure and pounding  Severity: Moderate  Modifying Factors: none  Associated Symptoms: nasal congestion      HPI: Terrance Gracia is a 71 y.o. female with a PMH of No significant past medical history who presents with headache onset 2 weeks ago. States she went to her PCP 3 days ago and was prescribed prednisone and z pack but did not fill the prescription. Says her head is pounding.,    PCP: Dallas Pal MD    Current Outpatient Prescriptions   Medication Sig Dispense Refill    cetirizine (ZYRTEC) 10 mg tablet Take 1 Tab by mouth daily for 30 days. 5 Tab 0    ondansetron (ZOFRAN ODT) 4 mg disintegrating tablet Take 1 Tab by mouth every eight (8) hours as needed for Nausea. 12 Tab 0    acetaminophen (TYLENOL) 325 mg tablet Take 2 Tabs by mouth every four (4) hours as needed.  hydrALAZINE (APRESOLINE) 25 mg tablet Take 1 Tab by mouth three (3) times daily. 100 Tab 12    docusate sodium (COLACE) 100 mg capsule Take 1 Cap by mouth two (2) times a day for 90 days. 60 Cap 2    polyethylene glycol (MIRALAX) 17 gram packet Take 1 Packet by mouth daily. 30 Packet 0    ergocalciferol (ERGOCALCIFEROL) 50,000 unit capsule Take 1 Cap by mouth every seven (7) days. 12 Cap 3    aspirin 81 mg chewable tablet Take 1 Tab by mouth daily. 30 Tab 0    atorvastatin (LIPITOR) 20 mg tablet Take 1 Tab by mouth nightly. 30 Tab 0    bumetanide (BUMEX) 0.5 mg tablet Take 1 Tab by mouth daily. 30 Tab 0    carvedilol (COREG) 6.25 mg tablet Take 1 Tab by mouth two (2) times daily (with meals).  60 Tab 0    isosorbide mononitrate ER (IMDUR) 30 mg tablet Take 1 Tab by mouth daily. 30 Tab 0    loperamide (IMODIUM) 1 mg/5 mL solution Take 5 mL by mouth four (4) times daily as needed for Diarrhea. 60 mL 0    gabapentin (NEURONTIN) 300 mg capsule Take 300 mg by mouth three (3) times daily.  omeprazole (PRILOSEC) 20 mg capsule Take 20 mg by mouth daily. Indications: GASTROESOPHAGEAL REFLUX         Past History     Past Medical History:  Past Medical History:   Diagnosis Date    Arthritis     Autoimmune disease ([address]     rheumatoid     CAD (coronary artery disease)     [facility]     Chronic pain     neuropathy bilateral feet,knees    Diabetes ([address]     GERD (gastroesophageal reflux disease)     Hypertension     Ill-defined condition     anemia    Ill-defined condition     blood transfusion hx    MI, old     Other ill-defined conditions(799.89)     high cholesterol    Renal cell carcinoma of right kidney ([address]     s/p resection 12/16       Past Surgical History:  Past Surgical History:   Procedure Laterality Date    CARDIAC SURG PROCEDURE UNLIST      three stents placed 2005    CARDIAC SURG PROCEDURE UNLIST      HX CHOLECYSTECTOMY  02/28/2017    lap tana with IOC.  HX PACEMAKER  2017/January    ICD    HX TONSILLECTOMY      HX UROLOGICAL  12/22/2016    RIGHT LAPOROSCOPIC HAND ASSISTED RADICAL NEPHRECTOMY       Family History:  Family History   Problem Relation Age of Onset   [facility] Mother      stomach    Other Father      aneurysym   [facility] Brother      lung    Other Brother      stomach problems    Stroke Brother        Social History:  Social History   Substance Use Topics    Smoking status: Never Smoker    Smokeless tobacco: Never Used    Alcohol use No       Allergies:   Allergies   Allergen Reactions    Percocet [Oxycodone-Acetaminophen] Other (comments)     Confused         Review of Systems   Review of Systems   Constitutional: Negative for fatigue and fever.   Respiratory: Negative for shortness of breath and wheezing. Cardiovascular: Negative for chest pain and palpitations. Gastrointestinal: Negative for abdominal pain. Musculoskeletal: Negative for arthralgias, myalgias, neck pain and neck stiffness. Skin: Negative for pallor and rash. Neurological: Positive for headaches. Negative for dizziness, tremors and weakness. Hematological: Negative for adenopathy. All other systems reviewed and are negative. Physical Exam     Vitals:    05/19/18 1115 05/19/18 1220   BP: (!) 177/114 (!) 167/108   Pulse: 80    Resp: 16    Temp: 98.2 °F (36.8 °C)    SpO2: 99%    Weight: 67.1 kg (148 lb)    Height: 5' 7\" (1.702 m)      Physical Exam   Constitutional: She is oriented to person, place, and time. She appears well-developed and well-nourished. No distress. HENT:   Head: Normocephalic and atraumatic. Right Ear: External ear normal.   Left Ear: External ear normal.   Nose: Nose normal.   Mouth/Throat: Oropharynx is clear and moist.   Eyes: Conjunctivae are normal.   Neck: Normal range of motion. Neck supple. Cardiovascular: Normal rate, regular rhythm and normal heart sounds. Pulmonary/Chest: Effort normal and breath sounds normal. No respiratory distress. She has no wheezes. Abdominal: Soft. Bowel sounds are normal. There is no tenderness. Musculoskeletal: Normal range of motion. Lymphadenopathy:     She has no cervical adenopathy. Neurological: She is alert and oriented to person, place, and time. No cranial nerve deficit. Coordination normal.   Skin: Skin is warm and dry. No rash noted. Psychiatric: She has a normal mood and affect. Her behavior is normal. Judgment and thought content normal.   Nursing note and vitals reviewed. Diagnostic Study Results     Labs -   No results found for this or any previous visit (from the past 12 hour(s)).     Radiologic Studies -   CT HEAD WO CONT   Final Result        CT Results  (Last 48 hours)               05/19/18 1206  CT HEAD WO CONT Final result    Impression:  IMPRESSION:       No evidence for acute intracranial abnormality       Patchy increased sclerosis of the calvarium of uncertain clinical significance. Consider nonemergent bone scan           Narrative:  INDICATION:   Headache; pounding in head        EXAM:  HEAD CT WITHOUT CONTRAST       COMPARISON:  None       TECHNIQUE:  Routine noncontrast axial head CT was performed. Coronal and   sagittal multiplanar reconstructions were obtained. CT dose reduction was   achieved through use of a standardized protocol tailored for this examination   and automatic exposure control for dose modulation. FINDINGS:       The ventricles and cortical sulci are within normal limits. No evidence for acute intracranial hemorrhage, midline shift, or mass effect. Mild bilateral subcortical and periventricular areas of patchy low attenuation   is nonspecific but likely related to changes of chronic small vessel ischemic   disease. The basal cisterns are patent. The visualized paranasal sinuses and mastoid air cells are clear. Patchy increased sclerosis of the calvarium of uncertain significance               CXR Results  (Last 48 hours)    None            Medical Decision Making   I am the first provider for this patient. I reviewed the vital signs, available nursing notes, past medical history, past surgical history, family history and social history. Vital Signs-Reviewed the patient's vital signs. Records Reviewed: Nursing Notes    ED Course:   stable  Disposition:  home    DISCHARGE NOTE:         Care plan outlined and precautions discussed. Copy of CT to patient and results reviewed. Patient has no new complaints, changes, or physical findings. Results of tests were reviewed with the patient. All medications were reviewed with the patient; will d/c home with zyrtec and zofran.  All of pt's questions and concerns were addressed. Patient was instructed and agrees to follow up with PCP, as well as to return to the ED upon further deterioration. Patient is ready to go home. Follow-up Information     Follow up With Details Comments Contact Info    Parish Walsh MD In 2 days  [address] [place] [address]  [phone]            Discharge Medication List as of 5/19/2018 12:42 PM          Provider Notes (Medical Decision Making):   DDX sinus headache migraine headache brain lesion aneurysm  Procedures:  Procedures        Diagnosis     Clinical Impression:   1.  Acute sinusitis, recurrence not specified, unspecified location

## 2018-06-02 ENCOUNTER — APPOINTMENT (OUTPATIENT)
Dept: CT IMAGING | Age: 73
DRG: 291 | End: 2018-06-02
Attending: EMERGENCY MEDICINE
Payer: MEDICARE

## 2018-06-02 ENCOUNTER — HOSPITAL ENCOUNTER (INPATIENT)
Age: 73
LOS: 3 days | Discharge: HOME | DRG: 291 | End: 2018-06-05
Attending: EMERGENCY MEDICINE | Admitting: INTERNAL MEDICINE
Payer: MEDICARE

## 2018-06-02 ENCOUNTER — APPOINTMENT (OUTPATIENT)
Dept: GENERAL RADIOLOGY | Age: 73
DRG: 291 | End: 2018-06-02
Attending: EMERGENCY MEDICINE
Payer: MEDICARE

## 2018-06-02 DIAGNOSIS — I50.21 ACUTE SYSTOLIC (CONGESTIVE) HEART FAILURE (HCC): Primary | ICD-10-CM

## 2018-06-02 DIAGNOSIS — R77.8 ELEVATED TROPONIN: ICD-10-CM

## 2018-06-02 DIAGNOSIS — I10 ACCELERATED HYPERTENSION: ICD-10-CM

## 2018-06-02 LAB
ALBUMIN SERPL-MCNC: 3.7 G/DL (ref 3.5–5)
ALBUMIN/GLOB SERPL: 1.1 {RATIO} (ref 1.1–2.2)
ALP SERPL-CCNC: 146 U/L (ref 45–117)
ALT SERPL-CCNC: 20 U/L (ref 12–78)
ANION GAP SERPL CALC-SCNC: 12 MMOL/L (ref 5–15)
AST SERPL-CCNC: 14 U/L (ref 15–37)
BASOPHILS # BLD: 0 K/UL (ref 0–0.1)
BASOPHILS NFR BLD: 0 % (ref 0–1)
BILIRUB SERPL-MCNC: 1.3 MG/DL (ref 0.2–1)
BNP SERPL-MCNC: ABNORMAL PG/ML (ref 0–125)
BUN SERPL-MCNC: 21 MG/DL (ref 6–20)
BUN/CREAT SERPL: 14 (ref 12–20)
CALCIUM SERPL-MCNC: 9.1 MG/DL (ref 8.5–10.1)
CHLORIDE SERPL-SCNC: 109 MMOL/L (ref 97–108)
CK MB CFR SERPL CALC: 3.4 % (ref 0–2.5)
CK MB SERPL-MCNC: 2.2 NG/ML (ref 5–25)
CK SERPL-CCNC: 64 U/L (ref 26–192)
CO2 SERPL-SCNC: 21 MMOL/L (ref 21–32)
CREAT SERPL-MCNC: 1.45 MG/DL (ref 0.55–1.02)
D DIMER PPP FEU-MCNC: 0.84 MG/L FEU (ref 0–0.65)
DIFFERENTIAL METHOD BLD: ABNORMAL
EOSINOPHIL # BLD: 0 K/UL (ref 0–0.4)
EOSINOPHIL NFR BLD: 0 % (ref 0–7)
ERYTHROCYTE [DISTWIDTH] IN BLOOD BY AUTOMATED COUNT: 19.2 % (ref 11.5–14.5)
GLOBULIN SER CALC-MCNC: 3.4 G/DL (ref 2–4)
GLUCOSE SERPL-MCNC: 91 MG/DL (ref 65–100)
HCT VFR BLD AUTO: 29.9 % (ref 35–47)
HGB BLD-MCNC: 9.5 G/DL (ref 11.5–16)
IMM GRANULOCYTES # BLD: 0 K/UL (ref 0–0.04)
IMM GRANULOCYTES NFR BLD AUTO: 0 % (ref 0–0.5)
INR PPP: 1.3 (ref 0.9–1.1)
LYMPHOCYTES # BLD: 1.1 K/UL (ref 0.8–3.5)
LYMPHOCYTES NFR BLD: 13 % (ref 12–49)
MAGNESIUM SERPL-MCNC: 1.6 MG/DL (ref 1.6–2.4)
MCH RBC QN AUTO: 23.8 PG (ref 26–34)
MCHC RBC AUTO-ENTMCNC: 31.8 G/DL (ref 30–36.5)
MCV RBC AUTO: 74.9 FL (ref 80–99)
MONOCYTES # BLD: 0.3 K/UL (ref 0–1)
MONOCYTES NFR BLD: 4 % (ref 5–13)
NEUTS SEG # BLD: 7.3 K/UL (ref 1.8–8)
NEUTS SEG NFR BLD: 83 % (ref 32–75)
NRBC # BLD: 0 K/UL (ref 0–0.01)
NRBC BLD-RTO: 0 PER 100 WBC
PLATELET # BLD AUTO: 222 K/UL (ref 150–400)
PMV BLD AUTO: 11 FL (ref 8.9–12.9)
POTASSIUM SERPL-SCNC: 3.8 MMOL/L (ref 3.5–5.1)
PROT SERPL-MCNC: 7.1 G/DL (ref 6.4–8.2)
PROTHROMBIN TIME: 12.7 SEC (ref 9–11.1)
RBC # BLD AUTO: 3.99 M/UL (ref 3.8–5.2)
SODIUM SERPL-SCNC: 142 MMOL/L (ref 136–145)
TROPONIN I SERPL-MCNC: 0.06 NG/ML
TROPONIN I SERPL-MCNC: 0.08 NG/ML
WBC # BLD AUTO: 8.8 K/UL (ref 3.6–11)

## 2018-06-02 PROCEDURE — 99285 EMERGENCY DEPT VISIT HI MDM: CPT

## 2018-06-02 PROCEDURE — 85379 FIBRIN DEGRADATION QUANT: CPT | Performed by: EMERGENCY MEDICINE

## 2018-06-02 PROCEDURE — 93005 ELECTROCARDIOGRAM TRACING: CPT

## 2018-06-02 PROCEDURE — 83735 ASSAY OF MAGNESIUM: CPT | Performed by: EMERGENCY MEDICINE

## 2018-06-02 PROCEDURE — 80053 COMPREHEN METABOLIC PANEL: CPT | Performed by: EMERGENCY MEDICINE

## 2018-06-02 PROCEDURE — 82550 ASSAY OF CK (CPK): CPT | Performed by: EMERGENCY MEDICINE

## 2018-06-02 PROCEDURE — 71045 X-RAY EXAM CHEST 1 VIEW: CPT

## 2018-06-02 PROCEDURE — 74011636320 HC RX REV CODE- 636/320: Performed by: EMERGENCY MEDICINE

## 2018-06-02 PROCEDURE — 83880 ASSAY OF NATRIURETIC PEPTIDE: CPT | Performed by: EMERGENCY MEDICINE

## 2018-06-02 PROCEDURE — 74011250636 HC RX REV CODE- 250/636: Performed by: EMERGENCY MEDICINE

## 2018-06-02 PROCEDURE — 71275 CT ANGIOGRAPHY CHEST: CPT

## 2018-06-02 PROCEDURE — 74011250636 HC RX REV CODE- 250/636: Performed by: INTERNAL MEDICINE

## 2018-06-02 PROCEDURE — 36415 COLL VENOUS BLD VENIPUNCTURE: CPT | Performed by: INTERNAL MEDICINE

## 2018-06-02 PROCEDURE — 96374 THER/PROPH/DIAG INJ IV PUSH: CPT

## 2018-06-02 PROCEDURE — 65660000000 HC RM CCU STEPDOWN

## 2018-06-02 PROCEDURE — 85610 PROTHROMBIN TIME: CPT | Performed by: EMERGENCY MEDICINE

## 2018-06-02 PROCEDURE — 74011000250 HC RX REV CODE- 250: Performed by: EMERGENCY MEDICINE

## 2018-06-02 PROCEDURE — 96375 TX/PRO/DX INJ NEW DRUG ADDON: CPT

## 2018-06-02 PROCEDURE — 85025 COMPLETE CBC W/AUTO DIFF WBC: CPT | Performed by: EMERGENCY MEDICINE

## 2018-06-02 PROCEDURE — 74011250637 HC RX REV CODE- 250/637: Performed by: INTERNAL MEDICINE

## 2018-06-02 PROCEDURE — 74011000250 HC RX REV CODE- 250: Performed by: INTERNAL MEDICINE

## 2018-06-02 PROCEDURE — 84484 ASSAY OF TROPONIN QUANT: CPT | Performed by: EMERGENCY MEDICINE

## 2018-06-02 RX ORDER — ONDANSETRON 2 MG/ML
4 INJECTION INTRAMUSCULAR; INTRAVENOUS
Status: DISCONTINUED | OUTPATIENT
Start: 2018-06-02 | End: 2018-06-05 | Stop reason: HOSPADM

## 2018-06-02 RX ORDER — POLYETHYLENE GLYCOL 3350 17 G/17G
17 POWDER, FOR SOLUTION ORAL DAILY
Status: DISCONTINUED | OUTPATIENT
Start: 2018-06-03 | End: 2018-06-05 | Stop reason: HOSPADM

## 2018-06-02 RX ORDER — SODIUM CHLORIDE 0.9 % (FLUSH) 0.9 %
10 SYRINGE (ML) INJECTION
Status: COMPLETED | OUTPATIENT
Start: 2018-06-02 | End: 2018-06-02

## 2018-06-02 RX ORDER — HYDRALAZINE HYDROCHLORIDE 25 MG/1
25 TABLET, FILM COATED ORAL 3 TIMES DAILY
Status: DISCONTINUED | OUTPATIENT
Start: 2018-06-02 | End: 2018-06-03

## 2018-06-02 RX ORDER — ISOSORBIDE MONONITRATE 30 MG/1
30 TABLET, EXTENDED RELEASE ORAL DAILY
Status: DISCONTINUED | OUTPATIENT
Start: 2018-06-03 | End: 2018-06-05 | Stop reason: HOSPADM

## 2018-06-02 RX ORDER — ACETAMINOPHEN 325 MG/1
650 TABLET ORAL
Status: DISCONTINUED | OUTPATIENT
Start: 2018-06-02 | End: 2018-06-05 | Stop reason: HOSPADM

## 2018-06-02 RX ORDER — LORAZEPAM 2 MG/ML
INJECTION INTRAMUSCULAR
Status: DISPENSED
Start: 2018-06-02 | End: 2018-06-03

## 2018-06-02 RX ORDER — HEPARIN SODIUM 5000 [USP'U]/ML
5000 INJECTION, SOLUTION INTRAVENOUS; SUBCUTANEOUS EVERY 8 HOURS
Status: DISCONTINUED | OUTPATIENT
Start: 2018-06-02 | End: 2018-06-05

## 2018-06-02 RX ORDER — GUAIFENESIN 100 MG/5ML
81 LIQUID (ML) ORAL DAILY
Status: DISCONTINUED | OUTPATIENT
Start: 2018-06-03 | End: 2018-06-05 | Stop reason: HOSPADM

## 2018-06-02 RX ORDER — DOCUSATE SODIUM 100 MG/1
100 CAPSULE, LIQUID FILLED ORAL
Status: DISCONTINUED | OUTPATIENT
Start: 2018-06-02 | End: 2018-06-05 | Stop reason: HOSPADM

## 2018-06-02 RX ORDER — CETIRIZINE HCL 10 MG
10 TABLET ORAL DAILY
Status: DISCONTINUED | OUTPATIENT
Start: 2018-06-03 | End: 2018-06-05 | Stop reason: HOSPADM

## 2018-06-02 RX ORDER — SODIUM CHLORIDE 0.9 % (FLUSH) 0.9 %
5-10 SYRINGE (ML) INJECTION AS NEEDED
Status: DISCONTINUED | OUTPATIENT
Start: 2018-06-02 | End: 2018-06-05 | Stop reason: HOSPADM

## 2018-06-02 RX ORDER — PANTOPRAZOLE SODIUM 40 MG/1
40 TABLET, DELAYED RELEASE ORAL DAILY
Status: DISCONTINUED | OUTPATIENT
Start: 2018-06-03 | End: 2018-06-05 | Stop reason: HOSPADM

## 2018-06-02 RX ORDER — GABAPENTIN 300 MG/1
300 CAPSULE ORAL 3 TIMES DAILY
Status: DISCONTINUED | OUTPATIENT
Start: 2018-06-02 | End: 2018-06-05 | Stop reason: HOSPADM

## 2018-06-02 RX ORDER — ATORVASTATIN CALCIUM 20 MG/1
20 TABLET, FILM COATED ORAL
Status: DISCONTINUED | OUTPATIENT
Start: 2018-06-02 | End: 2018-06-05 | Stop reason: HOSPADM

## 2018-06-02 RX ORDER — BUMETANIDE 0.25 MG/ML
0.5 INJECTION INTRAMUSCULAR; INTRAVENOUS 2 TIMES DAILY
Status: DISCONTINUED | OUTPATIENT
Start: 2018-06-02 | End: 2018-06-03

## 2018-06-02 RX ORDER — BUMETANIDE 0.25 MG/ML
0.5 INJECTION INTRAMUSCULAR; INTRAVENOUS
Status: COMPLETED | OUTPATIENT
Start: 2018-06-02 | End: 2018-06-02

## 2018-06-02 RX ORDER — LORAZEPAM 2 MG/ML
1 INJECTION INTRAMUSCULAR
Status: COMPLETED | OUTPATIENT
Start: 2018-06-02 | End: 2018-06-02

## 2018-06-02 RX ORDER — SODIUM CHLORIDE 0.9 % (FLUSH) 0.9 %
5-10 SYRINGE (ML) INJECTION EVERY 8 HOURS
Status: DISCONTINUED | OUTPATIENT
Start: 2018-06-02 | End: 2018-06-05 | Stop reason: HOSPADM

## 2018-06-02 RX ORDER — BUMETANIDE 0.25 MG/ML
INJECTION INTRAMUSCULAR; INTRAVENOUS
Status: DISPENSED
Start: 2018-06-02 | End: 2018-06-03

## 2018-06-02 RX ORDER — LISINOPRIL 5 MG/1
2.5 TABLET ORAL DAILY
Status: DISCONTINUED | OUTPATIENT
Start: 2018-06-03 | End: 2018-06-03

## 2018-06-02 RX ORDER — SODIUM CHLORIDE 9 MG/ML
50 INJECTION, SOLUTION INTRAVENOUS
Status: COMPLETED | OUTPATIENT
Start: 2018-06-02 | End: 2018-06-02

## 2018-06-02 RX ORDER — CARVEDILOL 6.25 MG/1
6.25 TABLET ORAL 2 TIMES DAILY WITH MEALS
Status: DISCONTINUED | OUTPATIENT
Start: 2018-06-02 | End: 2018-06-05 | Stop reason: HOSPADM

## 2018-06-02 RX ADMIN — SODIUM CHLORIDE 50 ML/HR: 900 INJECTION, SOLUTION INTRAVENOUS at 14:34

## 2018-06-02 RX ADMIN — IOPAMIDOL 100 ML: 755 INJECTION, SOLUTION INTRAVENOUS at 14:34

## 2018-06-02 RX ADMIN — Medication 10 ML: at 18:33

## 2018-06-02 RX ADMIN — ATORVASTATIN CALCIUM 20 MG: 20 TABLET, FILM COATED ORAL at 22:36

## 2018-06-02 RX ADMIN — LORAZEPAM 1 MG: 2 INJECTION INTRAMUSCULAR; INTRAVENOUS at 14:36

## 2018-06-02 RX ADMIN — HYDRALAZINE HYDROCHLORIDE 25 MG: 50 TABLET, FILM COATED ORAL at 16:37

## 2018-06-02 RX ADMIN — BUMETANIDE 0.5 MG: 0.25 INJECTION INTRAMUSCULAR; INTRAVENOUS at 14:36

## 2018-06-02 RX ADMIN — HEPARIN SODIUM 5000 UNITS: 5000 INJECTION, SOLUTION INTRAVENOUS; SUBCUTANEOUS at 18:33

## 2018-06-02 RX ADMIN — GABAPENTIN 300 MG: 300 CAPSULE ORAL at 16:37

## 2018-06-02 RX ADMIN — Medication 10 ML: at 14:34

## 2018-06-02 RX ADMIN — BUMETANIDE 0.5 MG: 0.25 INJECTION INTRAMUSCULAR; INTRAVENOUS at 22:36

## 2018-06-02 RX ADMIN — Medication 10 ML: at 22:37

## 2018-06-02 RX ADMIN — CARVEDILOL 6.25 MG: 6.25 TABLET, FILM COATED ORAL at 16:37

## 2018-06-02 RX ADMIN — HYDRALAZINE HYDROCHLORIDE 25 MG: 50 TABLET, FILM COATED ORAL at 22:37

## 2018-06-02 RX ADMIN — GABAPENTIN 300 MG: 300 CAPSULE ORAL at 22:36

## 2018-06-02 NOTE — H&P
Hospitalist Admission Note    NAME: No Simon   :  1945   MRN:  [id number]     Date/Time:  2018 3:58 PM    Patient PCP: Vincent Ervin MD  ________________________________________________________________________    My assessment of this patient's clinical condition and my plan of care is as follows. Assessment / Plan:  Acute on chronic systolic congestive heart failure exacerbation  Ischemic cardiomyopathy with left ventricle ejection fraction of 25%  Hypertensive emergency  Clinical picture, lab and chest x-ray findings all consistent with congestive heart failure exacerbation  Start Bumex 0.5 mg IV twice daily.  Resume home Coreg.  Is not on ACE inhibitor at home and no documented allergy so we will start lisinopril 2.5 mg. Check creatinine in view of being started on lisinopril and chronic kidney disease  Strict I's and O's, daily weights and low-salt diet  Consult cardiology Dr. Antoine Draft troponins ×3  Resume her home antihypertensives including hydralazine, Coreg,  Start hydralazine as needed    History of CKD stage III  Creatinine is close to baseline of around 1.5-6  Patient was started on low-dose ACE inhibitor so please closely watch creatinine next    History of coronary artery disease status post remote PCI in   Resume home aspirin, statin and Coreg    Elevated total bilirubin  May be related to possible Gilbert's syndrome  Check direct bilirubin in a.m.   Suggest further workup if direct is elevated on outpatient basis    History of renal carcinoma status post nephrectomy    Gastroesophageal reflux disease  Resume home omeprazole           Code Status: Full   Surrogate Decision Maker: Tate Mc    DVT Prophylaxis: heparin  GI Prophylaxis: not indicated    Baseline: From home independent        Subjective:   CHIEF COMPLAINT: Sob    HISTORY OF PRESENT ILLNESS:     This is a 29-year-old female with past medical history of systolic congestive heart failure is coming to the hospital chief complaint of shortness of breath since the last 3 days.  Patient reports that shortness of breath is mostly exertional along with orthopnea but no PND. Carson Smoker also reports chest pain which is mostly exertional but currently resolved.  She has chronic cough but no phlegm.  He does not report pedal edema.  She has no palpitations or syncopal episodes.  She denies abdominal pain, diarrhea or constipation.  She also reports mild nausea but no vomiting. She has been feeling sick and has not been taking her medication since the last 2 days. Carson Smoker went to see her primary care physician and was advised to come to the emergency department for further evaluation.  Today she was noted to have a blood pressure of 2 not 4 x 1 34 to primary care doctor's office. On arrival to the hospital blood pressure was 183/109. Carson Smoker had a chest x-ray which revealed pulmonary edema.  BNP was elevated at more than 30,000.  She was given 0.5 mg of IV Bumex. We were asked to admit for work up and evaluation of the above problems. Past Medical History:   Diagnosis Date    Arthritis     Autoimmune disease ([address]     rheumatoid     CAD (coronary artery disease)     CHF (congestive heart failure) (HCC)     Chronic pain     neuropathy bilateral feet,knees    Diabetes ([address]     GERD (gastroesophageal reflux disease)     Hypertension     Ill-defined condition     anemia    Ill-defined condition     blood transfusion hx    MI, old     Other ill-defined conditions(799.89)     high cholesterol    Renal cell carcinoma of right kidney ([address]     s/p resection 12/16        Past Surgical History:   Procedure Laterality Date    CARDIAC SURG PROCEDURE UNLIST      three stents placed 2005    CARDIAC SURG PROCEDURE UNLIST      HX CHOLECYSTECTOMY  02/28/2017    lap tana with IOC.     HX PACEMAKER  2017/January    ICD    HX TONSILLECTOMY      HX UROLOGICAL  12/22/2016    RIGHT LAPOROSCOPIC HAND ASSISTED RADICAL NEPHRECTOMY       Social History   Substance Use Topics    Smoking status: Never Smoker    Smokeless tobacco: Never Used    Alcohol use No        Family History   Problem Relation Age of Onset    Cancer Mother      stomach    Other Father      aneurysym   Sim Barraza Cancer Brother      lung    Other Brother      stomach problems    Stroke Brother      Allergies   Allergen Reactions    Percocet [Oxycodone-Acetaminophen] Other (comments)     Confused        Prior to Admission medications    Medication Sig Start Date End Date Taking? Authorizing Provider   cetirizine (ZYRTEC) 10 mg tablet Take 1 Tab by mouth daily for 30 days. 5/19/18 6/18/18 Yes Que Castillo NP   ondansetron (ZOFRAN ODT) 4 mg disintegrating tablet Take 1 Tab by mouth every eight (8) hours as needed for Nausea. 5/19/18  Yes Que Castillo NP   acetaminophen (TYLENOL) 325 mg tablet Take 2 Tabs by mouth every four (4) hours as needed. 4/10/18  Yes Steven Monreal MD   hydrALAZINE (APRESOLINE) 25 mg tablet Take 1 Tab by mouth three (3) times daily. 4/10/18  Yes Steven Monreal MD   docusate sodium (COLACE) 100 mg capsule Take 1 Cap by mouth two (2) times a day for 90 days. 4/10/18 7/9/18 Yes Carlos Marlow MD   polyethylene glycol (MIRALAX) 17 gram packet Take 1 Packet by mouth daily. 4/10/18  Yes Carlos Marlow MD   aspirin 81 mg chewable tablet Take 1 Tab by mouth daily. 6/13/17  Yes Fede Grier MD   atorvastatin (LIPITOR) 20 mg tablet Take 1 Tab by mouth nightly. 6/13/17  Yes Fede Grier MD   bumetanide (BUMEX) 0.5 mg tablet Take 1 Tab by mouth daily. 6/13/17  Yes Fede Grier MD   carvedilol (COREG) 6.25 mg tablet Take 1 Tab by mouth two (2) times daily (with meals). 6/13/17  Yes Fede Grier MD   isosorbide mononitrate ER (IMDUR) 30 mg tablet Take 1 Tab by mouth daily. 6/13/17  Yes Fede Grier MD   gabapentin (NEURONTIN) 300 mg capsule Take 300 mg by mouth three (3) times daily.    Yes Historical Provider   omeprazole (PRILOSEC) 20 mg capsule Take 20 mg by mouth daily. Indications: GASTROESOPHAGEAL REFLUX   Yes Historical Provider       REVIEW OF SYSTEMS:     I am not able to complete the review of systems because: The patient is intubated and sedated    The patient has altered mental status due to his acute medical problems    The patient has baseline aphasia from prior stroke(s)    The patient has baseline dementia and is not reliable historian    The patient is in acute medical distress and unable to provide information           Total of 12 systems reviewed as follows:       POSITIVE= underlined text  Negative = text not underlined  General:  fever, chills, sweats, generalized weakness, weight loss/gain,      loss of appetite   Eyes:    blurred vision, eye pain, loss of vision, double vision  ENT:    rhinorrhea, pharyngitis   Respiratory:   cough, sputum production, SOB, ROCK, wheezing, pleuritic pain   Cardiology:   chest pain, palpitations, orthopnea, PND, edema, syncope   Gastrointestinal:  abdominal pain , N/V, diarrhea, dysphagia, constipation, bleeding   Genitourinary:  frequency, urgency, dysuria, hematuria, incontinence   Muskuloskeletal :  arthralgia, myalgia, back pain  Hematology:  easy bruising, nose or gum bleeding, lymphadenopathy   Dermatological: rash, ulceration, pruritis, color change / jaundice  Endocrine:   hot flashes or polydipsia   Neurological:  headache, dizziness, confusion, focal weakness, paresthesia,     Speech difficulties, memory loss, gait difficulty  Psychological: Feelings of anxiety, depression, agitation    Objective:   VITALS:    Visit Vitals    BP (!) 180/119    Pulse 91    Temp 98.1 °F (36.7 °C)    Resp 15    Ht 5' 7\" (1.702 m)    Wt 65.8 kg (145 lb)    SpO2 100%    BMI 22.71 kg/m2       PHYSICAL EXAM:    General:    Alert, cooperative, no distress, appears stated age.      HEENT: Atraumatic, anicteric sclerae, pink conjunctivae     No oral ulcers, mucosa moist, throat clear, dentition fair  Neck:  Supple, symmetrical,  thyroid: non tender  Lungs:   Faint crackles at bases, no wheeze, good air entry  Chest wall:  No tenderness  No Accessory muscle use. Heart:   Regular  rhythm,  No  murmur   No edema  Abdomen:   Soft, non-tender. Not distended. Bowel sounds normal  Extremities: No cyanosis. No clubbing,      Skin turgor normal, Capillary refill normal, Radial dial pulse 2+  Skin:     Not pale. Not Jaundiced  No rashes   Psych:  Not depressed. Not anxious or agitated. Neurologic: EOMs intact. No facial asymmetry. No aphasia or slurred speech. Symmetrical strength, Sensation grossly intact. Alert and oriented X 4.     _______________________________________________________________________  Care Plan discussed with:    Comments   Patient y    Family      RN y    Care Manager                    Consultant:      _______________________________________________________________________  Expected  Disposition:   Home with Family y   HH/PT/OT/RN    SNF/LTC    GUILHERME    ________________________________________________________________________  TOTAL TIME: 61  Minutes    Critical Care Provided     Minutes non procedure based      Comments    y Reviewed previous records   >50% of visit spent in counseling and coordination of care y Discussion with patient and/or family and questions answered       ________________________________________________________________________  Signed: Andrew Vee MD    Procedures: see electronic medical records for all procedures/Xrays and details which were not copied into this note but were reviewed prior to creation of Plan.     LAB DATA REVIEWED:    Recent Results (from the past 24 hour(s))   EKG, 12 LEAD, INITIAL    Collection Time: 06/02/18 11:45 AM   Result Value Ref Range    Ventricular Rate 94 BPM    Atrial Rate 94 BPM    P-R Interval 150 ms    QRS Duration 96 ms    Q-T Interval 390 ms    QTC Calculation (Bezet) 487 ms Calculated P Axis 46 degrees    Calculated R Axis -13 degrees    Calculated T Axis 28 degrees    Diagnosis       Sinus rhythm with occasional premature ventricular complexes  Possible Left atrial enlargement  Left ventricular hypertrophy  T wave abnormality, consider anterior ischemia  Prolonged QT  Abnormal ECG  When compared with ECG of 06-APR-2018 16:42,  premature ventricular complexes are now present     CBC WITH AUTOMATED DIFF    Collection Time: 06/02/18 12:48 PM   Result Value Ref Range    WBC 8.8 3.6 - 11.0 K/uL    RBC 3.99 3.80 - 5.20 M/uL    HGB 9.5 (L) 11.5 - 16.0 g/dL    HCT 29.9 (L) 35.0 - 47.0 %    MCV 74.9 (L) 80.0 - 99.0 FL    MCH 23.8 (L) 26.0 - 34.0 PG    MCHC 31.8 30.0 - 36.5 g/dL    RDW 19.2 (H) 11.5 - 14.5 %    PLATELET 954 532 - 510 K/uL    MPV 11.0 8.9 - 12.9 FL    NRBC 0.0 0  WBC    ABSOLUTE NRBC 0.00 0.00 - 0.01 K/uL    NEUTROPHILS 83 (H) 32 - 75 %    LYMPHOCYTES 13 12 - 49 %    MONOCYTES 4 (L) 5 - 13 %    EOSINOPHILS 0 0 - 7 %    BASOPHILS 0 0 - 1 %    IMMATURE GRANULOCYTES 0 0.0 - 0.5 %    ABS. NEUTROPHILS 7.3 1.8 - 8.0 K/UL    ABS. LYMPHOCYTES 1.1 0.8 - 3.5 K/UL    ABS. MONOCYTES 0.3 0.0 - 1.0 K/UL    ABS. EOSINOPHILS 0.0 0.0 - 0.4 K/UL    ABS. BASOPHILS 0.0 0.0 - 0.1 K/UL    ABS. IMM.  GRANS. 0.0 0.00 - 0.04 K/UL    DF AUTOMATED     PROTHROMBIN TIME + INR    Collection Time: 06/02/18 12:48 PM   Result Value Ref Range    INR 1.3 (H) 0.9 - 1.1      Prothrombin time 12.7 (H) 9.0 - 60.5 sec   METABOLIC PANEL, COMPREHENSIVE    Collection Time: 06/02/18 12:48 PM   Result Value Ref Range    Sodium 142 136 - 145 mmol/L    Potassium 3.8 3.5 - 5.1 mmol/L    Chloride 109 (H) 97 - 108 mmol/L    CO2 21 21 - 32 mmol/L    Anion gap 12 5 - 15 mmol/L    Glucose 91 65 - 100 mg/dL    BUN 21 (H) 6 - 20 MG/DL    Creatinine 1.45 (H) 0.55 - 1.02 MG/DL    BUN/Creatinine ratio 14 12 - 20      GFR est AA 43 (L) >60 ml/min/1.73m2    GFR est non-AA 36 (L) >60 ml/min/1.73m2    Calcium 9.1 8.5 - 10.1 MG/DL Bilirubin, total 1.3 (H) 0.2 - 1.0 MG/DL    ALT (SGPT) 20 12 - 78 U/L    AST (SGOT) 14 (L) 15 - 37 U/L    Alk.  phosphatase 146 (H) 45 - 117 U/L    Protein, total 7.1 6.4 - 8.2 g/dL    Albumin 3.7 3.5 - 5.0 g/dL    Globulin 3.4 2.0 - 4.0 g/dL    A-G Ratio 1.1 1.1 - 2.2     MAGNESIUM    Collection Time: 06/02/18 12:48 PM   Result Value Ref Range    Magnesium 1.6 1.6 - 2.4 mg/dL   NT-PRO BNP    Collection Time: 06/02/18 12:48 PM   Result Value Ref Range    NT pro-BNP >36017 (H) 0 - 125 PG/ML   CK W/ CKMB & INDEX    Collection Time: 06/02/18 12:48 PM   Result Value Ref Range    CK 64 26 - 192 U/L    CK - MB 2.2 <3.6 NG/ML    CK-MB Index 3.4 (H) 0 - 2.5     TROPONIN I    Collection Time: 06/02/18 12:48 PM   Result Value Ref Range    Troponin-I, Qt. 0.06 (H) <0.05 ng/mL   D DIMER    Collection Time: 06/02/18 12:48 PM   Result Value Ref Range    D-dimer 0.84 (H) 0.00 - 0.65 mg/L FEU

## 2018-06-02 NOTE — ED PROVIDER NOTES
EMERGENCY DEPARTMENT HISTORY AND PHYSICAL EXAM      Date: 6/2/2018  Patient Name: Soha Neves    History of Presenting Illness     Chief Complaint   Patient presents with    Shortness of Breath     pt went to her pcp this morning due to ROCK. pt has not taken her bp meds in \"a couple of days because i felt so bad\". EMS was called to MD's office to bring pt in. History Provided By: Patient    HPI: Soha Neves, 71 y.o. female with PMHx significant for CAD, HTN, GERD, DM, CHF, MI, presents via EMS to the ED with cc of constant moderate SOB, ongoing for 3 days. Per EMS, pt was at providers office with complaints of CP and stated that she had not taken her BP for various days. Of note, pt denies any sxs of CP PTA or MAYO. She was given 4 baby Aspirins by EMS and refused to take nitroglycerine because it would make her nauseous. Per EMS, her BP was 204/134 and all other vitals were normal. Pt expresses that her SOB is exacerbated when ambulating and notes having weakness to her legs without any pain. She denies taking any long trips or inactivity for long periods of time. Pt specifically denies any cough, fevers, chills, nausea, vomiting, diarrhea, or abdominal pain. Chief Complaint: SOB  Duration: 3 days   Timing:  Constant  Location: lungs  Quality: N/A  Severity: Moderate  Modifying Factors: denies any modifying factors  Associated Symptoms: weakness to her legs    There are no other complaints, changes, or physical findings at this time.     PCP: Osman Botello MD    Current Facility-Administered Medications   Medication Dose Route Frequency Provider Last Rate Last Dose    acetaminophen (TYLENOL) tablet 650 mg  650 mg Oral Q6H PRN Octavio Cuba MD        [START ON 6/3/2018] aspirin chewable tablet 81 mg  81 mg Oral DAILY Octavio Cuba MD        atorvastatin (LIPITOR) tablet 20 mg  20 mg Oral QHS Octavio Cuba MD        carvedilol (COREG) tablet 6.25 mg  6.25 mg Oral BID WITH MEALS Ron TRAN Dieter Baker MD   6.25 mg at 06/02/18 1637    [START ON 6/3/2018] cetirizine (ZYRTEC) tablet 10 mg  10 mg Oral DAILY Andrew Vee MD        gabapentin (NEURONTIN) capsule 300 mg  300 mg Oral TID Andrew Vee MD   300 mg at 06/02/18 1637    hydrALAZINE (APRESOLINE) tablet 25 mg  25 mg Oral TID Andrew Vee MD   25 mg at 06/02/18 1637    [START ON 6/3/2018] isosorbide mononitrate ER (IMDUR) tablet 30 mg  30 mg Oral DAILY Andrew Vee MD        [START ON 6/3/2018] pantoprazole (PROTONIX) tablet 40 mg  40 mg Oral DAILY Andrew Vee MD        [START ON 6/3/2018] polyethylene glycol (MIRALAX) packet 17 g  17 g Oral DAILY Andrew Vee MD        sodium chloride (NS) flush 5-10 mL  5-10 mL IntraVENous Q8H Andrew eVe MD        sodium chloride (NS) flush 5-10 mL  5-10 mL IntraVENous PRN Andrew Vee MD        ondansetron [facility]) injection 4 mg  4 mg IntraVENous Q6H PRN Andrew Vee MD        docusate sodium (COLACE) capsule 100 mg  100 mg Oral DAILY PRN Andrew Vee MD        heparin (porcine) injection 5,000 Units  5,000 Units SubCUTAneous Salome Mendiola MD        bumetanide (BUMEX) injection 0.5 mg  0.5 mg IntraVENous BID Andrew Vee MD       All Prior [START ON 6/3/2018] lisinopril (PRINIVIL, ZESTRIL) tablet 2.5 mg  2.5 mg Oral DAILY Andrew Vee MD           Past History     Past Medical History:  Past Medical History:   Diagnosis Date    Arthritis     Autoimmune disease ([facility]     rheumatoid     CAD (coronary artery disease)     CHF (congestive heart failure) ([facility]     Chronic pain     neuropathy bilateral feet,knees    Diabetes ([facility]     GERD (gastroesophageal reflux disease)     Hypertension     Ill-defined condition     anemia    Ill-defined condition     blood transfusion hx    MI, old     Other ill-defined conditions(799.89)     high cholesterol    Renal cell carcinoma of right kidney [facility])     s/p resection 12/16       Past Surgical History:  Past Surgical History: Procedure Laterality Date    CARDIAC SURG PROCEDURE UNLIST      three stents placed 2005   [address]      HX CHOLECYSTECTOMY  02/28/2017    lap tana with IOC.  HX PACEMAKER  2017/January    ICD    HX TONSILLECTOMY      HX UROLOGICAL  12/22/2016    RIGHT LAPOROSCOPIC HAND ASSISTED RADICAL NEPHRECTOMY       Family History:  Family History   Problem Relation Age of Onset   Travis Barraza Cancer Mother      stomach    Other Father      aneurysym   [place] Cancer Brother      lung    Other Brother      stomach problems    Stroke Brother        Social History:  Social History   Substance Use Topics    Smoking status: Never Smoker    Smokeless tobacco: Never Used    Alcohol use No       Allergies: Allergies   Allergen Reactions    Percocet [Oxycodone-Acetaminophen] Other (comments)     Confused         Review of Systems   Review of Systems   Constitutional: Negative for chills and fever. HENT: Negative for congestion. Eyes: Negative for visual disturbance. Respiratory: Positive for shortness of breath. Negative for cough. Cardiovascular: Negative for chest pain. Gastrointestinal: Negative for abdominal pain, diarrhea, nausea and vomiting. Endocrine: Negative for heat intolerance. Genitourinary: Negative for dysuria. Musculoskeletal: Negative for back pain. Skin: Negative for rash. Allergic/Immunologic: Negative for immunocompromised state. Neurological: Positive for weakness (of legs). Hematological: Does not bruise/bleed easily. Psychiatric/Behavioral: Negative. All other systems reviewed and are negative. Physical Exam   Physical Exam   Constitutional: She is oriented to person, place, and time. She appears well-developed and well-nourished. No distress. HENT:   Head: Normocephalic and atraumatic. Eyes: EOM are normal. Pupils are equal, round, and reactive to light. Neck: Normal range of motion. Neck supple.    Cardiovascular: Normal rate, regular rhythm and intact distal pulses. Pulmonary/Chest: Effort normal and breath sounds normal. She has no wheezes. Abdominal: Soft. Bowel sounds are normal. There is no tenderness. Musculoskeletal: Normal range of motion. She exhibits no edema. Neurological: She is alert and oriented to person, place, and time. No focal deficits   Skin: Skin is warm and dry. Psychiatric: She has a normal mood and affect. Her behavior is normal.   Nursing note and vitals reviewed. Diagnostic Study Results     Labs -     Recent Results (from the past 12 hour(s))   EKG, 12 LEAD, INITIAL    Collection Time: 06/02/18 11:45 AM   Result Value Ref Range    Ventricular Rate 94 BPM    Atrial Rate 94 BPM    P-R Interval 150 ms    QRS Duration 96 ms    Q-T Interval 390 ms    QTC Calculation (Bezet) 487 ms    Calculated P Axis 46 degrees    Calculated R Axis -13 degrees    Calculated T Axis 28 degrees    Diagnosis       Sinus rhythm with occasional premature ventricular complexes  Possible Left atrial enlargement  Left ventricular hypertrophy  T wave abnormality, consider anterior ischemia  Prolonged QT  Abnormal ECG  When compared with ECG of 06-APR-2018 16:42,  premature ventricular complexes are now present     CBC WITH AUTOMATED DIFF    Collection Time: 06/02/18 12:48 PM   Result Value Ref Range    WBC 8.8 3.6 - 11.0 K/uL    RBC 3.99 3.80 - 5.20 M/uL    HGB 9.5 (L) 11.5 - 16.0 g/dL    HCT 29.9 (L) 35.0 - 47.0 %    MCV 74.9 (L) 80.0 - 99.0 FL    MCH 23.8 (L) 26.0 - 34.0 PG    MCHC 31.8 30.0 - 36.5 g/dL    RDW 19.2 (H) 11.5 - 14.5 %    PLATELET 954 451 - 562 K/uL    MPV 11.0 8.9 - 12.9 FL    NRBC 0.0 0  WBC    ABSOLUTE NRBC 0.00 0.00 - 0.01 K/uL    NEUTROPHILS 83 (H) 32 - 75 %    LYMPHOCYTES 13 12 - 49 %    MONOCYTES 4 (L) 5 - 13 %    EOSINOPHILS 0 0 - 7 %    BASOPHILS 0 0 - 1 %    IMMATURE GRANULOCYTES 0 0.0 - 0.5 %    ABS. NEUTROPHILS 7.3 1.8 - 8.0 K/UL    ABS. LYMPHOCYTES 1.1 0.8 - 3.5 K/UL    ABS.  MONOCYTES 0.3 0.0 - 1.0 K/UL ABS. EOSINOPHILS 0.0 0.0 - 0.4 K/UL    ABS. BASOPHILS 0.0 0.0 - 0.1 K/UL    ABS. IMM. GRANS. 0.0 0.00 - 0.04 K/UL    DF AUTOMATED     PROTHROMBIN TIME + INR    Collection Time: 06/02/18 12:48 PM   Result Value Ref Range    INR 1.3 (H) 0.9 - 1.1      Prothrombin time 12.7 (H) 9.0 - 34.3 sec   METABOLIC PANEL, COMPREHENSIVE    Collection Time: 06/02/18 12:48 PM   Result Value Ref Range    Sodium 142 136 - 145 mmol/L    Potassium 3.8 3.5 - 5.1 mmol/L    Chloride 109 (H) 97 - 108 mmol/L    CO2 21 21 - 32 mmol/L    Anion gap 12 5 - 15 mmol/L    Glucose 91 65 - 100 mg/dL    BUN 21 (H) 6 - 20 MG/DL    Creatinine 1.45 (H) 0.55 - 1.02 MG/DL    BUN/Creatinine ratio 14 12 - 20      GFR est AA 43 (L) >60 ml/min/1.73m2    GFR est non-AA 36 (L) >60 ml/min/1.73m2    Calcium 9.1 8.5 - 10.1 MG/DL    Bilirubin, total 1.3 (H) 0.2 - 1.0 MG/DL    ALT (SGPT) 20 12 - 78 U/L    AST (SGOT) 14 (L) 15 - 37 U/L    Alk. phosphatase 146 (H) 45 - 117 U/L    Protein, total 7.1 6.4 - 8.2 g/dL    Albumin 3.7 3.5 - 5.0 g/dL    Globulin 3.4 2.0 - 4.0 g/dL    A-G Ratio 1.1 1.1 - 2.2     MAGNESIUM    Collection Time: 06/02/18 12:48 PM   Result Value Ref Range    Magnesium 1.6 1.6 - 2.4 mg/dL   NT-PRO BNP    Collection Time: 06/02/18 12:48 PM   Result Value Ref Range    NT pro-BNP >48245 (H) 0 - 125 PG/ML   CK W/ CKMB & INDEX    Collection Time: 06/02/18 12:48 PM   Result Value Ref Range    CK 64 26 - 192 U/L    CK - MB 2.2 <3.6 NG/ML    CK-MB Index 3.4 (H) 0 - 2.5     TROPONIN I    Collection Time: 06/02/18 12:48 PM   Result Value Ref Range    Troponin-I, Qt. 0.06 (H) <0.05 ng/mL   D DIMER    Collection Time: 06/02/18 12:48 PM   Result Value Ref Range    D-dimer 0.84 (H) 0.00 - 0.65 mg/L FEU       Radiologic Studies -   CT Results  (Last 48 hours)               06/02/18 1435  CTA CHEST W OR W WO CONT Final result    Impression:  IMPRESSION:   1. No evidence of pulmonary embolus.    2.  Cardiomegaly with evidence of interstitial pulmonary edema and right heart   dysfunction. Narrative:  INDICATION:   Chest pain, acute, pulmonary embolism (PE) suspected        COMPARISON:  CT 1/14/2017       TECHNIQUE:  Following the uneventful intravenous administration of 100 cc Isovue   934, thin helical axial images were obtained through the chest. Postprocessing   was performed. 3D image postprocessing was performed. CT dose reduction was achieved through the use of a standardized protocol   tailored for this examination and automatic exposure control for dose   modulation. FINDINGS: There is evidence of right heart dysfunction, with reflux of contrast   into the IVC and hepatic veins. There are no enlarged mediastinal or hilar lymph nodes. There is chronic   cardiomegaly. There is a left-sided pacer device. .  There is no pericardial   effusion. No filling defect is seen within the pulmonary arterial system to suggest   pulmonary embolus. The aorta is normal in caliber. There is no focal air space disease. There is evidence of interstitial pulmonary   edema. No pulmonary nodule or mass is seen. There are no pleural effusions. Limited evaluation of the upper abdomen demonstrates no abnormality. There is a   chronic L1 compression fracture, similar to 2017. CXR Results  (Last 48 hours)               06/02/18 1318  XR CHEST PORT Final result    Impression:  IMPRESSION: No evidence of acute cardiopulmonary process. Narrative:  INDICATION:  Chest Pain with shortness of breath       COMPARISON: 4/6/2018       FINDINGS: Single AP portable view of the chest obtained at 1316 demonstrates a   stable cardiomediastinal silhouette. There is chronic cardiomegaly. There is a   left-sided pacer device. The lungs are clear bilaterally. No osseous   abnormalities are seen. Medical Decision Making   I am the first provider for this patient.     I reviewed the vital signs, available nursing notes, past medical history, past surgical history, family history and social history. Vital Signs-Reviewed the patient's vital signs. Patient Vitals for the past 12 hrs:   Temp Pulse Resp BP SpO2   06/02/18 1736 97.5 °F (36.4 °C) 77 22 (!) 157/105 100 %   06/02/18 1630 - 86 30 (!) 180/119 100 %   06/02/18 1500 - 91 15 (!) 180/119 100 %   06/02/18 1432 - - - (!) 186/127 -   06/02/18 1400 - 87 21 (!) 186/125 100 %   06/02/18 1330 - 89 25 (!) 191/124 100 %   06/02/18 1150 98.1 °F (36.7 °C) 87 19 (!) 183/109 100 %       Pulse Oximetry Analysis - 100% on RA    Cardiac Monitor:   Rate: 94 bpm  Rhythm: normal sinus rhythm with occasional premature ventricular complexes    EKG interpretation: (Preliminary) 11:45 AM  Rhythm: normal sinus rhythm with occasional premature ventricular complexes; and irregular. Rate (approx.): 94 bpm; Axis: normal; CT interval: normal; QRS interval: normal ; ST/T wave: T wave abnormality; Other findings: possible left atrial enlargement, left ventricular hypertrophy, prolonged QT, abnormal EKG. Written by SONY Vieirae, as dictated by Kingsley Rivers MD.    Records Reviewed: Nursing Notes, Old Medical Records and Previous Laboratory Studies    Provider Notes (Medical Decision Making):   DDx: CHF, CAD, PE, angina, mal compliance    ED Course:   Initial assessment performed. The patients presenting problems have been discussed, and they are in agreement with the care plan formulated and outlined with them. I have encouraged them to ask questions as they arise throughout their visit. CONSULT NOTE:   2:48 PM  Kingsley Rivers MD spoke with Bebe Councilman. Zhanna Garcia DO. Specialty: Hospitalist  Discussed pt's hx, disposition, and available diagnostic and imaging results. Reviewed care plans. Consultant will evaluate pt for admission.   Written by Mimi Miranda ED Scribe, as dictated by Kingsley Rivers MD.    Critical Care Time: 0    Disposition:  Admit Note:  2:48 PM  Pt is being admitted by Dr. Governor López. The results of their tests and reason(s) for their admission have been discussed with pt and/or available family. They convey agreement and understanding for the need to be admitted and for admission diagnosis. PLAN:  1. Admit to Dr. Governor López    Diagnosis     Clinical Impression:   1. Acute systolic (congestive) heart failure (HCC)    2. Elevated troponin    3. Accelerated hypertension        Attestations: This note is prepared by Sakina Frankel, acting as a Scribe for Beverly Horan MD.    Bevelry Horan MD: The scribe's documentation has been prepared under my direction and personally reviewed by me in its entirety. I confirm that the notes above accurately reflects all work, treatment, procedures, and medical decision making performed by me.

## 2018-06-02 NOTE — PROGRESS NOTES
TRANSFER - IN REPORT:    Verbal report received from ISAIAS Arechiga(name) on Stefania Thapa  being received from ED(unit) for routine progression of care      Report consisted of patients Situation, Background, Assessment and   Recommendations(SBAR). Information from the following report(s) SBAR, Kardex, ED Summary, Intake/Output, MAR and Recent Results was reviewed with the receiving nurse. Opportunity for questions and clarification was provided. Assessment completed upon patients arrival to unit and care assumed.

## 2018-06-02 NOTE — ED NOTES
TRANSFER - OUT REPORT:    Verbal report given to Brenda Simmons RN(name) on [id number] [facility]  being transferred to [facility](unit) for routine progression of care       Report consisted of patients Situation, Background, Assessment and   Recommendations(SBAR). Information from the following report(s) SBAR was reviewed with the receiving nurse. Lines:   Peripheral IV 06/02/18 Right Antecubital (Active)   Site Assessment Clean, dry, & intact 6/2/2018 12:48 PM   Phlebitis Assessment 0 6/2/2018 12:48 PM   Infiltration Assessment 0 6/2/2018 12:48 PM   Dressing Status Clean, dry, & intact 6/2/2018 12:48 PM   Dressing Type Transparent 6/2/2018 12:48 PM   Hub Color/Line Status Pink;Flushed 6/2/2018 12:48 PM   Action Taken Blood drawn 6/2/2018 12:48 PM        Opportunity for questions and clarification was provided.       Patient transported with:   Gigalo

## 2018-06-02 NOTE — ACP (ADVANCE CARE PLANNING)
Advance Care Planning Note    Name: Elizabeth London  YOB: 1945  MRN: [id number]  Admission Date: 6/2/2018 11:44 AM    Date of discussion: 6/2/2018    Active Diagnoses:    Hospital Problems  Date Reviewed: 12/21/2017          Codes Class Noted POA    Acute systolic (congestive) heart failure ([facility] [address] ICD-10-CM: I50.21  ICD-9-CM: 428.21, 428.0  6/2/2018 Unknown               These active diagnoses are of sufficient risk that focused discussion on advance care planning is indicated in order to allow the patient to thoughtfully consider personal goals of care, and if situations arise that prevent the ability to personally give input, to ensure appropriate representation of their personal desires for different levels and aggressiveness of care. Discussion:     Persons present and participating in discussion: Henrietta Kocher, MD,     Discussion: Discussed code status and wants to be full code    Time Spent:     Total time spent face-to-face in education and discussion: 16  minutes.      Fer Smiley MD  6/2/2018  4:10 PM

## 2018-06-02 NOTE — PROGRESS NOTES
Bedside shift change report given to [facility]  (oncoming nurse) by Ross Bassett (offgoing nurse). Report included the following information SBAR, Kardex, Intake/Output, MAR and Recent Results.

## 2018-06-02 NOTE — IP AVS SNAPSHOT
[address] [facility] 
[phone] Patient: Oscar Horvath MRN: [id number] About your hospitalization You were admitted on:  June 2, 2018 You last received care in the:  \[facility] [place] [facility]\"" 2 CARDIOPULMONARY CARE You were discharged on:  June 5, 2018 Why you were hospitalized Your primary diagnosis was:  Not on File Your diagnoses also included:  Acute Systolic (Congestive) Heart Failure (Hcc) Follow-up Information Follow up With Details Comments Contact Info Merced Oliveros NP Go on 6/6/2018 For hospital follow up appointment at 11:30AM  [address] [address] 
[phone] [facility]  This is the provider for your one-time hospital to home visit. [address] [facility] [address] [phone] [facility] Office on Aging  For additional CHF education, med management, resources, and support in the community. [address] Abiel Ratel III, DO Go on 6/19/2018 10:30AM; Cardiology follow-up with Marcy Martinez NP [address] [facility] 
[phone] [address]  Please call or go to your local DSS office to get more information regarding Medicaid and spending down - can provide significant assistance with medications and transportation. Logan Dixon, [address] [phone] Discharge Orders None A check scottie indicates which time of day the medication should be taken. My Medications START taking these medications Instructions Each Dose to Equal  
 Morning Noon Evening Bedtime  
 valsartan 80 mg tablet Commonly known as:  DIOVAN Start taking on:  6/6/2018 Your next dose is:  Tomorrow Take 1 Tab by mouth daily.   
 80 mg  
    
  
   
   
   
  
  
 CONTINUE taking these medications Instructions Each Dose to Equal  
 Morning Noon Evening Bedtime  
 acetaminophen 325 mg tablet Commonly known as:  TYLENOL Take 2 Tabs by mouth every four (4) hours as needed. 650 mg  
    
   
   
   
  
 aspirin 81 mg chewable tablet Your next dose is:  Tomorrow Take 1 Tab by mouth daily. 81 mg  
    
  
   
   
   
  
 atorvastatin 20 mg tablet Commonly known as:  LIPITOR Your next dose is: Today Take 1 Tab by mouth nightly. 20 mg  
    
   
   
   
  
  
 bumetanide 0.5 mg tablet Commonly known as:  Tura Gustavo Your next dose is:  Tomorrow Take 1 Tab by mouth daily. 0.5 mg  
    
  
   
   
   
  
 carvedilol 6.25 mg tablet Commonly known as:  Janalyn Ipta Your next dose is: Today Take 1 Tab by mouth two (2) times daily (with meals). 6.25 mg  
    
   
   
  
   
  
 cetirizine 10 mg tablet Commonly known as:  ZyrTEC Your next dose is:  Tomorrow Take 1 Tab by mouth daily for 30 days. 10 mg  
    
  
   
   
   
  
 docusate sodium 100 mg capsule Commonly known as:  Zafar Lux Your next dose is: Today Take 1 Cap by mouth two (2) times a day for 90 days. 100 mg  
    
   
   
  
   
  
 gabapentin 300 mg capsule Commonly known as:  NEURONTIN Your next dose is: Today Take 300 mg by mouth three (3) times daily. 300 mg  
    
   
   
  
   
  
 isosorbide mononitrate ER 30 mg tablet Commonly known as:  IMDUR Your next dose is:  Tomorrow Take 1 Tab by mouth daily. 30 mg  
    
  
   
   
   
  
 omeprazole 20 mg capsule Commonly known as:  PRILOSEC Your next dose is:  Tomorrow Take 20 mg by mouth daily. Indications: GASTROESOPHAGEAL REFLUX  
 20 mg  
    
  
   
   
   
  
 ondansetron 4 mg disintegrating tablet Commonly known as:  ZOFRAN ODT Take 1 Tab by mouth every eight (8) hours as needed for Nausea. 4 mg polyethylene glycol 17 gram packet Commonly known as:  Tracy Cancel Your next dose is:  Tomorrow Take 1 Packet by mouth daily. 17 g STOP taking these medications   
 hydrALAZINE 25 mg tablet Commonly known as:  APRESOLINE Where to Get Your Medications Information on where to get these meds will be given to you by the nurse or doctor. ! Ask your nurse or doctor about these medications  
  aspirin 81 mg chewable tablet  
 atorvastatin 20 mg tablet  
 bumetanide 0.5 mg tablet  
 carvedilol 6.25 mg tablet  
 isosorbide mononitrate ER 30 mg tablet  
 valsartan 80 mg tablet Discharge Instructions Patient Discharge Instructions Jude Rodriguez / [id number] : 1945 Admitted 2018 Discharged: 2018 DISCHARGE DIAGNOSIS:  
 
Heart failure - It is important that you take the medication exactly as they are prescribed. Take Home Medications General drug facts If you have a very bad allergy, wear an allergy ID at all times. It is important that you take the medication exactly as they are prescribed. Keep your medication in the bottles provided by the pharmacist. 
Keep a list of all your drugs (prescription, natural products, vitamins, OTC) with you. Give this list to your doctor. Do not take other medications without consulting your doctor. Do not share your drugs with others and do not take anyone else's drugs. Keep all drugs out of the reach of children and pets. Most drugs may be thrown away in household trash after mixing with coffee grounds or joey litter and sealing in a plastic bag. Keep a list Call your doctor for help with any side effects. If in the [place] you may also call the FDA at 0-903-FDA-8415 Talk with the doctor before starting any new drug, including OTC, natural products, or vitamins. What to do at [facility] 1. Recommended diet:cardiac, 2 g sodium 2. Recommended activity: Activity as tolerated 3. If you experience any of the following symptoms then please call your primary care physician or return to the emergency room if you cannot get hold of your doctor:worsening dyspnea / chest pain 4. Lab work:with PCP in 1 week 5. You should weigh yourself daily. You should measure your weight first thing in the morning, after you use the restroom. If you gain more than 3 pounds in 1 day or 5 pounds in 1 week or you are feeling more short of breath then usual you should take an extra Bumex in the afternoon. If you require extra lasix more than 3 days in a row, call Dr Anastasiia Cutler. Record your daily weights in a notebook. Bring this with you to all your doctor's appointments. 6.Bring these papers with you to your follow up appointments. The papers will help your doctors be sure to continue the care plan from the hospital. 
 
 
Follow-up with:  
PCP: Demetra Palmer NP Follow-up Information Follow up With Details Comments Contact Info Demetra Palmer NP Go on 6/6/2018 For hospital follow up appointment at 11:30AM  [address] [address] 
[phone] [address] [place] [address]  This is the provider for your one-time hospital to home visit. [address] [facility] [address] [phone] [facility] Office on Aging  For additional CHF education, med management, resources, and support in the community. [address]aez MD In 2 weeks  [address] 
[phone] Please call for your own appointment Information obtained by : 
I understand that if any problems occur once I am at home I am to contact my physician. I understand and acknowledge receipt of the instructions indicated above. Physician's or R.N.'s Signature                                                                  Date/Time Patient or Representative Signature                                                          Date/Time [employer] Announcement We are excited to announce that we are making your provider's discharge notes available to you in [employer]. You will see these notes when they are completed and signed by the physician that discharged you from your recent hospital stay. If you have any questions or concerns about any information you see in [employer], please call the Health Information Department where you were seen or reach out to your Primary Care Provider for more information about your plan of care. Introducing [facility]! Jake Bridges introduces [employer] patient portal. Now you can access parts of your medical record, email your doctor's office, and request medication refills online. 1. In your internet browser, go to [email or web address] 2. Click on the First Time User? Click Here link in the Sign In box. You will see the New Member Sign Up page. 3. Enter your [employer] Access Code exactly as it appears below. You will not need to use this code after youve completed the sign-up process. If you do not sign up before the expiration date, you must request a new code. · [employer] Access Code: [email or web address] Expires: 6/30/2018 11:22 AM 
 
4. Enter the last four digits of your Social Security Number (xxxx) and Date of Birth (mm/dd/yyyy) as indicated and click Submit. You will be taken to the next sign-up page. 5. Create a [employer] ID. This will be your [employer] login ID and cannot be changed, so think of one that is secure and easy to remember. 6. Create a [employer] password. You can change your password at any time. 7. Enter your Password Reset Question and Answer. This can be used at a later time if you forget your password. 8. Enter your e-mail address. You will receive e-mail notification when new information is available in [address] 9. Click Sign Up. You can now view and download portions of your medical record. 10. Click the Download Summary menu link to download a portable copy of your medical information. If you have questions, please visit the Frequently Asked Questions section of the [employer] website. Remember, [employer] is NOT to be used for urgent needs. For medical emergencies, dial 911. Now available from your iPhone and Android! Introducing Manolo Payne As a [facility] patient, I wanted to make you aware of our electronic visit tool called Manolo Payne. [employer] allows you to connect within minutes with a medical provider 24 hours a day, seven days a week via a mobile device or tablet or logging into a secure website from your computer. You can access Manolo Payne from anywhere in the [place]. A virtual visit might be right for you when you have a simple condition and feel like you just dont want to get out of bed, or cant get away from work for an appointment, when your regular [facility] provider is not available (evenings, weekends or holidays), or when youre out of town and need minor care. Electronic visits cost only $49 and if the [employer] provider determines a prescription is needed to treat your condition, one can be electronically transmitted to a nearby pharmacy*. Please take a moment to enroll today if you have not already done so. The enrollment process is free and takes just a few minutes. To enroll, please download the [employer] kailyn to your tablet or phone, or visit [email or web address] to enroll on your computer. And, as an [address] [place] [address] patient with a [employer] account, the results of your visits will be scanned into your electronic medical record and your primary care provider will be able to view the scanned results. We urge you to continue to see your regular [facility] provider for your ongoing medical care. And while your primary care provider may not be the one available when you seek a Manolo Rodriguezbudfin virtual visit, the peace of mind you get from getting a real diagnosis real time can be priceless. For more information on Manolo Payne, view our Frequently Asked Questions (FAQs) at [email or web address]. Sincerely, 
 
Nikolay Little MD 
Chief Medical Officer Charles Doshi *:  certain medications cannot be prescribed via Manolo Rodriguezcali Providers Seen During Your Hospitalization Provider Specialty Primary office phone Fredy Bear MD Emergency Medicine [phone] Shashi Jansen MD Internal Medicine [phone] Jina Haider MD Internal Medicine [phone] Your Primary Care Physician (PCP) Primary Care Physician Office Phone Office Fax Kaylynn Le [phone] You are allergic to the following Allergen Reactions Percocet (Oxycodone-Acetaminophen) Other (comments) Confused Recent Documentation Height Weight BMI OB Status Smoking Status 1.702 m 66.4 kg 22.93 kg/m2 Postmenopausal Never Smoker Emergency Contacts Name Discharge Info Relation Home Work Mobile 23 Mirtha Weaver Said CAREGIVER [3] Son [22] [phone] Fabiola He DISCHARGE CAREGIVER [3] Daughter [21] [phone] Juan Miguel Hobbs DISCHARGE CAREGIVER [3] Child [2] [phone] Patient Belongings  The following personal items are in your possession at time of discharge: 
  Dental Appliances: None  Visual Aid: Glasses, With patient      Home Medications: None   Jewelry: Earrings  Clothing: Pants, Shirt, Slippers, Sweater, Undergarments    Other Valuables: Purse Please provide this summary of care documentation to your next provider. Signatures-by signing, you are acknowledging that this After Visit Summary has been reviewed with you and you have received a copy. Patient Signature:  ____________________________________________________________ Date:  ____________________________________________________________  
  
Edrie Gunnels Provider Signature:  ____________________________________________________________ Date:  ____________________________________________________________

## 2018-06-03 LAB
ANION GAP SERPL CALC-SCNC: 11 MMOL/L (ref 5–15)
ATRIAL RATE: 94 BPM
BASOPHILS # BLD: 0 K/UL (ref 0–0.1)
BASOPHILS NFR BLD: 0 % (ref 0–1)
BILIRUB DIRECT SERPL-MCNC: 0.3 MG/DL (ref 0–0.2)
BUN SERPL-MCNC: 20 MG/DL (ref 6–20)
BUN/CREAT SERPL: 15 (ref 12–20)
CALCIUM SERPL-MCNC: 8.5 MG/DL (ref 8.5–10.1)
CALCULATED P AXIS, ECG09: 46 DEGREES
CALCULATED R AXIS, ECG10: -13 DEGREES
CALCULATED T AXIS, ECG11: 28 DEGREES
CHLORIDE SERPL-SCNC: 110 MMOL/L (ref 97–108)
CO2 SERPL-SCNC: 21 MMOL/L (ref 21–32)
CREAT SERPL-MCNC: 1.32 MG/DL (ref 0.55–1.02)
DIAGNOSIS, 93000: NORMAL
DIFFERENTIAL METHOD BLD: ABNORMAL
EOSINOPHIL # BLD: 0 K/UL (ref 0–0.4)
EOSINOPHIL NFR BLD: 0 % (ref 0–7)
ERYTHROCYTE [DISTWIDTH] IN BLOOD BY AUTOMATED COUNT: 18.7 % (ref 11.5–14.5)
GLUCOSE SERPL-MCNC: 90 MG/DL (ref 65–100)
HCT VFR BLD AUTO: 28 % (ref 35–47)
HGB BLD-MCNC: 8.9 G/DL (ref 11.5–16)
IMM GRANULOCYTES # BLD: 0 K/UL (ref 0–0.04)
IMM GRANULOCYTES NFR BLD AUTO: 1 % (ref 0–0.5)
LYMPHOCYTES # BLD: 0.9 K/UL (ref 0.8–3.5)
LYMPHOCYTES NFR BLD: 11 % (ref 12–49)
MCH RBC QN AUTO: 23.7 PG (ref 26–34)
MCHC RBC AUTO-ENTMCNC: 31.8 G/DL (ref 30–36.5)
MCV RBC AUTO: 74.7 FL (ref 80–99)
MONOCYTES # BLD: 0.4 K/UL (ref 0–1)
MONOCYTES NFR BLD: 4 % (ref 5–13)
NEUTS SEG # BLD: 6.8 K/UL (ref 1.8–8)
NEUTS SEG NFR BLD: 83 % (ref 32–75)
NRBC # BLD: 0 K/UL (ref 0–0.01)
NRBC BLD-RTO: 0 PER 100 WBC
P-R INTERVAL, ECG05: 150 MS
PLATELET # BLD AUTO: 179 K/UL (ref 150–400)
PMV BLD AUTO: 11 FL (ref 8.9–12.9)
POTASSIUM SERPL-SCNC: 3.7 MMOL/L (ref 3.5–5.1)
Q-T INTERVAL, ECG07: 390 MS
QRS DURATION, ECG06: 96 MS
QTC CALCULATION (BEZET), ECG08: 487 MS
RBC # BLD AUTO: 3.75 M/UL (ref 3.8–5.2)
SODIUM SERPL-SCNC: 142 MMOL/L (ref 136–145)
TROPONIN I SERPL-MCNC: 0.05 NG/ML
VENTRICULAR RATE, ECG03: 94 BPM
WBC # BLD AUTO: 8.2 K/UL (ref 3.6–11)

## 2018-06-03 PROCEDURE — 74011000250 HC RX REV CODE- 250: Performed by: INTERNAL MEDICINE

## 2018-06-03 PROCEDURE — 84484 ASSAY OF TROPONIN QUANT: CPT | Performed by: INTERNAL MEDICINE

## 2018-06-03 PROCEDURE — 74011250637 HC RX REV CODE- 250/637: Performed by: INTERNAL MEDICINE

## 2018-06-03 PROCEDURE — 36415 COLL VENOUS BLD VENIPUNCTURE: CPT | Performed by: INTERNAL MEDICINE

## 2018-06-03 PROCEDURE — 65660000000 HC RM CCU STEPDOWN

## 2018-06-03 PROCEDURE — 82248 BILIRUBIN DIRECT: CPT | Performed by: INTERNAL MEDICINE

## 2018-06-03 PROCEDURE — 80048 BASIC METABOLIC PNL TOTAL CA: CPT | Performed by: INTERNAL MEDICINE

## 2018-06-03 PROCEDURE — 74011250636 HC RX REV CODE- 250/636: Performed by: INTERNAL MEDICINE

## 2018-06-03 PROCEDURE — 85025 COMPLETE CBC W/AUTO DIFF WBC: CPT | Performed by: INTERNAL MEDICINE

## 2018-06-03 RX ORDER — LANOLIN ALCOHOL/MO/W.PET/CERES
400 CREAM (GRAM) TOPICAL 2 TIMES DAILY
Status: DISCONTINUED | OUTPATIENT
Start: 2018-06-03 | End: 2018-06-05 | Stop reason: HOSPADM

## 2018-06-03 RX ORDER — BUMETANIDE 0.25 MG/ML
0.5 INJECTION INTRAMUSCULAR; INTRAVENOUS DAILY
Status: DISCONTINUED | OUTPATIENT
Start: 2018-06-04 | End: 2018-06-04

## 2018-06-03 RX ORDER — BUMETANIDE 0.25 MG/ML
1 INJECTION INTRAMUSCULAR; INTRAVENOUS 2 TIMES DAILY
Status: DISCONTINUED | OUTPATIENT
Start: 2018-06-03 | End: 2018-06-03

## 2018-06-03 RX ORDER — VALSARTAN 80 MG/1
80 TABLET ORAL DAILY
Status: DISCONTINUED | OUTPATIENT
Start: 2018-06-04 | End: 2018-06-05 | Stop reason: HOSPADM

## 2018-06-03 RX ADMIN — Medication 400 MG: at 17:05

## 2018-06-03 RX ADMIN — HEPARIN SODIUM 5000 UNITS: 5000 INJECTION, SOLUTION INTRAVENOUS; SUBCUTANEOUS at 12:31

## 2018-06-03 RX ADMIN — GABAPENTIN 300 MG: 300 CAPSULE ORAL at 08:01

## 2018-06-03 RX ADMIN — CARVEDILOL 6.25 MG: 6.25 TABLET, FILM COATED ORAL at 07:57

## 2018-06-03 RX ADMIN — ATORVASTATIN CALCIUM 20 MG: 20 TABLET, FILM COATED ORAL at 21:10

## 2018-06-03 RX ADMIN — ASPIRIN 81 MG 81 MG: 81 TABLET ORAL at 08:02

## 2018-06-03 RX ADMIN — Medication 400 MG: at 12:31

## 2018-06-03 RX ADMIN — Medication 10 ML: at 06:00

## 2018-06-03 RX ADMIN — CETIRIZINE HYDROCHLORIDE 10 MG: 10 TABLET, FILM COATED ORAL at 12:31

## 2018-06-03 RX ADMIN — HEPARIN SODIUM 5000 UNITS: 5000 INJECTION, SOLUTION INTRAVENOUS; SUBCUTANEOUS at 02:00

## 2018-06-03 RX ADMIN — GABAPENTIN 300 MG: 300 CAPSULE ORAL at 15:29

## 2018-06-03 RX ADMIN — Medication 10 ML: at 21:10

## 2018-06-03 RX ADMIN — HEPARIN SODIUM 5000 UNITS: 5000 INJECTION, SOLUTION INTRAVENOUS; SUBCUTANEOUS at 17:05

## 2018-06-03 RX ADMIN — Medication 10 ML: at 15:30

## 2018-06-03 RX ADMIN — POLYETHYLENE GLYCOL 3350 17 G: 17 POWDER, FOR SOLUTION ORAL at 08:01

## 2018-06-03 RX ADMIN — BUMETANIDE 0.5 MG: 0.25 INJECTION INTRAMUSCULAR; INTRAVENOUS at 08:01

## 2018-06-03 RX ADMIN — HYDRALAZINE HYDROCHLORIDE 25 MG: 50 TABLET, FILM COATED ORAL at 08:00

## 2018-06-03 RX ADMIN — LISINOPRIL 2.5 MG: 5 TABLET ORAL at 08:01

## 2018-06-03 RX ADMIN — PANTOPRAZOLE SODIUM 40 MG: 40 TABLET, DELAYED RELEASE ORAL at 08:01

## 2018-06-03 RX ADMIN — CARVEDILOL 6.25 MG: 6.25 TABLET, FILM COATED ORAL at 17:05

## 2018-06-03 RX ADMIN — ISOSORBIDE MONONITRATE 30 MG: 30 TABLET, EXTENDED RELEASE ORAL at 08:02

## 2018-06-03 RX ADMIN — GABAPENTIN 300 MG: 300 CAPSULE ORAL at 21:10

## 2018-06-03 NOTE — PROGRESS NOTES
Bedside shift change report given to [facility]  (oncoming nurse) by Bren Buckley (offgoing nurse). Report included the following information SBAR, Kardex, Intake/Output, MAR and Recent Results.

## 2018-06-03 NOTE — CONSULTS
Consult    NAME: Meeta Rasheed   :  1945   MRN:  [id number]     Date/Time:  6/3/2018 1:00 PM    Patient PCP: Luis Juarez MD  ________________________________________________________________________     Assessment:     1. Acute on chronic systolic chf secondary to non-compliance with meds  2. CAD with distant PCI in , stress in  with no ischemia  3. Echo 2017 EF 25%  4. Sinus with LVH, s/p st denise ICD  5. HTN  6. Dyslipidemia  7. S/p nephrectomy for renal CA, CKD Dr. Christie Porter  8. GERD  9. , son lives with her, retired from cooking, ADLs  10. Cardiologist:  Alfonzo Banks        Plan:     Stress at home with son with legal issues and monetary issues. Non-compliant with meds. Increased blood pressure, mild chf only seen on CT    1. Cont ASA  2. Cont coreg  3. Change hydralazine to diovan  4. Cont imdur  5. Change bumex to 0.5mg iv, follow bmp  6. Cont statin    Home soon.  to assist with cost of meds, getting to appts. .. [x]        High complexity decision making was performed        Subjective:   CHIEF COMPLAINT: dyspnea    HISTORY OF PRESENT ILLNESS:       Meeta Rasheed, 71 y.o. female with PMHx significant for CAD, HTN, GERD, DM, CHF, MI, presents via EMS to the ED with cc of constant moderate SOB, ongoing for 3 days. Per EMS, pt was at providers office with complaints of CP and stated that she had not taken her BP for various days. Of note, pt denies any sxs of CP PTA or MAYO. She was given 4 baby Aspirins by EMS and refused to take nitroglycerine because it would make her nauseous. Per EMS, her BP was 204/134 and all other vitals were normal. Pt expresses that her SOB is exacerbated when ambulating and notes having weakness to her legs without any pain. She denies taking any long trips or inactivity for long periods of time. Pt specifically denies any cough, fevers, chills, nausea, vomiting, diarrhea, or abdominal pain.       We were asked to consult for work up and evaluation of the above problems. Past Medical History:   Diagnosis Date    Arthritis     Autoimmune disease ([address]     rheumatoid     CAD (coronary artery disease)     CHF (congestive heart failure) (HCC)     Chronic pain     neuropathy bilateral feet,knees    Diabetes ([address]     GERD (gastroesophageal reflux disease)     Hypertension     Ill-defined condition     anemia    Ill-defined condition     blood transfusion hx    MI, old     Other ill-defined conditions(799.89)     high cholesterol    Renal cell carcinoma of right kidney [address]     s/p resection 12/16      Past Surgical History:   Procedure Laterality Date    CARDIAC SURG PROCEDURE UNLIST      three stents placed 2005    CARDIAC SURG PROCEDURE UNLIST      HX CHOLECYSTECTOMY  02/28/2017    lap tana with IOC.  HX PACEMAKER  2017/January    ICD    HX TONSILLECTOMY      HX UROLOGICAL  12/22/2016    RIGHT LAPOROSCOPIC HAND ASSISTED RADICAL NEPHRECTOMY     Allergies   Allergen Reactions    Percocet [Oxycodone-Acetaminophen] Other (comments)     Confused      Meds:  See below  Social History   Substance Use Topics    Smoking status: Never Smoker    Smokeless tobacco: Never Used    Alcohol use No      Family History   Problem Relation Age of Onset    Cancer Mother      stomach    Other Father      aneurysym   Cameron Keating Cancer Brother      lung    Other Brother      stomach problems    Stroke Brother        REVIEW OF SYSTEMS:     []         Unable to obtain  ROS due to ---   [x]         Total of 12 systems reviewed as follows:     Total of 12 systems reviewed as follows:       POSITIVE= Bold text  Negative = normal text  General:  fever, chills, sweats, generalized weakness, weight loss/gain,      loss of appetite   Eyes:    blurred vision, eye pain, loss of vision, double vision  ENT:    rhinorrhea, pharyngitis   Respiratory:   cough, sputum production, SOB, ROCK, wheezing, pleuritic pain   Cardiology:   chest pain, palpitations, orthopnea, PND, edema, syncope   Gastrointestinal:  abdominal pain , N/V, diarrhea, dysphagia, constipation, bleeding   Genitourinary:  frequency, urgency, dysuria, hematuria, incontinence   Muskuloskeletal :  arthralgia, myalgia, back pain  Hematology:  easy bruising, nose or gum bleeding, lymphadenopathy   Dermatological: rash, ulceration, pruritis, color change / jaundice  Endocrine:   hot flashes or polydipsia   Neurological:  headache, dizziness, confusion, focal weakness, paresthesia,     Speech difficulties, memory loss, gait difficulty  Psychological: Feelings of anxiety, depression, agitation    Objective:      Physical Exam:    Last 24hrs VS reviewed since prior progress note. Most recent are:    Visit Vitals    /75 (BP 1 Location: Right arm, BP Patient Position: At rest)    Pulse 69    Temp 97.4 °F (36.3 °C)    Resp 18    Ht 5' 7\" (1.702 m)    Wt 64.7 kg (142 lb 11.2 oz)    SpO2 100%    BMI 22.35 kg/m2       Intake/Output Summary (Last 24 hours) at 06/03/18 1300  Last data filed at 06/03/18 0813   Gross per 24 hour   Intake              600 ml   Output              300 ml   Net              300 ml        General Appearance: Well developed, well nourished, alert & oriented x 3,    no acute distress. Ears/Nose/Mouth/Throat: Pupils equal and round, Hearing grossly normal.  Neck: Supple. JVP elevated. Carotids good upstrokes, with no bruit. Chest: Lungs clear to auscultation bilaterally. Cardiovascular: Regular rate and rhythm, S1S2 normal, no murmur, rubs, gallops. Abdomen: Soft, non-tender, bowel sounds are active. No organomegaly. Extremities: No edema bilaterally. Femoral pulses +2, Distal Pulses +1. Skin: Warm and dry. Neuro: CN II-XII grossly intact, Strength and sensation grossly intact. Data:      Prior to Admission medications    Medication Sig Start Date End Date Taking? Authorizing Provider   cetirizine (ZYRTEC) 10 mg tablet Take 1 Tab by mouth daily for 30 days.  5/19/18 6/18/18  Aisha Edouard NP   ondansetron (ZOFRAN ODT) 4 mg disintegrating tablet Take 1 Tab by mouth every eight (8) hours as needed for Nausea. 5/19/18   Aisha Edouard NP   acetaminophen (TYLENOL) 325 mg tablet Take 2 Tabs by mouth every four (4) hours as needed. 4/10/18   Wild Carrizales MD   hydrALAZINE (APRESOLINE) 25 mg tablet Take 1 Tab by mouth three (3) times daily. 4/10/18   Wild Carrizales MD   docusate sodium (COLACE) 100 mg capsule Take 1 Cap by mouth two (2) times a day for 90 days. 4/10/18 7/9/18  Corby Rivera MD   polyethylene glycol (MIRALAX) 17 gram packet Take 1 Packet by mouth daily. 4/10/18   Corby Rivera MD   aspirin 81 mg chewable tablet Take 1 Tab by mouth daily. 6/13/17   Joey Funes MD   atorvastatin (LIPITOR) 20 mg tablet Take 1 Tab by mouth nightly. 6/13/17   Joey Funes MD   bumetanide (BUMEX) 0.5 mg tablet Take 1 Tab by mouth daily. 6/13/17   Joey Funes MD   carvedilol (COREG) 6.25 mg tablet Take 1 Tab by mouth two (2) times daily (with meals). 6/13/17   Joey Funes MD   isosorbide mononitrate ER (IMDUR) 30 mg tablet Take 1 Tab by mouth daily. 6/13/17   Joey Funes MD   gabapentin (NEURONTIN) 300 mg capsule Take 300 mg by mouth three (3) times daily. Historical Provider   omeprazole (PRILOSEC) 20 mg capsule Take 20 mg by mouth daily.  Indications: GASTROESOPHAGEAL REFLUX    Historical Provider       Recent Results (from the past 24 hour(s))   TROPONIN I    Collection Time: 06/02/18  7:35 PM   Result Value Ref Range    Troponin-I, Qt. 0.08 (H) <1.39 ng/mL   METABOLIC PANEL, BASIC    Collection Time: 06/03/18  3:47 AM   Result Value Ref Range    Sodium 142 136 - 145 mmol/L    Potassium 3.7 3.5 - 5.1 mmol/L    Chloride 110 (H) 97 - 108 mmol/L    CO2 21 21 - 32 mmol/L    Anion gap 11 5 - 15 mmol/L    Glucose 90 65 - 100 mg/dL    BUN 20 6 - 20 MG/DL    Creatinine 1.32 (H) 0.55 - 1.02 MG/DL    BUN/Creatinine ratio 15 12 - 20 GFR est AA 48 (L) >60 ml/min/1.73m2    GFR est non-AA 40 (L) >60 ml/min/1.73m2    Calcium 8.5 8.5 - 10.1 MG/DL   CBC WITH AUTOMATED DIFF    Collection Time: 06/03/18  3:47 AM   Result Value Ref Range    WBC 8.2 3.6 - 11.0 K/uL    RBC 3.75 (L) 3.80 - 5.20 M/uL    HGB 8.9 (L) 11.5 - 16.0 g/dL    HCT 28.0 (L) 35.0 - 47.0 %    MCV 74.7 (L) 80.0 - 99.0 FL    MCH 23.7 (L) 26.0 - 34.0 PG    MCHC 31.8 30.0 - 36.5 g/dL    RDW 18.7 (H) 11.5 - 14.5 %    PLATELET 169 583 - 900 K/uL    MPV 11.0 8.9 - 12.9 FL    NRBC 0.0 0  WBC    ABSOLUTE NRBC 0.00 0.00 - 0.01 K/uL    NEUTROPHILS 83 (H) 32 - 75 %    LYMPHOCYTES 11 (L) 12 - 49 %    MONOCYTES 4 (L) 5 - 13 %    EOSINOPHILS 0 0 - 7 %    BASOPHILS 0 0 - 1 %    IMMATURE GRANULOCYTES 1 (H) 0.0 - 0.5 %    ABS. NEUTROPHILS 6.8 1.8 - 8.0 K/UL    ABS. LYMPHOCYTES 0.9 0.8 - 3.5 K/UL    ABS. MONOCYTES 0.4 0.0 - 1.0 K/UL    ABS. EOSINOPHILS 0.0 0.0 - 0.4 K/UL    ABS. BASOPHILS 0.0 0.0 - 0.1 K/UL    ABS. IMM.  GRANS. 0.0 0.00 - 0.04 K/UL    DF AUTOMATED     BILIRUBIN, DIRECT    Collection Time: 06/03/18  3:47 AM   Result Value Ref Range    Bilirubin, direct 0.3 (H) 0.0 - 0.2 MG/DL   TROPONIN I    Collection Time: 06/03/18  3:47 AM   Result Value Ref Range    Troponin-I, Qt. 0.05 (H) <0.05 ng/mL

## 2018-06-03 NOTE — PROGRESS NOTES
Hospitalist Progress Note    NAME: Paula Weaver   :  1945   MRN:  [id number]     Interim Hospital Summary: 71 y.o. female whom presented on 2018 with      Assessment / Plan:    Acute on chronic systolic congestive heart failure   Ischemic cardiomyopathy EF 25%  Hypertensive emergency  -CTA chest:  No PE, Cardiomegaly with evidence of interstitial pulmonary edema and right heart;  proBNP >30K. -weight down. Breathing better. Continue IV bumex. Follow lytes  -continue ARB and coreg  -cardiology consult    CKD stage III  -follow Cr    CAD, hx PCI   -continue aspirin, statin and Coreg     Minimal elevation of TB  -stress related vs from passive congestion  History of renal carcinoma status post nephrectomy  Gastroesophageal reflux disease      Code status: Full  Prophylaxis: Hep SQ  Recommended Disposition:  PT, OT, RN       Subjective:     Chief Complaint / Reason for Physician Visit  Follow up of  CHF, HTN, CDK, CAD  Chart reviewed in detail. Discussed with RN events overnight. Review of Systems:  Symptom Y/N Comments  Symptom Y/N Comments   Fever/Chills    Chest Pain     Poor Appetite    Edema     Cough    Abdominal Pain     Sputum    Joint Pain     SOB/ROCK    Pruritis/Rash     Nausea/vomit    Tolerating PT/OT     Diarrhea    Tolerating Diet     Constipation    Other       Could NOT obtain due to:      PO intake: Patient Vitals for the past 72 hrs:   % Diet Eaten   18 1344 100 %   18 0813 100 %   18 1815 100 %     Objective:     VITALS:   Last 24hrs VS reviewed since prior progress note.  Most recent are:  Patient Vitals for the past 24 hrs:   Temp Pulse Resp BP SpO2   18 1114 97.4 °F (36.3 °C) 69 18 131/75 100 %   18 0736 98.2 °F (36.8 °C) 81 18 (!) 178/107 100 %   18 0322 97.6 °F (36.4 °C) 75 18 150/82 97 %   18 2236 98 °F (36.7 °C) 75 20 (!) 161/105 96 %   18 1952 98.2 °F (36.8 °C) 76 22 146/88 94 %   06/02/18 1736 97.5 °F (36.4 °C) 77 22 (!) 157/105 100 %   06/02/18 1630 - 86 30 (!) 180/119 100 %   06/02/18 1500 - 91 15 (!) 180/119 100 %   06/02/18 1432 - - - (!) 186/127 -   06/02/18 1400 - 87 21 (!) 186/125 100 %       Intake/Output Summary (Last 24 hours) at 06/03/18 1344  Last data filed at 06/03/18 1344   Gross per 24 hour   Intake              700 ml   Output              300 ml   Net              400 ml        PHYSICAL EXAM:  General: WD, WN. Alert, cooperative, no acute distress    EENT:  EOMI. Anicteric sclerae. MMM  Resp:  CTA bilaterally, no wheezing or rales. No accessory muscle use  CV:  Regular  rhythm,  No edema  GI:  Soft, Non distended, Non tender.  +Bowel sounds  Neurologic:  Alert and oriented X 3, normal speech,   Psych:   Good insight. Not anxious nor agitated  Skin:  No rashes. No jaundice    Reviewed most current lab test results and cultures  YES  Reviewed most current radiology test results   YES  Review and summation of old records today    NO  Reviewed patient's current orders and MAR    YES  PMH/SH reviewed - no change compared to H&P  ________________________________________________________________________  Care Plan discussed with:    Comments   Patient x    Family      RN x    Care Manager     Consultant                        Multidiciplinary team rounds were held today with , nursing, pharmacist and clinical coordinator. Patient's plan of care was discussed; medications were reviewed and discharge planning was addressed.      ________________________________________________________________________  Total NON critical care TIME:  25   Minutes    Total CRITICAL CARE TIME Spent:   Minutes non procedure based      Comments   >50% of visit spent in counseling and coordination of care x     This includes time during multidisciplinary rounds if indicated above   ________________________________________________________________________  Gabi Hill MD Procedures: see electronic medical records for all procedures/Xrays and details which were not copied into this note but were reviewed prior to creation of Plan. LABS:  I reviewed today's most current labs and imaging studies.   Pertinent labs include:  Recent Labs      06/03/18   0347  06/02/18   1248   WBC  8.2  8.8   HGB  8.9*  9.5*   HCT  28.0*  29.9*   PLT  179  222     Recent Labs      06/03/18   0347  06/02/18   1248   NA  142  142   K  3.7  3.8   CL  110*  109*   CO2  21  21   GLU  90  91   BUN  20  21*   CREA  1.32*  1.45*   CA  8.5  9.1   MG   --   1.6   ALB   --   3.7   TBILI   --   1.3*   SGOT   --   14*   ALT   --   20   INR   --   1.3*

## 2018-06-04 ENCOUNTER — HOME HEALTH ADMISSION (OUTPATIENT)
Dept: HOME HEALTH SERVICES | Facility: HOME HEALTH | Age: 73
End: 2018-06-04

## 2018-06-04 LAB
ALBUMIN SERPL-MCNC: 2.8 G/DL (ref 3.5–5)
ALBUMIN/GLOB SERPL: 1 {RATIO} (ref 1.1–2.2)
ALP SERPL-CCNC: 112 U/L (ref 45–117)
ALT SERPL-CCNC: 16 U/L (ref 12–78)
ANION GAP SERPL CALC-SCNC: 8 MMOL/L (ref 5–15)
AST SERPL-CCNC: 9 U/L (ref 15–37)
BILIRUB SERPL-MCNC: 0.5 MG/DL (ref 0.2–1)
BUN SERPL-MCNC: 23 MG/DL (ref 6–20)
BUN/CREAT SERPL: 14 (ref 12–20)
CALCIUM SERPL-MCNC: 8.1 MG/DL (ref 8.5–10.1)
CHLORIDE SERPL-SCNC: 109 MMOL/L (ref 97–108)
CO2 SERPL-SCNC: 24 MMOL/L (ref 21–32)
CREAT SERPL-MCNC: 1.6 MG/DL (ref 0.55–1.02)
GLOBULIN SER CALC-MCNC: 2.7 G/DL (ref 2–4)
GLUCOSE SERPL-MCNC: 103 MG/DL (ref 65–100)
MAGNESIUM SERPL-MCNC: 1.6 MG/DL (ref 1.6–2.4)
POTASSIUM SERPL-SCNC: 3.6 MMOL/L (ref 3.5–5.1)
PROT SERPL-MCNC: 5.5 G/DL (ref 6.4–8.2)
SODIUM SERPL-SCNC: 141 MMOL/L (ref 136–145)

## 2018-06-04 PROCEDURE — 83735 ASSAY OF MAGNESIUM: CPT | Performed by: INTERNAL MEDICINE

## 2018-06-04 PROCEDURE — 74011250637 HC RX REV CODE- 250/637: Performed by: INTERNAL MEDICINE

## 2018-06-04 PROCEDURE — 74011250636 HC RX REV CODE- 250/636: Performed by: INTERNAL MEDICINE

## 2018-06-04 PROCEDURE — 80053 COMPREHEN METABOLIC PANEL: CPT | Performed by: INTERNAL MEDICINE

## 2018-06-04 PROCEDURE — 93306 TTE W/DOPPLER COMPLETE: CPT

## 2018-06-04 PROCEDURE — 36415 COLL VENOUS BLD VENIPUNCTURE: CPT | Performed by: INTERNAL MEDICINE

## 2018-06-04 PROCEDURE — 65660000000 HC RM CCU STEPDOWN

## 2018-06-04 RX ORDER — ALPRAZOLAM 0.25 MG/1
0.25 TABLET ORAL
Status: DISCONTINUED | OUTPATIENT
Start: 2018-06-04 | End: 2018-06-05 | Stop reason: HOSPADM

## 2018-06-04 RX ADMIN — GABAPENTIN 300 MG: 300 CAPSULE ORAL at 17:08

## 2018-06-04 RX ADMIN — GABAPENTIN 300 MG: 300 CAPSULE ORAL at 08:52

## 2018-06-04 RX ADMIN — POLYETHYLENE GLYCOL 3350 17 G: 17 POWDER, FOR SOLUTION ORAL at 08:52

## 2018-06-04 RX ADMIN — Medication 400 MG: at 08:52

## 2018-06-04 RX ADMIN — ISOSORBIDE MONONITRATE 30 MG: 30 TABLET, EXTENDED RELEASE ORAL at 08:52

## 2018-06-04 RX ADMIN — Medication 10 ML: at 21:20

## 2018-06-04 RX ADMIN — ATORVASTATIN CALCIUM 20 MG: 20 TABLET, FILM COATED ORAL at 21:20

## 2018-06-04 RX ADMIN — Medication 400 MG: at 17:08

## 2018-06-04 RX ADMIN — GABAPENTIN 300 MG: 300 CAPSULE ORAL at 21:20

## 2018-06-04 RX ADMIN — PANTOPRAZOLE SODIUM 40 MG: 40 TABLET, DELAYED RELEASE ORAL at 08:52

## 2018-06-04 RX ADMIN — Medication 5 ML: at 02:35

## 2018-06-04 RX ADMIN — HEPARIN SODIUM 5000 UNITS: 5000 INJECTION, SOLUTION INTRAVENOUS; SUBCUTANEOUS at 17:08

## 2018-06-04 RX ADMIN — CARVEDILOL 6.25 MG: 6.25 TABLET, FILM COATED ORAL at 08:52

## 2018-06-04 RX ADMIN — HEPARIN SODIUM 5000 UNITS: 5000 INJECTION, SOLUTION INTRAVENOUS; SUBCUTANEOUS at 10:43

## 2018-06-04 RX ADMIN — HEPARIN SODIUM 5000 UNITS: 5000 INJECTION, SOLUTION INTRAVENOUS; SUBCUTANEOUS at 02:00

## 2018-06-04 RX ADMIN — Medication 10 ML: at 14:02

## 2018-06-04 RX ADMIN — CETIRIZINE HYDROCHLORIDE 10 MG: 10 TABLET, FILM COATED ORAL at 08:52

## 2018-06-04 RX ADMIN — ASPIRIN 81 MG 81 MG: 81 TABLET ORAL at 08:52

## 2018-06-04 RX ADMIN — CARVEDILOL 6.25 MG: 6.25 TABLET, FILM COATED ORAL at 17:08

## 2018-06-04 NOTE — PROGRESS NOTES
Hospitalist Progress Note    NAME: Yoan Scott   :  1945   MRN:  [id number]     Interim Hospital Summary: 71 y.o. female whom presented on 2018 with      Assessment / Plan:    Acute on chronic systolic congestive heart failure   Ischemic cardiomyopathy EF 25%  Hypertensive emergency  -CTA chest:  No PE, Cardiomegaly with evidence of interstitial pulmonary edema and right heart;  proBNP >30K. -Breathing better, RA sat %. Pre renal shift in labs today so will hold bumex for today. Likely can restart PO bumex tomorrow.    -continue ARB and coreg  -Echo pending  -ambulating well. Will need help with meds at home. She has not been taking her medications regularly. Will discuss with RONEY BOWMAN planning if okay with cardiology  -cardiology input appreciated    CKD stage III  -follow Cr    CAD, hx PCI   -continue aspirin, statin and Coreg     Minimal elevation of TB, resolved  -stress related vs from passive congestion. Now wnl. No further work up    History of renal carcinoma status post nephrectomy  Gastroesophageal reflux disease  -PPI    Code status: Full  Prophylaxis: Hep SQ  Recommended Disposition:  PT, OT, RN       Subjective:     Chief Complaint / Reason for Physician Visit  Follow up of  CHF, HTN, CDK, CAD  Chart reviewed in detail. Discussed with RN events overnight.        Review of Systems:  Symptom Y/N Comments  Symptom Y/N Comments   Fever/Chills    Chest Pain     Poor Appetite    Edema     Cough    Abdominal Pain     Sputum    Joint Pain     SOB/ROCK    Pruritis/Rash     Nausea/vomit    Tolerating PT/OT     Diarrhea    Tolerating Diet     Constipation    Other       Could NOT obtain due to:      PO intake:   Patient Vitals for the past 72 hrs:   % Diet Eaten   18 1828 75 %   18 1453 75 %   18 1344 100 %   18 0813 100 %   18 1815 100 %     Objective:     VITALS:   Last 24hrs VS reviewed since prior progress note. Most recent are:  Patient Vitals for the past 24 hrs:   Temp Pulse Resp BP SpO2   06/04/18 0724 98.2 °F (36.8 °C) 70 20 (!) 150/105 100 %   06/04/18 0236 98.1 °F (36.7 °C) 71 20 142/88 98 %   06/03/18 2311 98.3 °F (36.8 °C) 69 20 128/75 100 %   06/03/18 2002 98.5 °F (36.9 °C) 70 18 143/87 97 %   06/03/18 1540 97.7 °F (36.5 °C) 73 18 (!) 145/96 100 %   06/03/18 1114 97.4 °F (36.3 °C) 69 18 131/75 100 %       Intake/Output Summary (Last 24 hours) at 06/04/18 0752  Last data filed at 06/03/18 1828   Gross per 24 hour   Intake              720 ml   Output             1100 ml   Net             -380 ml        PHYSICAL EXAM:  General: WD, WN. Alert, cooperative, no acute distress    EENT:  EOMI. Anicteric sclerae. MMM  Resp:  CTA bilaterally, no wheezing or rales. No accessory muscle use  CV:  Regular  rhythm,  No edema  GI:  Soft, Non distended, Non tender.  +Bowel sounds  Neurologic:  Alert and oriented X 3, normal speech,   Psych:   Good insight. Not anxious nor agitated  Skin:  No rashes. No jaundice    Reviewed most current lab test results and cultures  YES  Reviewed most current radiology test results   YES  Review and summation of old records today    NO  Reviewed patient's current orders and MAR    YES  PMH/SH reviewed - no change compared to H&P  ________________________________________________________________________  Care Plan discussed with:    Comments   Patient x    Family      RN x    Care Manager     Consultant                        Multidiciplinary team rounds were held today with , nursing, pharmacist and clinical coordinator. Patient's plan of care was discussed; medications were reviewed and discharge planning was addressed.      ________________________________________________________________________  Total NON critical care TIME:  25   Minutes    Total CRITICAL CARE TIME Spent:   Minutes non procedure based      Comments   >50% of visit spent in counseling and coordination of care x     This includes time during multidisciplinary rounds if indicated above   ________________________________________________________________________  Triston Mayfield MD     Procedures: see electronic medical records for all procedures/Xrays and details which were not copied into this note but were reviewed prior to creation of Plan. LABS:  I reviewed today's most current labs and imaging studies.   Pertinent labs include:  Recent Labs      06/03/18   0347  06/02/18   1248   WBC  8.2  8.8   HGB  8.9*  9.5*   HCT  28.0*  29.9*   PLT  179  222     Recent Labs      06/04/18   0241  06/03/18   0347  06/02/18   1248   NA  141  142  142   K  3.6  3.7  3.8   CL  109*  110*  109*   CO2  24  21  21   GLU  103*  90  91   BUN  23*  20  21*   CREA  1.60*  1.32*  1.45*   CA  8.1*  8.5  9.1   MG  1.6   --   1.6   ALB  2.8*   --   3.7   TBILI  0.5   --   1.3*   SGOT  9*   --   14*   ALT  16   --   20   INR   --    --   1.3*

## 2018-06-04 NOTE — ROUTINE PROCESS
PCP GURPREET appt scheduled with KUMAR.  Lucía on 6/6/18 at 11:30AM. Appt added to AVS. Jazmine Quintero, [address] Specialist

## 2018-06-04 NOTE — PROGRESS NOTES
Progress Note      6/4/2018 10:54 AM  NAME: Ted Bazan   MRN:  [id number]   Admit Diagnosis: Acute systolic (congestive) heart failure (HCC)                          Assessment:                 1. Acute on chronic systolic chf secondary to non-compliance with meds  2. CAD with distant PCI in 2005, stress in 2017 with no ischemia  3. Echo 2017 EF 25%  4. Sinus with LVH, s/p st denise ICD  5. HTN  6. Dyslipidemia  7. S/p nephrectomy for renal CA, CKD Dr. Diana Chairez  8. GERD  9. , son lives with her, retired from cooking, ADLs  10. Cardiologist:  Jimena Nieto                            Plan:                  Stress at home with son with legal issues and monetary issues. Non-compliant with meds. Increased blood pressure, mild chf only seen on CT     1. Cont ASA  2. Cont coreg  3. Change hydralazine to diovan  4. Cont imdur  5. Change bumex to 0.5mg back to po tomorrow, follow bmp  6. Cont statin     Goal home Tuesday AM   to assist with cost of meds, getting to appts. ..       [x]        High complexity decision making was performed            Subjective:     Stefania Neal denies chest pain, dyspnea. Discussed with RN events overnight. Review of Systems:    Symptom Y/N Comments  Symptom Y/N Comments   Fever/Chills N   Chest Pain N    Poor Appetite N   Edema N    Cough N   Abdominal Pain N    Sputum N   Joint Pain N    SOB/ROCK N   Pruritis/Rash N    Nausea/vomit N   Tolerating PT/OT Y    Diarrhea N   Tolerating Diet Y    Constipation N   Other       Could NOT obtain due to:      Objective:      Physical Exam:    Last 24hrs VS reviewed since prior progress note.  Most recent are:    Visit Vitals    BP (!) 150/105 (BP 1 Location: Right arm, BP Patient Position: At rest)    Pulse 70    Temp 98.2 °F (36.8 °C)    Resp 20    Ht 5' 7\" (1.702 m)    Wt 66.1 kg (145 lb 11.2 oz)    SpO2 100%    BMI 22.82 kg/m2       Intake/Output Summary (Last 24 hours) at 06/04/18 1054  Last data filed at 06/04/18 1010   Gross per 24 hour   Intake              480 ml   Output             1600 ml   Net            -1120 ml        General Appearance: Well developed, well nourished, alert & oriented x 3,    no acute distress. Ears/Nose/Mouth/Throat: Hearing grossly normal.  Neck: Supple. Chest: Lungs clear to auscultation bilaterally. Cardiovascular: Regular rate and rhythm, S1S2 normal, no murmur. Abdomen: Soft, non-tender, bowel sounds are active. Extremities: No edema bilaterally. Skin: Warm and dry. PMH/SH reviewed - no change compared to H&P    Data Review    Telemetry: normal sinus rhythm     Lab Data Personally Reviewed:    Recent Labs      06/03/18   0347 06/02/18   1248   WBC  8.2  8.8   HGB  8.9*  9.5*   HCT  28.0*  29.9*   PLT  179  222     Recent Labs      06/02/18   1248   INR  1.3*   PTP  12.7*      Recent Labs      06/04/18   0241  06/03/18   0347  06/02/18   1248   NA  141  142  142   K  3.6  3.7  3.8   CL  109*  110*  109*   CO2  24  21  21   BUN  23*  20  21*   CREA  1.60*  1.32*  1.45*   GLU  103*  90  91   CA  8.1*  8.5  9.1   MG  1.6   --   1.6     Recent Labs      06/03/18   0347  06/02/18   1935  06/02/18   1248   CPK   --    --   64   CKNDX   --    --   3.4*   TROIQ  0.05*  0.08*  0.06*     Lab Results   Component Value Date/Time    Cholesterol, total 177 06/08/2017 02:25 AM    HDL Cholesterol 40 06/08/2017 02:25 AM    LDL, calculated 107.6 (H) 06/08/2017 02:25 AM    Triglyceride 147 06/08/2017 02:25 AM    CHOL/HDL Ratio 4.4 06/08/2017 02:25 AM       Recent Labs      06/04/18   0241  06/02/18   1248   SGOT  9*  14*   AP  112  146*   TP  5.5*  7.1   ALB  2.8*  3.7   GLOB  2.7  3.4     No results for input(s): PH, PCO2, PO2 in the last 72 hours.     Medications Personally Reviewed:    Current Facility-Administered Medications   Medication Dose Route Frequency    magnesium oxide (MAG-OX) tablet 400 mg  400 mg Oral BID    valsartan (DIOVAN) tablet 80 mg  80 mg Oral DAILY    acetaminophen (TYLENOL) tablet 650 mg  650 mg Oral Q6H PRN    aspirin chewable tablet 81 mg  81 mg Oral DAILY    atorvastatin (LIPITOR) tablet 20 mg  20 mg Oral QHS    carvedilol (COREG) tablet 6.25 mg  6.25 mg Oral BID WITH MEALS    cetirizine (ZYRTEC) tablet 10 mg  10 mg Oral DAILY    gabapentin (NEURONTIN) capsule 300 mg  300 mg Oral TID    isosorbide mononitrate ER (IMDUR) tablet 30 mg  30 mg Oral DAILY    pantoprazole (PROTONIX) tablet 40 mg  40 mg Oral DAILY    polyethylene glycol (MIRALAX) packet 17 g  17 g Oral DAILY    sodium chloride (NS) flush 5-10 mL  5-10 mL IntraVENous Q8H    sodium chloride (NS) flush 5-10 mL  5-10 mL IntraVENous PRN    ondansetron (ZOFRAN) injection 4 mg  4 mg IntraVENous Q6H PRN    docusate sodium (COLACE) capsule 100 mg  100 mg Oral DAILY PRN    heparin (porcine) injection 5,000 Units  5,000 Units SubCUTAneous Maris Alvarez MD

## 2018-06-04 NOTE — PROGRESS NOTES
Reason for Admission: Acute systolic (congestive) heart failure                RRAT Score: 29                Resources/supports as identified by patient/family: Supportive son, daughter, and other family members to assist with any needs                  Top Challenges facing patient (as identified by patient/family and CM): Finances/Medication cost?  No concerns                  Transportation? No concerns - daughter/son drive her, run errands               Support system or lack thereof? No concerns                       Living arrangements? No concerns               Self-care/ADLs/Cognition? No concerns - fairly independent with ADLs          Current Advanced Directive/Advance Care Plan: Full Code - ACP on file                           Plan for utilizing home health: No PT needs at this time - possible [facility] for CHF                        Likelihood of readmission: Moderate                 Transition of Care Plan: Possible [facility] for CHF, follow-up care appointments    Pt is a 72 yo  female admitted on 1/3/71 for Acute systolic (congestive) heart failure. Pt lives in a [facility] ([address] [place]) with son, daughter. Pt is independent in ADLs and gets some assistance from family with IADLs (grocery shopping, etc). Pt has had [facility] services and been to [facility] in 2017. No reported hx of acute inpatient rehab. Pt has a RW, rollator, cane at home. Pt to dc home by private vehicle with family. Pt's family to provide transport to follow-up care appointments. Pt's preferred Rx is [facility] ([address] [facility]). CM met with pt to verify demographic info and complete initial assessment, dc planning. Pt is alert and oriented x 4. Pt sees Hyacinth Pierre NP (PC) and Dr. Terence Beavers (Cardiology) OP. PTsigned off on pt, have no recommendations for discharge. Possible [facility] for CHF. CM will continue to follow-up to ensure additional CM needs are met. Care Management Interventions  PCP Verified by CM: Yes  Palliative Care Criteria Met (RRAT>21 & CHF Dx)?: Yes  Palliative Consult Recommended?: No  Reason Palliative Care Not Recommended?:  (CM to consult MD)  Mode of Transport at Discharge:  Other (see comment) (By private vehicle with family)  Transition of Care Consult (CM Consult): Discharge Planning  Discharge Durable Medical Equipment: No (RW, Rollator, Cane at home)  Health Maintenance Reviewed: Yes  Physical Therapy Consult: Yes  Occupational Therapy Consult: No  Speech Therapy Consult: No  Current Support Network: Lives with Caregiver, Own Home, Family Lives [place], [facility] ([address] [place] [address] ([address]) with son, daughter)  Confirm Follow Up Transport: Family  Plan discussed with Pt/Family/Caregiver: Yes  Discharge Location  Discharge Placement:  (TBD)    OZZIE Gamino Supervisee in Social Work, [employer]  [phone]

## 2018-06-04 NOTE — PROGRESS NOTES
Bedside shift change report given to Ashleigh Ochoa RN (oncoming nurse). Report included the following information SBAR and Kardex. SHIFT SUMMARY:            [address] NURSING NOTE   Admission Date 6/2/2018   Admission Diagnosis Acute systolic (congestive) heart failure ([address]   Consults IP CONSULT TO HOSPITALIST  IP CONSULT TO CARDIOLOGY      Cardiac Monitoring [x] Yes [] No      Purposeful Hourly Rounding [x] Yes    Zuri Score Total Score: 1   Zuri score 3 or > [x] Bed Alarm [] Avasys [] 1:1 sitter [] Patient refused (Signed refusal form in chart)   Evan Score Evan Score: 22   Evan score 14 or < [] PMT consult [] Wound Care consult    []  Specialty bed  [] Nutrition consult      Influenza Vaccine Received Flu Vaccine for Current Season (usually Sept-March): No    Patient/Guardian Refused (Notify MD): Yes      Oxygen needs? [x] Room air Oxygen @  []1L    []2L    []3L   []4L    []5L   []6L via  NC   Chronic home O2 use? [] Yes [] No  Perform O2 challenge test and document in progress note using smartphMassively Fune (.Homeoxygen)      Last bowel movement Last Bowel Movement Date: 06/02/18      Urinary Catheter             LDAs               Peripheral IV 06/02/18 Left Antecubital (Active)   Site Assessment Clean, dry, & intact 6/4/2018  4:07 PM   Phlebitis Assessment 0 6/4/2018  4:07 PM   Infiltration Assessment 0 6/4/2018  4:07 PM   Dressing Status Clean, dry, & intact 6/4/2018  4:07 PM   Dressing Type Tape;Transparent 6/4/2018  4:07 PM   Hub Color/Line Status Blue;Capped 6/4/2018  4:07 PM                         Readmission Risk Assessment Tool Score High Risk            29       Total Score        3 Has Seen PCP in Last 6 Months (Yes=3, No=0)    4 IP Visits Last 12 Months (1-3=4, 4=9, >4=11)    5 Pt. Coverage (Medicare=5 , Medicaid, or Self-Pay=4)    17 Charlson Comorbidity Score (Age + Comorbid Conditions)        Criteria that do not apply:    . Living with Significant Other. Assisted Living. LTAC. SNF.  or   Rehab Patient Length of Stay (>5 days = 3)       Expected Length of Stay 4d 12h   Actual Length of Stay 2

## 2018-06-04 NOTE — PROGRESS NOTES
Bedside shift change report given to ISAIAS Davis (oncoming nurse). Report included the following information SBAR and Kardex. SHIFT SUMMARY:            [facility] NURSING NOTE   Admission Date 6/2/2018   Admission Diagnosis Acute systolic (congestive) heart failure ([address]   Consults IP CONSULT TO HOSPITALIST  IP CONSULT TO CARDIOLOGY      Cardiac Monitoring [x] Yes [] No      Purposeful Hourly Rounding [x] Yes    Zuri Score Total Score: 1   Zuri score 3 or > [x] Bed Alarm [] Avasys [] 1:1 sitter [] Patient refused (Signed refusal form in chart)   Evan Score Evan Score: 22   Evan score 14 or < [] PMT consult [] Wound Care consult    []  Specialty bed  [] Nutrition consult      Influenza Vaccine Received Flu Vaccine for Current Season (usually Sept-March): No    Patient/Guardian Refused (Notify MD): Yes      Oxygen needs? [x] Room air Oxygen @  []1L    []2L    []3L   []4L    []5L   []6L via  NC   Chronic home O2 use? [] Yes [] No  Perform O2 challenge test and document in progress note using smartphrase (.Homeoxygen)      Last bowel movement Last Bowel Movement Date: 06/02/18      Urinary Catheter             LDAs               Peripheral IV 06/02/18 Left Antecubital (Active)   Site Assessment Clean, dry, & intact 6/4/2018  4:07 PM   Phlebitis Assessment 0 6/4/2018  4:07 PM   Infiltration Assessment 0 6/4/2018  4:07 PM   Dressing Status Clean, dry, & intact 6/4/2018  4:07 PM   Dressing Type Tape;Transparent 6/4/2018  4:07 PM   Hub Color/Line Status Blue;Capped 6/4/2018  4:07 PM                         Readmission Risk Assessment Tool Score High Risk            29       Total Score        3 Has Seen PCP in Last 6 Months (Yes=3, No=0)    4 IP Visits Last 12 Months (1-3=4, 4=9, >4=11)    5 Pt. Coverage (Medicare=5 , Medicaid, or Self-Pay=4)    17 Charlson Comorbidity Score (Age + Comorbid Conditions)        Criteria that do not apply:    . Living with Significant Other. Assisted Living. LTAC. SNF.  or   Rehab Patient Length of Stay (>5 days = 3)       Expected Length of Stay 4d 12h   Actual Length of Stay 2

## 2018-06-04 NOTE — PROGRESS NOTES
Cardiopulmonary Care Interdisciplinary rounds were held today to discuss patient's plan of care and outcomes. The following members were present: NP/Physician, Pharmacy, Nursing and Case Management.     Expected Length of Stay:  4d 12h    Plan of Care: Continue current treatment plan  At echo, possible d/c

## 2018-06-04 NOTE — PROGRESS NOTES
Physical Therapy Note    Orders received. Chart reviewed. Spoke with RN. RN cleared pt for PT evaluation, however did note that patient has been up ad jeffrey independently. Pt received sitting on EOB. Pt stated baseline strength, balance, gait, and mobility at this time. She reported that she lives in one story home with 2 L[place]. She lives with her son who provides support as needed, however noting that she has other family/friends for support as well. Denies fall history. Does not drive. Pt observed ambulating in room x 20 ft independently and without LOB. Full acute PT evaluation not warranted. Recommend patient discharge home with family support. Will sign off. Please re-consult if patient with decline from baseline mobility.     Thank you,  Anthony Navarrete, PT, DPT  Total time 10 minutes

## 2018-06-04 NOTE — PROGRESS NOTES
Bedside shift change report given to Abdiel Jean RN by Melvin Stoddard. Report included the following information SBAR, Kardex, ED Summary, Intake/Output, MAR, Recent Results and Cardiac Rhythm NSR.

## 2018-06-05 VITALS
DIASTOLIC BLOOD PRESSURE: 87 MMHG | TEMPERATURE: 97.7 F | HEIGHT: 67 IN | WEIGHT: 146.39 LBS | RESPIRATION RATE: 20 BRPM | BODY MASS INDEX: 22.98 KG/M2 | OXYGEN SATURATION: 98 % | SYSTOLIC BLOOD PRESSURE: 138 MMHG | HEART RATE: 70 BPM

## 2018-06-05 LAB
ANION GAP SERPL CALC-SCNC: 9 MMOL/L (ref 5–15)
BUN SERPL-MCNC: 22 MG/DL (ref 6–20)
BUN/CREAT SERPL: 14 (ref 12–20)
CALCIUM SERPL-MCNC: 8.2 MG/DL (ref 8.5–10.1)
CHLORIDE SERPL-SCNC: 108 MMOL/L (ref 97–108)
CO2 SERPL-SCNC: 24 MMOL/L (ref 21–32)
CREAT SERPL-MCNC: 1.54 MG/DL (ref 0.55–1.02)
GLUCOSE SERPL-MCNC: 104 MG/DL (ref 65–100)
MAGNESIUM SERPL-MCNC: 1.7 MG/DL (ref 1.6–2.4)
POTASSIUM SERPL-SCNC: 4.3 MMOL/L (ref 3.5–5.1)
SODIUM SERPL-SCNC: 141 MMOL/L (ref 136–145)

## 2018-06-05 PROCEDURE — 74011250637 HC RX REV CODE- 250/637: Performed by: INTERNAL MEDICINE

## 2018-06-05 PROCEDURE — 36415 COLL VENOUS BLD VENIPUNCTURE: CPT | Performed by: INTERNAL MEDICINE

## 2018-06-05 PROCEDURE — 83735 ASSAY OF MAGNESIUM: CPT | Performed by: INTERNAL MEDICINE

## 2018-06-05 PROCEDURE — 74011250636 HC RX REV CODE- 250/636: Performed by: INTERNAL MEDICINE

## 2018-06-05 PROCEDURE — 80048 BASIC METABOLIC PNL TOTAL CA: CPT | Performed by: INTERNAL MEDICINE

## 2018-06-05 PROCEDURE — 74011250637 HC RX REV CODE- 250/637: Performed by: HOSPITALIST

## 2018-06-05 RX ORDER — BUMETANIDE 1 MG/1
0.5 TABLET ORAL DAILY
Status: DISCONTINUED | OUTPATIENT
Start: 2018-06-05 | End: 2018-06-05 | Stop reason: HOSPADM

## 2018-06-05 RX ORDER — ATORVASTATIN CALCIUM 20 MG/1
20 TABLET, FILM COATED ORAL
Qty: 30 TAB | Refills: 0 | Status: SHIPPED | OUTPATIENT
Start: 2018-06-05

## 2018-06-05 RX ORDER — ISOSORBIDE MONONITRATE 30 MG/1
30 TABLET, EXTENDED RELEASE ORAL DAILY
Qty: 30 TAB | Refills: 0 | Status: SHIPPED | OUTPATIENT
Start: 2018-06-05

## 2018-06-05 RX ORDER — VALSARTAN 80 MG/1
80 TABLET ORAL DAILY
Qty: 30 TAB | Refills: 0 | Status: SHIPPED | OUTPATIENT
Start: 2018-06-06

## 2018-06-05 RX ORDER — HEPARIN SODIUM 5000 [USP'U]/ML
5000 INJECTION, SOLUTION INTRAVENOUS; SUBCUTANEOUS EVERY 8 HOURS
Status: DISCONTINUED | OUTPATIENT
Start: 2018-06-05 | End: 2018-06-05 | Stop reason: HOSPADM

## 2018-06-05 RX ORDER — GUAIFENESIN 100 MG/5ML
81 LIQUID (ML) ORAL DAILY
Qty: 30 TAB | Refills: 0 | Status: SHIPPED | OUTPATIENT
Start: 2018-06-05

## 2018-06-05 RX ORDER — BUMETANIDE 0.5 MG/1
0.5 TABLET ORAL DAILY
Qty: 30 TAB | Refills: 0 | Status: SHIPPED | OUTPATIENT
Start: 2018-06-05 | End: 2018-08-18

## 2018-06-05 RX ORDER — CARVEDILOL 6.25 MG/1
6.25 TABLET ORAL 2 TIMES DAILY WITH MEALS
Qty: 60 TAB | Refills: 0 | Status: SHIPPED | OUTPATIENT
Start: 2018-06-05

## 2018-06-05 RX ADMIN — GABAPENTIN 300 MG: 300 CAPSULE ORAL at 09:23

## 2018-06-05 RX ADMIN — HEPARIN SODIUM 5000 UNITS: 5000 INJECTION, SOLUTION INTRAVENOUS; SUBCUTANEOUS at 05:43

## 2018-06-05 RX ADMIN — BUMETANIDE 0.5 MG: 1 TABLET ORAL at 11:45

## 2018-06-05 RX ADMIN — PANTOPRAZOLE SODIUM 40 MG: 40 TABLET, DELAYED RELEASE ORAL at 09:23

## 2018-06-05 RX ADMIN — ISOSORBIDE MONONITRATE 30 MG: 30 TABLET, EXTENDED RELEASE ORAL at 09:25

## 2018-06-05 RX ADMIN — Medication 10 ML: at 05:43

## 2018-06-05 RX ADMIN — ASPIRIN 81 MG 81 MG: 81 TABLET ORAL at 09:23

## 2018-06-05 RX ADMIN — VALSARTAN 80 MG: 80 TABLET, FILM COATED ORAL at 09:23

## 2018-06-05 RX ADMIN — POLYETHYLENE GLYCOL 3350 17 G: 17 POWDER, FOR SOLUTION ORAL at 09:23

## 2018-06-05 RX ADMIN — Medication 400 MG: at 09:23

## 2018-06-05 RX ADMIN — CARVEDILOL 6.25 MG: 6.25 TABLET, FILM COATED ORAL at 09:23

## 2018-06-05 RX ADMIN — CETIRIZINE HYDROCHLORIDE 10 MG: 10 TABLET, FILM COATED ORAL at 09:23

## 2018-06-05 RX ADMIN — DOCUSATE SODIUM 100 MG: 100 CAPSULE, LIQUID FILLED ORAL at 09:23

## 2018-06-05 NOTE — DISCHARGE SUMMARY
Hospitalist Discharge Summary     Patient ID:  Jaky Cabrera  [id number]  71 y.o.  1945    PCP on record: Kenn Mckinnon NP    Admit date: 6/2/2018  Discharge date and time: 6/5/2018      DISCHARGE DIAGNOSIS:    Acute on chronic systolic HF EF 69% / Ischemic cardiomyopathy / ICD  CAD, hx PCI 2005  Hypertensive emergency, resolved / HTN   Non compliance   CKD stage III  Minimal elevation of TB, resolved, stress related vs from passive congestion  History of renal carcinoma status post nephrectomy  GERD  Code status: Full      CONSULTATIONS:  IP CONSULT TO HOSPITALIST  IP CONSULT TO CARDIOLOGY    Excerpted HPI from H&P of Meryl Boykin MD:    This is a 69-year-old female with past medical history of systolic congestive heart failure is coming to the hospital chief complaint of shortness of breath since the last 3 days.  Patient reports that shortness of breath is mostly exertional along with orthopnea but no PND. Arleen Ram also reports chest pain which is mostly exertional but currently resolved.  She has chronic cough but no phlegm.  He does not report pedal edema.  She has no palpitations or syncopal episodes.  She denies abdominal pain, diarrhea or constipation.  She also reports mild nausea but no vomiting. She has been feeling sick and has not been taking her medication since the last 2 days. Arleen Ram went to see her primary care physician and was advised to come to the emergency department for further evaluation.  Today she was noted to have a blood pressure of 2 not 4 x 1 34 to primary care doctor's office. On arrival to the hospital blood pressure was 183/109. Arleen Ram had a chest x-ray which revealed pulmonary edema.  BNP was elevated at more than 30,000. Arleen Ram was given 0.5 mg of IV Bumex.     We were asked to admit for work up and evaluation of the above problems.      ______________________________________________________________________  DISCHARGE SUMMARY/HOSPITAL COURSE:  for full details see H&P, daily progress notes, labs, consult notes. Acute on chronic systolic HF EF 18% / Ischemic cardiomyopathy / ICD  CAD, hx PCI 2005  Hypertensive emergency  -clinically: improved with diureses; O2 sats stable on RA; BP stable at 130s   Exacerbation was due to non compliance with meds  -restarting Bumex PO today   -continue aspirin, statin and Coreg  -cardiology input was greatly appreciated: cont Bumex 0.5 mg daily; hydralazine was changed to [address]   Primary cardiology: Dr Nelson Murillo   -D/w SW to help with meds and appointments  -CTA chest:  No PE, Cardiomegaly with evidence of interstitial pulmonary edema and right heart;  proBNP >30K.    CKD stage III  -true baseline Cr is difficult to assess, ranging 1.3-1.7   -overall stable Cr with diureses   -Avoid nephrotoxic drugs, adjust all meds to GFR.     Minimal elevation of TB, resolved, stress related vs from passive congestion  History of renal carcinoma status post nephrectomy  GERD, cont PPI            Code status: Full  Prophylaxis: Hep SQ     Baseline: , one of her sons lives with her; ambulating independent; pt is not driving    Recommended Disposition: home today   PT: signed off, no services       _______________________________________________________________________  Patient seen and examined by me on discharge day. PHYSICAL EXAM:  General:                    WD, WN. Alert, cooperative, no acute distress    EENT:                                  EOMI. Anicteric sclerae. MMM  Resp:                                   CTA bilaterally, no wheezing or rales. No accessory muscle use  CV:                                      Regular  rhythm,  No edema  GI:                                       Soft, Non distended, Non tender.  +Bowel sounds  Neurologic:                Alert and oriented X 3, normal speech,   Psych:                       Good insight. Not anxious nor agitated  Skin:                                    No rashes.   No jaundice  _______________________________________________________________________  DISCHARGE MEDICATIONS:   Current Discharge Medication List      START taking these medications    Details   valsartan (DIOVAN) 80 mg tablet Take 1 Tab by mouth daily. Qty: 30 Tab, Refills: 0         CONTINUE these medications which have CHANGED    Details   aspirin 81 mg chewable tablet Take 1 Tab by mouth daily. Qty: 30 Tab, Refills: 0      atorvastatin (LIPITOR) 20 mg tablet Take 1 Tab by mouth nightly. Qty: 30 Tab, Refills: 0      bumetanide (BUMEX) 0.5 mg tablet Take 1 Tab by mouth daily. Qty: 30 Tab, Refills: 0      carvedilol (COREG) 6.25 mg tablet Take 1 Tab by mouth two (2) times daily (with meals). Qty: 60 Tab, Refills: 0      isosorbide mononitrate ER (IMDUR) 30 mg tablet Take 1 Tab by mouth daily. Qty: 30 Tab, Refills: 0         CONTINUE these medications which have NOT CHANGED    Details   cetirizine (ZYRTEC) 10 mg tablet Take 1 Tab by mouth daily for 30 days. Qty: 5 Tab, Refills: 0      ondansetron (ZOFRAN ODT) 4 mg disintegrating tablet Take 1 Tab by mouth every eight (8) hours as needed for Nausea. Qty: 12 Tab, Refills: 0      acetaminophen (TYLENOL) 325 mg tablet Take 2 Tabs by mouth every four (4) hours as needed. docusate sodium (COLACE) 100 mg capsule Take 1 Cap by mouth two (2) times a day for 90 days. Qty: 60 Cap, Refills: 2      polyethylene glycol (MIRALAX) 17 gram packet Take 1 Packet by mouth daily. Qty: 30 Packet, Refills: 0      gabapentin (NEURONTIN) 300 mg capsule Take 300 mg by mouth three (3) times daily. omeprazole (PRILOSEC) 20 mg capsule Take 20 mg by mouth daily.  Indications: GASTROESOPHAGEAL REFLUX         STOP taking these medications       hydrALAZINE (APRESOLINE) 25 mg tablet Comments:   Reason for Stopping:               My Recommended Diet, Activity, Wound Care, and follow-up labs are listed in the patient's Discharge Insturctions which I have personally completed and reviewed. ______________________________________________________________________    Risk of deterioration: Moderate due to h/o non compliance     Condition at Discharge:  Stable  ______________________________________________________________________    Disposition  Home with family, no needs  ______________________________________________________________________    Care Plan discussed with:   Patient, RN, Care Manager    ______________________________________________________________________    Code Status: Full Code  ______________________________________________________________________      Follow up with:   PCP : Cal Garcia NP  Follow-up Information     Follow up With Details Comments Lamberto Oneal NP Go on 6/6/2018 For hospital follow up appointment at 11:30AM  [address] [place] [address]  This is the provider for your one-time hospital to home visit. [facility] [phone] [facility] Office on Aging  For additional CHF education, med management, resources, and support in the community.   Guido Day MD In 2 weeks  [address]  [address] [facility]  [phone]                Total time in minutes spent coordinating this discharge (includes going over instructions, follow-up, prescriptions, and preparing report for sign off to her PCP) :  > 30 minutes    Signed:  Ki Rivera MD

## 2018-06-05 NOTE — PROGRESS NOTES
Bedside and Verbal shift change report given to Sienna Nash RN (oncoming nurse). Report included the following information SBAR, Kardex and Recent Results. SHIFT SUMMARY:            [address] NURSING NOTE   Admission Date 6/2/2018   Admission Diagnosis Acute systolic (congestive) heart failure ([address]   Consults IP CONSULT TO HOSPITALIST  IP CONSULT TO CARDIOLOGY      Cardiac Monitoring [x] Yes [] No      Purposeful Hourly Rounding [x] Yes    Zuri Score Total Score: 1   Zuri score 3 or > [x] Bed Alarm [] Avasys [] 1:1 sitter [] Patient refused (Signed refusal form in chart)   Evan Score Evan Score: 22   Evan score 14 or < [] PMT consult [] Wound Care consult    []  Specialty bed  [] Nutrition consult      Influenza Vaccine Received Flu Vaccine for Current Season (usually Sept-March): No    Patient/Guardian Refused (Notify MD): Yes      Oxygen needs? [x] Room air Oxygen @  []1L    []2L    []3L   []4L    []5L   []6L via  NC   Chronic home O2 use? [] Yes [] No  Perform O2 challenge test and document in progress note using smart[employer]Silver [employer] (.Homeoxygen)      Last bowel movement Last Bowel Movement Date: 06/02/18      Urinary Catheter             LDAs               Peripheral IV 06/02/18 Left Antecubital (Active)   Site Assessment Clean, dry, & intact 6/5/2018  3:54 AM   Phlebitis Assessment 0 6/5/2018  3:54 AM   Infiltration Assessment 0 6/5/2018  3:54 AM   Dressing Status Clean, dry, & intact 6/5/2018  3:54 AM   Dressing Type Transparent 6/5/2018  3:54 AM   Hub Color/Line Status Blue;Patent 6/5/2018  3:54 AM                         Readmission Risk Assessment Tool Score High Risk            29       Total Score        3 Has Seen PCP in Last 6 Months (Yes=3, No=0)    4 IP Visits Last 12 Months (1-3=4, 4=9, >4=11)    5 Pt. Coverage (Medicare=5 , Medicaid, or Self-Pay=4)    17 Charlson Comorbidity Score (Age + Comorbid Conditions)        Criteria that do not apply:    . Living with Significant Other. Assisted Living. LTAC. SNF.  or   Rehab    Patient Length of Stay (>5 days = 3)       Expected Length of Stay 4d 12h   Actual Length of Stay 3

## 2018-06-05 NOTE — DISCHARGE INSTRUCTIONS
Patient Discharge Instructions    Melanie Espinal / [id number] : 1945    Admitted 2018 Discharged: 2018         DISCHARGE DIAGNOSIS:     Heart failure - It is important that you take the medication exactly as they are prescribed. Take Home Medications       General drug facts     If you have a very bad allergy, wear an allergy ID at all times. It is important that you take the medication exactly as they are prescribed. Keep your medication in the bottles provided by the pharmacist.  Keep a list of all your drugs (prescription, natural products, vitamins, OTC) with you. Give this list to your doctor. Do not take other medications without consulting your doctor. Do not share your drugs with others and do not take anyone else's drugs. Keep all drugs out of the reach of children and pets. Most drugs may be thrown away in household trash after mixing with coffee grounds or joey litter and sealing in a plastic bag. Keep a list Call your doctor for help with any side effects. If in the [place] you may also call the FDA at 9-863-RPQ-9720    Talk with the doctor before starting any new drug, including OTC, natural products, or vitamins. What to do at Home    1. Recommended diet:cardiac, 2 g sodium     2. Recommended activity: Activity as tolerated    3. If you experience any of the following symptoms then please call your primary care physician or return to the emergency room if you cannot get hold of your doctor:worsening dyspnea / chest pain     4. Lab work:with PCP in 1 week     5. You should weigh yourself daily. You should measure your weight first thing in the morning, after you use the restroom. If you gain more than 3 pounds in 1 day or 5 pounds in 1 week or you are feeling more short of breath then usual you should take an extra Bumex in the afternoon. If you require extra lasix more than 3 days in a row, call Dr Rosendo Munoz. Record your daily weights in a notebook. Bring this with you to all your doctor's appointments. 6.Bring these papers with you to your follow up appointments. The papers will help your doctors be sure to continue the care plan from the hospital.      Follow-up with:   PCP: Yamileth Mullen NP  Follow-up Information     Follow up With Details Comments Lamberto Oneal NP Go on 6/6/2018 For hospital follow up appointment at 11:30AM  [address] [facility] [address]  This is the provider for your one-time hospital to home visit. [facility] [phone] [facility] Office on Aging  For additional CHF education, med management, resources, and support in the community. [address], MD In 2 weeks  [address]  [address] [facility]  [phone]             Please call for your own appointment        Information obtained by :  I understand that if any problems occur once I am at home I am to contact my physician. I understand and acknowledge receipt of the instructions indicated above.                                                                                                                                            Physician's or R.N.'s Signature                                                                  Date/Time                                                                                                                                              Patient or Representative Signature                                                          Date/Time

## 2018-06-05 NOTE — PROGRESS NOTES
7:34 AM Received bedside report from ISAIAS Mora.    2:28 PM Reviewed discharge instructions and medications with the patient. Ample time was given for any questions or concerns. Removed tele and PIV. Nurse emphasized on the importance of for follow up appointments and also making sure that she gets her medications as well. Patient stable for discharge. Patient was taken down to her vehicle by the volunteer.

## 2018-06-05 NOTE — PROGRESS NOTES
Problem: Falls - Risk of  Goal: *Absence of Falls  Document Zuri Fall Risk and appropriate interventions in the flowsheet.    Outcome: Progressing Towards Goal  Fall Risk Interventions:            Medication Interventions: Assess postural VS orthostatic hypotension, Bed/chair exit alarm, Evaluate medications/consider consulting pharmacy, Patient to call before getting OOB         History of Falls Interventions: Bed/chair exit alarm, Evaluate medications/consider consulting pharmacy, Door open when patient unattended

## 2018-06-05 NOTE — PROGRESS NOTES
CM informed by MD that despite information provided in CM assessment from pt, pt is now stating that she has not been taking her medications due to a lack of finances and that nobody in her family at this time is able to provide her transportation to follow-up care appointments and that her friends usually take her. CM contacted [employer] to do Medicaid Screening for pt at this time. Pt will likely need one-time med assist from CM. 10:45AM UPDATE  CM faxed hard prescriptions to [facility] (f: [phone]) - total cost estimated to be $74.23. CM discussed with pt who stated that she cannot afford these meds at this time. CM inquired about previous affordability of meds, pt stated \"I've never been able to afford them. \" CM inquired about applying for Medicaid at pt's DSS [facility]) as she's verbalized issues with meds and transportation. Pt reported that she's applied before and told she makes too much money. CM attempted to discuss monthly income and education re: spend-down for Medicaid. Pt declined to share income information and reported she does not want to spend-down. CM encouraged pt to apply for Medicaid or get more information at local [facility] office as this would provide assistance in this area ([facility] unable to screen pt for Medicaid due to pt having insurance at this time, CM informed via email). 12:00PM UPDATE  CM discussed case with CM Supervisor, will provide FA with pt's medications not to include OTC aspirin (total cost $71.98). Financial [employer] paperwork faxed to [place]. Pt reported that she has transportation home today and would have a ride to her f/u appointment tomorrow. Cardiology follow-up appointment scheduled in two weeks. TONY ASENCIO [address] visit arranged. Referral sent to [facility]. All information entered into pt AVS.     Pt has no additional CM needs at this time. Floor nurse notified. Care Management Interventions  PCP Verified by CM:  Yes  Palliative Care Criteria Met (RRAT>21 & CHF Dx)?: Yes  Palliative Consult Recommended?: No  Reason Palliative Care Not Recommended?:  (CM to consult MD)  Mode of Transport at Discharge:  Other (see comment) (By private vehicle with family)  Transition of Care Consult (CM Consult): Home Health [facility] for CHF)  [address] Yes  Discharge Durable Medical Equipment: No (RW, Rollator, Cane at home)  Health Maintenance Reviewed: Yes  Physical Therapy Consult: Yes  Occupational Therapy Consult: No  Speech Therapy Consult: No  Current Support Network: Lives with Caregiver, Own Home, Family Lives [place], [facility] ([address]) with son, daughter)  Confirm Follow Up Transport: Family  Plan discussed with Pt/Family/Caregiver: Yes  Freedom of Choice Offered: Yes  Discharge Location  Discharge Placement: Home with home health [facility] for CHF)    Teo Yuan MSW Supervisee in Social Work, [address] [place] [address]  [phone]

## 2018-06-05 NOTE — PROGRESS NOTES
Hospitalist Progress Note    NAME: Meeta Rasheed   :  1945   MRN:  [id number]       Assessment / Plan:  Acute on chronic systolic HF EF 79% / Ischemic cardiomyopathy / ICD  CAD, hx PCI   Hypertensive emergency  -clinically: improved with diureses; O2 sats stable on RA  Exacerbation was due to non compliance with meds  -restarting Bumex PO today   -continue aspirin, statin and Coreg  -cardiology input was greatly appreciated: cont Bumex 0.5 mg daily; hydralazine was changed to [address]   Primary cardiology: Dr Alfonzo Banks   -D/w SW to help with meds and appointments  -CTA chest:  No PE, Cardiomegaly with evidence of interstitial pulmonary edema and right heart;  proBNP >30K. CKD stage III  -true baseline Cr is difficult to assess, ranging 1.3-1.7   -overall stable Cr with diureses   -Avoid nephrotoxic drugs, adjust all meds to GFR.     Minimal elevation of TB, resolved, stress related vs from passive congestion  History of renal carcinoma status post nephrectomy  GERD, cont PPI         Code status: Full  Prophylaxis: Hep SQ    Baseline: , one of her sons lives with her; ambulating independent; pt is not driving    Recommended Disposition: home today   PT: signed off, no services      Subjective:     Chief Complaint / Reason for Physician Visit: following HF/ HTN / CKD   Pt reports feeling much better since admission   Denies CP  She admits to minimal ROCK      Discussed with RN events overnight. Review of Systems:  Symptom Y/N Comments  Symptom Y/N Comments   Fever/Chills n   Chest Pain n    Poor Appetite    Edema     Cough    Abdominal Pain n    Sputum    Joint Pain     SOB/ROCK n   Pruritis/Rash     Nausea/vomit    Tolerating PT/OT y    Diarrhea    Tolerating Diet y    Constipation n   Other       Could NOT obtain due to:      Objective:     VITALS:   Last 24hrs VS reviewed since prior progress note.  Most recent are:  Patient Vitals for the past 24 hrs:   Temp Pulse Resp BP SpO2 06/05/18 1030 97.7 °F (36.5 °C) 70 20 138/87 98 %   06/05/18 0749 97.7 °F (36.5 °C) 70 20 (!) 142/91 98 %   06/05/18 0302 97.8 °F (36.6 °C) 70 20 (!) 137/94 100 %   06/04/18 2320 98.1 °F (36.7 °C) 69 20 135/89 100 %   06/04/18 1848 98 °F (36.7 °C) 72 20 134/82 99 %   06/04/18 1516 97.8 °F (36.6 °C) 70 18 129/80 96 %   06/04/18 1136 97.9 °F (36.6 °C) 70 18 131/76 100 %       Intake/Output Summary (Last 24 hours) at 06/05/18 1038  Last data filed at 06/04/18 1744   Gross per 24 hour   Intake              240 ml   Output              350 ml   Net             -110 ml        PHYSICAL EXAM:  General: WD, WN. Alert, cooperative, no acute distress    EENT:  EOMI. Anicteric sclerae. MMM  Resp:  CTA bilaterally, no wheezing or rales. No accessory muscle use  CV:  Regular  rhythm,  No edema  GI:  Soft, Non distended, Non tender.  +Bowel sounds  Neurologic:  Alert and oriented X 3, normal speech,   Psych:   Good insight. Not anxious nor agitated  Skin:  No rashes. No jaundice    Reviewed most current lab test results and cultures  YES  Reviewed most current radiology test results   YES  Review and summation of old records today    NO  Reviewed patient's current orders and MAR    YES  PMH/ reviewed - no change compared to H&P  ________________________________________________________________________  Care Plan discussed with:    Comments   Patient y    Family      RN y    Care Manager y    Consultant                       y Multidiciplinary team rounds were held today with , nursing, pharmacist and clinical coordinator. Patient's plan of care was discussed; medications were reviewed and discharge planning was addressed.      ________________________________________________________________________  Total NON critical care TIME: 40  Minutes    Total CRITICAL CARE TIME Spent:   Minutes non procedure based      Comments   >50% of visit spent in counseling and coordination of care y Dc coordination and pt education ________________________________________________________________________  Moiz Edward MD     Procedures: see electronic medical records for all procedures/Xrays and details which were not copied into this note but were reviewed prior to creation of Plan. LABS:  I reviewed today's most current labs and imaging studies.   Pertinent labs include:  Recent Labs      06/03/18   0347  06/02/18   1248   WBC  8.2  8.8   HGB  8.9*  9.5*   HCT  28.0*  29.9*   PLT  179  222     Recent Labs      06/05/18   0315  06/04/18   0241  06/03/18   0347  06/02/18   1248   NA  141  141  142  142   K  4.3  3.6  3.7  3.8   CL  108  109*  110*  109*   CO2  24  24  21  21   GLU  104*  103*  90  91   BUN  22*  23*  20  21*   CREA  1.54*  1.60*  1.32*  1.45*   CA  8.2*  8.1*  8.5  9.1   MG  1.7  1.6   --   1.6   ALB   --   2.8*   --   3.7   TBILI   --   0.5   --   1.3*   SGOT   --   9*   --   14*   ALT   --   16   --   20   INR   --    --    --   1.3*       Signed: Moiz Edward MD

## 2018-06-07 ENCOUNTER — HOME CARE VISIT (OUTPATIENT)
Dept: HOME HEALTH SERVICES | Facility: HOME HEALTH | Age: 73
End: 2018-06-07

## 2018-06-11 ENCOUNTER — HOME CARE VISIT (OUTPATIENT)
Dept: SCHEDULING | Facility: HOME HEALTH | Age: 73
End: 2018-06-11

## 2018-06-11 PROCEDURE — G0299 HHS/HOSPICE OF RN EA 15 MIN: HCPCS

## 2018-06-19 ENCOUNTER — HOME CARE VISIT (OUTPATIENT)
Dept: SCHEDULING | Facility: HOME HEALTH | Age: 73
End: 2018-06-19

## 2018-06-19 PROCEDURE — G0299 HHS/HOSPICE OF RN EA 15 MIN: HCPCS

## 2018-06-25 ENCOUNTER — APPOINTMENT (OUTPATIENT)
Dept: GENERAL RADIOLOGY | Age: 73
End: 2018-06-25
Attending: EMERGENCY MEDICINE
Payer: MEDICARE

## 2018-06-25 PROCEDURE — 99285 EMERGENCY DEPT VISIT HI MDM: CPT

## 2018-06-25 PROCEDURE — 93005 ELECTROCARDIOGRAM TRACING: CPT

## 2018-06-26 ENCOUNTER — APPOINTMENT (OUTPATIENT)
Dept: GENERAL RADIOLOGY | Age: 73
End: 2018-06-26
Attending: EMERGENCY MEDICINE
Payer: MEDICARE

## 2018-06-26 ENCOUNTER — HOSPITAL ENCOUNTER (EMERGENCY)
Age: 73
Discharge: HOME | End: 2018-06-26
Attending: EMERGENCY MEDICINE | Admitting: EMERGENCY MEDICINE
Payer: MEDICARE

## 2018-06-26 VITALS
TEMPERATURE: 97.6 F | HEART RATE: 72 BPM | BODY MASS INDEX: 23.23 KG/M2 | DIASTOLIC BLOOD PRESSURE: 90 MMHG | HEIGHT: 67 IN | OXYGEN SATURATION: 99 % | WEIGHT: 148 LBS | RESPIRATION RATE: 21 BRPM | SYSTOLIC BLOOD PRESSURE: 156 MMHG

## 2018-06-26 DIAGNOSIS — I10 ACCELERATED HYPERTENSION: ICD-10-CM

## 2018-06-26 DIAGNOSIS — N30.00 ACUTE CYSTITIS WITHOUT HEMATURIA: Primary | ICD-10-CM

## 2018-06-26 LAB
ALBUMIN SERPL-MCNC: 3.3 G/DL (ref 3.5–5)
ALBUMIN/GLOB SERPL: 1 {RATIO} (ref 1.1–2.2)
ALP SERPL-CCNC: 136 U/L (ref 45–117)
ALT SERPL-CCNC: 18 U/L (ref 12–78)
ANION GAP SERPL CALC-SCNC: 10 MMOL/L (ref 5–15)
APPEARANCE UR: ABNORMAL
AST SERPL-CCNC: 16 U/L (ref 15–37)
ATRIAL RATE: 80 BPM
BACTERIA URNS QL MICRO: ABNORMAL /HPF
BASOPHILS # BLD: 0 K/UL (ref 0–0.1)
BASOPHILS NFR BLD: 0 % (ref 0–1)
BILIRUB SERPL-MCNC: 0.7 MG/DL (ref 0.2–1)
BILIRUB UR QL: NEGATIVE
BUN SERPL-MCNC: 18 MG/DL (ref 6–20)
BUN/CREAT SERPL: 13 (ref 12–20)
CALCIUM SERPL-MCNC: 8.5 MG/DL (ref 8.5–10.1)
CALCULATED P AXIS, ECG09: 38 DEGREES
CALCULATED R AXIS, ECG10: -5 DEGREES
CALCULATED T AXIS, ECG11: -35 DEGREES
CHLORIDE SERPL-SCNC: 104 MMOL/L (ref 97–108)
CK SERPL-CCNC: 51 U/L (ref 26–192)
CO2 SERPL-SCNC: 23 MMOL/L (ref 21–32)
COLOR UR: ABNORMAL
CREAT SERPL-MCNC: 1.36 MG/DL (ref 0.55–1.02)
DIAGNOSIS, 93000: NORMAL
DIFFERENTIAL METHOD BLD: ABNORMAL
EOSINOPHIL # BLD: 0.1 K/UL (ref 0–0.4)
EOSINOPHIL NFR BLD: 1 % (ref 0–7)
EPITH CASTS URNS QL MICRO: ABNORMAL /LPF
ERYTHROCYTE [DISTWIDTH] IN BLOOD BY AUTOMATED COUNT: 19.1 % (ref 11.5–14.5)
GLOBULIN SER CALC-MCNC: 3.3 G/DL (ref 2–4)
GLUCOSE SERPL-MCNC: 105 MG/DL (ref 65–100)
GLUCOSE UR STRIP.AUTO-MCNC: NEGATIVE MG/DL
HCT VFR BLD AUTO: 32.2 % (ref 35–47)
HGB BLD-MCNC: 10.2 G/DL (ref 11.5–16)
HGB UR QL STRIP: ABNORMAL
IMM GRANULOCYTES # BLD: 0 K/UL (ref 0–0.04)
IMM GRANULOCYTES NFR BLD AUTO: 0 % (ref 0–0.5)
KETONES UR QL STRIP.AUTO: NEGATIVE MG/DL
LEUKOCYTE ESTERASE UR QL STRIP.AUTO: ABNORMAL
LYMPHOCYTES # BLD: 1.3 K/UL (ref 0.8–3.5)
LYMPHOCYTES NFR BLD: 18 % (ref 12–49)
MCH RBC QN AUTO: 24.2 PG (ref 26–34)
MCHC RBC AUTO-ENTMCNC: 31.7 G/DL (ref 30–36.5)
MCV RBC AUTO: 76.3 FL (ref 80–99)
MONOCYTES # BLD: 0.4 K/UL (ref 0–1)
MONOCYTES NFR BLD: 6 % (ref 5–13)
NEUTS SEG # BLD: 5.2 K/UL (ref 1.8–8)
NEUTS SEG NFR BLD: 74 % (ref 32–75)
NITRITE UR QL STRIP.AUTO: NEGATIVE
NRBC # BLD: 0 K/UL (ref 0–0.01)
NRBC BLD-RTO: 0 PER 100 WBC
P-R INTERVAL, ECG05: 160 MS
PH UR STRIP: 5.5 [PH] (ref 5–8)
PLATELET # BLD AUTO: 217 K/UL (ref 150–400)
PMV BLD AUTO: 10.6 FL (ref 8.9–12.9)
POTASSIUM SERPL-SCNC: 3.6 MMOL/L (ref 3.5–5.1)
PROT SERPL-MCNC: 6.6 G/DL (ref 6.4–8.2)
PROT UR STRIP-MCNC: 100 MG/DL
Q-T INTERVAL, ECG07: 416 MS
QRS DURATION, ECG06: 96 MS
QTC CALCULATION (BEZET), ECG08: 479 MS
RBC # BLD AUTO: 4.22 M/UL (ref 3.8–5.2)
RBC #/AREA URNS HPF: ABNORMAL /HPF (ref 0–5)
SODIUM SERPL-SCNC: 137 MMOL/L (ref 136–145)
SP GR UR REFRACTOMETRY: 1.02 (ref 1–1.03)
TROPONIN I SERPL-MCNC: <0.05 NG/ML
UA: UC IF INDICATED,UAUC: ABNORMAL
UROBILINOGEN UR QL STRIP.AUTO: 1 EU/DL (ref 0.2–1)
VENTRICULAR RATE, ECG03: 80 BPM
WBC # BLD AUTO: 7 K/UL (ref 3.6–11)
WBC URNS QL MICRO: ABNORMAL /HPF (ref 0–4)

## 2018-06-26 PROCEDURE — 80053 COMPREHEN METABOLIC PANEL: CPT | Performed by: EMERGENCY MEDICINE

## 2018-06-26 PROCEDURE — 71046 X-RAY EXAM CHEST 2 VIEWS: CPT

## 2018-06-26 PROCEDURE — 87086 URINE CULTURE/COLONY COUNT: CPT | Performed by: EMERGENCY MEDICINE

## 2018-06-26 PROCEDURE — 82550 ASSAY OF CK (CPK): CPT | Performed by: EMERGENCY MEDICINE

## 2018-06-26 PROCEDURE — 81001 URINALYSIS AUTO W/SCOPE: CPT | Performed by: EMERGENCY MEDICINE

## 2018-06-26 PROCEDURE — 96374 THER/PROPH/DIAG INJ IV PUSH: CPT

## 2018-06-26 PROCEDURE — 84484 ASSAY OF TROPONIN QUANT: CPT | Performed by: EMERGENCY MEDICINE

## 2018-06-26 PROCEDURE — 74011000250 HC RX REV CODE- 250: Performed by: EMERGENCY MEDICINE

## 2018-06-26 PROCEDURE — 36415 COLL VENOUS BLD VENIPUNCTURE: CPT | Performed by: EMERGENCY MEDICINE

## 2018-06-26 PROCEDURE — 74011250637 HC RX REV CODE- 250/637: Performed by: EMERGENCY MEDICINE

## 2018-06-26 PROCEDURE — 85025 COMPLETE CBC W/AUTO DIFF WBC: CPT | Performed by: EMERGENCY MEDICINE

## 2018-06-26 RX ORDER — CEPHALEXIN 250 MG/1
500 CAPSULE ORAL
Status: COMPLETED | OUTPATIENT
Start: 2018-06-26 | End: 2018-06-26

## 2018-06-26 RX ORDER — LABETALOL HYDROCHLORIDE 5 MG/ML
10 INJECTION, SOLUTION INTRAVENOUS
Status: COMPLETED | OUTPATIENT
Start: 2018-06-26 | End: 2018-06-26

## 2018-06-26 RX ORDER — CEPHALEXIN 500 MG/1
500 CAPSULE ORAL 4 TIMES DAILY
Qty: 28 CAP | Refills: 0 | Status: SHIPPED | OUTPATIENT
Start: 2018-06-26 | End: 2018-07-03

## 2018-06-26 RX ORDER — CLONIDINE HYDROCHLORIDE 0.1 MG/1
0.2 TABLET ORAL
Status: COMPLETED | OUTPATIENT
Start: 2018-06-26 | End: 2018-06-26

## 2018-06-26 RX ADMIN — CEPHALEXIN 500 MG: 250 CAPSULE ORAL at 05:15

## 2018-06-26 RX ADMIN — CLONIDINE HYDROCHLORIDE 0.2 MG: 0.1 TABLET ORAL at 03:32

## 2018-06-26 RX ADMIN — LABETALOL HYDROCHLORIDE 10 MG: 5 INJECTION INTRAVENOUS at 04:25

## 2018-06-26 NOTE — ED NOTES
All discharge paperwork reviewed with patient and MD and she denies any need for further explanation regarding these instructions.   She is discharged via wheelchair to the waiting room to await her ride

## 2018-06-26 NOTE — ED PROVIDER NOTES
EMERGENCY DEPARTMENT HISTORY AND PHYSICAL EXAM    Date: 6/26/2018  Patient Name: Latanya Gonzales    History of Presenting Illness     Chief Complaint   Patient presents with    Anxiety     per EMS pt with anxiety attack PTA with chest pain and shortness of breath, pt with BP of 181/132 per EMS, pt now denies pain       History Provided By: Patient and EMS    HPI: Latanya Gonzales is a 71 y.o. female, pmhx CAD, HCL, HTN, GERD, DM, CHF, who presents via EMS to the ED c/o onset of an episode of difficultly swallowing x PTA. Pt states the episode lasted ~5 minutes prior to subsiding. She believes the episode is related to anxiety, given she has had increased stress recently since her son was incarcerated. She denies a hx of anxiety attacks. She notes her sxs have since resolved and she is currently feeling better. The pt was hypertensive (181/132) for EMS, prompting visit to the ED. Pt states she only takes her blood pressure medications in the morning. She denies any associated sxs. Pt specifically denies any vision changes, fever, congestion, cough, shortness of breath, chest pain, abdominal pain, nausea, vomiting, diarrhea, dysuria, or urinary frequency. PCP: Ruthy Melgar NP    PMHx: Significant for CAD, HCL, HTN, GERD, DM, CHF  PSHx: Significant for Pacemaker, cholecystectomy, cardiac stent placement   Social Hx: -tobacco, -EtOH, -Illicit Drugs     There are no other complaints, changes, or physical findings at this time. Current Facility-Administered Medications   Medication Dose Route Frequency Provider Last Rate Last Dose    cephALEXin (KEFLEX) capsule 500 mg  500 mg Oral NOW Caroll Najjar, MD         Current Outpatient Prescriptions   Medication Sig Dispense Refill    cephALEXin (KEFLEX) 500 mg capsule Take 1 Cap by mouth four (4) times daily for 7 days. 28 Cap 0    aspirin 81 mg chewable tablet Take 1 Tab by mouth daily.  30 Tab 0    atorvastatin (LIPITOR) 20 mg tablet Take 1 Tab by mouth nightly. 30 Tab 0    bumetanide (BUMEX) 0.5 mg tablet Take 1 Tab by mouth daily. 30 Tab 0    carvedilol (COREG) 6.25 mg tablet Take 1 Tab by mouth two (2) times daily (with meals). 60 Tab 0    isosorbide mononitrate ER (IMDUR) 30 mg tablet Take 1 Tab by mouth daily. 30 Tab 0    valsartan (DIOVAN) 80 mg tablet Take 1 Tab by mouth daily. 30 Tab 0    ondansetron (ZOFRAN ODT) 4 mg disintegrating tablet Take 1 Tab by mouth every eight (8) hours as needed for Nausea. 12 Tab 0    acetaminophen (TYLENOL) 325 mg tablet Take 2 Tabs by mouth every four (4) hours as needed.  docusate sodium (COLACE) 100 mg capsule Take 1 Cap by mouth two (2) times a day for 90 days. 60 Cap 2    polyethylene glycol (MIRALAX) 17 gram packet Take 1 Packet by mouth daily. 30 Packet 0    gabapentin (NEURONTIN) 300 mg capsule Take 300 mg by mouth three (3) times daily.  omeprazole (PRILOSEC) 20 mg capsule Take 20 mg by mouth daily. Indications: GASTROESOPHAGEAL REFLUX         Past History     Past Medical History:  Past Medical History:   Diagnosis Date    Arthritis     Autoimmune disease ([address]     rheumatoid     CAD (coronary artery disease)     [facility]     Chronic pain     neuropathy bilateral feet,knees    Diabetes ([address]     GERD (gastroesophageal reflux disease)     Hypertension     Ill-defined condition     anemia    Ill-defined condition     blood transfusion hx    MI, old     Other ill-defined conditions(799.89)     high cholesterol    Renal cell carcinoma of right kidney ([address]     s/p resection 12/16       Past Surgical History:  Past Surgical History:   Procedure Laterality Date    CARDIAC SURG PROCEDURE UNLIST      three stents placed 2005    CARDIAC SURG PROCEDURE UNLIST      HX CHOLECYSTECTOMY  02/28/2017    lap tana with IOC.     HX PACEMAKER  2017/January    ICD    HX TONSILLECTOMY      HX UROLOGICAL  12/22/2016    RIGHT LAPOROSCOPIC HAND ASSISTED RADICAL NEPHRECTOMY Family History:  Family History   Problem Relation Age of Onset   [address] [facility] Cancer Mother      stomach    Other Father      aneurysym    Cancer Brother      lung    Other Brother      stomach problems    Stroke Brother        Social History:  Social History   Substance Use Topics    Smoking status: Never Smoker    Smokeless tobacco: Never Used    Alcohol use No       Allergies: Allergies   Allergen Reactions    Percocet [Oxycodone-Acetaminophen] Other (comments)     Confused         Review of Systems   Review of Systems   Constitutional: Negative. Negative for fever. HENT: Positive for trouble swallowing. Eyes: Negative. Respiratory: Negative. Negative for shortness of breath. Cardiovascular: Negative for chest pain. Gastrointestinal: Negative for abdominal pain, nausea and vomiting. Endocrine: Negative. Genitourinary: Negative. Negative for difficulty urinating, dysuria and hematuria. Musculoskeletal: Negative. Skin: Negative. Allergic/Immunologic: Negative. Neurological: Negative. Psychiatric/Behavioral: Negative for suicidal ideas. The patient is nervous/anxious. All other systems reviewed and are negative. Physical Exam   Physical Exam   Constitutional: She is oriented to person, place, and time. She appears well-developed and well-nourished. No distress. HENT:   Head: Normocephalic and atraumatic. Nose: Nose normal.   Eyes: Conjunctivae and EOM are normal. No scleral icterus. Neck: Normal range of motion. No tracheal deviation present. Cardiovascular: Normal rate, regular rhythm, normal heart sounds and intact distal pulses. Exam reveals no friction rub. No murmur heard. Pulmonary/Chest: Effort normal and breath sounds normal. No stridor. No respiratory distress. She has no wheezes. She has no rales. Abdominal: Soft. Bowel sounds are normal. She exhibits no distension. There is no tenderness. There is no rebound.    Musculoskeletal: Normal range of motion. She exhibits no tenderness. Neurological: She is alert and oriented to person, place, and time. No cranial nerve deficit. Skin: Skin is warm and dry. No rash noted. She is not diaphoretic. Psychiatric: She has a normal mood and affect. Her speech is normal and behavior is normal. Judgment and thought content normal. Cognition and memory are normal.   Nursing note and vitals reviewed. Diagnostic Study Results     Labs -     Recent Results (from the past 12 hour(s))   EKG, 12 LEAD, INITIAL    Collection Time: 06/25/18 10:41 PM   Result Value Ref Range    Ventricular Rate 80 BPM    Atrial Rate 80 BPM    P-R Interval 160 ms    QRS Duration 96 ms    Q-T Interval 416 ms    QTC Calculation (Bezet) 479 ms    Calculated P Axis 38 degrees    Calculated R Axis -5 degrees    Calculated T Axis -35 degrees    Diagnosis       Normal sinus rhythm  Possible Left atrial enlargement  Left ventricular hypertrophy  ST & T wave abnormality, consider anterior ischemia  Prolonged QT  When compared with ECG of 02-JUN-2018 11:45,  premature ventricular complexes are no longer present  ST now depressed in Inferior leads  Inverted T waves have replaced nonspecific T wave abnormality in Inferior   leads     CBC WITH AUTOMATED DIFF    Collection Time: 06/26/18  3:26 AM   Result Value Ref Range    WBC 7.0 3.6 - 11.0 K/uL    RBC 4.22 3.80 - 5.20 M/uL    HGB 10.2 (L) 11.5 - 16.0 g/dL    HCT 32.2 (L) 35.0 - 47.0 %    MCV 76.3 (L) 80.0 - 99.0 FL    MCH 24.2 (L) 26.0 - 34.0 PG    MCHC 31.7 30.0 - 36.5 g/dL    RDW 19.1 (H) 11.5 - 14.5 %    PLATELET 387 023 - 079 K/uL    MPV 10.6 8.9 - 12.9 FL    NRBC 0.0 0  WBC    ABSOLUTE NRBC 0.00 0.00 - 0.01 K/uL    NEUTROPHILS 74 32 - 75 %    LYMPHOCYTES 18 12 - 49 %    MONOCYTES 6 5 - 13 %    EOSINOPHILS 1 0 - 7 %    BASOPHILS 0 0 - 1 %    IMMATURE GRANULOCYTES 0 0.0 - 0.5 %    ABS. NEUTROPHILS 5.2 1.8 - 8.0 K/UL    ABS. LYMPHOCYTES 1.3 0.8 - 3.5 K/UL    ABS.  MONOCYTES 0.4 0.0 - 1.0 K/UL    ABS. EOSINOPHILS 0.1 0.0 - 0.4 K/UL    ABS. BASOPHILS 0.0 0.0 - 0.1 K/UL    ABS. IMM. GRANS. 0.0 0.00 - 0.04 K/UL    DF AUTOMATED     METABOLIC PANEL, COMPREHENSIVE    Collection Time: 06/26/18  3:26 AM   Result Value Ref Range    Sodium 137 136 - 145 mmol/L    Potassium 3.6 3.5 - 5.1 mmol/L    Chloride 104 97 - 108 mmol/L    CO2 23 21 - 32 mmol/L    Anion gap 10 5 - 15 mmol/L    Glucose 105 (H) 65 - 100 mg/dL    BUN 18 6 - 20 MG/DL    Creatinine 1.36 (H) 0.55 - 1.02 MG/DL    BUN/Creatinine ratio 13 12 - 20      GFR est AA 46 (L) >60 ml/min/1.73m2    GFR est non-AA 38 (L) >60 ml/min/1.73m2    Calcium 8.5 8.5 - 10.1 MG/DL    Bilirubin, total 0.7 0.2 - 1.0 MG/DL    ALT (SGPT) 18 12 - 78 U/L    AST (SGOT) 16 15 - 37 U/L    Alk.  phosphatase 136 (H) 45 - 117 U/L    Protein, total 6.6 6.4 - 8.2 g/dL    Albumin 3.3 (L) 3.5 - 5.0 g/dL    Globulin 3.3 2.0 - 4.0 g/dL    A-G Ratio 1.0 (L) 1.1 - 2.2     TROPONIN I    Collection Time: 06/26/18  3:26 AM   Result Value Ref Range    Troponin-I, Qt. <0.05 <0.05 ng/mL   CK W/ REFLX CKMB    Collection Time: 06/26/18  3:26 AM   Result Value Ref Range    CK 51 26 - 192 U/L   URINALYSIS W/ REFLEX CULTURE    Collection Time: 06/26/18  4:27 AM   Result Value Ref Range    Color YELLOW/STRAW      Appearance CLOUDY (A) CLEAR      Specific gravity 1.018 1.003 - 1.030      pH (UA) 5.5 5.0 - 8.0      Protein 100 (A) NEG mg/dL    Glucose NEGATIVE  NEG mg/dL    Ketone NEGATIVE  NEG mg/dL    Bilirubin NEGATIVE  NEG      Blood TRACE (A) NEG      Urobilinogen 1.0 0.2 - 1.0 EU/dL    Nitrites NEGATIVE  NEG      Leukocyte Esterase LARGE (A) NEG      WBC 20-50 0 - 4 /hpf    RBC 0-5 0 - 5 /hpf    Epithelial cells MANY (A) FEW /lpf    Bacteria 3+ (A) NEG /hpf    UA:UC IF INDICATED URINE CULTURE ORDERED (A) CNI         Radiologic Studies -   XR CHEST PA LAT   Final Result        CT Results  (Last 48 hours)    None        CXR Results  (Last 48 hours)               06/26/18 0030  XR CHEST PA LAT Final result    Impression:  IMPRESSION:   No acute process. Narrative:  INDICATION:   cp       COMPARISON: June 2, 2018       FINDINGS:       Frontal and lateral views of the chest demonstrate left subclavian AICD device   and cardiomegaly. The lungs are adequately expanded. There is no edema,   effusion, consolidation, or pneumothorax. The osseous structures are   unremarkable. Medical Decision Making   I am the first provider for this patient. I reviewed the vital signs, available nursing notes, past medical history, past surgical history, family history and social history. Vital Signs-Reviewed the patient's vital signs. Patient Vitals for the past 12 hrs:   Temp Pulse Resp BP SpO2   06/26/18 0430 - 70 18 (!) 162/96 99 %   06/26/18 0425 - 70 - (!) 177/104 -   06/26/18 0415 - 72 18 (!) 177/104 98 %   06/26/18 0400 - 78 20 (!) 185/114 99 %   06/26/18 0345 - 77 19 (!) 186/116 98 %   06/26/18 0330 - 77 19 (!) 178/120 99 %   06/26/18 0315 - 77 20 - 99 %   06/25/18 2233 97.6 °F (36.4 °C) 74 16 (!) 160/124 100 %       Pulse Oximetry Analysis - 100% on RA    Cardiac Monitor:   Rate: 74 bpm  Rhythm: Normal Sinus Rhythm      Records Reviewed: Nursing Notes, Old Medical Records and Ambulance Run Sheet    Provider Notes (Medical Decision Making):     DDX:  Anxiety, depression, acs, arrhythmia, htn urgency    Plan:  Ekg, labs, cxr    Impression:  Uti, accelerated htn    ED Course:   Initial assessment performed. The patients presenting problems have been discussed, and they are in agreement with the care plan formulated and outlined with them. I have encouraged them to ask questions as they arise throughout their visit.     I reviewed our electronic medical record system for any past medical records that were available that may contribute to the patients current condition, the nursing notes and and vital signs from today's visit    Nursing notes will be reviewed as they become available in realtime while the pt has been in the ED. Fina Singleton MD    EKG interpretation 2241: NSR, nl Axis, rate 80; , QRS 96, QTc 479, possible acute ischemia; Fina Singleton MD    I personally reviewed pt's imaging. Official read by radiology listed above. Fina Singleton MD    4:56 AM  Progress note:  Pt noted to be feeling better, ready for discharge. Discussed lab and imaging findings with pt and/or family, specifically noting positive UA. fior d/c with Keflex. Pt will follow up as instructed. All questions have been answered, pt voiced understanding and agreement with plan. If narcotics were prescribed, pt was advised not to drive or operate heavy machinery. If abx were prescribed, pt advised that diarrhea and rash are possible side effects of the medications. Specific return precautions provided in addition to instructions for pt to return to the ED immediately should sx worsen at any time. Fina Singleton MD      Critical Care Time:     none    PLAN:  1. Current Discharge Medication List      START taking these medications    Details   cephALEXin (KEFLEX) 500 mg capsule Take 1 Cap by mouth four (4) times daily for 7 days. Qty: 28 Cap, Refills: 0           2. Follow-up Information     Follow up With Details Comments Lamberto Oneal NP   [address] [facility]  [phone]          Return to ED if worse     Disposition:    4:57 AM   The patient's results have been reviewed with family and/or caregiver. They verbally convey their understanding and agreement of the patient's signs, symptoms, diagnosis, treatment and prognosis and additionally agree to follow up as recommended in the discharge instructions or to return to the Emergency Room should the patient's condition change prior to their follow-up appointment. The family and/or caregiver verbally agrees with the care-plan and all of their questions have been answered.  The discharge instructions have also been provided to the them with educational information regarding the patient's diagnosis as well a list of reasons why the patient would want to return to the ER prior to their follow-up appointment should their condition change. Arin Newberry MD      Diagnosis     Clinical Impression:   1. Acute cystitis without hematuria    2. Accelerated hypertension        Attestations: This note is prepared by Chauncey Borrero, acting as Scribe for MD Arin Dave MD : The scribe's documentation has been prepared under my direction and personally reviewed by me in its entirety. I confirm that the note above accurately reflects all work, treatment, procedures, and medical decision making performed by me. This note will not be viewable in [address]

## 2018-06-26 NOTE — DISCHARGE INSTRUCTIONS
Urinary Tract Infection in Women: Care Instructions  Your Care Instructions    A urinary tract infection, or UTI, is a general term for an infection anywhere between the kidneys and the urethra (where urine comes out). Most UTIs are bladder infections. They often cause pain or burning when you urinate. UTIs are caused by bacteria and can be cured with antibiotics. Be sure to complete your treatment so that the infection goes away. Follow-up care is a key part of your treatment and safety. Be sure to make and go to all appointments, and call your doctor if you are having problems. It's also a good idea to know your test results and keep a list of the medicines you take. How can you care for yourself at home? · Take your antibiotics as directed. Do not stop taking them just because you feel better. You need to take the full course of antibiotics. · Drink extra water and other fluids for the next day or two. This may help wash out the bacteria that are causing the infection. (If you have kidney, heart, or liver disease and have to limit fluids, talk with your doctor before you increase your fluid intake.)  · Avoid drinks that are carbonated or have caffeine. They can irritate the bladder. · Urinate often. Try to empty your bladder each time. · To relieve pain, take a hot bath or lay a heating pad set on low over your lower belly or genital area. Never go to sleep with a heating pad in place. To prevent UTIs  · Drink plenty of water each day. This helps you urinate often, which clears bacteria from your system. (If you have kidney, heart, or liver disease and have to limit fluids, talk with your doctor before you increase your fluid intake.)  · Urinate when you need to. · Urinate right after you have sex. · Change sanitary pads often. · Avoid douches, bubble baths, feminine hygiene sprays, and other feminine hygiene products that have deodorants.   · After going to the bathroom, wipe from front to back.  When should you call for help? Call your doctor now or seek immediate medical care if:  ? · Symptoms such as fever, chills, nausea, or vomiting get worse or appear for the first time. ? · You have new pain in your back just below your rib cage. This is called flank pain. ? · There is new blood or pus in your urine. ? · You have any problems with your antibiotic medicine. ? Watch closely for changes in your health, and be sure to contact your doctor if:  ? · You are not getting better after taking an antibiotic for 2 days. ? · Your symptoms go away but then come back. Where can you learn more? Go to http://nohemy-josé miguel.info/. Enter H597 in the search box to learn more about \"Urinary Tract Infection in Women: Care Instructions. \"  Current as of: May 12, 2017  Content Version: 11.4  © 4007-2426 [employer]. Care instructions adapted under license by [employer] (which disclaims liability or warranty for this information). If you have questions about a medical condition or this instruction, always ask your healthcare professional. [place] [address] any warranty or liability for your use of this information. High Blood Pressure: Care Instructions  Your Care Instructions    If your blood pressure is usually above 140/90, you have high blood pressure, or hypertension. That means the top number is 140 or higher or the bottom number is 90 or higher, or both. Despite what a lot of people think, high blood pressure usually doesn't cause headaches or make you feel dizzy or lightheaded. It usually has no symptoms. But it does increase your risk for heart attack, stroke, and kidney or eye damage. The higher your blood pressure, the more your risk increases. Your doctor will give you a goal for your blood pressure. Your goal will be based on your health and your age. An example of a goal is to keep your blood pressure below 140/90.   Lifestyle changes, such as eating healthy and being active, are always important to help lower blood pressure. You might also take medicine to reach your blood pressure goal.  Follow-up care is a key part of your treatment and safety. Be sure to make and go to all appointments, and call your doctor if you are having problems. It's also a good idea to know your test results and keep a list of the medicines you take. How can you care for yourself at home? Medical treatment  · If you stop taking your medicine, your blood pressure will go back up. You may take one or more types of medicine to lower your blood pressure. Be safe with medicines. Take your medicine exactly as prescribed. Call your doctor if you think you are having a problem with your medicine. · Talk to your doctor before you start taking aspirin every day. Aspirin can help certain people lower their risk of a heart attack or stroke. But taking aspirin isn't right for everyone, because it can cause serious bleeding. · See your doctor regularly. You may need to see the doctor more often at first or until your blood pressure comes down. · If you are taking blood pressure medicine, talk to your doctor before you take decongestants or anti-inflammatory medicine, such as ibuprofen. Some of these medicines can raise blood pressure. · Learn how to check your blood pressure at home. Lifestyle changes  · Stay at a healthy weight. This is especially important if you put on weight around the waist. Losing even 10 pounds can help you lower your blood pressure. · If your doctor recommends it, get more exercise. Walking is a good choice. Bit by bit, increase the amount you walk every day. Try for at least 30 minutes on most days of the week. You also may want to swim, bike, or do other activities. · Avoid or limit alcohol. Talk to your doctor about whether you can drink any alcohol. · Try to limit how much sodium you eat to less than 2,300 milligrams (mg) a day.  Your doctor may ask you to try to eat less than 1,500 mg a day. · Eat plenty of fruits (such as bananas and oranges), vegetables, legumes, whole grains, and low-fat dairy products. · Lower the amount of saturated fat in your diet. Saturated fat is found in animal products such as milk, cheese, and meat. Limiting these foods may help you lose weight and also lower your risk for heart disease. · Do not smoke. Smoking increases your risk for heart attack and stroke. If you need help quitting, talk to your doctor about stop-smoking programs and medicines. These can increase your chances of quitting for good. When should you call for help? Call 911 anytime you think you may need emergency care. This may mean having symptoms that suggest that your blood pressure is causing a serious heart or blood vessel problem. Your blood pressure may be over 180/110. ? For example, call 911 if:  ? · You have symptoms of a heart attack. These may include:  ¨ Chest pain or pressure, or a strange feeling in the chest.  ¨ Sweating. ¨ Shortness of breath. ¨ Nausea or vomiting. ¨ Pain, pressure, or a strange feeling in the back, neck, jaw, or upper belly or in one or both shoulders or arms. ¨ Lightheadedness or sudden weakness. ¨ A fast or irregular heartbeat. ? · You have symptoms of a stroke. These may include:  ¨ Sudden numbness, tingling, weakness, or loss of movement in your face, arm, or leg, especially on only one side of your body. ¨ Sudden vision changes. ¨ Sudden trouble speaking. ¨ Sudden confusion or trouble understanding simple statements. ¨ Sudden problems with walking or balance. ¨ A sudden, severe headache that is different from past headaches. ? · You have severe back or belly pain. ?Do not wait until your blood pressure comes down on its own. Get help right away.   ?Call your doctor now or seek immediate care if:  ? · Your blood pressure is much higher than normal (such as 180/110 or higher), but you don't have symptoms. ? · You think high blood pressure is causing symptoms, such as:  ¨ Severe headache. ¨ Blurry vision. ? Watch closely for changes in your health, and be sure to contact your doctor if:  ? · Your blood pressure measures 140/90 or higher at least 2 times. That means the top number is 140 or higher or the bottom number is 90 or higher, or both. ? · You think you may be having side effects from your blood pressure medicine. ? · Your blood pressure is usually normal, but it goes above normal at least 2 times. Where can you learn more? Go to http://nohemy-josé miguel.info/. Enter R700 in the search box to learn more about \"High Blood Pressure: Care Instructions. \"  Current as of: September 21, 2016  Content Version: 11.4  © 1535-5078 [employer]. Care instructions adapted under license by [employer] (which disclaims liability or warranty for this information). If you have questions about a medical condition or this instruction, always ask your healthcare professional. [place] [address] any warranty or liability for your use of this information.

## 2018-06-27 LAB
BACTERIA SPEC CULT: NORMAL
CC UR VC: NORMAL
SERVICE CMNT-IMP: NORMAL

## 2018-07-21 ENCOUNTER — HOSPITAL ENCOUNTER (EMERGENCY)
Age: 73
Discharge: HOME | End: 2018-07-21
Attending: EMERGENCY MEDICINE
Payer: MEDICARE

## 2018-07-21 ENCOUNTER — APPOINTMENT (OUTPATIENT)
Dept: GENERAL RADIOLOGY | Age: 73
End: 2018-07-21
Attending: EMERGENCY MEDICINE
Payer: MEDICARE

## 2018-07-21 VITALS
HEART RATE: 89 BPM | TEMPERATURE: 98.2 F | RESPIRATION RATE: 22 BRPM | SYSTOLIC BLOOD PRESSURE: 185 MMHG | OXYGEN SATURATION: 97 % | DIASTOLIC BLOOD PRESSURE: 124 MMHG

## 2018-07-21 DIAGNOSIS — I50.23 ACUTE ON CHRONIC SYSTOLIC CONGESTIVE HEART FAILURE (HCC): Primary | ICD-10-CM

## 2018-07-21 DIAGNOSIS — N39.0 URINARY TRACT INFECTION WITHOUT HEMATURIA, SITE UNSPECIFIED: ICD-10-CM

## 2018-07-21 LAB
ALBUMIN SERPL-MCNC: 3.4 G/DL (ref 3.5–5)
ALBUMIN/GLOB SERPL: 1.1 {RATIO} (ref 1.1–2.2)
ALP SERPL-CCNC: 110 U/L (ref 45–117)
ALT SERPL-CCNC: 11 U/L (ref 12–78)
ANION GAP SERPL CALC-SCNC: 13 MMOL/L (ref 5–15)
APPEARANCE UR: ABNORMAL
AST SERPL-CCNC: 10 U/L (ref 15–37)
BACTERIA URNS QL MICRO: ABNORMAL /HPF
BASOPHILS # BLD: 0 K/UL (ref 0–0.1)
BASOPHILS NFR BLD: 0 % (ref 0–1)
BILIRUB SERPL-MCNC: 1.1 MG/DL (ref 0.2–1)
BILIRUB UR QL CFM: NEGATIVE
BNP SERPL-MCNC: ABNORMAL PG/ML (ref 0–125)
BUN SERPL-MCNC: 21 MG/DL (ref 6–20)
BUN/CREAT SERPL: 14 (ref 12–20)
CALCIUM SERPL-MCNC: 9 MG/DL (ref 8.5–10.1)
CHLORIDE SERPL-SCNC: 106 MMOL/L (ref 97–108)
CO2 SERPL-SCNC: 22 MMOL/L (ref 21–32)
COLOR UR: ABNORMAL
CREAT SERPL-MCNC: 1.47 MG/DL (ref 0.55–1.02)
DIFFERENTIAL METHOD BLD: ABNORMAL
EOSINOPHIL # BLD: 0 K/UL (ref 0–0.4)
EOSINOPHIL NFR BLD: 0 % (ref 0–7)
EPITH CASTS URNS QL MICRO: ABNORMAL /LPF
ERYTHROCYTE [DISTWIDTH] IN BLOOD BY AUTOMATED COUNT: 16.9 % (ref 11.5–14.5)
GLOBULIN SER CALC-MCNC: 3.2 G/DL (ref 2–4)
GLUCOSE SERPL-MCNC: 97 MG/DL (ref 65–100)
GLUCOSE UR STRIP.AUTO-MCNC: NEGATIVE MG/DL
HCT VFR BLD AUTO: 34 % (ref 35–47)
HGB BLD-MCNC: 10.9 G/DL (ref 11.5–16)
HGB UR QL STRIP: ABNORMAL
IMM GRANULOCYTES # BLD: 0 K/UL (ref 0–0.04)
IMM GRANULOCYTES NFR BLD AUTO: 0 % (ref 0–0.5)
KETONES UR QL STRIP.AUTO: 15 MG/DL
LEUKOCYTE ESTERASE UR QL STRIP.AUTO: ABNORMAL
LYMPHOCYTES # BLD: 1.1 K/UL (ref 0.8–3.5)
LYMPHOCYTES NFR BLD: 14 % (ref 12–49)
MAGNESIUM SERPL-MCNC: 1.8 MG/DL (ref 1.6–2.4)
MCH RBC QN AUTO: 23.9 PG (ref 26–34)
MCHC RBC AUTO-ENTMCNC: 32.1 G/DL (ref 30–36.5)
MCV RBC AUTO: 74.6 FL (ref 80–99)
MONOCYTES # BLD: 0.4 K/UL (ref 0–1)
MONOCYTES NFR BLD: 5 % (ref 5–13)
NEUTS SEG # BLD: 6.1 K/UL (ref 1.8–8)
NEUTS SEG NFR BLD: 80 % (ref 32–75)
NITRITE UR QL STRIP.AUTO: NEGATIVE
NRBC # BLD: 0 K/UL (ref 0–0.01)
NRBC BLD-RTO: 0 PER 100 WBC
PH UR STRIP: 5.5 [PH] (ref 5–8)
PLATELET # BLD AUTO: 259 K/UL (ref 150–400)
PMV BLD AUTO: 10.7 FL (ref 8.9–12.9)
POTASSIUM SERPL-SCNC: 3.6 MMOL/L (ref 3.5–5.1)
PROT SERPL-MCNC: 6.6 G/DL (ref 6.4–8.2)
PROT UR STRIP-MCNC: 100 MG/DL
RBC # BLD AUTO: 4.56 M/UL (ref 3.8–5.2)
RBC #/AREA URNS HPF: ABNORMAL /HPF (ref 0–5)
SODIUM SERPL-SCNC: 141 MMOL/L (ref 136–145)
SP GR UR REFRACTOMETRY: 1.03 (ref 1–1.03)
TROPONIN I SERPL-MCNC: <0.05 NG/ML
UA: UC IF INDICATED,UAUC: ABNORMAL
UROBILINOGEN UR QL STRIP.AUTO: 1 EU/DL (ref 0.2–1)
WBC # BLD AUTO: 7.7 K/UL (ref 3.6–11)
WBC URNS QL MICRO: ABNORMAL /HPF (ref 0–4)

## 2018-07-21 PROCEDURE — 74011250636 HC RX REV CODE- 250/636: Performed by: EMERGENCY MEDICINE

## 2018-07-21 PROCEDURE — 85025 COMPLETE CBC W/AUTO DIFF WBC: CPT | Performed by: EMERGENCY MEDICINE

## 2018-07-21 PROCEDURE — 80053 COMPREHEN METABOLIC PANEL: CPT | Performed by: EMERGENCY MEDICINE

## 2018-07-21 PROCEDURE — 36415 COLL VENOUS BLD VENIPUNCTURE: CPT | Performed by: EMERGENCY MEDICINE

## 2018-07-21 PROCEDURE — 71045 X-RAY EXAM CHEST 1 VIEW: CPT

## 2018-07-21 PROCEDURE — 96374 THER/PROPH/DIAG INJ IV PUSH: CPT

## 2018-07-21 PROCEDURE — 93005 ELECTROCARDIOGRAM TRACING: CPT

## 2018-07-21 PROCEDURE — 84484 ASSAY OF TROPONIN QUANT: CPT | Performed by: EMERGENCY MEDICINE

## 2018-07-21 PROCEDURE — 87086 URINE CULTURE/COLONY COUNT: CPT | Performed by: EMERGENCY MEDICINE

## 2018-07-21 PROCEDURE — 83735 ASSAY OF MAGNESIUM: CPT | Performed by: EMERGENCY MEDICINE

## 2018-07-21 PROCEDURE — 99284 EMERGENCY DEPT VISIT MOD MDM: CPT

## 2018-07-21 PROCEDURE — 83880 ASSAY OF NATRIURETIC PEPTIDE: CPT | Performed by: EMERGENCY MEDICINE

## 2018-07-21 PROCEDURE — 81001 URINALYSIS AUTO W/SCOPE: CPT | Performed by: EMERGENCY MEDICINE

## 2018-07-21 PROCEDURE — 74011250637 HC RX REV CODE- 250/637: Performed by: EMERGENCY MEDICINE

## 2018-07-21 RX ORDER — CEPHALEXIN 250 MG/1
250 CAPSULE ORAL
Status: COMPLETED | OUTPATIENT
Start: 2018-07-21 | End: 2018-07-21

## 2018-07-21 RX ORDER — FUROSEMIDE 10 MG/ML
40 INJECTION INTRAMUSCULAR; INTRAVENOUS
Status: COMPLETED | OUTPATIENT
Start: 2018-07-21 | End: 2018-07-21

## 2018-07-21 RX ORDER — BUMETANIDE 0.25 MG/ML
1 INJECTION INTRAMUSCULAR; INTRAVENOUS
Status: DISCONTINUED | OUTPATIENT
Start: 2018-07-21 | End: 2018-07-21 | Stop reason: CLARIF

## 2018-07-21 RX ORDER — CEPHALEXIN 250 MG/1
250 CAPSULE ORAL 4 TIMES DAILY
Qty: 28 CAP | Refills: 0 | Status: SHIPPED | OUTPATIENT
Start: 2018-07-21 | End: 2018-08-11

## 2018-07-21 RX ORDER — SODIUM CHLORIDE 0.9 % (FLUSH) 0.9 %
5-10 SYRINGE (ML) INJECTION AS NEEDED
Status: DISCONTINUED | OUTPATIENT
Start: 2018-07-21 | End: 2018-07-21 | Stop reason: HOSPADM

## 2018-07-21 RX ORDER — SODIUM CHLORIDE 0.9 % (FLUSH) 0.9 %
5-10 SYRINGE (ML) INJECTION EVERY 8 HOURS
Status: DISCONTINUED | OUTPATIENT
Start: 2018-07-21 | End: 2018-07-21 | Stop reason: HOSPADM

## 2018-07-21 RX ADMIN — CEPHALEXIN 250 MG: 250 CAPSULE ORAL at 13:23

## 2018-07-21 RX ADMIN — FUROSEMIDE 40 MG: 10 INJECTION, SOLUTION INTRAMUSCULAR; INTRAVENOUS at 12:41

## 2018-07-21 NOTE — ED NOTES
Dr. Celi Vo has reviewed discharge instructions with the patient. The patient verbalized understanding. Pt discharged home via wheelchair, discharge papers in hand.

## 2018-07-21 NOTE — ED NOTES
Bedside shift change report given to this RN (oncoming nurse) by Cynthia Coffey RN (offgoing nurse). Report included the following information SBAR and ED Summary.

## 2018-07-21 NOTE — ED NOTES
Pt ambulatory with steady gait, SpO2 remained >95%, pt reports feeling SOB though never appeared to be SOB. Dr. Ramírez Backers notified.

## 2018-07-21 NOTE — ED PROVIDER NOTES
EMERGENCY DEPARTMENT HISTORY AND PHYSICAL EXAM 
 
 
Date: 7/21/2018 Patient Name: Danica Griffiths History of Presenting Illness Chief Complaint Patient presents with  Shortness of Breath Ambulatory into the ED with c/o SOB x 1 week. Pt appears pale; denies bleedng. History Provided By: Patient HPI: Danica Griffiths, 71 y.o. female with PMHx significant for CAD, HTN, GERD, DM, Arthritis, CHF, presents ambulatory to the ED with cc of new onset constant SOB x 1 week. SOB worse with walking. Is coughing with clear sputum that is worse at night. She has not used any medications for her symptoms. She thought her symptoms would resolve within the week but it has remained constant. Pt denies any hx of pulmonary disease or cancer. Does have a hx of cholecystectomy and nephrectomy. She denies any CP, abdominal pain. She denies any pain with deep breaths. Of note pt's son was incarcerated recently, as a result she has a decreased appetite and has not been eating well. She does endorse nausea and vomiting yesterday and was able to tolerate her medications, but denies any abdominal pain. Social Hx: - Tobacco (-), - EtOH (-), - illicit drug use (-) There are no other complaints, changes, or physical findings at this time. PCP: Lamberto Cruz NP Current Facility-Administered Medications Medication Dose Route Frequency Provider Last Rate Last Dose  sodium chloride (NS) flush 5-10 mL  5-10 mL IntraVENous Q8H Orville Anderson MD      
 sodium chloride (NS) flush 5-10 mL  5-10 mL IntraVENous PRN Brandie Conner MD      
 
Current Outpatient Prescriptions Medication Sig Dispense Refill  cephALEXin (KEFLEX) 250 mg capsule Take 1 Cap by mouth four (4) times daily. 28 Cap 0  
 aspirin 81 mg chewable tablet Take 1 Tab by mouth daily. 30 Tab 0  
 atorvastatin (LIPITOR) 20 mg tablet Take 1 Tab by mouth nightly.  30 Tab 0  
 bumetanide (BUMEX) 0.5 mg tablet Take 1 Tab by mouth daily. 30 Tab 0  carvedilol (COREG) 6.25 mg tablet Take 1 Tab by mouth two (2) times daily (with meals). 60 Tab 0  
 isosorbide mononitrate ER (IMDUR) 30 mg tablet Take 1 Tab by mouth daily. 30 Tab 0  
 valsartan (DIOVAN) 80 mg tablet Take 1 Tab by mouth daily. 30 Tab 0  
 ondansetron (ZOFRAN ODT) 4 mg disintegrating tablet Take 1 Tab by mouth every eight (8) hours as needed for Nausea. 12 Tab 0  
 acetaminophen (TYLENOL) 325 mg tablet Take 2 Tabs by mouth every four (4) hours as needed.  polyethylene glycol (MIRALAX) 17 gram packet Take 1 Packet by mouth daily. 30 Packet 0  
 gabapentin (NEURONTIN) 300 mg capsule Take 300 mg by mouth three (3) times daily.  omeprazole (PRILOSEC) 20 mg capsule Take 20 mg by mouth daily. Indications: GASTROESOPHAGEAL REFLUX Past History Past Medical History: 
Past Medical History:  
Diagnosis Date  Arthritis  Autoimmune disease ([facility] [address]   
 rheumatoid  CAD (coronary artery disease)  CHF (congestive heart failure) ([facility] [address]  Chronic pain   
 neuropathy bilateral feet,knees  Diabetes ([facility] [address]  GERD (gastroesophageal reflux disease)  Hypertension  Ill-defined condition   
 anemia  Ill-defined condition   
 blood transfusion hx  MI, old  Other ill-defined conditions(799.89)   
 high cholesterol  Renal cell carcinoma of right kidney [facility])   
 s/p resection 12/16 Past Surgical History: 
Past Surgical History:  
Procedure Laterality Date  CARDIAC SURG PROCEDURE UNLIST    
 three stents placed 2005  CARDIAC SURG PROCEDURE UNLIST  HX CHOLECYSTECTOMY  02/28/2017  
 lap tana with IOC.  HX PACEMAKER  2017/January ICD  HX TONSILLECTOMY  HX UROLOGICAL  12/22/2016 RIGHT LAPOROSCOPIC HAND ASSISTED RADICAL NEPHRECTOMY Family History: 
Family History Problem Relation Age of Onset  Cancer Mother   
  stomach  Other Father   
  aneurysym  Cancer Brother   
  lung  Other Brother stomach problems  Stroke Brother Social History: 
Social History Substance Use Topics  Smoking status: Never Smoker  Smokeless tobacco: Never Used  Alcohol use No  
 
 
Allergies: Allergies Allergen Reactions  Percocet [Oxycodone-Acetaminophen] Other (comments) Confused Review of Systems Review of Systems Constitutional: Negative. Negative for chills and fever. HENT: Negative. Negative for congestion and rhinorrhea. Respiratory: Positive for cough and shortness of breath. Negative for chest tightness and wheezing. Cardiovascular: Negative. Negative for chest pain and palpitations. Gastrointestinal: Positive for nausea and vomiting. Negative for abdominal pain and diarrhea. Endocrine: Negative. Genitourinary: Negative. Negative for decreased urine volume, flank pain, hematuria and pelvic pain. Musculoskeletal: Negative. Negative for back pain and neck pain. Skin: Negative. Negative for color change, pallor and rash. Neurological: Negative. Negative for dizziness, seizures, weakness, numbness and headaches. Hematological: Negative. Negative for adenopathy. Psychiatric/Behavioral: Positive for dysphoric mood. All other systems reviewed and are negative. Physical Exam  
Physical Exam  
Constitutional: She is oriented to person, place, and time. She appears well-developed and well-nourished. No distress. HENT:  
Head: Normocephalic and atraumatic. Mouth/Throat: No oropharyngeal exudate. Eyes: Conjunctivae are normal. Pupils are equal, round, and reactive to light. Right eye exhibits no discharge. Left eye exhibits no discharge. No scleral icterus. Neck: Normal range of motion. Neck supple. No JVD present. Cardiovascular: Normal rate, regular rhythm, normal heart sounds and intact distal pulses. Exam reveals no gallop and no friction rub. No murmur heard. Pulmonary/Chest: Effort normal. No stridor. No respiratory distress. She has wheezes. She has no rales. She exhibits no tenderness. Abdominal: Soft. Bowel sounds are normal. She exhibits no distension and no mass. There is no tenderness. There is no rebound and no guarding. Neurological: She is alert and oriented to person, place, and time. She displays normal reflexes. No cranial nerve deficit. She exhibits normal muscle tone. Coordination normal.  
Skin: Skin is warm. No rash noted. She is not diaphoretic. No pallor. Vitals reviewed. Diagnostic Study Results Labs - Recent Results (from the past 12 hour(s)) EKG, 12 LEAD, INITIAL Collection Time: 07/21/18 10:01 AM  
Result Value Ref Range Ventricular Rate 94 BPM  
 Atrial Rate 94 BPM  
 P-R Interval 152 ms QRS Duration 92 ms Q-T Interval 380 ms QTC Calculation (Bezet) 475 ms Calculated P Axis 63 degrees Calculated R Axis -6 degrees Calculated T Axis 12 degrees Diagnosis Normal sinus rhythm Possible Left atrial enlargement Left ventricular hypertrophy ST & T wave abnormality, consider anterior ischemia Prolonged QT When compared with ECG of 25-JUN-2018 22:41, 
Nonspecific T wave abnormality has replaced inverted T waves in Inferior  
leads CBC WITH AUTOMATED DIFF Collection Time: 07/21/18 10:28 AM  
Result Value Ref Range WBC 7.7 3.6 - 11.0 K/uL  
 RBC 4.56 3.80 - 5.20 M/uL  
 HGB 10.9 (L) 11.5 - 16.0 g/dL HCT 34.0 (L) 35.0 - 47.0 % MCV 74.6 (L) 80.0 - 99.0 FL  
 MCH 23.9 (L) 26.0 - 34.0 PG  
 MCHC 32.1 30.0 - 36.5 g/dL  
 RDW 16.9 (H) 11.5 - 14.5 % PLATELET 693 582 - 282 K/uL MPV 10.7 8.9 - 12.9 FL  
 NRBC 0.0 0  WBC ABSOLUTE NRBC 0.00 0.00 - 0.01 K/uL NEUTROPHILS 80 (H) 32 - 75 % LYMPHOCYTES 14 12 - 49 % MONOCYTES 5 5 - 13 % EOSINOPHILS 0 0 - 7 % BASOPHILS 0 0 - 1 % IMMATURE GRANULOCYTES 0 0.0 - 0.5 % ABS. NEUTROPHILS 6.1 1.8 - 8.0 K/UL  
 ABS. LYMPHOCYTES 1.1 0.8 - 3.5 K/UL  
 ABS. MONOCYTES 0.4 0.0 - 1.0 K/UL  
 ABS. EOSINOPHILS 0.0 0.0 - 0.4 K/UL  
 ABS. BASOPHILS 0.0 0.0 - 0.1 K/UL  
 ABS. IMM. GRANS. 0.0 0.00 - 0.04 K/UL  
 DF AUTOMATED METABOLIC PANEL, COMPREHENSIVE Collection Time: 07/21/18 10:28 AM  
Result Value Ref Range Sodium 141 136 - 145 mmol/L Potassium 3.6 3.5 - 5.1 mmol/L Chloride 106 97 - 108 mmol/L  
 CO2 22 21 - 32 mmol/L Anion gap 13 5 - 15 mmol/L Glucose 97 65 - 100 mg/dL BUN 21 (H) 6 - 20 MG/DL Creatinine 1.47 (H) 0.55 - 1.02 MG/DL  
 BUN/Creatinine ratio 14 12 - 20 GFR est AA 42 (L) >60 ml/min/1.73m2 GFR est non-AA 35 (L) >60 ml/min/1.73m2 Calcium 9.0 8.5 - 10.1 MG/DL Bilirubin, total 1.1 (H) 0.2 - 1.0 MG/DL  
 ALT (SGPT) 11 (L) 12 - 78 U/L  
 AST (SGOT) 10 (L) 15 - 37 U/L Alk. phosphatase 110 45 - 117 U/L Protein, total 6.6 6.4 - 8.2 g/dL Albumin 3.4 (L) 3.5 - 5.0 g/dL Globulin 3.2 2.0 - 4.0 g/dL A-G Ratio 1.1 1.1 - 2.2 NT-PRO BNP Collection Time: 07/21/18 10:28 AM  
Result Value Ref Range NT pro-BNP >27008 (H) 0 - 125 PG/ML  
MAGNESIUM Collection Time: 07/21/18 10:28 AM  
Result Value Ref Range Magnesium 1.8 1.6 - 2.4 mg/dL TROPONIN I Collection Time: 07/21/18 10:28 AM  
Result Value Ref Range Troponin-I, Qt. <0.05 <0.05 ng/mL URINALYSIS W/ REFLEX CULTURE Collection Time: 07/21/18 11:01 AM  
Result Value Ref Range Color DARK YELLOW Appearance CLOUDY (A) CLEAR Specific gravity 1.030 1.003 - 1.030    
 pH (UA) 5.5 5.0 - 8.0 Protein 100 (A) NEG mg/dL Glucose NEGATIVE  NEG mg/dL Ketone 15 (A) NEG mg/dL Blood SMALL (A) NEG Urobilinogen 1.0 0.2 - 1.0 EU/dL Nitrites NEGATIVE  NEG Leukocyte Esterase MODERATE (A) NEG    
 WBC 20-50 0 - 4 /hpf  
 RBC 5-10 0 - 5 /hpf Epithelial cells MODERATE (A) FEW /lpf Bacteria 1+ (A) NEG /hpf  
 UA:UC IF INDICATED URINE CULTURE ORDERED (A) CNI    
BILIRUBIN, CONFIRM Collection Time: 07/21/18 11:01 AM  
Result Value Ref Range  Bilirubin UA, confirm NEGATIVE  NEG Radiologic Studies - CXR Results  (Last 48 hours) 07/21/18 1109  XR CHEST PORT Final result Impression:  IMPRESSION:  
No acute cardiopulmonary disease radiographically. . Stable cardiomegaly. .  
   
  
 Narrative:  INDICATION:  SOB EXAM: Chest single view. COMPARISON: 6/26/2018. FINDINGS: A single frontal view of the chest at 1106 hours shows no focal  
infiltrate or CHF pattern, although the left midlung field is obscured by an  
AICD power supply. Mild interstitial prominence, stable. The AICD appears  
stable. .  The heart, mediastinum and pulmonary vasculature are stable with  
cardiomegaly. .  The bony thorax is unremarkable for age. .  
   
  
  
 
 
 
Medical Decision Making I am the first provider for this patient. I reviewed the vital signs, available nursing notes, past medical history, past surgical history, family history and social history. Vital Signs-Reviewed the patient's vital signs. Patient Vitals for the past 12 hrs: 
 Temp Pulse Resp BP SpO2  
07/21/18 1030 - 89 22 (!) 185/124 97 %  
07/21/18 1015 98.2 °F (36.8 °C) - - - -  
07/21/18 1008 - 94 18 - 94 %  
07/21/18 1004 - - - (!) 187/122 -  
 
EKG interpretation: (Preliminary) 10:01 AM 
Rhythm: normal sinus rhythm; and regular . Rate (approx.): 94; Axis: normal; ND interval: normal; QT interval: Prolonged ; ST/T wave: ST/T wave abnormality; Other findings: LVH, and possible left atrial enlargement. As interpreted by Digna Whaley MD 
 
Records Reviewed: Nursing Notes, Old Medical Records, Previous Radiology Studies and Previous Laboratory Studies Provider Notes (Medical Decision Making): DDx: CHF, PNA, Coronary syndrome, UTI, depression, anemia. Impression/plan: Hx of ischemic cardiomyopathy to the ER with multiple somatic complaints but specifically SOB for 1 week as well as loss of appetite.  She admits to depression related to her sons recent incarceration. Plan to get labs and treat with diuretics. If improved possibly can be discharged. ED Course:  
Initial assessment performed. The patients presenting problems have been discussed, and they are in agreement with the care plan formulated and outlined with them. I have encouraged them to ask questions as they arise throughout their visit. ED SIGNOUT 
12:50 PM 
Patient care will be transferred to [place] [id number]. Peg Heller MD.  Discussed available diagnostic results and care plan at length. Pt made aware of provider change. Critical Care Time:  
None. Disposition: 
DISCHARGE NOTE 
2:02 PM 
The patient has been re-evaluated and is ready for discharge. Reviewed available results with patient. Counseled patient on diagnosis and care plan. Patient has expressed understanding, and all questions have been answered. Patient agrees with plan and agrees to follow up as recommended, or return to the ED if their symptoms worsen. Discharge instructions have been provided and explained to the patient, along with reasons to return to the ED. PLAN: 
1. Discharge Medication List as of 7/21/2018  1:13 PM  
  
START taking these medications Details  
cephALEXin (KEFLEX) 250 mg capsule Take 1 Cap by mouth four (4) times daily. , Print, Disp-28 Cap, R-0  
  
  
CONTINUE these medications which have NOT CHANGED Details  
aspirin 81 mg chewable tablet Take 1 Tab by mouth daily. , Print, Disp-30 Tab, R-0  
  
atorvastatin (LIPITOR) 20 mg tablet Take 1 Tab by mouth nightly. , Print, Disp-30 Tab, R-0  
  
bumetanide (BUMEX) 0.5 mg tablet Take 1 Tab by mouth daily. , Print, Disp-30 Tab, R-0  
  
carvedilol (COREG) 6.25 mg tablet Take 1 Tab by mouth two (2) times daily (with meals). , Print, Disp-60 Tab, R-0  
  
isosorbide mononitrate ER (IMDUR) 30 mg tablet Take 1 Tab by mouth daily. , Print, Disp-30 Tab, R-0  
  
valsartan (DIOVAN) 80 mg tablet Take 1 Tab by mouth daily. , Print, Disp-30 Tab, R-0  
  
ondansetron (ZOFRAN ODT) 4 mg disintegrating tablet Take 1 Tab by mouth every eight (8) hours as needed for Nausea., Normal, Disp-12 Tab, R-0  
  
acetaminophen (TYLENOL) 325 mg tablet Take 2 Tabs by mouth every four (4) hours as needed., OTC  
  
polyethylene glycol (MIRALAX) 17 gram packet Take 1 Packet by mouth daily. , Normal, Disp-30 Packet, R-0  
  
gabapentin (NEURONTIN) 300 mg capsule Take 300 mg by mouth three (3) times daily. , Historical Med  
  
omeprazole (PRILOSEC) 20 mg capsule Take 20 mg by mouth daily. Indications: GASTROESOPHAGEAL REFLUX, Historical Med 2. Follow-up Information Follow up With Details Comments Contact Info Lamberto Cruz NP Schedule an appointment as soon as possible for a visit in 2 days  [address] [place] [address] 
[phone] [facility] EMERGENCY DEPT  If symptoms worsen [address] 
[phone] Return to ED if worse Diagnosis Clinical Impression: 1. Acute on chronic systolic congestive heart failure ([address] 2. Urinary tract infection without hematuria, site unspecified Attestations: This note is prepared by Shakir Fuentes acting as Scribe for MD Brandie Nelson MD : The scribe's documentation has been prepared under my direction and personally reviewed by me in its entirety. I confirm that the note above accurately reflects all work, treatment, procedures, and medical decision making performed by me.

## 2018-07-22 LAB
ATRIAL RATE: 94 BPM
BACTERIA SPEC CULT: NORMAL
CALCULATED P AXIS, ECG09: 63 DEGREES
CALCULATED R AXIS, ECG10: -6 DEGREES
CALCULATED T AXIS, ECG11: 12 DEGREES
CC UR VC: NORMAL
DIAGNOSIS, 93000: NORMAL
P-R INTERVAL, ECG05: 152 MS
Q-T INTERVAL, ECG07: 380 MS
QRS DURATION, ECG06: 92 MS
QTC CALCULATION (BEZET), ECG08: 475 MS
SERVICE CMNT-IMP: NORMAL
VENTRICULAR RATE, ECG03: 94 BPM

## 2018-08-11 ENCOUNTER — HOSPITAL ENCOUNTER (EMERGENCY)
Age: 73
Discharge: HOME | End: 2018-08-11
Attending: EMERGENCY MEDICINE | Admitting: EMERGENCY MEDICINE
Payer: MEDICARE

## 2018-08-11 ENCOUNTER — APPOINTMENT (OUTPATIENT)
Dept: GENERAL RADIOLOGY | Age: 73
End: 2018-08-11
Attending: EMERGENCY MEDICINE
Payer: MEDICARE

## 2018-08-11 VITALS
RESPIRATION RATE: 18 BRPM | DIASTOLIC BLOOD PRESSURE: 98 MMHG | HEIGHT: 67 IN | OXYGEN SATURATION: 100 % | SYSTOLIC BLOOD PRESSURE: 168 MMHG | TEMPERATURE: 97.8 F | WEIGHT: 133 LBS | HEART RATE: 82 BPM | BODY MASS INDEX: 20.88 KG/M2

## 2018-08-11 DIAGNOSIS — E86.0 DEHYDRATION: ICD-10-CM

## 2018-08-11 DIAGNOSIS — R11.0 NAUSEA WITHOUT VOMITING: Primary | ICD-10-CM

## 2018-08-11 LAB
ALBUMIN SERPL-MCNC: 3.2 G/DL (ref 3.5–5)
ALBUMIN/GLOB SERPL: 1 {RATIO} (ref 1.1–2.2)
ALP SERPL-CCNC: 96 U/L (ref 45–117)
ALT SERPL-CCNC: 11 U/L (ref 12–78)
ANION GAP SERPL CALC-SCNC: 11 MMOL/L (ref 5–15)
APPEARANCE UR: ABNORMAL
AST SERPL-CCNC: 10 U/L (ref 15–37)
ATRIAL RATE: 93 BPM
BACTERIA URNS QL MICRO: NEGATIVE /HPF
BILIRUB SERPL-MCNC: 1 MG/DL (ref 0.2–1)
BILIRUB UR QL CFM: NEGATIVE
BNP SERPL-MCNC: ABNORMAL PG/ML (ref 0–125)
BUN SERPL-MCNC: 19 MG/DL (ref 6–20)
BUN/CREAT SERPL: 13 (ref 12–20)
CALCIUM SERPL-MCNC: 8.5 MG/DL (ref 8.5–10.1)
CALCULATED P AXIS, ECG09: 58 DEGREES
CALCULATED R AXIS, ECG10: -17 DEGREES
CALCULATED T AXIS, ECG11: 47 DEGREES
CHLORIDE SERPL-SCNC: 108 MMOL/L (ref 97–108)
CO2 SERPL-SCNC: 21 MMOL/L (ref 21–32)
COLOR UR: ABNORMAL
CREAT SERPL-MCNC: 1.44 MG/DL (ref 0.55–1.02)
DIAGNOSIS, 93000: NORMAL
EPITH CASTS URNS QL MICRO: ABNORMAL /LPF
ERYTHROCYTE [DISTWIDTH] IN BLOOD BY AUTOMATED COUNT: 16.5 % (ref 11.5–14.5)
GLOBULIN SER CALC-MCNC: 3.2 G/DL (ref 2–4)
GLUCOSE SERPL-MCNC: 110 MG/DL (ref 65–100)
GLUCOSE UR STRIP.AUTO-MCNC: NEGATIVE MG/DL
HCT VFR BLD AUTO: 33.1 % (ref 35–47)
HGB BLD-MCNC: 10.5 G/DL (ref 11.5–16)
HGB UR QL STRIP: NEGATIVE
KETONES UR QL STRIP.AUTO: 40 MG/DL
LEUKOCYTE ESTERASE UR QL STRIP.AUTO: ABNORMAL
MCH RBC QN AUTO: 24 PG (ref 26–34)
MCHC RBC AUTO-ENTMCNC: 31.7 G/DL (ref 30–36.5)
MCV RBC AUTO: 75.6 FL (ref 80–99)
NITRITE UR QL STRIP.AUTO: NEGATIVE
NRBC # BLD: 0 K/UL (ref 0–0.01)
NRBC BLD-RTO: 0 PER 100 WBC
P-R INTERVAL, ECG05: 154 MS
PH UR STRIP: 5 [PH] (ref 5–8)
PLATELET # BLD AUTO: 231 K/UL (ref 150–400)
PMV BLD AUTO: 11.7 FL (ref 8.9–12.9)
POTASSIUM SERPL-SCNC: 3.7 MMOL/L (ref 3.5–5.1)
PROT SERPL-MCNC: 6.4 G/DL (ref 6.4–8.2)
PROT UR STRIP-MCNC: 100 MG/DL
Q-T INTERVAL, ECG07: 384 MS
QRS DURATION, ECG06: 96 MS
QTC CALCULATION (BEZET), ECG08: 477 MS
RBC # BLD AUTO: 4.38 M/UL (ref 3.8–5.2)
RBC #/AREA URNS HPF: ABNORMAL /HPF (ref 0–5)
SODIUM SERPL-SCNC: 140 MMOL/L (ref 136–145)
SP GR UR REFRACTOMETRY: >1.03 (ref 1–1.03)
TROPONIN I SERPL-MCNC: 0.05 NG/ML
UA: UC IF INDICATED,UAUC: ABNORMAL
UROBILINOGEN UR QL STRIP.AUTO: 1 EU/DL (ref 0.2–1)
VENTRICULAR RATE, ECG03: 93 BPM
WBC # BLD AUTO: 7.3 K/UL (ref 3.6–11)
WBC URNS QL MICRO: ABNORMAL /HPF (ref 0–4)

## 2018-08-11 PROCEDURE — 93005 ELECTROCARDIOGRAM TRACING: CPT

## 2018-08-11 PROCEDURE — 96374 THER/PROPH/DIAG INJ IV PUSH: CPT

## 2018-08-11 PROCEDURE — 87086 URINE CULTURE/COLONY COUNT: CPT | Performed by: EMERGENCY MEDICINE

## 2018-08-11 PROCEDURE — 85027 COMPLETE CBC AUTOMATED: CPT | Performed by: EMERGENCY MEDICINE

## 2018-08-11 PROCEDURE — 84484 ASSAY OF TROPONIN QUANT: CPT | Performed by: EMERGENCY MEDICINE

## 2018-08-11 PROCEDURE — 36415 COLL VENOUS BLD VENIPUNCTURE: CPT | Performed by: EMERGENCY MEDICINE

## 2018-08-11 PROCEDURE — 74011250636 HC RX REV CODE- 250/636: Performed by: EMERGENCY MEDICINE

## 2018-08-11 PROCEDURE — 83880 ASSAY OF NATRIURETIC PEPTIDE: CPT | Performed by: EMERGENCY MEDICINE

## 2018-08-11 PROCEDURE — 80053 COMPREHEN METABOLIC PANEL: CPT | Performed by: EMERGENCY MEDICINE

## 2018-08-11 PROCEDURE — 71045 X-RAY EXAM CHEST 1 VIEW: CPT

## 2018-08-11 PROCEDURE — 81001 URINALYSIS AUTO W/SCOPE: CPT | Performed by: EMERGENCY MEDICINE

## 2018-08-11 PROCEDURE — 99284 EMERGENCY DEPT VISIT MOD MDM: CPT

## 2018-08-11 RX ORDER — ONDANSETRON 4 MG/1
4 TABLET, ORALLY DISINTEGRATING ORAL
Qty: 12 TAB | Refills: 0 | Status: SHIPPED | OUTPATIENT
Start: 2018-08-11

## 2018-08-11 RX ORDER — ONDANSETRON 2 MG/ML
4 INJECTION INTRAMUSCULAR; INTRAVENOUS
Status: COMPLETED | OUTPATIENT
Start: 2018-08-11 | End: 2018-08-11

## 2018-08-11 RX ADMIN — ONDANSETRON 4 MG: 2 INJECTION, SOLUTION INTRAMUSCULAR; INTRAVENOUS at 03:44

## 2018-08-11 NOTE — ED NOTES
Discharge instructions given to patient by Terrie Singleton MD. Patient verbalized understanding of discharge instructions. Pt discharged without difficulty. Pt discharged in stable condition via wheelchair, accompanied by friend.

## 2018-08-11 NOTE — DISCHARGE INSTRUCTIONS
Nausea and Vomiting: Care Instructions  Your Care Instructions    When you are nauseated, you may feel weak and sweaty and notice a lot of saliva in your mouth. Nausea often leads to vomiting. Most of the time you do not need to worry about nausea and vomiting, but they can be signs of other illnesses. Two common causes of nausea and vomiting are stomach flu and food poisoning. Nausea and vomiting from viral stomach flu will usually start to improve within 24 hours. Nausea and vomiting from food poisoning may last from 12 to 48 hours. The doctor has checked you carefully, but problems can develop later. If you notice any problems or new symptoms, get medical treatment right away. Follow-up care is a key part of your treatment and safety. Be sure to make and go to all appointments, and call your doctor if you are having problems. It's also a good idea to know your test results and keep a list of the medicines you take. How can you care for yourself at home? · To prevent dehydration, drink plenty of fluids, enough so that your urine is light yellow or clear like water. Choose water and other caffeine-free clear liquids until you feel better. If you have kidney, heart, or liver disease and have to limit fluids, talk with your doctor before you increase the amount of fluids you drink. · Rest in bed until you feel better. · When you are able to eat, try clear soups, mild foods, and liquids until all symptoms are gone for 12 to 48 hours. Other good choices include dry toast, crackers, cooked cereal, and gelatin dessert, such as Jell-O. When should you call for help? Call 911 anytime you think you may need emergency care. For example, call if:    · You passed out (lost consciousness).    Call your doctor now or seek immediate medical care if:    · You have symptoms of dehydration, such as:  ¨ Dry eyes and a dry mouth. ¨ Passing only a little dark urine.   ¨ Feeling thirstier than usual.     · You have new or worsening belly pain.     · You have a new or higher fever.     · You vomit blood or what looks like coffee grounds.    Watch closely for changes in your health, and be sure to contact your doctor if:    · You have ongoing nausea and vomiting.     · Your vomiting is getting worse.     · Your vomiting lasts longer than 2 days.     · You are not getting better as expected. Where can you learn more? Go to http://nohemy-josé miguel.info/. Enter 25 [id number] in the search box to learn more about \"Nausea and Vomiting: Care Instructions. \"  Current as of: November 20, 2017  Content Version: 11.7  © 7546-2210 [employer]. Care instructions adapted under license by [employer] (which disclaims liability or warranty for this information). If you have questions about a medical condition or this instruction, always ask your healthcare professional. [address] any warranty or liability for your use of this information.

## 2018-08-11 NOTE — ED PROVIDER NOTES
EMERGENCY DEPARTMENT HISTORY AND PHYSICAL EXAM      Date: 8/11/2018  Patient Name: Raciel Amado    History of Presenting Illness     Chief Complaint   Patient presents with    Nausea    Shortness of Breath       History Provided By: Patient    HPI: Raciel Amado, 68 y.o. female with PMHx significant for CAD, HTN, RA, GERD, renal cell carcinoma, anemia, DM, arthritis, chronic pain, CHF, and MI, presents via EMS to the ED with cc of constant nausea, SOB, leg swelling, and abd pain, as well as improving \"pacemaker pain\" onset last night. Pt reports that last night, her pacemaker started hurting, but she denies pacemaker firing. She denies CP now. She reports that she is compliant with her fluid pills. She denies dysuria and diarrhea. There are no other complaints, changes, or physical findings at this time. Social Hx: -Tobacco, -EtOH, -Illicit Drug Use    PCP: Henrique Oliva, NP    Current Outpatient Prescriptions   Medication Sig Dispense Refill    ondansetron (ZOFRAN ODT) 4 mg disintegrating tablet Take 1 Tab by mouth every eight (8) hours as needed for Nausea. 12 Tab 0    aspirin 81 mg chewable tablet Take 1 Tab by mouth daily. 30 Tab 0    atorvastatin (LIPITOR) 20 mg tablet Take 1 Tab by mouth nightly. 30 Tab 0    bumetanide (BUMEX) 0.5 mg tablet Take 1 Tab by mouth daily. 30 Tab 0    carvedilol (COREG) 6.25 mg tablet Take 1 Tab by mouth two (2) times daily (with meals). 60 Tab 0    isosorbide mononitrate ER (IMDUR) 30 mg tablet Take 1 Tab by mouth daily. 30 Tab 0    valsartan (DIOVAN) 80 mg tablet Take 1 Tab by mouth daily. 30 Tab 0    acetaminophen (TYLENOL) 325 mg tablet Take 2 Tabs by mouth every four (4) hours as needed.  polyethylene glycol (MIRALAX) 17 gram packet Take 1 Packet by mouth daily. 30 Packet 0    gabapentin (NEURONTIN) 300 mg capsule Take 300 mg by mouth three (3) times daily.  omeprazole (PRILOSEC) 20 mg capsule Take 20 mg by mouth daily.  Indications: GASTROESOPHAGEAL REFLUX         Past History     Past Medical History:  Past Medical History:   Diagnosis Date    Arthritis     Autoimmune disease ([facility] [address]     rheumatoid     CAD (coronary artery disease)     CHF (congestive heart failure) (HCC)     Chronic pain     neuropathy bilateral feet,knees    Diabetes ([address]     GERD (gastroesophageal reflux disease)     Hypertension     Ill-defined condition     anemia    Ill-defined condition     blood transfusion hx    MI, old     Other ill-defined conditions(799.89)     high cholesterol    Renal cell carcinoma of right kidney ([facility] [address]     s/p resection 12/16       Past Surgical History:  Past Surgical History:   Procedure Laterality Date    CARDIAC SURG PROCEDURE UNLIST      three stents placed 2005    CARDIAC SURG PROCEDURE UNLIST      HX CHOLECYSTECTOMY  02/28/2017    lap tana with IOC.  HX PACEMAKER  2017/January    ICD    HX TONSILLECTOMY      HX UROLOGICAL  12/22/2016    RIGHT LAPOROSCOPIC HAND ASSISTED RADICAL NEPHRECTOMY       Family History:  Family History   Problem Relation Age of Onset   [employer] Mother      stomach    Other Father      aneurysym   [employer] Brother      lung    Other Brother      stomach problems    Stroke Brother        Social History:  Social History   Substance Use Topics    Smoking status: Never Smoker    Smokeless tobacco: Never Used    Alcohol use No       Allergies: Allergies   Allergen Reactions    Percocet [Oxycodone-Acetaminophen] Other (comments)     Confused         Review of Systems   Review of Systems   Constitutional: Negative for chills and fever. Respiratory: Positive for shortness of breath. Negative for cough. Cardiovascular: Positive for chest pain (resolved) and leg swelling. Gastrointestinal: Positive for abdominal pain and nausea. Negative for constipation, diarrhea and vomiting. Neurological: Negative for weakness and numbness. All other systems reviewed and are negative.       Physical Exam   Physical Exam   Constitutional: She is oriented to person, place, and time. She appears well-developed and well-nourished. HENT:   Head: Normocephalic and atraumatic. Eyes: Conjunctivae and EOM are normal.   Neck: Normal range of motion. Neck supple. Cardiovascular: Normal rate and regular rhythm. Trace pitting edema of BLE   Pulmonary/Chest: Effort normal and breath sounds normal. No respiratory distress. Abdominal: Soft. She exhibits no distension. There is no tenderness. Musculoskeletal: Normal range of motion. Neurological: She is alert and oriented to person, place, and time. Skin: Skin is warm and dry. Psychiatric: She has a normal mood and affect. Nursing note and vitals reviewed.       Diagnostic Study Results     Labs -     Recent Results (from the past 12 hour(s))   EKG, 12 LEAD, INITIAL    Collection Time: 08/11/18  3:04 AM   Result Value Ref Range    Ventricular Rate 93 BPM    Atrial Rate 93 BPM    P-R Interval 154 ms    QRS Duration 96 ms    Q-T Interval 384 ms    QTC Calculation (Bezet) 477 ms    Calculated P Axis 58 degrees    Calculated R Axis -17 degrees    Calculated T Axis 47 degrees    Diagnosis       Normal sinus rhythm  Possible Left atrial enlargement  Left ventricular hypertrophy  ST & T wave abnormality, consider anterior ischemia  Prolonged QT  When compared with ECG of 21-JUL-2018 10:01,  No significant change was found     TROPONIN I    Collection Time: 08/11/18  4:03 AM   Result Value Ref Range    Troponin-I, Qt. 0.05 (H) <0.05 ng/mL   CBC W/O DIFF    Collection Time: 08/11/18  4:03 AM   Result Value Ref Range    WBC 7.3 3.6 - 11.0 K/uL    RBC 4.38 3.80 - 5.20 M/uL    HGB 10.5 (L) 11.5 - 16.0 g/dL    HCT 33.1 (L) 35.0 - 47.0 %    MCV 75.6 (L) 80.0 - 99.0 FL    MCH 24.0 (L) 26.0 - 34.0 PG    MCHC 31.7 30.0 - 36.5 g/dL    RDW 16.5 (H) 11.5 - 14.5 %    PLATELET 229 427 - 083 K/uL    MPV 11.7 8.9 - 12.9 FL    NRBC 0.0 0  WBC    ABSOLUTE NRBC 0.00 0.00 - 0.01 K/uL   METABOLIC PANEL, COMPREHENSIVE    Collection Time: 08/11/18  4:03 AM   Result Value Ref Range    Sodium 140 136 - 145 mmol/L    Potassium 3.7 3.5 - 5.1 mmol/L    Chloride 108 97 - 108 mmol/L    CO2 21 21 - 32 mmol/L    Anion gap 11 5 - 15 mmol/L    Glucose 110 (H) 65 - 100 mg/dL    BUN 19 6 - 20 MG/DL    Creatinine 1.44 (H) 0.55 - 1.02 MG/DL    BUN/Creatinine ratio 13 12 - 20      GFR est AA 43 (L) >60 ml/min/1.73m2    GFR est non-AA 36 (L) >60 ml/min/1.73m2    Calcium 8.5 8.5 - 10.1 MG/DL    Bilirubin, total 1.0 0.2 - 1.0 MG/DL    ALT (SGPT) 11 (L) 12 - 78 U/L    AST (SGOT) 10 (L) 15 - 37 U/L    Alk. phosphatase 96 45 - 117 U/L    Protein, total 6.4 6.4 - 8.2 g/dL    Albumin 3.2 (L) 3.5 - 5.0 g/dL    Globulin 3.2 2.0 - 4.0 g/dL    A-G Ratio 1.0 (L) 1.1 - 2.2     URINALYSIS W/ REFLEX CULTURE    Collection Time: 08/11/18  4:07 AM   Result Value Ref Range    Color DARK YELLOW      Appearance CLOUDY (A) CLEAR      Specific gravity >1.030 (H) 1.003 - 1.030    pH (UA) 5.0 5.0 - 8.0      Protein 100 (A) NEG mg/dL    Glucose NEGATIVE  NEG mg/dL    Ketone 40 (A) NEG mg/dL    Blood NEGATIVE  NEG      Urobilinogen 1.0 0.2 - 1.0 EU/dL    Nitrites NEGATIVE  NEG      Leukocyte Esterase SMALL (A) NEG      WBC 10-20 0 - 4 /hpf    RBC 0-5 0 - 5 /hpf    Epithelial cells MODERATE (A) FEW /lpf    Bacteria NEGATIVE  NEG /hpf    UA:UC IF INDICATED URINE CULTURE ORDERED (A) CNI     BILIRUBIN, CONFIRM    Collection Time: 08/11/18  4:07 AM   Result Value Ref Range    Bilirubin UA, confirm NEGATIVE  NEG         Radiologic Studies -   XR CHEST PORT   Final Result        CT Results  (Last 48 hours)    None        CXR Results  (Last 48 hours)               08/11/18 0352  XR CHEST PORT Final result    Impression:  IMPRESSION: No acute findings. Narrative:  EXAM:  XR CHEST PORT       INDICATION:  Chest Pain, SOB       COMPARISON:  July 21       FINDINGS: A portable AP radiograph of the chest was obtained at 0348 hours.  Left subclavian leads are stable. There is atherosclerosis of the aorta. The patient   is on a cardiac monitor. The lungs are clear. The cardiac and mediastinal   contours and pulmonary vascularity are normal.  The bones and soft tissues are   grossly within normal limits. Medical Decision Making   I am the first provider for this patient. I reviewed the vital signs, available nursing notes, past medical history, past surgical history, family history and social history. Vital Signs-Reviewed the patient's vital signs. Patient Vitals for the past 12 hrs:   Temp Pulse Resp BP SpO2   08/11/18 0304 97.8 °F (36.6 °C) 92 15 (!) 164/102 100 %       Pulse Oximetry Analysis - 100% on RA    EKG interpretation: (Preliminary) 03:04  Rhythm: normal sinus rhythm; and regular . Rate (approx.): 93; Axis: normal; IN interval: normal; QRS interval: normal ; ST/T wave: T wave inverted in anterior lateral leads; Other findings: abnormal ekg. Written by Delano Maldonado ED Scribe, as dictated by Karla Dozier MD.    Records Reviewed: Nursing Notes, Old Medical Records, Previous electrocardiograms and Previous Laboratory Studies    Provider Notes (Medical Decision Making):   Patient presents with nausea and vomiting. Differential includes gastritis/GERD, pancreatitis cholelithiasis, cholecystitis, hepatitis, renal pathology, ACS, gastroenteritis, infection such as UTI/PNA. Will obtain EKG, labs and possibly imaging. Will also evaluate for CHF  Written by Delano Maldonado ED Scribe, as dictated by Karla Dozier MD    ED Course:   Initial assessment performed. The patients presenting problems have been discussed, and they are in agreement with the care plan formulated and outlined with them. I have encouraged them to ask questions as they arise throughout their visit. Progress Notes:  4:56 AM  Informed pt that labs are all wnl. Labs showed pt is dehydrated, so advised pt to continue fluids PO.  Will d/c with nausea meds home. Written by Kadie Kramer ED Scribe, as dictated by Pao Velasco MD    5:04 AM  Re-assessed pt. She is sleeping comfortably. She has not had any vomiting while in ED. Her O2 sats are normal.  Written by SONY Castilloibe, as dictated by Pao Velasco MD    Critical Care Time:   0 minutes. Disposition:  Discharge Note:  4:56 AM  The patient has been re-evaluated and is ready for discharge. Reviewed available results with patient. Counseled patient/parent/guardian on diagnosis and care plan. Patient has expressed understanding, and all questions have been answered. Patient agrees with plan and agrees to follow up as recommended, or return to the ED if their symptoms worsen. Discharge instructions have been provided and explained to the patient, along with reasons to return to the ED. Written by SONY Castillo, as dictated by Pao Velasco MD    PLAN:  1. Current Discharge Medication List      CONTINUE these medications which have CHANGED    Details   ondansetron (ZOFRAN ODT) 4 mg disintegrating tablet Take 1 Tab by mouth every eight (8) hours as needed for Nausea. Qty: 12 Tab, Refills: 0           2. Follow-up Information     Follow up With Details Comments Lamberto Oneal [place]   [address] [address]  [phone]          Return to ED if worse     Diagnosis     Clinical Impression:   1. Nausea without vomiting    2. Dehydration        Attestations: This note is prepared by Kadie Kramer, acting as scribe for MD Pao Hamilton MD: The scribe's documentation has been prepared under my direction and personally reviewed by me in its entirety. I confirm that the note above accurately reflects all work, treatment, procedures, and medical decision making performed by me.

## 2018-08-12 LAB
BACTERIA SPEC CULT: NORMAL
CC UR VC: NORMAL
SERVICE CMNT-IMP: NORMAL

## 2018-08-18 ENCOUNTER — HOSPITAL ENCOUNTER (EMERGENCY)
Age: 73
Discharge: HOME | End: 2018-08-18
Attending: EMERGENCY MEDICINE
Payer: MEDICARE

## 2018-08-18 ENCOUNTER — APPOINTMENT (OUTPATIENT)
Dept: GENERAL RADIOLOGY | Age: 73
End: 2018-08-18
Attending: EMERGENCY MEDICINE
Payer: MEDICARE

## 2018-08-18 VITALS
DIASTOLIC BLOOD PRESSURE: 112 MMHG | BODY MASS INDEX: 20.83 KG/M2 | OXYGEN SATURATION: 100 % | SYSTOLIC BLOOD PRESSURE: 165 MMHG | WEIGHT: 133 LBS | RESPIRATION RATE: 20 BRPM | HEART RATE: 95 BPM | TEMPERATURE: 97.8 F

## 2018-08-18 DIAGNOSIS — R07.89 MIDSTERNAL CHEST PAIN: Primary | ICD-10-CM

## 2018-08-18 DIAGNOSIS — R06.02 SOB (SHORTNESS OF BREATH): ICD-10-CM

## 2018-08-18 LAB
ALBUMIN SERPL-MCNC: 3.2 G/DL (ref 3.5–5)
ALBUMIN/GLOB SERPL: 1 {RATIO} (ref 1.1–2.2)
ALP SERPL-CCNC: 91 U/L (ref 45–117)
ALT SERPL-CCNC: 7 U/L (ref 12–78)
ANION GAP SERPL CALC-SCNC: 13 MMOL/L (ref 5–15)
AST SERPL-CCNC: 9 U/L (ref 15–37)
BILIRUB SERPL-MCNC: 1.1 MG/DL (ref 0.2–1)
BNP SERPL-MCNC: ABNORMAL PG/ML (ref 0–125)
BUN SERPL-MCNC: 18 MG/DL (ref 6–20)
BUN/CREAT SERPL: 13 (ref 12–20)
CALCIUM SERPL-MCNC: 8.8 MG/DL (ref 8.5–10.1)
CHLORIDE SERPL-SCNC: 110 MMOL/L (ref 97–108)
CO2 SERPL-SCNC: 20 MMOL/L (ref 21–32)
CREAT SERPL-MCNC: 1.38 MG/DL (ref 0.55–1.02)
ERYTHROCYTE [DISTWIDTH] IN BLOOD BY AUTOMATED COUNT: 17.3 % (ref 11.5–14.5)
GLOBULIN SER CALC-MCNC: 3.3 G/DL (ref 2–4)
GLUCOSE SERPL-MCNC: 103 MG/DL (ref 65–100)
HCT VFR BLD AUTO: 32 % (ref 35–47)
HGB BLD-MCNC: 10.4 G/DL (ref 11.5–16)
LIPASE SERPL-CCNC: 43 U/L (ref 73–393)
MCH RBC QN AUTO: 24.1 PG (ref 26–34)
MCHC RBC AUTO-ENTMCNC: 32.5 G/DL (ref 30–36.5)
MCV RBC AUTO: 74.2 FL (ref 80–99)
NRBC # BLD: 0 K/UL (ref 0–0.01)
NRBC BLD-RTO: 0 PER 100 WBC
PLATELET # BLD AUTO: 217 K/UL (ref 150–400)
PMV BLD AUTO: 10.8 FL (ref 8.9–12.9)
POTASSIUM SERPL-SCNC: 3.9 MMOL/L (ref 3.5–5.1)
PROT SERPL-MCNC: 6.5 G/DL (ref 6.4–8.2)
RBC # BLD AUTO: 4.31 M/UL (ref 3.8–5.2)
SODIUM SERPL-SCNC: 143 MMOL/L (ref 136–145)
TROPONIN I SERPL-MCNC: 0.05 NG/ML
WBC # BLD AUTO: 7.5 K/UL (ref 3.6–11)

## 2018-08-18 PROCEDURE — 99284 EMERGENCY DEPT VISIT MOD MDM: CPT

## 2018-08-18 PROCEDURE — 85027 COMPLETE CBC AUTOMATED: CPT | Performed by: EMERGENCY MEDICINE

## 2018-08-18 PROCEDURE — 74011000250 HC RX REV CODE- 250: Performed by: EMERGENCY MEDICINE

## 2018-08-18 PROCEDURE — 36415 COLL VENOUS BLD VENIPUNCTURE: CPT | Performed by: EMERGENCY MEDICINE

## 2018-08-18 PROCEDURE — 84484 ASSAY OF TROPONIN QUANT: CPT | Performed by: EMERGENCY MEDICINE

## 2018-08-18 PROCEDURE — 74011250637 HC RX REV CODE- 250/637: Performed by: EMERGENCY MEDICINE

## 2018-08-18 PROCEDURE — 71045 X-RAY EXAM CHEST 1 VIEW: CPT

## 2018-08-18 PROCEDURE — 83690 ASSAY OF LIPASE: CPT | Performed by: EMERGENCY MEDICINE

## 2018-08-18 PROCEDURE — 83880 ASSAY OF NATRIURETIC PEPTIDE: CPT | Performed by: EMERGENCY MEDICINE

## 2018-08-18 PROCEDURE — 80053 COMPREHEN METABOLIC PANEL: CPT | Performed by: EMERGENCY MEDICINE

## 2018-08-18 RX ORDER — BUMETANIDE 0.5 MG/1
1 TABLET ORAL
Status: COMPLETED | OUTPATIENT
Start: 2018-08-18 | End: 2018-08-18

## 2018-08-18 RX ORDER — CLONIDINE HYDROCHLORIDE 0.1 MG/1
0.1 TABLET ORAL
Status: COMPLETED | OUTPATIENT
Start: 2018-08-18 | End: 2018-08-18

## 2018-08-18 RX ORDER — FAMOTIDINE 20 MG/1
20 TABLET, FILM COATED ORAL 2 TIMES DAILY
Qty: 20 TAB | Refills: 0 | Status: SHIPPED | OUTPATIENT
Start: 2018-08-18 | End: 2018-10-03 | Stop reason: CLARIF

## 2018-08-18 RX ORDER — BUMETANIDE 0.5 MG/1
0.5 TABLET ORAL DAILY
Qty: 30 TAB | Refills: 0 | Status: SHIPPED | OUTPATIENT
Start: 2018-08-18

## 2018-08-18 RX ORDER — ONDANSETRON 2 MG/ML
4 INJECTION INTRAMUSCULAR; INTRAVENOUS
Status: DISCONTINUED | OUTPATIENT
Start: 2018-08-18 | End: 2018-08-18

## 2018-08-18 RX ORDER — CLONIDINE HYDROCHLORIDE 0.1 MG/1
0.1 TABLET ORAL
Status: DISCONTINUED | OUTPATIENT
Start: 2018-08-18 | End: 2018-08-18 | Stop reason: HOSPADM

## 2018-08-18 RX ORDER — ONDANSETRON 4 MG/1
8 TABLET, ORALLY DISINTEGRATING ORAL
Status: COMPLETED | OUTPATIENT
Start: 2018-08-18 | End: 2018-08-18

## 2018-08-18 RX ADMIN — ONDANSETRON 8 MG: 4 TABLET, ORALLY DISINTEGRATING ORAL at 12:53

## 2018-08-18 RX ADMIN — BUMETANIDE 1 MG: 0.5 TABLET ORAL at 13:41

## 2018-08-18 RX ADMIN — LIDOCAINE HYDROCHLORIDE 40 ML: 20 SOLUTION ORAL; TOPICAL at 12:33

## 2018-08-18 RX ADMIN — CLONIDINE HYDROCHLORIDE 0.1 MG: 0.1 TABLET ORAL at 13:41

## 2018-08-18 NOTE — DISCHARGE INSTRUCTIONS

## 2018-08-18 NOTE — ED NOTES
Received pt to exam room for c/o ongoing acid reflux. Pt states was vomiting up \"yellow stuff\" this AM. Pt also reports a vibration in her chest and is worried it is coming from her pacemaker.

## 2018-08-18 NOTE — ED NOTES
Discharge instructions reviewed w/ pt and copy given by Dr. Johnny Osborn. Pt discharged to waiting room while waiting for ride home.

## 2018-08-18 NOTE — ED PROVIDER NOTES
EMERGENCY DEPARTMENT HISTORY AND PHYSICAL EXAM      Date: 8/18/2018  Patient Name: Phyllis Rodriguez    History of Presenting Illness     Chief Complaint   Patient presents with    Shortness of Breath    Other     Acid reflux    Vomiting       History Provided By: Patient    HPI: Phyllis Rodriguez, 68 y.o. female with PMHx significant for CAD, HTN, CHF and GERD, presents via EMS to the ED with cc of sudden onset, constant nausea and vomiting since this morning with associated SOB worsened from baseline, mild instensity mid-sternal CP and a non-productive cough. Emesis is described as yellow. Pt notes that onset of her symptoms has caused her to be unable to tolerate PO. Persistence of her symptoms prompted the trip to the ED for evaluation. Of note, she mentioned she has been experiencing \"odd vibrations\" from her [employer] pacemaker. Pt denies a history of COPD or asthma. Pt specifically denies abd pain, CP, a fever, diarrhea or constipation. There are no other complaints, changes, or physical findings at this time. PCP: Emilie Chang NP    Current Outpatient Prescriptions   Medication Sig Dispense Refill    bumetanide (BUMEX) 0.5 mg tablet Take 1 Tab by mouth daily. 30 Tab 0    famotidine (PEPCID) 20 mg tablet Take 1 Tab by mouth two (2) times a day. 20 Tab 0    ondansetron (ZOFRAN ODT) 4 mg disintegrating tablet Take 1 Tab by mouth every eight (8) hours as needed for Nausea. 12 Tab 0    aspirin 81 mg chewable tablet Take 1 Tab by mouth daily. 30 Tab 0    atorvastatin (LIPITOR) 20 mg tablet Take 1 Tab by mouth nightly. 30 Tab 0    carvedilol (COREG) 6.25 mg tablet Take 1 Tab by mouth two (2) times daily (with meals). 60 Tab 0    isosorbide mononitrate ER (IMDUR) 30 mg tablet Take 1 Tab by mouth daily. 30 Tab 0    valsartan (DIOVAN) 80 mg tablet Take 1 Tab by mouth daily. 30 Tab 0    acetaminophen (TYLENOL) 325 mg tablet Take 2 Tabs by mouth every four (4) hours as needed.       polyethylene glycol (MIRALAX) 17 gram packet Take 1 Packet by mouth daily. 30 Packet 0    gabapentin (NEURONTIN) 300 mg capsule Take 300 mg by mouth three (3) times daily.  omeprazole (PRILOSEC) 20 mg capsule Take 20 mg by mouth daily. Indications: GASTROESOPHAGEAL REFLUX         Past History     Past Medical History:  Past Medical History:   Diagnosis Date    Arthritis     Autoimmune disease ([address]     rheumatoid     CAD (coronary artery disease)     CHF (congestive heart failure) (HCC)     Chronic pain     neuropathy bilateral feet,knees    Diabetes ([address]     GERD (gastroesophageal reflux disease)     Hypertension     Ill-defined condition     anemia    Ill-defined condition     blood transfusion hx    MI, old     Other ill-defined conditions(799.89)     high cholesterol    Renal cell carcinoma of right kidney ([facility] [address]     s/p resection 12/16       Past Surgical History:  Past Surgical History:   Procedure Laterality Date    CARDIAC SURG PROCEDURE UNLIST      three stents placed 2005    CARDIAC SURG PROCEDURE UNLIST      HX CHOLECYSTECTOMY  02/28/2017    lap tana with IOC.  HX PACEMAKER  2017/January    ICD    HX TONSILLECTOMY      HX UROLOGICAL  12/22/2016    RIGHT LAPOROSCOPIC HAND ASSISTED RADICAL NEPHRECTOMY       Family History:  Family History   Problem Relation Age of Onset   [employer] Mother      stomach    Other Father      aneurysym   [employer] Brother      lung    Other Brother      stomach problems    Stroke Brother        Social History:  Social History   Substance Use Topics    Smoking status: Never Smoker    Smokeless tobacco: Never Used    Alcohol use No       Allergies: Allergies   Allergen Reactions    Percocet [Oxycodone-Acetaminophen] Other (comments)     Confused         Review of Systems   Review of Systems   Constitutional: Negative for chills and fever. Respiratory: Positive for cough and shortness of breath. Cardiovascular: Positive for chest pain.    Gastrointestinal: Positive for nausea and vomiting. Negative for abdominal pain, constipation and diarrhea. Neurological: Negative for weakness and numbness. All other systems reviewed and are negative. Physical Exam   Physical Exam   Constitutional: She is oriented to person, place, and time. She appears well-developed and well-nourished. HENT:   Head: Normocephalic and atraumatic. Eyes: Conjunctivae and EOM are normal.   Neck: Normal range of motion. Neck supple. Cardiovascular: Normal rate and regular rhythm. Pulmonary/Chest: Effort normal and breath sounds normal. No respiratory distress. Lungs clear   Abdominal: Soft. She exhibits no distension. There is no tenderness. Musculoskeletal: Normal range of motion. Neurological: She is alert and oriented to person, place, and time. Skin: Skin is warm and dry. Psychiatric: She has a normal mood and affect. Flat affect  Judging affect   Nursing note and vitals reviewed.       Diagnostic Study Results     Labs -     Recent Results (from the past 12 hour(s))   CBC W/O DIFF    Collection Time: 08/18/18 12:27 PM   Result Value Ref Range    WBC 7.5 3.6 - 11.0 K/uL    RBC 4.31 3.80 - 5.20 M/uL    HGB 10.4 (L) 11.5 - 16.0 g/dL    HCT 32.0 (L) 35.0 - 47.0 %    MCV 74.2 (L) 80.0 - 99.0 FL    MCH 24.1 (L) 26.0 - 34.0 PG    MCHC 32.5 30.0 - 36.5 g/dL    RDW 17.3 (H) 11.5 - 14.5 %    PLATELET 376 937 - 563 K/uL    MPV 10.8 8.9 - 12.9 FL    NRBC 0.0 0  WBC    ABSOLUTE NRBC 0.00 0.00 - 7.12 K/uL   METABOLIC PANEL, COMPREHENSIVE    Collection Time: 08/18/18 12:27 PM   Result Value Ref Range    Sodium 143 136 - 145 mmol/L    Potassium 3.9 3.5 - 5.1 mmol/L    Chloride 110 (H) 97 - 108 mmol/L    CO2 20 (L) 21 - 32 mmol/L    Anion gap 13 5 - 15 mmol/L    Glucose 103 (H) 65 - 100 mg/dL    BUN 18 6 - 20 MG/DL    Creatinine 1.38 (H) 0.55 - 1.02 MG/DL    BUN/Creatinine ratio 13 12 - 20      GFR est AA 46 (L) >60 ml/min/1.73m2    GFR est non-AA 38 (L) >60 ml/min/1.73m2 Calcium 8.8 8.5 - 10.1 MG/DL    Bilirubin, total 1.1 (H) 0.2 - 1.0 MG/DL    ALT (SGPT) 7 (L) 12 - 78 U/L    AST (SGOT) 9 (L) 15 - 37 U/L    Alk. phosphatase 91 45 - 117 U/L    Protein, total 6.5 6.4 - 8.2 g/dL    Albumin 3.2 (L) 3.5 - 5.0 g/dL    Globulin 3.3 2.0 - 4.0 g/dL    A-G Ratio 1.0 (L) 1.1 - 2.2     LIPASE    Collection Time: 08/18/18 12:27 PM   Result Value Ref Range    Lipase 43 (L) 73 - 393 U/L   TROPONIN I    Collection Time: 08/18/18 12:27 PM   Result Value Ref Range    Troponin-I, Qt. 0.05 (H) <0.05 ng/mL   NT-PRO BNP    Collection Time: 08/18/18 12:27 PM   Result Value Ref Range    NT pro-BNP >00435 (H) 0 - 125 PG/ML       Radiologic Studies -   CXR Results  (Last 48 hours)               08/18/18 1212  XR CHEST PORT Final result    Impression:  IMPRESSION: No acute findings or interval change. Narrative:  EXAM:  XR CHEST PORT       INDICATION:  sob       COMPARISON:  5009       FINDINGS: A portable AP radiograph of the chest was obtained at 1208 hours. Left   axillary AICD device appears unchanged. Moderate cardiac contour enlargement is   again shown which is stable. There is unchanged mild thoracic aortic tortuosity. There is no hilar enlargement. The lungs and pleural margins are clear. The   bones and soft tissues are grossly within normal limits. Medical Decision Making   I am the first provider for this patient. I reviewed the vital signs, available nursing notes, past medical history, past surgical history, family history and social history. Vital Signs-Reviewed the patient's vital signs.   Patient Vitals for the past 12 hrs:   Temp Pulse Resp BP SpO2   08/18/18 1155 - 95 20 (!) 165/112 100 %   08/18/18 1147 97.8 °F (36.6 °C) 97 24 (!) 173/123 98 %       Pulse Oximetry Analysis - 98% on RA    Cardiac Monitor:   Rate: 97 bpm  Rhythm: Normal Sinus Rhythm   Records Reviewed: Nursing Notes and Old Medical Records    Provider Notes (Medical Decision Making):   Patient presents with nausea and vomiting. Differential includes gastritis/GERD, pancreatitis cholelithiasis, cholecystitis, hepatitis, renal pathology, ACS, gastroenteritis, infection such as UTI/PNA. Will obtain EKG, labs and possibly imaging. For her SOB will get a CXR to r/o PNA or CHF. Will also check pacemaker. ED Course:   Initial assessment performed. The patients presenting problems have been discussed, and they are in agreement with the care plan formulated and outlined with them. I have encouraged them to ask questions as they arise throughout their visit. 12:41 PM   [employer] said she has had no arrhythmias on the ICD. Her pacemaker hasnt been needed recently. 1:20 PM  Labs are all at patients baseline including her CKD, troponin of 0.05, and elevated pro-BNP. As per physical exam, patients lungs are clear and I have low suspicion this is heart failure. I have a feeling her mid-sternal CP and nausea is more likely esophagitis. Ive review patients stress test from June 2017 which was normal. Advised patient to follow up with cardiology if her pain does not improve with Pepcid. There might also be a component of anxiety. 1:26 PM   Discussed pts CXR and lab work. When mentioning fluid pill, patient does not believe she is on one. Will re-prescribe Bumex and counseled on the importance of follow up with Dr. Lokesh Jimenez. Critical Care Time:   0    Disposition:  DISCHARGE NOTE:  1:27 PM  The patient is ready for discharge. The patients signs, symptoms, diagnosis, and instructions for discharge have been discussed and the pt has conveyed their understanding. The patient is to follow up as recommended or return to the ER should their symptoms worsen. Plan has been discussed and patient has conveyed their agreement. PLAN:  1.  Discharge to home  Current Discharge Medication List      START taking these medications    Details   famotidine (PEPCID) 20 mg tablet Take 1 Tab by mouth two (2) times a day. Qty: 20 Tab, Refills: 0         CONTINUE these medications which have CHANGED    Details   bumetanide (BUMEX) 0.5 mg tablet Take 1 Tab by mouth daily. Qty: 30 Tab, Refills: 0           2. Follow-up Information     Follow up With Details Comments [address], [place] Schedule an appointment as soon as possible for a visit  [address]  [address]  [phone]          Return to ED if worse     Diagnosis     Clinical Impression:   1. Midsternal chest pain    2. SOB (shortness of breath)        Attestations: This note is prepared by Tammy Bender, acting as Scribe for Hannah Hyatt MD.    Hannah Hyatt MD: The scribe's documentation has been prepared under my direction and personally reviewed by me in its entirety. I confirm that the note above accurately reflects all work, treatment, procedures, and medical decision making performed by me.

## 2018-09-13 ENCOUNTER — HOSPITAL ENCOUNTER (OUTPATIENT)
Dept: CT IMAGING | Age: 73
Discharge: HOME | End: 2018-09-13
Attending: NURSE PRACTITIONER
Payer: MEDICARE

## 2018-09-13 DIAGNOSIS — R63.0 LOSS OF APPETITE: ICD-10-CM

## 2018-09-13 DIAGNOSIS — K21.9 ESOPHAGEAL REFLUX: ICD-10-CM

## 2018-09-13 PROCEDURE — 71250 CT THORAX DX C-: CPT

## 2018-09-14 ENCOUNTER — HOME HEALTH ADMISSION (OUTPATIENT)
Dept: HOME HEALTH SERVICES | Facility: HOME HEALTH | Age: 73
End: 2018-09-14
Payer: MEDICARE

## 2018-09-15 ENCOUNTER — HOME CARE VISIT (OUTPATIENT)
Dept: SCHEDULING | Facility: HOME HEALTH | Age: 73
End: 2018-09-15
Payer: MEDICARE

## 2018-09-15 VITALS
HEIGHT: 67 IN | WEIGHT: 127 LBS | BODY MASS INDEX: 19.93 KG/M2 | TEMPERATURE: 98.1 F | RESPIRATION RATE: 18 BRPM | HEART RATE: 84 BPM | OXYGEN SATURATION: 99 % | SYSTOLIC BLOOD PRESSURE: 104 MMHG | DIASTOLIC BLOOD PRESSURE: 60 MMHG

## 2018-09-15 PROCEDURE — 3331090002 HH PPS REVENUE DEBIT

## 2018-09-15 PROCEDURE — 400013 HH SOC

## 2018-09-15 PROCEDURE — G0299 HHS/HOSPICE OF RN EA 15 MIN: HCPCS

## 2018-09-15 PROCEDURE — 3331090001 HH PPS REVENUE CREDIT

## 2018-09-16 PROCEDURE — 3331090002 HH PPS REVENUE DEBIT

## 2018-09-16 PROCEDURE — 3331090001 HH PPS REVENUE CREDIT

## 2018-09-17 PROCEDURE — 3331090002 HH PPS REVENUE DEBIT

## 2018-09-17 PROCEDURE — 3331090001 HH PPS REVENUE CREDIT

## 2018-09-18 ENCOUNTER — HOME CARE VISIT (OUTPATIENT)
Dept: HOME HEALTH SERVICES | Facility: HOME HEALTH | Age: 73
End: 2018-09-18
Payer: MEDICARE

## 2018-09-18 PROCEDURE — 3331090001 HH PPS REVENUE CREDIT

## 2018-09-18 PROCEDURE — 3331090002 HH PPS REVENUE DEBIT

## 2018-09-19 PROCEDURE — 3331090002 HH PPS REVENUE DEBIT

## 2018-09-19 PROCEDURE — 3331090001 HH PPS REVENUE CREDIT

## 2018-09-20 PROCEDURE — 3331090001 HH PPS REVENUE CREDIT

## 2018-09-20 PROCEDURE — 3331090002 HH PPS REVENUE DEBIT

## 2018-09-21 PROCEDURE — 3331090001 HH PPS REVENUE CREDIT

## 2018-09-21 PROCEDURE — 3331090002 HH PPS REVENUE DEBIT

## 2018-09-22 PROCEDURE — 3331090002 HH PPS REVENUE DEBIT

## 2018-09-22 PROCEDURE — 3331090001 HH PPS REVENUE CREDIT

## 2018-09-23 PROCEDURE — 3331090001 HH PPS REVENUE CREDIT

## 2018-09-23 PROCEDURE — 3331090002 HH PPS REVENUE DEBIT

## 2018-09-24 PROCEDURE — 3331090002 HH PPS REVENUE DEBIT

## 2018-09-24 PROCEDURE — 3331090001 HH PPS REVENUE CREDIT

## 2018-09-25 ENCOUNTER — HOME CARE VISIT (OUTPATIENT)
Dept: HOME HEALTH SERVICES | Facility: HOME HEALTH | Age: 73
End: 2018-09-25
Payer: MEDICARE

## 2018-09-25 PROCEDURE — 3331090002 HH PPS REVENUE DEBIT

## 2018-09-25 PROCEDURE — 3331090001 HH PPS REVENUE CREDIT

## 2018-09-26 PROCEDURE — 3331090002 HH PPS REVENUE DEBIT

## 2018-09-26 PROCEDURE — 3331090001 HH PPS REVENUE CREDIT

## 2018-09-27 ENCOUNTER — HOME CARE VISIT (OUTPATIENT)
Dept: SCHEDULING | Facility: HOME HEALTH | Age: 73
End: 2018-09-27
Payer: MEDICARE

## 2018-09-27 VITALS
BODY MASS INDEX: 18.79 KG/M2 | SYSTOLIC BLOOD PRESSURE: 100 MMHG | WEIGHT: 120 LBS | RESPIRATION RATE: 18 BRPM | HEART RATE: 73 BPM | TEMPERATURE: 97.6 F | DIASTOLIC BLOOD PRESSURE: 60 MMHG

## 2018-09-27 PROCEDURE — 3331090001 HH PPS REVENUE CREDIT

## 2018-09-27 PROCEDURE — 3331090002 HH PPS REVENUE DEBIT

## 2018-09-27 PROCEDURE — G0299 HHS/HOSPICE OF RN EA 15 MIN: HCPCS

## 2018-09-28 PROCEDURE — 3331090001 HH PPS REVENUE CREDIT

## 2018-09-28 PROCEDURE — 3331090002 HH PPS REVENUE DEBIT

## 2018-09-29 PROCEDURE — 3331090002 HH PPS REVENUE DEBIT

## 2018-09-29 PROCEDURE — 3331090001 HH PPS REVENUE CREDIT

## 2018-09-30 PROCEDURE — 3331090001 HH PPS REVENUE CREDIT

## 2018-09-30 PROCEDURE — 3331090002 HH PPS REVENUE DEBIT

## 2018-10-01 ENCOUNTER — HOME CARE VISIT (OUTPATIENT)
Dept: SCHEDULING | Facility: HOME HEALTH | Age: 73
End: 2018-10-01
Payer: MEDICARE

## 2018-10-01 PROCEDURE — 3331090001 HH PPS REVENUE CREDIT

## 2018-10-01 PROCEDURE — 3331090002 HH PPS REVENUE DEBIT

## 2018-10-02 ENCOUNTER — HOME CARE VISIT (OUTPATIENT)
Dept: SCHEDULING | Facility: HOME HEALTH | Age: 73
End: 2018-10-02
Payer: MEDICARE

## 2018-10-02 VITALS
HEART RATE: 70 BPM | TEMPERATURE: 98.4 F | SYSTOLIC BLOOD PRESSURE: 118 MMHG | DIASTOLIC BLOOD PRESSURE: 80 MMHG | RESPIRATION RATE: 18 BRPM | OXYGEN SATURATION: 96 %

## 2018-10-02 PROCEDURE — G0151 HHCP-SERV OF PT,EA 15 MIN: HCPCS

## 2018-10-02 PROCEDURE — 3331090001 HH PPS REVENUE CREDIT

## 2018-10-02 PROCEDURE — 3331090002 HH PPS REVENUE DEBIT

## 2018-10-03 ENCOUNTER — HOME CARE VISIT (OUTPATIENT)
Dept: SCHEDULING | Facility: HOME HEALTH | Age: 73
End: 2018-10-03
Payer: MEDICARE

## 2018-10-03 PROCEDURE — G0299 HHS/HOSPICE OF RN EA 15 MIN: HCPCS

## 2018-10-03 PROCEDURE — 3331090002 HH PPS REVENUE DEBIT

## 2018-10-03 PROCEDURE — 3331090001 HH PPS REVENUE CREDIT

## 2018-10-04 ENCOUNTER — HOSPITAL ENCOUNTER (OUTPATIENT)
Age: 73
Setting detail: OUTPATIENT SURGERY
Discharge: HOME | End: 2018-10-04
Attending: INTERNAL MEDICINE | Admitting: INTERNAL MEDICINE
Payer: MEDICARE

## 2018-10-04 ENCOUNTER — ANESTHESIA EVENT (OUTPATIENT)
Dept: ENDOSCOPY | Age: 73
End: 2018-10-04
Payer: MEDICARE

## 2018-10-04 ENCOUNTER — ANESTHESIA (OUTPATIENT)
Dept: ENDOSCOPY | Age: 73
End: 2018-10-04
Payer: MEDICARE

## 2018-10-04 VITALS
WEIGHT: 121.19 LBS | RESPIRATION RATE: 18 BRPM | DIASTOLIC BLOOD PRESSURE: 63 MMHG | TEMPERATURE: 97.3 F | HEIGHT: 67 IN | BODY MASS INDEX: 19.02 KG/M2 | OXYGEN SATURATION: 100 % | HEART RATE: 70 BPM | SYSTOLIC BLOOD PRESSURE: 119 MMHG

## 2018-10-04 PROCEDURE — 77030019988 HC FCPS ENDOSC DISP BSC -B: Performed by: INTERNAL MEDICINE

## 2018-10-04 PROCEDURE — 3331090002 HH PPS REVENUE DEBIT

## 2018-10-04 PROCEDURE — 3331090001 HH PPS REVENUE CREDIT

## 2018-10-04 PROCEDURE — 88342 IMHCHEM/IMCYTCHM 1ST ANTB: CPT | Performed by: INTERNAL MEDICINE

## 2018-10-04 PROCEDURE — 76040000019: Performed by: INTERNAL MEDICINE

## 2018-10-04 PROCEDURE — 74011250636 HC RX REV CODE- 250/636: Performed by: INTERNAL MEDICINE

## 2018-10-04 PROCEDURE — 76060000031 HC ANESTHESIA FIRST 0.5 HR: Performed by: INTERNAL MEDICINE

## 2018-10-04 PROCEDURE — 88305 TISSUE EXAM BY PATHOLOGIST: CPT | Performed by: INTERNAL MEDICINE

## 2018-10-04 PROCEDURE — 74011250636 HC RX REV CODE- 250/636

## 2018-10-04 RX ORDER — SODIUM CHLORIDE 9 MG/ML
75 INJECTION, SOLUTION INTRAVENOUS CONTINUOUS
Status: DISCONTINUED | OUTPATIENT
Start: 2018-10-04 | End: 2018-10-04 | Stop reason: HOSPADM

## 2018-10-04 RX ORDER — SODIUM CHLORIDE 0.9 % (FLUSH) 0.9 %
5-10 SYRINGE (ML) INJECTION EVERY 8 HOURS
Status: DISCONTINUED | OUTPATIENT
Start: 2018-10-04 | End: 2018-10-04 | Stop reason: HOSPADM

## 2018-10-04 RX ORDER — LIDOCAINE HYDROCHLORIDE 20 MG/ML
INJECTION, SOLUTION EPIDURAL; INFILTRATION; INTRACAUDAL; PERINEURAL AS NEEDED
Status: DISCONTINUED | OUTPATIENT
Start: 2018-10-04 | End: 2018-10-04 | Stop reason: HOSPADM

## 2018-10-04 RX ORDER — EPINEPHRINE 0.1 MG/ML
1 INJECTION INTRACARDIAC; INTRAVENOUS
Status: DISCONTINUED | OUTPATIENT
Start: 2018-10-04 | End: 2018-10-04 | Stop reason: HOSPADM

## 2018-10-04 RX ORDER — ATROPINE SULFATE 0.1 MG/ML
0.5 INJECTION INTRAVENOUS
Status: DISCONTINUED | OUTPATIENT
Start: 2018-10-04 | End: 2018-10-04 | Stop reason: HOSPADM

## 2018-10-04 RX ORDER — DEXTROMETHORPHAN/PSEUDOEPHED 2.5-7.5/.8
1.2 DROPS ORAL
Status: DISCONTINUED | OUTPATIENT
Start: 2018-10-04 | End: 2018-10-04 | Stop reason: HOSPADM

## 2018-10-04 RX ORDER — FLUMAZENIL 0.1 MG/ML
0.2 INJECTION INTRAVENOUS
Status: DISCONTINUED | OUTPATIENT
Start: 2018-10-04 | End: 2018-10-04 | Stop reason: HOSPADM

## 2018-10-04 RX ORDER — NALOXONE HYDROCHLORIDE 0.4 MG/ML
0.4 INJECTION, SOLUTION INTRAMUSCULAR; INTRAVENOUS; SUBCUTANEOUS
Status: DISCONTINUED | OUTPATIENT
Start: 2018-10-04 | End: 2018-10-04 | Stop reason: HOSPADM

## 2018-10-04 RX ORDER — SODIUM CHLORIDE 0.9 % (FLUSH) 0.9 %
5-10 SYRINGE (ML) INJECTION AS NEEDED
Status: DISCONTINUED | OUTPATIENT
Start: 2018-10-04 | End: 2018-10-04 | Stop reason: HOSPADM

## 2018-10-04 RX ORDER — PROPOFOL 10 MG/ML
INJECTION, EMULSION INTRAVENOUS AS NEEDED
Status: DISCONTINUED | OUTPATIENT
Start: 2018-10-04 | End: 2018-10-04 | Stop reason: HOSPADM

## 2018-10-04 RX ADMIN — PROPOFOL 50 MG: 10 INJECTION, EMULSION INTRAVENOUS at 09:29

## 2018-10-04 RX ADMIN — SODIUM CHLORIDE 75 ML/HR: 900 INJECTION, SOLUTION INTRAVENOUS at 08:48

## 2018-10-04 RX ADMIN — LIDOCAINE HYDROCHLORIDE 20 MG: 20 INJECTION, SOLUTION EPIDURAL; INFILTRATION; INTRACAUDAL; PERINEURAL at 09:26

## 2018-10-04 RX ADMIN — PROPOFOL 10 MG: 10 INJECTION, EMULSION INTRAVENOUS at 09:33

## 2018-10-04 RX ADMIN — PROPOFOL 50 MG: 10 INJECTION, EMULSION INTRAVENOUS at 09:27

## 2018-10-04 NOTE — ANESTHESIA POSTPROCEDURE EVALUATION
Post-Anesthesia Evaluation and Assessment Patient: Saira Roth MRN: [id number]  SSN: [id number] YOB: 1945  Age: 68 y.o. Sex: female Cardiovascular Function/Vital Signs Visit Vitals  /63  Pulse 70  Temp 36.3 °C (97.3 °F)  Resp 18  Ht 5' 7\" (1.702 m)  Wt 55 kg (121 lb 3 oz)  SpO2 100%  Breastfeeding No  
 BMI 18.98 kg/m2 Patient is status post general, total IV anesthesia anesthesia for Procedure(s): ESOPHAGOGASTRODUODENOSCOPY (EGD) ESOPHAGEAL DILATION 
ESOPHAGOGASTRODUODENAL (EGD) BIOPSY. Nausea/Vomiting: None Postoperative hydration reviewed and adequate. Pain: 
Pain Scale 1: Numeric (0 - 10) (10/04/18 1005) Pain Intensity 1: 0 (10/04/18 1005) Managed Neurological Status: At baseline Mental Status and Level of Consciousness: Arousable Pulmonary Status:  
O2 Device: Room air (10/04/18 1005) Adequate oxygenation and airway patent Complications related to anesthesia: None Post-anesthesia assessment completed. No concerns Signed By: Eugenie Stephenson MD   
 October 4, 2018

## 2018-10-04 NOTE — IP AVS SNAPSHOT
[address] [facility] 
[phone] Patient: Sheron Grant MRN: [id number]:[id number] About your hospitalization You were admitted on:  October 4, 2018 You last received care in the:  [facility] You were discharged on:  October 4, 2018 Why you were hospitalized Your primary diagnosis was:  Not on File Follow-up Information Follow up With Details Comments Contact Info Gail Real NP   [address] [facility] 
[phone] Your Scheduled Appointments Friday October 05, 2018 To Be Determined ROUTINE with Kaaren All [address] ([address]) [address] ([address]) Friday October 05, 2018 12:30 PM EDT  
PT ROUTINE with Caity Flight [address] ([address]) [address] ([address]) Monday October 08, 2018 To Be Determined ROUTINE with Kaaren All [address] ([address]) [address] ([address]) Tuesday October 09, 2018 To Be Determined PT ROUTINE with Davin Thompson, PT  
[address] [address] ([address]) [address] ([address]) Wednesday October 10, 2018 To Be Determined ROUTINE with Kaaren All [address] ([address]) [address] ([address]) Thursday October 11, 2018 To Be Determined PT ROUTINE with Davin Thompson, PT  
[place] [address] ([address]) [address] ([address]) Monday October 15, 2018 To Be Determined ROUTINE with Bonnee Manolo [address] ([address]) [address] ([address]) Tuesday October 16, 2018 To Be Determined PT ROUTINE with Coby Talbot, PT  
[place] [address] ([address]) [address] ([address]) Wednesday October 17, 2018 To Be Determined ROUTINE with Bonnee Manolo [address] ([address]) [address] ([address]) Thursday October 18, 2018 To Be Determined PT DISCIPLINE DISCHARGE with Coby Talbot, [address] [facility] [address] ([address]) [address] ([address]) Monday October 22, 2018 To Be Determined ROUTINE with Bonnee Manolo [address] ([address]) [address] ([address]) Wednesday October 24, 2018 To Be Determined ROUTINE with Bonnee Manolo [address] ([address]) [address] ([address]) Monday October 29, 2018 To Be Determined ROUTINE with Bonnee Manolo [address] ([address]) [address] ([address]) Wednesday October 31, 2018 To Be Determined ROUTINE with Bonnee Manolo [address] ([address]) [address] ([address]) Monday November 05, 2018 To Be Determined ROUTINE with Jadyn Alley [address] ([address]) [address] ([address]) Wednesday November 07, 2018 To Be Determined ROUTINE with Jadyn Alley [address] ([address]) [address] ([address]) Monday November 12, 2018 To Be Determined RECERTIFICATION with Jadyn Alley [address] ([address]) [address] ([address]) Discharge Orders None A check scottie indicates which time of day the medication should be taken. My Medications CONTINUE taking these medications Instructions Each Dose to Equal  
 Morning Noon Evening Bedtime  
 aspirin 81 mg chewable tablet Your last dose was: Your next dose is: Take 1 Tab by mouth daily. 81 mg  
    
   
   
   
  
 atorvastatin 20 mg tablet Commonly known as:  LIPITOR Your last dose was: Your next dose is: Take 1 Tab by mouth nightly. 20 mg  
    
   
   
   
  
 bumetanide 0.5 mg tablet Commonly known as:  Kwadwo Cummingsen Your last dose was: Your next dose is: Take 1 Tab by mouth daily. 0.5 mg  
    
   
   
   
  
 carvedilol 6.25 mg tablet Commonly known as:  Shanna Asif Your last dose was: Your next dose is: Take 1 Tab by mouth two (2) times daily (with meals). 6.25 mg  
    
   
   
   
  
 irbesartan 75 mg tablet Commonly known as:  AVAPRO Your last dose was: Your next dose is: Take 75 mg by mouth daily. 75 mg  
    
   
   
   
  
 isosorbide mononitrate ER 30 mg tablet Commonly known as:  IMDUR Your last dose was: Your next dose is: Take 1 Tab by mouth daily. 30 mg  
    
   
   
   
  
 ondansetron 4 mg disintegrating tablet Commonly known as:  ZOFRAN ODT Your last dose was: Your next dose is: Take 1 Tab by mouth every eight (8) hours as needed for Nausea. 4 mg  
    
   
   
   
  
 pantoprazole 40 mg tablet Commonly known as:  PROTONIX Your last dose was: Your next dose is: Take 40 mg by mouth daily. 40 mg  
    
   
   
   
  
 polyethylene glycol 17 gram packet Commonly known as:  Syd Lota Your last dose was: Your next dose is: Take 1 Packet by mouth daily. 17 g  
    
   
   
   
  
 valsartan 80 mg tablet Commonly known as:  DIOVAN Your last dose was: Your next dose is: Take 1 Tab by mouth daily. 80 mg Discharge Instructions Stefania Liz [id number] 
1945 EGD DISCHARGE INSTRUCTIONS Discomfort: 
Sore throat- throat lozenges or warm salt water gargle 
redness at IV site- apply warm compress to area; if redness or soreness persist- contact your physician Gaseous discomfort- walking, belching will help relieve any discomfort You may not operate a vehicle for 12 hours You may not engage in an occupation involving machinery or appliances for rest of today You may not drink alcoholic beverages for at least 12 hours Avoid making any critical decisions for at least 24 hour DIET You may resume your regular diet  however -  remember your colon is empty and a heavy meal will produce gas. Avoid these foods:  vegetables, fried / greasy foods, carbonated drinks MEDICATIONS: 
Per Medication Reconciliation ACTIVITY You may resume your normal daily activities until tomorrow AM; 
Spend the remainder of the day resting -  avoid any strenuous activity. CALL M.D. ANY SIGN OF Increasing pain, nausea, vomiting Abdominal distension (swelling) New increased bleeding (oral or rectal) Fever (chills) Pain in chest area Bloody discharge from nose or mouth Shortness of breath You may take any Advil, Aspirin, Ibuprofen, Motrin, Aleve, or Goodys for 10 days, ONLY  Tylenol as needed for pain. IMPRESSION: 
Impression: 1. Presbyesophagus 2. Mild Schatzki's ring. Dilated with a 51 Fr Savary dilator 3. Mild, patchy, non-erosive gastropathy in body and antrum. Biopsied 4. Stomach otherwise normal, including retroflexion 5. Normal duodenal bulb and 2nd portion of the duodenum Recommendations: 
1. Follow up pathology 2. Follow up in GI clinic in 3-4 weeks 3. Eat small bites, chew thoroughly, sit upright for 30-60 minutes after eating Follow-up Instructions: 
 Call Dr. Amalia Hewitt for the results of procedure / biopsy in 7-10 days Telephone #[phone] Moise Arriaza MD 
 
  
 
  
  
  
Introducing [facility]! [facility] introduces [employer] patient portal. Now you can access parts of your medical record, email your doctor's office, and request medication refills online. 1. In your internet browser, go to [email or web address] 2. Click on the First Time User? Click Here link in the Sign In box. You will see the New Member Sign Up page. 3. Enter your [employer] Access Code exactly as it appears below. You will not need to use this code after youve completed the sign-up process. If you do not sign up before the expiration date, you must request a new code. · [employer] Access Code: [email or web address] Expires: 12/29/2018  7:01 PM 
 
4. Enter the last four digits of your Social Security Number (xxxx) and Date of Birth (mm/dd/yyyy) as indicated and click Submit. You will be taken to the next sign-up page. 5. Create a Cubitot ID. This will be your Cubitot login ID and cannot be changed, so think of one that is secure and easy to remember. 6. Create a [employer] password. You can change your password at any time. 7. Enter your Password Reset Question and Answer. This can be used at a later time if you forget your password. 8. Enter your e-mail address. You will receive e-mail notification when new information is available in [address] 9. Click Sign Up. You can now view and download portions of your medical record. 10. Click the Download Summary menu link to download a portable copy of your medical information. If you have questions, please visit the Frequently Asked Questions section of the [employer] website. Remember, [employer] is NOT to be used for urgent needs. For medical emergencies, dial 911. Now available from your iPhone and Android! Introducing Manolo Payne As a [facility] patient, I wanted to make you aware of our electronic visit tool called Manolo Payne. [employer] allows you to connect within minutes with a medical provider 24 hours a day, seven days a week via a mobile device or tablet or logging into a secure website from your computer. You can access Manolo Payne from anywhere in the [place]. A virtual visit might be right for you when you have a simple condition and feel like you just dont want to get out of bed, or cant get away from work for an appointment, when your regular [facility] provider is not available (evenings, weekends or holidays), or when youre out of town and need minor care. Electronic visits cost only $49 and if the [employer] provider determines a prescription is needed to treat your condition, one can be electronically transmitted to a nearby pharmacy*. Please take a moment to enroll today if you have not already done so. The enrollment process is free and takes just a few minutes. To enroll, please download the Fleecs kailyn to your tablet or phone, or visit [email or web address] to enroll on your computer. And, as an [address] [place] [address] patient with a Notch account, the results of your visits will be scanned into your electronic medical record and your primary care provider will be able to view the scanned results. We urge you to continue to see your regular [facility] provider for your ongoing medical care. And while your primary care provider may not be the one available when you seek a Manolo Oroscofin virtual visit, the peace of mind you get from getting a real diagnosis real time can be priceless. For more information on Manolo Oroscofin, view our Frequently Asked Questions (FAQs) at [email or web address]. Sincerely, 
 
Evan Hurley MD 
Chief Medical Officer [address] *:  certain medications cannot be prescribed via Manolo Kwesifin Providers Seen During Your Hospitalization Provider Specialty Primary office phone Norma Mathis MD Gastroenterology [phone] Your Primary Care Physician (PCP) Primary Care Physician Office Phone Office Fax Purdum Holland [phone] You are allergic to the following Allergen Reactions Percocet (Oxycodone-Acetaminophen) Other (comments) Confused Recent Documentation Height Weight Breastfeeding? BMI OB Status Smoking Status 1.702 m 55 kg No 18.98 kg/m2 Postmenopausal Never Smoker Emergency Contacts Name Discharge Info Relation Home Work Mobile 23 Mirtha Weaver Said CAREGIVER [3] Son [22] [phone] Fabiola He DISCHARGE CAREGIVER [3] Daughter [21] [phone] Juan Miguel Hobbs DISCHARGE CAREGIVER [3] Child [2] [phone] Patient Belongings The following personal items are in your possession at time of discharge: 
  Dental Appliances: None  Visual Aid: Glasses (glasses in purse per patient ) Please provide this summary of care documentation to your next provider. Signatures-by signing, you are acknowledging that this After Visit Summary has been reviewed with you and you have received a copy. Patient Signature:  ____________________________________________________________ Date:  ____________________________________________________________  
  
Orlean Amend Provider Signature:  ____________________________________________________________ Date:  ____________________________________________________________

## 2018-10-04 NOTE — PROCEDURES
NAME:  Arnaldo Dwyer   :   1945   MRN:   [id number]     Date/Time:  10/4/2018 9:35 AM    Esophagogastroduodenoscopy (EGD) Procedure Note    Procedure: Esophagogastroduodenoscopy with biopsy, esophageal dilation    Indication:  Dysphagia/odynophagia  Pre-operative Diagnosis: see indication above  Post-operative Diagnosis: see findings below  :  Sarika Jc MD  Referring Provider:   --Cole Rubio NP    Exam:  Airway: clear, no airway problems anticipated  Heart: RRR, without gallops or rubs  Lungs: clear bilaterally without wheezes, crackles, or rhonchi  Abdomen: soft, nontender, nondistended, bowel sounds present  Mental Status: awake, alert and oriented to person, place and time     Anethesia/Sedation:  MAC anesthesia Propofol  Procedure Details   After informed consent was obtained for the procedure, with all risks and benefits of procedure explained the patient was taken to the endoscopy suite and placed in the left lateral decubitus position. Following sequential administration of sedation as per above, the JCGA210 gastroscope was inserted into the mouth and advanced under direct vision to second portion of the duodenum. A careful inspection was made as the gastroscope was withdrawn, including a retroflexed view of the proximal stomach; findings and interventions are described below. Findings:   1. Presbyesophagus  2. Mild Schatzki's ring. Dilated with a 51 Fr Savary dilator  3. Mild, patchy, non-erosive gastropathy in body and antrum. Biopsied  4. Stomach otherwise normal, including retroflexion  5. Normal duodenal bulb and 2nd portion of the duodenum    Therapies:  1. Biopsies 2. Savary Dilation    Specimens: 1. Gastric biopsies    EBL:  None. Complications:   None; patient tolerated the procedure well. Impression:    1. Presbyesophagus  2. Mild Schatzki's ring. Dilated with a 51 Fr Savary dilator  3.  Mild, patchy, non-erosive gastropathy in body and antrum. Biopsied  4. Stomach otherwise normal, including retroflexion  5. Normal duodenal bulb and 2nd portion of the duodenum    Recommendations:  1. Follow up pathology  2. Follow up in GI clinic in 3-4 weeks  3.  Eat small bites, chew thoroughly, sit upright for 30-60 minutes after eating    Discharge disposition:  Home in the company of  when able to ambulate    Hari Bean MD

## 2018-10-04 NOTE — DISCHARGE INSTRUCTIONS
Deborah Julien  [id number]  1945    EGD DISCHARGE INSTRUCTIONS  Discomfort:  Sore throat- throat lozenges or warm salt water gargle  redness at IV site- apply warm compress to area; if redness or soreness persist- contact your physician  Gaseous discomfort- walking, belching will help relieve any discomfort  You may not operate a vehicle for 12 hours  You may not engage in an occupation involving machinery or appliances for rest of today  You may not drink alcoholic beverages for at least 12 hours  Avoid making any critical decisions for at least 24 hour  DIET  You may resume your regular diet - however -  remember your colon is empty and a heavy meal will produce gas. Avoid these foods:  vegetables, fried / greasy foods, carbonated drinks    MEDICATIONS:  Per Medication Reconciliation      ACTIVITY  You may resume your normal daily activities until tomorrow AM;  Spend the remainder of the day resting -  avoid any strenuous activity. CALL M.D. ANY SIGN OF   Increasing pain, nausea, vomiting  Abdominal distension (swelling)  New increased bleeding (oral or rectal)  Fever (chills)  Pain in chest area  Bloody discharge from nose or mouth  Shortness of breath    You may take any Advil, Aspirin, Ibuprofen, Motrin, Aleve, or Goodys for 10 days, ONLY  Tylenol as needed for pain. IMPRESSION:  Impression:    1. Presbyesophagus  2. Mild Schatzki's ring. Dilated with a 51 Fr Savary dilator  3. Mild, patchy, non-erosive gastropathy in body and antrum. Biopsied  4. Stomach otherwise normal, including retroflexion  5. Normal duodenal bulb and 2nd portion of the duodenum    Recommendations:  1. Follow up pathology  2. Follow up in GI clinic in 3-4 weeks  3.  Eat small bites, chew thoroughly, sit upright for 30-60 minutes after eating    Follow-up Instructions:   Call Dr. Chris Sultana for the results of procedure / biopsy in 7-10 days   Telephone #[phone]   Gonzalo Fuentes MD

## 2018-10-04 NOTE — ROUTINE PROCESS
Marcy Vincentll  1945  [id number]    Situation:  Verbal report received from: Manjula White. RN  Procedure: Procedure(s):  ESOPHAGOGASTRODUODENOSCOPY (EGD)  ESOPHAGEAL DILATION  ESOPHAGOGASTRODUODENAL (EGD) BIOPSY    Background:    Preoperative diagnosis: WEIGHT LOSS, DECREASED APPETITIE, NAUSEA, VOMITING, SCREENING, HISTORY DUODENAL ULCER, DYSPHAGIA  Postoperative diagnosis: SCHAZSKI'S RING, GASTRITIS    :  Dr. Radha Strange    Assistant(s): Endoscopy Technician-1: Moriah Best  Endoscopy RN-1: Jany Hercules RN    Specimens:   ID Type Source Tests Collected by Time Destination   1 : GASTRIC BIOPSY Preservative Gastric  Juaquin Gutierres MD 10/4/2018 0994 Pathology     H. Pylori  no    Assessment:  Intra-procedure medications       Anesthesia gave intra-procedure sedation and medications, see anesthesia flow sheet yes    Intravenous fluids: NS@ KVO     Vital signs stable       Abdominal assessment: round and soft       Recommendation:  Discharge patient per MD order  .     Family or Friend Alysia  Permission to share finding with family or friend yes

## 2018-10-04 NOTE — ANESTHESIA PREPROCEDURE EVALUATION
Anesthetic History No history of anesthetic complications Review of Systems / Medical History Patient summary reviewed, nursing notes reviewed and pertinent labs reviewed Pulmonary Within defined limits Neuro/Psych Comments: neuropathy  Cardiovascular Hypertension CHF Dysrhythmias : atrial fibrillation Pacemaker, CAD and hyperlipidemia Exercise tolerance: <4 METS Comments: Coronary stent x 3 Orthostatic hypotension Hx SVT  
  
GI/Hepatic/Renal 
  
GERD Renal disease: CRI Comments: Cholecystitis Hx Right sided Renal cell carcinoma Hx Diverticulitis Endo/Other Diabetes Arthritis and anemia Other Findings Comments: Wt loss 
 
rheumatoid arthritis Physical Exam 
 
Airway Mallampati: IV Neck ROM: normal range of motion Mouth opening: Diminished (comment) Comments: Small mouth opening Cardiovascular Regular rate and rhythm,  S1 and S2 normal,  no murmur, click, rub, or gallop Dental 
No notable dental hx Pulmonary Breath sounds clear to auscultation Abdominal 
GI exam deferred Other Findings Anesthetic Plan ASA: 3 Anesthesia type: general and total IV anesthesia Induction: Intravenous Anesthetic plan and risks discussed with: Patient

## 2018-10-05 ENCOUNTER — HOME CARE VISIT (OUTPATIENT)
Dept: SCHEDULING | Facility: HOME HEALTH | Age: 73
End: 2018-10-05
Payer: MEDICARE

## 2018-10-05 VITALS
SYSTOLIC BLOOD PRESSURE: 102 MMHG | OXYGEN SATURATION: 98 % | TEMPERATURE: 98 F | HEART RATE: 70 BPM | DIASTOLIC BLOOD PRESSURE: 77 MMHG

## 2018-10-05 VITALS
RESPIRATION RATE: 18 BRPM | TEMPERATURE: 97.9 F | HEART RATE: 70 BPM | OXYGEN SATURATION: 96 % | DIASTOLIC BLOOD PRESSURE: 60 MMHG | WEIGHT: 115 LBS | SYSTOLIC BLOOD PRESSURE: 100 MMHG | BODY MASS INDEX: 18.01 KG/M2

## 2018-10-05 VITALS
DIASTOLIC BLOOD PRESSURE: 70 MMHG | BODY MASS INDEX: 18.01 KG/M2 | SYSTOLIC BLOOD PRESSURE: 100 MMHG | RESPIRATION RATE: 16 BRPM | HEART RATE: 73 BPM | OXYGEN SATURATION: 98 % | WEIGHT: 115 LBS | TEMPERATURE: 98.4 F

## 2018-10-05 PROCEDURE — G0157 HHC PT ASSISTANT EA 15: HCPCS

## 2018-10-05 PROCEDURE — G0299 HHS/HOSPICE OF RN EA 15 MIN: HCPCS

## 2018-10-05 PROCEDURE — 3331090001 HH PPS REVENUE CREDIT

## 2018-10-05 PROCEDURE — 3331090002 HH PPS REVENUE DEBIT

## 2018-10-06 PROCEDURE — 3331090001 HH PPS REVENUE CREDIT

## 2018-10-06 PROCEDURE — 3331090002 HH PPS REVENUE DEBIT

## 2018-10-07 PROCEDURE — 3331090001 HH PPS REVENUE CREDIT

## 2018-10-07 PROCEDURE — 3331090002 HH PPS REVENUE DEBIT

## 2018-10-08 ENCOUNTER — HOME CARE VISIT (OUTPATIENT)
Dept: SCHEDULING | Facility: HOME HEALTH | Age: 73
End: 2018-10-08
Payer: MEDICARE

## 2018-10-08 PROCEDURE — 3331090001 HH PPS REVENUE CREDIT

## 2018-10-08 PROCEDURE — 3331090002 HH PPS REVENUE DEBIT

## 2018-10-08 PROCEDURE — G0152 HHCP-SERV OF OT,EA 15 MIN: HCPCS

## 2018-10-08 PROCEDURE — G0299 HHS/HOSPICE OF RN EA 15 MIN: HCPCS

## 2018-10-09 ENCOUNTER — HOME CARE VISIT (OUTPATIENT)
Dept: HOME HEALTH SERVICES | Facility: HOME HEALTH | Age: 73
End: 2018-10-09
Payer: MEDICARE

## 2018-10-09 ENCOUNTER — HOME CARE VISIT (OUTPATIENT)
Dept: SCHEDULING | Facility: HOME HEALTH | Age: 73
End: 2018-10-09
Payer: MEDICARE

## 2018-10-09 VITALS
OXYGEN SATURATION: 99 % | TEMPERATURE: 98.4 F | SYSTOLIC BLOOD PRESSURE: 102 MMHG | DIASTOLIC BLOOD PRESSURE: 62 MMHG | HEART RATE: 70 BPM | RESPIRATION RATE: 18 BRPM

## 2018-10-09 VITALS — TEMPERATURE: 98.4 F | HEART RATE: 78 BPM | OXYGEN SATURATION: 99 %

## 2018-10-09 PROCEDURE — G0151 HHCP-SERV OF PT,EA 15 MIN: HCPCS

## 2018-10-09 PROCEDURE — 3331090001 HH PPS REVENUE CREDIT

## 2018-10-09 PROCEDURE — 3331090002 HH PPS REVENUE DEBIT

## 2018-10-10 ENCOUNTER — HOME CARE VISIT (OUTPATIENT)
Dept: SCHEDULING | Facility: HOME HEALTH | Age: 73
End: 2018-10-10
Payer: MEDICARE

## 2018-10-10 VITALS
BODY MASS INDEX: 17.39 KG/M2 | WEIGHT: 111 LBS | HEART RATE: 74 BPM | SYSTOLIC BLOOD PRESSURE: 100 MMHG | OXYGEN SATURATION: 99 % | TEMPERATURE: 97.8 F | RESPIRATION RATE: 18 BRPM | DIASTOLIC BLOOD PRESSURE: 60 MMHG

## 2018-10-10 VITALS
OXYGEN SATURATION: 99 % | SYSTOLIC BLOOD PRESSURE: 132 MMHG | DIASTOLIC BLOOD PRESSURE: 78 MMHG | TEMPERATURE: 98.2 F | HEART RATE: 76 BPM

## 2018-10-10 PROCEDURE — 3331090002 HH PPS REVENUE DEBIT

## 2018-10-10 PROCEDURE — G0299 HHS/HOSPICE OF RN EA 15 MIN: HCPCS

## 2018-10-10 PROCEDURE — 3331090001 HH PPS REVENUE CREDIT

## 2018-10-11 ENCOUNTER — HOME CARE VISIT (OUTPATIENT)
Dept: SCHEDULING | Facility: HOME HEALTH | Age: 73
End: 2018-10-11
Payer: MEDICARE

## 2018-10-11 PROCEDURE — 3331090001 HH PPS REVENUE CREDIT

## 2018-10-11 PROCEDURE — 3331090002 HH PPS REVENUE DEBIT

## 2018-10-12 PROCEDURE — 3331090001 HH PPS REVENUE CREDIT

## 2018-10-12 PROCEDURE — 3331090002 HH PPS REVENUE DEBIT

## 2018-10-13 PROCEDURE — 3331090001 HH PPS REVENUE CREDIT

## 2018-10-13 PROCEDURE — 3331090002 HH PPS REVENUE DEBIT

## 2018-10-14 PROCEDURE — 3331090001 HH PPS REVENUE CREDIT

## 2018-10-14 PROCEDURE — 3331090002 HH PPS REVENUE DEBIT

## 2018-10-15 ENCOUNTER — HOME CARE VISIT (OUTPATIENT)
Dept: SCHEDULING | Facility: HOME HEALTH | Age: 73
End: 2018-10-15
Payer: MEDICARE

## 2018-10-15 VITALS
DIASTOLIC BLOOD PRESSURE: 60 MMHG | HEART RATE: 77 BPM | OXYGEN SATURATION: 99 % | RESPIRATION RATE: 16 BRPM | SYSTOLIC BLOOD PRESSURE: 100 MMHG | TEMPERATURE: 98 F | BODY MASS INDEX: 18.64 KG/M2 | WEIGHT: 119 LBS

## 2018-10-15 PROCEDURE — G0299 HHS/HOSPICE OF RN EA 15 MIN: HCPCS

## 2018-10-15 PROCEDURE — 3331090002 HH PPS REVENUE DEBIT

## 2018-10-15 PROCEDURE — 3331090001 HH PPS REVENUE CREDIT

## 2018-10-16 ENCOUNTER — HOME CARE VISIT (OUTPATIENT)
Dept: SCHEDULING | Facility: HOME HEALTH | Age: 73
End: 2018-10-16
Payer: MEDICARE

## 2018-10-16 VITALS
RESPIRATION RATE: 20 BRPM | WEIGHT: 119 LBS | OXYGEN SATURATION: 99 % | BODY MASS INDEX: 18.64 KG/M2 | DIASTOLIC BLOOD PRESSURE: 120 MMHG | TEMPERATURE: 98.1 F | SYSTOLIC BLOOD PRESSURE: 160 MMHG | HEART RATE: 70 BPM

## 2018-10-16 PROCEDURE — G0299 HHS/HOSPICE OF RN EA 15 MIN: HCPCS

## 2018-10-16 PROCEDURE — 3331090001 HH PPS REVENUE CREDIT

## 2018-10-16 PROCEDURE — 3331090002 HH PPS REVENUE DEBIT

## 2018-10-17 ENCOUNTER — HOME CARE VISIT (OUTPATIENT)
Dept: SCHEDULING | Facility: HOME HEALTH | Age: 73
End: 2018-10-17
Payer: MEDICARE

## 2018-10-17 PROCEDURE — 3331090002 HH PPS REVENUE DEBIT

## 2018-10-17 PROCEDURE — 3331090001 HH PPS REVENUE CREDIT

## 2018-10-18 ENCOUNTER — HOME CARE VISIT (OUTPATIENT)
Dept: SCHEDULING | Facility: HOME HEALTH | Age: 73
End: 2018-10-18
Payer: MEDICARE

## 2018-10-19 ENCOUNTER — HOME CARE VISIT (OUTPATIENT)
Dept: SCHEDULING | Facility: HOME HEALTH | Age: 73
End: 2018-10-19
Payer: MEDICARE

## 2018-10-22 ENCOUNTER — HOME CARE VISIT (OUTPATIENT)
Dept: SCHEDULING | Facility: HOME HEALTH | Age: 73
End: 2018-10-22
Payer: MEDICARE

## 2018-10-22 PROCEDURE — G0300 HHS/HOSPICE OF LPN EA 15 MIN: HCPCS

## 2018-10-23 ENCOUNTER — HOME CARE VISIT (OUTPATIENT)
Dept: SCHEDULING | Facility: HOME HEALTH | Age: 73
End: 2018-10-23
Payer: MEDICARE

## 2018-10-24 ENCOUNTER — HOME CARE VISIT (OUTPATIENT)
Dept: SCHEDULING | Facility: HOME HEALTH | Age: 73
End: 2018-10-24
Payer: MEDICARE

## 2018-10-24 VITALS
SYSTOLIC BLOOD PRESSURE: 126 MMHG | OXYGEN SATURATION: 99 % | RESPIRATION RATE: 18 BRPM | TEMPERATURE: 97.1 F | DIASTOLIC BLOOD PRESSURE: 96 MMHG | HEART RATE: 77 BPM

## 2018-10-24 PROCEDURE — G0300 HHS/HOSPICE OF LPN EA 15 MIN: HCPCS

## 2018-10-25 VITALS
SYSTOLIC BLOOD PRESSURE: 110 MMHG | OXYGEN SATURATION: 98 % | RESPIRATION RATE: 18 BRPM | DIASTOLIC BLOOD PRESSURE: 72 MMHG | HEART RATE: 70 BPM | TEMPERATURE: 98.1 F | BODY MASS INDEX: 18.64 KG/M2 | WEIGHT: 119 LBS

## 2018-10-30 ENCOUNTER — HOME CARE VISIT (OUTPATIENT)
Dept: SCHEDULING | Facility: HOME HEALTH | Age: 73
End: 2018-10-30
Payer: MEDICARE

## 2018-10-30 VITALS
DIASTOLIC BLOOD PRESSURE: 102 MMHG | WEIGHT: 122.25 LBS | BODY MASS INDEX: 19.15 KG/M2 | TEMPERATURE: 98.2 F | OXYGEN SATURATION: 99 % | RESPIRATION RATE: 18 BRPM | HEART RATE: 67 BPM | SYSTOLIC BLOOD PRESSURE: 150 MMHG

## 2018-10-30 PROCEDURE — G0300 HHS/HOSPICE OF LPN EA 15 MIN: HCPCS

## 2018-11-02 ENCOUNTER — HOME CARE VISIT (OUTPATIENT)
Dept: SCHEDULING | Facility: HOME HEALTH | Age: 73
End: 2018-11-02
Payer: MEDICARE

## 2018-11-02 PROCEDURE — G0300 HHS/HOSPICE OF LPN EA 15 MIN: HCPCS

## 2018-11-03 VITALS
OXYGEN SATURATION: 98 % | HEART RATE: 70 BPM | TEMPERATURE: 98.8 F | SYSTOLIC BLOOD PRESSURE: 132 MMHG | DIASTOLIC BLOOD PRESSURE: 88 MMHG | RESPIRATION RATE: 16 BRPM

## 2018-11-05 ENCOUNTER — HOME CARE VISIT (OUTPATIENT)
Dept: SCHEDULING | Facility: HOME HEALTH | Age: 73
End: 2018-11-05
Payer: MEDICARE

## 2018-11-05 VITALS
RESPIRATION RATE: 16 BRPM | SYSTOLIC BLOOD PRESSURE: 148 MMHG | TEMPERATURE: 98.5 F | HEART RATE: 85 BPM | DIASTOLIC BLOOD PRESSURE: 80 MMHG | OXYGEN SATURATION: 98 %

## 2018-11-05 PROCEDURE — G0300 HHS/HOSPICE OF LPN EA 15 MIN: HCPCS

## 2018-11-08 ENCOUNTER — HOME CARE VISIT (OUTPATIENT)
Dept: SCHEDULING | Facility: HOME HEALTH | Age: 73
End: 2018-11-08
Payer: MEDICARE

## 2018-11-08 PROCEDURE — G0300 HHS/HOSPICE OF LPN EA 15 MIN: HCPCS

## 2018-11-09 VITALS
HEART RATE: 70 BPM | DIASTOLIC BLOOD PRESSURE: 90 MMHG | OXYGEN SATURATION: 98 % | RESPIRATION RATE: 18 BRPM | TEMPERATURE: 98.9 F | SYSTOLIC BLOOD PRESSURE: 130 MMHG

## 2018-11-12 ENCOUNTER — HOME CARE VISIT (OUTPATIENT)
Dept: SCHEDULING | Facility: HOME HEALTH | Age: 73
End: 2018-11-12
Payer: MEDICARE

## 2018-11-12 VITALS
HEART RATE: 88 BPM | RESPIRATION RATE: 20 BRPM | TEMPERATURE: 98.6 F | BODY MASS INDEX: 18.01 KG/M2 | WEIGHT: 115 LBS | DIASTOLIC BLOOD PRESSURE: 72 MMHG | OXYGEN SATURATION: 99 % | SYSTOLIC BLOOD PRESSURE: 118 MMHG

## 2018-11-12 PROCEDURE — G0299 HHS/HOSPICE OF RN EA 15 MIN: HCPCS

## 2018-11-15 ENCOUNTER — HOME CARE VISIT (OUTPATIENT)
Dept: SCHEDULING | Facility: HOME HEALTH | Age: 73
End: 2018-11-15
Payer: MEDICARE

## 2018-11-15 PROCEDURE — G0300 HHS/HOSPICE OF LPN EA 15 MIN: HCPCS

## 2018-11-15 PROCEDURE — 400014 HH F/U

## 2018-11-19 ENCOUNTER — HOME CARE VISIT (OUTPATIENT)
Dept: SCHEDULING | Facility: HOME HEALTH | Age: 73
End: 2018-11-19
Payer: MEDICARE

## 2018-11-19 PROCEDURE — G0300 HHS/HOSPICE OF LPN EA 15 MIN: HCPCS

## 2018-11-20 VITALS
HEART RATE: 82 BPM | DIASTOLIC BLOOD PRESSURE: 60 MMHG | WEIGHT: 115 LBS | SYSTOLIC BLOOD PRESSURE: 110 MMHG | OXYGEN SATURATION: 98 % | TEMPERATURE: 98.5 F | BODY MASS INDEX: 18.01 KG/M2

## 2018-11-21 ENCOUNTER — HOME CARE VISIT (OUTPATIENT)
Dept: SCHEDULING | Facility: HOME HEALTH | Age: 73
End: 2018-11-21
Payer: MEDICARE

## 2018-11-26 ENCOUNTER — HOME CARE VISIT (OUTPATIENT)
Dept: SCHEDULING | Facility: HOME HEALTH | Age: 73
End: 2018-11-26
Payer: MEDICARE

## 2018-11-26 PROCEDURE — G0151 HHCP-SERV OF PT,EA 15 MIN: HCPCS

## 2018-11-27 ENCOUNTER — HOME CARE VISIT (OUTPATIENT)
Dept: HOME HEALTH SERVICES | Facility: HOME HEALTH | Age: 73
End: 2018-11-27
Payer: MEDICARE

## 2018-11-27 VITALS
TEMPERATURE: 98.6 F | SYSTOLIC BLOOD PRESSURE: 120 MMHG | DIASTOLIC BLOOD PRESSURE: 88 MMHG | HEART RATE: 70 BPM | OXYGEN SATURATION: 98 %

## 2018-11-29 ENCOUNTER — HOME CARE VISIT (OUTPATIENT)
Dept: SCHEDULING | Facility: HOME HEALTH | Age: 73
End: 2018-11-29
Payer: MEDICARE

## 2018-11-29 PROCEDURE — G0300 HHS/HOSPICE OF LPN EA 15 MIN: HCPCS

## 2018-11-30 ENCOUNTER — HOME CARE VISIT (OUTPATIENT)
Dept: SCHEDULING | Facility: HOME HEALTH | Age: 73
End: 2018-11-30
Payer: MEDICARE

## 2018-11-30 VITALS
SYSTOLIC BLOOD PRESSURE: 130 MMHG | RESPIRATION RATE: 18 BRPM | DIASTOLIC BLOOD PRESSURE: 90 MMHG | OXYGEN SATURATION: 98 % | HEART RATE: 73 BPM | WEIGHT: 116 LBS | TEMPERATURE: 97.3 F | BODY MASS INDEX: 18.17 KG/M2

## 2018-11-30 PROCEDURE — G0151 HHCP-SERV OF PT,EA 15 MIN: HCPCS

## 2018-12-01 VITALS
HEART RATE: 71 BPM | DIASTOLIC BLOOD PRESSURE: 90 MMHG | SYSTOLIC BLOOD PRESSURE: 140 MMHG | OXYGEN SATURATION: 98 % | TEMPERATURE: 98.1 F

## 2018-12-03 ENCOUNTER — HOME CARE VISIT (OUTPATIENT)
Dept: SCHEDULING | Facility: HOME HEALTH | Age: 73
End: 2018-12-03
Payer: MEDICARE

## 2018-12-03 PROCEDURE — G0151 HHCP-SERV OF PT,EA 15 MIN: HCPCS

## 2018-12-04 ENCOUNTER — HOME CARE VISIT (OUTPATIENT)
Dept: SCHEDULING | Facility: HOME HEALTH | Age: 73
End: 2018-12-04
Payer: MEDICARE

## 2018-12-04 VITALS — HEART RATE: 64 BPM | TEMPERATURE: 97.9 F | OXYGEN SATURATION: 93 %

## 2018-12-04 PROCEDURE — G0300 HHS/HOSPICE OF LPN EA 15 MIN: HCPCS

## 2018-12-05 VITALS
RESPIRATION RATE: 18 BRPM | DIASTOLIC BLOOD PRESSURE: 90 MMHG | OXYGEN SATURATION: 95 % | SYSTOLIC BLOOD PRESSURE: 140 MMHG | HEART RATE: 74 BPM | TEMPERATURE: 98.5 F

## 2018-12-06 ENCOUNTER — HOME CARE VISIT (OUTPATIENT)
Dept: SCHEDULING | Facility: HOME HEALTH | Age: 73
End: 2018-12-06
Payer: MEDICARE

## 2018-12-06 ENCOUNTER — HOME CARE VISIT (OUTPATIENT)
Dept: HOME HEALTH SERVICES | Facility: HOME HEALTH | Age: 73
End: 2018-12-06
Payer: MEDICARE

## 2018-12-06 PROCEDURE — G0300 HHS/HOSPICE OF LPN EA 15 MIN: HCPCS

## 2018-12-07 VITALS
RESPIRATION RATE: 18 BRPM | OXYGEN SATURATION: 97 % | TEMPERATURE: 98.9 F | DIASTOLIC BLOOD PRESSURE: 90 MMHG | HEART RATE: 70 BPM | SYSTOLIC BLOOD PRESSURE: 139 MMHG

## 2018-12-11 ENCOUNTER — HOME CARE VISIT (OUTPATIENT)
Dept: SCHEDULING | Facility: HOME HEALTH | Age: 73
End: 2018-12-11
Payer: MEDICARE

## 2018-12-11 VITALS
TEMPERATURE: 99.6 F | SYSTOLIC BLOOD PRESSURE: 138 MMHG | DIASTOLIC BLOOD PRESSURE: 80 MMHG | HEART RATE: 78 BPM | OXYGEN SATURATION: 98 % | RESPIRATION RATE: 18 BRPM

## 2018-12-11 PROCEDURE — G0300 HHS/HOSPICE OF LPN EA 15 MIN: HCPCS

## 2018-12-14 ENCOUNTER — HOME CARE VISIT (OUTPATIENT)
Dept: SCHEDULING | Facility: HOME HEALTH | Age: 73
End: 2018-12-14
Payer: MEDICARE

## 2018-12-14 VITALS
RESPIRATION RATE: 18 BRPM | SYSTOLIC BLOOD PRESSURE: 130 MMHG | DIASTOLIC BLOOD PRESSURE: 90 MMHG | HEART RATE: 78 BPM | OXYGEN SATURATION: 98 % | TEMPERATURE: 99.5 F

## 2018-12-14 PROCEDURE — G0300 HHS/HOSPICE OF LPN EA 15 MIN: HCPCS

## 2018-12-18 ENCOUNTER — HOME CARE VISIT (OUTPATIENT)
Dept: SCHEDULING | Facility: HOME HEALTH | Age: 73
End: 2018-12-18
Payer: MEDICARE

## 2018-12-18 PROCEDURE — G0300 HHS/HOSPICE OF LPN EA 15 MIN: HCPCS

## 2018-12-20 ENCOUNTER — HOME CARE VISIT (OUTPATIENT)
Dept: HOME HEALTH SERVICES | Facility: HOME HEALTH | Age: 73
End: 2018-12-20
Payer: MEDICARE

## 2018-12-20 VITALS
SYSTOLIC BLOOD PRESSURE: 130 MMHG | TEMPERATURE: 98.5 F | OXYGEN SATURATION: 98 % | DIASTOLIC BLOOD PRESSURE: 100 MMHG | HEART RATE: 67 BPM

## 2018-12-21 ENCOUNTER — HOME CARE VISIT (OUTPATIENT)
Dept: HOME HEALTH SERVICES | Facility: HOME HEALTH | Age: 73
End: 2018-12-21
Payer: MEDICARE

## 2018-12-27 ENCOUNTER — HOME CARE VISIT (OUTPATIENT)
Dept: SCHEDULING | Facility: HOME HEALTH | Age: 73
End: 2018-12-27
Payer: MEDICARE

## 2018-12-27 ENCOUNTER — HOME CARE VISIT (OUTPATIENT)
Dept: HOME HEALTH SERVICES | Facility: HOME HEALTH | Age: 73
End: 2018-12-27
Payer: MEDICARE

## 2018-12-27 PROCEDURE — G0299 HHS/HOSPICE OF RN EA 15 MIN: HCPCS

## 2018-12-28 VITALS
DIASTOLIC BLOOD PRESSURE: 60 MMHG | SYSTOLIC BLOOD PRESSURE: 120 MMHG | OXYGEN SATURATION: 99 % | TEMPERATURE: 98.8 F | HEART RATE: 79 BPM | RESPIRATION RATE: 18 BRPM

## 2022-10-08 NOTE — DISCHARGE INSTRUCTIONS
Heart Failure: Care Instructions  Your Care Instructions    Heart failure occurs when your heart does not pump as much blood as the body needs. Failure does not mean that the heart has stopped pumping but rather that it is not pumping as well as it should. Over time, this causes fluid buildup in your lungs and other parts of your body. Fluid buildup can cause shortness of breath, fatigue, swollen ankles, and other problems. By taking medicines regularly, reducing sodium (salt) in your diet, checking your weight every day, and making lifestyle changes, you can feel better and live longer. Follow-up care is a key part of your treatment and safety. Be sure to make and go to all appointments, and call your doctor if you are having problems. It's also a good idea to know your test results and keep a list of the medicines you take. How can you care for yourself at home? Medicines    · Be safe with medicines. Take your medicines exactly as prescribed. Call your doctor if you think you are having a problem with your medicine.     · Do not take any vitamins, over-the-counter medicine, or herbal products without talking to your doctor first. Bukervinh Lota not take ibuprofen (Advil or Motrin) and naproxen (Aleve) without talking to your doctor first. They could make your heart failure worse.     · You may be taking some of the following medicine. ¨ Beta-blockers can slow heart rate, decrease blood pressure, and improve your condition. Taking a beta-blocker may lower your chance of needing to be hospitalized. ¨ Angiotensin-converting enzyme inhibitors (ACEIs) reduce the heart's workload, lower blood pressure, and reduce swelling. Taking an ACEI may lower your chance of needing to be hospitalized again. ¨ Angiotensin II receptor blockers (ARBs) work like ACEIs. Your doctor may prescribe them instead of ACEIs. ¨ Diuretics, also called water pills, reduce swelling.   ¨ Potassium supplements replace this important mineral, which is sometimes lost with diuretics. ¨ Aspirin and other blood thinners prevent blood clots, which can cause a stroke or heart attack.    You will get more details on the specific medicines your doctor prescribes. Diet    · Your doctor may suggest that you limit sodium to 2,000 milligrams (mg) a day or less. That is less than 1 teaspoon of salt a day, including all the salt you eat in cooking or in packaged foods. People get most of their sodium from processed foods. Fast food and restaurant meals also tend to be very high in sodium.     · Ask your doctor how much liquid you can drink each day. You may have to limit liquids.    Weight    · Weigh yourself without clothing at the same time each day. Record your weight. Call your doctor if you have a sudden weight gain, such as more than 2 to 3 pounds in a day or 5 pounds in a week. (Your doctor may suggest a different range of weight gain.) A sudden weight gain may mean that your heart failure is getting worse.    Activity level    · Start light exercise (if your doctor says it is okay). Even if you can only do a small amount, exercise will help you get stronger, have more energy, and manage your weight and your stress. Walking is an easy way to get exercise. Start out by walking a little more than you did before. Bit by bit, increase the amount you walk.     · When you exercise, watch for signs that your heart is working too hard. You are pushing yourself too hard if you cannot talk while you are exercising. If you become short of breath or dizzy or have chest pain, stop, sit down, and rest.     · If you feel \"wiped out\" the day after you exercise, walk slower or for a shorter distance until you can work up to a better pace.     · Get enough rest at night. Sleeping with 1 or 2 pillows under your upper body and head may help you breathe easier.    Lifestyle changes    · Do not smoke. Smoking can make a heart condition worse.  If you need help quitting, talk to your doctor about stop-smoking programs and medicines. These can increase your chances of quitting for good. Quitting smoking may be the most important step you can take to protect your heart.     · Limit alcohol to 2 drinks a day for men and 1 drink a day for women. Too much alcohol can cause health problems.     · Avoid getting sick from colds and the flu. Get a pneumococcal vaccine shot. If you have had one before, ask your doctor whether you need another dose. Get a flu shot each year. If you must be around people with colds or the flu, wash your hands often. When should you call for help? Call 911 if you have symptoms of sudden heart failure such as:    · You have severe trouble breathing.     · You cough up pink, foamy mucus.     · You have a new irregular or rapid heartbeat.    Call your doctor now or seek immediate medical care if:    · You have new or increased shortness of breath.     · You are dizzy or lightheaded, or you feel like you may faint.     · You have sudden weight gain, such as more than 2 to 3 pounds in a day or 5 pounds in a week. (Your doctor may suggest a different range of weight gain.)     · You have increased swelling in your legs, ankles, or feet.     · You are suddenly so tired or weak that you cannot do your usual activities.    Watch closely for changes in your health, and be sure to contact your doctor if you develop new symptoms. Where can you learn more? Go to http://nohemy-josé miguel.info/. Enter W064 in the search box to learn more about \"Heart Failure: Care Instructions. \"  Current as of: May 10, 2017  Content Version: 11.7  © 8663-1642 Healthwise, Incorporated. Care instructions adapted under license by [employer] (which disclaims liability or warranty for this information).  If you have questions about a medical condition or this instruction, always ask your healthcare professional. [address] any warranty or liability for your use of this information. Urinary Tract Infection in Women: Care Instructions  Your Care Instructions    A urinary tract infection, or UTI, is a general term for an infection anywhere between the kidneys and the urethra (where urine comes out). Most UTIs are bladder infections. They often cause pain or burning when you urinate. UTIs are caused by bacteria and can be cured with antibiotics. Be sure to complete your treatment so that the infection goes away. Follow-up care is a key part of your treatment and safety. Be sure to make and go to all appointments, and call your doctor if you are having problems. It's also a good idea to know your test results and keep a list of the medicines you take. How can you care for yourself at home? · Take your antibiotics as directed. Do not stop taking them just because you feel better. You need to take the full course of antibiotics. · Drink extra water and other fluids for the next day or two. This may help wash out the bacteria that are causing the infection. (If you have kidney, heart, or liver disease and have to limit fluids, talk with your doctor before you increase your fluid intake.)  · Avoid drinks that are carbonated or have caffeine. They can irritate the bladder. · Urinate often. Try to empty your bladder each time. · To relieve pain, take a hot bath or lay a heating pad set on low over your lower belly or genital area. Never go to sleep with a heating pad in place. To prevent UTIs  · Drink plenty of water each day. This helps you urinate often, which clears bacteria from your system. (If you have kidney, heart, or liver disease and have to limit fluids, talk with your doctor before you increase your fluid intake.)  · Urinate when you need to. · Urinate right after you have sex. · Change sanitary pads often. · Avoid douches, bubble baths, feminine hygiene sprays, and other feminine hygiene products that have deodorants.   · After going to the bathroom, wipe The patient is a 88y Female complaining of abdominal pain. from front to back. When should you call for help? Call your doctor now or seek immediate medical care if:    · Symptoms such as fever, chills, nausea, or vomiting get worse or appear for the first time.     · You have new pain in your back just below your rib cage. This is called flank pain.     · There is new blood or pus in your urine.     · You have any problems with your antibiotic medicine.    Watch closely for changes in your health, and be sure to contact your doctor if:    · You are not getting better after taking an antibiotic for 2 days.     · Your symptoms go away but then come back. Where can you learn more? Go to http://nohemy-josé miguel.info/. Enter C138 in the search box to learn more about \"Urinary Tract Infection in Women: Care Instructions. \"  Current as of: May 12, 2017  Content Version: 11.7  © 2495-8653 [employer]. Care instructions adapted under license by [employer] (which disclaims liability or warranty for this information). If you have questions about a medical condition or this instruction, always ask your healthcare professional. [address] any warranty or liability for your use of this information. Wantful Activation    Thank you for requesting access to Wantful. Please follow the instructions below to securely access and download your online medical record. Wantful allows you to send messages to your doctor, view your test results, renew your prescriptions, schedule appointments, and more. How Do I Sign Up? 1. In your internet browser, go to [email or web address]  2. Click on the First Time User? Click Here link in the Sign In box. You will be redirect to the New Member Sign Up page. 3. Enter your Wantful Access Code exactly as it appears below. You will not need to use this code after youve completed the sign-up process.  If you do not sign up before the expiration date, you must request a new code.    Upworthy Access Code: [email or web address]  Expires: 2018  3:13 PM (This is the date your Upworthy access code will )    4. Enter the last four digits of your Social Security Number (xxxx) and Date of Birth (mm/dd/yyyy) as indicated and click Submit. You will be taken to the next sign-up page. 5. Create a PlayLabt ID. This will be your Upworthy login ID and cannot be changed, so think of one that is secure and easy to remember. 6. Create a Upworthy password. You can change your password at any time. 7. Enter your Password Reset Question and Answer. This can be used at a later time if you forget your password. 8. Enter your e-mail address. You will receive e-mail notification when new information is available in [address]. 9. Click Sign Up. You can now view and download portions of your medical record. 10. Click the Download Summary menu link to download a portable copy of your medical information. Additional Information    If you have questions, please visit the Frequently Asked Questions section of the Upworthy website at [email or web address] Remember, Upworthy is NOT to be used for urgent needs. For medical emergencies, dial 911.

## 2023-08-29 NOTE — ADT AUTH CERT NOTES
Problem: Fluid Volume Excess  Goal: # Oxygenation is maintained (SpO2 greater than or equal to 90% or as ordered)  Outcome: Outcome Met, Continue evaluating goal progress toward completion  Goal: # Verbalizes understanding of FVE management plan  Description: Document on Patient Education Activity  Outcome: Outcome Met, Continue evaluating goal progress toward completion     Problem: Fluid Volume Excess  Goal: # Fluid Volume Excess Symptoms Resolved  Description: Treatment often consists of oxygen and respiratory support with diuretic therapy at doses that exceed usual dose (typically doubled).  Monitor patient response to treatment.    Acute dyspnea should resolve quickly if dose is adequate and kidney function is adequate. Dyspnea/SOB should only be observed with Activity after effective treatment. Patient should be able to lie down comfortably, without SOB.  Outcome: Outcome Not Met, Continue to Monitor        Patient Demographics        Patient Name [address] Sex  Address Phone       Shauna Mack [id number] Female 1945 [address]  [facility] [address] [phone] (Home) *Preferred*  [phone] (Mobile)           CSN:       [id number]           Admit Date: Admit Time Room Bed       2018  4:17 PM 2215 [786] 01 [[id number]]           Attending Providers        Provider Pager From To       [facility] Mauro. Julieta Wright MD  18       Heena Casillas MD  04/06/18 04/10/18           Emergency Contact(s)        Name Relation Home Work Mobile       Natalie Salinas Son [phone]       Yuridia Espino Daughter [phone]         Sharyn [place] [phone]           Utilization Review           addit clin  by Benji Ross        Review Status Review Entered       In Primary 2018       Details         Admission  at . [facility]. [facility] and Acute on chronic CHF.      - PER ED RN; pain woke her up from sleep and localizes pain over her pacemaker. Patient reports decrease in pain from 10/10 to 8/10 after taking 4 baby aspirin. Patient reports being under an increased amount of stress recently,  stating her son was incarcerated yesterday. Patient's BP elevated en route   and on arrival, patient reports she has not taken her BP medications in about   3 days. Patient also reports seeing her PCP yesterday .      -PER ED MD;   cc of gradual onset, moderate, intermittent left sided chest pain with   associated SOB and nausea that has been ongoing for several weeks and worsened   today. The pain, described as pressure, is located at her ICD site which was   placed 1 year ago by Dr. Alicia Avalos. The pain will radiate up and down her   sternum. She notes SOB, worsened with exertion since onset of the chest pain. In the ED, pt has no pain but is SOB. She was given ASA PTA by EMS with some   relief, no NTG. Pt notes episodes of emesis on 4/3-2018.  Pt was seen at   [facility] ED on 4/1/2018 for similar sxs.              Additional Notes       EKG; Normal sinus rhythm 94.        Possible Left atrial enlargement .       Left ventricular hypertrophy with repolarization abnormality .              When compared with ECG of 01-APR-2018 10:23,        No significant change was found.           Angina - Care Day 2 (4/7/2018) by Lindsey Palencia        Review Status Review Entered       Completed 4/11/2018       Details              Care Day: 2 Care Date: 4/7/2018 Level of Care: Telemetry       Guideline Day 2        Clinical Status       ( ) * Hemodynamic stability       4/11/2018 12:21 PM EDT by Camila Phoenix         VS; 97.7-80-20, 170/100 , 141/88.  SAT 98 ra.              (X) * Dangerous arrhythmia absent       4/11/2018 12:21 PM EDT by Camila Phoenix         NSR              (X) * Chest pain, dyspnea, or anginal equivalent absent       (X) * No evidence of bleeding or myocardial ischemia       ( ) * Vascular access site without evidence of infection, aneurysm, or growing hematoma       ( ) * Renal function at baseline or acceptable for next level of care       ( ) * Discharge plans and education understood              Activity       (X) * Ambulatory       4/11/2018 12:27 PM EDT by Camila Phoenix         AMB TO RESTROOM                     Routes       (X) * Oral hydration, medications, and diet       (X) Heart-healthy diet       4/11/2018 12:27 PM EDT by Camila Phoenix         CARD DIET                     Interventions       (X) * Oxygen absent       (X) Possible cardiac monitoring       4/11/2018 12:27 PM EDT by Camila Phoenix         TELE                     Medications       (X) * Anticoagulation absent or arranged for next level of care       (X) Aspirin       4/11/2018 12:27 PM EDT by Camila Phoenix         ASA 81 mg po daily,              (X) Possible nitrates, beta-blocker, statin, and ACE inhibitor       4/11/2018 12:27 PM EDT by Camila Phoenix         MEDS;  lipitor po q hs, bumex 1 mg po daily,  coreg 6.5 mg received and then increased in pm to to 12.5 mg po bid,  Lovenox sc 40 mg q 24h, neurontin po 300 mg tid,  Apresolioone 50 mg po tid, imdur po daily 30 mg .                     4/11/2018 12:27 PM EDT by Zafar Vaughn       Subject: Additional Clinical Information       -------------No labs or imaging 4/7-----------PER RN ; Diminished Breath sounds with crackles bilaterally.  -----------                                          * Milestone              Additional Notes       ----per CARDIOLOGY;        Angina       Acute on chronic systolic heart failure.        Ischemic Cardiomyopathy.    LV EF 25%        CAD        Remote MI       Remote PCI 2005        S/p Nephrectomy for renal cell CA        CKD stage 3.        GERD        Anemia.        Diabetes type 2.        Hypertension uncontrolled.                4/7  BP is under some better control but needs further adjustment.                                       Plan:         Increase Coreg dose.        Amlodipine added.        Continue diuresis. -----------

## 2023-09-21 NOTE — PROGRESS NOTES
Called and lvm to give directions for visit with Dr. Warner   RICHARD spoke with Wander Price at [facility] and [address] [place] [address] @ [phone]. Wander Price expressed she has room all set for patient for today. RICHARD has notified Dr. Lety Mckeon. Will be discharged at 2pm.     RICHARD spoke with pt's son Ariana Will regarding transportation to [facility] and [address] [place] [address].  Son will transport and will be here at [facility]  X [phone]

## 2024-05-09 NOTE — PROGRESS NOTES
Nutrition Assessment:    RECOMMENDATIONS:   Continue Gi Lite diet  RD to add Ensure Clear TID  Encourage antiemetics prn    DIETITIANS INTERVENTIONS/PLAN:   Continue diet as tolerated  Provided menu and encouraged pt to order  Add PO supplements  Monitor appetite/PO intake     ASSESSMENT:   Pt admitted with diverticulitis. PMH: CAD, GERD, HTN. Chart reviewed, pt sitting up in bed awake and alert. She is s/p cholecystostomy placement. She has been on a liquid diet and was upgraded today to GI Lite although she is afraid to try solids and she c/o nausea. Noted she has not taken any zofran over the past couple of days per EMR. I provided pt a menu and encouraged her to order what she feels she can tolerate best.  She states she tried a chocolate ice cream today and felt nauseous. She is open to trying a PO supplements, and agreed that a clear liquid based ONS would be best given her nausea. SUBJECTIVE/OBJECTIVE:   \"I tried ice cream and now my stomach hurts\"  Diet Order: Clear liquids  % Eaten:  Patient Vitals for the past 72 hrs:   % Diet Eaten   01/28/17 0800 40 %     Pertinent Medications:colace, levaquin, flagyl, protonix, senokot; Annabelle@[employer]). Chemistries:  Lab Results   Component Value Date/Time    Sodium 133 01/30/2017 03:31 AM    Potassium 3.5 01/30/2017 03:31 AM    Chloride 100 01/30/2017 03:31 AM    CO2 22 01/30/2017 03:31 AM    Anion gap 11 01/30/2017 03:31 AM    Glucose 78 01/30/2017 03:31 AM    BUN 26 01/30/2017 03:31 AM    Creatinine 1.30 01/30/2017 03:31 AM    BUN/Creatinine ratio 20 01/30/2017 03:31 AM    GFR est AA 49 01/30/2017 03:31 AM    GFR est non-AA 40 01/30/2017 03:31 AM    Calcium 7.2 01/30/2017 03:31 AM    ALT 6 01/29/2017 04:54 AM    AST 16 01/29/2017 04:54 AM    Alk.  phosphatase 149 01/29/2017 04:54 AM    Protein, total 5.4 01/29/2017 04:54 AM    Albumin 1.8 01/29/2017 04:54 AM    Globulin 3.6 01/29/2017 04:54 AM    A-G Ratio 0.5 01/29/2017 04:54 AM      Anthropometrics: Height: 5' 7\" (170.2 cm) Weight: 73.1 kg (161 lb 2.5 oz)    IBW (%IBW):   ( ) UBW (%UBW):   (  %)    BMI: Body mass index is 25.24 kg/(m^2). This BMI is indicative of:  []Underweight   [x]Normal(for age)   []Overweight   [] Obesity   [] Extreme Obesity (BMI>40)  Estimated Nutrition Needs (Based on): 1669 Kcals/day (MSJ 1284 x 1.3) , 58 g (0.8gPro/kg) Protein  Carbohydrate: At Least 130 g/day  Fluids: 1700 mL/day    Last BM: 1/30   [x]Active     []Hyperactive  []Hypoactive       [] Absent   BS  Skin:    [] Intact   [x] Incision  [] Breakdown   [] DTI   [] Tears/Excoriation/Abrasion  [x]Edema(+1-BLE) [] Other: Wt Readings from Last 30 Encounters:   01/26/17 73.1 kg (161 lb 2.5 oz)   01/17/17 66.9 kg (147 lb 8 oz)   12/22/16 68.2 kg (150 lb 5.7 oz)   12/14/16 70.1 kg (154 lb 8.7 oz)   10/21/16 83.9 kg (185 lb)   10/15/16 84.4 kg (186 lb)   10/03/16 83.8 kg (184 lb 11.9 oz)   04/11/15 89.4 kg (197 lb)   06/01/14 88.3 kg (194 lb 10.7 oz)   06/08/13 83.7 kg (184 lb 8.4 oz)   03/02/12 91.1 kg (200 lb 13.4 oz)      NUTRITION DIAGNOSES:   Problem:  Inadequate oral food/beverage intake      Etiology: related to c/o nausea and pt not ready to try solid food      Signs/Symptoms: as evidenced by PO intake <50% of meals. NUTRITION INTERVENTIONS:  Meals/Snacks: General/healthful diet   Supplements: Commercial supplement              GOAL:   Pt will consume >50% of meals/supplements in 2-4 days.      Cultural, Scientology, or Ethnic Dietary Needs: None     LEARNING NEEDS (Diet, Food/Nutrient-Drug Interaction):    [x] None Identified   [] Identified and Education Provided/Documented   [] Identified and Pt declined/was not appropriate      [x] Interdisciplinary Care Plan Reviewed/Documented    [x] Participated in Discharge Planning: TBD   [] Interdisciplinary Rounds     NUTRITION RISK:    [x] High              [x] Moderate           []  Low  []  Minimal/Uncompromised    PT SEEN FOR:    [x]  MD Consult: []Calorie Count []Diabetic Diet Education        []Diet Education     []Electrolyte Management     [x]General Nutrition Management and Supplements     []Management of Tube Feeding     []TPN Recommendations    []  RN Referral:  []MST score >=2     []Enteral/Parenteral Nutrition PTA     []Pregnant: Gestational DM or Multigestation   []  Low BMI  []  DTR Referral       Ela Ramos RD  Pager [phone]  Weekend Pager [phone] room air

## (undated) DEVICE — DERMABOND SKIN ADH 0.7ML -- DERMABOND ADVANCED 12/BX

## (undated) DEVICE — Device

## (undated) DEVICE — CLIP APPLIER WITH CLIP LOGIC TECHNOLOGY: Brand: ENDO CLIP III

## (undated) DEVICE — REM POLYHESIVE ADULT PATIENT RETURN ELECTRODE: Brand: VALLEYLAB

## (undated) DEVICE — DRAIN SURG W10MMXL20CM SIL FULL PERF HUBLESS FLAT RADPQ

## (undated) DEVICE — 1200 GUARD II KIT W/5MM TUBE W/O VAC TUBE: Brand: GUARDIAN

## (undated) DEVICE — STERILE POLYISOPRENE POWDER-FREE SURGICAL GLOVES: Brand: PROTEXIS

## (undated) DEVICE — SYR 10ML LUER LOK 1/5ML GRAD --

## (undated) DEVICE — DEVICE SUT VLT PRELD W/ POLYSRB 2-0 L48IN SUT DISP FOR LAP

## (undated) DEVICE — SYR 3ML LL TIP 1/10ML GRAD --

## (undated) DEVICE — SUTURING DEVICE: Brand: ENDO STITCH

## (undated) DEVICE — UNIVERSAL FIXATION CANNULA: Brand: VERSAONE

## (undated) DEVICE — DEVON™ KNEE AND BODY STRAP 60" X 3" (1.5 M X 7.6 CM): Brand: DEVON

## (undated) DEVICE — NEEDLE HYPO 18GA L1.5IN PNK S STL HUB POLYPR SHLD REG BVL

## (undated) DEVICE — KENDALL SCD EXPRESS SLEEVES, KNEE LENGTH, MEDIUM: Brand: KENDALL SCD

## (undated) DEVICE — MEDI-VAC YANK SUCT HNDL W/TPRD BULBOUS TIP: Brand: CARDINAL HEALTH

## (undated) DEVICE — SOLIDIFIER MEDC 1200ML -- CONVERT TO 356117

## (undated) DEVICE — BASIN EMSIS 16OZ GRAPHITE PLAS KID SHP MOLD GRAD FOR ORAL

## (undated) DEVICE — (D)SYR 10ML 1/5ML GRAD NSAF -- PKGING CHANGE USE ITEM 338027

## (undated) DEVICE — KIT INFECTION CTRL ST FRAN --

## (undated) DEVICE — ENDOLOOP SUT LIGATURE 18IN -- 12/BX PDS II

## (undated) DEVICE — NEONATAL-ADULT SPO2 SENSOR: Brand: NELLCOR

## (undated) DEVICE — SOLUTION IV 500ML 0.9% SOD CHL FLX CONT

## (undated) DEVICE — KENDALL RADIOLUCENT FOAM MONITORING ELECTRODE RECTANGULAR SHAPE: Brand: KENDALL

## (undated) DEVICE — AMD ANTIMICROBIAL DRAIN SPONGES, 6 PLY, 0.2% POLYHEXAMETHYLENE BIGUANIDE HCI (PHMB): Brand: EXCILON

## (undated) DEVICE — KIT CHOLGM POLYUR W/ KARLAN BLLN CATH 4FR L60CM 5MM INTRO

## (undated) DEVICE — CATHETER URET 4FR L70CM POLYUR OLV FLX TIP KINK RESIST W/

## (undated) DEVICE — APPLIER RMFG CLIP R MED/LG --

## (undated) DEVICE — APPLIER LIG CLP L13IN 10MM PSTL GRP CONTAIN 15 TI L CLP

## (undated) DEVICE — SUTURE SZ 0 27IN 5/8 CIR UR-6  TAPER PT VIOLET ABSRB VICRYL J603H

## (undated) DEVICE — SPECIMEN RETRIEVAL POUCH: Brand: ENDO CATCH GOLD

## (undated) DEVICE — SUTURE VCRL SZ 4-0 L27IN ABSRB UD L19MM PS-2 3/8 CIR PRIM J426H

## (undated) DEVICE — HANDLE LT SNAP ON ULT DURABLE LENS FOR TRUMPF ALC DISPOSABLE

## (undated) DEVICE — BLOCK BITE ENDOSCP AD 21 MM W/ DIL BLU LF DISP

## (undated) DEVICE — SURGICAL PROCEDURE KIT GEN LAPAROSCOPY LF

## (undated) DEVICE — SUT ETHLN 3-0 18IN PS1 BLK --

## (undated) DEVICE — SET ADMIN 16ML TBNG L100IN 2 Y INJ SITE IV PIGGY BK DISP

## (undated) DEVICE — BLADELESS OPTICAL TROCAR WITH FIXATION CANNULA: Brand: VERSAONE

## (undated) DEVICE — CATH IV AUTOGRD BC PNK 20GA 25 -- INSYTE

## (undated) DEVICE — BAG SPEC BIOHZRD 10 X 10 IN --

## (undated) DEVICE — NEEDLE HYPO 22GA L1.5IN BLK S STL HUB POLYPR SHLD REG BVL

## (undated) DEVICE — TOWEL 4 PLY TISS 19X30 SUE WHT

## (undated) DEVICE — CONTAINER SPEC 20 ML LID NEUT BUFF FORMALIN 10 % POLYPR STS

## (undated) DEVICE — POLYESTER SINGLE STITCH RELOAD: Brand: SURGIDAC

## (undated) DEVICE — (D)PREP SKN CHLRAPRP APPL 26ML -- CONVERT TO ITEM 371833

## (undated) DEVICE — BLADELESS OPTICAL TROCAR WITH FIXATION CANNULA: Brand: VERSAPORT

## (undated) DEVICE — FORCEPS BX L160CM DIA8MM GRSP DISECT CUP TIP NONLOCKING ROT

## (undated) DEVICE — Z DISCONTINUED PER MEDLINE LINE GAS SAMPLING O2/CO2 LNG AD 13 FT NSL W/ TBNG FILTERLINE

## (undated) DEVICE — TROCAR SITE CLOSURE DEVICE: Brand: ENDO CLOSE

## (undated) DEVICE — ELECTRODE ES 36CM LAP FLAT L HK COAT DISP CLEANCOAT

## (undated) DEVICE — Z CONVERTED USE 2107985 COVER FLROSCP W36XL28IN 4 SIDE ADH